# Patient Record
Sex: FEMALE | Race: WHITE | Employment: UNEMPLOYED | URBAN - METROPOLITAN AREA
[De-identification: names, ages, dates, MRNs, and addresses within clinical notes are randomized per-mention and may not be internally consistent; named-entity substitution may affect disease eponyms.]

---

## 2017-05-03 PROBLEM — C79.51 SECONDARY CANCER OF BONE (HCC): Status: ACTIVE | Noted: 2017-05-03

## 2017-05-03 PROBLEM — F32.0 MILD SINGLE CURRENT EPISODE OF MAJOR DEPRESSIVE DISORDER (HCC): Status: ACTIVE | Noted: 2017-05-03

## 2017-05-03 PROBLEM — C50.919 METASTATIC BREAST CANCER (HCC): Status: ACTIVE | Noted: 2017-05-03

## 2018-02-12 ENCOUNTER — APPOINTMENT (OUTPATIENT)
Dept: GENERAL RADIOLOGY | Age: 63
DRG: 872 | End: 2018-02-12
Attending: EMERGENCY MEDICINE
Payer: MEDICARE

## 2018-02-12 ENCOUNTER — HOSPITAL ENCOUNTER (INPATIENT)
Age: 63
LOS: 7 days | Discharge: NURSING, REHAB OR LONG-TERM CARE | DRG: 872 | End: 2018-02-19
Attending: EMERGENCY MEDICINE | Admitting: FAMILY MEDICINE
Payer: MEDICARE

## 2018-02-12 DIAGNOSIS — A41.9 SEPSIS, DUE TO UNSPECIFIED ORGANISM: Primary | ICD-10-CM

## 2018-02-12 LAB
ALBUMIN SERPL-MCNC: 3.7 G/DL (ref 3.5–5)
ALBUMIN/GLOB SERPL: 1 {RATIO} (ref 1.1–2.2)
ALP SERPL-CCNC: 79 U/L (ref 45–117)
ALT SERPL-CCNC: 25 U/L (ref 12–78)
ANION GAP SERPL CALC-SCNC: 14 MMOL/L (ref 5–15)
APPEARANCE UR: ABNORMAL
AST SERPL-CCNC: 15 U/L (ref 15–37)
ATRIAL RATE: 113 BPM
BACTERIA URNS QL MICRO: ABNORMAL /HPF
BASOPHILS # BLD: 0 K/UL (ref 0–0.1)
BASOPHILS NFR BLD: 0 % (ref 0–1)
BILIRUB SERPL-MCNC: 0.6 MG/DL (ref 0.2–1)
BILIRUB UR QL: NEGATIVE
BUN SERPL-MCNC: 24 MG/DL (ref 6–20)
BUN/CREAT SERPL: 21 (ref 12–20)
CALCIUM SERPL-MCNC: 8.7 MG/DL (ref 8.5–10.1)
CALCULATED P AXIS, ECG09: 7 DEGREES
CALCULATED R AXIS, ECG10: 47 DEGREES
CALCULATED T AXIS, ECG11: 100 DEGREES
CHLORIDE SERPL-SCNC: 104 MMOL/L (ref 97–108)
CO2 SERPL-SCNC: 18 MMOL/L (ref 21–32)
COLOR UR: ABNORMAL
CREAT SERPL-MCNC: 1.15 MG/DL (ref 0.55–1.02)
DIAGNOSIS, 93000: NORMAL
DIFFERENTIAL METHOD BLD: ABNORMAL
EOSINOPHIL # BLD: 0 K/UL (ref 0–0.4)
EOSINOPHIL NFR BLD: 0 % (ref 0–7)
EPITH CASTS URNS QL MICRO: ABNORMAL /LPF
ERYTHROCYTE [DISTWIDTH] IN BLOOD BY AUTOMATED COUNT: 12.9 % (ref 11.5–14.5)
FLUAV AG NPH QL IA: NEGATIVE
FLUBV AG NOSE QL IA: NEGATIVE
GLOBULIN SER CALC-MCNC: 3.8 G/DL (ref 2–4)
GLUCOSE SERPL-MCNC: 132 MG/DL (ref 65–100)
GLUCOSE UR STRIP.AUTO-MCNC: NEGATIVE MG/DL
HCT VFR BLD AUTO: 45.8 % (ref 35–47)
HGB BLD-MCNC: 15.7 G/DL (ref 11.5–16)
HGB UR QL STRIP: ABNORMAL
IMM GRANULOCYTES # BLD: 0.2 K/UL (ref 0–0.04)
IMM GRANULOCYTES NFR BLD AUTO: 1 % (ref 0–0.5)
KETONES UR QL STRIP.AUTO: NEGATIVE MG/DL
LACTATE SERPL-SCNC: 1.8 MMOL/L (ref 0.4–2)
LACTATE SERPL-SCNC: 3.6 MMOL/L (ref 0.4–2)
LEUKOCYTE ESTERASE UR QL STRIP.AUTO: ABNORMAL
LYMPHOCYTES # BLD: 0.2 K/UL (ref 0.8–3.5)
LYMPHOCYTES NFR BLD: 1 % (ref 12–49)
MCH RBC QN AUTO: 31.1 PG (ref 26–34)
MCHC RBC AUTO-ENTMCNC: 34.3 G/DL (ref 30–36.5)
MCV RBC AUTO: 90.7 FL (ref 80–99)
MONOCYTES # BLD: 0.5 K/UL (ref 0–1)
MONOCYTES NFR BLD: 3 % (ref 5–13)
NEUTS SEG # BLD: 16.5 K/UL (ref 1.8–8)
NEUTS SEG NFR BLD: 95 % (ref 32–75)
NITRITE UR QL STRIP.AUTO: NEGATIVE
NRBC # BLD: 0 K/UL (ref 0–0.01)
NRBC BLD-RTO: 0 PER 100 WBC
P-R INTERVAL, ECG05: 138 MS
PH UR STRIP: 6 [PH] (ref 5–8)
PLATELET # BLD AUTO: 259 K/UL (ref 150–400)
PMV BLD AUTO: 10.8 FL (ref 8.9–12.9)
POTASSIUM SERPL-SCNC: 4.7 MMOL/L (ref 3.5–5.1)
PROT SERPL-MCNC: 7.5 G/DL (ref 6.4–8.2)
PROT UR STRIP-MCNC: 30 MG/DL
Q-T INTERVAL, ECG07: 318 MS
QRS DURATION, ECG06: 68 MS
QTC CALCULATION (BEZET), ECG08: 436 MS
RBC # BLD AUTO: 5.05 M/UL (ref 3.8–5.2)
RBC #/AREA URNS HPF: ABNORMAL /HPF (ref 0–5)
RBC MORPH BLD: ABNORMAL
SODIUM SERPL-SCNC: 136 MMOL/L (ref 136–145)
SP GR UR REFRACTOMETRY: 1.02 (ref 1–1.03)
UROBILINOGEN UR QL STRIP.AUTO: 0.2 EU/DL (ref 0.2–1)
VENTRICULAR RATE, ECG03: 113 BPM
WBC # BLD AUTO: 17.4 K/UL (ref 3.6–11)
WBC URNS QL MICRO: ABNORMAL /HPF (ref 0–4)

## 2018-02-12 PROCEDURE — 96365 THER/PROPH/DIAG IV INF INIT: CPT

## 2018-02-12 PROCEDURE — 87040 BLOOD CULTURE FOR BACTERIA: CPT | Performed by: EMERGENCY MEDICINE

## 2018-02-12 PROCEDURE — 81001 URINALYSIS AUTO W/SCOPE: CPT | Performed by: EMERGENCY MEDICINE

## 2018-02-12 PROCEDURE — 74011250637 HC RX REV CODE- 250/637: Performed by: INTERNAL MEDICINE

## 2018-02-12 PROCEDURE — 74011000258 HC RX REV CODE- 258: Performed by: EMERGENCY MEDICINE

## 2018-02-12 PROCEDURE — 96361 HYDRATE IV INFUSION ADD-ON: CPT

## 2018-02-12 PROCEDURE — 87804 INFLUENZA ASSAY W/OPTIC: CPT | Performed by: EMERGENCY MEDICINE

## 2018-02-12 PROCEDURE — 74011250636 HC RX REV CODE- 250/636: Performed by: EMERGENCY MEDICINE

## 2018-02-12 PROCEDURE — 85025 COMPLETE CBC W/AUTO DIFF WBC: CPT | Performed by: EMERGENCY MEDICINE

## 2018-02-12 PROCEDURE — 36415 COLL VENOUS BLD VENIPUNCTURE: CPT | Performed by: EMERGENCY MEDICINE

## 2018-02-12 PROCEDURE — 71045 X-RAY EXAM CHEST 1 VIEW: CPT

## 2018-02-12 PROCEDURE — 93005 ELECTROCARDIOGRAM TRACING: CPT

## 2018-02-12 PROCEDURE — 80053 COMPREHEN METABOLIC PANEL: CPT | Performed by: EMERGENCY MEDICINE

## 2018-02-12 PROCEDURE — 96376 TX/PRO/DX INJ SAME DRUG ADON: CPT

## 2018-02-12 PROCEDURE — 96375 TX/PRO/DX INJ NEW DRUG ADDON: CPT

## 2018-02-12 PROCEDURE — 83605 ASSAY OF LACTIC ACID: CPT | Performed by: EMERGENCY MEDICINE

## 2018-02-12 PROCEDURE — 65270000032 HC RM SEMIPRIVATE

## 2018-02-12 PROCEDURE — 65660000000 HC RM CCU STEPDOWN

## 2018-02-12 PROCEDURE — 74011250637 HC RX REV CODE- 250/637: Performed by: FAMILY MEDICINE

## 2018-02-12 PROCEDURE — 99285 EMERGENCY DEPT VISIT HI MDM: CPT

## 2018-02-12 RX ORDER — FENTANYL CITRATE 50 UG/ML
100 INJECTION, SOLUTION INTRAMUSCULAR; INTRAVENOUS
Status: COMPLETED | OUTPATIENT
Start: 2018-02-12 | End: 2018-02-12

## 2018-02-12 RX ORDER — ENOXAPARIN SODIUM 100 MG/ML
40 INJECTION SUBCUTANEOUS EVERY 24 HOURS
Status: DISCONTINUED | OUTPATIENT
Start: 2018-02-12 | End: 2018-02-19 | Stop reason: HOSPADM

## 2018-02-12 RX ORDER — LORAZEPAM 2 MG/ML
2 INJECTION INTRAMUSCULAR
Status: COMPLETED | OUTPATIENT
Start: 2018-02-12 | End: 2018-02-12

## 2018-02-12 RX ORDER — CARVEDILOL 12.5 MG/1
12.5 TABLET ORAL 2 TIMES DAILY WITH MEALS
Status: DISCONTINUED | OUTPATIENT
Start: 2018-02-12 | End: 2018-02-19 | Stop reason: HOSPADM

## 2018-02-12 RX ORDER — ONDANSETRON 2 MG/ML
4 INJECTION INTRAMUSCULAR; INTRAVENOUS
Status: COMPLETED | OUTPATIENT
Start: 2018-02-12 | End: 2018-02-12

## 2018-02-12 RX ORDER — POLYETHYLENE GLYCOL 3350 17 G/17G
17 POWDER, FOR SOLUTION ORAL DAILY
COMMUNITY
End: 2018-02-19

## 2018-02-12 RX ORDER — BISMUTH SUBSALICYLATE 262 MG
1 TABLET,CHEWABLE ORAL DAILY
COMMUNITY
End: 2018-04-11 | Stop reason: SDUPTHER

## 2018-02-12 RX ORDER — MIRTAZAPINE 15 MG/1
15 TABLET, FILM COATED ORAL
Status: ON HOLD | COMMUNITY
End: 2018-04-05

## 2018-02-12 RX ORDER — SODIUM CHLORIDE 0.9 % (FLUSH) 0.9 %
5-10 SYRINGE (ML) INJECTION AS NEEDED
Status: DISCONTINUED | OUTPATIENT
Start: 2018-02-12 | End: 2018-02-19 | Stop reason: HOSPADM

## 2018-02-12 RX ORDER — LOSARTAN POTASSIUM 50 MG/1
50 TABLET ORAL DAILY
Status: DISCONTINUED | OUTPATIENT
Start: 2018-02-13 | End: 2018-02-19 | Stop reason: HOSPADM

## 2018-02-12 RX ORDER — IBUPROFEN 600 MG/1
600 TABLET ORAL
Status: DISCONTINUED | OUTPATIENT
Start: 2018-02-12 | End: 2018-02-19 | Stop reason: HOSPADM

## 2018-02-12 RX ORDER — CHOLECALCIFEROL (VITAMIN D3) 125 MCG
5 CAPSULE ORAL
Status: ON HOLD | COMMUNITY
End: 2018-04-05

## 2018-02-12 RX ORDER — AMOXICILLIN 250 MG
2 CAPSULE ORAL 2 TIMES DAILY
Status: ON HOLD | COMMUNITY
End: 2018-04-05

## 2018-02-12 RX ORDER — CALCIUM CARBONATE 200(500)MG
1 TABLET,CHEWABLE ORAL AS NEEDED
Status: ON HOLD | COMMUNITY
End: 2018-04-06

## 2018-02-12 RX ORDER — LOPERAMIDE HYDROCHLORIDE 2 MG/1
2 CAPSULE ORAL AS NEEDED
Status: DISCONTINUED | OUTPATIENT
Start: 2018-02-12 | End: 2018-02-19 | Stop reason: HOSPADM

## 2018-02-12 RX ORDER — DOCUSATE SODIUM 100 MG/1
100 CAPSULE, LIQUID FILLED ORAL
Status: DISCONTINUED | OUTPATIENT
Start: 2018-02-12 | End: 2018-02-19 | Stop reason: HOSPADM

## 2018-02-12 RX ORDER — CODEINE PHOSPHATE AND GUAIFENESIN 10; 100 MG/5ML; MG/5ML
5-10 SOLUTION ORAL
COMMUNITY
End: 2018-02-19

## 2018-02-12 RX ORDER — AMOXICILLIN 250 MG
1 CAPSULE ORAL DAILY
Status: DISCONTINUED | OUTPATIENT
Start: 2018-02-13 | End: 2018-02-19 | Stop reason: HOSPADM

## 2018-02-12 RX ORDER — SODIUM CHLORIDE 0.9 % (FLUSH) 0.9 %
5-10 SYRINGE (ML) INJECTION EVERY 8 HOURS
Status: DISCONTINUED | OUTPATIENT
Start: 2018-02-12 | End: 2018-02-19 | Stop reason: HOSPADM

## 2018-02-12 RX ORDER — DOCUSATE SODIUM 100 MG/1
100 CAPSULE, LIQUID FILLED ORAL EVERY 12 HOURS
COMMUNITY
End: 2018-02-19

## 2018-02-12 RX ORDER — ZOLPIDEM TARTRATE 12.5 MG/1
12.5 TABLET, FILM COATED, EXTENDED RELEASE ORAL
Status: DISCONTINUED | OUTPATIENT
Start: 2018-02-12 | End: 2018-02-12

## 2018-02-12 RX ORDER — LEVOFLOXACIN 5 MG/ML
750 INJECTION, SOLUTION INTRAVENOUS
Status: COMPLETED | OUTPATIENT
Start: 2018-02-12 | End: 2018-02-12

## 2018-02-12 RX ORDER — SODIUM CHLORIDE 9 MG/ML
1000 INJECTION, SOLUTION INTRAVENOUS CONTINUOUS
Status: DISCONTINUED | OUTPATIENT
Start: 2018-02-12 | End: 2018-02-16

## 2018-02-12 RX ORDER — ALPRAZOLAM 1 MG/1
1 TABLET ORAL 4 TIMES DAILY
Status: DISCONTINUED | OUTPATIENT
Start: 2018-02-12 | End: 2018-02-19 | Stop reason: HOSPADM

## 2018-02-12 RX ORDER — LEVOFLOXACIN 5 MG/ML
750 INJECTION, SOLUTION INTRAVENOUS EVERY 24 HOURS
Status: DISCONTINUED | OUTPATIENT
Start: 2018-02-13 | End: 2018-02-14 | Stop reason: ALTCHOICE

## 2018-02-12 RX ORDER — ASPIRIN 81 MG/1
81 TABLET ORAL DAILY
Status: DISCONTINUED | OUTPATIENT
Start: 2018-02-13 | End: 2018-02-19 | Stop reason: HOSPADM

## 2018-02-12 RX ADMIN — LOPERAMIDE HYDROCHLORIDE 2 MG: 2 CAPSULE ORAL at 18:33

## 2018-02-12 RX ADMIN — IBUPROFEN 600 MG: 600 TABLET, FILM COATED ORAL at 17:36

## 2018-02-12 RX ADMIN — PIPERACILLIN AND TAZOBACTAM 10.12 G: 3; .375 INJECTION, POWDER, FOR SOLUTION INTRAVENOUS at 11:25

## 2018-02-12 RX ADMIN — SODIUM CHLORIDE 1559 ML: 900 INJECTION, SOLUTION INTRAVENOUS at 11:21

## 2018-02-12 RX ADMIN — SODIUM CHLORIDE 1000 ML: 900 INJECTION, SOLUTION INTRAVENOUS at 10:01

## 2018-02-12 RX ADMIN — ALPRAZOLAM 1 MG: 1 TABLET ORAL at 17:36

## 2018-02-12 RX ADMIN — LOPERAMIDE HYDROCHLORIDE 2 MG: 2 CAPSULE ORAL at 21:27

## 2018-02-12 RX ADMIN — ONDANSETRON 4 MG: 2 INJECTION INTRAMUSCULAR; INTRAVENOUS at 10:34

## 2018-02-12 RX ADMIN — FENTANYL CITRATE 100 MCG: 50 INJECTION, SOLUTION INTRAMUSCULAR; INTRAVENOUS at 11:00

## 2018-02-12 RX ADMIN — VENLAFAXINE 150 MG: 100 TABLET ORAL at 17:36

## 2018-02-12 RX ADMIN — PIPERACILLIN SODIUM,TAZOBACTAM SODIUM 3.38 G: 3; .375 INJECTION, POWDER, FOR SOLUTION INTRAVENOUS at 11:24

## 2018-02-12 RX ADMIN — ALPRAZOLAM 1 MG: 1 TABLET ORAL at 21:27

## 2018-02-12 RX ADMIN — LEVOFLOXACIN 750 MG: 5 INJECTION, SOLUTION INTRAVENOUS at 13:08

## 2018-02-12 RX ADMIN — CARVEDILOL 12.5 MG: 12.5 TABLET, FILM COATED ORAL at 17:35

## 2018-02-12 RX ADMIN — LORAZEPAM 2 MG: 2 INJECTION INTRAMUSCULAR; INTRAVENOUS at 11:19

## 2018-02-12 RX ADMIN — Medication 10 ML: at 21:28

## 2018-02-12 RX ADMIN — ONDANSETRON 4 MG: 2 INJECTION INTRAMUSCULAR; INTRAVENOUS at 10:03

## 2018-02-12 NOTE — PROGRESS NOTES
Admission Medication Reconciliation:    Information obtained from: external medical records ([address])    Significant PMH/Disease States:   Past Medical History:   Diagnosis Date    Anxiety     Cancer [facility])     breast    Depression     GERD (gastroesophageal reflux disease)     HTN (hypertension)     Hypercholesterolemia     Hypotension 9/12/2012    Metastatic breast cancer ([facility] [address] 5/3/2017    Secondary cancer of bone ([facility] 5/3/2017       Chief Complaint for this Admission:    Chief Complaint   Patient presents with    Nausea    Vomiting       Allergies:  Sulfa (sulfonamide antibiotics) and Wellbutrin [bupropion hcl]    Prior to Admission Medications:   Prior to Admission Medications   Prescriptions Last Dose Informant Patient Reported? Taking? alprazolam (XANAX) 1 mg tablet   Yes Yes   Sig: Take 1 mg by mouth four (4) times daily. calcium carbonate (SABINO-GEST ANTACID) 200 mg calcium (500 mg) chew   Yes Yes   Sig: Take 1 Tab by mouth as needed. Indications: Heartburn   carvedilol (COREG) 12.5 mg tablet   Yes Yes   Sig: Take  by mouth two (2) times daily (with meals). docusate sodium (COLACE) 100 mg capsule   Yes Yes   Sig: Take 100 mg by mouth every twelve (12) hours. guaiFENesin-codeine (ROBITUSSIN AC) 100-10 mg/5 mL solution   Yes Yes   Sig: Take 5-10 mL by mouth four (4) times daily as needed for Cough. losartan (COZAAR) 50 mg tablet   Yes Yes   Sig: Take 50 mg by mouth daily. melatonin tab tablet   Yes Yes   Sig: Take 5 mg by mouth nightly. mirtazapine (REMERON) 15 mg tablet   Yes Yes   Sig: Take 15 mg by mouth nightly. multivitamin (ONE A DAY) tablet   Yes Yes   Sig: Take 1 Tab by mouth daily. polyethylene glycol (MIRALAX) 17 gram/dose powder   Yes Yes   Sig: Take 17 g by mouth daily. (Mix with liquid of choice)   senna-docusate (SENEXON-S) 8.6-50 mg per tablet   Yes Yes   Sig: Take 2 Tabs by mouth two (2) times a day.    venlafaxine (EFFEXOR) 75 mg tablet   Yes Yes Sig: Take 150 mg by mouth two (2) times a day. Facility-Administered Medications: None         Comments/Recommendations: All medications/allergies have been reviewed and updated. Medication list obtained via fax from [facility] list.     Changes made to Prior to Admission (PTA) Medication List:     Medications Added:  - guaifenesin/codeine PRN  - mirtazapine   - melatonin  - Phil-gest   - Miralax     Medications Changed:  - docusate changed from 1 cap nightly to 1 cap q12hr  - clarified losartan dose as 50 mg daily     Medications Removed:  - duplicate alprazolam order  - aspirin 81 mg  - Esgic plus PRN  - calcium + vitamin D   - chlorhexidine solution  - Tussionex PRN  - Adderall  - Lidex ointment  - Zofran PRN  - Ambien CR      Thank you for allowing me to participate in the care of this patient. Please contact the medication reconciliation pharmacist () with any questions.      Salud Moran, PharmD Candidate Class of 2381

## 2018-02-12 NOTE — IP AVS SNAPSHOT
[address] 
[phone] Patient: Dave Mcdonough MRN: [id number] About your hospitalization You were admitted on:  February 12, 2018 You last received care in the:  [address] [place] [address] You were discharged on:  February 19, 2018 Why you were hospitalized Your primary diagnosis was:  Sepsis (Hcc) Your diagnoses also included:  Urinary Tract Infection Without Hematuria, Htn (Hypertension), Gastroenteritis Follow-up Information Follow up With Details Comments Contact Info Oma Krishnamurthy MD   [address] [address] 
[phone] Discharge Orders None A check ramo indicates which time of day the medication should be taken. My Medications START taking these medications Instructions Each Dose to Equal  
 Morning Noon Evening Bedtime  
 amoxicillin-clavulanate 875-125 mg per tablet Commonly known as:  AUGMENTIN Your last dose was: Your next dose is: Take 1 Tab by mouth every twelve (12) hours. 1 Tab  
    
   
   
   
  
 fentaNYL 50 mcg/hr PATCH Commonly known as:  Jason Montemayor Your last dose was: Your next dose is:    
   
   
 1 Patch by TransDERmal route every seventy-two (72) hours. Max Daily Amount: 1 Patch. 1 Patch  
    
   
   
   
  
 hydroCHLOROthiazide 25 mg tablet Commonly known as:  HYDRODIURIL Your last dose was: Your next dose is: Take 1 Tab by mouth daily. 25 mg CHANGE how you take these medications Instructions Each Dose to Equal  
 Morning Noon Evening Bedtime ALPRAZolam 1 mg tablet Commonly known as:  Liseth Felipe What changed:  Another medication with the same name was removed. Continue taking this medication, and follow the directions you see here. Your last dose was: Your next dose is: Take 1 mg by mouth four (4) times daily. 1 mg  
    
   
   
   
  
 aspirin delayed-release 81 mg tablet What changed:  how much to take Your last dose was: Your next dose is: Take 1 Tab by mouth daily. 81 mg  
    
   
   
   
  
 docusate sodium 100 mg capsule Commonly known as:  Yu Hung What changed:   
- when to take this - Another medication with the same name was removed. Continue taking this medication, and follow the directions you see here. Your last dose was: Your next dose is: Take 1 Cap by mouth nightly for 90 days. 100 mg  
    
   
   
   
  
 multivitamin tablet Commonly known as:  ONE A DAY What changed:  Another medication with the same name was removed. Continue taking this medication, and follow the directions you see here. Your last dose was: Your next dose is: Take 1 Tab by mouth daily. 1 Tab SENEXON-S 8.6-50 mg per tablet Generic drug:  senna-docusate What changed:  Another medication with the same name was removed. Continue taking this medication, and follow the directions you see here. Your last dose was: Your next dose is: Take 2 Tabs by mouth two (2) times a day. 2 Tab CONTINUE taking these medications Instructions Each Dose to Equal  
 Morning Noon Evening Bedtime SBAINO-GEST ANTACID 200 mg calcium (500 mg) Chew Generic drug:  calcium carbonate Your last dose was: Your next dose is: Take 1 Tab by mouth as needed. Indications: Heartburn 1 Tab COREG 12.5 mg tablet Generic drug:  carvedilol Your last dose was: Your next dose is: Take  by mouth two (2) times daily (with meals). losartan 50 mg tablet Commonly known as:  COZAAR Your last dose was: Your next dose is: Take 50 mg by mouth daily. 50 mg  
    
   
   
   
  
 melatonin Tab tablet Your last dose was: Your next dose is: Take 5 mg by mouth nightly. 5 mg  
    
   
   
   
  
 mirtazapine 15 mg tablet Commonly known as:  Tanya Braden Your last dose was: Your next dose is: Take 15 mg by mouth nightly. 15 mg  
    
   
   
   
  
 venlafaxine 75 mg tablet Commonly known as:  [facility] Your last dose was: Your next dose is: Take 150 mg by mouth two (2) times a day. 150 mg  
    
   
   
   
  
  
STOP taking these medications   
 guaiFENesin-codeine 100-10 mg/5 mL solution Commonly known as:  ROBITUSSIN AC  
   
  
 MIRALAX 17 gram/dose powder Generic drug:  polyethylene glycol Where to Get Your Medications Information on where to get these meds will be given to you by the nurse or doctor. ! Ask your nurse or doctor about these medications  
  amoxicillin-clavulanate 875-125 mg per tablet  
 aspirin delayed-release 81 mg tablet  
 docusate sodium 100 mg capsule  
 fentaNYL 50 mcg/hr PATCH  
 hydroCHLOROthiazide 25 mg tablet Discharge Instructions Patient Discharge Instructions Dustin Valle / [id number] : 1955 Admitted 2018 Discharged: 2018 Take Home Medications · It is important that you take the medication exactly as they are prescribed. · Keep your medication in the bottles provided by the pharmacist and keep a list of the medication names, dosages, and times to be taken in your wallet. · Do not take other medications without consulting your doctor. What to do at [facility] Recommended diet: Regular Diet, Recommended activity: Activity as tolerated, If you experience any of the following symptoms abd pain, please follow up with Dr Shea Ghotra. Follow-up Appointments Procedures  FOLLOW UP VISIT Appointment in: One Week Dr Akin Abdi Standing Status:   Standing Number of Occurrences:   1 Order Specific Question:   Appointment in Answer: One Week Information obtained by : 
I understand that if any problems occur once I am at home I am to contact my physician. I understand and acknowledge receipt of the instructions indicated above. Physician's or R.N.'s Signature                                                                  Date/Time Patient or Representative Signature                                                          Date/Time Introducing \[facility] [place] [facility]\"" & HEALTH SERVICES! [facility] introduces [employer] patient portal. Now you can access parts of your medical record, email your doctor's office, and request medication refills online. 1. In your internet browser, go to [email or web address] 2. Click on the First Time User? Click Here link in the Sign In box. You will see the New Member Sign Up page. 3. Enter your [employer] Access Code exactly as it appears below. You will not need to use this code after youve completed the sign-up process. If you do not sign up before the expiration date, you must request a new code. · [employer] Access Code: [email or web address] Expires: 5/20/2018  8:09 AM 
 
4. Enter the last four digits of your Social Security Number (xxxx) and Date of Birth (mm/dd/yyyy) as indicated and click Submit. You will be taken to the next sign-up page. 5. Create a [employer] ID. This will be your [employer] login ID and cannot be changed, so think of one that is secure and easy to remember. 6. Create a [employer] password. You can change your password at any time. 7. Enter your Password Reset Question and Answer. This can be used at a later time if you forget your password. 8. Enter your e-mail address. You will receive e-mail notification when new information is available in [address] 9. Click Sign Up. You can now view and download portions of your medical record. 10. Click the Download Summary menu link to download a portable copy of your medical information. If you have questions, please visit the Frequently Asked Questions section of the [employer] website. Remember, [employer] is NOT to be used for urgent needs. For medical emergencies, dial 911. Now available from your iPhone and Android! Unresulted Labs-Please follow up with your PCP about these lab tests Order Current Status NOROVIRUS BY PCR In process Providers Seen During Your Hospitalization Provider Specialty Primary office phone Hussein Chacon MD Emergency Medicine [phone] Zachary Rinaldi MD Family Practice [phone] Your Primary Care Physician (PCP) Primary Care Physician Office Phone Office Fax Liv Alessandra [phone] You are allergic to the following Allergen Reactions Sulfa (Sulfonamide Antibiotics) Unknown (comments) Wellbutrin (Bupropion Hcl) Unknown (comments) Recent Documentation Height Weight BMI OB Status Smoking Status 1.651 m 85.3 kg 31.28 kg/m2 Postmenopausal Former Smoker Emergency Contacts Name Discharge Info Relation Home Work Mobile Barrera Hare CAREGIVER [3] Friend [5] [phone] Patient Belongings The following personal items are in your possession at time of discharge: 
  Dental Appliances: None  Visual Aid: Glasses      Home Medications: None   Jewelry: Watch  Clothing: Robe, Pajamas, Slippers    Other Valuables: Cell Phone Please provide this summary of care documentation to your next provider. Signatures-by signing, you are acknowledging that this After Visit Summary has been reviewed with you and you have received a copy. Patient Signature:  ____________________________________________________________ Date:  ____________________________________________________________  
  
Devota Dandy Provider Signature:  ____________________________________________________________ Date:  ____________________________________________________________

## 2018-02-12 NOTE — ED TRIAGE NOTES
Pt arrives via EMS from [facility] c/o N/V x 30 times since 1015 PM last night, fevers, rib tenderness. Hx of Breast CA with mets to the bone.

## 2018-02-12 NOTE — ED NOTES
Pt anxious in bed, asking questions repetitively, restless. MD at bedside and made aware. Pt given anxiety medication per order. Will continue to monitor. Reports taking 1 mg PO Xanax qid and has missed all doses today. 12:25 PM  Pt reports improvement in anxiety after medication administration.

## 2018-02-12 NOTE — ROUTINE PROCESS
TRANSFER - OUT REPORT:    Verbal report given to Shayan Gregorio RN(name) on Kourtney Canada  being transferred to (unit) for routine progression of care       Report consisted of patients Situation, Background, Assessment and   Recommendations(SBAR). Information from the following report(s) SBAR, ED Summary, Intake/Output, MAR, Recent Results and Cardiac Rhythm ST was reviewed with the receiving nurse. Lines:   Venous Access Device Power Port Upper chest (subclavicular area, right (Active)   Central Line Being Utilized Yes 2/12/2018  9:49 AM   Site Assessment Clean, dry, & intact 2/12/2018  9:49 AM   Date of Last Dressing Change 02/12/18 2/12/2018  9:49 AM   Dressing Status Clean, dry, & intact 2/12/2018  9:49 AM   Dressing Type Transparent 2/12/2018  9:49 AM   Date Accessed (Medial Site) 02/12/18 2/12/2018  9:49 AM   Access Time (Medial Site) 0950 2/12/2018  9:49 AM   Access Needle Size (Site #1) 20 G 2/12/2018  9:49 AM   Access Needle Length (Medial Site) 1 inch 2/12/2018  9:49 AM   Positive Blood Return (Medial Site) Yes 2/12/2018  9:49 AM   Action Taken (Medial Site) Flushed;Blood drawn 2/12/2018  9:49 AM       Peripheral IV 02/12/18 Right;Posterior Forearm (Active)   Site Assessment Clean, dry, & intact 2/12/2018  1:06 PM   Phlebitis Assessment 0 2/12/2018  1:06 PM   Infiltration Assessment 0 2/12/2018  1:06 PM   Dressing Status Clean, dry, & intact 2/12/2018  1:06 PM   Dressing Type Transparent 2/12/2018  1:06 PM   Hub Color/Line Status Patent; Flushed 2/12/2018  1:06 PM   Action Taken Blood drawn 2/12/2018  1:06 PM        Opportunity for questions and clarification was provided.       Patient transported with:   Monitor

## 2018-02-12 NOTE — PROGRESS NOTES
Primary Nurse Fawad Paniagua and Braden Alvares RN performed a dual skin assessment on this patient No impairment noted  Adama score is 20

## 2018-02-12 NOTE — PROGRESS NOTES
Problem: Falls - Risk of  Goal: *Absence of Falls  Document Mary Ellen Fall Risk and appropriate interventions in the flowsheet.    Outcome: Progressing Towards Goal  Fall Risk Interventions:  Mobility Interventions: Patient to call before getting OOB    Mentation Interventions: Evaluate medications/consider consulting pharmacy    Medication Interventions: Patient to call before getting OOB    Elimination Interventions: Call light in reach    History of Falls Interventions: Door open when patient unattended

## 2018-02-12 NOTE — ED PROVIDER NOTES
HPI Comments: 63 y.o. female with past medical history significant for HTN, GERD, depression, metastatic breast CA, and bone CA who presents from [facility] via EMS with chief complaint of vomiting. Pt reports 25-30 episodes of vomiting and diarrhea since 2115 last night with accompanying nausea and chills. Pt states she wasn't feeling well prior to symptoms starting, noting she only had some rice for dinner. Pt reports she had a low grade fever of 99.4 at home. Pt reports she has Stage 4 metastatic breast CA, stopped chemotherapy due to a cardiac complications and now is getting \"injections\" and has a port. Pt reports the CA has metastasized to her ribs and sternum, which the vomiting has aggravated and the pt reports CP. Pt reports finished a course of zithromax 3 or 4 days ago for an ear infection. Pt states her L shoulder and arm has hurt for the last 3 days. Pt denies any blood in emesis or stool, cough, or SOB. There are no other acute medical concerns at this time. Social hx: Former smoker (Quit 2012); No EtOH use  PCP: Andreia Antunez MD    Note written by Luz Marina Reyna, as dictated by Efra Rogers MD 10:12 AM          The history is provided by the patient. No  was used.         Past Medical History:   Diagnosis Date    Anxiety     Cancer [facility])     breast    Depression     GERD (gastroesophageal reflux disease)     HTN (hypertension)     Hypercholesterolemia     Hypotension 9/12/2012    Metastatic breast cancer ([facility] [address] 5/3/2017    Secondary cancer of bone ([facility] 5/3/2017       Past Surgical History:   Procedure Laterality Date    BREAST SURGERY PROCEDURE UNLISTED  march 13    carina masectomy         Family History:   Problem Relation Age of Onset    Hypertension Mother     Heart Disease Mother     Hypertension Father        Social History     Social History    Marital status: SINGLE     Spouse name: N/A    Number of children: N/A    Years of education: N/A Occupational History    Not on file. Social History Main Topics    Smoking status: Former Smoker     Packs/day: 0.50     Years: 20.00     Quit date: 1/18/2012    Smokeless tobacco: Never Used    Alcohol use No    Drug use: No    Sexual activity: Not on file     Other Topics Concern    Not on file     Social History Narrative         ALLERGIES: Sulfa (sulfonamide antibiotics) and Wellbutrin [bupropion hcl]    Review of Systems   Constitutional: Positive for chills and fever. Negative for diaphoresis. HENT: Negative for congestion, postnasal drip, rhinorrhea and sore throat. Eyes: Negative for photophobia, discharge, redness and visual disturbance. Respiratory: Negative for cough, chest tightness, shortness of breath and wheezing. Cardiovascular: Positive for chest pain. Negative for palpitations and leg swelling. Gastrointestinal: Positive for diarrhea, nausea and vomiting. Negative for abdominal distention, abdominal pain, blood in stool and constipation. Genitourinary: Negative for difficulty urinating, dysuria, frequency, hematuria and urgency. Musculoskeletal: Positive for arthralgias. Negative for back pain, joint swelling and myalgias. Skin: Negative for color change and rash. Neurological: Negative for dizziness, speech difficulty, weakness, light-headedness, numbness and headaches. Psychiatric/Behavioral: Negative for confusion. The patient is not nervous/anxious. All other systems reviewed and are negative. There were no vitals filed for this visit. Physical Exam   Constitutional: She is oriented to person, place, and time. She appears well-developed and well-nourished. No distress. HENT:   Head: Normocephalic and atraumatic. Right Ear: External ear normal.   Left Ear: External ear normal.   Nose: Nose normal.   Mouth/Throat: Oropharynx is clear and moist.   Eyes: Conjunctivae and EOM are normal. Pupils are equal, round, and reactive to light.  No scleral icterus. Neck: Normal range of motion. Neck supple. No JVD present. No tracheal deviation present. No thyromegaly present. Cardiovascular: Normal rate, regular rhythm and normal heart sounds. Exam reveals no gallop and no friction rub. No murmur heard. Regular rate and rhythm. No abnormal heart sounds. Pulmonary/Chest: Effort normal and breath sounds normal. No respiratory distress. She has no wheezes. She has no rales. She exhibits no tenderness. Clear lungs. Bilateral mastectomy. Abdominal: Soft. Bowel sounds are normal. She exhibits no distension and no mass. There is no tenderness. There is no rebound and no guarding. Musculoskeletal: Normal range of motion. She exhibits edema. She exhibits no tenderness. Trace pretibal edema. Lymphadenopathy:     She has no cervical adenopathy. Neurological: She is alert and oriented to person, place, and time. She has normal strength. She displays no atrophy and no tremor. No cranial nerve deficit. She exhibits normal muscle tone. Coordination and gait normal.   Skin: Skin is warm and dry. No rash noted. She is not diaphoretic. No erythema. Psychiatric: She has a normal mood and affect. Her behavior is normal. Judgment and thought content normal.   Nursing note and vitals reviewed. Note written by Luz Marina Scherer, as dictated by Leonidas Rob MD 10:12 AM    MDM  Number of Diagnoses or Management Options  Sepsis, due to unspecified organism [facility]):   Diagnosis management comments: MICHAEL  Impression: 29-year-old female presented to emergency department with acute onset of multiple episodes of nausea vomiting associated with diarrhea. No fever chills, no hematemesis or blood noted in the stool. She did finish antibiotics Zithromax for bronchitis recently, states usually gets diarrhea when she's taken antibiotics. No offending foods that cause this.  The patient is getting injections for her chemotherapy 3 times a week and is followed at [address] [place] [address] for this. The patient lives in an assisted living facility. Differential includes dehydration, electrolyte abnormalities, consider this to be a viral etiology for her nausea vomiting and diarrhea. Consider urinary tract infection. Consider bronchitis or pneumonia. Plan of care we baseline labs, urinalysis, chest x-ray, IV fluids and will treat symptomatically while in the emergency department. Critical Care  Total time providing critical care: (Total critical care time spend exclusive of procedures: 45 minutes  )        ED Course       Procedures      ED EKG interpretation: 8:59  Rhythm: sinus tachycardia; and regular . Rate (approx.): 113 bpm; Axis: normal; ST/T wave: non-specific changes. Note written by Luz Marina Butler, as dictated by Ki Angeles MD 9:09 AM       CONSULT NOTE:  12:34 PM Ki Angeles MD spoke with Dr. Erick Ryan, Consult for PCP. Discussed available diagnostic tests and clinical findings. He is in agreement with care plans as outlined. Dr. Erick Ryan will admit the pt.

## 2018-02-12 NOTE — ED NOTES
Pt reports that she did not have lymph nodes removed on R side with mastectomy. Reports that she believes sx was ~10 years ago. PIV started on RUE for additional access for abx administration. Denies ability to urinate at this time, IVF infusing, will continue to monitor I/O.     1340  Stool x 1, brief changed and pericare provided, repositioned in bed.     3:28 PM  Pt now notes that she thinks mastectomy was performed in 2011, but unsure of exact date. Also unsure whether lymph nodes were removed. IV abx completed and RUE PIV locked at this time.

## 2018-02-13 PROBLEM — N39.0 URINARY TRACT INFECTION WITHOUT HEMATURIA: Status: ACTIVE | Noted: 2018-02-13

## 2018-02-13 LAB
BASOPHILS # BLD: 0 K/UL (ref 0–0.1)
BASOPHILS NFR BLD: 0 % (ref 0–1)
C DIFF TOX GENS STL QL NAA+PROBE: NEGATIVE
DIFFERENTIAL METHOD BLD: ABNORMAL
EOSINOPHIL # BLD: 0 K/UL (ref 0–0.4)
EOSINOPHIL NFR BLD: 0 % (ref 0–7)
ERYTHROCYTE [DISTWIDTH] IN BLOOD BY AUTOMATED COUNT: 13 % (ref 11.5–14.5)
HCT VFR BLD AUTO: 30.9 % (ref 35–47)
HGB BLD-MCNC: 10.6 G/DL (ref 11.5–16)
IMM GRANULOCYTES # BLD: 0.1 K/UL (ref 0–0.04)
IMM GRANULOCYTES NFR BLD AUTO: 1 % (ref 0–0.5)
LYMPHOCYTES # BLD: 0.5 K/UL (ref 0.8–3.5)
LYMPHOCYTES NFR BLD: 9 % (ref 12–49)
MCH RBC QN AUTO: 31.8 PG (ref 26–34)
MCHC RBC AUTO-ENTMCNC: 34.3 G/DL (ref 30–36.5)
MCV RBC AUTO: 92.8 FL (ref 80–99)
MONOCYTES # BLD: 0.3 K/UL (ref 0–1)
MONOCYTES NFR BLD: 6 % (ref 5–13)
NEUTS SEG # BLD: 4.4 K/UL (ref 1.8–8)
NEUTS SEG NFR BLD: 83 % (ref 32–75)
NRBC # BLD: 0 K/UL (ref 0–0.01)
NRBC BLD-RTO: 0 PER 100 WBC
PLATELET # BLD AUTO: 146 K/UL (ref 150–400)
PMV BLD AUTO: 10.9 FL (ref 8.9–12.9)
RBC # BLD AUTO: 3.33 M/UL (ref 3.8–5.2)
WBC # BLD AUTO: 5.3 K/UL (ref 3.6–11)

## 2018-02-13 PROCEDURE — 85025 COMPLETE CBC W/AUTO DIFF WBC: CPT | Performed by: FAMILY MEDICINE

## 2018-02-13 PROCEDURE — 65660000000 HC RM CCU STEPDOWN

## 2018-02-13 PROCEDURE — 74011250637 HC RX REV CODE- 250/637: Performed by: FAMILY MEDICINE

## 2018-02-13 PROCEDURE — 74011250637 HC RX REV CODE- 250/637: Performed by: INTERNAL MEDICINE

## 2018-02-13 PROCEDURE — 97116 GAIT TRAINING THERAPY: CPT

## 2018-02-13 PROCEDURE — 97162 PT EVAL MOD COMPLEX 30 MIN: CPT

## 2018-02-13 PROCEDURE — 74011250636 HC RX REV CODE- 250/636: Performed by: EMERGENCY MEDICINE

## 2018-02-13 PROCEDURE — 36415 COLL VENOUS BLD VENIPUNCTURE: CPT | Performed by: FAMILY MEDICINE

## 2018-02-13 PROCEDURE — 87493 C DIFF AMPLIFIED PROBE: CPT | Performed by: FAMILY MEDICINE

## 2018-02-13 PROCEDURE — 87045 FECES CULTURE AEROBIC BACT: CPT | Performed by: FAMILY MEDICINE

## 2018-02-13 PROCEDURE — G8979 MOBILITY GOAL STATUS: HCPCS

## 2018-02-13 PROCEDURE — G8978 MOBILITY CURRENT STATUS: HCPCS

## 2018-02-13 PROCEDURE — 74011250636 HC RX REV CODE- 250/636: Performed by: FAMILY MEDICINE

## 2018-02-13 PROCEDURE — 74011250636 HC RX REV CODE- 250/636: Performed by: INTERNAL MEDICINE

## 2018-02-13 RX ORDER — FENTANYL 50 UG/1
1 PATCH TRANSDERMAL
Status: DISCONTINUED | OUTPATIENT
Start: 2018-02-13 | End: 2018-02-19 | Stop reason: HOSPADM

## 2018-02-13 RX ORDER — FENTANYL CITRATE 50 UG/ML
100 INJECTION, SOLUTION INTRAMUSCULAR; INTRAVENOUS
Status: DISCONTINUED | OUTPATIENT
Start: 2018-02-13 | End: 2018-02-15

## 2018-02-13 RX ADMIN — LOPERAMIDE HYDROCHLORIDE 2 MG: 2 CAPSULE ORAL at 10:20

## 2018-02-13 RX ADMIN — ASPIRIN 81 MG: 81 TABLET, COATED ORAL at 09:27

## 2018-02-13 RX ADMIN — LOSARTAN POTASSIUM 50 MG: 50 TABLET ORAL at 09:27

## 2018-02-13 RX ADMIN — VENLAFAXINE 150 MG: 100 TABLET ORAL at 09:41

## 2018-02-13 RX ADMIN — LEVOFLOXACIN 750 MG: 5 INJECTION, SOLUTION INTRAVENOUS at 13:47

## 2018-02-13 RX ADMIN — CARVEDILOL 12.5 MG: 12.5 TABLET, FILM COATED ORAL at 09:27

## 2018-02-13 RX ADMIN — ALPRAZOLAM 1 MG: 1 TABLET ORAL at 17:24

## 2018-02-13 RX ADMIN — ALPRAZOLAM 1 MG: 1 TABLET ORAL at 09:27

## 2018-02-13 RX ADMIN — PIPERACILLIN AND TAZOBACTAM 10.12 G: 3; .375 INJECTION, POWDER, FOR SOLUTION INTRAVENOUS at 11:58

## 2018-02-13 RX ADMIN — SODIUM CHLORIDE 1000 ML: 900 INJECTION, SOLUTION INTRAVENOUS at 11:58

## 2018-02-13 RX ADMIN — Medication 10 ML: at 22:01

## 2018-02-13 RX ADMIN — LOPERAMIDE HYDROCHLORIDE 2 MG: 2 CAPSULE ORAL at 17:32

## 2018-02-13 RX ADMIN — ALPRAZOLAM 1 MG: 1 TABLET ORAL at 22:01

## 2018-02-13 RX ADMIN — VENLAFAXINE 150 MG: 100 TABLET ORAL at 17:24

## 2018-02-13 RX ADMIN — FENTANYL CITRATE 100 MCG: 50 INJECTION, SOLUTION INTRAMUSCULAR; INTRAVENOUS at 05:23

## 2018-02-13 RX ADMIN — ENOXAPARIN SODIUM 40 MG: 40 INJECTION SUBCUTANEOUS at 15:55

## 2018-02-13 RX ADMIN — ALPRAZOLAM 1 MG: 1 TABLET ORAL at 13:47

## 2018-02-13 RX ADMIN — Medication 10 ML: at 13:47

## 2018-02-13 RX ADMIN — CARVEDILOL 12.5 MG: 12.5 TABLET, FILM COATED ORAL at 17:24

## 2018-02-13 RX ADMIN — LOPERAMIDE HYDROCHLORIDE 2 MG: 2 CAPSULE ORAL at 06:22

## 2018-02-13 NOTE — PROGRESS NOTES
Paged on call for Dr. Perla Whitmore. Dr. Claudetta Bays returned my page, informed of severe pain in ribcage/sternum that the patient was wanting something more than motrin for. Was given 100mcg fent. In ED. Orders received for QID PRN fent. 100mcg.

## 2018-02-13 NOTE — PROGRESS NOTES
physical Therapy EVALUATION  Patient: India Delacruz (63 y.o. female)  Date: 2/13/2018  Primary Diagnosis: Sepsis [facility])        Precautions:   Fall, Bilateral Mastectomies - no BP in UE    ASSESSMENT :  Based on the objective data described below, the patient presents with Stage IV Breast Ca with mets to ribs/sternum with onset of nausea, vomiting, and diarrhea. She was negative for the flu and was finally able to eat bland food this AM. She is normally independent with ADL's and ambulation despite living in an [address] [place] [address]. She is planning on moving to an apartment alone in [place] in the future. She is agreeable to participate in PT despite feeling fatigued. She was minimal assist for bed mobility and transfers. She was able to ambulate 100 feet pushing the IV pole for balance and confidence. No loss of balance but +fatigue with activity. She scored a 20/28 on the Tinetti which classifies her as a moderate fall risk due to not feeling well and being mostly bed bound due to illness for the last few days. Anticipate quick return to prior functional activity. Will continue to follow for PT services to progress toward independent and functional activity prior to discharge. Do not anticipate any Home Health needs. Patient will benefit from skilled intervention to address the above impairments.   Patients rehabilitation potential is considered to be Fair  Factors which may influence rehabilitation potential include:   []         None noted  []         Mental ability/status  [x]         Medical condition  [x]         Home/family situation and support systems  []         Safety awareness  []         Pain tolerance/management  []         Other:      PLAN :  Recommendations and Planned Interventions:  [x]           Bed Mobility Training             []    Neuromuscular Re-Education  [x]           Transfer Training                   []    Orthotic/Prosthetic Training  [x]           Gait Training []    Modalities  [x]           Therapeutic Exercises           []    Edema Management/Control  [x]           Therapeutic Activities            [x]    Patient and Family Training/Education  []           Other (comment):    Frequency/Duration: Patient will be followed by physical therapy  5 times a week to address goals. Discharge Recommendations: None  Further Equipment Recommendations for Discharge: None? SUBJECTIVE:   Patient stated I would like to try to walk. I have been in bed for a few days.     OBJECTIVE DATA SUMMARY:   HISTORY:    Past Medical History:   Diagnosis Date    Anxiety     Cancer ([facility]     breast    Depression     GERD (gastroesophageal reflux disease)     HTN (hypertension)     Hypercholesterolemia     Hypotension 9/12/2012    [facility] 5/[facility]/2017    Secondary cancer of bone ([facility] 5/3/2017     Past Surgical History:   Procedure Laterality Date    BREAST SURGERY PROCEDURE UNLISTED  march 13    carina masectomy     Prior Level of Function/Home Situation: Lives in an [address] but does not require assistance for ADL's or ambulation. No assistive device and no recent falls. She plans to move to an apartment in [place] to be near her friend. She is a retired nurse. Drives.   Personal factors and/or comorbidities impacting plan of care:     Home Situation  [address] Environment[address] Name: [facility]  # Steps to Enter: 0  One/Two Story Residence: One story  Lift Chair Available: No (elevator)  Living Alone: No  Support Systems: Assisted living ([address])  Patient Expects to be Discharged to[de-identified] Assisted living  Current DME Used/Available at Home: None    EXAMINATION/PRESENTATION/DECISION MAKING:   Critical Behavior:  Neurologic State: Alert  Orientation Level: Oriented X4  Cognition: Appropriate decision making, Appropriate for age attention/concentration, Appropriate safety awareness  Safety/Judgement: Home safety, Fall prevention, Insight into deficits, Good awareness of safety precautions  Hearing: Auditory  Auditory Impairment: None  Skin:  Appears grossly intact. Edema: none apparent  Range Of Motion:  AROM: Generally decreased, functional  PROM: Generally decreased, functional  Strength:    Strength: Generally decreased, functional     Tone & Sensation:   Tone: Normal  Sensation: Intact    Coordination:  Coordination: Generally decreased, functional  Functional Mobility:  Bed Mobility:  Rolling: Supervision  Supine to Sit: Minimum assistance (increased time)  Sit to Supine:  (Up in the chair. )  Scooting: Supervision  Transfers:  Sit to Stand: Minimum assistance; Additional time  Stand to Sit: Contact guard assistance  Balance:   Sitting: Intact  Standing: Intact; With support  Ambulation/Gait Training:  Distance (ft): 100 Feet (ft)  Assistive Device: Gait belt (pushing IV pole)  Ambulation - Level of Assistance: Contact guard assistance  Gait Abnormalities: Decreased step clearance  Speed/Antonietta: Slow  Step Length: Right shortened;Left shortened   No loss of balance +fatigue. Therapeutic Exercises:    Ankle pumps, LAQ's    Functional Measure:  Tinetti test:    Sitting Balance: 1  Arises: 0  Attempts to Rise: 2  Immediate Standing Balance: 2  Standing Balance: 1  Nudged: 1  Eyes Closed: 1  Turn 360 Degrees - Continuous/Discontinuous: 1  Turn 360 Degrees - Steady/Unsteady: 1  Sitting Down: 1  Balance Score: 11  Indication of Gait: 1  R Step Length/Height: 1  L Step Length/Height: 1  R Foot Clearance: 1  L Foot Clearance: 1  Step Symmetry: 1  Step Continuity: 1  Path: 1  Trunk: 0  Walking Time: 1  Gait Score: 9  Total Score: 20       Tinetti Test and G-code impairment scale:  Percentage of Impairment CH    0%   CI    1-19% CJ    20-39% CK    40-59% CL    60-79% CM    80-99% CN     100%   Tinetti  Score 0-28 28 23-27 17-22 12-16 6-11 1-5 0       Tinetti Tool Score Risk of Falls  <19 = High Fall Risk  19-24 = Moderate Fall Risk  25-28 = Low Fall Risk  Tinpanda ME. Performance-Oriented Assessment of Mobility Problems in Elderly Patients. [facility]; [id number]. (Scoring Description: PT Bulletin Feb. 10, 1993)    Older adults: Booker Colon et al, 2009; n = 1000 [facility] elderly evaluated with ABC, LINO, ADL, and IADL)  · Mean LINO score for males aged 69-68 years = 26.21(3.40)  · Mean LINO score for females age 69-68 years = 25.16(4.30)  · Mean LINO score for males over 80 years = 23.29(6.02)  · Mean LINO score for females over 80 years = 17.20(8.32)       G codes: In compliance with CMSs Claims Based Outcome Reporting, the following G-code set was chosen for this patient based on their primary functional limitation being treated: The outcome measure chosen to determine the severity of the functional limitation was the Tinetti with a score of 20/28 which was correlated with the impairment scale. ? Mobility - Walking and Moving Around:     - CURRENT STATUS: CJ - 20%-39% impaired, limited or restricted    - GOAL STATUS: CI - 1%-19% impaired, limited or restricted    - D/C STATUS:  ---------------To be determined---------------        Based on the above components, the patient evaluation is determined to be of the following complexity level: MEDIUM    Pain:  Pain Scale 1: Numeric (0 - 10)  Pain Intensity 1: 8  Pain Location 1: Rib cage  Pain Orientation 1: Mid  Pain Description 1: Aching     Activity Tolerance:   Please refer to the flowsheet for vital signs taken during this treatment. After treatment:   [x]         Patient left in no apparent distress sitting up in chair  []         Patient left in no apparent distress in bed  [x]         Call bell left within reach  [x]         Nursing notified  [x]         Caregiver present  []         Bed alarm activated    COMMUNICATION/EDUCATION:   The patients plan of care was discussed with: Registered Nurse.   [x]         Fall prevention education was provided and the patient/caregiver indicated understanding. [x]         Patient/family have participated as able in goal setting and plan of care. [x]         Patient/family agree to work toward stated goals and plan of care. []         Patient understands intent and goals of therapy, but is neutral about his/her participation. []         Patient is unable to participate in goal setting and plan of care.     Thank you for this referral.  Ricky Santillan, PT   Time Calculation: 23 mins

## 2018-02-13 NOTE — CDMP QUERY
Paulino Seaman,     Please clarify if this patient is (was) being treated/managed for:     => Urinary tract infection in the setting of abnormal UA requiring IV abxs  => Other explanation of clinical findings  => Unable to determine (no explanation for clinical findings)    The medical record reflects the following clinical findings, treatment, and risk factors. Risk Factors:  64 y/o pt  Clinical Indicators:  UA: large leuk est, moderate epithelial cells, bacteria 3+  Treatment: IV abxs    Please clarify and document your clinical opinion in the progress notes and discharge summary including the definitive and/or presumptive diagnosis, (suspected or probable), related to the above clinical findings. Please include clinical findings supporting your diagnosis.     Thank you,   Freida Murguia, [address]

## 2018-02-13 NOTE — PROGRESS NOTES
Dirk Hodge, Fabián Castrejon, and Melissa Burris Date: 2/12/2018      Subjective:     Patient has complaints of Had 1 episode diarrhea overnight. Feeling a little better. Lots of rib pains where her metastatic cancer is - worse with cough. .. Current Facility-Administered Medications   Medication Dose Route Frequency    fentaNYL citrate (PF) injection 100 mcg  100 mcg IntraVENous QID PRN    fentaNYL (DURAGESIC) 50 mcg/hr patch 1 Patch  1 Patch TransDERmal Q72H    sodium chloride (NS) flush 5-10 mL  5-10 mL IntraVENous PRN    piperacillin-tazobactam (ZOSYN) 10.125 g in  mL continuous 24 hr infusion  10.125 g IntraVENous Q24H    ALPRAZolam (XANAX) tablet 1 mg  1 mg Oral QID    aspirin delayed-release tablet 81 mg  81 mg Oral DAILY    carvedilol (COREG) tablet 12.5 mg  12.5 mg Oral BID WITH MEALS    docusate sodium (COLACE) capsule 100 mg  100 mg Oral QHS    losartan (COZAAR) tablet 50 mg  50 mg Oral DAILY    senna-docusate (PERICOLACE) 8.6-50 mg per tablet 1 Tab  1 Tab Oral DAILY    venlafaxine (EFFEXOR) tablet 150 mg  150 mg Oral BID    0.9% sodium chloride infusion 1,000 mL  1,000 mL IntraVENous CONTINUOUS    sodium chloride (NS) flush 5-10 mL  5-10 mL IntraVENous Q8H    sodium chloride (NS) flush 5-10 mL  5-10 mL IntraVENous PRN    enoxaparin (LOVENOX) injection 40 mg  40 mg SubCUTAneous Q24H    levoFLOXacin (LEVAQUIN) 750 mg in D5W IVPB  750 mg IntraVENous Q24H    ibuprofen (MOTRIN) tablet 600 mg  600 mg Oral Q6H PRN    loperamide (IMODIUM) capsule 2 mg  2 mg Oral PRN          Objective:     Patient Vitals for the past 8 hrs:   BP Temp Pulse Resp SpO2   02/13/18 0449 134/73 98.8 °F (37.1 °C) (!) 101 18 96 %             Physical Exam: Lungs: clear to auscultation bilaterally  Heart: regular rate and rhythm, S1, S2 normal, no murmur, click, rub or gallop  Abdomen: soft, non-tender.  Bowel sounds normal. No masses,  no organomegaly        Data Review   Recent Results (from the past 24 hour(s))   EKG, 12 LEAD, INITIAL    Collection Time: 02/12/18  8:59 AM   Result Value Ref Range    Ventricular Rate 113 BPM    Atrial Rate 113 BPM    P-R Interval 138 ms    QRS Duration 68 ms    Q-T Interval 318 ms    QTC Calculation (Bezet) 436 ms    Calculated P Axis 7 degrees    Calculated R Axis 47 degrees    Calculated T Axis 100 degrees    Diagnosis       Sinus tachycardia  Nonspecific T wave abnormality  When compared with ECG of 03-FEB-2016 01:36,  ST no longer depressed in Anterior leads  T wave inversion no longer evident in Inferior leads  T wave inversion no longer evident in Anterior leads  Confirmed by Montana Squires MD, Uri ([id number]) on 2/12/2018 11:08:27 AM     CBC WITH AUTOMATED DIFF    Collection Time: 02/12/18  9:53 AM   Result Value Ref Range    WBC 17.4 (H) 3.6 - 11.0 K/uL    RBC 5.05 3.80 - 5.20 M/uL    HGB 15.7 11.5 - 16.0 g/dL    HCT 45.8 35.0 - 47.0 %    MCV 90.7 80.0 - 99.0 FL    MCH 31.1 26.0 - 34.0 PG    MCHC 34.3 30.0 - 36.5 g/dL    RDW 12.9 11.5 - 14.5 %    PLATELET 287 581 - 594 K/uL    MPV 10.8 8.9 - 12.9 FL    NRBC 0.0 0  WBC    ABSOLUTE NRBC 0.00 0.00 - 0.01 K/uL    NEUTROPHILS 95 (H) 32 - 75 %    LYMPHOCYTES 1 (L) 12 - 49 %    MONOCYTES 3 (L) 5 - 13 %    EOSINOPHILS 0 0 - 7 %    BASOPHILS 0 0 - 1 %    IMMATURE GRANULOCYTES 1 (H) 0.0 - 0.5 %    ABS. NEUTROPHILS 16.5 (H) 1.8 - 8.0 K/UL    ABS. LYMPHOCYTES 0.2 (L) 0.8 - 3.5 K/UL    ABS. MONOCYTES 0.5 0.0 - 1.0 K/UL    ABS. EOSINOPHILS 0.0 0.0 - 0.4 K/UL    ABS. BASOPHILS 0.0 0.0 - 0.1 K/UL    ABS. IMM.  GRANS. 0.2 (H) 0.00 - 0.04 K/UL    DF SMEAR SCANNED      RBC COMMENTS NORMOCYTIC, NORMOCHROMIC     METABOLIC PANEL, COMPREHENSIVE    Collection Time: 02/12/18  9:53 AM   Result Value Ref Range    Sodium 136 136 - 145 mmol/L    Potassium 4.7 3.5 - 5.1 mmol/L    Chloride 104 97 - 108 mmol/L    CO2 18 (L) 21 - 32 mmol/L    Anion gap 14 5 - 15 mmol/L    Glucose 132 (H) 65 - 100 mg/dL    BUN 24 (H) 6 - 20 MG/DL    Creatinine 1.15 (H) 0.55 - 1.02 MG/DL    BUN/Creatinine ratio 21 (H) 12 - 20      GFR est AA 58 (L) >60 ml/min/1.73m2    GFR est non-AA 48 (L) >60 ml/min/1.73m2    Calcium 8.7 8.5 - 10.1 MG/DL    Bilirubin, total 0.6 0.2 - 1.0 MG/DL    ALT (SGPT) 25 12 - 78 U/L    AST (SGOT) 15 15 - 37 U/L    Alk.  phosphatase 79 45 - 117 U/L    Protein, total 7.5 6.4 - 8.2 g/dL    Albumin 3.7 3.5 - 5.0 g/dL    Globulin 3.8 2.0 - 4.0 g/dL    A-G Ratio 1.0 (L) 1.1 - 2.2     LACTIC ACID    Collection Time: 02/12/18  9:53 AM   Result Value Ref Range    Lactic acid 3.6 (HH) 0.4 - 2.0 MMOL/L   CULTURE, BLOOD    Collection Time: 02/12/18  9:53 AM   Result Value Ref Range    Special Requests: NO SPECIAL REQUESTS      Culture result: NO GROWTH AFTER 17 HOURS     CULTURE, BLOOD    Collection Time: 02/12/18 11:04 AM   Result Value Ref Range    Special Requests: NO SPECIAL REQUESTS      Culture result: NO GROWTH AFTER 17 HOURS     INFLUENZA A & B AG (RAPID TEST)    Collection Time: 02/12/18 11:07 AM   Result Value Ref Range    Influenza A Antigen NEGATIVE  NEG      Influenza B Antigen NEGATIVE  NEG     URINALYSIS W/ RFLX MICROSCOPIC    Collection Time: 02/12/18  1:41 PM   Result Value Ref Range    Color YELLOW/STRAW      Appearance CLOUDY (A) CLEAR      Specific gravity 1.025 1.003 - 1.030      pH (UA) 6.0 5.0 - 8.0      Protein 30 (A) NEG mg/dL    Glucose NEGATIVE  NEG mg/dL    Ketone NEGATIVE  NEG mg/dL    Bilirubin NEGATIVE  NEG      Blood LARGE (A) NEG      Urobilinogen 0.2 0.2 - 1.0 EU/dL    Nitrites NEGATIVE  NEG      Leukocyte Esterase LARGE (A) NEG     URINE MICROSCOPIC ONLY    Collection Time: 02/12/18  1:41 PM   Result Value Ref Range    WBC 20-50 0 - 4 /hpf    RBC 5-10 0 - 5 /hpf    Epithelial cells MODERATE (A) FEW /lpf    Bacteria 3+ (A) NEG /hpf   LACTIC ACID    Collection Time: 02/12/18  1:48 PM   Result Value Ref Range    Lactic acid 1.8 0.4 - 2.0 MMOL/L   CBC WITH AUTOMATED DIFF    Collection Time: 02/13/18  5:30 AM   Result Value Ref Range    WBC 5.3 3.6 - 11.0 K/uL    RBC 3.33 (L) 3.80 - 5.20 M/uL    HGB 10.6 (L) 11.5 - 16.0 g/dL    HCT 30.9 (L) 35.0 - 47.0 %    MCV 92.8 80.0 - 99.0 FL    MCH 31.8 26.0 - 34.0 PG    MCHC 34.3 30.0 - 36.5 g/dL    RDW 13.0 11.5 - 14.5 %    PLATELET 974 (L) 659 - 400 K/uL    MPV 10.9 8.9 - 12.9 FL    NRBC 0.0 0  WBC    ABSOLUTE NRBC 0.00 0.00 - 0.01 K/uL    NEUTROPHILS 83 (H) 32 - 75 %    LYMPHOCYTES 9 (L) 12 - 49 %    MONOCYTES 6 5 - 13 %    EOSINOPHILS 0 0 - 7 %    BASOPHILS 0 0 - 1 %    IMMATURE GRANULOCYTES 1 (H) 0.0 - 0.5 %    ABS. NEUTROPHILS 4.4 1.8 - 8.0 K/UL    ABS. LYMPHOCYTES 0.5 (L) 0.8 - 3.5 K/UL    ABS. MONOCYTES 0.3 0.0 - 1.0 K/UL    ABS. EOSINOPHILS 0.0 0.0 - 0.4 K/UL    ABS. BASOPHILS 0.0 0.0 - 0.1 K/UL    ABS. IMM.  GRANS. 0.1 (H) 0.00 - 0.04 K/UL    DF AUTOMATED             Assessment:     Active Problems:    Sepsis ([facility] (2/12/2018)        Plan:     1) Cont IV Abx  2) IVF  3) Fentalyl patch

## 2018-02-13 NOTE — H&P
History and Physical    Subjective:   THERESA Gerber is a 63 y.o.  female who presents with vomiting , diarrhea, fever for the last 24 hours. Patient has metastatic breast cancer and undergoing chemo. Susie Leger Past Medical History:   Diagnosis Date    Anxiety     Cancer [facility])     breast    Depression     GERD (gastroesophageal reflux disease)     HTN (hypertension)     Hypercholesterolemia     Hypotension 9/12/2012    Metastatic [facility] ([facility] 5/3/2017    Secondary cancer of bone ([facility] 5/3/2017      Past Surgical History:   Procedure Laterality Date    BREAST SURGERY PROCEDURE UNLISTED  march 13    carina masectomy     Family History   Problem Relation Age of Onset    Hypertension Mother     Heart Disease Mother     Hypertension Father       Social History   Substance Use Topics    Smoking status: Former Smoker     Packs/day: 0.50     Years: 20.00     Quit date: 1/18/2012    Smokeless tobacco: Never Used    Alcohol use No       Prior to Admission medications    Medication Sig Start Date End Date Taking? Authorizing Provider   docusate sodium (COLACE) 100 mg capsule Take 100 mg by mouth every twelve (12) hours. Yes Historical Provider   multivitamin (ONE A DAY) tablet Take 1 Tab by mouth daily. Yes Historical Provider   senna-docusate (SENEXON-S) 8.6-50 mg per tablet Take 2 Tabs by mouth two (2) times a day. Yes Historical Provider   guaiFENesin-codeine (ROBITUSSIN AC) 100-10 mg/5 mL solution Take 5-10 mL by mouth four (4) times daily as needed for Cough. Yes Historical Provider   mirtazapine (REMERON) 15 mg tablet Take 15 mg by mouth nightly. Yes Historical Provider   melatonin tab tablet Take 5 mg by mouth nightly. Yes Historical Provider   calcium carbonate (SABINO-GEST ANTACID) 200 mg calcium (500 mg) chew Take 1 Tab by mouth as needed. Indications: Heartburn   Yes Historical Provider   polyethylene glycol (MIRALAX) 17 gram/dose powder Take 17 g by mouth daily.  (Mix with liquid of choice)   Yes Historical Provider   carvedilol (COREG) 12.5 mg tablet Take  by mouth two (2) times daily (with meals). Yes Historical Provider   losartan (COZAAR) 50 mg tablet Take 50 mg by mouth daily. Yes Historical Provider   venlafaxine (EFFEXOR) 75 mg tablet Take 150 mg by mouth two (2) times a day. 5/31/11  Yes Historical Provider   alprazolam Venia Retort) 1 mg tablet Take 1 mg by mouth four (4) times daily. 5/31/11  Yes Historical Provider     Allergies   Allergen Reactions    Sulfa (Sulfonamide Antibiotics) Unknown (comments)    Wellbutrin [Bupropion Hcl] Unknown (comments)      Health Maintenance   Topic Date Due    DTaP/Tdap/Td series (1 - Tdap) 12/31/1978    PAP AKA CERVICAL CYTOLOGY  12/31/1978    Pneumococcal 19-64 Highest Risk (2 of 3 - PCV13) 02/06/2017    COLONOSCOPY  05/31/2019    BREAST CANCER SCRN MAMMOGRAM  11/09/2019    Hepatitis C Screening  Completed    ZOSTER VACCINE AGE 60>  Completed    Influenza Age 5 to Adult  Completed       Review of Systems:  A comprehensive review of systems was negative except for that written in the History of Present Illness. Objective: Intake and Output:            Physical Exam:   Visit Vitals    /87 (BP 1 Location: Right leg, BP Patient Position: Supine)    Pulse (!) 106    Temp 98 °F (36.7 °C)    Resp 18    Ht 5' 5\" (1.651 m)    Wt 188 lb (85.3 kg)    SpO2 99%    BMI 31.28 kg/m2     Neck: supple, symmetrical, trachea midline, no adenopathy, thyroid: not enlarged, symmetric, no tenderness/mass/nodules, no carotid bruit and no JVD  Lungs: clear to auscultation bilaterally  Heart: regular rate and rhythm, S1, S2 normal, no murmur, click, rub or gallop  Abdomen: soft, non-tender.  Bowel sounds normal. No masses,  no organomegaly  Extremities: extremities normal, atraumatic, no cyanosis or edema  Neurologic: Grossly normal       Data Review:   Recent Results (from the past 24 hour(s))   EKG, 12 LEAD, INITIAL    Collection Time: 02/12/18 8:59 AM   Result Value Ref Range    Ventricular Rate 113 BPM    Atrial Rate 113 BPM    P-R Interval 138 ms    QRS Duration 68 ms    Q-T Interval 318 ms    QTC Calculation (Bezet) 436 ms    Calculated P Axis 7 degrees    Calculated R Axis 47 degrees    Calculated T Axis 100 degrees    Diagnosis       Sinus tachycardia  Nonspecific T wave abnormality  When compared with ECG of 03-FEB-2016 01:36,  ST no longer depressed in Anterior leads  T wave inversion no longer evident in Inferior leads  T wave inversion no longer evident in Anterior leads  Confirmed by Evie Schofield MD, Uri ([id number]) on 2/12/2018 11:08:27 AM     CBC WITH AUTOMATED DIFF    Collection Time: 02/12/18  9:53 AM   Result Value Ref Range    WBC 17.4 (H) 3.6 - 11.0 K/uL    RBC 5.05 3.80 - 5.20 M/uL    HGB 15.7 11.5 - 16.0 g/dL    HCT 45.8 35.0 - 47.0 %    MCV 90.7 80.0 - 99.0 FL    MCH 31.1 26.0 - 34.0 PG    MCHC 34.3 30.0 - 36.5 g/dL    RDW 12.9 11.5 - 14.5 %    PLATELET 716 685 - 190 K/uL    MPV 10.8 8.9 - 12.9 FL    NRBC 0.0 0  WBC    ABSOLUTE NRBC 0.00 0.00 - 0.01 K/uL    NEUTROPHILS 95 (H) 32 - 75 %    LYMPHOCYTES 1 (L) 12 - 49 %    MONOCYTES 3 (L) 5 - 13 %    EOSINOPHILS 0 0 - 7 %    BASOPHILS 0 0 - 1 %    IMMATURE GRANULOCYTES 1 (H) 0.0 - 0.5 %    ABS. NEUTROPHILS 16.5 (H) 1.8 - 8.0 K/UL    ABS. LYMPHOCYTES 0.2 (L) 0.8 - 3.5 K/UL    ABS. MONOCYTES 0.5 0.0 - 1.0 K/UL    ABS. EOSINOPHILS 0.0 0.0 - 0.4 K/UL    ABS. BASOPHILS 0.0 0.0 - 0.1 K/UL    ABS. IMM.  GRANS. 0.2 (H) 0.00 - 0.04 K/UL    DF SMEAR SCANNED      RBC COMMENTS NORMOCYTIC, NORMOCHROMIC     METABOLIC PANEL, COMPREHENSIVE    Collection Time: 02/12/18  9:53 AM   Result Value Ref Range    Sodium 136 136 - 145 mmol/L    Potassium 4.7 3.5 - 5.1 mmol/L    Chloride 104 97 - 108 mmol/L    CO2 18 (L) 21 - 32 mmol/L    Anion gap 14 5 - 15 mmol/L    Glucose 132 (H) 65 - 100 mg/dL    BUN 24 (H) 6 - 20 MG/DL    Creatinine 1.15 (H) 0.55 - 1.02 MG/DL    BUN/Creatinine ratio 21 (H) 12 - 20      GFR est AA 58 (L) >60 ml/min/1.73m2    GFR est non-AA 48 (L) >60 ml/min/1.73m2    Calcium 8.7 8.5 - 10.1 MG/DL    Bilirubin, total 0.6 0.2 - 1.0 MG/DL    ALT (SGPT) 25 12 - 78 U/L    AST (SGOT) 15 15 - 37 U/L    Alk. phosphatase 79 45 - 117 U/L    Protein, total 7.5 6.4 - 8.2 g/dL    Albumin 3.7 3.5 - 5.0 g/dL    Globulin 3.8 2.0 - 4.0 g/dL    A-G Ratio 1.0 (L) 1.1 - 2.2     LACTIC ACID    Collection Time: 02/12/18  9:53 AM   Result Value Ref Range    Lactic acid 3.6 (HH) 0.4 - 2.0 MMOL/L   INFLUENZA A & B AG (RAPID TEST)    Collection Time: 02/12/18 11:07 AM   Result Value Ref Range    Influenza A Antigen NEGATIVE  NEG      Influenza B Antigen NEGATIVE  NEG     URINALYSIS W/ RFLX MICROSCOPIC    Collection Time: 02/12/18  1:41 PM   Result Value Ref Range    Color YELLOW/STRAW      Appearance CLOUDY (A) CLEAR      Specific gravity 1.025 1.003 - 1.030      pH (UA) 6.0 5.0 - 8.0      Protein 30 (A) NEG mg/dL    Glucose NEGATIVE  NEG mg/dL    Ketone NEGATIVE  NEG mg/dL    Bilirubin NEGATIVE  NEG      Blood LARGE (A) NEG      Urobilinogen 0.2 0.2 - 1.0 EU/dL    Nitrites NEGATIVE  NEG      Leukocyte Esterase LARGE (A) NEG     URINE MICROSCOPIC ONLY    Collection Time: 02/12/18  1:41 PM   Result Value Ref Range    WBC 20-50 0 - 4 /hpf    RBC 5-10 0 - 5 /hpf    Epithelial cells MODERATE (A) FEW /lpf    Bacteria 3+ (A) NEG /hpf   LACTIC ACID    Collection Time: 02/12/18  1:48 PM   Result Value Ref Range    Lactic acid 1.8 0.4 - 2.0 MMOL/L       Chest x-ray was negative for infiltrate, effusion, pneumothorax, or wide mediastinum.     Assessment:     Active Problems:    Sepsis ([address] (2/12/2018)        Plan:     1) IV Zosyn and Levaquin  2) IVF  3) Zofran prn  4) DVT prophylaxis    Signed By: Isreal Mcghee MD     February 12, 2018

## 2018-02-14 PROCEDURE — 74011250636 HC RX REV CODE- 250/636: Performed by: EMERGENCY MEDICINE

## 2018-02-14 PROCEDURE — 74011250636 HC RX REV CODE- 250/636: Performed by: FAMILY MEDICINE

## 2018-02-14 PROCEDURE — 65660000000 HC RM CCU STEPDOWN

## 2018-02-14 PROCEDURE — 74011250637 HC RX REV CODE- 250/637: Performed by: INTERNAL MEDICINE

## 2018-02-14 PROCEDURE — 74011250637 HC RX REV CODE- 250/637: Performed by: FAMILY MEDICINE

## 2018-02-14 RX ORDER — LEVOFLOXACIN 750 MG/1
750 TABLET ORAL EVERY 24 HOURS
Status: DISCONTINUED | OUTPATIENT
Start: 2018-02-15 | End: 2018-02-15

## 2018-02-14 RX ORDER — ONDANSETRON 2 MG/ML
4 INJECTION INTRAMUSCULAR; INTRAVENOUS
Status: DISCONTINUED | OUTPATIENT
Start: 2018-02-14 | End: 2018-02-19 | Stop reason: HOSPADM

## 2018-02-14 RX ADMIN — ALPRAZOLAM 1 MG: 1 TABLET ORAL at 08:40

## 2018-02-14 RX ADMIN — ENOXAPARIN SODIUM 40 MG: 40 INJECTION SUBCUTANEOUS at 14:39

## 2018-02-14 RX ADMIN — ALPRAZOLAM 1 MG: 1 TABLET ORAL at 21:17

## 2018-02-14 RX ADMIN — LOPERAMIDE HYDROCHLORIDE 2 MG: 2 CAPSULE ORAL at 11:51

## 2018-02-14 RX ADMIN — CARVEDILOL 12.5 MG: 12.5 TABLET, FILM COATED ORAL at 08:39

## 2018-02-14 RX ADMIN — LEVOFLOXACIN 750 MG: 5 INJECTION, SOLUTION INTRAVENOUS at 12:19

## 2018-02-14 RX ADMIN — LOPERAMIDE HYDROCHLORIDE 2 MG: 2 CAPSULE ORAL at 20:08

## 2018-02-14 RX ADMIN — PIPERACILLIN AND TAZOBACTAM 10.12 G: 3; .375 INJECTION, POWDER, FOR SOLUTION INTRAVENOUS at 12:17

## 2018-02-14 RX ADMIN — VENLAFAXINE 150 MG: 100 TABLET ORAL at 08:40

## 2018-02-14 RX ADMIN — ONDANSETRON 4 MG: 2 INJECTION INTRAMUSCULAR; INTRAVENOUS at 11:51

## 2018-02-14 RX ADMIN — SODIUM CHLORIDE 1000 ML: 900 INJECTION, SOLUTION INTRAVENOUS at 17:47

## 2018-02-14 RX ADMIN — ASPIRIN 81 MG: 81 TABLET, COATED ORAL at 08:39

## 2018-02-14 RX ADMIN — IBUPROFEN 600 MG: 600 TABLET, FILM COATED ORAL at 04:53

## 2018-02-14 RX ADMIN — ONDANSETRON 4 MG: 2 INJECTION INTRAMUSCULAR; INTRAVENOUS at 18:57

## 2018-02-14 RX ADMIN — IBUPROFEN 600 MG: 600 TABLET, FILM COATED ORAL at 14:39

## 2018-02-14 RX ADMIN — LOPERAMIDE HYDROCHLORIDE 2 MG: 2 CAPSULE ORAL at 21:17

## 2018-02-14 RX ADMIN — ALPRAZOLAM 1 MG: 1 TABLET ORAL at 12:17

## 2018-02-14 RX ADMIN — LOSARTAN POTASSIUM 50 MG: 50 TABLET ORAL at 08:40

## 2018-02-14 RX ADMIN — Medication 10 ML: at 21:17

## 2018-02-14 RX ADMIN — VENLAFAXINE 150 MG: 100 TABLET ORAL at 17:12

## 2018-02-14 RX ADMIN — ALPRAZOLAM 1 MG: 1 TABLET ORAL at 17:12

## 2018-02-14 RX ADMIN — Medication 10 ML: at 07:13

## 2018-02-14 RX ADMIN — LOPERAMIDE HYDROCHLORIDE 2 MG: 2 CAPSULE ORAL at 18:57

## 2018-02-14 RX ADMIN — CARVEDILOL 12.5 MG: 12.5 TABLET, FILM COATED ORAL at 17:12

## 2018-02-14 NOTE — PROGRESS NOTES
Problem: Falls - Risk of  Goal: *Absence of Falls  Document Mary Ellen Fall Risk and appropriate interventions in the flowsheet.    Outcome: Progressing Towards Goal  Fall Risk Interventions:  Mobility Interventions: Communicate number of staff needed for ambulation/transfer, Patient to call before getting OOB, PT Consult for mobility concerns, PT Consult for assist device competence    Mentation Interventions: Evaluate medications/consider consulting pharmacy, Room close to nurse's station    Medication Interventions: Evaluate medications/consider consulting pharmacy, Patient to call before getting OOB, Teach patient to arise slowly    Elimination Interventions: Call light in reach, Patient to call for help with toileting needs, Toileting schedule/hourly rounds    History of Falls Interventions: Room close to nurse's station, Investigate reason for fall

## 2018-02-14 NOTE — PROGRESS NOTES
Clinical Pharmacy Note: Re: IV to PO Automatic Conversion - Antibiotic    Please note: Edith Saxena medication(s) Levaquin has/have been changed from IV to PO based on the following criteria:    The patient:  1. Has received IV therapy for at least 48 hours (at time next dose is due)  2. Has a functioning GI tract  - Taking scheduled oral medications  - Tolerating tube feeds at goal rate or a full liquid, soft, or regular diet         3. Is clinically stable        - Temperature < 100.4F for at least 24 hours        - WBC is trending down    This IV to PO conversion is based on the [employer] approved automatic conversion policy for eligible patients. Please call with questions.

## 2018-02-14 NOTE — ROUTINE PROCESS
Bedside shift change report given to Jayy Nolen (oncoming nurse) by Rox Grayson (offgoing nurse). Report included the following information SBAR.

## 2018-02-14 NOTE — PROGRESS NOTES
Problem: Falls - Risk of  Goal: *Absence of Falls  Document Mary Ellen Fall Risk and appropriate interventions in the flowsheet.    Outcome: Progressing Towards Goal  Fall Risk Interventions:  Mobility Interventions: Patient to call before getting OOB    Mentation Interventions: Evaluate medications/consider consulting pharmacy, Room close to nurse's station    Medication Interventions: Patient to call before getting OOB    Elimination Interventions: Call light in reach, Patient to call for help with toileting needs    History of Falls Interventions: Door open when patient unattended

## 2018-02-14 NOTE — PROGRESS NOTES
Dirk Banerjee, Tyrell Roque, and Yue Burger Date: 2/12/2018      Subjective:     Patient feeling better. N nausea but still with diarrhea. C Diff neg. Rib pain improved with Duragesic patch. .       Current Facility-Administered Medications   Medication Dose Route Frequency    fentaNYL citrate (PF) injection 100 mcg  100 mcg IntraVENous QID PRN    fentaNYL (DURAGESIC) 50 mcg/hr patch 1 Patch  1 Patch TransDERmal Q72H    sodium chloride (NS) flush 5-10 mL  5-10 mL IntraVENous PRN    piperacillin-tazobactam (ZOSYN) 10.125 g in  mL continuous 24 hr infusion  10.125 g IntraVENous Q24H    ALPRAZolam (XANAX) tablet 1 mg  1 mg Oral QID    aspirin delayed-release tablet 81 mg  81 mg Oral DAILY    carvedilol (COREG) tablet 12.5 mg  12.5 mg Oral BID WITH MEALS    docusate sodium (COLACE) capsule 100 mg  100 mg Oral QHS    losartan (COZAAR) tablet 50 mg  50 mg Oral DAILY    senna-docusate (PERICOLACE) 8.6-50 mg per tablet 1 Tab  1 Tab Oral DAILY    venlafaxine (EFFEXOR) tablet 150 mg  150 mg Oral BID    0.9% sodium chloride infusion 1,000 mL  1,000 mL IntraVENous CONTINUOUS    sodium chloride (NS) flush 5-10 mL  5-10 mL IntraVENous Q8H    sodium chloride (NS) flush 5-10 mL  5-10 mL IntraVENous PRN    enoxaparin (LOVENOX) injection 40 mg  40 mg SubCUTAneous Q24H    levoFLOXacin (LEVAQUIN) 750 mg in D5W IVPB  750 mg IntraVENous Q24H    ibuprofen (MOTRIN) tablet 600 mg  600 mg Oral Q6H PRN    loperamide (IMODIUM) capsule 2 mg  2 mg Oral PRN          Objective:     Patient Vitals for the past 8 hrs:   BP Temp Pulse Resp SpO2   02/14/18 0509 164/76 98.1 °F (36.7 °C) 89 18 97 %             Physical Exam: Lungs: clear to auscultation bilaterally  Heart: regular rate and rhythm, S1, S2 normal, no murmur, click, rub or gallop  Abdomen: soft, non-tender. Bowel sounds normal. No masses,  no organomegaly        Data Review   Recent Results (from the past 24 hour(s))   C.  DIFFICILE (DNA) Collection Time: 02/13/18  3:50 PM   Result Value Ref Range    C. difficile (DNA) NEGATIVE  NEG             Assessment:     Active Problems:    Sepsis ([address] (2/12/2018)      Urinary tract infection without hematuria (2/13/2018)        Plan:     1) Cont IVF  2) IV Abx  3) OOB as janki

## 2018-02-15 LAB
ANION GAP SERPL CALC-SCNC: 8 MMOL/L (ref 5–15)
BACTERIA SPEC CULT: ABNORMAL
BASOPHILS # BLD: 0 K/UL (ref 0–0.1)
BASOPHILS NFR BLD: 0 % (ref 0–1)
BUN SERPL-MCNC: 3 MG/DL (ref 6–20)
BUN/CREAT SERPL: 5 (ref 12–20)
C JEJUNI+C COLI AG STL QL: NEGATIVE
CALCIUM SERPL-MCNC: 6.5 MG/DL (ref 8.5–10.1)
CHLORIDE SERPL-SCNC: 112 MMOL/L (ref 97–108)
CO2 SERPL-SCNC: 23 MMOL/L (ref 21–32)
CREAT SERPL-MCNC: 0.59 MG/DL (ref 0.55–1.02)
DIFFERENTIAL METHOD BLD: ABNORMAL
E COLI SXT1+2 STL IA: NO GROWTH
EOSINOPHIL # BLD: 0.2 K/UL (ref 0–0.4)
EOSINOPHIL NFR BLD: 5 % (ref 0–7)
ERYTHROCYTE [DISTWIDTH] IN BLOOD BY AUTOMATED COUNT: 13 % (ref 11.5–14.5)
GLUCOSE SERPL-MCNC: 117 MG/DL (ref 65–100)
HCT VFR BLD AUTO: 37 % (ref 35–47)
HGB BLD-MCNC: 12.2 G/DL (ref 11.5–16)
IMM GRANULOCYTES # BLD: 0 K/UL
IMM GRANULOCYTES NFR BLD AUTO: 0 %
LYMPHOCYTES # BLD: 1.5 K/UL (ref 0.8–3.5)
LYMPHOCYTES NFR BLD: 46 % (ref 12–49)
MCH RBC QN AUTO: 31.3 PG (ref 26–34)
MCHC RBC AUTO-ENTMCNC: 33 G/DL (ref 30–36.5)
MCV RBC AUTO: 94.9 FL (ref 80–99)
MONOCYTES # BLD: 0.3 K/UL (ref 0–1)
MONOCYTES NFR BLD: 8 % (ref 5–13)
NEUTS SEG # BLD: 1.4 K/UL (ref 1.8–8)
NEUTS SEG NFR BLD: 41 % (ref 32–75)
NRBC # BLD: 0 K/UL (ref 0–0.01)
NRBC BLD-RTO: 0 PER 100 WBC
PLATELET # BLD AUTO: 174 K/UL (ref 150–400)
PMV BLD AUTO: 11 FL (ref 8.9–12.9)
POTASSIUM SERPL-SCNC: 3.3 MMOL/L (ref 3.5–5.1)
RBC # BLD AUTO: 3.9 M/UL (ref 3.8–5.2)
RBC MORPH BLD: ABNORMAL
SERVICE CMNT-IMP: ABNORMAL
SODIUM SERPL-SCNC: 143 MMOL/L (ref 136–145)
WBC # BLD AUTO: 3.4 K/UL (ref 3.6–11)

## 2018-02-15 PROCEDURE — 97116 GAIT TRAINING THERAPY: CPT

## 2018-02-15 PROCEDURE — 85025 COMPLETE CBC W/AUTO DIFF WBC: CPT | Performed by: FAMILY MEDICINE

## 2018-02-15 PROCEDURE — 65660000000 HC RM CCU STEPDOWN

## 2018-02-15 PROCEDURE — 80048 BASIC METABOLIC PNL TOTAL CA: CPT | Performed by: FAMILY MEDICINE

## 2018-02-15 PROCEDURE — 87798 DETECT AGENT NOS DNA AMP: CPT | Performed by: FAMILY MEDICINE

## 2018-02-15 PROCEDURE — 36415 COLL VENOUS BLD VENIPUNCTURE: CPT | Performed by: FAMILY MEDICINE

## 2018-02-15 PROCEDURE — 74011250636 HC RX REV CODE- 250/636: Performed by: FAMILY MEDICINE

## 2018-02-15 PROCEDURE — 74011250637 HC RX REV CODE- 250/637: Performed by: FAMILY MEDICINE

## 2018-02-15 PROCEDURE — 74011250636 HC RX REV CODE- 250/636: Performed by: EMERGENCY MEDICINE

## 2018-02-15 PROCEDURE — 97110 THERAPEUTIC EXERCISES: CPT

## 2018-02-15 PROCEDURE — 74011250637 HC RX REV CODE- 250/637: Performed by: INTERNAL MEDICINE

## 2018-02-15 RX ORDER — POTASSIUM CHLORIDE 750 MG/1
20 TABLET, FILM COATED, EXTENDED RELEASE ORAL EVERY 6 HOURS
Status: COMPLETED | OUTPATIENT
Start: 2018-02-15 | End: 2018-02-15

## 2018-02-15 RX ADMIN — ALPRAZOLAM 1 MG: 1 TABLET ORAL at 08:54

## 2018-02-15 RX ADMIN — LOPERAMIDE HYDROCHLORIDE 2 MG: 2 CAPSULE ORAL at 07:03

## 2018-02-15 RX ADMIN — ONDANSETRON 4 MG: 2 INJECTION INTRAMUSCULAR; INTRAVENOUS at 09:01

## 2018-02-15 RX ADMIN — LOSARTAN POTASSIUM 50 MG: 50 TABLET ORAL at 08:53

## 2018-02-15 RX ADMIN — ASPIRIN 81 MG: 81 TABLET, COATED ORAL at 08:53

## 2018-02-15 RX ADMIN — ALPRAZOLAM 1 MG: 1 TABLET ORAL at 21:09

## 2018-02-15 RX ADMIN — VENLAFAXINE 150 MG: 100 TABLET ORAL at 08:53

## 2018-02-15 RX ADMIN — IBUPROFEN 600 MG: 600 TABLET, FILM COATED ORAL at 03:26

## 2018-02-15 RX ADMIN — ENOXAPARIN SODIUM 40 MG: 40 INJECTION SUBCUTANEOUS at 15:29

## 2018-02-15 RX ADMIN — CARVEDILOL 12.5 MG: 12.5 TABLET, FILM COATED ORAL at 08:54

## 2018-02-15 RX ADMIN — POTASSIUM CHLORIDE 20 MEQ: 750 TABLET, EXTENDED RELEASE ORAL at 19:44

## 2018-02-15 RX ADMIN — LOPERAMIDE HYDROCHLORIDE 2 MG: 2 CAPSULE ORAL at 13:47

## 2018-02-15 RX ADMIN — IBUPROFEN 600 MG: 600 TABLET, FILM COATED ORAL at 09:05

## 2018-02-15 RX ADMIN — IBUPROFEN 600 MG: 600 TABLET, FILM COATED ORAL at 17:27

## 2018-02-15 RX ADMIN — PIPERACILLIN AND TAZOBACTAM 10.12 G: 3; .375 INJECTION, POWDER, FOR SOLUTION INTRAVENOUS at 12:43

## 2018-02-15 RX ADMIN — VENLAFAXINE 150 MG: 100 TABLET ORAL at 17:24

## 2018-02-15 RX ADMIN — CARVEDILOL 12.5 MG: 12.5 TABLET, FILM COATED ORAL at 17:24

## 2018-02-15 RX ADMIN — ALPRAZOLAM 1 MG: 1 TABLET ORAL at 17:24

## 2018-02-15 RX ADMIN — POTASSIUM CHLORIDE 20 MEQ: 750 TABLET, EXTENDED RELEASE ORAL at 13:44

## 2018-02-15 RX ADMIN — ALPRAZOLAM 1 MG: 1 TABLET ORAL at 12:43

## 2018-02-15 NOTE — PROGRESS NOTES
Problem: Mobility Impaired (Adult and Pediatric)  Goal: *Acute Goals and Plan of Care (Insert Text)  Physical Therapy Goals  Initiated 2/13/2018  1. Patient will move from supine to sit and sit to supine  in bed with modified independence within 7 day(s). 2.  Patient will transfer from bed to chair and chair to bed with modified independence using the least restrictive device within 7 day(s). 3.  Patient will perform sit to stand with modified independence within 7 day(s). 4.  Patient will ambulate with independence for 350feet with the least restrictive device within 7 day(s). 5.  Patient will ascend/descend 3 stairs with 1 handrail(s) with modified independence within 7 day(s). physical Therapy TREATMENT  Patient: Kaylee Whitfield (63 y.o. female)  Date: 2/15/2018  Diagnosis: Sepsis ([address] <principal problem not specified>       Precautions: Fall  Chart, physical therapy assessment, plan of care and goals were reviewed. ASSESSMENT:  Pt received sitting up in bed and agreeable to working with therapy. Pt moving very well and demonstrating gait static and dynamic balance. Pt ambulated within room without external support from IV pole. Completed 6 laps with tight turns and no LOB. Minimal fatigue reported after gait trial. Pt given theraband for continued exercises acutely and at home. Demonstrated different exercises and given information on how to find additional exercises. Pt reports she sits most of the day but is constantly moving to avoid stiffness. Limited to within room treatment due to being on enteric precautions. Pt is near her baseline, therefore decreasing frequency to 3x/week to avoid any deconditioning. Pt would benefit from OP PT vs gym  upon discharge to address strength and endurance goals for continued healthy living given her diagnosis.    Progression toward goals:  [x]    Improving appropriately and progressing toward goals  []    Improving slowly and progressing toward goals  [] Not making progress toward goals and plan of care will be adjusted     PLAN:  Patient continues to benefit from skilled intervention to address the above impairments. Continue treatment per established plan of care. Discharge Recommendations:  None  Further Equipment Recommendations for Discharge:  None     SUBJECTIVE:   Patient stated I don;t feel bad given the Stage 4 cancer, it's just the fatigue.     OBJECTIVE DATA SUMMARY:   Critical Behavior:  Neurologic State: Alert  Orientation Level: Oriented X4  Cognition: Follows commands  Safety/Judgement: Home safety, Fall prevention, Insight into deficits, Good awareness of safety precautions  Functional Mobility Training:  Bed Mobility:  Rolling: Supervision  Supine to Sit: Supervision  Sit to Supine: Supervision           Transfers:  Sit to Stand: Modified independent  Stand to Sit: Modified independent                             Balance:  Sitting: Intact  Standing: Intact; Without support  Ambulation/Gait Training:  Distance (ft): 100 Feet (ft)  Assistive Device: Gait belt  Ambulation - Level of Assistance: Stand-by asssistance        Gait Abnormalities: Decreased step clearance              Speed/Antonietta: Pace decreased (<100 feet/min)  Step Length: Right shortened;Left shortened           Activity Tolerance:   Good  Please refer to the flowsheet for vital signs taken during this treatment.   After treatment:   []    Patient left in no apparent distress sitting up in chair  [x]    Patient left in no apparent distress in bed  [x]    Call bell left within reach  [x]    Nursing notified  []    Caregiver present  []    Bed alarm activated    COMMUNICATION/COLLABORATION:   The patients plan of care was discussed with: Registered Nurse    Ricky Hawthorne, PT   Time Calculation: 33 mins

## 2018-02-15 NOTE — PROGRESS NOTES
[facility]                                      Answering service # [phone]. Progress Note            Daily Progress Note: 2/15/2018    Patient seen as courtesy visit as she is [facility] [employer] member. Overall feeling better  Noted 3 overnight and 1 this morning watery BM even with imodium  Denies any abdominal pain. Might be related with viral gastroenteritis. Managed by Zachary Rinaldi MD  Let us know if we can help during hospitalization or for any post discharge care. Thanks  Sofi Vásquez MD   [facility] hospitalist  Cell # [phone].

## 2018-02-15 NOTE — PROGRESS NOTES
[employer] Volunteer visited patient in [address] [place] [address] on 2.15.18. Documented by:  Rev. Anna Jain M.Div, [facility]

## 2018-02-15 NOTE — PROGRESS NOTES
Potassium 3.3; paged Dr. Martin Alvarez. Received order for 20mEq of potassium now and 6 hours from now.  Repeat BMP in the AM. Also received order to d/c remote telemetry

## 2018-02-15 NOTE — PROGRESS NOTES
Dirk Jacobs, Stacey Martini, Fabián, and Lizz Palacios Date: 2/12/2018      Subjective:     Patient had 4 episodes diarrhea last PM. Nausea occasionally. .       Current Facility-Administered Medications   Medication Dose Route Frequency    ondansetron (ZOFRAN) injection 4 mg  4 mg IntraVENous Q6H PRN    levoFLOXacin (LEVAQUIN) tablet 750 mg  750 mg Oral Q24H    fentaNYL citrate (PF) injection 100 mcg  100 mcg IntraVENous QID PRN    fentaNYL (DURAGESIC) 50 mcg/hr patch 1 Patch  1 Patch TransDERmal Q72H    sodium chloride (NS) flush 5-10 mL  5-10 mL IntraVENous PRN    piperacillin-tazobactam (ZOSYN) 10.125 g in  mL continuous 24 hr infusion  10.125 g IntraVENous Q24H    ALPRAZolam (XANAX) tablet 1 mg  1 mg Oral QID    aspirin delayed-release tablet 81 mg  81 mg Oral DAILY    carvedilol (COREG) tablet 12.5 mg  12.5 mg Oral BID WITH MEALS    docusate sodium (COLACE) capsule 100 mg  100 mg Oral QHS    losartan (COZAAR) tablet 50 mg  50 mg Oral DAILY    senna-docusate (PERICOLACE) 8.6-50 mg per tablet 1 Tab  1 Tab Oral DAILY    venlafaxine (EFFEXOR) tablet 150 mg  150 mg Oral BID    0.9% sodium chloride infusion 1,000 mL  1,000 mL IntraVENous CONTINUOUS    sodium chloride (NS) flush 5-10 mL  5-10 mL IntraVENous Q8H    sodium chloride (NS) flush 5-10 mL  5-10 mL IntraVENous PRN    enoxaparin (LOVENOX) injection 40 mg  40 mg SubCUTAneous Q24H    ibuprofen (MOTRIN) tablet 600 mg  600 mg Oral Q6H PRN    loperamide (IMODIUM) capsule 2 mg  2 mg Oral PRN          Objective:     Patient Vitals for the past 8 hrs:   BP Temp Pulse Resp SpO2   02/15/18 0330 (!) 144/97 97.8 °F (36.6 °C) 94 20 97 %             Physical Exam: Lungs: clear to auscultation bilaterally  Heart: regular rate and rhythm, S1, S2 normal, no murmur, click, rub or gallop  Abdomen: soft, non-tender.  Bowel sounds normal. No masses,  no organomegaly        Data Review No results found for this or any previous visit (from the past 24 hour(s)).         Assessment:     Active Problems:    Sepsis ([facility] (2/12/2018)      Urinary tract infection without hematuria (2/13/2018)        Plan:     1) Continue IVF, Abx  2) check for Norovirus  3) OOB

## 2018-02-15 NOTE — PROGRESS NOTES
Problem: Falls - Risk of  Goal: *Absence of Falls  Document Mary Ellen Fall Risk and appropriate interventions in the flowsheet. Outcome: Progressing Towards Goal  Fall Risk Interventions:  Mobility Interventions: Patient to call before getting OOB, PT Consult for mobility concerns    Mentation Interventions: Door open when patient unattended, Familiar objects from home, More frequent rounding, Reorient patient    Medication Interventions: Patient to call before getting OOB, Teach patient to arise slowly    Elimination Interventions: Call light in reach, Patient to call for help with toileting needs, Toilet paper/wipes in reach    History of Falls Interventions:  Investigate reason for fall, Room close to nurse's station

## 2018-02-16 LAB
ANION GAP SERPL CALC-SCNC: 5 MMOL/L (ref 5–15)
BUN SERPL-MCNC: 4 MG/DL (ref 6–20)
BUN/CREAT SERPL: 7 (ref 12–20)
CALCIUM SERPL-MCNC: 6.5 MG/DL (ref 8.5–10.1)
CHLORIDE SERPL-SCNC: 115 MMOL/L (ref 97–108)
CO2 SERPL-SCNC: 24 MMOL/L (ref 21–32)
CREAT SERPL-MCNC: 0.56 MG/DL (ref 0.55–1.02)
GLUCOSE SERPL-MCNC: 89 MG/DL (ref 65–100)
POTASSIUM SERPL-SCNC: 3.8 MMOL/L (ref 3.5–5.1)
SODIUM SERPL-SCNC: 144 MMOL/L (ref 136–145)

## 2018-02-16 PROCEDURE — 74011250636 HC RX REV CODE- 250/636: Performed by: FAMILY MEDICINE

## 2018-02-16 PROCEDURE — 74011250637 HC RX REV CODE- 250/637: Performed by: FAMILY MEDICINE

## 2018-02-16 PROCEDURE — 74011250637 HC RX REV CODE- 250/637: Performed by: INTERNAL MEDICINE

## 2018-02-16 PROCEDURE — 65270000029 HC RM PRIVATE

## 2018-02-16 PROCEDURE — 80048 BASIC METABOLIC PNL TOTAL CA: CPT | Performed by: FAMILY MEDICINE

## 2018-02-16 PROCEDURE — 36415 COLL VENOUS BLD VENIPUNCTURE: CPT | Performed by: FAMILY MEDICINE

## 2018-02-16 RX ORDER — AMOXICILLIN AND CLAVULANATE POTASSIUM 875; 125 MG/1; MG/1
1 TABLET, FILM COATED ORAL EVERY 12 HOURS
Status: DISCONTINUED | OUTPATIENT
Start: 2018-02-16 | End: 2018-02-19 | Stop reason: HOSPADM

## 2018-02-16 RX ORDER — CHOLESTYRAMINE 4 G/4.8G
4 POWDER, FOR SUSPENSION ORAL 2 TIMES DAILY WITH MEALS
Status: DISCONTINUED | OUTPATIENT
Start: 2018-02-16 | End: 2018-02-18

## 2018-02-16 RX ADMIN — CARVEDILOL 12.5 MG: 12.5 TABLET, FILM COATED ORAL at 09:02

## 2018-02-16 RX ADMIN — AMOXICILLIN AND CLAVULANATE POTASSIUM 1 TABLET: 875; 125 TABLET, FILM COATED ORAL at 21:26

## 2018-02-16 RX ADMIN — CHOLESTYRAMINE 4 G: 4 POWDER, FOR SUSPENSION ORAL at 12:29

## 2018-02-16 RX ADMIN — VENLAFAXINE 150 MG: 100 TABLET ORAL at 18:45

## 2018-02-16 RX ADMIN — VENLAFAXINE 150 MG: 100 TABLET ORAL at 09:02

## 2018-02-16 RX ADMIN — CARVEDILOL 12.5 MG: 12.5 TABLET, FILM COATED ORAL at 16:19

## 2018-02-16 RX ADMIN — ALPRAZOLAM 1 MG: 1 TABLET ORAL at 18:46

## 2018-02-16 RX ADMIN — Medication 10 ML: at 21:27

## 2018-02-16 RX ADMIN — LOSARTAN POTASSIUM 50 MG: 50 TABLET ORAL at 09:02

## 2018-02-16 RX ADMIN — Medication 10 ML: at 14:33

## 2018-02-16 RX ADMIN — ALPRAZOLAM 1 MG: 1 TABLET ORAL at 09:02

## 2018-02-16 RX ADMIN — ASPIRIN 81 MG: 81 TABLET, COATED ORAL at 09:02

## 2018-02-16 RX ADMIN — CHOLESTYRAMINE 4 G: 4 POWDER, FOR SUSPENSION ORAL at 16:19

## 2018-02-16 RX ADMIN — IBUPROFEN 600 MG: 600 TABLET, FILM COATED ORAL at 14:31

## 2018-02-16 RX ADMIN — IBUPROFEN 600 MG: 600 TABLET, FILM COATED ORAL at 03:15

## 2018-02-16 RX ADMIN — IBUPROFEN 600 MG: 600 TABLET, FILM COATED ORAL at 09:02

## 2018-02-16 RX ADMIN — ALPRAZOLAM 1 MG: 1 TABLET ORAL at 21:26

## 2018-02-16 RX ADMIN — IBUPROFEN 600 MG: 600 TABLET, FILM COATED ORAL at 21:29

## 2018-02-16 RX ADMIN — AMOXICILLIN AND CLAVULANATE POTASSIUM 1 TABLET: 875; 125 TABLET, FILM COATED ORAL at 09:02

## 2018-02-16 RX ADMIN — ALPRAZOLAM 1 MG: 1 TABLET ORAL at 12:29

## 2018-02-16 RX ADMIN — ENOXAPARIN SODIUM 40 MG: 40 INJECTION SUBCUTANEOUS at 14:31

## 2018-02-16 NOTE — PROGRESS NOTES
Dirk Ortez, Fabián Evans, and Ovidio Lou Date: 2/12/2018      Subjective:     Patient with less diarrhea over the last 24 hrs, none over the last 12. Minimal nausea. ..       Current Facility-Administered Medications   Medication Dose Route Frequency    amoxicillin-clavulanate (AUGMENTIN) 875-125 mg per tablet 1 Tab  1 Tab Oral Q12H    ondansetron (ZOFRAN) injection 4 mg  4 mg IntraVENous Q6H PRN    fentaNYL (DURAGESIC) 50 mcg/hr patch 1 Patch  1 Patch TransDERmal Q72H    sodium chloride (NS) flush 5-10 mL  5-10 mL IntraVENous PRN    ALPRAZolam (XANAX) tablet 1 mg  1 mg Oral QID    aspirin delayed-release tablet 81 mg  81 mg Oral DAILY    carvedilol (COREG) tablet 12.5 mg  12.5 mg Oral BID WITH MEALS    docusate sodium (COLACE) capsule 100 mg  100 mg Oral QHS    losartan (COZAAR) tablet 50 mg  50 mg Oral DAILY    senna-docusate (PERICOLACE) 8.6-50 mg per tablet 1 Tab  1 Tab Oral DAILY    venlafaxine (EFFEXOR) tablet 150 mg  150 mg Oral BID    sodium chloride (NS) flush 5-10 mL  5-10 mL IntraVENous Q8H    sodium chloride (NS) flush 5-10 mL  5-10 mL IntraVENous PRN    enoxaparin (LOVENOX) injection 40 mg  40 mg SubCUTAneous Q24H    ibuprofen (MOTRIN) tablet 600 mg  600 mg Oral Q6H PRN    loperamide (IMODIUM) capsule 2 mg  2 mg Oral PRN          Objective:     Patient Vitals for the past 8 hrs:   BP Temp Pulse Resp SpO2   02/16/18 0549 138/80 97.8 °F (36.6 °C) 81 18 97 %             Physical Exam: Lungs: clear to auscultation bilaterally  Heart: regular rate and rhythm, S1, S2 normal, no murmur, click, rub or gallop  Abdomen: soft, non-tender.  Bowel sounds normal. No masses,  no organomegaly        Data Review   Recent Results (from the past 24 hour(s))   METABOLIC PANEL, BASIC    Collection Time: 02/15/18  8:56 AM   Result Value Ref Range    Sodium 143 136 - 145 mmol/L    Potassium 3.3 (L) 3.5 - 5.1 mmol/L    Chloride 112 (H) 97 - 108 mmol/L    CO2 23 21 - 32 mmol/L    Anion gap 8 5 - 15 mmol/L    Glucose 117 (H) 65 - 100 mg/dL    BUN 3 (L) 6 - 20 MG/DL    Creatinine 0.59 0.55 - 1.02 MG/DL    BUN/Creatinine ratio 5 (L) 12 - 20      GFR est AA >60 >60 ml/min/1.73m2    GFR est non-AA >60 >60 ml/min/1.73m2    Calcium 6.5 (L) 8.5 - 10.1 MG/DL   CBC WITH AUTOMATED DIFF    Collection Time: 02/15/18  8:56 AM   Result Value Ref Range    WBC 3.4 (L) 3.6 - 11.0 K/uL    RBC 3.90 3.80 - 5.20 M/uL    HGB 12.2 11.5 - 16.0 g/dL    HCT 37.0 35.0 - 47.0 %    MCV 94.9 80.0 - 99.0 FL    MCH 31.3 26.0 - 34.0 PG    MCHC 33.0 30.0 - 36.5 g/dL    RDW 13.0 11.5 - 14.5 %    PLATELET 522 688 - 238 K/uL    MPV 11.0 8.9 - 12.9 FL    NRBC 0.0 0  WBC    ABSOLUTE NRBC 0.00 0.00 - 0.01 K/uL    EOSINOPHILS 5 0 - 7 %    BASOPHILS 0 0 - 1 %    ABS. EOSINOPHILS 0.2 0.0 - 0.4 K/UL    ABS. BASOPHILS 0.0 0.0 - 0.1 K/UL    DF AUTOMATED      RBC COMMENTS NORMOCYTIC, NORMOCHROMIC      NEUTROPHILS 41 32 - 75 %    LYMPHOCYTES 46 12 - 49 %    MONOCYTES 8 5 - 13 %    IMMATURE GRANULOCYTES 0 %    ABS. NEUTROPHILS 1.4 (L) 1.8 - 8.0 K/UL    ABS. LYMPHOCYTES 1.5 0.8 - 3.5 K/UL    ABS. MONOCYTES 0.3 0.0 - 1.0 K/UL    ABS. IMM.  GRANS. 0.0 K/UL   METABOLIC PANEL, BASIC    Collection Time: 02/16/18  5:46 AM   Result Value Ref Range    Sodium 144 136 - 145 mmol/L    Potassium 3.8 3.5 - 5.1 mmol/L    Chloride 115 (H) 97 - 108 mmol/L    CO2 24 21 - 32 mmol/L    Anion gap 5 5 - 15 mmol/L    Glucose 89 65 - 100 mg/dL    BUN 4 (L) 6 - 20 MG/DL    Creatinine 0.56 0.55 - 1.02 MG/DL    BUN/Creatinine ratio 7 (L) 12 - 20      GFR est AA >60 >60 ml/min/1.73m2    GFR est non-AA >60 >60 ml/min/1.73m2    Calcium 6.5 (L) 8.5 - 10.1 MG/DL           Assessment:     Active Problems:    Sepsis ([facility] (2/12/2018)      Urinary tract infection without hematuria (2/13/2018)        Plan:     1) D/C IVF  2) Abx to PO  3) plan d/c in AM  4) D/C with Duragesic patch

## 2018-02-16 NOTE — PROGRESS NOTES
Spoke with Dr. Yi Hooks as he was rounding on pt this morning as she is a [employer] member. Dr. Vikas Lenz is attending here and expects to discharge pt tomorrow to return to [facility]. Pt has been cleared by PT, moving well. No therapy recommendations for discharge.       PB Judd

## 2018-02-16 NOTE — PROGRESS NOTES
Problem: Falls - Risk of  Goal: *Absence of Falls  Document Mary Ellen Fall Risk and appropriate interventions in the flowsheet. Outcome: Progressing Towards Goal  Fall Risk Interventions:  Mobility Interventions: Communicate number of staff needed for ambulation/transfer, Patient to call before getting OOB, PT Consult for mobility concerns, Utilize walker, cane, or other assitive device    Mentation Interventions: Door open when patient unattended, Increase mobility, Reorient patient, Room close to nurse's station    Medication Interventions: Patient to call before getting OOB, Teach patient to arise slowly    Elimination Interventions: Call light in reach, Patient to call for help with toileting needs, Toileting schedule/hourly rounds    History of Falls Interventions:  Investigate reason for fall, Room close to nurse's station

## 2018-02-16 NOTE — PROGRESS NOTES
[facility]ist                        Answering service # [phone]. Progress Note             Daily Progress Note: 2/15/2018     Patient seen as courtesy visit as she is [employer] member.     Overall feeling better but have 2 explosive diarrhea episode today and had accident  Nausea is still better, but continue to have diarrhea  Denies any abdominal pain. Might be related with viral gastroenteritis.     Managed by Trena Cheng MD  Discuss with patient in detail, I think adding [place] Mcwilliams would help. Discussed with dr. mccurdy who is covering dr. Vivek Medrano. Plan for discharge home in am    I let patient know  Let us know if we can help during hospitalization or for any post discharge care.     Thanks  Jocelyn Juarez MD   [facility] hospitalist  Cell # [phone].

## 2018-02-17 LAB
BACTERIA SPEC CULT: NORMAL
BACTERIA SPEC CULT: NORMAL
SERVICE CMNT-IMP: NORMAL
SERVICE CMNT-IMP: NORMAL

## 2018-02-17 PROCEDURE — 74011250637 HC RX REV CODE- 250/637: Performed by: INTERNAL MEDICINE

## 2018-02-17 PROCEDURE — 65270000029 HC RM PRIVATE

## 2018-02-17 PROCEDURE — 74011250636 HC RX REV CODE- 250/636: Performed by: FAMILY MEDICINE

## 2018-02-17 PROCEDURE — 74011250637 HC RX REV CODE- 250/637: Performed by: FAMILY MEDICINE

## 2018-02-17 RX ORDER — CALCIUM CARBONATE 200(500)MG
200 TABLET,CHEWABLE ORAL
Status: DISCONTINUED | OUTPATIENT
Start: 2018-02-17 | End: 2018-02-19 | Stop reason: HOSPADM

## 2018-02-17 RX ORDER — CLONIDINE HYDROCHLORIDE 0.1 MG/1
0.1 TABLET ORAL
Status: DISCONTINUED | OUTPATIENT
Start: 2018-02-17 | End: 2018-02-19 | Stop reason: HOSPADM

## 2018-02-17 RX ORDER — AMLODIPINE BESYLATE 5 MG/1
5 TABLET ORAL
Status: COMPLETED | OUTPATIENT
Start: 2018-02-17 | End: 2018-02-17

## 2018-02-17 RX ORDER — PANTOPRAZOLE SODIUM 40 MG/1
40 TABLET, DELAYED RELEASE ORAL
Status: DISCONTINUED | OUTPATIENT
Start: 2018-02-17 | End: 2018-02-19 | Stop reason: HOSPADM

## 2018-02-17 RX ORDER — OXYCODONE AND ACETAMINOPHEN 5; 325 MG/1; MG/1
1 TABLET ORAL
Status: DISCONTINUED | OUTPATIENT
Start: 2018-02-17 | End: 2018-02-19 | Stop reason: HOSPADM

## 2018-02-17 RX ADMIN — CLONIDINE HYDROCHLORIDE 0.1 MG: 0.1 TABLET ORAL at 23:01

## 2018-02-17 RX ADMIN — ALPRAZOLAM 1 MG: 1 TABLET ORAL at 09:10

## 2018-02-17 RX ADMIN — CALCIUM CARBONATE (ANTACID) CHEW TAB 500 MG 200 MG: 500 CHEW TAB at 20:41

## 2018-02-17 RX ADMIN — AMOXICILLIN AND CLAVULANATE POTASSIUM 1 TABLET: 875; 125 TABLET, FILM COATED ORAL at 20:41

## 2018-02-17 RX ADMIN — Medication 10 ML: at 23:02

## 2018-02-17 RX ADMIN — CARVEDILOL 12.5 MG: 12.5 TABLET, FILM COATED ORAL at 17:58

## 2018-02-17 RX ADMIN — CHOLESTYRAMINE 4 G: 4 POWDER, FOR SUSPENSION ORAL at 09:10

## 2018-02-17 RX ADMIN — Medication 10 ML: at 15:36

## 2018-02-17 RX ADMIN — AMLODIPINE BESYLATE 5 MG: 5 TABLET ORAL at 20:41

## 2018-02-17 RX ADMIN — ASPIRIN 81 MG: 81 TABLET, COATED ORAL at 09:10

## 2018-02-17 RX ADMIN — ALPRAZOLAM 1 MG: 1 TABLET ORAL at 17:58

## 2018-02-17 RX ADMIN — IBUPROFEN 600 MG: 600 TABLET, FILM COATED ORAL at 12:38

## 2018-02-17 RX ADMIN — Medication 10 ML: at 05:54

## 2018-02-17 RX ADMIN — VENLAFAXINE 150 MG: 100 TABLET ORAL at 17:59

## 2018-02-17 RX ADMIN — IBUPROFEN 600 MG: 600 TABLET, FILM COATED ORAL at 05:53

## 2018-02-17 RX ADMIN — ALPRAZOLAM 1 MG: 1 TABLET ORAL at 23:01

## 2018-02-17 RX ADMIN — AMOXICILLIN AND CLAVULANATE POTASSIUM 1 TABLET: 875; 125 TABLET, FILM COATED ORAL at 09:10

## 2018-02-17 RX ADMIN — ALPRAZOLAM 1 MG: 1 TABLET ORAL at 12:38

## 2018-02-17 RX ADMIN — PANTOPRAZOLE SODIUM 40 MG: 40 TABLET, DELAYED RELEASE ORAL at 20:41

## 2018-02-17 RX ADMIN — VENLAFAXINE 150 MG: 100 TABLET ORAL at 09:12

## 2018-02-17 RX ADMIN — LOSARTAN POTASSIUM 50 MG: 50 TABLET ORAL at 09:10

## 2018-02-17 RX ADMIN — OXYCODONE AND ACETAMINOPHEN 1 TABLET: 5; 325 TABLET ORAL at 23:01

## 2018-02-17 RX ADMIN — ENOXAPARIN SODIUM 40 MG: 40 INJECTION SUBCUTANEOUS at 15:36

## 2018-02-17 RX ADMIN — CHOLESTYRAMINE 4 G: 4 POWDER, FOR SUSPENSION ORAL at 17:58

## 2018-02-17 RX ADMIN — CARVEDILOL 12.5 MG: 12.5 TABLET, FILM COATED ORAL at 09:10

## 2018-02-17 NOTE — PROGRESS NOTES
Medical Progress Note      NAME: Esa Duckworth   :  1955  MRM:  [id number]    Date/Time: 2018           Problem List:     Active Problems:    Sepsis ([facility] [address] [address] (2018)      Urinary tract infection without hematuria (2018)             Subjective:     Now on Questran. Diarrhea improved. Denies nausea. Past Medical History:   Diagnosis Date    Anxiety     Cancer [facility])     breast    Depression     GERD (gastroesophageal reflux disease)     HTN (hypertension)     Hypercholesterolemia     Hypotension 2012    Metastatic breast cancer ([facility] 5/3/2017    Secondary cancer of bone ([facility] 5/3/2017            Objective:         Vitals:      Last 24hrs VS reviewed since prior progress note. Most recent are:    Visit Vitals    /67    Pulse 80    Temp 98.2 °F (36.8 °C)    Resp 18    Ht 5' 5\" (1.651 m)    Wt 188 lb (85.3 kg)    SpO2 99%    BMI 31.28 kg/m2     SpO2 Readings from Last 6 Encounters:   18 99%   16 94%   12 98%   12 100%        No intake or output data in the 24 hours ending 18 0802               Exam:      General:  Alert, cooperative, no distress, appears stated age. Lungs:   Clear to auscultation bilaterally. Heart:  Regular rate and rhythm, S1, S2 normal, no murmur, click, rub or gallop. Abdomen:   Soft, non-tender. Bowel sounds normal. No masses,  No organomegaly. Extremities: No pedal edema. Lab Data Reviewed: (see below)  No results found for this or any previous visit (from the past 24 hour(s)).     Medications Reviewed: (see below)    ______________________________________________________________________    Medications:     Current Facility-Administered Medications   Medication Dose Route Frequency    amoxicillin-clavulanate (AUGMENTIN) 875-125 mg per tablet 1 Tab  1 Tab Oral Q12H    cholestyramine-aspartame (QUESTRAN LIGHT) packet 4 g  4 g Oral BID WITH MEALS    ondansetron (ZOFRAN) injection 4 mg  4 mg IntraVENous Q6H PRN    fentaNYL (DURAGESIC) 50 mcg/hr patch 1 Patch  1 Patch TransDERmal Q72H    sodium chloride (NS) flush 5-10 mL  5-10 mL IntraVENous PRN    ALPRAZolam (XANAX) tablet 1 mg  1 mg Oral QID    aspirin delayed-release tablet 81 mg  81 mg Oral DAILY    carvedilol (COREG) tablet 12.5 mg  12.5 mg Oral BID WITH MEALS    docusate sodium (COLACE) capsule 100 mg  100 mg Oral QHS    losartan (COZAAR) tablet 50 mg  50 mg Oral DAILY    senna-docusate (PERICOLACE) 8.6-50 mg per tablet 1 Tab  1 Tab Oral DAILY    venlafaxine (EFFEXOR) tablet 150 mg  150 mg Oral BID    sodium chloride (NS) flush 5-10 mL  5-10 mL IntraVENous Q8H    sodium chloride (NS) flush 5-10 mL  5-10 mL IntraVENous PRN    enoxaparin (LOVENOX) injection 40 mg  40 mg SubCUTAneous Q24H    ibuprofen (MOTRIN) tablet 600 mg  600 mg Oral Q6H PRN    loperamide (IMODIUM) capsule 2 mg  2 mg Oral PRN                   Assessment:   Infectious Gastroenteritis, improving. Continue current rx.    Hopefully she can be d/c'd soon back to [facility]  Patient Active Problem List   Diagnosis Code    HTN (hypertension) I10    GERD (gastroesophageal reflux disease) K21.9    Hypercholesterolemia E78.00    Breast cancer ([facility] C50.919    Bimalleolar fracture of left ankle S82.842A    Anxiety F41.9    Closed left ankle fracture S82.892A    Mild single current episode of major depressive disorder ([facility] F32.0    Metastatic breast cancer ([facility] C50.919    Secondary cancer of bone ([facility] C79.51    Sepsis ([facility] A41.9    Urinary tract infection without hematuria N39.0          Plan:     As above.                                                        ___________________________________________________      Attending Physician: Jose Cruz Betancourt MD

## 2018-02-17 NOTE — PROGRESS NOTES
Chart reviewed for completion of discharge plan. No discharge order placed for today, MD notes reviewed. At discharge, nurse please call report to receiving nurse at [facility], phone, [phone]. PB Miner      3:15pm  Spoke with nurse who confirmed pt is to stay overnight tonight due to symptoms. CM called [facility], 3rd floor, phone [phone] to advise of possible return tomorrow. Pt was cleared by PT so she can likely travel by car. Was able to speak with Lashawn Lion, staff at [facility] who is aware pt may return tomorrow.     PB Miner

## 2018-02-17 NOTE — PROGRESS NOTES
Bedside and Verbal shift change report given to Constantine Dye (oncoming nurse) by Silvia Dhillon (offgoing nurse). Report included the following information SBAR, Kardex, Procedure Summary, Intake/Output, MAR and Recent Results.

## 2018-02-18 PROCEDURE — 74011250637 HC RX REV CODE- 250/637: Performed by: FAMILY MEDICINE

## 2018-02-18 PROCEDURE — 65270000029 HC RM PRIVATE

## 2018-02-18 PROCEDURE — 74011250637 HC RX REV CODE- 250/637: Performed by: INTERNAL MEDICINE

## 2018-02-18 PROCEDURE — 74011250636 HC RX REV CODE- 250/636: Performed by: FAMILY MEDICINE

## 2018-02-18 RX ORDER — HYDROCHLOROTHIAZIDE 25 MG/1
25 TABLET ORAL DAILY
Status: DISCONTINUED | OUTPATIENT
Start: 2018-02-18 | End: 2018-02-19 | Stop reason: HOSPADM

## 2018-02-18 RX ADMIN — Medication 10 ML: at 06:46

## 2018-02-18 RX ADMIN — ALPRAZOLAM 1 MG: 1 TABLET ORAL at 20:04

## 2018-02-18 RX ADMIN — ASPIRIN 81 MG: 81 TABLET, COATED ORAL at 08:09

## 2018-02-18 RX ADMIN — HYDROCHLOROTHIAZIDE 25 MG: 25 TABLET ORAL at 09:40

## 2018-02-18 RX ADMIN — VENLAFAXINE 150 MG: 100 TABLET ORAL at 17:25

## 2018-02-18 RX ADMIN — ALPRAZOLAM 1 MG: 1 TABLET ORAL at 17:25

## 2018-02-18 RX ADMIN — OXYCODONE AND ACETAMINOPHEN 1 TABLET: 5; 325 TABLET ORAL at 03:16

## 2018-02-18 RX ADMIN — OXYCODONE AND ACETAMINOPHEN 1 TABLET: 5; 325 TABLET ORAL at 20:05

## 2018-02-18 RX ADMIN — ALPRAZOLAM 1 MG: 1 TABLET ORAL at 08:08

## 2018-02-18 RX ADMIN — ENOXAPARIN SODIUM 40 MG: 40 INJECTION SUBCUTANEOUS at 14:17

## 2018-02-18 RX ADMIN — VENLAFAXINE 150 MG: 100 TABLET ORAL at 08:13

## 2018-02-18 RX ADMIN — Medication 10 ML: at 14:17

## 2018-02-18 RX ADMIN — CLONIDINE HYDROCHLORIDE 0.1 MG: 0.1 TABLET ORAL at 16:07

## 2018-02-18 RX ADMIN — CARVEDILOL 12.5 MG: 12.5 TABLET, FILM COATED ORAL at 08:08

## 2018-02-18 RX ADMIN — CARVEDILOL 12.5 MG: 12.5 TABLET, FILM COATED ORAL at 17:24

## 2018-02-18 RX ADMIN — PANTOPRAZOLE SODIUM 40 MG: 40 TABLET, DELAYED RELEASE ORAL at 06:46

## 2018-02-18 RX ADMIN — LOSARTAN POTASSIUM 50 MG: 50 TABLET ORAL at 08:09

## 2018-02-18 RX ADMIN — OXYCODONE AND ACETAMINOPHEN 1 TABLET: 5; 325 TABLET ORAL at 08:09

## 2018-02-18 RX ADMIN — Medication 10 ML: at 20:05

## 2018-02-18 RX ADMIN — ALPRAZOLAM 1 MG: 1 TABLET ORAL at 12:22

## 2018-02-18 RX ADMIN — AMOXICILLIN AND CLAVULANATE POTASSIUM 1 TABLET: 875; 125 TABLET, FILM COATED ORAL at 20:05

## 2018-02-18 RX ADMIN — OXYCODONE AND ACETAMINOPHEN 1 TABLET: 5; 325 TABLET ORAL at 16:07

## 2018-02-18 RX ADMIN — AMOXICILLIN AND CLAVULANATE POTASSIUM 1 TABLET: 875; 125 TABLET, FILM COATED ORAL at 08:08

## 2018-02-18 NOTE — PROGRESS NOTES
Dr mccurdy notified of pt status. meds ordered for bp and gastric reflux. bp to be rechecked 2hrs after bp med given.

## 2018-02-18 NOTE — PROGRESS NOTES
Medical Progress Note      NAME: Dustin Valle   :  1955  MRM:  [id number]    Date/Time: 2018           Problem List:     Active Problems:    HTN (hypertension) ()      Sepsis ([facility] (2018)      Urinary tract infection without hematuria (2018)             Subjective:     bp has been elevated, briefly responding to PRN Clonidine. She took HCTZ in the past     Headache last night; denies headache today     Had indigestion last night responding to prn TUMS and Protonix     Last bm was formed and occurred yesterday . Denies urinary difficulty     Past Medical History:   Diagnosis Date    Anxiety     Cancer ([facility]     breast    Depression     GERD (gastroesophageal reflux disease)     HTN (hypertension)     Hypercholesterolemia     Hypotension 2012    Metastatic breast cancer ([facility] 5/3/2017    Secondary cancer of bone ([facility] 5/3/2017            Objective:         Vitals:      Last 24hrs VS reviewed since prior progress note. Most recent are:    Visit Vitals    BP (!) 170/98 (BP 1 Location: Right leg, BP Patient Position: At rest)    Pulse 85    Temp 97.8 °F (36.6 °C)    Resp 18    Ht 5' 5\" (1.651 m)    Wt 188 lb (85.3 kg)    SpO2 95%    BMI 31.28 kg/m2     SpO2 Readings from Last 6 Encounters:   18 95%   16 94%   12 98%   12 100%        No intake or output data in the 24 hours ending 18 [id number]               Exam:      General:  Alert, cooperative, no distress, appears stated age. ENT Neck supple      Lungs:   Clear to auscultation bilaterally. Heart:  Regular rate and rhythm, S1, S2 normal, no murmur, click, rub or gallop. Abdomen:   Soft, non-tender. Bowel sounds normal. No masses,  No organomegaly. Extremities: No pedal  edema. Lab Data Reviewed: (see below)  No results found for this or any previous visit (from the past 24 hour(s)).     Medications Reviewed: (see below)    ______________________________________________________________________    Medications:     Current Facility-Administered Medications   Medication Dose Route Frequency    hydroCHLOROthiazide (HYDRODIURIL) tablet 25 mg  25 mg Oral DAILY    calcium carbonate (TUMS) chewable tablet 200 mg [elemental]  200 mg Oral Q4H PRN    pantoprazole (PROTONIX) tablet 40 mg  40 mg Oral ACB    cloNIDine HCl (CATAPRES) tablet 0.1 mg  0.1 mg Oral Q4H PRN    oxyCODONE-acetaminophen (PERCOCET) 5-325 mg per tablet 1 Tab  1 Tab Oral Q4H PRN    amoxicillin-clavulanate (AUGMENTIN) 875-125 mg per tablet 1 Tab  1 Tab Oral Q12H    ondansetron (ZOFRAN) injection 4 mg  4 mg IntraVENous Q6H PRN    fentaNYL (DURAGESIC) 50 mcg/hr patch 1 Patch  1 Patch TransDERmal Q72H    sodium chloride (NS) flush 5-10 mL  5-10 mL IntraVENous PRN    ALPRAZolam (XANAX) tablet 1 mg  1 mg Oral QID    aspirin delayed-release tablet 81 mg  81 mg Oral DAILY    carvedilol (COREG) tablet 12.5 mg  12.5 mg Oral BID WITH MEALS    docusate sodium (COLACE) capsule 100 mg  100 mg Oral QHS    losartan (COZAAR) tablet 50 mg  50 mg Oral DAILY    senna-docusate (PERICOLACE) 8.6-50 mg per tablet 1 Tab  1 Tab Oral DAILY    venlafaxine (EFFEXOR) tablet 150 mg  150 mg Oral BID    sodium chloride (NS) flush 5-10 mL  5-10 mL IntraVENous Q8H    sodium chloride (NS) flush 5-10 mL  5-10 mL IntraVENous PRN    enoxaparin (LOVENOX) injection 40 mg  40 mg SubCUTAneous Q24H    ibuprofen (MOTRIN) tablet 600 mg  600 mg Oral Q6H PRN    loperamide (IMODIUM) capsule 2 mg  2 mg Oral PRN                   Assessment:     Diarrhea resolved. P: D/c Enteric isolation. D/c Questran     HTN. Restart HCTZ    GERD . Continue PPI and PRN TUMS.      Augmentin continues     Patient Active Problem List   Diagnosis Code    HTN (hypertension) I10    GERD (gastroesophageal reflux disease) K21.9    Hypercholesterolemia E78.00    Breast cancer ([facility] C50.919    Bimalleolar fracture of left ankle S82.842A    Anxiety F41.9    Closed left ankle fracture S82.892A    Mild single current episode of major depressive disorder ([facility] F32.0    Metastatic breast cancer ([facility] C50.919    Secondary cancer of bone ([facility] C79.51    Sepsis ([facility] A41.9    Urinary tract infection without hematuria N39.0          Plan:     Metatstatic Breast ca to ribs and sternum.  Generally comfortable now on Duragesic patch and PRN Percocet.                                                        ___________________________________________________      Attending Physician: Sven Bradley MD

## 2018-02-18 NOTE — ROUTINE PROCESS
Bedside shift change report given to Sho Ybarra (oncoming nurse) by Miranda Mauro (offgoing nurse). Report included the following information SBAR, Kardex, ED Summary, Procedure Summary, Intake/Output and MAR.

## 2018-02-18 NOTE — PROGRESS NOTES
Talked to Dr. Dash Lerma at bedside regarding pt elevated BP. Dr ordered daily hydrochlorothiazide. If elevated again to give prn clonidine already ordered. 1600: Pt complaining of headache. BP elevated. PRN clondine given. Will recheck BP.

## 2018-02-19 ENCOUNTER — DOCUMENTATION ONLY (OUTPATIENT)
Dept: CASE MANAGEMENT | Age: 63
End: 2018-02-19

## 2018-02-19 VITALS
HEIGHT: 65 IN | SYSTOLIC BLOOD PRESSURE: 179 MMHG | OXYGEN SATURATION: 95 % | BODY MASS INDEX: 31.32 KG/M2 | HEART RATE: 93 BPM | RESPIRATION RATE: 18 BRPM | TEMPERATURE: 98.3 F | WEIGHT: 188 LBS | DIASTOLIC BLOOD PRESSURE: 82 MMHG

## 2018-02-19 PROBLEM — K52.9 GASTROENTERITIS: Status: ACTIVE | Noted: 2018-02-19

## 2018-02-19 PROCEDURE — 74011250637 HC RX REV CODE- 250/637: Performed by: INTERNAL MEDICINE

## 2018-02-19 PROCEDURE — 74011250636 HC RX REV CODE- 250/636: Performed by: FAMILY MEDICINE

## 2018-02-19 PROCEDURE — 74011250637 HC RX REV CODE- 250/637: Performed by: FAMILY MEDICINE

## 2018-02-19 RX ORDER — ASPIRIN 81 MG/1
81 TABLET ORAL DAILY
Qty: 100 TAB | Refills: 0 | Status: SHIPPED | OUTPATIENT
Start: 2018-02-19 | End: 2018-04-10

## 2018-02-19 RX ORDER — AMOXICILLIN AND CLAVULANATE POTASSIUM 875; 125 MG/1; MG/1
1 TABLET, FILM COATED ORAL EVERY 12 HOURS
Qty: 6 TAB | Refills: 0 | Status: SHIPPED | OUTPATIENT
Start: 2018-02-19 | End: 2018-04-05

## 2018-02-19 RX ORDER — DOCUSATE SODIUM 100 MG/1
100 CAPSULE, LIQUID FILLED ORAL
Qty: 30 CAP | Refills: 2 | Status: ON HOLD | OUTPATIENT
Start: 2018-02-19 | End: 2018-04-06

## 2018-02-19 RX ORDER — HEPARIN 100 UNIT/ML
300 SYRINGE INTRAVENOUS AS NEEDED
Status: DISCONTINUED | OUTPATIENT
Start: 2018-02-19 | End: 2018-02-19 | Stop reason: HOSPADM

## 2018-02-19 RX ORDER — FENTANYL 50 UG/1
1 PATCH TRANSDERMAL
Qty: 10 PATCH | Refills: 0 | Status: SHIPPED | OUTPATIENT
Start: 2018-02-19 | End: 2018-03-02 | Stop reason: SINTOL

## 2018-02-19 RX ORDER — HYDROCHLOROTHIAZIDE 25 MG/1
25 TABLET ORAL DAILY
Qty: 30 TAB | Refills: 3 | Status: SHIPPED | OUTPATIENT
Start: 2018-02-19 | End: 2018-04-05

## 2018-02-19 RX ADMIN — OXYCODONE AND ACETAMINOPHEN 1 TABLET: 5; 325 TABLET ORAL at 06:30

## 2018-02-19 RX ADMIN — VENLAFAXINE 150 MG: 100 TABLET ORAL at 08:45

## 2018-02-19 RX ADMIN — AMOXICILLIN AND CLAVULANATE POTASSIUM 1 TABLET: 875; 125 TABLET, FILM COATED ORAL at 08:46

## 2018-02-19 RX ADMIN — ALPRAZOLAM 1 MG: 1 TABLET ORAL at 08:46

## 2018-02-19 RX ADMIN — LOSARTAN POTASSIUM 50 MG: 50 TABLET ORAL at 08:45

## 2018-02-19 RX ADMIN — CARVEDILOL 12.5 MG: 12.5 TABLET, FILM COATED ORAL at 08:46

## 2018-02-19 RX ADMIN — OXYCODONE AND ACETAMINOPHEN 1 TABLET: 5; 325 TABLET ORAL at 01:13

## 2018-02-19 RX ADMIN — SODIUM CHLORIDE, PRESERVATIVE FREE 300 UNITS: 5 INJECTION INTRAVENOUS at 09:48

## 2018-02-19 RX ADMIN — HYDROCHLOROTHIAZIDE 25 MG: 25 TABLET ORAL at 08:46

## 2018-02-19 RX ADMIN — PANTOPRAZOLE SODIUM 40 MG: 40 TABLET, DELAYED RELEASE ORAL at 06:31

## 2018-02-19 RX ADMIN — IBUPROFEN 600 MG: 600 TABLET, FILM COATED ORAL at 08:46

## 2018-02-19 RX ADMIN — ASPIRIN 81 MG: 81 TABLET, COATED ORAL at 08:45

## 2018-02-19 NOTE — PROGRESS NOTES
Cm informed of discharge for today. Patient will return to [facility]. Cm discussed this with the patient, she confirmed that her friend will provide transport. [facility] ([phone]) to advise of discharge and faxed discharge instructions along with prescriptions.   Advance Gianna Us

## 2018-02-19 NOTE — ROUTINE PROCESS
Bedside shift change report given to Chalmer Libman (oncoming nurse) by Surendra (offgoing nurse). Report included the following information SBAR, Kardex, ED Summary, Intake/Output and MAR.

## 2018-02-19 NOTE — DISCHARGE INSTRUCTIONS
Patient Discharge Instructions    Maggy Avery / [id number] : 1955    Admitted 2018 Discharged: 2018     Take Home Medications            · It is important that you take the medication exactly as they are prescribed. · Keep your medication in the bottles provided by the pharmacist and keep a list of the medication names, dosages, and times to be taken in your wallet. · Do not take other medications without consulting your doctor. What to do at Home    Recommended diet: Regular Diet,     Recommended activity: Activity as tolerated,     If you experience any of the following symptoms abd pain, please follow up with Dr Aracelis Miller. Follow-up Appointments   Procedures    FOLLOW UP VISIT Appointment in: One Week Dr Aracelis Miller     Standing Status:   Standing     Number of Occurrences:   1     Order Specific Question:   Appointment in     Answer: One Week            Information obtained by :  I understand that if any problems occur once I am at home I am to contact my physician. I understand and acknowledge receipt of the instructions indicated above.                                                                                                                                            Physician's or R.N.'s Signature                                                                  Date/Time                                                                                                                                              Patient or Representative Signature                                                          Date/Time

## 2018-02-20 ENCOUNTER — PATIENT OUTREACH (OUTPATIENT)
Dept: CASE MANAGEMENT | Age: 63
End: 2018-02-20

## 2018-02-20 LAB
NOROVIRUS GI RNA STL QL NAA+PROBE: NEGATIVE
NOROVIRUS GII RNA STL QL NAA+PROBE: POSITIVE

## 2018-02-21 PROBLEM — A08.11 GASTROENTERITIS DUE TO NOROVIRUS: Status: ACTIVE | Noted: 2018-02-21

## 2018-02-22 ENCOUNTER — PATIENT OUTREACH (OUTPATIENT)
Dept: INTERNAL MEDICINE CLINIC | Age: 63
End: 2018-02-22

## 2018-02-22 ENCOUNTER — PATIENT OUTREACH (OUTPATIENT)
Dept: CASE MANAGEMENT | Age: 63
End: 2018-02-22

## 2018-02-22 NOTE — PROGRESS NOTES
Attempted call for [facility] patient to complete med rec, review discharge paperwork, and get update on patient's transition back into the facility. Was transferred to the 3rd floor that patient resides on, but the line continued to ring that writer was in queue, however there was no staff  within a 10 minute time frame. Left message this time with request to return call requesting the above information. Laura Osborn is a 63 y.o. female   This patient was received as a referral from High Risk Report   Inpatient RRAT Score: 25    Follow up appointments:  Dr. Lorne Goel changed from 2/23/18 to 3/2/18 @ 11:30    Goals      Facility/patient understands red flags post discharge. Fever, tachycardia, tachypnea, respiratory distress, N/V, fatigue, decreased urinary output, abdominal/pelvic pain    Note the importance of completion of antibiotics as ordered and to reach out to the doctor if infection does not seem to be responding or resolved with the antibiotic ordered.  Prevent complications post hospitalization. Pt to be managed OP without complications warranting readmission for a minimum of 30 days. Pt at [facility] from 2/12/18-2/19/18 for UTI/Sepsis, HTN, GERD. Pt under care of Dr. Lorne Goel and has "[employer]", so was seen by [facility] as well while in hospital. Was discharged with Augmentin antibiotic, restarted on HCTZ for HTN, and Duragesic patch for pain r/t stage 4 cancer diagnosis.

## 2018-02-22 NOTE — PROGRESS NOTES
Attempted call for [facility] patient to complete med rec, review discharge paperwork, and get update on patient's transition back into the facility. Was transferred to the 3rd floor that patient resides on, but the line continued to ring that writer was in queue, however there was no staff  within a 10 minute time frame. Will re-attempt at a later date. Lakeshia Singh is a 63 y.o. female   This patient was received as a referral from High Risk Report   Inpatient RRAT Score: 25  Patient's challenges to self management identified:  depression, ineffective coping, lack of knowledge about disease, utilization of services    Medication Management:  Facility administering medications, good adherence, good understanding    Summary of patients top three problems:     Problem 1: Lives in senior living. At will of staff to recognize and reach out appropriately on the patient's behalf. Problem 2: At risk for C-diff due to recent completion of Z-pack prior to admission for UTI and starting IV Abx and then PO Abx regimen      Problem 3: Pt also at risk for some overlapping symptoms due to chemotherapy injections through port for her stage 4 metastatic cancer. Patients motivational level on a scale of 0-10: PENNY  Advance Care Planning:   Patient was offered the opportunity to discuss advance care planning:  Unable to speak with patient     Does patient have an Advance Directive:  Not on file   If no, did you provide information on Advance Care Planning? Unable to speak with patient     [employer], Referrals, and Durable Medical Equipment: none    Follow up appointments:  Dr. Rut Dos Santos on 2/23/18  Goals      Facility/patient understands red flags post discharge.             Fever, tachycardia, tachypnea, respiratory distress, N/V, fatigue, decreased urinary output, abdominal/pelvic pain    Note the importance of completion of antibiotics as ordered and to reach out to the doctor if infection does not seem to be responding or resolved with the antibiotic ordered.  Prevent complications post hospitalization. Pt to be managed OP without complications warranting readmission for a minimum of 30 days. Pt at [facility] from 2/12/18-2/19/18 for UTI/Sepsis, HTN, GERD. Pt under care of Dr. Katelyn Waktins and has [employer], so was seen by [facility] as well while in hospital. Was discharged with Augmentin antibiotic, restarted on HCTZ for HTN, and Duragesic patch for pain r/t stage 4 cancer diagnosis.

## 2018-03-09 ENCOUNTER — HOSPITAL ENCOUNTER (OUTPATIENT)
Dept: GENERAL RADIOLOGY | Age: 63
Discharge: HOME | End: 2018-03-09
Attending: FAMILY MEDICINE
Payer: MEDICARE

## 2018-03-09 DIAGNOSIS — M25.512 LEFT SHOULDER PAIN, UNSPECIFIED CHRONICITY: ICD-10-CM

## 2018-03-09 PROCEDURE — 73030 X-RAY EXAM OF SHOULDER: CPT

## 2018-03-13 NOTE — DISCHARGE SUMMARY
Physician Discharge Summary     Patient ID:  Daniel Gerber  [id number]  56 y.o.  1955    Admit date: 2/12/2018    Discharge date and time: 3/12/2018    Admission Diagnoses: Sepsis [facility])    Discharge Diagnoses:  Principal Diagnosis Sepsis ([facility]                                            Principal Problem:    Sepsis ([facility] (2/12/2018)    Active Problems:    HTN (hypertension) ()      Urinary tract infection without hematuria (2/13/2018)      Gastroenteritis due to norovirus (2/21/2018)           Hospital Course: Admitted with urosepsis and gastroenteritis. She also has metastatic breast cancer to bone. Sheimproved with IV Abx and IVF. Her nausea improved after 2 days and her appetite improved slowly. She had continued rib pain from rib mets. Tried on Fentanyl patch which helped her sx without too much cognitive effects. PCP: ROBE Foster    Consults: None        Discharge Exam:  Visit Vitals    /82 (BP 1 Location: Left leg, BP Patient Position: At rest)    Pulse 93    Temp 98.3 °F (36.8 °C)    Resp 18    Ht 5' 5\" (1.651 m)    Wt 188 lb (85.3 kg)    SpO2 95%    BMI 31.28 kg/m2     Neck: supple, symmetrical, trachea midline, no adenopathy, thyroid: not enlarged, symmetric, no tenderness/mass/nodules, no carotid bruit and no JVD  Lungs: clear to auscultation bilaterally  Heart: regular rate and rhythm, S1, S2 normal, no murmur, click, rub or gallop  Abdomen: soft, non-tender. Bowel sounds normal. No masses,  no organomegaly  Extremities: extremities normal, atraumatic, no cyanosis or edema    Disposition: home    Patient Instructions:   Cannot display discharge medications since this patient is not currently admitted.     Activity: Activity as tolerated  Diet: Regular Diet  Wound Care: None needed    Follow-up Appointments   Procedures    FOLLOW UP VISIT Appointment in: One Week Dr Barbara Berry     Standing Status:   Standing     Number of Occurrences:   1     Order Specific Question: Appointment in     Answer:    One Week          Signed:  Shandra Viera MD  3/12/2018  10:19 PM

## 2018-04-02 ENCOUNTER — APPOINTMENT (OUTPATIENT)
Dept: GENERAL RADIOLOGY | Age: 63
DRG: 918 | End: 2018-04-02
Attending: EMERGENCY MEDICINE
Payer: MEDICARE

## 2018-04-02 ENCOUNTER — HOSPITAL ENCOUNTER (INPATIENT)
Age: 63
LOS: 3 days | Discharge: PSYCHIATRIC OR SUBSTANCE USE FACILITY | DRG: 918 | End: 2018-04-05
Attending: EMERGENCY MEDICINE | Admitting: INTERNAL MEDICINE
Payer: MEDICARE

## 2018-04-02 DIAGNOSIS — R53.0 NEOPLASTIC MALIGNANT RELATED FATIGUE: ICD-10-CM

## 2018-04-02 DIAGNOSIS — Z79.899 POLYPHARMACY: ICD-10-CM

## 2018-04-02 DIAGNOSIS — C50.919 METASTATIC BREAST CANCER (HCC): ICD-10-CM

## 2018-04-02 DIAGNOSIS — T50.902A INTENTIONAL DRUG OVERDOSE, INITIAL ENCOUNTER (HCC): Primary | ICD-10-CM

## 2018-04-02 DIAGNOSIS — Z71.89 ADVANCE CARE PLANNING: ICD-10-CM

## 2018-04-02 DIAGNOSIS — T50.902D INTENTIONAL DRUG OVERDOSE, SUBSEQUENT ENCOUNTER: ICD-10-CM

## 2018-04-02 DIAGNOSIS — G89.3 PAIN DUE TO NEOPLASM: ICD-10-CM

## 2018-04-02 DIAGNOSIS — F33.2 SEVERE EPISODE OF RECURRENT MAJOR DEPRESSIVE DISORDER, WITHOUT PSYCHOTIC FEATURES (HCC): ICD-10-CM

## 2018-04-02 DIAGNOSIS — K59.03 DRUG-INDUCED CONSTIPATION: ICD-10-CM

## 2018-04-02 DIAGNOSIS — T39.1X2A INTENTIONAL ACETAMINOPHEN OVERDOSE, INITIAL ENCOUNTER (HCC): ICD-10-CM

## 2018-04-02 PROBLEM — T50.901A OVERDOSE: Status: ACTIVE | Noted: 2018-04-02

## 2018-04-02 PROBLEM — F32.A DEPRESSION: Status: ACTIVE | Noted: 2018-04-02

## 2018-04-02 PROBLEM — I50.9 CHF (CONGESTIVE HEART FAILURE) (HCC): Status: ACTIVE | Noted: 2018-04-02

## 2018-04-02 PROBLEM — F03.90 DEMENTIA (HCC): Status: ACTIVE | Noted: 2018-04-02

## 2018-04-02 LAB
ALBUMIN SERPL-MCNC: 3.9 G/DL (ref 3.5–5)
ALBUMIN/GLOB SERPL: 1.1 {RATIO} (ref 1.1–2.2)
ALP SERPL-CCNC: 55 U/L (ref 45–117)
ALT SERPL-CCNC: 24 U/L (ref 12–78)
AMPHET UR QL SCN: NEGATIVE
ANION GAP SERPL CALC-SCNC: 7 MMOL/L (ref 5–15)
APAP SERPL-MCNC: 132 UG/ML (ref 10–30)
APPEARANCE UR: CLEAR
AST SERPL-CCNC: 14 U/L (ref 15–37)
BACTERIA URNS QL MICRO: NEGATIVE /HPF
BARBITURATES UR QL SCN: NEGATIVE
BASOPHILS # BLD: 0 K/UL (ref 0–0.1)
BASOPHILS NFR BLD: 0 % (ref 0–1)
BENZODIAZ UR QL: POSITIVE
BILIRUB SERPL-MCNC: 0.4 MG/DL (ref 0.2–1)
BILIRUB UR QL: NEGATIVE
BUN SERPL-MCNC: 17 MG/DL (ref 6–20)
BUN/CREAT SERPL: 13 (ref 12–20)
CALCIUM SERPL-MCNC: 9.5 MG/DL (ref 8.5–10.1)
CANNABINOIDS UR QL SCN: NEGATIVE
CHLORIDE SERPL-SCNC: 97 MMOL/L (ref 97–108)
CO2 SERPL-SCNC: 30 MMOL/L (ref 21–32)
COCAINE UR QL SCN: NEGATIVE
COLOR UR: ABNORMAL
CREAT SERPL-MCNC: 1.32 MG/DL (ref 0.55–1.02)
DIFFERENTIAL METHOD BLD: NORMAL
DRUG SCRN COMMENT,DRGCM: ABNORMAL
EOSINOPHIL # BLD: 0 K/UL (ref 0–0.4)
EOSINOPHIL NFR BLD: 1 % (ref 0–7)
EPITH CASTS URNS QL MICRO: ABNORMAL /LPF
ERYTHROCYTE [DISTWIDTH] IN BLOOD BY AUTOMATED COUNT: 12.6 % (ref 11.5–14.5)
ETHANOL SERPL-MCNC: <10 MG/DL
GLOBULIN SER CALC-MCNC: 3.5 G/DL (ref 2–4)
GLUCOSE SERPL-MCNC: 115 MG/DL (ref 65–100)
GLUCOSE UR STRIP.AUTO-MCNC: NEGATIVE MG/DL
HCT VFR BLD AUTO: 40.8 % (ref 35–47)
HGB BLD-MCNC: 13.6 G/DL (ref 11.5–16)
HGB UR QL STRIP: NEGATIVE
IMM GRANULOCYTES # BLD: 0 K/UL (ref 0–0.04)
IMM GRANULOCYTES NFR BLD AUTO: 0 % (ref 0–0.5)
KETONES UR QL STRIP.AUTO: NEGATIVE MG/DL
LEUKOCYTE ESTERASE UR QL STRIP.AUTO: NEGATIVE
LIPASE SERPL-CCNC: 132 U/L (ref 73–393)
LYMPHOCYTES # BLD: 1.6 K/UL (ref 0.8–3.5)
LYMPHOCYTES NFR BLD: 29 % (ref 12–49)
MAGNESIUM SERPL-MCNC: 1.8 MG/DL (ref 1.6–2.4)
MCH RBC QN AUTO: 30.5 PG (ref 26–34)
MCHC RBC AUTO-ENTMCNC: 33.3 G/DL (ref 30–36.5)
MCV RBC AUTO: 91.5 FL (ref 80–99)
METHADONE UR QL: NEGATIVE
MONOCYTES # BLD: 0.4 K/UL (ref 0–1)
MONOCYTES NFR BLD: 7 % (ref 5–13)
MUCOUS THREADS URNS QL MICRO: ABNORMAL /LPF
NEUTS SEG # BLD: 3.4 K/UL (ref 1.8–8)
NEUTS SEG NFR BLD: 63 % (ref 32–75)
NITRITE UR QL STRIP.AUTO: NEGATIVE
NRBC # BLD: 0 K/UL (ref 0–0.01)
NRBC BLD-RTO: 0 PER 100 WBC
OPIATES UR QL: POSITIVE
PCP UR QL: NEGATIVE
PH UR STRIP: 6.5 [PH] (ref 5–8)
PLATELET # BLD AUTO: 288 K/UL (ref 150–400)
PMV BLD AUTO: 10.8 FL (ref 8.9–12.9)
POTASSIUM SERPL-SCNC: 3.9 MMOL/L (ref 3.5–5.1)
PROT SERPL-MCNC: 7.4 G/DL (ref 6.4–8.2)
PROT UR STRIP-MCNC: NEGATIVE MG/DL
RBC # BLD AUTO: 4.46 M/UL (ref 3.8–5.2)
RBC #/AREA URNS HPF: ABNORMAL /HPF (ref 0–5)
SALICYLATES SERPL-MCNC: 2.1 MG/DL (ref 2.8–20)
SODIUM SERPL-SCNC: 134 MMOL/L (ref 136–145)
SP GR UR REFRACTOMETRY: 1.02 (ref 1–1.03)
TROPONIN I SERPL-MCNC: <0.04 NG/ML
UA: UC IF INDICATED,UAUC: ABNORMAL
UROBILINOGEN UR QL STRIP.AUTO: 0.2 EU/DL (ref 0.2–1)
WBC # BLD AUTO: 5.4 K/UL (ref 3.6–11)
WBC URNS QL MICRO: ABNORMAL /HPF (ref 0–4)

## 2018-04-02 PROCEDURE — 83690 ASSAY OF LIPASE: CPT | Performed by: EMERGENCY MEDICINE

## 2018-04-02 PROCEDURE — 83735 ASSAY OF MAGNESIUM: CPT | Performed by: EMERGENCY MEDICINE

## 2018-04-02 PROCEDURE — 99285 EMERGENCY DEPT VISIT HI MDM: CPT

## 2018-04-02 PROCEDURE — 96365 THER/PROPH/DIAG IV INF INIT: CPT

## 2018-04-02 PROCEDURE — 96360 HYDRATION IV INFUSION INIT: CPT

## 2018-04-02 PROCEDURE — 80053 COMPREHEN METABOLIC PANEL: CPT | Performed by: EMERGENCY MEDICINE

## 2018-04-02 PROCEDURE — 77030003560 HC NDL HUBR BARD -A

## 2018-04-02 PROCEDURE — 96361 HYDRATE IV INFUSION ADD-ON: CPT

## 2018-04-02 PROCEDURE — 74011250637 HC RX REV CODE- 250/637: Performed by: INTERNAL MEDICINE

## 2018-04-02 PROCEDURE — 74011250636 HC RX REV CODE- 250/636: Performed by: EMERGENCY MEDICINE

## 2018-04-02 PROCEDURE — 71045 X-RAY EXAM CHEST 1 VIEW: CPT

## 2018-04-02 PROCEDURE — 87086 URINE CULTURE/COLONY COUNT: CPT | Performed by: EMERGENCY MEDICINE

## 2018-04-02 PROCEDURE — 80307 DRUG TEST PRSMV CHEM ANLYZR: CPT | Performed by: EMERGENCY MEDICINE

## 2018-04-02 PROCEDURE — 65660000000 HC RM CCU STEPDOWN

## 2018-04-02 PROCEDURE — 85025 COMPLETE CBC W/AUTO DIFF WBC: CPT | Performed by: EMERGENCY MEDICINE

## 2018-04-02 PROCEDURE — 93005 ELECTROCARDIOGRAM TRACING: CPT

## 2018-04-02 PROCEDURE — 84484 ASSAY OF TROPONIN QUANT: CPT | Performed by: EMERGENCY MEDICINE

## 2018-04-02 PROCEDURE — 81001 URINALYSIS AUTO W/SCOPE: CPT | Performed by: EMERGENCY MEDICINE

## 2018-04-02 PROCEDURE — 74011000258 HC RX REV CODE- 258: Performed by: EMERGENCY MEDICINE

## 2018-04-02 PROCEDURE — 77030038269 HC DRN EXT URIN PURWCK BARD -A

## 2018-04-02 PROCEDURE — 74011250636 HC RX REV CODE- 250/636: Performed by: INTERNAL MEDICINE

## 2018-04-02 PROCEDURE — 36415 COLL VENOUS BLD VENIPUNCTURE: CPT | Performed by: EMERGENCY MEDICINE

## 2018-04-02 RX ORDER — CARVEDILOL 12.5 MG/1
12.5 TABLET ORAL 2 TIMES DAILY WITH MEALS
Status: DISCONTINUED | OUTPATIENT
Start: 2018-04-03 | End: 2018-04-05 | Stop reason: HOSPADM

## 2018-04-02 RX ORDER — HEPARIN SODIUM 5000 [USP'U]/ML
5000 INJECTION, SOLUTION INTRAVENOUS; SUBCUTANEOUS EVERY 8 HOURS
Status: DISCONTINUED | OUTPATIENT
Start: 2018-04-02 | End: 2018-04-05 | Stop reason: HOSPADM

## 2018-04-02 RX ORDER — ASPIRIN 81 MG/1
81 TABLET ORAL DAILY
Status: DISCONTINUED | OUTPATIENT
Start: 2018-04-03 | End: 2018-04-05 | Stop reason: HOSPADM

## 2018-04-02 RX ORDER — MIRTAZAPINE 15 MG/1
15 TABLET, FILM COATED ORAL
Status: DISCONTINUED | OUTPATIENT
Start: 2018-04-02 | End: 2018-04-05 | Stop reason: HOSPADM

## 2018-04-02 RX ORDER — HYDROCHLOROTHIAZIDE 25 MG/1
25 TABLET ORAL DAILY
Status: DISCONTINUED | OUTPATIENT
Start: 2018-04-03 | End: 2018-04-03

## 2018-04-02 RX ORDER — SODIUM CHLORIDE 0.9 % (FLUSH) 0.9 %
5-10 SYRINGE (ML) INJECTION AS NEEDED
Status: DISCONTINUED | OUTPATIENT
Start: 2018-04-02 | End: 2018-04-05 | Stop reason: HOSPADM

## 2018-04-02 RX ORDER — SODIUM CHLORIDE 0.9 % (FLUSH) 0.9 %
5-10 SYRINGE (ML) INJECTION EVERY 8 HOURS
Status: DISCONTINUED | OUTPATIENT
Start: 2018-04-02 | End: 2018-04-05 | Stop reason: HOSPADM

## 2018-04-02 RX ORDER — VENLAFAXINE 37.5 MG/1
150 TABLET ORAL 2 TIMES DAILY
Status: DISCONTINUED | OUTPATIENT
Start: 2018-04-02 | End: 2018-04-05 | Stop reason: HOSPADM

## 2018-04-02 RX ORDER — LOSARTAN POTASSIUM 50 MG/1
50 TABLET ORAL DAILY
Status: DISCONTINUED | OUTPATIENT
Start: 2018-04-03 | End: 2018-04-03

## 2018-04-02 RX ADMIN — HEPARIN SODIUM 5000 UNITS: 5000 INJECTION, SOLUTION INTRAVENOUS; SUBCUTANEOUS at 21:24

## 2018-04-02 RX ADMIN — VENLAFAXINE HYDROCHLORIDE 150 MG: 37.5 TABLET ORAL at 21:20

## 2018-04-02 RX ADMIN — SODIUM CHLORIDE 1000 ML: 900 INJECTION, SOLUTION INTRAVENOUS at 16:21

## 2018-04-02 RX ADMIN — MIRTAZAPINE 15 MG: 15 TABLET, FILM COATED ORAL at 21:20

## 2018-04-02 RX ADMIN — ACETYLCYSTEINE 4520 MG: 200 INJECTION, SOLUTION INTRAVENOUS at 20:12

## 2018-04-02 RX ADMIN — ACETYLCYSTEINE 13540 MG: 200 INJECTION, SOLUTION INTRAVENOUS at 19:03

## 2018-04-02 RX ADMIN — SODIUM CHLORIDE 1000 ML: 900 INJECTION, SOLUTION INTRAVENOUS at 18:34

## 2018-04-02 NOTE — ED PROVIDER NOTES
EMERGENCY DEPARTMENT HISTORY AND PHYSICAL EXAM      Date: 4/2/2018  Patient Name: Rory Chappell    History of Presenting Illness     Chief Complaint   Patient presents with    Suicidal     History Provided By: Patient and EMS    HPI: Rory Chappell, 63 y.o. female with PMHx significant for breast cancer, HTN, depression, anxiety, and hyperlipidemia, presents via EMS to the ED with a mental health concern with SI and attempt to overdose. Per EMS, pt's friend found the pt sitting on the bed with notable lethargy and concern for an overdose on medications. EMS informs that they asked the pt whether she had taken her medications and the pt informed she had taken all of her medications in an attempt to overdose. Pt reports she took her medications at near 1400 today. Pt informs she took Norco 5-325 mg and Alprazolam 1 mg; pt does not know how many of the medications she took but notes that the bottles were nearly full. EMS report that the pt was presenting with lethargy, somnolence, and a slow speech pattern. EMS informs that the pt was moving all extremities and following all commands albeit slowly. Pt and EMS inform that the pt has a history of stage four breast cancer with metastasis. EMS lastly informs that the pt had written a suicide note which is in PD's possession. Pt denies any abdominal pain. PMHx: GERD  PSHx: bilateral mastectomy   Social Hx: - EtOH; Former Smoker; - Illicit Drugs    PCP: Smita De Leon MD    History of present illness is limited secondary to the pt's condition.     Current Facility-Administered Medications   Medication Dose Route Frequency Provider Last Rate Last Dose    acetylcysteine (ACETADOTE) 13,540 mg in dextrose 5% 200 mL infusion  150 mg/kg IntraVENous ONCE Kari Gatica .7 mL/hr at 04/02/18 1903 13,540 mg at 04/02/18 1903    Followed by   [facility] acetylcysteine (ACETADOTE) 4,520 mg in dextrose 5% 500 mL infusion  50 mg/kg IntraVENous Shirlene Hanley MD Followed by   Tai Prior acetylcysteine (ACETADOTE) 9,020 mg in dextrose 5% 1,000 mL infusion  100 mg/kg IntraVENous ONCE Jered Zuniga MD         Current Outpatient Prescriptions   Medication Sig Dispense Refill    HYDROcodone-acetaminophen (NORCO) 5-325 mg per tablet Take 1 Tab by mouth two (2) times daily as needed for Pain. Max Daily Amount: 2 Tabs. 60 Tab 0    naloxone 2 mg/actuation spry Use 1 spray intranasally into 1 nostril. Use a new Narcan nasal spray for subsequent doses and administer into alternating nostrils. May repeat every 2 to 3 minutes as needed. 4 Each 0    aspirin delayed-release 81 mg tablet Take 1 Tab by mouth daily. 100 Tab 0    docusate sodium (COLACE) 100 mg capsule Take 1 Cap by mouth nightly for 90 days. 30 Cap 2    amoxicillin-clavulanate (AUGMENTIN) 875-125 mg per tablet Take 1 Tab by mouth every twelve (12) hours. 6 Tab 0    hydroCHLOROthiazide (HYDRODIURIL) 25 mg tablet Take 1 Tab by mouth daily. 30 Tab 3    multivitamin (ONE A DAY) tablet Take 1 Tab by mouth daily.  senna-docusate (SENEXON-S) 8.6-50 mg per tablet Take 2 Tabs by mouth two (2) times a day.  mirtazapine (REMERON) 15 mg tablet Take 15 mg by mouth nightly.  melatonin tab tablet Take 5 mg by mouth nightly.  calcium carbonate (SABINO-GEST ANTACID) 200 mg calcium (500 mg) chew Take 1 Tab by mouth as needed. Indications: Heartburn      carvedilol (COREG) 12.5 mg tablet Take  by mouth two (2) times daily (with meals).  losartan (COZAAR) 50 mg tablet Take 50 mg by mouth daily.  venlafaxine (EFFEXOR) 75 mg tablet Take 150 mg by mouth two (2) times a day.  alprazolam (XANAX) 1 mg tablet Take 1 mg by mouth four (4) times daily.        Past History     Past Medical History:  Past Medical History:   Diagnosis Date    Anxiety     Cancer ([facility] [address]     breast    Depression     GERD (gastroesophageal reflux disease)     HTN (hypertension)     Hypercholesterolemia     Hypotension 9/12/2012    Metastatic breast cancer ([facility] [address] 5/3/2017    Secondary cancer of bone ([facility] 5/3/2017     Past Surgical History:  Past Surgical History:   Procedure Laterality Date    BREAST SURGERY PROCEDURE UNLISTED  march 13    carina masectomy     Family History:  Family History   Problem Relation Age of Onset    Hypertension Mother     Heart Disease Mother     Hypertension Father      Social History:  Social History   Substance Use Topics    Smoking status: Former Smoker     Packs/day: 0.50     Years: 20.00     Quit date: 1/18/2012    Smokeless tobacco: Never Used    Alcohol use No     Allergies: Allergies   Allergen Reactions    Sulfa (Sulfonamide Antibiotics) Unknown (comments)    Wellbutrin [Bupropion Hcl] Unknown (comments)     Review of Systems   Review of Systems   Unable to perform ROS: Other   Eyes: Positive for pain. Physical Exam   Physical Exam   Constitutional: She appears well-developed and well-nourished. She appears lethargic. She appears distressed. HENT:   Head: Normocephalic and atraumatic. Head is without raccoon's eyes and without Lin's sign. Mouth/Throat: No oropharyngeal exudate. Eyes: Conjunctivae are normal. Pupils are equal, round, and reactive to light. Right eye exhibits no discharge. Left eye exhibits no discharge. No scleral icterus. Neck: Trachea normal and normal range of motion. Neck supple. No JVD present. Speech slurred   Cardiovascular: Normal rate, regular rhythm, normal heart sounds and intact distal pulses. Exam reveals no gallop and no friction rub. No murmur heard. Pulmonary/Chest: Effort normal and breath sounds normal. No stridor. No respiratory distress. She has no wheezes. She has no rales. She exhibits no tenderness. Abdominal: Soft. Bowel sounds are normal. She exhibits no distension, no abdominal bruit, no pulsatile midline mass and no mass. There is no tenderness. There is no rebound, no guarding and no CVA tenderness.    Neurological: She appears lethargic. She is disoriented. She displays normal reflexes. No cranial nerve deficit or sensory deficit. She exhibits normal muscle tone. She displays no seizure activity. Coordination normal. GCS eye subscore is 4. GCS verbal subscore is 5. GCS motor subscore is 6. Patient is maintaining airway, sitting up in bed, no stridor    Slurred speech   Skin: Skin is warm. No rash noted. She is not diaphoretic. No pallor. Psychiatric: Her speech is delayed and slurred. She exhibits a depressed mood. She expresses suicidal ideation. Vitals reviewed. Diagnostic Study Results     Labs -     Recent Results (from the past 12 hour(s))   EKG, 12 LEAD, INITIAL    Collection Time: 04/02/18  4:40 PM   Result Value Ref Range    Ventricular Rate 70 BPM    Atrial Rate 70 BPM    P-R Interval 180 ms    QRS Duration 78 ms    Q-T Interval 382 ms    QTC Calculation (Bezet) 412 ms    Calculated P Axis -6 degrees    Calculated R Axis 22 degrees    Calculated T Axis 46 degrees    Diagnosis       Normal sinus rhythm  Normal ECG  When compared with ECG of 12-FEB-2018 08:59,  Vent. rate has decreased BY  43 BPM  Nonspecific T wave abnormality, improved in Lateral leads     CBC WITH AUTOMATED DIFF    Collection Time: 04/02/18  5:22 PM   Result Value Ref Range    WBC 5.4 3.6 - 11.0 K/uL    RBC 4.46 3.80 - 5.20 M/uL    HGB 13.6 11.5 - 16.0 g/dL    HCT 40.8 35.0 - 47.0 %    MCV 91.5 80.0 - 99.0 FL    MCH 30.5 26.0 - 34.0 PG    MCHC 33.3 30.0 - 36.5 g/dL    RDW 12.6 11.5 - 14.5 %    PLATELET 486 224 - 396 K/uL    MPV 10.8 8.9 - 12.9 FL    NRBC 0.0 0  WBC    ABSOLUTE NRBC 0.00 0.00 - 0.01 K/uL    NEUTROPHILS 63 32 - 75 %    LYMPHOCYTES 29 12 - 49 %    MONOCYTES 7 5 - 13 %    EOSINOPHILS 1 0 - 7 %    BASOPHILS 0 0 - 1 %    IMMATURE GRANULOCYTES 0 0.0 - 0.5 %    ABS. NEUTROPHILS 3.4 1.8 - 8.0 K/UL    ABS. LYMPHOCYTES 1.6 0.8 - 3.5 K/UL    ABS. MONOCYTES 0.4 0.0 - 1.0 K/UL    ABS. EOSINOPHILS 0.0 0.0 - 0.4 K/UL    ABS.  BASOPHILS 0.0 0.0 - 0.1 K/UL    ABS. IMM. GRANS. 0.0 0.00 - 0.04 K/UL    DF AUTOMATED     METABOLIC PANEL, COMPREHENSIVE    Collection Time: 04/02/18  5:22 PM   Result Value Ref Range    Sodium 134 (L) 136 - 145 mmol/L    Potassium 3.9 3.5 - 5.1 mmol/L    Chloride 97 97 - 108 mmol/L    CO2 30 21 - 32 mmol/L    Anion gap 7 5 - 15 mmol/L    Glucose 115 (H) 65 - 100 mg/dL    BUN 17 6 - 20 MG/DL    Creatinine 1.32 (H) 0.55 - 1.02 MG/DL    BUN/Creatinine ratio 13 12 - 20      GFR est AA 50 (L) >60 ml/min/1.73m2    GFR est non-AA 41 (L) >60 ml/min/1.73m2    Calcium 9.5 8.5 - 10.1 MG/DL    Bilirubin, total 0.4 0.2 - 1.0 MG/DL    ALT (SGPT) 24 12 - 78 U/L    AST (SGOT) 14 (L) 15 - 37 U/L    Alk.  phosphatase 55 45 - 117 U/L    Protein, total 7.4 6.4 - 8.2 g/dL    Albumin 3.9 3.5 - 5.0 g/dL    Globulin 3.5 2.0 - 4.0 g/dL    A-G Ratio 1.1 1.1 - 2.2     LIPASE    Collection Time: 04/02/18  5:22 PM   Result Value Ref Range    Lipase 132 73 - 393 U/L   ETHYL ALCOHOL    Collection Time: 04/02/18  5:22 PM   Result Value Ref Range    ALCOHOL(ETHYL),SERUM <10 <10 MG/DL   TROPONIN I    Collection Time: 04/02/18  5:22 PM   Result Value Ref Range    Troponin-I, Qt. <0.04 <0.05 ng/mL   MAGNESIUM    Collection Time: 04/02/18  5:22 PM   Result Value Ref Range    Magnesium 1.8 1.6 - 2.4 mg/dL   DRUG SCREEN, URINE    Collection Time: 04/02/18  5:56 PM   Result Value Ref Range    AMPHETAMINES NEGATIVE  NEG      BARBITURATES NEGATIVE  NEG      BENZODIAZEPINES POSITIVE (A) NEG      COCAINE NEGATIVE  NEG      METHADONE NEGATIVE  NEG      OPIATES POSITIVE (A) NEG      PCP(PHENCYCLIDINE) NEGATIVE  NEG      THC (TH-CANNABINOL) NEGATIVE  NEG      Drug screen comment (NOTE)    URINALYSIS W/ REFLEX CULTURE    Collection Time: 04/02/18  5:56 PM   Result Value Ref Range    Color YELLOW/STRAW      Appearance CLEAR CLEAR      Specific gravity 1.023 1.003 - 1.030      pH (UA) 6.5 5.0 - 8.0      Protein NEGATIVE  NEG mg/dL    Glucose NEGATIVE  NEG mg/dL    Ketone NEGATIVE  NEG mg/dL    Bilirubin NEGATIVE  NEG      Blood NEGATIVE  NEG      Urobilinogen 0.2 0.2 - 1.0 EU/dL    Nitrites NEGATIVE  NEG      Leukocyte Esterase NEGATIVE  NEG      WBC 0-4 0 - 4 /hpf    RBC 0-5 0 - 5 /hpf    Epithelial cells FEW FEW /lpf    Bacteria NEGATIVE  NEG /hpf    UA:UC IF INDICATED CULTURE NOT INDICATED BY UA RESULT CNI      Mucus TRACE (A) NEG /lpf     Radiologic Studies -   XR CHEST PORT   Final Result        CXR Results  (Last 48 hours)               04/02/18 1750  XR CHEST PORT Final result    Impression:   impression: Shallow inspiration, no acute intrathoracic findings. Narrative:  Clinical indication: OD. Portable AP supine view of the chest is obtained, comparison February 12. Infusion catheter is in place, inspiration is shallow. Surgical clips left   axilla. No acute infiltrate. Medical Decision Making   I am the first provider for this patient. I reviewed the vital signs, available nursing notes, past medical history, past surgical history, family history and social history. Vital Signs-Reviewed the patient's vital signs. Patient Vitals for the past 12 hrs:   Temp Pulse Resp BP SpO2   04/02/18 1856 - 60 14 99/78 99 %   04/02/18 1845 - 62 12 (!) 77/46 97 %   04/02/18 1801 - 78 22 - 98 %   04/02/18 1727 - 65 16 - 100 %   04/02/18 1632 97.7 °F (36.5 °C) 73 18 98/49 97 %     Pulse Oximetry Analysis - 97% on RA    Cardiac Monitor:   Rate: 73 bpm  Rhythm: Normal Sinus Rhythm      EKG interpretation: (1640)  Rhythm: normal sinus rhythm; and regular . Rate (approx.): 70;  Axis: normal; NH interval: normal; QRS interval: normal ; ST/T wave: normal;  Written by CONSTANZA Girard, as dictated by Ton Kaplan MD.    Records Reviewed: Nursing Notes and Old Medical Records    Provider Notes (Medical Decision Making):   DDx: SI, opiate/benzodiazapine overdose, respiratory failure, Tylenol toxicity, aspiration PNA, electrolyte abnormality, arrhythmia     Pt with stage 4 breast cancer with history of depression to the ED with intentional suicidal ingestion of medications at 1400 today. concern for excessive Tylenol ingestion, will treat with antidote. Airway has been maintained. Labile BP, being treated with fluids, may need pressors support at some point. Will admit to hospitalist.     ED Course:   Initial assessment performed. The patients presenting problems have been discussed, and they are in agreement with the care plan formulated and outlined with them. I have encouraged them to ask questions as they arise throughout their visit. Progress Note:   5:19 PM  Tai Martins MD on the phone with Poison Control. Recommends to initiate Acetadote and to follow levels alongside liver function. No charcoal based on duration from ingestion. Written by CONSTANZA Parke, as dictated by Tai Martins MD.     CONSULT NOTE:   6:31 PM  Tai Martins MD spoke with Dr. Scotty Thornton,  Specialty: Hospitalist   Discussed pt's hx, disposition, and available diagnostic and imaging results. Reviewed care plans. Consultant agrees with plans as outlined. Accepts admission  Written by CONSTANZA Parkibmika, as dictated by Tai Martins MD.    Progress Note:   6:31 PM  RN informs pt's SBP is in the 76s. Will administer fluids. Written by CONSTANZA Parkibmika, as dictated by Tai Martins MD.     Progress Note:   7:13 PM  Pt's SBP slowly trending downwards, continuing fluids. If no improvement, may need to place on short term pressors. Friends at bedside, pt maintaining airway.   Written by CONSTANZA Parkibe, as dictated by Tai Martins MD.     Critical Care:    IMPENDING DETERIORATION -Airway, Respiratory, Cardiovascular, CNS, Metabolic, Renal and Hepatic  ASSOCIATED RISK FACTORS - Hypotension, Shock, Hypoxia, Dysrhythmia, Metabolic changes, Vascular Compromise and CNS Decompensation  MANAGEMENT- Bedside Assessment and Supervision of Care  INTERPRETATION -  ECG and Blood Pressure  INTERVENTIONS - hemodynamic mngmt, Metobolic interventions and antidote for acetaminophen  CASE REVIEW - Hospitalist, Nursing, Family and Poison Control  TREATMENT RESPONSE -Stable  PERFORMED BY - Self    Notes:  I have spent 45 minutes of critical care time involved in lab review, consultations with specialist, family decision- making, bedside attention and documentation. During this entire length of time I was immediately available to the patient. Laverne Parker MD    Disposition:  ADMIT NOTE:    6:33 PM  Patient is being admitted to the hospital by Dr. Kenneth Araujo. The results of their tests and reasons for their admission have been discussed with the patient and/or available family. They convey agreement and understanding for the need to be admitted and for the admission diagnosis. PLAN:  1. Admit to Hospitalist, Dr. Kenneth Araujo    Diagnosis     Clinical Impression:   1. [facility]    2. Polypharmacy    3. Intentional acetaminophen overdose, initial encounter [facility])      Attestations: This note is prepared by Abdiaziz Hughes, acting as Scribe for Laverne Parker MD.    Laverne Parker MD: The scribe's documentation has been prepared under my direction and personally reviewed by me in its entirety. I confirm that the note above accurately reflects all work, treatment, procedures, and medical decision making performed by me.

## 2018-04-02 NOTE — ED TRIAGE NOTES
Triage note: EMS reports pt coming from a friends house where she has been staying. Pt's friend found pt sitting on her bed in her bedroom lethargic, appearing to have taken too many of her pain and anxiety medication. Pt has a dx of breast cancer with mets. Is being seen by Dr. Shubham Hewitt. EMS found pt lethargic, placed IO in right humerus after unsuccessful IV attempt. EMS states pt's bp was 05'Z systolic. Pt a&o x3, sleepy but easily aroused. Pt asked if she took the medication and states \"yes, I took all of them\". Pt follows commands and moves all extremeties without difficulty. Pt on 2L via NC, rr even and unlabored. No medication given by EMS. EMS also states pt wrote a suicide note, and police were involved and had the note. Pt states she took the pills around 1400 today.

## 2018-04-02 NOTE — ED NOTES
Pt unable to verify medications. States she does not have her purse with her and her medication list is in there.

## 2018-04-02 NOTE — ED NOTES
Pt's bp 96/78. One liter of NS hung per MD order. Sitter remains at bedside. Will continue to monitor.

## 2018-04-02 NOTE — ED NOTES
Assumed care of patient from Erica Vidal RN. Patient is alert, lethargic, maintaining airway. Reports no pain at this time. States \"I think I took some pills\", when asked if she remembered anything today. Patient sitting up, high fowlers, with friends and sitter at bedside. Patients blood pressure borderline hypotensive, MAP remains above 65 at this time. MD aware. Anecdote infusing without issue.  Updated on the plan of care

## 2018-04-02 NOTE — IP AVS SNAPSHOT
[address] [facility] [address]. 
[phone] Patient: Eleanor Ibrahim MRN: [id number] About your hospitalization You were admitted on:  April 2, 2018 You last received care in the:  [facility] 2 GENERAL SURGERY You were discharged on:  April 5, 2018 Why you were hospitalized Your primary diagnosis was:  Not on File Your diagnoses also included:  Breast Cancer (Hcc), Chf (Congestive Heart Failure) (Hcc), Overdose, Depression, Dementia, Pain Due To Neoplasm, Neoplastic Malignant Related Fatigue, Drug-Induced Constipation, Advance Care Planning Follow-up Information Follow up With Details Comments Contact Info Anna Gutierrez [place]   [address] [address] 
[phone] Discharge Orders None A check ramo indicates which time of day the medication should be taken. My Medications START taking these medications Instructions Each Dose to Equal  
 Morning Noon Evening Bedtime  
 fentaNYL 25 mcg/hr PATCH Commonly known as:  Arville Hobbs Start taking on:  4/7/2018 Your last dose was:  1 Patch on 4/4/2018  1:28 PM  
   
 1 Patch by TransDERmal route every seventy-two (72) hours. Max Daily Amount: 1 Patch. 1 Patch  
    
   
   
   
  
 morphine IR 15 mg tablet Commonly known as:  MS IR Take 0.5 Tabs by mouth every four (4) hours as needed. Max Daily Amount: 45 mg.  
 7.5 mg  
    
   
   
   
  
  
CONTINUE taking these medications Instructions Each Dose to Equal  
 Morning Noon Evening Bedtime  
 aspirin delayed-release 81 mg tablet Your last dose was:  81 mg on 4/5/2018  9:01 AM  
   
 Take 1 Tab by mouth daily. 81 mg  
    
   
   
   
  
 SABINO-GEST ANTACID 200 mg calcium (500 mg) Chew Generic drug:  calcium carbonate Take 1 Tab by mouth as needed. Indications: Heartburn 1 Tab COREG 12.5 mg tablet Generic drug:  carvedilol Your last dose was:  12.5 mg on 4/4/2018  9:45 AM  
   
 Take  by mouth two (2) times daily (with meals). docusate sodium 100 mg capsule Commonly known as:  Hope Kirti Take 1 Cap by mouth nightly for 90 days. 100 mg  
    
   
   
   
  
 melatonin Tab tablet Take 5 mg by mouth nightly. 5 mg  
    
   
   
   
  
 mirtazapine 15 mg tablet Commonly known as:  Marzemulu Fallen Your last dose was:  15 mg on 4/2/2018  9:20 PM  
   
 Take 15 mg by mouth nightly. 15 mg  
    
   
   
   
  
 multivitamin tablet Commonly known as:  ONE A DAY Take 1 Tab by mouth daily. 1 Tab  
    
   
   
   
  
 naloxone 2 mg/actuation Spry Use 1 spray intranasally into 1 nostril. Use a new Narcan nasal spray for subsequent doses and administer into alternating nostrils. May repeat every 2 to 3 minutes as needed. SENEXON-S 8.6-50 mg per tablet Generic drug:  senna-docusate Take 2 Tabs by mouth two (2) times a day. 2 Tab  
    
   
   
   
  
 venlafaxine 75 mg tablet Commonly known as:  [facility] Your last dose was:  150 mg on 4/5/2018  9:01 AM  
   
 Take 150 mg by mouth two (2) times a day. 150 mg  
    
   
   
   
  
  
STOP taking these medications ALPRAZolam 1 mg tablet Commonly known as:  XANAX  
   
  
 amoxicillin-clavulanate 875-125 mg per tablet Commonly known as:  AUGMENTIN  
   
  
 hydroCHLOROthiazide 25 mg tablet Commonly known as:  HYDRODIURIL HYDROcodone-acetaminophen 5-325 mg per tablet Commonly known as:  NORCO  
   
  
 losartan 50 mg tablet Commonly known as:  COZAAR Where to Get Your Medications Information on where to get these meds will be given to you by the nurse or doctor. ! Ask your nurse or doctor about these medications  
  fentaNYL 25 mcg/hr PATCH  
 morphine IR 15 mg tablet Opioid Education Prescription Opioids: What You Need to Know: 
 
 
NAME: Tiffanie Mehta YOB: 1955 MRN:  [id number] Date/Time:  2018 10:45 AM 
 
ADMIT DATE: 2018 DISCHARGE DATE: 2018 Attending Physician: Tejal Easton MD 
 
DISCHARGE DIAGNOSIS: 
1) Benzodiazpeine overdose 2) Tylenol overdose   
3) Depression with suicidal intention 4) HTN 
5) Hypotension, resolved 6) Metastatic Breast CA to bone 7) Hypokalemia 8) CHF 8) CORNELIA, resolved Medications: Per above medication reconciliation. Pain Management: per above medications Recommended diet: Cardiac Diet Recommended activity: Activity as tolerated Wound care: None Indwelling devices:  None Supplemental Oxygen: None Required Lab work: none Glucose management:  None Code status: Full Outside physician follow up: Follow-up Information Follow up With Details Comments Contact [place]   [address] [address] 
[phone] Information obtained by : 
I understand that if any problems occur once I am at home I am to contact my physician. I understand and acknowledge receipt of the instructions indicated above. Physician's or R.N.'s Signature                                                                  Date/Time Patient or Repres Introducing [facility]! [employer] introduces Vaporet patient portal. Now you can access parts of your medical record, email your doctor's office, and request medication refills online. 1. In your internet browser, go to [email or web address] ([email or web address])t 2. Click on the First Time User? Click Here link in the Sign In box. You will see the New Member Sign Up page. 3. Enter your Aloqa Access Code exactly as it appears below. You will not need to use this code after youve completed the sign-up process. If you do not sign up before the expiration date, you must request a new code. · Aloqa Access Code: [email or web address] Expires: 5/20/2018  9:09 AM 
 
4. Enter the last four digits of your Social Security Number (xxxx) and Date of Birth (mm/dd/yyyy) as indicated and click Submit. You will be taken to the next sign-up page. 5. Create a Aloqa ID. This will be your Aloqa login ID and cannot be changed, so think of one that is secure and easy to remember. 6. Create a Aloqa password. You can change your password at any time. 7. Enter your Password Reset Question and Answer. This can be used at a later time if you forget your password. 8. Enter your e-mail address. You will receive e-mail notification when new information is available in [address] 9. Click Sign Up. You can now view and download portions of your medical record. 10. Click the Download Summary menu link to download a portable copy of your medical information. If you have questions, please visit the Frequently Asked Questions section of the Aloqa website. Remember, Aloqa is NOT to be used for urgent needs. For medical emergencies, dial 911. Now available from your iPhone and Android! Introducing Anshu Alegria As a [facility] patient, I wanted to make you aware of our electronic visit tool called Anshu Alegria. Raadver[employer] Dove 24/7 allows you to connect within minutes with a medical provider 24 hours a day, seven days a week via a mobile device or tablet or logging into a secure website from your computer. You can access [employer] from anywhere in the [place]. A virtual visit might be right for you when you have a simple condition and feel like you just dont want to get out of bed, or cant get away from work for an appointment, when your regular [facility] provider is not available (evenings, weekends or holidays), or when youre out of town and need minor care. Electronic visits cost only $49 and if the YbarraLoco2 24/NetDragon provider determines a prescription is needed to treat your condition, one can be electronically transmitted to a nearby pharmacy*. Please take a moment to enroll today if you have not already done so. The enrollment process is free and takes just a few minutes. To enroll, please download the GreenPocket zaida to your tablet or phone, or visit [email or web address] to enroll on your computer. And, as an [address] [place] [address] patient with a [employer] account, the results of your visits will be scanned into your electronic medical record and your primary care provider will be able to view the scanned results. We urge you to continue to see your regular [facility] provider for your ongoing medical care. And while your primary care provider may not be the one available when you seek a Anshu Alegria virtual visit, the peace of mind you get from getting a real diagnosis real time can be priceless. For more information on Anshu Valocor Therapeuticsjuan miguelfin, view our Frequently Asked Questions (FAQs) at [email or web address]. Sincerely, 
 
Marzena You MD 
Chief Medical Officer Rimma Mccollum *:  certain medications cannot be prescribed via [employer] Providers Seen During Your Hospitalization Provider Specialty Primary office phone Laverne Parker MD Emergency Medicine [phone] Amanda Dumont MD Hospitalist [phone] Your Primary Care Physician (PCP) Primary Care Physician Office Phone Office Fax Mal Norwood [phone] You are allergic to the following Allergen Reactions Sulfa (Sulfonamide Antibiotics) Unknown (comments) Wellbutrin (Bupropion Hcl) Unknown (comments) Recent Documentation Height Weight BMI OB Status Smoking Status 1.651 m 88.2 kg 32.35 kg/m2 Postmenopausal Former Smoker Emergency Contacts Name Discharge Info Relation Home Work Mobile [place]irt CAREGIVER [3] Friend [5] [phone] Patient Belongings The following personal items are in your possession at time of discharge: 
  Dental Appliances: None  Visual Aid: At bedside      Home Medications: None   Jewelry: None  Clothing: At bedside    Other Valuables: At bedside Please provide this summary of care documentation to your next provider. Signatures-by signing, you are acknowledging that this After Visit Summary has been reviewed with you and you have received a copy. Patient Signature:  ____________________________________________________________ Date:  ____________________________________________________________  
  
Ilda Hurtado Provider Signature:  ____________________________________________________________ Date:  ____________________________________________________________

## 2018-04-02 NOTE — ED NOTES
Purposeful rounding performed. Pt resting comfortably on stretcher. Nothing needed at this time. Sitter at bedside. Will continue to monitor.

## 2018-04-02 NOTE — ED NOTES
Pt's friends at bedside. Pt sitting up in bed. Appears to be more alert, but still has periods where she falls asleep during conversation, but easily arouses to voice.

## 2018-04-03 LAB
ALBUMIN SERPL-MCNC: 3 G/DL (ref 3.5–5)
ALBUMIN SERPL-MCNC: 3.1 G/DL (ref 3.5–5)
ALBUMIN/GLOB SERPL: 0.9 {RATIO} (ref 1.1–2.2)
ALBUMIN/GLOB SERPL: 1 {RATIO} (ref 1.1–2.2)
ALP SERPL-CCNC: 42 U/L (ref 45–117)
ALP SERPL-CCNC: 43 U/L (ref 45–117)
ALT SERPL-CCNC: 19 U/L (ref 12–78)
ALT SERPL-CCNC: 22 U/L (ref 12–78)
ANION GAP SERPL CALC-SCNC: 10 MMOL/L (ref 5–15)
ANION GAP SERPL CALC-SCNC: 12 MMOL/L (ref 5–15)
APAP SERPL-MCNC: 8 UG/ML (ref 10–30)
AST SERPL-CCNC: 12 U/L (ref 15–37)
AST SERPL-CCNC: 14 U/L (ref 15–37)
ATRIAL RATE: 70 BPM
BACTERIA SPEC CULT: NORMAL
BILIRUB SERPL-MCNC: 0.2 MG/DL (ref 0.2–1)
BILIRUB SERPL-MCNC: 0.3 MG/DL (ref 0.2–1)
BUN SERPL-MCNC: 16 MG/DL (ref 6–20)
BUN SERPL-MCNC: 16 MG/DL (ref 6–20)
BUN/CREAT SERPL: 12 (ref 12–20)
BUN/CREAT SERPL: 14 (ref 12–20)
CALCIUM SERPL-MCNC: 7.7 MG/DL (ref 8.5–10.1)
CALCIUM SERPL-MCNC: 8.1 MG/DL (ref 8.5–10.1)
CALCULATED P AXIS, ECG09: -6 DEGREES
CALCULATED R AXIS, ECG10: 22 DEGREES
CALCULATED T AXIS, ECG11: 46 DEGREES
CC UR VC: NORMAL
CHLORIDE SERPL-SCNC: 100 MMOL/L (ref 97–108)
CHLORIDE SERPL-SCNC: 101 MMOL/L (ref 97–108)
CO2 SERPL-SCNC: 25 MMOL/L (ref 21–32)
CO2 SERPL-SCNC: 28 MMOL/L (ref 21–32)
CREAT SERPL-MCNC: 1.13 MG/DL (ref 0.55–1.02)
CREAT SERPL-MCNC: 1.34 MG/DL (ref 0.55–1.02)
DIAGNOSIS, 93000: NORMAL
ERYTHROCYTE [DISTWIDTH] IN BLOOD BY AUTOMATED COUNT: 12.8 % (ref 11.5–14.5)
GLOBULIN SER CALC-MCNC: 3 G/DL (ref 2–4)
GLOBULIN SER CALC-MCNC: 3.3 G/DL (ref 2–4)
GLUCOSE SERPL-MCNC: 142 MG/DL (ref 65–100)
GLUCOSE SERPL-MCNC: 87 MG/DL (ref 65–100)
HCT VFR BLD AUTO: 40.7 % (ref 35–47)
HGB BLD-MCNC: 13.3 G/DL (ref 11.5–16)
MCH RBC QN AUTO: 30.3 PG (ref 26–34)
MCHC RBC AUTO-ENTMCNC: 32.7 G/DL (ref 30–36.5)
MCV RBC AUTO: 92.7 FL (ref 80–99)
NRBC # BLD: 0 K/UL (ref 0–0.01)
NRBC BLD-RTO: 0 PER 100 WBC
P-R INTERVAL, ECG05: 180 MS
PLATELET # BLD AUTO: 265 K/UL (ref 150–400)
PMV BLD AUTO: 10.5 FL (ref 8.9–12.9)
POTASSIUM SERPL-SCNC: 2.7 MMOL/L (ref 3.5–5.1)
POTASSIUM SERPL-SCNC: 3.1 MMOL/L (ref 3.5–5.1)
PROT SERPL-MCNC: 6 G/DL (ref 6.4–8.2)
PROT SERPL-MCNC: 6.4 G/DL (ref 6.4–8.2)
Q-T INTERVAL, ECG07: 382 MS
QRS DURATION, ECG06: 78 MS
QTC CALCULATION (BEZET), ECG08: 412 MS
RBC # BLD AUTO: 4.39 M/UL (ref 3.8–5.2)
SERVICE CMNT-IMP: NORMAL
SODIUM SERPL-SCNC: 137 MMOL/L (ref 136–145)
SODIUM SERPL-SCNC: 139 MMOL/L (ref 136–145)
VENTRICULAR RATE, ECG03: 70 BPM
WBC # BLD AUTO: 7 K/UL (ref 3.6–11)

## 2018-04-03 PROCEDURE — 85027 COMPLETE CBC AUTOMATED: CPT | Performed by: INTERNAL MEDICINE

## 2018-04-03 PROCEDURE — 74011250636 HC RX REV CODE- 250/636: Performed by: INTERNAL MEDICINE

## 2018-04-03 PROCEDURE — 74011250637 HC RX REV CODE- 250/637: Performed by: INTERNAL MEDICINE

## 2018-04-03 PROCEDURE — 36415 COLL VENOUS BLD VENIPUNCTURE: CPT | Performed by: INTERNAL MEDICINE

## 2018-04-03 PROCEDURE — 65270000029 HC RM PRIVATE

## 2018-04-03 PROCEDURE — 80053 COMPREHEN METABOLIC PANEL: CPT | Performed by: INTERNAL MEDICINE

## 2018-04-03 PROCEDURE — 74011250636 HC RX REV CODE- 250/636: Performed by: EMERGENCY MEDICINE

## 2018-04-03 PROCEDURE — 80307 DRUG TEST PRSMV CHEM ANLYZR: CPT | Performed by: INTERNAL MEDICINE

## 2018-04-03 RX ORDER — POTASSIUM CHLORIDE 750 MG/1
40 TABLET, FILM COATED, EXTENDED RELEASE ORAL
Status: COMPLETED | OUTPATIENT
Start: 2018-04-03 | End: 2018-04-03

## 2018-04-03 RX ORDER — SODIUM CHLORIDE 9 MG/ML
100 INJECTION, SOLUTION INTRAVENOUS CONTINUOUS
Status: DISCONTINUED | OUTPATIENT
Start: 2018-04-03 | End: 2018-04-04

## 2018-04-03 RX ORDER — POTASSIUM CHLORIDE 7.45 MG/ML
10 INJECTION INTRAVENOUS
Status: COMPLETED | OUTPATIENT
Start: 2018-04-03 | End: 2018-04-03

## 2018-04-03 RX ADMIN — POTASSIUM CHLORIDE 10 MEQ: 10 INJECTION, SOLUTION INTRAVENOUS at 19:50

## 2018-04-03 RX ADMIN — LOSARTAN POTASSIUM 50 MG: 50 TABLET ORAL at 09:23

## 2018-04-03 RX ADMIN — HYDROCHLOROTHIAZIDE 25 MG: 25 TABLET ORAL at 09:23

## 2018-04-03 RX ADMIN — VENLAFAXINE HYDROCHLORIDE 150 MG: 37.5 TABLET ORAL at 09:23

## 2018-04-03 RX ADMIN — POTASSIUM CHLORIDE 40 MEQ: 750 TABLET, EXTENDED RELEASE ORAL at 17:53

## 2018-04-03 RX ADMIN — POTASSIUM CHLORIDE 10 MEQ: 10 INJECTION, SOLUTION INTRAVENOUS at 18:47

## 2018-04-03 RX ADMIN — CARVEDILOL 12.5 MG: 12.5 TABLET, FILM COATED ORAL at 17:53

## 2018-04-03 RX ADMIN — POTASSIUM CHLORIDE 10 MEQ: 10 INJECTION, SOLUTION INTRAVENOUS at 17:41

## 2018-04-03 RX ADMIN — VENLAFAXINE HYDROCHLORIDE 150 MG: 37.5 TABLET ORAL at 18:50

## 2018-04-03 RX ADMIN — ASPIRIN 81 MG: 81 TABLET, COATED ORAL at 09:23

## 2018-04-03 RX ADMIN — POTASSIUM CHLORIDE 10 MEQ: 10 INJECTION, SOLUTION INTRAVENOUS at 20:51

## 2018-04-03 RX ADMIN — HEPARIN SODIUM 5000 UNITS: 5000 INJECTION, SOLUTION INTRAVENOUS; SUBCUTANEOUS at 13:16

## 2018-04-03 RX ADMIN — ACETYLCYSTEINE 9020 MG: 200 INJECTION, SOLUTION INTRAVENOUS at 00:43

## 2018-04-03 RX ADMIN — Medication 5 ML: at 14:00

## 2018-04-03 RX ADMIN — SODIUM CHLORIDE 1000 ML: 900 INJECTION, SOLUTION INTRAVENOUS at 15:23

## 2018-04-03 RX ADMIN — HEPARIN SODIUM 5000 UNITS: 5000 INJECTION, SOLUTION INTRAVENOUS; SUBCUTANEOUS at 20:50

## 2018-04-03 RX ADMIN — CARVEDILOL 12.5 MG: 12.5 TABLET, FILM COATED ORAL at 09:24

## 2018-04-03 RX ADMIN — SODIUM CHLORIDE 100 ML/HR: 900 INJECTION, SOLUTION INTRAVENOUS at 17:40

## 2018-04-03 NOTE — ED NOTES
Bedside and Verbal shift change report given to Pennie Frank (oncoming nurse) by Mary Manning (offgoing nurse). Report included the following information SBAR, Kardex, ED Summary, Intake/Output, MAR and Recent Results.

## 2018-04-03 NOTE — ED NOTES
Pts sheets and brief changed, purewick repositioned. Cardiac monitor x3. SRx2. Pt resting on stretcher in position of comfort. Lights dimmed. Sitter at bedside. 1:1 documentation continued.

## 2018-04-03 NOTE — ED NOTES
Pt states she is feeling sad and all \"donato has been taken from me\". She states that breast cancer has \"destroyed\" her. Pt seems pensive and lost in thought. She is calm, cooperative and pleasant. Pt has been hypotensive. Dr Benton Durbin phoned by Marivel Monk RN and obtained verbal order for 1L normal saline bolus. Pt has fluids infusing at this time. Sitter remains at bedside 1:1. Needs met at this time. Will continue to monitor.

## 2018-04-03 NOTE — ED NOTES
Bedside and verbal report given to Kee Chong RN on [facility] at shift change for routine progression of care. Report consisted of patients Situation, Background, Assessment and Recommendations(SBAR). Information from the following report(s) SBAR, Kardex, ED Summary, [facility] and Recent Results was reviewed with the receiving nurse. Opportunity for questions and clarification was provided.

## 2018-04-03 NOTE — ED NOTES
Phoned and spoke with Dr Carmen Alvares. Reported ast/alt lab results. Reported potassium levels and pt remains hypotensive. Verbal orders with verbal readback placed and inputted by RN.

## 2018-04-03 NOTE — ED NOTES
Assumed care of pt from Lauren Villaseñor RN at bedside with verbal report consisting of Situation, Background, Assessment, and Recommendations (SBAR). Pt is sleeping. Call bell within reach. Side rails x 2. Cardiac monitor x 3. Stretcher locked in the lowest position. Pt in no acute distress at this the time. Will continue to monitor. Sitter at bedside.

## 2018-04-03 NOTE — ED NOTES
Bedside and Verbal shift change report given to [place], RN and Vikas Boo RN (oncoming nurse) by this RN (offgoing nurse). Report included the following information SBAR, Kardex, Accordion and Recent Results.

## 2018-04-03 NOTE — ED NOTES
Pt hypotensive, but alert and interacting appropriately. Spoke with Dr. Joseph Levi orders received for NS IVF bolus. Receiving RN is aware.

## 2018-04-03 NOTE — CDMP QUERY
Account Number: [de-identified]  MRN: [id number]  Patient: Carmita Whiteside  Created: [id number]  Clinician Name: Froylan Krabbe, RN, CCDS   EMERITA Sebastian :  Please clarify if this patient is (was) being treated/managed for:     => Chronic diastolic (congestive) heart failure  => Other explanation of clinical findings  => Clinically Undetermined (no explanation for clinical findings)    The medical record reflects the following clinical findings, treatment, and risk factors. Risk Factors:  65yo F w/ hx of CHF   Clinical Indicators:  last echo 8/2012 LVEF est 50-55% range. Treatment: Continue coreg, follow I&O,     Please clarify and document your clinical opinion in the progress notes and discharge summary including the definitive and/or presumptive diagnosis, (suspected or probable), related to the above clinical findings. Please include clinical findings supporting your diagnosis.   Thank Janine Winn RN, CCDS

## 2018-04-03 NOTE — H&P
Hospitalist Admission Note    NAME: Jeff Gray   :  1955   MRN:  [id number]     Date/Time:  2018 8:30 PM    Patient PCP: Jonathan Ko MD  ________________________________________________________________________    My assessment of this patient's clinical condition and my plan of care is as follows. Assessment / Plan:    1) Benzo overdose: Not sure if intentional or accidental. Patient received flumazenil in the ED. patient consciousness on and off, follow commands, liver function no acute abnormal findings. AM labs, continue hydration    2) Metastatic Breast CA to bone    3) CHF: Continue coreg, troponin NEG    4) HTN: Continue home meds    5) Depression; Continue home meds        Code Status: full  Surrogate Decision Maker:    DVT Prophylaxis: yes  GI Prophylaxis: not indicated    Baseline: lives alone at home        Subjective:   CHIEF COMPLAINT: overdose    HISTORY OF PRESENT ILLNESS:     Tanna Torrez is a 63 y.o. Poor historian so info collected from ER note and friends in the room :PMH CHF, depression HTN, Metastatic breast CA  Who presents via EMS to the ED with probable intentional vs accidental drug overdose. Apparently friends came to check on her and founded her in bed with altered mental status, and some of her medicine bottles open.  EMS was called        Past Medical History:   Diagnosis Date    Anxiety     Cancer ([address]     breast    Depression     GERD (gastroesophageal reflux disease)     HTN (hypertension)     Hypercholesterolemia     Hypotension 2012    Metastatic breast cancer ([address] 5/3/2017    Secondary cancer of [facility] [address] [address] 5/3/2017        Past Surgical History:   Procedure Laterality Date    BREAST SURGERY PROCEDURE UNLISTED      carina masectomy       Social History   Substance Use Topics    Smoking status: Former Smoker     Packs/day: 0.50     Years: 20.00     Quit date: 2012    Smokeless tobacco: Never Used    Alcohol use No Family History   Problem Relation Age of Onset    Hypertension Mother     Heart Disease Mother     Hypertension Father      Allergies   Allergen Reactions    Sulfa (Sulfonamide Antibiotics) Unknown (comments)    Wellbutrin [Bupropion Hcl] Unknown (comments)        Prior to Admission medications    Medication Sig Start Date End Date Taking? Authorizing Provider   HYDROcodone-acetaminophen (NORCO) 5-325 mg per tablet Take 1 Tab by mouth two (2) times daily as needed for Pain. Max Daily Amount: 2 Tabs. 3/2/18   Kellie Herman MD   naloxone 2 mg/actuation spry Use 1 spray intranasally into 1 nostril. Use a new Narcan nasal spray for subsequent doses and administer into alternating nostrils. May repeat every 2 to 3 minutes as needed. 3/2/18   Kellie Herman MD   aspirin delayed-release 81 mg tablet Take 1 Tab by mouth daily. 2/19/18   Kellie Herman MD   docusate sodium (COLACE) 100 mg capsule Take 1 Cap by mouth nightly for 90 days. 2/19/18 5/20/18  Kellie Herman MD   amoxicillin-clavulanate (AUGMENTIN) 875-125 mg per tablet Take 1 Tab by mouth every twelve (12) hours. 2/19/18   Kellie Herman MD   hydroCHLOROthiazide (HYDRODIURIL) 25 mg tablet Take 1 Tab by mouth daily. 2/19/18   Kellie Herman MD   multivitamin (ONE A DAY) tablet Take 1 Tab by mouth daily. Historical Provider   senna-docusate (SENEXON-S) 8.6-50 mg per tablet Take 2 Tabs by mouth two (2) times a day. Historical Provider   mirtazapine (REMERON) 15 mg tablet Take 15 mg by mouth nightly. Historical Provider   melatonin tab tablet Take 5 mg by mouth nightly. Historical Provider   calcium carbonate (SABINO-GEST ANTACID) 200 mg calcium (500 mg) chew Take 1 Tab by mouth as needed. Indications: Heartburn    Historical Provider   carvedilol (COREG) 12.5 mg tablet Take  by mouth two (2) times daily (with meals). Historical Provider   losartan (COZAAR) 50 mg tablet Take 50 mg by mouth daily.     Historical Provider   venlafaxine [facility]) 75 mg tablet Take 150 mg by mouth two (2) times a day. 5/31/11   Historical Provider   alprazokev El) 1 mg tablet Take 1 mg by mouth four (4) times daily. 5/31/11   Historical Provider       REVIEW OF SYSTEMS:     I am not able to complete the review of systems because:    The patient is intubated and sedated   x The patient has altered mental status due to his acute medical problems    The patient has baseline aphasia from prior stroke(s)    The patient has baseline dementia and is not reliable historian    The patient is in acute medical distress and unable to provide information           Total of 12 systems reviewed as follows:       POSITIVE= underlined text  Negative = text not underlined  General:  fever, chills, sweats, generalized weakness, weight loss/gain,      loss of appetite   Eyes:    blurred vision, eye pain, loss of vision, double vision  ENT:    rhinorrhea, pharyngitis   Respiratory:   cough, sputum production, SOB, LOBO, wheezing, pleuritic pain   Cardiology:   chest pain, palpitations, orthopnea, PND, edema, syncope   Gastrointestinal:  abdominal pain , N/V, diarrhea, dysphagia, constipation, bleeding   Genitourinary:  frequency, urgency, dysuria, hematuria, incontinence   Muskuloskeletal :  arthralgia, myalgia, back pain  Hematology:  easy bruising, nose or gum bleeding, lymphadenopathy   Dermatological: rash, ulceration, pruritis, color change / jaundice  Endocrine:   hot flashes or polydipsia   Neurological:  headache, dizziness, confusion, focal weakness, paresthesia,     Speech difficulties, memory loss, gait difficulty  Psychological: Feelings of anxiety, depression, agitation    Objective:   VITALS:    Visit Vitals    BP 92/68    Pulse 68    Temp 97.7 °F (36.5 °C)    Resp 15    Ht 5' 5\" (1.651 m)    Wt 90.2 kg (198 lb 13.7 oz)    SpO2 100%    BMI 33.09 kg/m2       PHYSICAL EXAM:    General:    Somnolent, but follow commands, answer questions , no distress, appears stated age. HEENT: Atraumatic, anicteric sclerae, pink conjunctivae     No oral ulcers, mucosa moist, throat clear, dentition fair  Neck:  Supple, symmetrical,  thyroid: non tender  Lungs:   Clear to auscultation bilaterally. No Wheezing or Rhonchi. No rales. Chest wall:  Chemo port in place, No tenderness  No Accessory muscle use. Heart:   Regular  rhythm,  No  murmur   No edema  Abdomen:   Soft, non-tender. Not distended. Bowel sounds normal  Extremities: No cyanosis. No clubbing,      Skin turgor normal, Capillary refill normal, Radial dial pulse 2+  Skin:     Not pale. Not Jaundiced  No rashes   Psych:  Good insight. Not depressed. Not anxious or agitated. Neurologic: EOMs intact. No facial asymmetry. No aphasia or slurred speech. Symmetrical strength,somnolent  _______________________________________________________________________  Care Plan discussed with:    Comments   Patient x    Family      RN     Care Manager                    Consultant:      _______________________________________________________________________  Expected  Disposition:   Home with Family    HH/PT/OT/RN    SNF/LTC x   JATIN    ________________________________________________________________________  TOTAL TIME:  48 Minutes    Critical Care Provided     Minutes non procedure based      Comments    x Reviewed previous records   >50% of visit spent in counseling and coordination of care  Discussion with patient and/or family and questions answered       ________________________________________________________________________  Signed: Jennifer Charles MD    Procedures: see electronic medical records for all procedures/Xrays and details which were not copied into this note but were reviewed prior to creation of Plan.     LAB DATA REVIEWED:    Recent Results (from the past 24 hour(s))   EKG, 12 LEAD, INITIAL    Collection Time: 04/02/18  4:40 PM   Result Value Ref Range    Ventricular Rate 70 BPM    Atrial Rate 70 BPM P-R Interval 180 ms    QRS Duration 78 ms    Q-T Interval 382 ms    QTC Calculation (Bezet) 412 ms    Calculated P Axis -6 degrees    Calculated R Axis 22 degrees    Calculated T Axis 46 degrees    Diagnosis       Normal sinus rhythm  Normal ECG  When compared with ECG of 12-FEB-2018 08:59,  Vent. rate has decreased BY  43 BPM  Nonspecific T wave abnormality, improved in Lateral leads     CBC WITH AUTOMATED DIFF    Collection Time: 04/02/18  5:22 PM   Result Value Ref Range    WBC 5.4 3.6 - 11.0 K/uL    RBC 4.46 3.80 - 5.20 M/uL    HGB 13.6 11.5 - 16.0 g/dL    HCT 40.8 35.0 - 47.0 %    MCV 91.5 80.0 - 99.0 FL    MCH 30.5 26.0 - 34.0 PG    MCHC 33.3 30.0 - 36.5 g/dL    RDW 12.6 11.5 - 14.5 %    PLATELET 943 687 - 457 K/uL    MPV 10.8 8.9 - 12.9 FL    NRBC 0.0 0  WBC    ABSOLUTE NRBC 0.00 0.00 - 0.01 K/uL    NEUTROPHILS 63 32 - 75 %    LYMPHOCYTES 29 12 - 49 %    MONOCYTES 7 5 - 13 %    EOSINOPHILS 1 0 - 7 %    BASOPHILS 0 0 - 1 %    IMMATURE GRANULOCYTES 0 0.0 - 0.5 %    ABS. NEUTROPHILS 3.4 1.8 - 8.0 K/UL    ABS. LYMPHOCYTES 1.6 0.8 - 3.5 K/UL    ABS. MONOCYTES 0.4 0.0 - 1.0 K/UL    ABS. EOSINOPHILS 0.0 0.0 - 0.4 K/UL    ABS. BASOPHILS 0.0 0.0 - 0.1 K/UL    ABS. IMM. GRANS. 0.0 0.00 - 0.04 K/UL    DF AUTOMATED     METABOLIC PANEL, COMPREHENSIVE    Collection Time: 04/02/18  5:22 PM   Result Value Ref Range    Sodium 134 (L) 136 - 145 mmol/L    Potassium 3.9 3.5 - 5.1 mmol/L    Chloride 97 97 - 108 mmol/L    CO2 30 21 - 32 mmol/L    Anion gap 7 5 - 15 mmol/L    Glucose 115 (H) 65 - 100 mg/dL    BUN 17 6 - 20 MG/DL    Creatinine 1.32 (H) 0.55 - 1.02 MG/DL    BUN/Creatinine ratio 13 12 - 20      GFR est AA 50 (L) >60 ml/min/1.73m2    GFR est non-AA 41 (L) >60 ml/min/1.73m2    Calcium 9.5 8.5 - 10.1 MG/DL    Bilirubin, total 0.4 0.2 - 1.0 MG/DL    ALT (SGPT) 24 12 - 78 U/L    AST (SGOT) 14 (L) 15 - 37 U/L    Alk.  phosphatase 55 45 - 117 U/L    Protein, total 7.4 6.4 - 8.2 g/dL    Albumin 3.9 3.5 - 5.0 g/dL Globulin 3.5 2.0 - 4.0 g/dL    A-G Ratio 1.1 1.1 - 2.2     LIPASE    Collection Time: 04/02/18  5:22 PM   Result Value Ref Range    Lipase 132 73 - 393 U/L   ETHYL ALCOHOL    Collection Time: 04/02/18  5:22 PM   Result Value Ref Range    ALCOHOL(ETHYL),SERUM <10 <10 MG/DL   TROPONIN I    Collection Time: 04/02/18  5:22 PM   Result Value Ref Range    Troponin-I, Qt. <0.04 <0.05 ng/mL   MAGNESIUM    Collection Time: 04/02/18  5:22 PM   Result Value Ref Range    Magnesium 1.8 1.6 - 2.4 mg/dL   DRUG SCREEN, URINE    Collection Time: 04/02/18  5:56 PM   Result Value Ref Range    AMPHETAMINES NEGATIVE  NEG      BARBITURATES NEGATIVE  NEG      BENZODIAZEPINES POSITIVE (A) NEG      COCAINE NEGATIVE  NEG      METHADONE NEGATIVE  NEG      OPIATES POSITIVE (A) NEG      PCP(PHENCYCLIDINE) NEGATIVE  NEG      THC (TH-CANNABINOL) NEGATIVE  NEG      Drug screen comment (NOTE)    URINALYSIS W/ REFLEX CULTURE    Collection Time: 04/02/18  5:56 PM   Result Value Ref Range    Color YELLOW/STRAW      Appearance CLEAR CLEAR      Specific gravity 1.023 1.003 - 1.030      pH (UA) 6.5 5.0 - 8.0      Protein NEGATIVE  NEG mg/dL    Glucose NEGATIVE  NEG mg/dL    Ketone NEGATIVE  NEG mg/dL    Bilirubin NEGATIVE  NEG      Blood NEGATIVE  NEG      Urobilinogen 0.2 0.2 - 1.0 EU/dL    Nitrites NEGATIVE  NEG      Leukocyte Esterase NEGATIVE  NEG      WBC 0-4 0 - 4 /hpf    RBC 0-5 0 - 5 /hpf    Epithelial cells FEW FEW /lpf    Bacteria NEGATIVE  NEG /hpf    UA:UC IF INDICATED CULTURE NOT INDICATED BY UA RESULT CNI      Mucus TRACE (A) NEG /lpf   SALICYLATE    Collection Time: 04/02/18  7:00 PM   Result Value Ref Range    Salicylate level 2.1 (L) 2.8 - 20.0 MG/DL   ACETAMINOPHEN    Collection Time: 04/02/18  7:00 PM   Result Value Ref Range    Acetaminophen level 132 (H) 10 - 30 ug/mL

## 2018-04-03 NOTE — ED NOTES
Assumed care of pt at this time, received bedside report from [place] with pt included in care. Pt is resting in room, with call bell in reach. All questions answered.

## 2018-04-03 NOTE — ED NOTES
[facility], will repeat LFT and APAP level at 14:00 tomorrow (4/3/18)

## 2018-04-03 NOTE — ED NOTES
Bedside shift change report given to Jazmín Valdes RN (oncoming nurse) by Daniel Benitez RN (offgoing nurse). Report included the following information SBAR, Kardex, ED Summary, MAR, Recent Results and Med Rec Status.

## 2018-04-03 NOTE — ED NOTES
Pt. Resting comfortably in bed, denies needs at this time. Purewick in place. VS stable, BP slightly increased over previous hypotensive readings. Bed locked and low, call mcginnis in reach. Sitter remains at bedside.

## 2018-04-03 NOTE — ED NOTES
Hourly rounds completed. All concerns and needs addressed by RN as requested by the pt. In no apparent distress at this time. Resting in position of comfort. ED tech at bedside for 1:1 observation. Will continue to monitor.

## 2018-04-03 NOTE — ED NOTES
Spoke with Janine Zazueta at [facility] regarding latest lab results. No further labs requested. Will continue to monitor the case.

## 2018-04-04 PROBLEM — G89.3 PAIN DUE TO NEOPLASM: Status: ACTIVE | Noted: 2018-04-04

## 2018-04-04 PROBLEM — R53.0 NEOPLASTIC MALIGNANT RELATED FATIGUE: Status: ACTIVE | Noted: 2018-04-04

## 2018-04-04 LAB
ANION GAP SERPL CALC-SCNC: 6 MMOL/L (ref 5–15)
BUN SERPL-MCNC: 12 MG/DL (ref 6–20)
BUN/CREAT SERPL: 18 (ref 12–20)
CALCIUM SERPL-MCNC: 7.5 MG/DL (ref 8.5–10.1)
CHLORIDE SERPL-SCNC: 108 MMOL/L (ref 97–108)
CO2 SERPL-SCNC: 26 MMOL/L (ref 21–32)
CREAT SERPL-MCNC: 0.67 MG/DL (ref 0.55–1.02)
GLUCOSE SERPL-MCNC: 88 MG/DL (ref 65–100)
POTASSIUM SERPL-SCNC: 3.1 MMOL/L (ref 3.5–5.1)
SODIUM SERPL-SCNC: 140 MMOL/L (ref 136–145)

## 2018-04-04 PROCEDURE — 74011250637 HC RX REV CODE- 250/637: Performed by: INTERNAL MEDICINE

## 2018-04-04 PROCEDURE — 77010033678 HC OXYGEN DAILY

## 2018-04-04 PROCEDURE — 76450000000

## 2018-04-04 PROCEDURE — 80048 BASIC METABOLIC PNL TOTAL CA: CPT | Performed by: INTERNAL MEDICINE

## 2018-04-04 PROCEDURE — 74011250636 HC RX REV CODE- 250/636: Performed by: INTERNAL MEDICINE

## 2018-04-04 PROCEDURE — 65270000029 HC RM PRIVATE

## 2018-04-04 PROCEDURE — 74011250637 HC RX REV CODE- 250/637: Performed by: NURSE PRACTITIONER

## 2018-04-04 PROCEDURE — 36415 COLL VENOUS BLD VENIPUNCTURE: CPT | Performed by: INTERNAL MEDICINE

## 2018-04-04 RX ORDER — POTASSIUM CHLORIDE 750 MG/1
40 TABLET, FILM COATED, EXTENDED RELEASE ORAL ONCE
Status: COMPLETED | OUTPATIENT
Start: 2018-04-04 | End: 2018-04-04

## 2018-04-04 RX ORDER — MORPHINE SULFATE 15 MG/1
7.5 TABLET ORAL
Status: DISCONTINUED | OUTPATIENT
Start: 2018-04-04 | End: 2018-04-05 | Stop reason: HOSPADM

## 2018-04-04 RX ORDER — MORPHINE SULFATE 15 MG/1
7.5 TABLET ORAL
Status: DISCONTINUED | OUTPATIENT
Start: 2018-04-04 | End: 2018-04-04

## 2018-04-04 RX ORDER — FENTANYL 25 UG/1
1 PATCH TRANSDERMAL
Status: DISCONTINUED | OUTPATIENT
Start: 2018-04-04 | End: 2018-04-05 | Stop reason: HOSPADM

## 2018-04-04 RX ADMIN — SODIUM CHLORIDE 100 ML/HR: 900 INJECTION, SOLUTION INTRAVENOUS at 13:28

## 2018-04-04 RX ADMIN — VENLAFAXINE HYDROCHLORIDE 150 MG: 37.5 TABLET ORAL at 11:45

## 2018-04-04 RX ADMIN — POTASSIUM CHLORIDE 40 MEQ: 750 TABLET, EXTENDED RELEASE ORAL at 15:39

## 2018-04-04 RX ADMIN — Medication 10 ML: at 22:07

## 2018-04-04 RX ADMIN — SODIUM CHLORIDE 100 ML/HR: 900 INJECTION, SOLUTION INTRAVENOUS at 03:28

## 2018-04-04 RX ADMIN — HEPARIN SODIUM 5000 UNITS: 5000 INJECTION, SOLUTION INTRAVENOUS; SUBCUTANEOUS at 13:15

## 2018-04-04 RX ADMIN — Medication 10 ML: at 13:29

## 2018-04-04 RX ADMIN — ASPIRIN 81 MG: 81 TABLET, COATED ORAL at 09:45

## 2018-04-04 RX ADMIN — VENLAFAXINE HYDROCHLORIDE 150 MG: 37.5 TABLET ORAL at 17:55

## 2018-04-04 RX ADMIN — CARVEDILOL 12.5 MG: 12.5 TABLET, FILM COATED ORAL at 09:45

## 2018-04-04 RX ADMIN — HEPARIN SODIUM 5000 UNITS: 5000 INJECTION, SOLUTION INTRAVENOUS; SUBCUTANEOUS at 04:30

## 2018-04-04 RX ADMIN — Medication 10 ML: at 06:00

## 2018-04-04 RX ADMIN — HEPARIN SODIUM 5000 UNITS: 5000 INJECTION, SOLUTION INTRAVENOUS; SUBCUTANEOUS at 20:49

## 2018-04-04 NOTE — PROGRESS NOTES
Hospitalist Progress Note    NAME: Russell Medley   :  1955   MRN:  [id number]       Assessment / Plan:    1) Benzodiazpeine overdose:   Patient received flumazenil in the ED. Continues to improve, still sleepy but more alert and answering all questions.    2) Tylenol overdose  Resolved. tylenol level at 132 on admission. She received IV acetylcysteine and Iv fluids. LFT wnl.      3) hypotension  Resolved. Stop iv fluids. Secondary to benzodiazepine overdose. 4) HTN:   bp is controlled. Holding home dose of HCTZ, losartan. close monitoring. 5) Depression  Patient with signs of severe depression and anxiety related to stage 4 breast cancer. Palliative and psychiatry services consulted. Continue home velafaxine. 6)  Metastatic Breast CA to bone    7). Hypokalemia  Replete    8) CORNELIA. Resolved. Pre-renal due to dehydration  9) History of CHF  - Patient reports developing CHF after chemotherapy  -  Unknown type of CHF. Compensated. Body mass index is 32.35 kg/(m^2). Code status: Full  Prophylaxis: Hep SQ  Recommended Disposition: Home w/Family     Subjective:     Chief Complaint / Reason for Physician Visit  \"patient is still very tearful, she took more pain pills because she was just tired of her cancer\". Discussed with RN events overnight. Review of Systems:  Symptom Y/N Comments  Symptom Y/N Comments   Fever/Chills n   Chest Pain n    Poor Appetite n   Edema n    Cough n   Abdominal Pain n    Sputum n   Joint Pain n    SOB/LOBO n   Pruritis/Rash n    Nausea/vomit n   Tolerating PT/OT     Diarrhea n   Tolerating Diet y    Constipation    Other       Could NOT obtain due to:      Objective:     VITALS:   Last 24hrs VS reviewed since prior progress note.  Most recent are:  Patient Vitals for the past 24 hrs:   Temp Pulse Resp BP SpO2   18 1249 97.5 °F (36.4 °C) 87 20 103/63 95 %   18 0900 - 78 24 - 100 %   18 0845 - 78 23 - 100 %   18 0830 - 77 22 - 100 % 04/04/18 0815 - 78 24 - 100 %   04/04/18 0800 - 80 24 - 100 %   04/04/18 0745 - 79 20 - 100 %   04/04/18 0730 - 80 19 - 98 %   04/04/18 0715 - 86 23 - 99 %   04/04/18 0700 - 81 25 125/77 100 %   04/04/18 0600 - 80 23 118/81 99 %   04/04/18 0530 - 83 15 131/79 99 %   04/04/18 0515 - 79 22 128/78 100 %   04/04/18 0502 - - - - 100 %   04/04/18 0500 - 77 14 125/76 -   04/04/18 0445 - 87 19 114/73 99 %   04/04/18 0430 - 78 18 120/77 100 %   04/04/18 0415 - 78 20 113/72 100 %   04/04/18 0400 - 77 18 110/66 100 %   04/04/18 0345 - 78 18 116/68 100 %   04/04/18 0330 - 80 15 108/72 100 %   04/04/18 0315 - 80 18 107/65 100 %   04/04/18 0300 - 80 19 104/66 100 %   04/04/18 0245 - 81 19 103/64 99 %   04/04/18 0230 - 80 21 99/61 99 %   04/04/18 0215 - 79 17 106/68 100 %   04/04/18 0200 - 78 14 98/65 100 %   04/04/18 0145 - 86 17 102/70 97 %   04/04/18 0130 - 82 20 102/65 97 %   04/04/18 0115 - 80 19 93/65 96 %   04/04/18 0045 - 80 21 94/66 96 %   04/04/18 0030 - 80 14 92/69 97 %   04/04/18 0015 - 80 18 90/59 97 %   04/04/18 0001 - 81 23 (!) 80/55 97 %   04/03/18 2345 - 89 24 102/64 98 %   04/03/18 2331 - 84 20 108/66 97 %   04/03/18 2315 - 83 18 95/64 99 %   04/03/18 2300 - 89 20 96/65 99 %   04/03/18 2245 - 84 20 (!) 89/56 99 %   04/03/18 2230 - 85 17 98/60 99 %   04/03/18 2215 - 86 17 (!) 89/56 98 %   04/03/18 2200 - 90 25 (!) 89/61 -   04/03/18 2145 - 92 19 (!) 88/53 98 %   04/03/18 2130 - 91 20 (!) 85/60 96 %   04/03/18 2126 - 90 18 (!) 88/52 98 %   04/03/18 2100 - 93 20 (!) 85/64 95 %   04/03/18 2045 - 89 20 (!) 83/61 -   04/03/18 2036 - 93 22 97/70 -   04/03/18 2017 - 89 (!) 35 (!) 87/54 (!) 76 %   04/03/18 2000 - 86 24 (!) 78/47 98 %   04/03/18 1930 - 88 17 (!) 88/59 97 %   04/03/18 1900 - 99 20 (!) 83/59 95 %   04/03/18 1830 - 90 15 96/69 94 %   04/03/18 1800 - 92 18 101/65 97 %   04/03/18 1730 - 86 17 (!) 86/51 97 %   04/03/18 1702 - 87 13 92/52 98 %   04/03/18 1630 98 °F (36.7 °C) 83 19 (!) 87/60 96 %   04/03/18 1607 - - - - 96 %   04/03/18 1530 - 83 23 95/56 96 %   04/03/18 1524 - 82 14 (!) 83/51 98 %   04/03/18 1522 - 83 12 (!) 70/50 99 %       Intake/Output Summary (Last 24 hours) at 04/04/18 1330  Last data filed at 04/03/18 2330   Gross per 24 hour   Intake                0 ml   Output             1000 ml   Net            -1000 ml        PHYSICAL EXAM:  General: Alert, cooperative, tearful. Not in acute pain or distress. EENT:  Anicteric sclerae. Resp:  CTA bilaterally, no wheezing or rales. Bilateral mastectomy  CV:  Regular rate,  No LE edema  GI:  Soft, Non distended, Non tender.  +Bowel sounds  Neurologic:  Alert and oriented X 3, normal speech,   Psych:   Sad and tearful. Skin:  No rashes. No jaundice    Reviewed most current lab test results and cultures  YES  Reviewed most current radiology test results   YES  Review and summation of old records today    NO  Reviewed patient's current orders and MAR    YES  PMH/SH reviewed - no change compared to H&P  ________________________________________________________________________  ________________________________________________________________________  Lashay Louis MD     Procedures: see electronic medical records for all procedures/Xrays and details which were not copied into this note but were reviewed prior to creation of Plan. LABS:  I reviewed today's most current labs and imaging studies.   Pertinent labs include:  Recent Labs      04/03/18   0405  04/02/18   1722   WBC  7.0  5.4   HGB  13.3  13.6   HCT  40.7  40.8   PLT  265  288     Recent Labs      04/04/18   0440  04/03/18   1600  04/03/18   0405  04/02/18   1722   NA  140  139  137  134*   K  3.1*  2.7*  3.1*  3.9   CL  108  101  100  97   CO2  26  28  25  30   GLU  88  87  142*  115*   BUN  12  16  16  17   CREA  0.67  1.34*  1.13*  1.32*   CA  7.5*  7.7*  8.1*  9.5   MG   --    --    --   1.8   ALB   --   3.0*  3.1*  3.9   TBILI   --   0.2  0.3  0.4   SGOT   --   14*  12*  14*   ALT --   22 19 24       Signed: Ashley Ball MD

## 2018-04-04 NOTE — CONSULTS
PSYCHIATRIC CONSULTATION                         IDENTIFICATION:    Patient Name  Ajith New   Date of Birth 1955   [facility] [id number]   Medical Record Number  [id number]      Age  63 y.o. PCP Lord Robert MD   Admit date:  4/2/2018    Room Number  2178/01  @ [facility]   Date of Service  4/4/2018            HISTORY         REASON FOR HOSPITALIZATION/CONSULTATION:  A psychiatric consultation was requested by Mark Castillo to evaluate or provide advice/opinion related to evaluating for overdose   CC: \"depressed \"    HISTORY OF PRESENT ILLNESS:    The patient, Ajith New, is a 63 y.o. WHITE [place]  female with a past psychiatric history significant for depression  who was admitted to the medical floor for the treatment of overdose on narco , she is stage four breast cancer and she is feeling sad and depressed and she has no energy , she stated she is feeling depressed for long time and she lives by herself now and she could not take it anymore and she overdosed on Xanax and Narco . She stated depression run in hr family and mother passed away and she was depressed and tried to commit suicide several times , she denied paranoid feeling and denied psychotic features     Pt seen today for evaluation. ALLERGIES:  Allergies   Allergen Reactions    Sulfa (Sulfonamide Antibiotics) Unknown (comments)    Wellbutrin [Bupropion Hcl] Unknown (comments)      MEDICATIONS PRIOR TO ADMISSION:  Prescriptions Prior to Admission   Medication Sig    HYDROcodone-acetaminophen (NORCO) 5-325 mg per tablet Take 1 Tab by mouth two (2) times daily as needed for Pain. Max Daily Amount: 2 Tabs.  aspirin delayed-release 81 mg tablet Take 1 Tab by mouth daily.  docusate sodium (COLACE) 100 mg capsule Take 1 Cap by mouth nightly for 90 days.  hydroCHLOROthiazide (HYDRODIURIL) 25 mg tablet Take 1 Tab by mouth daily.  multivitamin (ONE A DAY) tablet Take 1 Tab by mouth daily.     senna-docusate (SENEXON-S) 8.6-50 mg per tablet Take 2 Tabs by mouth two (2) times a day.  melatonin tab tablet Take 5 mg by mouth nightly.  calcium carbonate (SABINO-GEST ANTACID) 200 mg calcium (500 mg) chew Take 1 Tab by mouth as needed. Indications: Heartburn    carvedilol (COREG) 12.5 mg tablet Take  by mouth two (2) times daily (with meals).  losartan (COZAAR) 50 mg tablet Take 50 mg by mouth daily.  venlafaxine (EFFEXOR) 75 mg tablet Take 150 mg by mouth two (2) times a day.  alprazolam (XANAX) 1 mg tablet Take 1 mg by mouth four (4) times daily.  naloxone 2 mg/actuation spry Use 1 spray intranasally into 1 nostril. Use a new Narcan nasal spray for subsequent doses and administer into alternating nostrils. May repeat every 2 to 3 minutes as needed.  amoxicillin-clavulanate (AUGMENTIN) 875-125 mg per tablet Take 1 Tab by mouth every twelve (12) hours.  mirtazapine (REMERON) 15 mg tablet Take 15 mg by mouth nightly. PAST MEDICAL HISTORY:  Past Medical History:   Diagnosis Date    Anxiety     Cancer [facility])     breast    Depression     GERD (gastroesophageal reflux disease)     HTN (hypertension)     Hypercholesterolemia     Hypotension 9/12/2012    Metastatic breast cancer ([facility] [address] 5/3/2017    Secondary cancer of bone ([facility] [address] 5/3/2017     Past Surgical History:   Procedure Laterality Date    BREAST SURGERY PROCEDURE UNLISTED  march 13    carina masectomy      SOCIAL HISTORY: single   Social History     Social History    Marital status: SINGLE     Spouse name: N/A    Number of children: N/A    Years of education: N/A     Occupational History    Not on file.      Social History Main Topics    Smoking status: Former Smoker     Packs/day: 0.50     Years: 20.00     Quit date: 1/18/2012    Smokeless tobacco: Never Used    Alcohol use No    Drug use: No    Sexual activity: Not on file     Other Topics Concern    Not on file     Social History Narrative      FAMILY HISTORY: History reviewed. No pertinent family history. Family History   Problem Relation Age of Onset    Hypertension Mother     Heart Disease Mother     Hypertension Father        REVIEW of SYSTEMS:   History obtained from the patient  Pertinent items are noted in the History of Present Illness. All other Systems reviewed and are considered negative. MENTAL STATUS EXAM & VITALS       MENTAL STATUS EXAM (MSE):    MSE FINDINGS ARE WITHIN NORMAL LIMITS (WNL) UNLESS OTHERWISE STATED BELOW. Orientation oriented to time, place and person   Vital Signs (BP,Pulse, Temp) See below (reviewed 4/4/2018); vital signs are WNL if not listed below.    Gait and Station Stable, no ataxia   Abnormal Muscular Movements/Tone/Behavior No EPS, no Tardive Dyskinesia, no abnormal muscular movements; wnl tone   Relations cooperative   General Appearance:  age appropriate   Language No aphasia or dysarthria   Speech:  normal pitch and normal volume   Thought Processes logical, wnl rate of thoughts, good abstract reasoning and computation   Thought Associations logical   Thought Content free of delusions   Suicidal Ideations no plan    Homicidal Ideations no plan    Mood:  depressed   Affect:  depressed   Memory recent  adequate   Memory remote:  adequate   Concentration/Attention:  adequate   Fund of Knowledge average   Insight:  fair   Reliability fair   Judgment:  limited            VITALS:     Patient Vitals for the past 24 hrs:   Temp Pulse Resp BP SpO2   04/04/18 1446 97.5 °F (36.4 °C) 78 18 93/56 98 %   04/04/18 1249 97.5 °F (36.4 °C) 87 20 103/63 95 %   04/04/18 0900 - 78 24 - 100 %   04/04/18 0845 - 78 23 - 100 %   04/04/18 0830 - 77 22 - 100 %   04/04/18 0815 - 78 24 - 100 %   04/04/18 0800 - 80 24 - 100 %   04/04/18 0745 - 79 20 - 100 %   04/04/18 0730 - 80 19 - 98 %   04/04/18 0715 - 86 23 - 99 %   04/04/18 0700 - 81 25 125/77 100 %   04/04/18 0600 - 80 23 118/81 99 %   04/04/18 0530 - 83 15 131/79 99 %   04/04/18 0515 - 79 22 128/78 100 %   04/04/18 0502 - - - - 100 %   04/04/18 0500 - 77 14 125/76 -   04/04/18 0445 - 87 19 114/73 99 %   04/04/18 0430 - 78 18 120/77 100 %   04/04/18 0415 - 78 20 113/72 100 %   04/04/18 0400 - 77 18 110/66 100 %   04/04/18 0345 - 78 18 116/68 100 %   04/04/18 0330 - 80 15 108/72 100 %   04/04/18 0315 - 80 18 107/65 100 %   04/04/18 0300 - 80 19 104/66 100 %   04/04/18 0245 - 81 19 103/64 99 %   04/04/18 0230 - 80 21 99/61 99 %   04/04/18 0215 - 79 17 106/68 100 %   04/04/18 0200 - 78 14 98/65 100 %   04/04/18 0145 - 86 17 102/70 97 %   04/04/18 0130 - 82 20 102/65 97 %   04/04/18 0115 - 80 19 93/65 96 %   04/04/18 0045 - 80 21 94/66 96 %   04/04/18 0030 - 80 14 92/69 97 %   04/04/18 0015 - 80 18 90/59 97 %   04/04/18 0001 - 81 23 (!) 80/55 97 %   04/03/18 2345 - 89 24 102/64 98 %   04/03/18 2331 - 84 20 108/66 97 %   04/03/18 2315 - 83 18 95/64 99 %   04/03/18 2300 - 89 20 96/65 99 %   04/03/18 2245 - 84 20 (!) 89/56 99 %   04/03/18 2230 - 85 17 98/60 99 %   04/03/18 2215 - 86 17 (!) 89/56 98 %   04/03/18 2200 - 90 25 (!) 89/61 -   04/03/18 2145 - 92 19 (!) 88/53 98 %   04/03/18 2130 - 91 20 (!) 85/60 96 %   04/03/18 2126 - 90 18 (!) 88/52 98 %   04/03/18 2100 - 93 20 (!) 85/64 95 %   04/03/18 2045 - 89 20 (!) 83/61 -   04/03/18 2036 - 93 22 97/70 -   04/03/18 2017 - 89 (!) 35 (!) 87/54 (!) 76 %   04/03/18 2000 - 86 24 (!) 78/47 98 %   04/03/18 1930 - 88 17 (!) 88/59 97 %   04/03/18 1900 - 99 20 (!) 83/59 95 %   04/03/18 1830 - 90 15 96/69 94 %   04/03/18 1800 - 92 18 101/65 97 %   04/03/18 1730 - 86 17 (!) 86/51 97 %   04/03/18 1702 - 87 13 92/52 98 %   04/03/18 1630 98 °F (36.7 °C) 83 19 (!) 87/60 96 %   04/03/18 1607 - - - - 96 %   04/03/18 1530 - 83 23 95/56 96 %   04/03/18 1524 - 82 14 (!) 83/51 98 %   04/03/18 1522 - 83 12 (!) 70/50 99 %              DATA     LABORATORY DATA:  Labs Reviewed   METABOLIC PANEL, COMPREHENSIVE - Abnormal; Notable for the following:        Result Value    Sodium 134 (*) Glucose 115 (*)     Creatinine 1.32 (*)     GFR est AA 50 (*)     GFR est non-AA 41 (*)     AST (SGOT) 14 (*)     All other components within normal limits   DRUG SCREEN, URINE - Abnormal; Notable for the following:     BENZODIAZEPINES POSITIVE (*)     OPIATES POSITIVE (*)     All other components within normal limits   URINALYSIS W/ REFLEX CULTURE - Abnormal; Notable for the following:     Mucus TRACE (*)     All other components within normal limits   SALICYLATE - Abnormal; Notable for the following:     Salicylate level 2.1 (*)     All other components within normal limits   ACETAMINOPHEN - Abnormal; Notable for the following:     Acetaminophen level 132 (*)     All other components within normal limits   METABOLIC PANEL, COMPREHENSIVE - Abnormal; Notable for the following:     Potassium 3.1 (*)     Glucose 142 (*)     Creatinine 1.13 (*)     GFR est AA 60 (*)     GFR est non-AA 49 (*)     Calcium 8.1 (*)     AST (SGOT) 12 (*)     Alk. phosphatase 42 (*)     Albumin 3.1 (*)     A-G Ratio 0.9 (*)     All other components within normal limits   ACETAMINOPHEN - Abnormal; Notable for the following:     Acetaminophen level 8 (*)     All other components within normal limits   METABOLIC PANEL, COMPREHENSIVE - Abnormal; Notable for the following:     Potassium 2.7 (*)     Creatinine 1.34 (*)     GFR est AA 49 (*)     GFR est non-AA 40 (*)     Calcium 7.7 (*)     AST (SGOT) 14 (*)     Alk.  phosphatase 43 (*)     Protein, total 6.0 (*)     Albumin 3.0 (*)     A-G Ratio 1.0 (*)     All other components within normal limits   METABOLIC PANEL, BASIC - Abnormal; Notable for the following:     Potassium 3.1 (*)     Calcium 7.5 (*)     All other components within normal limits   CULTURE, URINE   CBC WITH AUTOMATED DIFF   LIPASE   ETHYL ALCOHOL   TROPONIN I   MAGNESIUM   CBC W/O DIFF     Admission on 04/02/2018   Component Date Value Ref Range Status    Ventricular Rate 04/02/2018 70  BPM Final    Atrial Rate 04/02/2018 70 BPM Final    P-R Interval 04/02/2018 180  ms Final    QRS Duration 04/02/2018 78  ms Final    Q-T Interval 04/02/2018 382  ms Final    QTC Calculation (Bezet) 04/02/2018 412  ms Final    Calculated P Axis 04/02/2018 -6  degrees Final    Calculated R Axis 04/02/2018 22  degrees Final    Calculated T Axis 04/02/2018 46  degrees Final    Diagnosis 04/02/2018    Final                    Value:Normal sinus rhythm  When compared with ECG of 12-FEB-2018 08:59,  Vent. rate has decreased BY  43 BPM  Nonspecific T wave abnormality, improved in Lateral leads  Confirmed by Mark Ford ([id number]) on 4/3/2018 7:41:40 PM      WBC 04/02/2018 5.4  3.6 - 11.0 K/uL Final    RBC 04/02/2018 4.46  3.80 - 5.20 M/uL Final    HGB 04/02/2018 13.6  11.5 - 16.0 g/dL Final    HCT 04/02/2018 40.8  35.0 - 47.0 % Final    MCV 04/02/2018 91.5  80.0 - 99.0 FL Final    MCH 04/02/2018 30.5  26.0 - 34.0 PG Final    MCHC 04/02/2018 33.3  30.0 - 36.5 g/dL Final    RDW 04/02/2018 12.6  11.5 - 14.5 % Final    PLATELET 56/57/6203 311  150 - 400 K/uL Final    MPV 04/02/2018 10.8  8.9 - 12.9 FL Final    NRBC 04/02/2018 0.0  0  WBC Final    ABSOLUTE NRBC 04/02/2018 0.00  0.00 - 0.01 K/uL Final    NEUTROPHILS 04/02/2018 63  32 - 75 % Final    LYMPHOCYTES 04/02/2018 29  12 - 49 % Final    MONOCYTES 04/02/2018 7  5 - 13 % Final    EOSINOPHILS 04/02/2018 1  0 - 7 % Final    BASOPHILS 04/02/2018 0  0 - 1 % Final    IMMATURE GRANULOCYTES 04/02/2018 0  0.0 - 0.5 % Final    ABS. NEUTROPHILS 04/02/2018 3.4  1.8 - 8.0 K/UL Final    ABS. LYMPHOCYTES 04/02/2018 1.6  0.8 - 3.5 K/UL Final    ABS. MONOCYTES 04/02/2018 0.4  0.0 - 1.0 K/UL Final    ABS. EOSINOPHILS 04/02/2018 0.0  0.0 - 0.4 K/UL Final    ABS. BASOPHILS 04/02/2018 0.0  0.0 - 0.1 K/UL Final    ABS. IMM.  GRANS. 04/02/2018 0.0  0.00 - 0.04 K/UL Final    DF 04/02/2018 AUTOMATED    Final    Sodium 04/02/2018 134* 136 - 145 mmol/L Final    Potassium 04/02/2018 3.9  3.5 - 5.1 mmol/L Final    Chloride 04/02/2018 97  97 - 108 mmol/L Final    CO2 04/02/2018 30  21 - 32 mmol/L Final    Anion gap 04/02/2018 7  5 - 15 mmol/L Final    Glucose 04/02/2018 115* 65 - 100 mg/dL Final    BUN 04/02/2018 17  6 - 20 MG/DL Final    Creatinine 04/02/2018 1.32* 0.55 - 1.02 MG/DL Final    BUN/Creatinine ratio 04/02/2018 13  12 - 20   Final    GFR est AA 04/02/2018 50* >60 ml/min/1.73m2 Final    GFR est non-AA 04/02/2018 41* >60 ml/min/1.73m2 Final    Calcium 04/02/2018 9.5  8.5 - 10.1 MG/DL Final    Bilirubin, total 04/02/2018 0.4  0.2 - 1.0 MG/DL Final    ALT (SGPT) 04/02/2018 24  12 - 78 U/L Final    AST (SGOT) 04/02/2018 14* 15 - 37 U/L Final    Alk.  phosphatase 04/02/2018 55  45 - 117 U/L Final    Protein, total 04/02/2018 7.4  6.4 - 8.2 g/dL Final    Albumin 04/02/2018 3.9  3.5 - 5.0 g/dL Final    Globulin 04/02/2018 3.5  2.0 - 4.0 g/dL Final    A-G Ratio 04/02/2018 1.1  1.1 - 2.2   Final    Lipase 04/02/2018 132  73 - 393 U/L Final    AMPHETAMINES 04/02/2018 NEGATIVE   NEG   Final    BARBITURATES 04/02/2018 NEGATIVE   NEG   Final    BENZODIAZEPINES 04/02/2018 POSITIVE* NEG   Final    COCAINE 04/02/2018 NEGATIVE   NEG   Final    METHADONE 04/02/2018 NEGATIVE   NEG   Final    OPIATES 04/02/2018 POSITIVE* NEG   Final    PCP(PHENCYCLIDINE) 04/02/2018 NEGATIVE   NEG   Final    THC (TH-CANNABINOL) 04/02/2018 NEGATIVE   NEG   Final    Drug screen comment 04/02/2018 (NOTE)   Final    ALCOHOL(ETHYL),SERUM 04/02/2018 <10  <10 MG/DL Final    Color 04/02/2018 YELLOW/STRAW    Final    Appearance 04/02/2018 CLEAR  CLEAR   Final    Specific gravity 04/02/2018 1.023  1.003 - 1.030   Final    pH (UA) 04/02/2018 6.5  5.0 - 8.0   Final    Protein 04/02/2018 NEGATIVE   NEG mg/dL Final    Glucose 04/02/2018 NEGATIVE   NEG mg/dL Final    Ketone 04/02/2018 NEGATIVE   NEG mg/dL Final    Bilirubin 04/02/2018 NEGATIVE   NEG   Final    Blood 04/02/2018 NEGATIVE   NEG   Final    Urobilinogen 04/02/2018 0.2  0.2 - 1.0 EU/dL Final    Nitrites 04/02/2018 NEGATIVE   NEG   Final    Leukocyte Esterase 04/02/2018 NEGATIVE   NEG   Final    WBC 04/02/2018 0-4  0 - 4 /hpf Final    RBC 04/02/2018 0-5  0 - 5 /hpf Final    Epithelial cells 04/02/2018 FEW  FEW /lpf Final    Bacteria 04/02/2018 NEGATIVE   NEG /hpf Final    UA:UC IF INDICATED 04/02/2018 CULTURE NOT INDICATED BY UA RESULT  CNI   Final    Mucus 04/02/2018 TRACE* NEG /lpf Final    Troponin-I, Qt. 04/02/2018 <0.04  <0.05 ng/mL Final    Magnesium 04/02/2018 1.8  1.6 - 2.4 mg/dL Final    Salicylate level 01/12/2296 2.1* 2.8 - 20.0 MG/DL Final    Acetaminophen level 04/02/2018 132* 10 - 30 ug/mL Final    Special Requests: 04/02/2018 NO SPECIAL REQUESTS    Final    [place] Count 04/02/2018 <1,000 CFU/ML    Final    Culture result: 04/02/2018 NO GROWTH 1 DAY    Final    Sodium 04/03/2018 137  136 - 145 mmol/L Final    Potassium 04/03/2018 3.1* 3.5 - 5.1 mmol/L Final    Chloride 04/03/2018 100  97 - 108 mmol/L Final    CO2 04/03/2018 25  21 - 32 mmol/L Final    Anion gap 04/03/2018 12  5 - 15 mmol/L Final    Glucose 04/03/2018 142* 65 - 100 mg/dL Final    BUN 04/03/2018 16  6 - 20 MG/DL Final    Creatinine 04/03/2018 1.13* 0.55 - 1.02 MG/DL Final    BUN/Creatinine ratio 04/03/2018 14  12 - 20   Final    GFR est AA 04/03/2018 60* >60 ml/min/1.73m2 Final    GFR est non-AA 04/03/2018 49* >60 ml/min/1.73m2 Final    Calcium 04/03/2018 8.1* 8.5 - 10.1 MG/DL Final    Bilirubin, total 04/03/2018 0.3  0.2 - 1.0 MG/DL Final    ALT (SGPT) 04/03/2018 19  12 - 78 U/L Final    AST (SGOT) 04/03/2018 12* 15 - 37 U/L Final    Alk.  phosphatase 04/03/2018 42* 45 - 117 U/L Final    Protein, total 04/03/2018 6.4  6.4 - 8.2 g/dL Final    Albumin 04/03/2018 3.1* 3.5 - 5.0 g/dL Final    Globulin 04/03/2018 3.3  2.0 - 4.0 g/dL Final    A-G Ratio 04/03/2018 0.9* 1.1 - 2.2   Final    WBC 04/03/2018 7.0  3.6 - 11.0 K/uL Final    RBC 04/03/2018 4.39  3.80 - 5.20 M/uL Final    HGB 04/03/2018 13.3  11.5 - 16.0 g/dL Final    HCT 04/03/2018 40.7  35.0 - 47.0 % Final    MCV 04/03/2018 92.7  80.0 - 99.0 FL Final    MCH 04/03/2018 30.3  26.0 - 34.0 PG Final    MCHC 04/03/2018 32.7  30.0 - 36.5 g/dL Final    RDW 04/03/2018 12.8  11.5 - 14.5 % Final    PLATELET 82/60/6455 747  150 - 400 K/uL Final    MPV 04/03/2018 10.5  8.9 - 12.9 FL Final    NRBC 04/03/2018 0.0  0  WBC Final    ABSOLUTE NRBC 04/03/2018 0.00  0.00 - 0.01 K/uL Final    Acetaminophen level 04/03/2018 8* 10 - 30 ug/mL Final    Sodium 04/03/2018 139  136 - 145 mmol/L Final    Potassium 04/03/2018 2.7* 3.5 - 5.1 mmol/L Final    Chloride 04/03/2018 101  97 - 108 mmol/L Final    CO2 04/03/2018 28  21 - 32 mmol/L Final    Anion gap 04/03/2018 10  5 - 15 mmol/L Final    Glucose 04/03/2018 87  65 - 100 mg/dL Final    BUN 04/03/2018 16  6 - 20 MG/DL Final    Creatinine 04/03/2018 1.34* 0.55 - 1.02 MG/DL Final    BUN/Creatinine ratio 04/03/2018 12  12 - 20   Final    GFR est AA 04/03/2018 49* >60 ml/min/1.73m2 Final    GFR est non-AA 04/03/2018 40* >60 ml/min/1.73m2 Final    Calcium 04/03/2018 7.7* 8.5 - 10.1 MG/DL Final    Bilirubin, total 04/03/2018 0.2  0.2 - 1.0 MG/DL Final    ALT (SGPT) 04/03/2018 22  12 - 78 U/L Final    AST (SGOT) 04/03/2018 14* 15 - 37 U/L Final    Alk.  phosphatase 04/03/2018 43* 45 - 117 U/L Final    Protein, total 04/03/2018 6.0* 6.4 - 8.2 g/dL Final    Albumin 04/03/2018 3.0* 3.5 - 5.0 g/dL Final    Globulin 04/03/2018 3.0  2.0 - 4.0 g/dL Final    A-G Ratio 04/03/2018 1.0* 1.1 - 2.2   Final    Sodium 04/04/2018 140  136 - 145 mmol/L Final    Potassium 04/04/2018 3.1* 3.5 - 5.1 mmol/L Final    Chloride 04/04/2018 108  97 - 108 mmol/L Final    CO2 04/04/2018 26  21 - 32 mmol/L Final    Anion gap 04/04/2018 6  5 - 15 mmol/L Final    Glucose 04/04/2018 88  65 - 100 mg/dL Final    BUN 04/04/2018 12  6 - 20 MG/DL Final    Creatinine 04/04/2018 0.67  0.55 - 1.02 MG/DL Final    BUN/Creatinine ratio 04/04/2018 18  12 - 20   Final    GFR est AA 04/04/2018 >60  >60 ml/min/1.73m2 Final    GFR est non-AA 04/04/2018 >60  >60 ml/min/1.73m2 Final    Calcium 04/04/2018 7.5* 8.5 - 10.1 MG/DL Final        RADIOLOGY REPORTS:    Results from Hospital Encounter encounter on 04/02/18   XR CHEST PORT   Narrative Clinical indication: OD. Portable AP supine view of the chest is obtained, comparison February 12. Infusion catheter is in place, inspiration is shallow. Surgical clips left  axilla. No acute infiltrate. Impression impression: Shallow inspiration, no acute intrathoracic findings. Xr Shoulder Lt Ap/lat Min 2 V    Result Date: 3/9/2018  EXAM:  XR SHOULDER LT AP/LAT MIN 2 V INDICATION:   L shoulder pain. COMPARISON: None. FINDINGS: Three views of the left shoulder demonstrate no fracture, dislocation or other acute abnormality. There is no rotator cuff calcification. No significant arthritis is apparent. IMPRESSION:  No acute abnormality. Xr Chest Port    Result Date: 4/2/2018  Clinical indication: OD. Portable AP supine view of the chest is obtained, comparison February 12. Infusion catheter is in place, inspiration is shallow. Surgical clips left axilla. No acute infiltrate. impression: Shallow inspiration, no acute intrathoracic findings. Xr Chest Port    Result Date: 2/12/2018  EXAM:  XR CHEST PORT INDICATION:  Chest pain, rib tenderness, fever, nausea, and vomiting. Symptoms present since 10:15 PM last night. History of breast cancer metastatic to bone including ribs and sternum COMPARISON:  2/3/2016 FINDINGS: A portable AP radiograph of the chest was obtained at 0 900 hours. The patient is on a cardiac monitor. The Port-A-Cath tip terminates at the caval atrial junction. Left axillary clips are present. There is no infiltrate.  At least one 4 mm nodule is noted at the right lung base, stable as compared to 2016 and dense. The cardiac and mediastinal contours and pulmonary vascularity are normal.  The bones and soft tissues are grossly within normal limits. IMPRESSION: No acute cardiopulmonary process seen.  Possible right lower lobe granuloma              MEDICATIONS       ALL MEDICATIONS  Current Facility-Administered Medications   Medication Dose Route Frequency    fentaNYL (DURAGESIC) 25 mcg/hr patch 1 Patch  1 Patch TransDERmal Q72H    morphine IR (MS IR) tablet 7.5 mg  7.5 mg Oral Q4H PRN    potassium chloride SR (KLOR-CON 10) tablet 40 mEq  40 mEq Oral ONCE    aspirin delayed-release tablet 81 mg  81 mg Oral DAILY    carvedilol (COREG) tablet 12.5 mg  12.5 mg Oral BID WITH MEALS    mirtazapine (REMERON) tablet 15 mg  15 mg Oral QHS    venlafaxine (EFFEXOR) tablet 150 mg  150 mg Oral BID    sodium chloride (NS) flush 5-10 mL  5-10 mL IntraVENous Q8H    sodium chloride (NS) flush 5-10 mL  5-10 mL IntraVENous PRN    heparin (porcine) injection 5,000 Units  5,000 Units SubCUTAneous Q8H      SCHEDULED MEDICATIONS  Current Facility-Administered Medications   Medication Dose Route Frequency    fentaNYL (DURAGESIC) 25 mcg/hr patch 1 Patch  1 Patch TransDERmal Q72H    potassium chloride SR (KLOR-CON 10) tablet 40 mEq  40 mEq Oral ONCE    aspirin delayed-release tablet 81 mg  81 mg Oral DAILY    carvedilol (COREG) tablet 12.5 mg  12.5 mg Oral BID WITH MEALS    mirtazapine (REMERON) tablet 15 mg  15 mg Oral QHS    venlafaxine (EFFEXOR) tablet 150 mg  150 mg Oral BID    sodium chloride (NS) flush 5-10 mL  5-10 mL IntraVENous Q8H    heparin (porcine) injection 5,000 Units  5,000 Units SubCUTAneous Q8H                ASSESSMENT & PLAN        The patient Larry Chavez is a 63 y.o.  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 I will continue to follow up with patient as deemed appropriate. I have reviewed admission (and previous/old) labs and medical tests in the EHR and or transferring hospital documents. I will continue to order blood tests/labs and diagnostic tests as deemed appropriate and review results as they become available (see orders for details). I have reviewed old psychiatric and medical records available in the EHR. I Will order additional psychiatric records from other institutions to further elucidate the nature of patient's psychopathology and review once available. I will gather additional collateral information from friends, family and o/p treatment team to further elucidate the nature of patient's psychopathology and baselline level of psychiatric functioning.                      SIGNED:    Amy Brown MD  4/4/2018

## 2018-04-04 NOTE — ED NOTES
Patient resting in bed, requested and provided with warm blanket. Patient back to sleep. VS stable with /64. Fluids continue to infuse.

## 2018-04-04 NOTE — ED NOTES
Pt resting in bed. Denies discomfort. Pt eating apple. Sitter bedside 1:1. Call bell within [facility].

## 2018-04-04 NOTE — CONSULTS
Palliative Medicine Consult  Antonio: [phone] ([phone])    Patient Name: Russell Medley  YOB: 1955    Date of Initial Consult: 4/4/18  Reason for Consult: end stage disease  Requesting Provider: Zoë Andrade   Primary Care Physician: Magdalena Linn MD     SUMMARY:   Russell Medley is a 63 y.o. with a past history of breast cancer, HTN, depression, anxiety, and hyperlipidemia, who was admitted on 4/2/2018 from home with a diagnosis of suicide attempt. Current medical issues leading to Palliative Medicine involvement include:     : 3/31/18 Xanax 1mg tabs #120/30 day supply and 3/2/18 Norco 5/325mg #60 tab/30 day supply. No aberrant behaviors noted. Psychosocial: lives alone, never , no children. Only living relative is 79y/o father who is retired missionary. 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  (progress note dated 2/19/18 shows pt resides at [address] [place] [address] - [address])   [address] [place] [address] Dr:   1. Pain due to neoplasm  2. Lethargy  3. Distress  4. Disorientation   5. Depression   6. Severe fatigue     PLAN:   1. DEPRESSION:  Met with pt, obtained history. Pt reports first diagnosed with breast cancer in 1990, then again in her sternum and ribs in 1997. She received chemo and XRT both times, then had to stop chemo 2/2 heart issues related to the chemo. She goes to [facility] every 3 weeks for shots. She has been suffering from severe fatigue, which she is not sure if it is related to the shots or the cancer, however, she is so tired of living with this severe fatigue that she just wanted to make it stop. She has not told her oncologist about this, thus the fatigue has not been addressed. We talked about options to deal with fatigue; trial of Ritalin, or, stop the treatments if they are indeed causing the fatigue, and just focus on comfort with help from Hospice.   I recommended that she talk to her oncologist about this, since he is the only one who would know if the fatigue is disease or treatment related. She verbalized remorse over the suicide attempt, primarily because it would hurt her father, who has already been devastated by the murder of her brother. 1. Psychiatry evaluation pending. 2. Pt states she is not interested in group counseling with other breast cancer patients. 2. PAIN: pain in sternum and ribs due to neoplasm. Fentanyl Patch 25mcg/hr tried, however, she kept sweating the patches off, so she was prescribed Norco.  She does not like how the Lorenzo Joshua makes her feel, so she has only taken about 3 tabs prior to her taking the whole bottle. So, she just tries to deal with the pain without medication. Motrin used to work for her, but over time has become less effective. Pt's PCP  prescribes pt's pain medication, pt has given me permission to speak to him about a plan moving forward; she has neoplastic pain that needs to be treated. 1. Spoke with , who reports Fentanyl patch worked well for pain control, but oversedating. Agreed to try a smaller dose patch. 2. Fentanyl 25mcg patch. 3. Morphine 7.5mg q. 4 hours prn breakthrough pain. 4. Patient will need to be seen by  this week if discharged. 3. Extensive chart review,  reviewed. 4. Initial consult note routed to primary continuity provider  5.  Communicated plan of care with: Palliative IDT, RN,      GOALS OF CARE / TREATMENT PREFERENCES:     GOALS OF CARE:  Patient/Health Care Proxy Stated Goals: Prolong life      TREATMENT PREFERENCES:   Code Status: Full Code    Advance Care Planning:  Advance Care Planning 4/3/2018   Patient's Healthcare Decision Maker is: Patient declined (Legal Next of Kin remains as decision maker)   Primary Decision Maker Name -   Primary Decision Maker Phone Number -   Primary Decision Maker Relationship to Patient -   Secondary Decision [address] Name -   Secondary Decision [address] Phone Number -   Secondary Decision Maker Relationship to Patient - Confirm Advance Directive None   Patient Would Like to Complete Advance Directive -       Medical Interventions: Full interventions   Other Instructions: Other:    As far as possible, the palliative care team has discussed with patient / health care proxy about goals of care / treatment preferences for patient. HISTORY:     History obtained from: chart, patient    CHIEF COMPLAINT: lethargy    HPI/SUBJECTIVE:    The patient is:   [x] Verbal and participatory  [] Non-participatory due to:     BIBA; pt was staying at a friend's house, friend found pt sitting on her bed, lethargic, appeared to have taken too many of her pain and anxiety medications. Pt was diagnosed with breast cancer with mets, seen by Dr. Skyler Pickett. EMS found pt lethargic; pt admitted that she took \"all of them\" when asked if she took the medications. Pt had written a suicide note and the police are involved and have the note. patient took approx 57 tabs of Norco 5/325mg and 1/4 bottle of Alprazolam 1mg.     Clinical Pain Assessment (nonverbal scale for severity on nonverbal patients):   Clinical Pain Assessment  Severity: 8  Location: sternum and ribs  Character: achy  Duration: years  Frequency: constant          Duration: for how long has pt been experiencing pain (e.g., 2 days, 1 month, years)  Frequency: how often pain is an issue (e.g., several times per day, once every few days, constant)     FUNCTIONAL ASSESSMENT:     Palliative Performance Scale (PPS):  PPS: 70       PSYCHOSOCIAL/SPIRITUAL SCREENING:     Palliative IDT has assessed this patient for cultural preferences / practices and a referral made as appropriate to needs (Cultural Services, Patient Advocacy, Ethics, etc.)    Advance Care Planning:  Advance Care Planning 4/3/2018   Patient's Healthcare Decision Maker is: Patient declined (Legal Next of Kin remains as decision maker)   Primary Decision Maker Name -   Primary Decision Maker Phone Number -   Primary Decision Maker Relationship to Patient -   Secondary Decision Maker Name -   Secondary Decision Maker Phone Number -   Secondary Decision Maker Relationship to Patient -   Confirm Advance Directive None   Patient Would Like to Complete Advance Directive -       Any spiritual / Faith concerns:  [] Yes /  [x] No    Caregiver Burnout:  [] Yes /  [x] No /  [] No Caregiver Present      Anticipatory grief assessment:   [x] Normal  / [] Maladaptive       ESAS Anxiety: Anxiety: 0    ESAS Depression: Depression: 5        REVIEW OF SYSTEMS:     Positive and pertinent negative findings in ROS are noted above in HPI. The following systems were [x] reviewed / [] unable to be reviewed as noted in HPI  Other findings are noted below. Systems: constitutional, ears/nose/mouth/throat, respiratory, gastrointestinal, genitourinary, musculoskeletal, integumentary, neurologic, psychiatric, endocrine. Positive findings noted below. Modified ESAS Completed by: provider   Fatigue: 8 Drowsiness: 0   Depression: 5 Pain: 8   Anxiety: 0 Nausea: 0   Anorexia: 0 Dyspnea: 0     Constipation: No              PHYSICAL EXAM:     From RN flowsheet:  Wt Readings from Last 3 Encounters:   04/02/18 198 lb 13.7 oz (90.2 kg)   03/09/18 195 lb (88.5 kg)   03/02/18 196 lb (88.9 kg)     Blood pressure 125/77, pulse 81, temperature 98 °F (36.7 °C), resp. rate 25, height 5' 5\" (1.651 m), weight 198 lb 13.7 oz (90.2 kg), SpO2 100 %.     Pain Scale 1: Numeric (0 - 10)  Pain Intensity 1: 0                 Last bowel movement, if known:     Constitutional: WD, WN, NAD, AAOx3, fatigued and soft spoken  Eyes: pupils equal, anicteric  ENMT: no nasal discharge, moist mucous membranes  Cardiovascular: regular rhythm, distal pulses intact  Respiratory: breathing not labored, symmetric  Gastrointestinal: soft non-tender, +bowel sounds  Musculoskeletal: no deformity, no tenderness to palpation  Skin: warm, dry  Neurologic: following commands, moving all extremities  Psychiatric: full affect       HISTORY:     Active Problems:    Breast cancer ([facility] (1/13/2013)      CHF (congestive heart failure) ([facility] (4/2/2018)      Overdose (4/2/2018)      Depression (4/2/2018)      Dementia (4/2/2018)      Past Medical History:   Diagnosis Date    Anxiety     Cancer ([facility]     breast    Depression     GERD (gastroesophageal reflux disease)     HTN (hypertension)     Hypercholesterolemia     Hypotension 9/12/2012    Metastatic breast cancer ([facility] 5/3/2017    Secondary cancer of bone ([facility] 5/3/2017      Past Surgical History:   Procedure Laterality Date    BREAST SURGERY PROCEDURE UNLISTED  march 13    carina masectomy      Family History   Problem Relation Age of Onset    Hypertension Mother     Heart Disease Mother     Hypertension Father       History reviewed, no pertinent family history. Social History   Substance Use Topics    Smoking status: Former Smoker     Packs/day: 0.50     Years: 20.00     Quit date: 1/18/2012    Smokeless tobacco: Never Used    Alcohol use No     Allergies   Allergen Reactions    Sulfa (Sulfonamide Antibiotics) Unknown (comments)    Wellbutrin [Bupropion Hcl] Unknown (comments)      Current Facility-Administered Medications   Medication Dose Route Frequency    0.9% sodium chloride infusion  100 mL/hr IntraVENous CONTINUOUS    aspirin delayed-release tablet 81 mg  81 mg Oral DAILY    carvedilol (COREG) tablet 12.5 mg  12.5 mg Oral BID WITH MEALS    mirtazapine (REMERON) tablet 15 mg  15 mg Oral QHS    venlafaxine (EFFEXOR) tablet 150 mg  150 mg Oral BID    sodium chloride (NS) flush 5-10 mL  5-10 mL IntraVENous Q8H    sodium chloride (NS) flush 5-10 mL  5-10 mL IntraVENous PRN    heparin (porcine) injection 5,000 Units  5,000 Units SubCUTAneous Q8H     Current Outpatient Prescriptions   Medication Sig    HYDROcodone-acetaminophen (NORCO) 5-325 mg per tablet Take 1 Tab by mouth two (2) times daily as needed for Pain.  Max Daily Amount: 2 Tabs.  aspirin delayed-release 81 mg tablet Take 1 Tab by mouth daily.  docusate sodium (COLACE) 100 mg capsule Take 1 Cap by mouth nightly for 90 days.  hydroCHLOROthiazide (HYDRODIURIL) 25 mg tablet Take 1 Tab by mouth daily.  multivitamin (ONE A DAY) tablet Take 1 Tab by mouth daily.  senna-docusate (SENEXON-S) 8.6-50 mg per tablet Take 2 Tabs by mouth two (2) times a day.  melatonin tab tablet Take 5 mg by mouth nightly.  calcium carbonate (SABINO-GEST ANTACID) 200 mg calcium (500 mg) chew Take 1 Tab by mouth as needed. Indications: Heartburn    carvedilol (COREG) 12.5 mg tablet Take  by mouth two (2) times daily (with meals).  losartan (COZAAR) 50 mg tablet Take 50 mg by mouth daily.  venlafaxine (EFFEXOR) 75 mg tablet Take 150 mg by mouth two (2) times a day.  alprazolam (XANAX) 1 mg tablet Take 1 mg by mouth four (4) times daily.  naloxone 2 mg/actuation spry Use 1 spray intranasally into 1 nostril. Use a new Narcan nasal spray for subsequent doses and administer into alternating nostrils. May repeat every 2 to 3 minutes as needed.  amoxicillin-clavulanate (AUGMENTIN) 875-125 mg per tablet Take 1 Tab by mouth every twelve (12) hours.  mirtazapine (REMERON) 15 mg tablet Take 15 mg by mouth nightly. LAB AND IMAGING FINDINGS:     Lab Results   Component Value Date/Time    WBC 7.0 04/03/2018 04:05 AM    HGB 13.3 04/03/2018 04:05 AM    PLATELET 515 14/64/4861 04:05 AM     Lab Results   Component Value Date/Time    Sodium 140 04/04/2018 04:40 AM    Potassium 3.1 (L) 04/04/2018 04:40 AM    Chloride 108 04/04/2018 04:40 AM    CO2 26 04/04/2018 04:40 AM    BUN 12 04/04/2018 04:40 AM    Creatinine 0.67 04/04/2018 04:40 AM    Calcium 7.5 (L) 04/04/2018 04:40 AM    Magnesium 1.8 04/02/2018 05:22 PM      Lab Results   Component Value Date/Time    AST (SGOT) 14 (L) 04/03/2018 04:00 PM    Alk.  phosphatase 43 (L) 04/03/2018 04:00 PM    Protein, total 6.0 (L) 04/03/2018 04:00 PM    Albumin 3.0 (L) 04/03/2018 04:00 PM    Globulin 3.0 04/03/2018 04:00 PM     No results found for: INR, PTMR, PTP, PT1, PT2, APTT   No results found for: IRON, FE, TIBC, IBCT, PSAT, FERR   No results found for: PH, PCO2, PO2  No components found for: [place]   Lab Results   Component Value Date/Time    CK 44 08/31/2012 04:00 AM    CK - MB 1.0 08/31/2012 04:00 AM                Total time:   Counseling / coordination time, spent as noted above:   > 50% counseling / coordination?: y    Prolonged service was provided for  []30 min   []75 min in face to face time in the presence of the patient, spent as noted above. Time Start:   Time End:   Note: this can only be billed with 38644 (initial) or 19624 (follow up). If multiple start / stop times, list each separately.

## 2018-04-04 NOTE — ED NOTES
Kerri phoned from [facility]. Last set of vital signs reported.  Kerri will call to check on pt around 630am.

## 2018-04-04 NOTE — ED NOTES
Bedside and Verbal shift change report given to Bakari Cr RN (oncoming nurse) by Lynsey Hernandez RN (offgoing nurse). Report included the following information SBAR, Kardex, ED Summary, Intake/Output, MAR, Recent Results and Med Rec Status.

## 2018-04-04 NOTE — PROGRESS NOTES
Nutrition Assessment:    RECOMMENDATIONS:   Continue Cardiac diet     DIETITIANS INTERVENTIONS/PLAN:   Continue diet as tolerated  Monitor appetite/PO intake    ASSESSMENT:   Pt admitted with OD. PMH: HTN, breast CA + mets, CHF, GERD. Chart reviewed. MST triggered for unsure of wt loss. Pt's wt has been stable per EMR. Pt's appetite is very good, she consumed 100% of her lunch. No skin breakdown noted. Pt is on an appetite stimulant. SUBJECTIVE/OBJECTIVE:     Diet Order: Cardiac  % Eaten:  Patient Vitals for the past 72 hrs:   % Diet Eaten   04/04/18 0715 100 %     Pertinent Medications:KCl, remeron. Chemistries:  Lab Results   Component Value Date/Time    Sodium 140 04/04/2018 04:40 AM    Potassium 3.1 (L) 04/04/2018 04:40 AM    Chloride 108 04/04/2018 04:40 AM    CO2 26 04/04/2018 04:40 AM    Anion gap 6 04/04/2018 04:40 AM    Glucose 88 04/04/2018 04:40 AM    BUN 12 04/04/2018 04:40 AM    Creatinine 0.67 04/04/2018 04:40 AM    BUN/Creatinine ratio 18 04/04/2018 04:40 AM    GFR est AA >60 04/04/2018 04:40 AM    GFR est non-AA >60 04/04/2018 04:40 AM    Calcium 7.5 (L) 04/04/2018 04:40 AM    AST (SGOT) 14 (L) 04/03/2018 04:00 PM    Alk. phosphatase 43 (L) 04/03/2018 04:00 PM    Protein, total 6.0 (L) 04/03/2018 04:00 PM    Albumin 3.0 (L) 04/03/2018 04:00 PM    Globulin 3.0 04/03/2018 04:00 PM    A-G Ratio 1.0 (L) 04/03/2018 04:00 PM    ALT (SGPT) 22 04/03/2018 04:00 PM      Anthropometrics: Height: 5' 5\" (165.1 cm) Weight: 88.2 kg (194 lb 6.4 oz)  []bed scale    []stated   []unknown    IBW (%IBW):   ( ) UBW (%UBW):   (  %)    BMI: Body mass index is 32.35 kg/(m^2). This BMI is indicative of:  []Underweight   []Normal   []Overweight   [x] Obesity   [] Extreme Obesity (BMI>40)  Estimated Nutrition Needs (Based on): 1744 Kcals/day (MSJ 1453 x 1.2) , 71 g (0.8gPro/kg) Protein  Carbohydrate:  At Least 130 g/day  Fluids: 1800 mL/day    Last BM: 4/2   []Active     []Hyperactive  []Hypoactive       [] Absent   BS  Skin:    [x] Intact   [] Incision  [] Breakdown   [] DTI   [] Tears/Excoriation/Abrasion  []Edema [] Other: Wt Readings from Last 30 Encounters:   04/04/18 88.2 kg (194 lb 6.4 oz)   03/09/18 88.5 kg (195 lb)   03/02/18 88.9 kg (196 lb)   02/12/18 85.3 kg (188 lb)   02/02/18 93 kg (205 lb)   05/03/17 87.1 kg (192 lb)   02/03/16 71.2 kg (157 lb)   09/09/14 98 kg (216 lb)   02/21/13 78.9 kg (174 lb)   09/12/12 78 kg (172 lb)   08/31/12 79.6 kg (175 lb 7.8 oz)   02/15/12 81.6 kg (180 lb)   06/14/11 76.2 kg (168 lb)      NUTRITION DIAGNOSES:   Problem:  No nutritional diagnosis at this time        NUTRITION INTERVENTIONS:  Meals/Snacks: General/healthful diet                  GOAL:   Pt will consume >70% of meals in 4-6 days.      Cultural, Mu-ism, or Ethnic Dietary Needs: None     LEARNING NEEDS (Diet, Food/Nutrient-Drug Interaction):    [x] None Identified   [] Identified and Education Provided/Documented   [] Identified and Pt declined/was not appropriate      [x] Interdisciplinary Care Plan Reviewed/Documented    [x] Participated in Discharge Planning: Cardiac diet    [] Interdisciplinary Rounds     NUTRITION RISK:    [] High              [] Moderate           [x]  Low  [x]  Minimal/Uncompromised    PT SEEN FOR:    []  MD Consult: []Calorie Count      []Diabetic Diet Education        []Diet Education     []Electrolyte Management     []General Nutrition Management and Supplements     []Management of Tube Feeding     []TPN Recommendations    [x]  RN Referral:  [x]MST score >=2     []Enteral/Parenteral Nutrition PTA     []Pregnant: Gestational DM or Multigestation   []  Low BMI  []  Re-Screen   []  LOS   []  NPO/clears x 5 days   []  New TF/TPN    Zofia Nugent RD, [address]    Pager [phone]  Weekend Pager [phone]

## 2018-04-04 NOTE — PROGRESS NOTES
CM spoke to pt about inpatient psychiatry and pt is agreeable. Patient would like to go to [facility].       Zavala Clamp  Ext [phone]

## 2018-04-04 NOTE — ED NOTES
Assumed care of pt. Discussed case with MD regarding psych consult. Consult orders placed. Paged palliative care.  Paged on psych, who will round on pt about 1400

## 2018-04-05 ENCOUNTER — HOSPITAL ENCOUNTER (INPATIENT)
Age: 63
LOS: 5 days | Discharge: HOME | DRG: 881 | End: 2018-04-10
Attending: PSYCHIATRY & NEUROLOGY | Admitting: PSYCHIATRY & NEUROLOGY
Payer: MEDICARE

## 2018-04-05 VITALS
TEMPERATURE: 98.1 F | HEART RATE: 93 BPM | BODY MASS INDEX: 32.39 KG/M2 | WEIGHT: 194.4 LBS | SYSTOLIC BLOOD PRESSURE: 115 MMHG | OXYGEN SATURATION: 97 % | RESPIRATION RATE: 18 BRPM | DIASTOLIC BLOOD PRESSURE: 56 MMHG | HEIGHT: 65 IN

## 2018-04-05 PROBLEM — K59.03 DRUG-INDUCED CONSTIPATION: Status: ACTIVE | Noted: 2018-04-05

## 2018-04-05 PROBLEM — F32.9 MAJOR DEPRESSION: Status: ACTIVE | Noted: 2018-04-05

## 2018-04-05 PROBLEM — Z71.89 ADVANCE CARE PLANNING: Status: ACTIVE | Noted: 2018-04-05

## 2018-04-05 PROBLEM — T50.901A OVERDOSE: Status: RESOLVED | Noted: 2018-04-02 | Resolved: 2018-04-05

## 2018-04-05 LAB
ANION GAP SERPL CALC-SCNC: 5 MMOL/L (ref 5–15)
BUN SERPL-MCNC: 9 MG/DL (ref 6–20)
BUN/CREAT SERPL: 18 (ref 12–20)
CALCIUM SERPL-MCNC: 7.5 MG/DL (ref 8.5–10.1)
CHLORIDE SERPL-SCNC: 111 MMOL/L (ref 97–108)
CO2 SERPL-SCNC: 26 MMOL/L (ref 21–32)
CREAT SERPL-MCNC: 0.5 MG/DL (ref 0.55–1.02)
GLUCOSE SERPL-MCNC: 88 MG/DL (ref 65–100)
MAGNESIUM SERPL-MCNC: 1.6 MG/DL (ref 1.6–2.4)
POTASSIUM SERPL-SCNC: 3.7 MMOL/L (ref 3.5–5.1)
SODIUM SERPL-SCNC: 142 MMOL/L (ref 136–145)

## 2018-04-05 PROCEDURE — HZ2ZZZZ DETOXIFICATION SERVICES FOR SUBSTANCE ABUSE TREATMENT: ICD-10-PCS | Performed by: PSYCHIATRY & NEUROLOGY

## 2018-04-05 PROCEDURE — GZHZZZZ GROUP PSYCHOTHERAPY: ICD-10-PCS | Performed by: PSYCHIATRY & NEUROLOGY

## 2018-04-05 PROCEDURE — 74011250637 HC RX REV CODE- 250/637: Performed by: INTERNAL MEDICINE

## 2018-04-05 PROCEDURE — 74011250636 HC RX REV CODE- 250/636: Performed by: INTERNAL MEDICINE

## 2018-04-05 PROCEDURE — 65220000003 HC RM SEMIPRIVATE PSYCH

## 2018-04-05 PROCEDURE — 36415 COLL VENOUS BLD VENIPUNCTURE: CPT | Performed by: INTERNAL MEDICINE

## 2018-04-05 PROCEDURE — 80048 BASIC METABOLIC PNL TOTAL CA: CPT | Performed by: INTERNAL MEDICINE

## 2018-04-05 PROCEDURE — 83735 ASSAY OF MAGNESIUM: CPT | Performed by: INTERNAL MEDICINE

## 2018-04-05 RX ORDER — BENZTROPINE MESYLATE 1 MG/ML
2 INJECTION INTRAMUSCULAR; INTRAVENOUS
Status: DISCONTINUED | OUTPATIENT
Start: 2018-04-05 | End: 2018-04-10 | Stop reason: HOSPADM

## 2018-04-05 RX ORDER — ADHESIVE BANDAGE
30 BANDAGE TOPICAL DAILY PRN
Status: DISCONTINUED | OUTPATIENT
Start: 2018-04-05 | End: 2018-04-10 | Stop reason: HOSPADM

## 2018-04-05 RX ORDER — AMOXICILLIN 250 MG
1 CAPSULE ORAL 2 TIMES DAILY
Status: DISCONTINUED | OUTPATIENT
Start: 2018-04-05 | End: 2018-04-05 | Stop reason: HOSPADM

## 2018-04-05 RX ORDER — ZOLPIDEM TARTRATE 10 MG/1
10 TABLET ORAL
Status: DISCONTINUED | OUTPATIENT
Start: 2018-04-05 | End: 2018-04-10 | Stop reason: HOSPADM

## 2018-04-05 RX ORDER — POLYETHYLENE GLYCOL 3350 17 G/17G
17 POWDER, FOR SOLUTION ORAL DAILY
Status: DISCONTINUED | OUTPATIENT
Start: 2018-04-05 | End: 2018-04-05 | Stop reason: HOSPADM

## 2018-04-05 RX ORDER — FENTANYL 25 UG/1
1 PATCH TRANSDERMAL
Qty: 5 PATCH | Refills: 0 | Status: SHIPPED
Start: 2018-04-07 | End: 2018-04-10

## 2018-04-05 RX ORDER — BENZTROPINE MESYLATE 2 MG/1
2 TABLET ORAL
Status: DISCONTINUED | OUTPATIENT
Start: 2018-04-05 | End: 2018-04-10 | Stop reason: HOSPADM

## 2018-04-05 RX ORDER — OLANZAPINE 5 MG/1
5 TABLET ORAL
Status: DISCONTINUED | OUTPATIENT
Start: 2018-04-05 | End: 2018-04-10 | Stop reason: HOSPADM

## 2018-04-05 RX ORDER — IBUPROFEN 200 MG
1 TABLET ORAL
Status: DISCONTINUED | OUTPATIENT
Start: 2018-04-05 | End: 2018-04-10 | Stop reason: HOSPADM

## 2018-04-05 RX ORDER — ACETAMINOPHEN 325 MG/1
650 TABLET ORAL
Status: DISCONTINUED | OUTPATIENT
Start: 2018-04-05 | End: 2018-04-10 | Stop reason: HOSPADM

## 2018-04-05 RX ORDER — IBUPROFEN 400 MG/1
400 TABLET ORAL
Status: DISCONTINUED | OUTPATIENT
Start: 2018-04-05 | End: 2018-04-06

## 2018-04-05 RX ORDER — MORPHINE SULFATE 15 MG/1
7.5 TABLET ORAL
Qty: 10 TAB | Refills: 0 | Status: SHIPPED
Start: 2018-04-05 | End: 2018-04-10

## 2018-04-05 RX ADMIN — VENLAFAXINE HYDROCHLORIDE 150 MG: 37.5 TABLET ORAL at 09:01

## 2018-04-05 RX ADMIN — Medication 10 ML: at 06:00

## 2018-04-05 RX ADMIN — HEPARIN SODIUM 5000 UNITS: 5000 INJECTION, SOLUTION INTRAVENOUS; SUBCUTANEOUS at 05:43

## 2018-04-05 RX ADMIN — ASPIRIN 81 MG: 81 TABLET, COATED ORAL at 09:01

## 2018-04-05 NOTE — IP AVS SNAPSHOT
[address] [facility] 
[phone] Patient: Ebony Reed MRN: [id number] [facility]:[birth year] A check ramo indicates which time of day the medication should be taken. My Medications START taking these medications Instructions Each Dose to Equal  
 Morning Noon Evening Bedtime  
 amitriptyline 50 mg tablet Commonly known as:  ELAVIL Your next dose is:  bedtime Take 1 Tab by mouth nightly. Indications: depression 50 mg FLUoxetine 20 mg capsule Commonly known as:  PROzac Your next dose is:  Tomorrow 9am  
   
 Take 1 Cap by mouth daily. Indications: Generalized Anxiety Disorder 20 mg  
    
  
   
   
   
  
 lidocaine 5 % Commonly known as:  Sagar Obando Notes to Patient:  You refused patch this am.   
   
 Apply patch to the affected area for 12 hours a day and remove for 12 hours a day. Indications: POSTHERPETIC NEURALGIA  
     
   
   
   
  
 meloxicam 15 mg tablet Commonly known as:  MOBIC Your next dose is:  Tomorrow 9am  
   
 Take 1 Tab by mouth daily. Indications: Rheumatoid Arthritis 15 mg CHANGE how you take these medications Instructions Each Dose to Equal  
 Morning Noon Evening Bedtime  
 carvedilol 12.5 mg tablet Commonly known as:  Omar Latham What changed:  how much to take Your next dose is: Today at 5pm  
   
 Take 1 Tab by mouth two (2) times daily (with meals). Indications: hypertension 12.5 mg  
    
  
   
   
  
   
  
  
CONTINUE taking these medications Instructions Each Dose to Equal  
 Morning Noon Evening Bedtime  
 aspirin delayed-release 81 mg tablet Your next dose is:  Tomorrow 9am  
   
 Take 1 Tab by mouth daily. Indications: prevention of transient ischemic attack 81 mg  
    
  
   
   
   
  
 hydroCHLOROthiazide 25 mg tablet Commonly known as:  HYDRODIURIL  
 Your next dose is:  Tomorrow 9am  
   
 Take 1 Tab by mouth daily. Indications: hypertension 25 mg  
    
  
   
   
   
  
 losartan 50 mg tablet Commonly known as:  COZAAR Your next dose is:  Tomorrow 9am  
   
 Take 1 Tab by mouth daily. Indications: hypertension 50 mg  
    
  
   
   
   
  
 multivitamin tablet Commonly known as:  ONE A DAY Your next dose is:  Tomorrow 9am  
   
 Take 1 Tab by mouth daily. 1 Tab STOP taking these medications ALPRAZolam 1 mg tablet Commonly known as:  XANAX  
   
  
 docusate sodium 50 mg capsule Commonly known as:  COLACE  
   
  
 fentaNYL 25 mcg/hr PATCH Commonly known as:  DURAGESIC  
   
  
 morphine IR 15 mg tablet Commonly known as:  MS IR  
   
  
 venlafaxine 75 mg tablet Commonly known as:  [facility] Where to Get Your Medications Information on where to get these meds will be given to you by the nurse or doctor. ! Ask your nurse or doctor about these medications  
  amitriptyline 50 mg tablet  
 aspirin delayed-release 81 mg tablet  
 carvedilol 12.5 mg tablet FLUoxetine 20 mg capsule  
 hydroCHLOROthiazide 25 mg tablet  
 lidocaine 5 %  
 losartan 50 mg tablet  
 meloxicam 15 mg tablet

## 2018-04-05 NOTE — PROGRESS NOTES
TRANSFER - OUT REPORT:    Verbal report given to Jacek Hearn RN (name) on Gladys Yanes  being transferred to [facility] inpatient psych (unit) for routine progression of care       Report consisted of patients Situation, Background, Assessment and   Recommendations(SBAR). Information from the following report(s) SBAR was reviewed with the receiving nurse. Lines:   Venous Access Device Power port 04/02/18 Upper chest (subclavicular area, right (Active)   Central Line Being Utilized No 4/5/2018  8:55 AM   Criteria for Appropriate Use Limited/no vessel suitable for conventional peripheral access 4/5/2018  8:55 AM   Site Assessment Clean, dry, & intact 4/5/2018  8:55 AM   Date of Last Dressing Change 04/02/18 4/5/2018  8:55 AM   Dressing Status Clean, dry, & intact 4/5/2018  8:55 AM   Dressing Type Disk with Chlorhexadine gluconate (CHG) 4/5/2018  8:55 AM       Peripheral IV 04/02/18 Right Antecubital (Active)   Site Assessment Clean, dry, & intact 4/5/2018  8:55 AM   Phlebitis Assessment 0 4/5/2018  8:55 AM   Infiltration Assessment 0 4/5/2018  8:55 AM   Dressing Status Clean, dry, & intact 4/5/2018  8:55 AM   Dressing Type Transparent;Tape 4/5/2018  8:55 AM   Hub Color/Line Status Pink;Capped 4/5/2018  8:55 AM   Action Taken Open ports on tubing capped 4/5/2018 12:00 AM   Alcohol Cap Used Yes 4/5/2018 12:00 AM        Opportunity for questions and clarification was provided.       Patient transported with:  Patient belongings

## 2018-04-05 NOTE — PROGRESS NOTES
Spiritual Care Assessment/Progress Note  [facility]      NAME: Rory Chappell      MRN: [id number]  AGE: 63 y.o.  SEX: female  Oriental orthodox Affiliation: Baptism   Language: English     4/5/2018     Total Time (in minutes): 29     Spiritual Assessment begun in [facility] GENERAL SURGERY through conversation with:         [x]Patient        [] Family    [] Friend(s)        Reason for Consult: Palliative Care, Initial/Spiritual Assessment     Spiritual beliefs: (Please include comment if needed)     [x] Involved in a kimberley tradition/spiritual practice:     [] Supported by a kimberley community:      [] Claims no spiritual orientation:      [] Seeking spiritual identity:           [] Adheres to an individual form of spirituality:      [] Not able to assess:                     Identified resources for coping:      [x] Prayer                  [] Devotional reading               [] Music                  [] Guided Imagery     [x] Family/friends                 [] Pet visits     [] Other:        Interventions offered during this visit: (See comments for more details)    Patient Interventions: Affirmation of emotions/emotional suffering, Coping skills reviewed/reinforced, Prayer (actual), Iconic (affirming the presence of God/Higher Power), Initial/Spiritual assessment, patient floor, Reframing, Issues around forgiveness/closure, Oriental orthodox beliefs/image of God discussed           Plan of Care:     [] Discuss Spiritual/Cultural needs    [] Support AMD and/or advance care planning process      [] Support grieving process   [] Coordinate Rites/Rituals    [] Coordination with community clergy   [] No spiritual needs identified at this time   [x] Detailed Plan of Care below (See Comments)  [] Make referral to Music Therapy  [] Make referral to Pet Therapy     [] Make referral to Addiction services  [] Make referral to [facility]  [] Make referral to Spiritual Care Partner  [] No future visits requested Initial Spiritual Assessment in 2178 with Palliative pt. Pt appeared to be resting on side, but awoke when approached by . Led pt in processing while providing active listening. Pt identified as a former RN and went on to say that she was dealing with guilt and shame over decision she had made that led to hospitalization. Pt identified as a believer in God adding that her father had served as a missionary. Pt felt that she had been selfish towards family. Drawing from pt's kimberley background attempted to redirect pt's focus and feelings on guilt and shame towards recovery and renewal. Challenged pt to take experience as something she can learn and grow from and trust that God still accepts and forgives her. Used biblical stories to illustrate point. Pt became tearful and expressed appreciation. Closed visit with prayer. Pt to be transferred to [facility] which she is familiar with and happy to be going to. Advised of chaplains available at [facility] if desired as well. MICHAEL Bradford. Bea

## 2018-04-05 NOTE — PROGRESS NOTES
Palliative Medicine Consult  [place]: [phone]    Patient Name: Ebony Reed  YOB: 1955    Date of Initial Consult: 4/4/18  Reason for Consult: end stage disease  Requesting Provider: Replaced by [facility]   Primary Care Physician: Hamida Contreras MD     SUMMARY:   Ebony Reed is a 63 y.o. with a past history of breast cancer, HTN, depression, anxiety, and hyperlipidemia, who was admitted on 4/2/2018 from home with a diagnosis of suicide attempt. Current medical issues leading to Palliative Medicine involvement include:     : 3/31/18 Xanax 1mg tabs #120/30 day supply and 3/2/18 Norco 5/325mg #60 tab/30 day supply. No aberrant behaviors noted. Psychosocial: lives alone, never , no children. Only living relative is 79y/o father who is retired missionary. 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  (progress note dated 2/19/18 shows pt resides at [address] [place] [address] - [address])   [address] [place] [address] Dr:   1. Pain due to neoplasm  2. Lethargy  3. Distress  4. Disorientation   5. Depression   6. Severe fatigue  7. Care decisions  8. Opioid induced constipation  9. Memory impairment     PLAN:   1. DEPRESSION:    1. Psychiatry evaluation complete and plan to admit to inpatient psychiatry unit at [address] [place] [address]. 2. Pt requests that I contact her psychiatrist  at [facility] to update him on events resulting in this hospitalization. Message left on his office vm.  3. Pt states she is not interested in group counseling with other breast cancer patients. 2. PAIN: pt rates pain level today 6/10, down from 8/10 yesterday. 1. Discussed with pt decision to retry Fentanyl patch; that previously the fentanyl patch made her sleepy, however, I have lowered the dose from 50mcg to 25mcg. 2. Counseled pt that she can ask for Morphine 7.5mg q. 4 hours prn breakthrough pain. No PRNs administered overnight. 3. Patient will need to be seen by  1-2 days after discharge.   3. OPIOID INDUCED CONSTIPATION:  1. Miralax 17 gms daily. 2. pericolace 1 tab bid. 4. CARE DECISIONS:  Pt states she has no family or friends that she can name as mPOA. 1. Completed a POST. See ACP note. 5. MEMORY IMPAIRMENT: Pt shared that she has memory issues; remembered speaking with psychiatrist yesterday but did not remember plan to transfer to [facility]  psychiatric unit until I reminded her of the plan. She is worried that she might have Alzheimer's Dz, as her mother did. 6. ANXIETY:  1. Consider restarting Xanax, as pt was taking Xanax 1mg qid, or at least a lower dose to prevent w/d.  7. Extensive chart review,  reviewed. 8. Initial consult note routed to primary continuity provider  9. Communicated plan of care with: Palliative IDT, RN,      GOALS OF CARE / TREATMENT PREFERENCES:     GOALS OF CARE:  Patient/Health Care Proxy Stated Goals: Prolong life      TREATMENT PREFERENCES:   Code Status: DNR    Advance Care Planning:  Advance Care Planning 4/5/2018   Patient's Healthcare Decision Maker is: -   Primary Decision Maker Name -   Primary Decision Maker Phone Number -   Primary Decision Maker Relationship to Patient -   Secondary Decision Maker Name -   Secondary Decision [facility] Phone Number -   Secondary Decision Maker Relationship to Patient -   Confirm Advance Directive None   Patient Would Like to Complete Advance Directive No   Does the patient have other document types MOST/MOLST/POST/POLST       Medical Interventions: Limited additional interventions   Other Instructions:    Artificially Administered Nutrition: No feeding tube     Other:    As far as possible, the palliative care team has discussed with patient / health care proxy about goals of care / treatment preferences for patient.            HISTORY:     History obtained from: chart, patient    CHIEF COMPLAINT: lethargy    HPI/SUBJECTIVE:    The patient is:   [x] Verbal and participatory  [] Non-participatory due to:     BIBA; pt was staying at a friend's house, friend found pt sitting on her bed, lethargic, appeared to have taken too many of her pain and anxiety medications. Pt was diagnosed with breast cancer with mets, seen by Dr. Shanna Osman. EMS found pt lethargic; pt admitted that she took \"all of them\" when asked if she took the medications. Pt had written a suicide note and the police are involved and have the note. patient took approx 57 tabs of Norco 5/325mg and 1/4 bottle of Alprazolam 1mg.     Clinical Pain Assessment (nonverbal scale for severity on nonverbal patients):   Clinical Pain Assessment  Severity: 6  Location: sternum and ribs  Character: achy  Duration: years  Frequency: constant          Duration: for how long has pt been experiencing pain (e.g., 2 days, 1 month, years)  Frequency: how often pain is an issue (e.g., several times per day, once every few days, constant)     FUNCTIONAL ASSESSMENT:     Palliative Performance Scale (PPS):  PPS: 70       PSYCHOSOCIAL/SPIRITUAL SCREENING:     Palliative IDT has assessed this patient for cultural preferences / practices and a referral made as appropriate to needs (Cultural Services, Patient Advocacy, Ethics, etc.)    Advance Care Planning:  Advance Care Planning 4/5/2018   Patient's Healthcare Decision Maker is: -   Primary Decision Maker Name -   Primary Decision Maker Phone Number -   Primary Decision Maker Relationship to Patient -   Secondary Decision Maker Name -   Secondary Decision [address] Phone Number -   Secondary Decision Maker Relationship to Patient -   Confirm Advance Directive None   Patient Would Like to Complete Advance Directive No   Does the patient have other document types MOST/MOLST/POST/POLST       Any spiritual / Sikhism concerns:  [] Yes /  [x] No    Caregiver Burnout:  [] Yes /  [x] No /  [] No Caregiver Present      Anticipatory grief assessment:   [x] Normal  / [] Maladaptive       ESAS Anxiety: Anxiety: 3    ESAS Depression: Depression: 5        REVIEW OF SYSTEMS:     Positive and pertinent negative findings in ROS are noted above in HPI. The following systems were [x] reviewed / [] unable to be reviewed as noted in HPI  Other findings are noted below. Systems: constitutional, ears/nose/mouth/throat, respiratory, gastrointestinal, genitourinary, musculoskeletal, integumentary, neurologic, psychiatric, endocrine. Positive findings noted below. Modified ESAS Completed by: provider   Fatigue: 4 Drowsiness: 0   Depression: 5 Pain: 6   Anxiety: 3 Nausea: 0   Anorexia: 0 Dyspnea: 0     Constipation: Yes              PHYSICAL EXAM:     From RN flowsheet:  Wt Readings from Last 3 Encounters:   04/04/18 194 lb 6.4 oz (88.2 kg)   03/09/18 195 lb (88.5 kg)   03/02/18 196 lb (88.9 kg)     Blood pressure 105/71, pulse 93, temperature 98.2 °F (36.8 °C), resp. rate 18, height 5' 5\" (1.651 m), weight 194 lb 6.4 oz (88.2 kg), SpO2 100 %.     Pain Scale 1: Numeric (0 - 10)  Pain Intensity 1: 0                 Last bowel movement, if known:     Constitutional: WD, WN, NAD, AAOx3, fatigued and soft spoken  Eyes: pupils equal, anicteric  ENMT: no nasal discharge, moist mucous membranes  Cardiovascular: regular rhythm, distal pulses intact  Respiratory: breathing not labored, symmetric  Gastrointestinal: soft non-tender, +bowel sounds  Musculoskeletal: no deformity, no tenderness to palpation  Skin: warm, dry  Neurologic: following commands, moving all extremities  Psychiatric: full affect       HISTORY:     Active Problems:    Breast cancer ([facility] (1/13/2013)      CHF (congestive heart failure) ([facility] (4/2/2018)      Depression (4/2/2018)      Dementia (4/2/2018)      Pain due to neoplasm (4/4/2018)      Neoplastic malignant related fatigue (4/4/2018)      Past Medical History:   Diagnosis Date    Anxiety     Cancer [facility])     breast    Depression     GERD (gastroesophageal reflux disease)     HTN (hypertension)     Hypercholesterolemia     Hypotension 9/12/2012    Metastatic breast cancer ([facility] 5/3/2017    Secondary cancer of bone ([facility] 5/3/2017      Past Surgical History:   Procedure Laterality Date    BREAST SURGERY PROCEDURE UNLISTED  march 13    carina masectomy      Family History   Problem Relation Age of Onset    Hypertension Mother     Heart Disease Mother     Hypertension Father       History reviewed, no pertinent family history.   Social History   Substance Use Topics    Smoking status: Former Smoker     Packs/day: 0.50     Years: 20.00     Quit date: 1/18/2012    Smokeless tobacco: Never Used    Alcohol use No     Allergies   Allergen Reactions    Sulfa (Sulfonamide Antibiotics) Unknown (comments)    Wellbutrin [Bupropion Hcl] Unknown (comments)      Current Facility-Administered Medications   Medication Dose Route Frequency    polyethylene glycol (MIRALAX) packet 17 g  17 g Oral DAILY    senna-docusate (PERICOLACE) 8.6-50 mg per tablet 1 Tab  1 Tab Oral BID    fentaNYL (DURAGESIC) 25 mcg/hr patch 1 Patch  1 Patch TransDERmal Q72H    morphine IR (MS IR) tablet 7.5 mg  7.5 mg Oral Q4H PRN    aspirin delayed-release tablet 81 mg  81 mg Oral DAILY    carvedilol (COREG) tablet 12.5 mg  12.5 mg Oral BID WITH MEALS    mirtazapine (REMERON) tablet 15 mg  15 mg Oral QHS    venlafaxine (EFFEXOR) tablet 150 mg  150 mg Oral BID    sodium chloride (NS) flush 5-10 mL  5-10 mL IntraVENous Q8H    sodium chloride (NS) flush 5-10 mL  5-10 mL IntraVENous PRN    heparin (porcine) injection 5,000 Units  5,000 Units SubCUTAneous Q8H          LAB AND IMAGING FINDINGS:     Lab Results   Component Value Date/Time    WBC 7.0 04/03/2018 04:05 AM    HGB 13.3 04/03/2018 04:05 AM    PLATELET 203 82/40/3389 04:05 AM     Lab Results   Component Value Date/Time    Sodium 142 04/05/2018 04:40 AM    Potassium 3.7 04/05/2018 04:40 AM    Chloride 111 (H) 04/05/2018 04:40 AM    CO2 26 04/05/2018 04:40 AM    BUN 9 04/05/2018 04:40 AM    Creatinine 0.50 (L) 04/05/2018 04:40 AM    Calcium 7.5 (L) 04/05/2018 04:40 AM    Magnesium 1.6 04/05/2018 04:40 AM      Lab Results   Component Value Date/Time    AST (SGOT) 14 (L) 04/03/2018 04:00 PM    Alk. phosphatase 43 (L) 04/03/2018 04:00 PM    Protein, total 6.0 (L) 04/03/2018 04:00 PM    Albumin 3.0 (L) 04/03/2018 04:00 PM    Globulin 3.0 04/03/2018 04:00 PM     No results found for: INR, PTMR, PTP, PT1, PT2, APTT   No results found for: IRON, FE, TIBC, IBCT, PSAT, FERR   No results found for: PH, PCO2, PO2  No components found for: [place]   Lab Results   Component Value Date/Time    CK 44 08/31/2012 04:00 AM    CK - MB 1.0 08/31/2012 04:00 AM                Total time: 75 min  Counseling / coordination time, spent as noted above: 65 min  > 50% counseling / coordination?: y    Prolonged service was provided for  []30 min   []75 min in face to face time in the presence of the patient, spent as noted above. Time Start: 9:45am  Time End:  11:00am  Note: this can only be billed with  (initial) or 21 [phone] (follow up). If multiple start / stop times, list each separately.

## 2018-04-05 NOTE — ACP (ADVANCE CARE PLANNING)
Primary Decision Maker Info:   none             Secondary Decision Maker Info:   none             Advance Directive Info:  Confirm Advance Directive: None      POLST FORM:    1. CODE STATUS:  DNR    2. LIMITED MEDICAL INTERVENTIONS    3.  NO FEEDING TUBE

## 2018-04-05 NOTE — PROGRESS NOTES
CM spoke to Dr Tiki Fischer and MD confirmed that pt is ready to discharge to inpatient psych. CM called [facility] and CM is currently waiting for a call back. 11:15am  CM informed RN and MD of [facility] and 12:30pm  time    [facility] has a bed for pt. Patient is being transferred via [facility] at 12:30pm.  CM left a vm for Rina at [phone] informing her of transportation time. All required paperwork has been printed and placed on pt's chart. CM informed RN and  MD of placement and requested d/c order. Patient is a 63 yr old woman admitted for an overdose. Patient lives alone in a 2nd fl apartment with 5 steps and/or an elevator to enter. Patient is independent with ADL/IADL and drives. Patient admits to having [facility] in the past but denies rehab and the use of DME. Patient is being transferred to the Psych. Unit at [facility] at 12;33 via [facility]    PCP Dr Danni Kwon in [address] [place] [address] Road Management Interventions  PCP Verified by CM:  Yes (Dr Lizbeth Trinh)  Mode of Transport at Discharge: BLS  Transition of Care Consult (CM Consult): Discharge Planning  Discharge Durable Medical Equipment: No (no DME use)  Physical Therapy Consult: No  Occupational Therapy Consult: No  Speech Therapy Consult: No  Current Support Network: Lives Alone (lives in a 2nd fl apartment with 5 steps to enter and/or an elevator)  Confirm Follow Up Transport: Self  Plan discussed with Pt/Family/Caregiver: Yes  Discharge Location  Discharge Placement: Psychiatric Unit YASMIN NAGEL [facility])             Cecilia Garrison  Ext [phone]

## 2018-04-05 NOTE — ROUTINE PROCESS
Bedside and Verbal shift change report given to ZHANG Monroy (oncoming nurse) by Arnol Curry (offgoing nurse). Report included the following information SBAR, Kardex, Intake/Output, MAR and Recent Results.

## 2018-04-05 NOTE — DISCHARGE INSTRUCTIONS
HOSPITALIST DISCHARGE INSTRUCTIONS    NAME: Alease Hamman   :  1955   MRN:  [id number]     Date/Time:  2018 10:45 AM    ADMIT DATE: 2018   DISCHARGE DATE: 2018     Attending Physician: Judie Shane MD    DISCHARGE DIAGNOSIS:  1) Benzodiazpeine overdose  2) Tylenol overdose    3) Depression with suicidal intention   4) HTN  5) Hypotension, resolved  6) Metastatic Breast CA to bone  7) Hypokalemia  8) CHF  8) CORNELIA, resolved      Medications: Per above medication reconciliation. Pain Management: per above medications    Recommended diet: Cardiac Diet    Recommended activity: Activity as tolerated    Wound care: None    Indwelling devices:  None    Supplemental Oxygen: None    Required Lab work: none    Glucose management:  None    Code status: Full    Outside physician follow up: Follow-up Information     Follow up With Details Comments [address], [address] [place] [id number]  [phone]                  Information obtained by :  I understand that if any problems occur once I am at home I am to contact my physician. I understand and acknowledge receipt of the instructions indicated above.                                                                                                                                            Physician's or R.N.'s Signature                                                                  Date/Time                                                                                                                                              Patient or Repres

## 2018-04-05 NOTE — IP AVS SNAPSHOT
1111 [facility] [address] [place] [address] 
[phone] Patient: Sam Dias MRN: [id number] About your hospitalization You were admitted on:  April 5, 2018 You last received care in the:  [address] [facility] [address] You were discharged on:  April 10, 2018 Why you were hospitalized Your primary diagnosis was: Major Depression Your diagnoses also included:  Htn (Hypertension), Metastatic Breast Cancer (Hcc) Follow-up Information Follow up With Details Comments Contact Info [facility] Schedule an appointment as soon as possible for a visit  [address] [place], [address] [facility] 
[phone] Sofiya Euceda MD In 1 week hospital discharge follow up [address] [facility] [address] [place] [address] 
[phone] Jenelle Mata On 4/17/2018 You have a 10:00am appointment with the nurse practitioner for medication management. 235 [facility] 2810 [facility], [address] [place] [address] Pkw[address] 
[phone] [facility] On 4/25/2018 You have a 1:00pm appointment with the licensed clinical  (LCSW) for individual therapy. 235 [facility] [place] 1154 [facility], [address] [place] [address] 
[phone] Discharge Orders None A check ramo indicates which time of day the medication should be taken. My Medications START taking these medications Instructions Each Dose to Equal  
 Morning Noon Evening Bedtime  
 amitriptyline 50 mg tablet Commonly known as:  ELAVIL Your next dose is:  bedtime Take 1 Tab by mouth nightly. Indications: depression 50 mg FLUoxetine 20 mg capsule Commonly known as:  PROzac Your next dose is:  Tomorrow 9am  
   
 Take 1 Cap by mouth daily. Indications: Generalized Anxiety Disorder 20 mg  
    
  
   
   
   
  
 lidocaine 5 % Commonly known as:  Francisco Pulley  
 Notes to Patient:  You refused patch this am.   
   
 Apply patch to the affected area for 12 hours a day and remove for 12 hours a day. Indications: POSTHERPETIC NEURALGIA  
     
   
   
   
  
 meloxicam 15 mg tablet Commonly known as:  MOBIC Your next dose is:  Tomorrow 9am  
   
 Take 1 Tab by mouth daily. Indications: Rheumatoid Arthritis 15 mg CHANGE how you take these medications Instructions Each Dose to Equal  
 Morning Noon Evening Bedtime  
 carvedilol 12.5 mg tablet Commonly known as:  Keenan Mckee What changed:  how much to take Your next dose is: Today at 5pm  
   
 Take 1 Tab by mouth two (2) times daily (with meals). Indications: hypertension 12.5 mg  
    
  
   
   
  
   
  
  
CONTINUE taking these medications Instructions Each Dose to Equal  
 Morning Noon Evening Bedtime  
 aspirin delayed-release 81 mg tablet Your next dose is:  Tomorrow 9am  
   
 Take 1 Tab by mouth daily. Indications: prevention of transient ischemic attack 81 mg  
    
  
   
   
   
  
 hydroCHLOROthiazide 25 mg tablet Commonly known as:  HYDRODIURIL Your next dose is:  Tomorrow 9am  
   
 Take 1 Tab by mouth daily. Indications: hypertension 25 mg  
    
  
   
   
   
  
 losartan 50 mg tablet Commonly known as:  COZAAR Your next dose is:  Tomorrow 9am  
   
 Take 1 Tab by mouth daily. Indications: hypertension 50 mg  
    
  
   
   
   
  
 multivitamin tablet Commonly known as:  ONE A DAY Your next dose is:  Tomorrow 9am  
   
 Take 1 Tab by mouth daily. 1 Tab STOP taking these medications ALPRAZolam 1 mg tablet Commonly known as:  XANAX  
   
  
 docusate sodium 50 mg capsule Commonly known as:  COLACE  
   
  
 fentaNYL 25 mcg/hr PATCH Commonly known as:  DURAGESIC  
   
  
 morphine IR 15 mg tablet Commonly known as:  MS IR  
   
  
 venlafaxine 75 mg tablet Commonly known as:  [facility]  
   
  
 Where to Get Your Medications Information on where to get these meds will be given to you by the nurse or doctor. ! Ask your nurse or doctor about these medications  
  amitriptyline 50 mg tablet  
 aspirin delayed-release 81 mg tablet  
 carvedilol 12.5 mg tablet FLUoxetine 20 mg capsule  
 hydroCHLOROthiazide 25 mg tablet  
 lidocaine 5 %  
 losartan 50 mg tablet  
 meloxicam 15 mg tablet Discharge Instructions DISCHARGE SUMMARY 
 
NAME:Edith Hook : 1955 MRN: [id number] The patient Jeff Gray exhibits the ability to control behavior in a less restrictive environment. Patient's level of functioning is improving. No assaultive/destructive behavior has been observed for the past 24 hours. No suicidal/homicidal threat or behavior has been observed for the past 24 hours. There is no evidence of serious medication side effects. Patient has not been in physical or protective restraints for at least the past 24 hours. If weapons involved, how are they secured? No weapons involved. Is patient aware of and in agreement with discharge plan? Yes Arrangements for medication:  Prescriptions given to patient. Referral for substance abuse treatment? Patient is not using substances/Not applicable. Referral for smoking cessation needed? Yes, refused. Copy of discharge instructions to provider?:  Bulmaro Nguyen; Dr. Sandoval Favors Arrangements for transportation home:  Friend to . Keep all follow up appointments as scheduled, continue to take prescribed medications per physician instructions. Mental health crisis number:  538 or your local mental health crisis line number at [phone]. DISCHARGE SUMMARY from Nurse PATIENT INSTRUCTIONS: 
 
What to do at Home: 
Recommended activity: Activity as tolerated, If you experience any of the following symptoms thoughts of harming self, feeling overwhelmed with anxiety, hopelessness and loneliness, please follow up with your assigned providers. *  Please give a list of your current medications to your Primary Care Provider. *  Please update this list whenever your medications are discontinued, doses are 
    changed, or new medications (including over-the-counter products) are added. *  Please carry medication information at all times in case of emergency situations. These are general instructions for a healthy lifestyle: No smoking/ No tobacco products/ Avoid exposure to second hand smoke Surgeon General's Warning:  Quitting smoking now greatly reduces serious risk to your health. Obesity, smoking, and sedentary lifestyle greatly increases your risk for illness A healthy diet, regular physical exercise & weight monitoring are important for maintaining a healthy lifestyle You may be retaining fluid if you have a history of heart failure or if you experience any of the following symptoms:  Weight gain of 3 pounds or more overnight or 5 pounds in a week, increased swelling in our hands or feet or shortness of breath while lying flat in bed. Please call your doctor as soon as you notice any of these symptoms; do not wait until your next office visit. Recognize signs and symptoms of STROKE: 
 
F-face looks uneven A-arms unable to move or move unevenly S-speech slurred or non-existent T-time-call 911 as soon as signs and symptoms begin-DO NOT go Back to bed or wait to see if you get better-TIME IS BRAIN. Warning Signs of HEART ATTACK Call 911 if you have these symptoms: 
? Chest discomfort. Most heart attacks involve discomfort in the center of the chest that lasts more than a few minutes, or that goes away and comes back. It can feel like uncomfortable pressure, squeezing, fullness, or pain. ? Discomfort in other areas of the upper body.  Symptoms can include pain or discomfort in one or both arms, the back, neck, jaw, or stomach. ? Shortness of breath with or without chest discomfort. ? Other signs may include breaking out in a cold sweat, nausea, or lightheadedness. Don't wait more than five minutes to call [address]! Fast action can save your life. Calling 911 is almost always the fastest way to get lifesaving treatment. Emergency Medical Services staff can begin treatment when they arrive  up to an hour sooner than if someone gets to the hospital by car. The discharge information has been reviewed with the patient. The patient verbalized understanding. Discharge medications reviewed with the patient and appropriate educational materials and side effects teaching were provided. ___________________________________________________________________________________________________________________________________ [employer] Announcement We are excited to announce that we are making your provider's discharge notes available to you in Contorion. You will see these notes when they are completed and signed by the physician that discharged you from your recent hospital stay. If you have any questions or concerns about any information you see in Space Apartt, please call the Health Information Department where you were seen or reach out to your Primary Care Provider for more information about your plan of care. Introducing [facility]! [employer] introduces Contorion patient portal. Now you can access parts of your medical record, email your doctor's office, and request medication refills online. 1. In your internet browser, go to [email or web address]. Airphrame[employer]Swan Inchart 2. Click on the First Time User? Click Here link in the Sign In box. You will see the New Member Sign Up page. 3. Enter your Contorion Access Code exactly as it appears below. You will not need to use this code after youve completed the sign-up process.  If you do not sign up before the expiration date, you must request a new code. · Flybits Access Code: [email or web address] Expires: 5/20/2018  9:09 AM 
 
4. Enter the last four digits of your Social Security Number (xxxx) and Date of Birth (mm/dd/yyyy) as indicated and click Submit. You will be taken to the next sign-up page. 5. Create a Flybits ID. This will be your Flybits login ID and cannot be changed, so think of one that is secure and easy to remember. 6. Create a Flybits password. You can change your password at any time. 7. Enter your Password Reset Question and Answer. This can be used at a later time if you forget your password. 8. Enter your e-mail address. You will receive e-mail notification when new information is available in [address] 9. Click Sign Up. You can now view and download portions of your medical record. 10. Click the Download Summary menu link to download a portable copy of your medical information. If you have questions, please visit the Frequently Asked Questions section of the Flybits website. Remember, Flybits is NOT to be used for urgent needs. For medical emergencies, dial 911. Now available from your iPhone and Android! Introducing Anshu Alegria As a [employer] patient, I wanted to make you aware of our electronic visit tool called Anshu Alegria. [employer] 24/7 allows you to connect within minutes with a medical provider 24 hours a day, seven days a week via a mobile device or tablet or logging into a secure website from your computer. You can access Anshu Alegria from anywhere in the [place].  
 
A virtual visit might be right for you when you have a simple condition and feel like you just dont want to get out of bed, or cant get away from work for an appointment, when your regular [employer] provider is not available (evenings, weekends or holidays), or when youre out of town and need minor care. Electronic visits cost only $49 and if the [address] [place] [address] 24/7 provider determines a prescription is needed to treat your condition, one can be electronically transmitted to a nearby pharmacy*. Please take a moment to enroll today if you have not already done so. The enrollment process is free and takes just a few minutes. To enroll, please download the [address] [place] [address] 24/7 zaida to your tablet or phone, or visit [email or web address] to enroll on your computer. And, as an [address] [place] [address] patient with a [employer] account, the results of your visits will be scanned into your electronic medical record and your primary care provider will be able to view the scanned results. We urge you to continue to see your regular [address] [place] [address] provider for your ongoing medical care. And while your primary care provider may not be the one available when you seek a Zenitum virtual visit, the peace of mind you get from getting a real diagnosis real time can be priceless. For more information on Zenitum, view our Frequently Asked Questions (FAQs) at [email or web address]. Sincerely, 
 
Tatiana Casper MD 
Chief Medical Officer Rimma Mccollum *:  certain medications cannot be prescribed via Zenitum Providers Seen During Your Hospitalization Provider Specialty Primary office phone Cl Huggins MD Psychiatry [phone] Your Primary Care Physician (PCP) Primary Care Physician Office Phone Office Fax Miguel Rhoades [phone] You are allergic to the following Allergen Reactions Sulfa (Sulfonamide Antibiotics) Unknown (comments) Wellbutrin (Bupropion Hcl) Unknown (comments) Recent Documentation Height Weight Breastfeeding? BMI OB Status Smoking Status 1.651 m 86.2 kg No 31.62 kg/m2 Postmenopausal Former Smoker Emergency Contacts Name Discharge Info Relation Home Work Mobile Malone Ash CAREGIVER [3] Friend [5] [phone] Patient Belongings The following personal items are in your possession at time of discharge: 
  Dental Appliances: None  Visual Aid: None      Home Medications: None   Jewelry: None  Clothing: None    Other Valuables: Other (comment) (William)  Personal Items Sent to Safe: None Please provide this summary of care documentation to your next provider. Signatures-by signing, you are acknowledging that this After Visit Summary has been reviewed with you and you have received a copy. Patient Signature:  ____________________________________________________________ Date:  ____________________________________________________________  
  
[facility] Provider Signature:  ____________________________________________________________ Date:  ____________________________________________________________

## 2018-04-05 NOTE — BH NOTES
Dr. Mikel wolff regarding H&P. Notified of consult. 2:42 pm-Dr. [facility] office notified of consult to see pt.

## 2018-04-05 NOTE — PROGRESS NOTES
Bedside and Verbal shift change report given to [facility] (oncoming nurse) by Narcisa Brantley (offgoing nurse). Report included the following information SBAR, Kardex, MAR and Recent Results.

## 2018-04-05 NOTE — BH NOTES
Primary Nurse Jenna Jordan RN and Caridad Liu RN performed a dual skin assessment on this patient No impairment noted  Adama score is 23

## 2018-04-05 NOTE — PROGRESS NOTES
TRANSFER - IN REPORT:    Verbal report received from [facility] RN(name) on Elmarie Alpers  being received from [facility](unit) for routine progression of care      Report consisted of patients Situation, Background, Assessment and   Recommendations(SBAR). Information from the following report(s) SBAR, Kardex, Intake/Output, Recent Results and Med Rec Status was reviewed with the receiving nurse. Opportunity for questions and clarification was provided. Assessment completed upon patients arrival to unit and care assumed.

## 2018-04-05 NOTE — BH NOTES
Pt admitted voluntarily to the service of Dr. Heena Ly after an overdose. Skin assessment completed. Pt has a power port on the right upper chest. Stated she is being treated for cancer. Denies any drug or alcohol usage at this time. Denies SI/HI currently. Placed on Q 15 minute safety checks.

## 2018-04-05 NOTE — DISCHARGE SUMMARY
Hospitalist Progress Note    NAME: Elmarie Alpers   :  1955   MRN:  [id number]                       Hospitalist Discharge Summary     Patient ID:  Elmarie Alpers  [id number]  2 y.o.  1955    PCP on record: Tori Du MD    Admit date: 2018  Discharge date and time: 2018      DISCHARGE DIAGNOSIS:  1) Benzodiazpeine overdose  2) Tylenol overdose    3) Depression with suicidal intention   4) HTN  5) Hypotension  6) Metastatic Breast CA to bone  7) Hypokalemia  8) CHF, unknown type. No records in our system. 8) CORNELIA      CONSULTATIONS:  None    ______________________________________________________________________  DISCHARGE SUMMARY/HOSPITAL COURSE:  for full details see H&P, daily progress notes, labs, consult notes. 63year old female with past medical history of depression, HTN, metastatic breast CA and CHF who was brought to ED after found somnolent by her friends. Admitted with diagnosis of overdose tylenol and BZD, hypotension and CORNELIA secondary to overdose and severe depression with suicidal ideation. Patient received flumazenil, IV acetylcysteine and IV fluid hydration with clinical improvement. Tylenol level 132 on admission to 8 after supportive therapy. Psychiatry assessed the patient and recommended inpatient psychiatry hospitalization. Palliative team adjusted home medications with better control of pain related to cancer. PLAN:   - inpatient psychiatry hospitalization. _______________________________________________________________________  Patient seen and examined by me on discharge day. Pertinent Findings:  Gen:    Not in acute pain or distress  Chest: Clear to auscultation. No wheezes. CVS:   Regular rate. No edema  Abd:  Soft, not distended, not tender  Neuro:  Alert, oriented x3.  Moving all extremities  _______________________________________________________________________  DISCHARGE MEDICATIONS:   Discharge Medication List as of 2018 12:45 PM START taking these medications    Details   fentaNYL (DURAGESIC) 25 mcg/hr PATCH 1 Patch by TransDERmal route every seventy-two (72) hours. Max Daily Amount: 1 Patch., No Print, Disp-5 Patch, R-0      morphine IR (MS IR) 15 mg tablet Take 0.5 Tabs by mouth every four (4) hours as needed. Max Daily Amount: 45 mg., No Print, Disp-10 Tab, R-0         CONTINUE these medications which have NOT CHANGED    Details   aspirin delayed-release 81 mg tablet Take 1 Tab by mouth daily. , Print, Disp-100 Tab, R-0      docusate sodium (COLACE) 100 mg capsule Take 1 Cap by mouth nightly for 90 days. , Print, Disp-30 Cap, R-2      multivitamin (ONE A DAY) tablet Take 1 Tab by mouth daily. , Historical Med      calcium carbonate (SABINO-GEST ANTACID) 200 mg calcium (500 mg) chew Take 1 Tab by mouth as needed. Indications: Heartburn, Historical Med      senna-docusate (SENEXON-S) 8.6-50 mg per tablet Take 2 Tabs by mouth two (2) times a day., Historical Med      melatonin tab tablet Take 5 mg by mouth nightly., Historical Med      carvedilol (COREG) 12.5 mg tablet Take  by mouth two (2) times daily (with meals). , Historical Med      venlafaxine (EFFEXOR) 75 mg tablet Take 150 mg by mouth two (2) times a day. Historical Med, 150 mg      naloxone 2 mg/actuation spry Use 1 spray intranasally into 1 nostril. Use a new Narcan nasal spray for subsequent doses and administer into alternating nostrils. May repeat every 2 to 3 minutes as needed. , Print, Disp-4 Each, R-0      mirtazapine (REMERON) 15 mg tablet Take 15 mg by mouth nightly., Historical Med         STOP taking these medications       HYDROcodone-acetaminophen (NORCO) 5-325 mg per tablet Comments:   Reason for Stopping:         amoxicillin-clavulanate (AUGMENTIN) 875-125 mg per tablet Comments:   Reason for Stopping:         hydroCHLOROthiazide (HYDRODIURIL) 25 mg tablet Comments:   Reason for Stopping:         losartan (COZAAR) 50 mg tablet Comments:   Reason for Stopping: alprazolam (XANAX) 1 mg tablet Comments:   Reason for Stopping:               My Recommended Diet, Activity, Wound Care, and follow-up labs are listed in the patient's Discharge Insturctions which I have personally completed and reviewed.     ______________________________________________________________________    Risk of deterioration: Low    Condition at Discharge:  Stable  ______________________________________________________________________    Disposition  psychiatry hospital  ______________________________________________________________________    Care Plan discussed with:   Patient    ______________________________________________________________________    Code Status: Full Code  ______________________________________________________________________      Follow up with:   PCP : Smita De Leon MD  Follow-up Information     Follow up With Details Comments [address], [place]   [address] [place] [address] Place [id number] [phone]                Total time in minutes spent coordinating this discharge (includes going over instructions, follow-up, prescriptions, and preparing report for sign off to her PCP) :  30 minutes    Signed:  Nevin Steve MD

## 2018-04-06 PROCEDURE — 65220000003 HC RM SEMIPRIVATE PSYCH

## 2018-04-06 PROCEDURE — 74011250637 HC RX REV CODE- 250/637: Performed by: PSYCHIATRY & NEUROLOGY

## 2018-04-06 RX ORDER — VENLAFAXINE HYDROCHLORIDE 37.5 MG/1
37.5 CAPSULE, EXTENDED RELEASE ORAL
Status: DISCONTINUED | OUTPATIENT
Start: 2018-04-10 | End: 2018-04-10 | Stop reason: HOSPADM

## 2018-04-06 RX ORDER — LIDOCAINE 50 MG/G
2 PATCH TOPICAL EVERY 24 HOURS
Status: DISCONTINUED | OUTPATIENT
Start: 2018-04-07 | End: 2018-04-06

## 2018-04-06 RX ORDER — LOSARTAN POTASSIUM 50 MG/1
50 TABLET ORAL DAILY
COMMUNITY
End: 2018-04-10

## 2018-04-06 RX ORDER — CARVEDILOL 12.5 MG/1
12.5 TABLET ORAL 2 TIMES DAILY WITH MEALS
Status: DISCONTINUED | OUTPATIENT
Start: 2018-04-06 | End: 2018-04-10 | Stop reason: HOSPADM

## 2018-04-06 RX ORDER — DOCUSATE SODIUM 100 MG/1
100 CAPSULE, LIQUID FILLED ORAL
Status: DISCONTINUED | OUTPATIENT
Start: 2018-04-06 | End: 2018-04-06

## 2018-04-06 RX ORDER — LORAZEPAM 2 MG/1
2 TABLET ORAL
Status: DISCONTINUED | OUTPATIENT
Start: 2018-04-06 | End: 2018-04-10 | Stop reason: HOSPADM

## 2018-04-06 RX ORDER — ALPRAZOLAM 1 MG/1
1 TABLET ORAL 4 TIMES DAILY
COMMUNITY
End: 2018-04-10

## 2018-04-06 RX ORDER — FOLIC ACID 1 MG/1
1 TABLET ORAL DAILY
Status: DISCONTINUED | OUTPATIENT
Start: 2018-04-07 | End: 2018-04-06 | Stop reason: CLARIF

## 2018-04-06 RX ORDER — LOSARTAN POTASSIUM 50 MG/1
50 TABLET ORAL DAILY
Status: DISCONTINUED | OUTPATIENT
Start: 2018-04-07 | End: 2018-04-10 | Stop reason: HOSPADM

## 2018-04-06 RX ORDER — MELOXICAM 7.5 MG/1
15 TABLET ORAL DAILY
Status: DISCONTINUED | OUTPATIENT
Start: 2018-04-07 | End: 2018-04-10 | Stop reason: HOSPADM

## 2018-04-06 RX ORDER — VENLAFAXINE HYDROCHLORIDE 150 MG/1
150 CAPSULE, EXTENDED RELEASE ORAL
Status: COMPLETED | OUTPATIENT
Start: 2018-04-07 | End: 2018-04-08

## 2018-04-06 RX ORDER — PROCHLORPERAZINE MALEATE 10 MG
10 TABLET ORAL
Status: DISCONTINUED | OUTPATIENT
Start: 2018-04-06 | End: 2018-04-10 | Stop reason: HOSPADM

## 2018-04-06 RX ORDER — ASPIRIN 81 MG/1
81 TABLET ORAL DAILY
Status: DISCONTINUED | OUTPATIENT
Start: 2018-04-07 | End: 2018-04-10 | Stop reason: HOSPADM

## 2018-04-06 RX ORDER — FLUOXETINE HYDROCHLORIDE 20 MG/1
20 CAPSULE ORAL DAILY
Status: DISCONTINUED | OUTPATIENT
Start: 2018-04-10 | End: 2018-04-10 | Stop reason: HOSPADM

## 2018-04-06 RX ORDER — DOCUSATE SODIUM 100 MG/1
100 CAPSULE, LIQUID FILLED ORAL 2 TIMES DAILY
Status: DISCONTINUED | OUTPATIENT
Start: 2018-04-06 | End: 2018-04-10 | Stop reason: HOSPADM

## 2018-04-06 RX ORDER — HYDROCHLOROTHIAZIDE 25 MG/1
25 TABLET ORAL DAILY
Status: DISCONTINUED | OUTPATIENT
Start: 2018-04-07 | End: 2018-04-10 | Stop reason: HOSPADM

## 2018-04-06 RX ORDER — HYDROCHLOROTHIAZIDE 25 MG/1
25 TABLET ORAL DAILY
COMMUNITY
End: 2018-04-10

## 2018-04-06 RX ORDER — LORAZEPAM 2 MG/1
4 TABLET ORAL
Status: DISCONTINUED | OUTPATIENT
Start: 2018-04-06 | End: 2018-04-10 | Stop reason: HOSPADM

## 2018-04-06 RX ORDER — THERA TABS 400 MCG
1 TAB ORAL DAILY
Status: DISCONTINUED | OUTPATIENT
Start: 2018-04-07 | End: 2018-04-10 | Stop reason: HOSPADM

## 2018-04-06 RX ORDER — DIAZEPAM 10 MG/1
20 TABLET ORAL
Status: COMPLETED | OUTPATIENT
Start: 2018-04-06 | End: 2018-04-06

## 2018-04-06 RX ORDER — TRAMADOL HYDROCHLORIDE 50 MG/1
50 TABLET ORAL
Status: DISCONTINUED | OUTPATIENT
Start: 2018-04-06 | End: 2018-04-10 | Stop reason: HOSPADM

## 2018-04-06 RX ORDER — FLUOXETINE 10 MG/1
10 CAPSULE ORAL DAILY
Status: COMPLETED | OUTPATIENT
Start: 2018-04-07 | End: 2018-04-09

## 2018-04-06 RX ORDER — LANOLIN ALCOHOL/MO/W.PET/CERES
100 CREAM (GRAM) TOPICAL DAILY
Status: DISCONTINUED | OUTPATIENT
Start: 2018-04-07 | End: 2018-04-06 | Stop reason: CLARIF

## 2018-04-06 RX ORDER — VENLAFAXINE HYDROCHLORIDE 37.5 MG/1
75 CAPSULE, EXTENDED RELEASE ORAL
Status: COMPLETED | OUTPATIENT
Start: 2018-04-09 | End: 2018-04-09

## 2018-04-06 RX ORDER — AMITRIPTYLINE HYDROCHLORIDE 25 MG/1
25 TABLET, FILM COATED ORAL
Status: DISCONTINUED | OUTPATIENT
Start: 2018-04-06 | End: 2018-04-08

## 2018-04-06 RX ADMIN — DIAZEPAM 20 MG: 10 TABLET ORAL at 16:02

## 2018-04-06 RX ADMIN — AMITRIPTYLINE HYDROCHLORIDE 25 MG: 25 TABLET, FILM COATED ORAL at 21:43

## 2018-04-06 RX ADMIN — DOCUSATE SODIUM 100 MG: 100 CAPSULE, LIQUID FILLED ORAL at 17:06

## 2018-04-06 RX ADMIN — IBUPROFEN 400 MG: 400 TABLET, FILM COATED ORAL at 11:53

## 2018-04-06 RX ADMIN — DIAZEPAM 20 MG: 10 TABLET ORAL at 17:06

## 2018-04-06 RX ADMIN — DIAZEPAM 20 MG: 10 TABLET ORAL at 15:03

## 2018-04-06 RX ADMIN — VENLAFAXINE 225 MG: 100 TABLET ORAL at 18:03

## 2018-04-06 RX ADMIN — CARVEDILOL 12.5 MG: 12.5 TABLET, FILM COATED ORAL at 17:45

## 2018-04-06 NOTE — BH NOTES
Note pt wearing fentanyl patch  .b/p 75/45 at present.  aware and patch removed and disregarded by Amanda Masterson. Witness by emilee adler rn. . Will continue to observe b/p the patient encourage to drink water. Pt encourage to call for assistance with ambulation if needed.

## 2018-04-06 NOTE — PROGRESS NOTES
Problem: Depressed Mood (Adult/Pediatric)  Goal: *STG: Attends activities and groups  Outcome: Progressing Towards Goal  Pt currently attending NA/AA meeting

## 2018-04-06 NOTE — BH NOTES
PSYCHOSOCIAL ASSESSMENT  :Patient identifying info: Gerry Guevara is a 63 y.o., female admitted 2018  1:44 PM     Presenting problem and precipitating factors: Patient was transferred to [place] [address] after being medically stabilized at [address] following an intentional overdose attempt. Pt's friend had found her unresponsive after Pt had taken hydrocodone and xanax in an attempt to take her life. Pt has several previous suicide attempts and a family history. Pt is stage 4 cancer patient with chronic pain. Mental status assessment: Depressed, oriented    Current psychiatric providers and contact info: Dr. Dinesh Abbasi (psychiatrist)    Previous psychiatric services/providers and response to treatment: long-term outpatient psychiatric treatment; previous suicide attempt    Family history of mental illness: Mother suffered from bipolar disorder and attempted suicide several times    Substance abuse history:  None  Social History   Substance Use Topics    Smoking status: Former Smoker     Packs/day: 0.50     Years: 20.00    Smokeless tobacco: Never Used    Alcohol use No       Family constellation: None    Is significant other involved?  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

## 2018-04-06 NOTE — INTERDISCIPLINARY ROUNDS
Behavioral Health Interdisciplinary Rounds     Patient Name: Laura Stanley  Age: 63 y.o. Room/Bed:  731/02  Primary Diagnosis: <principal problem not specified>   Admission Status: Voluntary     Readmission within 30 days:   Power of  in place:   Patient requires a blocked bed: no          Reason for blocked bed: na    VTE Prophylaxis: No--was on ASA 81 mg Delayed Release before here  Flu vaccine given : yes --PTA   Mobility needs/Fall risk: no    Nutritional Plan: no  Consults:          Labs/Testing due today?: yes    Sleep hours:  7+      Participation in Care/Groups:  no  Medication Compliant?: Yes  PRNS (last 24 hours): None    Restraints (last 24 hours):  no     CIWA (range last 24 hours):     COWS (range last 24 hours):      Alcohol screening (AUDIT) completed -   AUDIT Score: 0     If applicable, date SBIRT discussed in treatment team AND documented: N/A    Tobacco - patient is a smoker: Have You Used Tobacco in the Past 30 Days: Yes  Illegal Drugs use: Have You Used Any Illegal Substances Over the Past 12 Months: No    24 hour chart check complete: yes     Patient goal(s) for today:   Treatment team focus/goals: psychosocial  Progress note: Pt transferred after overdose     LOS:  1  Expected LOS: TBD    Financial concerns/prescription coverage: [employer]; Medicaid  Date of last family contact: None       Family requesting physician contact today: No  Discharge plan: Return home (senior care) when stable for discharge  Guns in the home: No       Outpatient provider(s):     Participating treatment team members:  PB Rai; Dr. Samm Rasheed MD; Aracelis Morales RN; Dewayne Vieira, ShaniaD

## 2018-04-06 NOTE — H&P
INITIAL PSYCHIATRIC EVALUATION            IDENTIFICATION:    Patient Name  Melo Felipe   Date of Birth 1955   [facility] [id number]   Medical Record Number  [id number]      Age  63 y.o. PCP Kaitlynn Ramirez MD   Admit date:  4/5/2018    Room Number  731/02  @ [facility]   Date of Service  4/6/2018            HISTORY         REASON FOR HOSPITALIZATION:  CC: \"SI/SA \". Pt admitted under a voluntary basis for severe depression with suicidal ideations proving to be an imminent danger to self and an inability to care for self. HISTORY OF PRESENT ILLNESS:    The patient, Melo Felipe, is a 63 y.o. WHITE [place]  female with a past psychiatric history significant for depression , who presents at this time with complaints of (and/or evidence of) the following emotional symptoms: depression and suicidal thoughts/threats. Additional symptomatology include anxiety. The above symptoms have been present for years . These symptoms are of severe severity. These symptoms are intermittent/ fleeting in nature. The patient's condition has been precipitated by cancer and psychosocial stressors (cancer treatment  ). Patient's condition made worse by no psychotherapy . BAL=0. ALLERGIES:   Allergies   Allergen Reactions    Sulfa (Sulfonamide Antibiotics) Unknown (comments)    Wellbutrin [Bupropion Hcl] Unknown (comments)      MEDICATIONS PRIOR TO ADMISSION:   Prescriptions Prior to Admission   Medication Sig    aspirin delayed-release 81 mg tablet Take 1 Tab by mouth daily.  docusate sodium (COLACE) 100 mg capsule Take 1 Cap by mouth nightly for 90 days.  multivitamin (ONE A DAY) tablet Take 1 Tab by mouth daily.  carvedilol (COREG) 12.5 mg tablet Take  by mouth two (2) times daily (with meals).  venlafaxine (EFFEXOR) 75 mg tablet Take 150 mg by mouth two (2) times a day.  [START ON 4/7/2018] fentaNYL (DURAGESIC) 25 mcg/hr PATCH 1 Patch by TransDERmal route every seventy-two (72) hours. Max Daily Amount: 1 Patch.  morphine IR (MS IR) 15 mg tablet Take 0.5 Tabs by mouth every four (4) hours as needed. Max Daily Amount: 45 mg. PAST MEDICAL HISTORY:   Past Medical History:   Diagnosis Date    Anxiety     Cancer [facility])     breast    Depression     GERD (gastroesophageal reflux disease)     HTN (hypertension)     Hypercholesterolemia     Hypotension 9/12/2012    Metastatic breast cancer ([facility] [address] 5/3/2017    Secondary cancer of bone ([facility] [address] 5/3/2017     Past Surgical History:   Procedure Laterality Date    BREAST SURGERY PROCEDURE UNLISTED  march 13    carina masectomy      SOCIAL HISTORY:    Social History     Social History    Marital status: SINGLE     Spouse name: N/A    Number of children: N/A    Years of education: N/A     Occupational History    Not on file. Social History Main Topics    Smoking status: Former Smoker     Packs/day: 0.50     Years: 20.00    Smokeless tobacco: Never Used    Alcohol use No    Drug use: No    Sexual activity: No     Other Topics Concern    Not on file     Social History Narrative    63year old  female admitted for depression after suicide attempt on Xanax and opiods. Py is follwed by DR. Stiven Banuelos on the outside. She has breast and bone cancer. She is living by herself with no family supports. FAMILY HISTORY:    Family History   Problem Relation Age of Onset    Hypertension Mother     Heart Disease Mother     Depression Mother     Hypertension Father        REVIEW OF SYSTEMS:   Psychological ROS: positive for - behavioral disorder  Respiratory ROS: no cough, shortness of breath, or wheezing  Cardiovascular ROS: no chest pain or dyspnea on exertion  Pertinent items are noted in the History of Present Illness. All other Systems reviewed and are considered negative.            MENTAL STATUS EXAM & VITALS     MENTAL STATUS EXAM (MSE):    MSE FINDINGS ARE WITHIN NORMAL LIMITS (WNL) UNLESS OTHERWISE STATED BELOW. ( ALL OF THE BELOW CATEGORIES OF THE MSE HAVE BEEN REVIEWED (reviewed 4/6/2018) AND UPDATED AS DEEMED APPROPRIATE )  General Presentation age appropriate, cooperative   Orientation oriented to time, place and person   Vital Signs  See below (reviewed 4/6/2018); Vital Signs (BP, Pulse, & Temp) are within normal limits if not listed below.    Gait and Station Stable/steady, no ataxia   Musculoskeletal System No extrapyramidal symptoms (EPS); no abnormal muscular movements or Tardive Dyskinesia (TD); muscle strength and tone are within normal limits   Language No aphasia or dysarthria   Speech:  normal pitch and normal volume   Thought Processes logical; normal rate of thoughts; fair abstract reasoning/computation   Thought Associations goal directed   Thought Content free of delusions   Suicidal Ideations none   Homicidal Ideations none   Mood:  anxious  and depressed   Affect:  mood-congruent   Memory recent  good   Memory remote:  good   Concentration/Attention:  hypervigilance   Fund of Knowledge above average   Insight:  limited   Reliability fair   Judgment:  limited          VITALS:     Patient Vitals for the past 24 hrs:   Temp Pulse Resp BP SpO2   04/06/18 1151 97.3 °F (36.3 °C) (!) 102 18 136/90 97 %   04/06/18 0900 97.5 °F (36.4 °C) (!) 103 20 106/83 95 %   04/05/18 2053 97.5 °F (36.4 °C) 80 20 118/68 -   04/05/18 1747 97.4 °F (36.3 °C) 99 16 132/84 99 %     Wt Readings from Last 3 Encounters:   04/05/18 89.8 kg (198 lb)   04/04/18 88.2 kg (194 lb 6.4 oz)   03/09/18 88.5 kg (195 lb)     Temp Readings from Last 3 Encounters:   04/06/18 97.3 °F (36.3 °C)   04/05/18 98.1 °F (36.7 °C)   02/19/18 98.3 °F (36.8 °C)     BP Readings from Last 3 Encounters:   04/06/18 136/90   04/05/18 115/56   03/02/18 134/84     Pulse Readings from Last 3 Encounters:   04/06/18 (!) 102   04/05/18 93   02/19/18 93            DATA     LABORATORY DATA:  Labs Reviewed   TSH 3RD GENERATION   LIPID PANEL   GLUCOSE, FASTING     Admission on 04/02/2018, Discharged on 04/05/2018   Component Date Value Ref Range Status    Ventricular Rate 04/02/2018 70  BPM Final    Atrial Rate 04/02/2018 70  BPM Final    P-R Interval 04/02/2018 180  ms Final    QRS Duration 04/02/2018 78  ms Final    Q-T Interval 04/02/2018 382  ms Final    QTC Calculation (Bezet) 04/02/2018 412  ms Final    Calculated P Axis 04/02/2018 -6  degrees Final    Calculated R Axis 04/02/2018 22  degrees Final    Calculated T Axis 04/02/2018 46  degrees Final    Diagnosis 04/02/2018    Final                    Value:Normal sinus rhythm  When compared with ECG of 12-FEB-2018 08:59,  Vent. rate has decreased BY  43 BPM  Nonspecific T wave abnormality, improved in Lateral leads  Confirmed by Mark Ford ([id number]) on 4/3/2018 7:41:40 PM      WBC 04/02/2018 5.4  3.6 - 11.0 K/uL Final    RBC 04/02/2018 4.46  3.80 - 5.20 M/uL Final    HGB 04/02/2018 13.6  11.5 - 16.0 g/dL Final    HCT 04/02/2018 40.8  35.0 - 47.0 % Final    MCV 04/02/2018 91.5  80.0 - 99.0 FL Final    MCH 04/02/2018 30.5  26.0 - 34.0 PG Final    MCHC 04/02/2018 33.3  30.0 - 36.5 g/dL Final    RDW 04/02/2018 12.6  11.5 - 14.5 % Final    PLATELET 03/14/5905 791  150 - 400 K/uL Final    MPV 04/02/2018 10.8  8.9 - 12.9 FL Final    NRBC 04/02/2018 0.0  0  WBC Final    ABSOLUTE NRBC 04/02/2018 0.00  0.00 - 0.01 K/uL Final    NEUTROPHILS 04/02/2018 63  32 - 75 % Final    LYMPHOCYTES 04/02/2018 29  12 - 49 % Final    MONOCYTES 04/02/2018 7  5 - 13 % Final    EOSINOPHILS 04/02/2018 1  0 - 7 % Final    BASOPHILS 04/02/2018 0  0 - 1 % Final    IMMATURE GRANULOCYTES 04/02/2018 0  0.0 - 0.5 % Final    ABS. NEUTROPHILS 04/02/2018 3.4  1.8 - 8.0 K/UL Final    ABS. LYMPHOCYTES 04/02/2018 1.6  0.8 - 3.5 K/UL Final    ABS. MONOCYTES 04/02/2018 0.4  0.0 - 1.0 K/UL Final    ABS. EOSINOPHILS 04/02/2018 0.0  0.0 - 0.4 K/UL Final    ABS. BASOPHILS 04/02/2018 0.0  0.0 - 0.1 K/UL Final    ABS. IMM.  GRANS. 04/02/2018 0.0  0.00 - 0.04 K/UL Final    DF 04/02/2018 AUTOMATED    Final    Sodium 04/02/2018 134* 136 - 145 mmol/L Final    Potassium 04/02/2018 3.9  3.5 - 5.1 mmol/L Final    Chloride 04/02/2018 97  97 - 108 mmol/L Final    CO2 04/02/2018 30  21 - 32 mmol/L Final    Anion gap 04/02/2018 7  5 - 15 mmol/L Final    Glucose 04/02/2018 115* 65 - 100 mg/dL Final    BUN 04/02/2018 17  6 - 20 MG/DL Final    Creatinine 04/02/2018 1.32* 0.55 - 1.02 MG/DL Final    BUN/Creatinine ratio 04/02/2018 13  12 - 20   Final    GFR est AA 04/02/2018 50* >60 ml/min/1.73m2 Final    GFR est non-AA 04/02/2018 41* >60 ml/min/1.73m2 Final    Calcium 04/02/2018 9.5  8.5 - 10.1 MG/DL Final    Bilirubin, total 04/02/2018 0.4  0.2 - 1.0 MG/DL Final    ALT (SGPT) 04/02/2018 24  12 - 78 U/L Final    AST (SGOT) 04/02/2018 14* 15 - 37 U/L Final    Alk.  phosphatase 04/02/2018 55  45 - 117 U/L Final    Protein, total 04/02/2018 7.4  6.4 - 8.2 g/dL Final    Albumin 04/02/2018 3.9  3.5 - 5.0 g/dL Final    Globulin 04/02/2018 3.5  2.0 - 4.0 g/dL Final    A-G Ratio 04/02/2018 1.1  1.1 - 2.2   Final    Lipase 04/02/2018 132  73 - 393 U/L Final    AMPHETAMINES 04/02/2018 NEGATIVE   NEG   Final    BARBITURATES 04/02/2018 NEGATIVE   NEG   Final    BENZODIAZEPINES 04/02/2018 POSITIVE* NEG   Final    COCAINE 04/02/2018 NEGATIVE   NEG   Final    METHADONE 04/02/2018 NEGATIVE   NEG   Final    OPIATES 04/02/2018 POSITIVE* NEG   Final    PCP(PHENCYCLIDINE) 04/02/2018 NEGATIVE   NEG   Final    THC (TH-CANNABINOL) 04/02/2018 NEGATIVE   NEG   Final    Drug screen comment 04/02/2018 (NOTE)   Final    ALCOHOL(ETHYL),SERUM 04/02/2018 <10  <10 MG/DL Final    Color 04/02/2018 YELLOW/STRAW    Final    Appearance 04/02/2018 CLEAR  CLEAR   Final    Specific gravity 04/02/2018 1.023  1.003 - 1.030   Final    pH (UA) 04/02/2018 6.5  5.0 - 8.0   Final    Protein 04/02/2018 NEGATIVE   NEG mg/dL Final    Glucose 04/02/2018 NEGATIVE   NEG mg/dL Final    Ketone 04/02/2018 NEGATIVE   NEG mg/dL Final    Bilirubin 04/02/2018 NEGATIVE   NEG   Final    Blood 04/02/2018 NEGATIVE   NEG   Final    Urobilinogen 04/02/2018 0.2  0.2 - 1.0 EU/dL Final    Nitrites 04/02/2018 NEGATIVE   NEG   Final    Leukocyte Esterase 04/02/2018 NEGATIVE   NEG   Final    WBC 04/02/2018 0-4  0 - 4 /hpf Final    RBC 04/02/2018 0-5  0 - 5 /hpf Final    Epithelial cells 04/02/2018 FEW  FEW /lpf Final    Bacteria 04/02/2018 NEGATIVE   NEG /hpf Final    UA:UC IF INDICATED 04/02/2018 CULTURE NOT INDICATED BY UA RESULT  CNI   Final    Mucus 04/02/2018 TRACE* NEG /lpf Final    Troponin-I, Qt. 04/02/2018 <0.04  <0.05 ng/mL Final    Magnesium 04/02/2018 1.8  1.6 - 2.4 mg/dL Final    Salicylate level 31/84/7832 2.1* 2.8 - 20.0 MG/DL Final    Acetaminophen level 04/02/2018 132* 10 - 30 ug/mL Final    Special Requests: 04/02/2018 NO SPECIAL REQUESTS    Final    [place] Count 04/02/2018 <1,000 CFU/ML    Final    Culture result: 04/02/2018 NO GROWTH 1 DAY    Final    Sodium 04/03/2018 137  136 - 145 mmol/L Final    Potassium 04/03/2018 3.1* 3.5 - 5.1 mmol/L Final    Chloride 04/03/2018 100  97 - 108 mmol/L Final    CO2 04/03/2018 25  21 - 32 mmol/L Final    Anion gap 04/03/2018 12  5 - 15 mmol/L Final    Glucose 04/03/2018 142* 65 - 100 mg/dL Final    BUN 04/03/2018 16  6 - 20 MG/DL Final    Creatinine 04/03/2018 1.13* 0.55 - 1.02 MG/DL Final    BUN/Creatinine ratio 04/03/2018 14  12 - 20   Final    GFR est AA 04/03/2018 60* >60 ml/min/1.73m2 Final    GFR est non-AA 04/03/2018 49* >60 ml/min/1.73m2 Final    Calcium 04/03/2018 8.1* 8.5 - 10.1 MG/DL Final    Bilirubin, total 04/03/2018 0.3  0.2 - 1.0 MG/DL Final    ALT (SGPT) 04/03/2018 19  12 - 78 U/L Final    AST (SGOT) 04/03/2018 12* 15 - 37 U/L Final    Alk.  phosphatase 04/03/2018 42* 45 - 117 U/L Final    Protein, total 04/03/2018 6.4  6.4 - 8.2 g/dL Final    Albumin 04/03/2018 3.1* 3.5 - 5.0 g/dL Final    Globulin 04/03/2018 3.3  2.0 - 4.0 g/dL Final    A-G Ratio 04/03/2018 0.9* 1.1 - 2.2   Final    WBC 04/03/2018 7.0  3.6 - 11.0 K/uL Final    RBC 04/03/2018 4.39  3.80 - 5.20 M/uL Final    HGB 04/03/2018 13.3  11.5 - 16.0 g/dL Final    HCT 04/03/2018 40.7  35.0 - 47.0 % Final    MCV 04/03/2018 92.7  80.0 - 99.0 FL Final    MCH 04/03/2018 30.3  26.0 - 34.0 PG Final    MCHC 04/03/2018 32.7  30.0 - 36.5 g/dL Final    RDW 04/03/2018 12.8  11.5 - 14.5 % Final    PLATELET 54/57/3732 076  150 - 400 K/uL Final    MPV 04/03/2018 10.5  8.9 - 12.9 FL Final    NRBC 04/03/2018 0.0  0  WBC Final    ABSOLUTE NRBC 04/03/2018 0.00  0.00 - 0.01 K/uL Final    Acetaminophen level 04/03/2018 8* 10 - 30 ug/mL Final    Sodium 04/03/2018 139  136 - 145 mmol/L Final    Potassium 04/03/2018 2.7* 3.5 - 5.1 mmol/L Final    Chloride 04/03/2018 101  97 - 108 mmol/L Final    CO2 04/03/2018 28  21 - 32 mmol/L Final    Anion gap 04/03/2018 10  5 - 15 mmol/L Final    Glucose 04/03/2018 87  65 - 100 mg/dL Final    BUN 04/03/2018 16  6 - 20 MG/DL Final    Creatinine 04/03/2018 1.34* 0.55 - 1.02 MG/DL Final    BUN/Creatinine ratio 04/03/2018 12  12 - 20   Final    GFR est AA 04/03/2018 49* >60 ml/min/1.73m2 Final    GFR est non-AA 04/03/2018 40* >60 ml/min/1.73m2 Final    Calcium 04/03/2018 7.7* 8.5 - 10.1 MG/DL Final    Bilirubin, total 04/03/2018 0.2  0.2 - 1.0 MG/DL Final    ALT (SGPT) 04/03/2018 22  12 - 78 U/L Final    AST (SGOT) 04/03/2018 14* 15 - 37 U/L Final    Alk.  phosphatase 04/03/2018 43* 45 - 117 U/L Final    Protein, total 04/03/2018 6.0* 6.4 - 8.2 g/dL Final    Albumin 04/03/2018 3.0* 3.5 - 5.0 g/dL Final    Globulin 04/03/2018 3.0  2.0 - 4.0 g/dL Final    A-G Ratio 04/03/2018 1.0* 1.1 - 2.2   Final    Sodium 04/04/2018 140  136 - 145 mmol/L Final    Potassium 04/04/2018 3.1* 3.5 - 5.1 mmol/L Final    Chloride 04/04/2018 108  97 - 108 mmol/L Final    CO2 04/04/2018 26  21 - 32 mmol/L Final    Anion gap 04/04/2018 6  5 - 15 mmol/L Final    Glucose 04/04/2018 88  65 - 100 mg/dL Final    BUN 04/04/2018 12  6 - 20 MG/DL Final    Creatinine 04/04/2018 0.67  0.55 - 1.02 MG/DL Final    BUN/Creatinine ratio 04/04/2018 18  12 - 20   Final    GFR est AA 04/04/2018 >60  >60 ml/min/1.73m2 Final    GFR est non-AA 04/04/2018 >60  >60 ml/min/1.73m2 Final    Calcium 04/04/2018 7.5* 8.5 - 10.1 MG/DL Final    Sodium 04/05/2018 142  136 - 145 mmol/L Final    Potassium 04/05/2018 3.7  3.5 - 5.1 mmol/L Final    Chloride 04/05/2018 111* 97 - 108 mmol/L Final    CO2 04/05/2018 26  21 - 32 mmol/L Final    Anion gap 04/05/2018 5  5 - 15 mmol/L Final    Glucose 04/05/2018 88  65 - 100 mg/dL Final    BUN 04/05/2018 9  6 - 20 MG/DL Final    Creatinine 04/05/2018 0.50* 0.55 - 1.02 MG/DL Final    BUN/Creatinine ratio 04/05/2018 18  12 - 20   Final    GFR est AA 04/05/2018 >60  >60 ml/min/1.73m2 Final    GFR est non-AA 04/05/2018 >60  >60 ml/min/1.73m2 Final    Calcium 04/05/2018 7.5* 8.5 - 10.1 MG/DL Final    Magnesium 04/05/2018 1.6  1.6 - 2.4 mg/dL Final        RADIOLOGY REPORTS:    Results from Hospital Encounter encounter on 04/02/18   XR CHEST PORT   Narrative Clinical indication: OD. Portable AP supine view of the chest is obtained, comparison February 12. Infusion catheter is in place, inspiration is shallow. Surgical clips left  axilla. No acute infiltrate. Impression impression: Shallow inspiration, no acute intrathoracic findings. Xr Shoulder Lt Ap/lat Min 2 V    Result Date: 3/9/2018  EXAM:  XR SHOULDER LT AP/LAT MIN 2 V INDICATION:   L shoulder pain. COMPARISON: None. FINDINGS: Three views of the left shoulder demonstrate no fracture, dislocation or other acute abnormality. There is no rotator cuff calcification. No significant arthritis is apparent. IMPRESSION:  No acute abnormality. Xr Chest Port    Result Date: 4/2/2018  Clinical indication: OD.  Portable AP supine view of the chest is obtained, comparison February 12. Infusion catheter is in place, inspiration is shallow. Surgical clips left axilla. No acute infiltrate. impression: Shallow inspiration, no acute intrathoracic findings. Xr Chest Port    Result Date: 2/12/2018  EXAM:  XR CHEST PORT INDICATION:  Chest pain, rib tenderness, fever, nausea, and vomiting. Symptoms present since 10:15 PM last night. History of breast cancer metastatic to bone including ribs and sternum COMPARISON:  2/3/2016 FINDINGS: A portable AP radiograph of the chest was obtained at 0 900 hours. The patient is on a cardiac monitor. The Port-A-Cath tip terminates at the caval atrial junction. Left axillary clips are present. There is no infiltrate. At least one 4 mm nodule is noted at the right lung base, stable as compared to 2016 and dense. The cardiac and mediastinal contours and pulmonary vascularity are normal.  The bones and soft tissues are grossly within normal limits. IMPRESSION: No acute cardiopulmonary process seen. Possible right lower lobe granuloma              MEDICATIONS       ALL MEDICATIONS  Current Facility-Administered Medications   Medication Dose Route Frequency    venlafaxine (EFFEXOR) tablet 150 mg  150 mg Oral BID    carvedilol (COREG) tablet 12.5 mg  12.5 mg Oral BID WITH MEALS    [START ON 4/7/2018] aspirin delayed-release tablet 81 mg  81 mg Oral DAILY    docusate sodium (COLACE) capsule 100 mg  100 mg Oral QHS    . PHARMACY TO SUBSTITUTE PER PROTOCOL    Per Protocol    [START ON 4/9/2412] folic acid (FOLVITE) tablet 1 mg  1 mg Oral DAILY    [START ON 4/7/2018] thiamine (B-1) tablet 100 mg  100 mg Oral DAILY    diazePAM (VALIUM) tablet 20 mg  20 mg Oral Q1H    LORazepam (ATIVAN) tablet 2 mg  2 mg Oral Q1H PRN    LORazepam (ATIVAN) tablet 4 mg  4 mg Oral Q1H PRN    prochlorperazine (COMPAZINE) tablet 10 mg  10 mg Oral Q6H PRN    [START ON 4/7/2018] meloxicam (MOBIC) tablet 15 mg  15 mg Oral DAILY    traMADol (ULTRAM) tablet 50 mg  50 mg Oral BID PRN    lidocaine (LIDODERM) 5 % patch 2 Patch  2 Patch TransDERmal Q24H    ziprasidone (GEODON) 20 mg in sterile water (preservative free) 1 mL injection  20 mg IntraMUSCular BID PRN    OLANZapine (ZyPREXA) tablet 5 mg  5 mg Oral Q6H PRN    benztropine (COGENTIN) tablet 2 mg  2 mg Oral BID PRN    benztropine (COGENTIN) injection 2 mg  2 mg IntraMUSCular BID PRN    zolpidem (AMBIEN) tablet 10 mg  10 mg Oral QHS PRN    acetaminophen (TYLENOL) tablet 650 mg  650 mg Oral Q4H PRN    ibuprofen (MOTRIN) tablet 400 mg  400 mg Oral Q8H PRN    magnesium hydroxide (MILK OF MAGNESIA) 400 mg/5 mL oral suspension 30 mL  30 mL Oral DAILY PRN    nicotine (NICODERM CQ) 21 mg/24 hr patch 1 Patch  1 Patch TransDERmal DAILY PRN      SCHEDULED MEDICATIONS  Current Facility-Administered Medications   Medication Dose Route Frequency    venlafaxine (EFFEXOR) tablet 150 mg  150 mg Oral BID    carvedilol (COREG) tablet 12.5 mg  12.5 mg Oral BID WITH MEALS    [START ON 4/7/2018] aspirin delayed-release tablet 81 mg  81 mg Oral DAILY    docusate sodium (COLACE) capsule 100 mg  100 mg Oral QHS    [START ON 5/4/7192] folic acid (FOLVITE) tablet 1 mg  1 mg Oral DAILY    [START ON 4/7/2018] thiamine (B-1) tablet 100 mg  100 mg Oral DAILY    diazePAM (VALIUM) tablet 20 mg  20 mg Oral Q1H    [START ON 4/7/2018] meloxicam (MOBIC) tablet 15 mg  15 mg Oral DAILY    lidocaine (LIDODERM) 5 % patch 2 Patch  2 Patch TransDERmal Q24H                ASSESSMENT & PLAN        The patient, Paulina Souza, is a 63 y.o.  female who presents at this time for treatment of the following diagnoses:  Patient Active Hospital Problem List:   Major depression (4/5/2018)    Assessment: sadness, hopelessness, helplesness, poor energy and insomnia and fatigue. Plan: Taper off of Effexor. Start prozac upward titration. Valium loading, and CIWA with Ativan PRN, SZ precaution. NON opiate pain management.                A coordinated, multidisplinary treatment team (includes the nurse, unit pharmcist,  and writer) round was conducted for this initial evaluation with the patient present. The following regarding medications was addressed during rounds with patient: valium   the risks and benefits of the proposed medication. The patient was given the opportunity to ask questions. Informed consent given to the use of the above medications. I will continue to adjust psychiatric and non-psychiatric medications (see above \"medication\" section and orders section for details) as deemed appropriate & based upon diagnoses and response to treatment. I have reviewed admission (and previous/old) labs and medical tests in the EHR and or transferring hospital documents. I will continue to order blood tests/labs and diagnostic tests as deemed appropriate and review results as they become available (see orders for details). I have reviewed old psychiatric and medical records available in the EHR. I Will order additional psychiatric records from other institutions to further elucidate the nature of patient's psychopathology and review once available. I will gather additional collateral information from friends, family and o/p treatment team to further elucidate the nature of patient's psychopathology and baselline level of psychiatric functioning.       ESTIMATED LENGTH OF STAY:    5-7 days        STRENGTHS:  Access to housing/residential stability, Knowledge of medications and Awareness of Substance abuse issues                                        SIGNED:    Iván Barrera MD  4/6/2018

## 2018-04-06 NOTE — CONSULTS
Medical Consult    Subjective:   HPI  Ada Covarrubias is a 63 y.o.  female who is admitted in psychiatric unit for attempted suicide. She says it was a willfull act and tried to overdose. She has complicated medical history of metastatic breast cancer to bone. She says she just got frustrated that she had nowhere to live as her place is taking forever to get fixed. She has few other complaints today. She had been put on pain meds by me for her sternal metastases pain. She had a low dose Duragesic patch pulled off her last night. She is off her BP meds for now as her BP is OK. Admitted and medically cleared at [facility] before coming to [facility] [place]. .   Past Medical History:   Diagnosis Date    Anxiety     Cancer ([address]     breast    Depression     GERD (gastroesophageal reflux disease)     HTN (hypertension)     Hypercholesterolemia     Hypotension 9/12/2012    Metastatic breast cancer ([facility] 5/3/2017    Secondary cancer of bone ([facility] 5/3/2017      Past Surgical History:   Procedure Laterality Date    BREAST SURGERY PROCEDURE UNLISTED  march 13    carina masectomy     Family History   Problem Relation Age of Onset    Hypertension Mother     Heart Disease Mother     Depression Mother     Hypertension Father       Social History   Substance Use Topics    Smoking status: Former Smoker     Packs/day: 0.50     Years: 20.00    Smokeless tobacco: Never Used    Alcohol use No       Prior to Admission medications    Medication Sig Start Date End Date Taking? Authorizing Provider   aspirin delayed-release 81 mg tablet Take 1 Tab by mouth daily. 2/19/18  Yes Jose Dash MD   docusate sodium (COLACE) 100 mg capsule Take 1 Cap by mouth nightly for 90 days. 2/19/18 5/20/18 Yes Jose Dash MD   multivitamin (ONE A DAY) tablet Take 1 Tab by mouth daily. Yes Historical Provider   carvedilol (COREG) 12.5 mg tablet Take  by mouth two (2) times daily (with meals).    Yes Historical Provider   venlafaxine (EFFEXOR) 75 mg tablet Take 150 mg by mouth two (2) times a day. 5/31/11  Yes Historical Provider   fentaNYL (DURAGESIC) 25 mcg/hr PATCH 1 Patch by TransDERmal route every seventy-two (72) hours. Max Daily Amount: 1 Patch. 4/7/18   Nevin Steve MD   morphine IR (MS IR) 15 mg tablet Take 0.5 Tabs by mouth every four (4) hours as needed. Max Daily Amount: 45 mg. 4/5/18   Nevin Steve MD     Allergies   Allergen Reactions    Sulfa (Sulfonamide Antibiotics) Unknown (comments)    Wellbutrin [Bupropion Hcl] Unknown (comments)      Health Maintenance   Topic Date Due    DTaP/Tdap/Td series (1 - Tdap) 12/31/1978    PAP AKA CERVICAL CYTOLOGY  12/31/1978    Pneumococcal 19-64 Highest Risk (2 of 3 - PCV13) 02/06/2017    MEDICARE YEARLY EXAM  03/14/2018    COLONOSCOPY  05/31/2019    BREAST CANCER SCRN MAMMOGRAM  11/09/2019    Hepatitis C Screening  Completed    ZOSTER VACCINE AGE 60>  Completed    Influenza Age 5 to Adult  Completed       Review of Systems:  A comprehensive review of systems was negative except for that written in the History of Present Illness. Objective: Intake and Output:            Physical Exam:   Visit Vitals    /83 (BP Patient Position: At rest)    Pulse (!) 103    Temp 97.5 °F (36.4 °C)    Resp 20    Ht 5' 5\" (1.651 m)    Wt 198 lb (89.8 kg)    SpO2 95%    Breastfeeding No    BMI 32.95 kg/m2     Neck: supple, symmetrical, trachea midline, no adenopathy, thyroid: not enlarged, symmetric, no tenderness/mass/nodules, no carotid bruit and no JVD  Lungs: clear to auscultation bilaterally  Heart: regular rate and rhythm, S1, S2 normal, no murmur, click, rub or gallop  Abdomen: soft, non-tender. Bowel sounds normal. No masses,  no organomegaly  Extremities: extremities normal, atraumatic, no cyanosis or edema  Neurologic: Grossly normal         Data Review:   No results found for this or any previous visit (from the past 24 hour(s)).         Assessment:     Active Problems:    Major depression (4/5/2018)        Plan:     Hold BP meds for now  Hold pain meds    Signed By: Kaitlynn Ramirez MD     April 6, 2018

## 2018-04-06 NOTE — PROGRESS NOTES
1954  BP 75/45 P 80. Pt denies symptoms  Pt noted to have a phentanyl patch on her left shoulder. There is no order for patch. \"Read only\" order in [facility] unable to be scanned as it has been discontinued. Patch removed and dispose of by writer and [email or web address], LPN. Pt provided with a water jug and encouraged fluid intake. 2027 /62 P 92.

## 2018-04-06 NOTE — PROGRESS NOTES
[address] [facility] 60  Master Treatment Plan for Adonay Sheikh    Date Treatment Plan Initiated: 4/8/18    Treatment Plan Modalities:  Type of Modality Amount  (x minutes) Frequency (x/week) Duration (x days) Name of Responsible Staff   [address] [facility] meetings to encourage peer interactions 15 [address] psychotherapy to assist in building coping skills and internal controls 60 7 1 Alireza Hood   Therapeutic activity groups to build coping skills 60 7 1 Alireza Hood   Psychoeducation in group setting to address:   Medication education   15 [phone] [facility] skills         Relaxation techniques         Symptom management         Discharge planning   60 2 Magdalene Finnlonnie 115   60 1 1 volunteer   Recovery/AA/NA      volunteer   Physician medication management   13 7 1 Dr. Nahed Arita meeting/discharge planning   15 2 [address] [facility] and Shai Case                                          Problem: Depressed Mood (Adult/Pediatric) these goals will be met by 4/11/18  Goal: *STG: Participates in treatment plan  Outcome: Progressing Towards Goal  Out on unit engaged and pleasant. Mood and affect full range. Daily goal is to talk with tx team about length of stay. Goal: *STG: Verbalizes anger, guilt, and other feelings in a constructive manor  Outcome: Progressing Towards Goal  Grief regarding cancer, anxiety about future when discussing cancer  Goal: *STG: Attends activities and groups  Outcome: Progressing Towards Goal  engaged  Goal: *STG: Demonstrates reduction in symptoms and increase in insight into coping skills/future focused  Outcome: Progressing Towards Goal  Denies SI, no self harming behaviors. Symptoms such as hopelessness resolved. Insight into benefits of psychiatry follow up plans to continue to see psychiatrist declined to engaged in groups or support groups as outpatient.  States able to share with staff recently has helped   Goal: Interventions  Outcome: Progressing Towards Goal  Staff focus is on offering support and reassurnace

## 2018-04-07 PROCEDURE — 65220000003 HC RM SEMIPRIVATE PSYCH

## 2018-04-07 PROCEDURE — 74011250637 HC RX REV CODE- 250/637: Performed by: PSYCHIATRY & NEUROLOGY

## 2018-04-07 RX ORDER — LIDOCAINE 50 MG/G
3 PATCH TOPICAL EVERY 24 HOURS
Status: DISCONTINUED | OUTPATIENT
Start: 2018-04-07 | End: 2018-04-10 | Stop reason: HOSPADM

## 2018-04-07 RX ORDER — SENNOSIDES 8.6 MG/1
2 TABLET ORAL DAILY PRN
Status: DISCONTINUED | OUTPATIENT
Start: 2018-04-07 | End: 2018-04-10 | Stop reason: HOSPADM

## 2018-04-07 RX ADMIN — ASPIRIN 81 MG: 81 TABLET, COATED ORAL at 08:44

## 2018-04-07 RX ADMIN — HYDROCHLOROTHIAZIDE 25 MG: 25 TABLET ORAL at 08:42

## 2018-04-07 RX ADMIN — ACETAMINOPHEN 650 MG: 325 TABLET, FILM COATED ORAL at 21:50

## 2018-04-07 RX ADMIN — CARVEDILOL 12.5 MG: 12.5 TABLET, FILM COATED ORAL at 17:21

## 2018-04-07 RX ADMIN — CARVEDILOL 12.5 MG: 12.5 TABLET, FILM COATED ORAL at 10:15

## 2018-04-07 RX ADMIN — DOCUSATE SODIUM 100 MG: 100 CAPSULE, LIQUID FILLED ORAL at 08:44

## 2018-04-07 RX ADMIN — FLUOXETINE 10 MG: 10 CAPSULE ORAL at 08:42

## 2018-04-07 RX ADMIN — AMITRIPTYLINE HYDROCHLORIDE 25 MG: 25 TABLET, FILM COATED ORAL at 20:27

## 2018-04-07 RX ADMIN — MELOXICAM 15 MG: 7.5 TABLET ORAL at 08:44

## 2018-04-07 RX ADMIN — SENNOSIDES 17.2 MG: 8.6 TABLET, FILM COATED ORAL at 13:48

## 2018-04-07 RX ADMIN — LOSARTAN POTASSIUM 50 MG: 50 TABLET ORAL at 10:15

## 2018-04-07 RX ADMIN — DOCUSATE SODIUM 100 MG: 100 CAPSULE, LIQUID FILLED ORAL at 17:21

## 2018-04-07 RX ADMIN — THERA TABS 1 TABLET: TAB at 08:44

## 2018-04-07 RX ADMIN — VENLAFAXINE HYDROCHLORIDE 150 MG: 150 CAPSULE, EXTENDED RELEASE ORAL at 08:42

## 2018-04-07 NOTE — BH NOTES
PSYCHIATRIC PROGRESS NOTE         Patient Name  Deepti Quiros   Date of Birth 1955   [facility] [id number]   Medical Record Number  [id number]      Age  63 y.o. PCP Wilfredo Marvin MD   Admit date:  4/5/2018    Room Number  26/1  @ [facility]   Date of Service  4/7/2018          PSYCHOTHERAPY SESSION NOTE:  Length of psychotherapy session: 45 minutes    Main condition/diagnosis/issues treated during session today, 4/7/2018 : coping skills, anxiety mangement, sobriety    I employed Cognitive Behavioral therapy techniques, Reality-Oriented psychotherapy, as well as supportive psychotherapy in regards to various ongoing psychosocial stressors, including the following: pre-admission and current problems; housing issues; occupational issues; medical issues; and stress of hospitalization. Interpersonal relationship issues and psychodynamic conflicts explored. Attempts made to alleviate maladaptive patterns. We, also, worked on issues of denial & effects of substance dependency/use     Overall, patient is not progressing    Treatment Plan Update (reviewed an updated 4/7/2018) : I will modify psychotherapy tx plan by implementing more stress management strategies, building upon cognitive behavioral techniques, increasing coping skills, as well as shoring up psychological defenses). An extended energy and skill set was needed to engage pt in psychotherapy due to some of the following: resistiveness, complexity, negativity, confrontational nature, hostile behaviors, and/or severe abnormalities in thought processes/psychosis resulting in the loss of expressive/receptive language communication skills. E & M PROGRESS NOTE:         HISTORY       CC:  \"SI [id number] \"  HISTORY OF PRESENT ILLNESS/INTERVAL HISTORY:  (reviewed/updated 4/7/2018).   per initial evaluation:     Deepti Quiros presents/reports/evidences the following emotional symptoms today, 4/7/2018:depression and suicidal thoughts/threats. The above symptoms have been present for 4 months . These symptoms are of severe severity. The symptoms are ntermittent/ fleeting in nature. Additional symptomatology and features include anxiety. SIDE EFFECTS: (reviewed/updated 4/7/2018)  None reported or admitted to. No noted toxicity with use of Depakote/Tegretol/lithium/Clozaril/TCAs   ALLERGIES:(reviewed/updated 4/7/2018)  Allergies   Allergen Reactions    Sulfa (Sulfonamide Antibiotics) Unknown (comments)    Wellbutrin [Bupropion Hcl] Unknown (comments)      MEDICATIONS PRIOR TO ADMISSION:(reviewed/updated 4/7/2018)  Prescriptions Prior to Admission   Medication Sig    ALPRAZolam (XANAX) 1 mg tablet Take 1 mg by mouth four (4) times daily. Indications: ANXIETY WITH DEPRESSION    hydroCHLOROthiazide (HYDRODIURIL) 25 mg tablet Take 25 mg by mouth daily. Indications: hypertension    losartan (COZAAR) 50 mg tablet Take 50 mg by mouth daily. Indications: hypertension    docusate sodium (COLACE) 50 mg capsule Take 50 mg by mouth two (2) times a day. Indications: constipation    aspirin delayed-release 81 mg tablet Take 1 Tab by mouth daily.  multivitamin (ONE A DAY) tablet Take 1 Tab by mouth daily.  carvedilol (COREG) 12.5 mg tablet Take  by mouth two (2) times daily (with meals).  venlafaxine (EFFEXOR) 75 mg tablet Take 150 mg by mouth two (2) times a day.  fentaNYL (DURAGESIC) 25 mcg/hr PATCH 1 Patch by TransDERmal route every seventy-two (72) hours. Max Daily Amount: 1 Patch.  morphine IR (MS IR) 15 mg tablet Take 0.5 Tabs by mouth every four (4) hours as needed. Max Daily Amount: 45 mg. PAST MEDICAL HISTORY: Past medical history from the initial psychiatric evaluation has been reviewed (reviewed/updated 4/7/2018) with no additional updates (I asked patient and no additional past medical history provided).  Past Medical History:   Diagnosis Date    Anxiety     Cancer [facility])     breast    Depression  GERD (gastroesophageal reflux disease)     HTN (hypertension)     Hypercholesterolemia     Hypotension 9/12/2012    Metastatic breast cancer ([facility] 5/3/2017    Secondary cancer of bone ([facility] 5/3/2017     Past Surgical History:   Procedure Laterality Date    BREAST SURGERY PROCEDURE UNLISTED  march 13    carina masectomy      SOCIAL HISTORY: Social history from the initial psychiatric evaluation has been reviewed (reviewed/updated 4/7/2018) with no additional updates (I asked patient and no additional social history provided). Social History     Social History    Marital status: SINGLE     Spouse name: N/A    Number of children: N/A    Years of education: N/A     Occupational History    Not on file. Social History Main Topics    Smoking status: Former Smoker     Packs/day: 0.50     Years: 20.00    Smokeless tobacco: Never Used    Alcohol use No    Drug use: No    Sexual activity: No     Other Topics Concern    Not on file     Social History Narrative    63year old  female admitted for depression after suicide attempt on Xanax and opiods. Py is follwed by DR. Jose Mehta on the outside. She has breast and bone cancer. She is living by herself with no family supports. FAMILY HISTORY: Family history from the initial psychiatric evaluation has been reviewed (reviewed/updated 4/7/2018) with no additional updates (I asked patient and no additional family history provided).  Family History   Problem Relation Age of Onset    Hypertension Mother     Heart Disease Mother     Depression Mother     Hypertension Father        REVIEW OF SYSTEMS: (reviewed/updated 4/7/2018)  Appetite:no change from normal   Sleep: no change   All other Review of Systems: Psychological ROS: positive for - behavioral disorder  Respiratory ROS: no cough, shortness of breath, or wheezing  Cardiovascular ROS: no chest pain or dyspnea on exertion         [address]    [facility] FINDINGS ARE WITHIN NORMAL LIMITS (WNL) UNLESS OTHERWISE STATED BELOW. ( ALL OF THE BELOW CATEGORIES OF THE MSE HAVE BEEN REVIEWED (reviewed 4/7/2018) AND UPDATED AS DEEMED APPROPRIATE )  General Presentation age appropriate, cooperative   Orientation oriented to time, place and person   Vital Signs  See below (reviewed 4/7/2018); Vital Signs (BP, Pulse, & Temp) are within normal limits if not listed below.    Gait and Station Stable/steady, no ataxia   Musculoskeletal System No extrapyramidal symptoms (EPS); no abnormal muscular movements or Tardive Dyskinesia (TD); muscle strength and tone are within normal limits   Language No aphasia or dysarthria   Speech:  hyperverbal   Thought Processes logical; normal rate of thoughts; poor abstract reasoning/computation   Thought Associations goal directed   Thought Content free of delusions   Suicidal Ideations contracts for safety   Homicidal Ideations none   Mood:  anxious    Affect:  mood-congruent   Memory recent  fair   Memory remote:  fair   Concentration/Attention:  distractable   Fund of Knowledge average   Insight:  poor   Reliability poor   Judgment:  poor          VITALS:     Patient Vitals for the past 24 hrs:   Temp Pulse Resp BP SpO2   04/07/18 1425 98.3 °F (36.8 °C) 93 18 (!) 141/92 98 %   04/07/18 1013 - (!) 101 - 120/87 -   04/07/18 0841 - 97 - 109/89 -   04/07/18 0738 98.1 °F (36.7 °C) 100 18 110/82 100 %   04/06/18 2030 97.3 °F (36.3 °C) 97 16 117/79 98 %     Wt Readings from Last 3 Encounters:   04/05/18 89.8 kg (198 lb)   04/04/18 88.2 kg (194 lb 6.4 oz)   03/09/18 88.5 kg (195 lb)     Temp Readings from Last 3 Encounters:   04/07/18 98.3 °F (36.8 °C)   04/05/18 98.1 °F (36.7 °C)   02/19/18 98.3 °F (36.8 °C)     BP Readings from Last 3 Encounters:   04/07/18 (!) 141/92   04/05/18 115/56   03/02/18 134/84     Pulse Readings from Last 3 Encounters:   04/07/18 93   04/05/18 93   02/19/18 93            DATA     LABORATORY DATA:(reviewed/updated 4/7/2018)  No results found for this or any previous visit (from the past 24 hour(s)). No results found for: VALF2, VALAC, VALP, VALPR, DS6, CRBAM, CRBAMP, CARB2, XCRBAM  No results found for: LITHM   RADIOLOGY REPORTS:(reviewed/updated 4/7/2018)  Xr Shoulder Lt Ap/lat Min 2 V    Result Date: 3/9/2018  EXAM:  XR SHOULDER LT AP/LAT MIN 2 V INDICATION:   L shoulder pain. COMPARISON: None. FINDINGS: Three views of the left shoulder demonstrate no fracture, dislocation or other acute abnormality. There is no rotator cuff calcification. No significant arthritis is apparent. IMPRESSION:  No acute abnormality. Xr Chest Port    Result Date: 4/2/2018  Clinical indication: OD. Portable AP supine view of the chest is obtained, comparison February 12. Infusion catheter is in place, inspiration is shallow. Surgical clips left axilla. No acute infiltrate. impression: Shallow inspiration, no acute intrathoracic findings. Xr Chest Port    Result Date: 2/12/2018  EXAM:  XR CHEST PORT INDICATION:  Chest pain, rib tenderness, fever, nausea, and vomiting. Symptoms present since 10:15 PM last night. History of breast cancer metastatic to bone including ribs and sternum COMPARISON:  2/3/2016 FINDINGS: A portable AP radiograph of the chest was obtained at 0 900 hours. The patient is on a cardiac monitor. The Port-A-Cath tip terminates at the caval atrial junction. Left axillary clips are present. There is no infiltrate. At least one 4 mm nodule is noted at the right lung base, stable as compared to 2016 and dense. The cardiac and mediastinal contours and pulmonary vascularity are normal.  The bones and soft tissues are grossly within normal limits. IMPRESSION: No acute cardiopulmonary process seen.  Possible right lower lobe granuloma          MEDICATIONS     ALL MEDICATIONS:   Current Facility-Administered Medications   Medication Dose Route Frequency    lidocaine (LIDODERM) 5 % patch 3 Patch  3 Patch TransDERmal Q24H    senna (SENOKOT) tablet 17.2 mg  2 Tab Oral DAILY PRN    carvedilol (COREG) tablet 12.5 mg  12.5 mg Oral BID WITH MEALS    aspirin delayed-release tablet 81 mg  81 mg Oral DAILY    LORazepam (ATIVAN) tablet 2 mg  2 mg Oral Q1H PRN    LORazepam (ATIVAN) tablet 4 mg  4 mg Oral Q1H PRN    prochlorperazine (COMPAZINE) tablet 10 mg  10 mg Oral Q6H PRN    meloxicam (MOBIC) tablet 15 mg  15 mg Oral DAILY    traMADol (ULTRAM) tablet 50 mg  50 mg Oral BID PRN    therapeutic multivitamin (THERAGRAN) tablet 1 Tab  1 Tab Oral DAILY    docusate sodium (COLACE) capsule 100 mg  100 mg Oral BID    hydroCHLOROthiazide (HYDRODIURIL) tablet 25 mg  25 mg Oral DAILY    losartan (COZAAR) tablet 50 mg  50 mg Oral DAILY    venlafaxine-SR (EFFEXOR-XR) capsule 150 mg  150 mg Oral DAILY WITH BREAKFAST    [START ON 4/9/2018] venlafaxine-SR (EFFEXOR-XR) capsule 75 mg  75 mg Oral DAILY WITH BREAKFAST    [START ON 4/10/2018] venlafaxine-SR (EFFEXOR-XR) capsule 37.5 mg  37.5 mg Oral DAILY WITH BREAKFAST    FLUoxetine (PROzac) capsule 10 mg  10 mg Oral DAILY    [START ON 4/10/2018] FLUoxetine (PROzac) capsule 20 mg  20 mg Oral DAILY    amitriptyline (ELAVIL) tablet 25 mg  25 mg Oral QHS    ziprasidone (GEODON) 20 mg in sterile water (preservative free) 1 mL injection  20 mg IntraMUSCular BID PRN    OLANZapine (ZyPREXA) tablet 5 mg  5 mg Oral Q6H PRN    benztropine (COGENTIN) tablet 2 mg  2 mg Oral BID PRN    benztropine (COGENTIN) injection 2 mg  2 mg IntraMUSCular BID PRN    zolpidem (AMBIEN) tablet 10 mg  10 mg Oral QHS PRN    acetaminophen (TYLENOL) tablet 650 mg  650 mg Oral Q4H PRN    magnesium hydroxide (MILK OF MAGNESIA) 400 mg/5 mL oral suspension 30 mL  30 mL Oral DAILY PRN    nicotine (NICODERM CQ) 21 mg/24 hr patch 1 Patch  1 Patch TransDERmal DAILY PRN      SCHEDULED MEDICATIONS:   Current Facility-Administered Medications   Medication Dose Route Frequency    lidocaine (LIDODERM) 5 % patch 3 Patch  3 Patch TransDERmal Q24H    carvedilol (COREG) tablet 12.5 mg  12.5 mg Oral BID WITH MEALS    aspirin delayed-release tablet 81 mg  81 mg Oral DAILY    meloxicam (MOBIC) tablet 15 mg  15 mg Oral DAILY    therapeutic multivitamin (THERAGRAN) tablet 1 Tab  1 Tab Oral DAILY    docusate sodium (COLACE) capsule 100 mg  100 mg Oral BID    hydroCHLOROthiazide (HYDRODIURIL) tablet 25 mg  25 mg Oral DAILY    losartan (COZAAR) tablet 50 mg  50 mg Oral DAILY    venlafaxine-SR (EFFEXOR-XR) capsule 150 mg  150 mg Oral DAILY WITH BREAKFAST    [START ON 4/9/2018] venlafaxine-SR (EFFEXOR-XR) capsule 75 mg  75 mg Oral DAILY WITH BREAKFAST    [START ON 4/10/2018] venlafaxine-SR (EFFEXOR-XR) capsule 37.5 mg  37.5 mg Oral DAILY WITH BREAKFAST    FLUoxetine (PROzac) capsule 10 mg  10 mg Oral DAILY    [START ON 4/10/2018] FLUoxetine (PROzac) capsule 20 mg  20 mg Oral DAILY    amitriptyline (ELAVIL) tablet 25 mg  25 mg Oral QHS          ASSESSMENT & PLAN     DIAGNOSES REQUIRING ACTIVE TREATMENT AND MONITORING: (reviewed/updated 4/7/2018)  Patient Active Hospital Problem List:   Major depression (4/5/2018)    Assessment: sadness, helplessness, hopelessness, poor energy and insomnia     Plan: antideepressant- consider Prazosin for PTSD like symptoms              In summary, Ajith New is a 63 y.o.  female who presents with a severe exacerbation of the principal diagnosis of Major depression  Patient's condition is worsening/not improving/not stable . Patient requires continued inpatient hospitalization for further stabilization, safety monitoring and medication management. I will continue to coordinate the provision of individual, milieu, occupational, group, and substance abuse therapies to address target symptoms/diagnoses as deemed appropriate for the individual patient.   A coordinated, multidisplinary treatment team round was conducted with the patient (this team consists of the nurse, psychiatric unit pharmcist,  and writer). Complete current electronic health record for patient has been reviewed today including consultant notes, ancillary staff notes, nurses and psychiatric tech notes. Suicide risk assessment completed and patient deemed to be of low risk for suicide at this time. The following regarding medications was addressed during rounds with patient:   the risks and benefits of the proposed medication. The patient was given the opportunity to ask questions. Informed consent given to the use of the above medications. Will continue to adjust psychiatric and non-psychiatric medications (see above \"medication\" section and orders section for details) as deemed appropriate & based upon diagnoses and response to treatment. I will continue to order blood tests/labs and diagnostic tests as deemed appropriate and review results as they become available (see orders for details and above listed lab/test results). I will order psychiatric records from previous [facility] hospitals to further elucidate the nature of patient's psychopathology and review once available. I will gather additional collateral information from friends, family and o/p treatment team to further elucidate the nature of patient's psychopathology and baselline level of psychiatric functioning. I certify that this patient's inpatient psychiatric hospital services furnished since the previous certification were, and continue to be, required for treatment that could reasonably be expected to improve the patient's condition, or for diagnostic study, and that the patient continues to need, on a daily basis, active treatment furnished directly by or requiring the supervision of inpatient psychiatric facility personnel. In addition, the hospital records show that services furnished were intensive treatment services, admission or related services, or equivalent services.     EXPECTED DISCHARGE DATE/DAY: TBD     DISPOSITION: Home Signed By:   Marva Greene MD  4/7/2018

## 2018-04-07 NOTE — BH NOTES
Pt currently resting comfortably in bed. Respirations even and unlabored. NAD. Continue to monitor via 15 minute observation. 24 hour chart review completed. 0515-attempted to draw pts TSH, unable to obtain draw. Pt encouraged to hydrate and attempt can be made later.

## 2018-04-07 NOTE — INTERDISCIPLINARY ROUNDS
Behavioral Health Interdisciplinary Rounds     Patient Name: Laura Stanley  Age: 63 y.o. Room/Bed:  731/02  Primary Diagnosis: Major depression   Admission Status: Voluntary     Readmission within 30 days: no  Power of  in place: no  Patient requires a blocked bed: no          Reason for blocked bed: n/a    VTE Prophylaxis: Yes-ASA  Flu vaccine given : yes-PTA   Mobility needs/Fall risk: no    Nutritional Plan: no  Consults: no         Labs/Testing due today?: yes-TSH    Sleep hours: 6:15       Participation in Care/Groups:  no  Medication Compliant?: Yes  PRNS (last 24 hours): Pain    Restraints (last 24 hours):  no     CIWA (range last 24 hours): CIWA-Ar Total: 0   COWS (range last 24 hours): COWS Total: 0    Alcohol screening (AUDIT) completed -   AUDIT Score: 0     If applicable, date SBIRT discussed in treatment team AND documented:     Tobacco - patient is a smoker: Have You Used Tobacco in the Past 30 Days: Yes  Illegal Drugs use: Have You Used Any Illegal Substances Over the Past 12 Months: No    24 hour chart check complete: yes     Patient goal(s) for today: Contact supportive friends  Treatment team focus/goals: Continue medication regime  Progress note Patient is alert and oriented. She is attending groups. LOS:  2  Expected LOS: TBD    Financial concerns/prescription coverage:  Has insurance  Date of last family contact:       Family requesting physician contact today:  no  Discharge plan: Return to home when stabilized  Guns in the home:  No       Outpatient provider(s): No provider    Participating treatment team members:  Lauar Stanley, Dr[facility]; Dong Cisse

## 2018-04-08 LAB — TSH SERPL DL<=0.05 MIU/L-ACNC: 5.89 UIU/ML (ref 0.36–3.74)

## 2018-04-08 PROCEDURE — 65220000003 HC RM SEMIPRIVATE PSYCH

## 2018-04-08 PROCEDURE — 74011250637 HC RX REV CODE- 250/637: Performed by: PSYCHIATRY & NEUROLOGY

## 2018-04-08 PROCEDURE — 84443 ASSAY THYROID STIM HORMONE: CPT | Performed by: PSYCHIATRY & NEUROLOGY

## 2018-04-08 PROCEDURE — 36415 COLL VENOUS BLD VENIPUNCTURE: CPT | Performed by: PSYCHIATRY & NEUROLOGY

## 2018-04-08 RX ORDER — DIAZEPAM 10 MG/1
20 TABLET ORAL
Status: DISCONTINUED | OUTPATIENT
Start: 2018-04-08 | End: 2018-04-08

## 2018-04-08 RX ORDER — DIAZEPAM 10 MG/1
20 TABLET ORAL
Status: COMPLETED | OUTPATIENT
Start: 2018-04-08 | End: 2018-04-08

## 2018-04-08 RX ORDER — AMITRIPTYLINE HYDROCHLORIDE 50 MG/1
50 TABLET, FILM COATED ORAL
Status: DISCONTINUED | OUTPATIENT
Start: 2018-04-08 | End: 2018-04-10 | Stop reason: HOSPADM

## 2018-04-08 RX ADMIN — ASPIRIN 81 MG: 81 TABLET, COATED ORAL at 08:26

## 2018-04-08 RX ADMIN — CARVEDILOL 12.5 MG: 12.5 TABLET, FILM COATED ORAL at 08:27

## 2018-04-08 RX ADMIN — DIAZEPAM 20 MG: 10 TABLET ORAL at 14:27

## 2018-04-08 RX ADMIN — DOCUSATE SODIUM 100 MG: 100 CAPSULE, LIQUID FILLED ORAL at 17:08

## 2018-04-08 RX ADMIN — LORAZEPAM 2 MG: 2 TABLET ORAL at 05:12

## 2018-04-08 RX ADMIN — VENLAFAXINE HYDROCHLORIDE 150 MG: 150 CAPSULE, EXTENDED RELEASE ORAL at 08:26

## 2018-04-08 RX ADMIN — MAGNESIUM HYDROXIDE 30 ML: 400 SUSPENSION ORAL at 17:09

## 2018-04-08 RX ADMIN — ACETAMINOPHEN 650 MG: 325 TABLET, FILM COATED ORAL at 06:34

## 2018-04-08 RX ADMIN — FLUOXETINE 10 MG: 10 CAPSULE ORAL at 08:27

## 2018-04-08 RX ADMIN — DOCUSATE SODIUM 100 MG: 100 CAPSULE, LIQUID FILLED ORAL at 08:27

## 2018-04-08 RX ADMIN — AMITRIPTYLINE HYDROCHLORIDE 50 MG: 50 TABLET, FILM COATED ORAL at 20:52

## 2018-04-08 RX ADMIN — DIAZEPAM 20 MG: 10 TABLET ORAL at 13:04

## 2018-04-08 RX ADMIN — LORAZEPAM 2 MG: 2 TABLET ORAL at 19:37

## 2018-04-08 RX ADMIN — MELOXICAM 15 MG: 7.5 TABLET ORAL at 08:27

## 2018-04-08 RX ADMIN — CARVEDILOL 12.5 MG: 12.5 TABLET, FILM COATED ORAL at 17:08

## 2018-04-08 RX ADMIN — DIAZEPAM 20 MG: 10 TABLET ORAL at 12:15

## 2018-04-08 RX ADMIN — HYDROCHLOROTHIAZIDE 25 MG: 25 TABLET ORAL at 08:26

## 2018-04-08 RX ADMIN — LOSARTAN POTASSIUM 50 MG: 50 TABLET ORAL at 08:26

## 2018-04-08 RX ADMIN — THERA TABS 1 TABLET: TAB at 08:27

## 2018-04-08 NOTE — INTERDISCIPLINARY ROUNDS
Behavioral Health Interdisciplinary Rounds     Patient Name: Haley Kelly  Age: 63 y.o. Room/Bed:  731/02  Primary Diagnosis: Major depression   Admission Status: Voluntary     Readmission within 30 days: no  Power of  in place: no  Patient requires a blocked bed: no          Reason for blocked bed: n/a    VTE Prophylaxis: Yes, ASA  Flu vaccine given : yes -, PTA  Mobility needs/Fall risk: no    Nutritional Plan: no  Consults:          Labs/Testing due today?: yes   (TSH)   Drawn and sent    Sleep hours:    7 hrs. Participation in Care/Groups:  no  Medication Compliant?: Yes  PRNS (last 24 hours): Pain and Senokot    Restraints (last 24 hours):  no     CIWA (range last 24 hours): CIWA-Ar Total: 1   COWS (range last 24 hours): COWS Total: 0    Alcohol screening (AUDIT) completed -   AUDIT Score: 0     If applicable, date SBIRT discussed in treatment team AND documented:     Tobacco - patient is a smoker: Have You Used Tobacco in the Past 30 Days: Yes  Illegal Drugs use: Have You Used Any Illegal Substances Over the Past 12 Months: No    24 hour chart check complete: yes     Patient goal(s) for today:   Treatment team focus/goals:   Progress note     LOS:  3  Expected LOS:     Financial concerns/prescription coverage:    Date of last family contact:       Family requesting physician contact today:    Discharge plan:   Guns in the home:         Outpatient provider(s):     Participating treatment team members:  Natydarius Kelly, * (assigned SW),

## 2018-04-08 NOTE — BH NOTES
Pt is appropriate and cooperative with staff and peers   Watching TV in the DR socializing with her peers   Med/Meal compliant   No signs of distress noted

## 2018-04-08 NOTE — PROGRESS NOTES
Problem: Depressed Mood (Adult/Pediatric)  Goal: *STG: REMAINS SAFE IN HOSPITAL  Outcome: Progressing Towards Goal  Pt continues on q15 min checks and Standard Falls Precautions and Seizure Precautions for safety. CIWA scoring. 0130  CIWA = 1.      0458  CIWA = 8  VS obtained. MEWS =  1    PRN Medication Documentation    Specific patient behavior that led to need for PRN medication: CIWA 8  Staff interventions attempted prior to PRN being given: pt had been resting in bed;  CIWA scoring monitoring  PRN medication given:   0512     Ativan 2 mg po administered for CIWA = 8  Patient response/effectiveness of PRN medication:   Will reassess 1 hr    1757  Pt reassessed. CIWA = 6  HR = 88.       PRN Medication Documentation    Specific patient behavior that led to need for PRN medication: pt c/o headache, #6 of 10 (10 highest)  Staff interventions attempted prior to PRN being given: pt resting in bed, lights dim  PRN medication given:  0634  Tylenol 650 mg po administered for c/o headache  Patient response/effectiveness of PRN medication: will reassess

## 2018-04-08 NOTE — PROGRESS NOTES
1429- Pt completes loading dose Valium  20 mg po x 3. Pt alert oriented. Pt encouraged to rest at this time.

## 2018-04-08 NOTE — PROGRESS NOTES
Problem: Falls - Risk of  Goal: *Absence of Falls  Document Mary Ellen Fall Risk and appropriate interventions in the flowsheet.    Outcome: Progressing Towards Goal  Fall Risk Interventions:            Medication Interventions: Teach patient to arise slowly

## 2018-04-08 NOTE — BH NOTES
Clarified with Dr. Christine Abarca that patient is fact supposed to receive 3 loading doses of valium beginning at noon, each dose one hour apart

## 2018-04-08 NOTE — PROGRESS NOTES
Problem: Depressed Mood (Adult/Pediatric)  Goal: *STG: Complies with medication therapy  Outcome: Progressing Towards Goal  Patient is lying quietly in bed. Appears to be asleep. No respiratory distress noted. Will continue to monitor.

## 2018-04-08 NOTE — PROGRESS NOTES
Problem: Depressed Mood (Adult/Pediatric)  Goal: *STG: Verbalizes anger, guilt, and other feelings in a constructive manor  Outcome: Progressing Towards Goal  Pt verbalizing feelings in a constructive manor. Goal: *STG: Attends activities and groups  Outcome: Progressing Towards Goal  Encouraged pt to attend groups and express feelings and concerns. Goal: *STG: Complies with medication therapy  Outcome: Progressing Towards Goal  Pt took all scheduled medications this shift.

## 2018-04-08 NOTE — BH NOTES
0130  Writer to pt room to remove 3 Lidoderm patches. Pt has 1 patch on top of bedside table. Can't remember what she did with the other 2 patches \"threw away\" because ends were \"curling\" and bothering her. Pt, bed, trash bags in room and floor searched for patches. Pt verbalized understanding that when patches placed again in 12 hours, she is to come to nursing if she wants them removed. Pt verbalized understanding.     CIWA = 1

## 2018-04-08 NOTE — PROGRESS NOTES
Problem: Depressed Mood (Adult/Pediatric)  Goal: *STG: Complies with medication therapy  Outcome: Progressing Towards Goal  Patient is sitting in the dinning room; interacting with peers. No distress noted. Will continue to monitor.

## 2018-04-08 NOTE — BH NOTES
PRN Medication Documentation    Specific patient behavior that led to need for PRN medication: Anxious; CIWA 10  Staff interventions attempted prior to PRN being given: redirection  PRN medication given: Ativan 2mg  Patient response/effectiveness of PRN medication: tolerated

## 2018-04-08 NOTE — BH NOTES
PSYCHIATRIC PROGRESS NOTE         Patient Name  Eleanor Ibrahim   Date of Birth 1955   [facility] [id number]   Medical Record Number  [id number]      Age  63 y.o. PCP Anna Gutierrez MD   Admit date:  4/5/2018    Room Number  26/1  @ [facility]   Date of Service  4/8/2018          PSYCHOTHERAPY SESSION NOTE:  Length of psychotherapy session: 45 minutes    Main condition/diagnosis/issues treated during session today, 4/8/2018 : coping skills, anxiety mangement, sobriety    I employed Cognitive Behavioral therapy techniques, Reality-Oriented psychotherapy, as well as supportive psychotherapy in regards to various ongoing psychosocial stressors, including the following: pre-admission and current problems; housing issues; occupational issues; medical issues; and stress of hospitalization. Interpersonal relationship issues and psychodynamic conflicts explored. Attempts made to alleviate maladaptive patterns. We, also, worked on issues of denial & effects of substance dependency/use     Overall, patient is not progressing    Treatment Plan Update (reviewed an updated 4/8/2018) : I will modify psychotherapy tx plan by implementing more stress management strategies, building upon cognitive behavioral techniques, increasing coping skills, as well as shoring up psychological defenses). An extended energy and skill set was needed to engage pt in psychotherapy due to some of the following: resistiveness, complexity, negativity, confrontational nature, hostile behaviors, and/or severe abnormalities in thought processes/psychosis resulting in the loss of expressive/receptive language communication skills. E & M PROGRESS NOTE:         HISTORY       CC:  \"[id number] \"  HISTORY OF PRESENT ILLNESS/INTERVAL HISTORY:  (reviewed/updated 4/8/2018).   per initial evaluation:     Eleanor Ibrahim presents/reports/evidences the following emotional symptoms today, 4/8/2018:depression and suicidal thoughts/threats. The above symptoms have been present for 4 months . These symptoms are of severe severity. The symptoms are ntermittent/ fleeting in nature. Additional symptomatology and features include anxiety. 4/8/18- Pt is anxious and has med seeking behaviors. Completely denied any hx of substance abuse. SIDE EFFECTS: (reviewed/updated 4/8/2018)  None reported or admitted to. No noted toxicity with use of Depakote/Tegretol/lithium/Clozaril/TCAs   ALLERGIES:(reviewed/updated 4/8/2018)  Allergies   Allergen Reactions    Sulfa (Sulfonamide Antibiotics) Unknown (comments)    Wellbutrin [Bupropion Hcl] Unknown (comments)      MEDICATIONS PRIOR TO ADMISSION:(reviewed/updated 4/8/2018)  Prescriptions Prior to Admission   Medication Sig    ALPRAZolam (XANAX) 1 mg tablet Take 1 mg by mouth four (4) times daily. Indications: ANXIETY WITH DEPRESSION    hydroCHLOROthiazide (HYDRODIURIL) 25 mg tablet Take 25 mg by mouth daily. Indications: hypertension    losartan (COZAAR) 50 mg tablet Take 50 mg by mouth daily. Indications: hypertension    docusate sodium (COLACE) 50 mg capsule Take 50 mg by mouth two (2) times a day. Indications: constipation    aspirin delayed-release 81 mg tablet Take 1 Tab by mouth daily.  multivitamin (ONE A DAY) tablet Take 1 Tab by mouth daily.  carvedilol (COREG) 12.5 mg tablet Take  by mouth two (2) times daily (with meals).  venlafaxine (EFFEXOR) 75 mg tablet Take 150 mg by mouth two (2) times a day.  fentaNYL (DURAGESIC) 25 mcg/hr PATCH 1 Patch by TransDERmal route every seventy-two (72) hours. Max Daily Amount: 1 Patch.  morphine IR (MS IR) 15 mg tablet Take 0.5 Tabs by mouth every four (4) hours as needed. Max Daily Amount: 45 mg.       PAST MEDICAL HISTORY: Past medical history from the initial psychiatric evaluation has been reviewed (reviewed/updated 4/8/2018) with no additional updates (I asked patient and no additional past medical history provided). Past Medical History:   Diagnosis Date    Anxiety     Cancer [facility])     breast    Depression     GERD (gastroesophageal reflux disease)     HTN (hypertension)     Hypercholesterolemia     Hypotension 9/12/2012    Metastatic breast cancer ([facility] [address] 5/3/2017    Secondary cancer of bone ([facility] [address] 5/3/2017     Past Surgical History:   Procedure Laterality Date    BREAST SURGERY PROCEDURE UNLISTED  march 13    carina masectomy      SOCIAL HISTORY: Social history from the initial psychiatric evaluation has been reviewed (reviewed/updated 4/8/2018) with no additional updates (I asked patient and no additional social history provided). Social History     Social History    Marital status: SINGLE     Spouse name: N/A    Number of children: N/A    Years of education: N/A     Occupational History    Not on file. Social History Main Topics    Smoking status: Former Smoker     Packs/day: 0.50     Years: 20.00    Smokeless tobacco: Never Used    Alcohol use No    Drug use: No    Sexual activity: No     Other Topics Concern    Not on file     Social History Narrative    63year old  female admitted for depression after suicide attempt on Xanax and opiods. Py is follwed by DR. Amrais Barajas on the outside. She has breast and bone cancer. She is living by herself with no family supports. FAMILY HISTORY: Family history from the initial psychiatric evaluation has been reviewed (reviewed/updated 4/8/2018) with no additional updates (I asked patient and no additional family history provided).    Family History   Problem Relation Age of Onset    Hypertension Mother     Heart Disease Mother     Depression Mother     Hypertension Father        REVIEW OF SYSTEMS: (reviewed/updated 4/8/2018)  Appetite:no change from normal   Sleep: no change   All other Review of Systems: Psychological ROS: positive for - behavioral disorder  Respiratory ROS: no cough, shortness of breath, or wheezing  Cardiovascular ROS: no chest pain or dyspnea on exertion         [facility] ([facility] [place]    MSE FINDINGS ARE WITHIN NORMAL LIMITS (WNL) UNLESS OTHERWISE STATED BELOW. ( ALL OF THE BELOW CATEGORIES OF THE MSE HAVE BEEN REVIEWED (reviewed 4/8/2018) AND UPDATED AS DEEMED APPROPRIATE )  General Presentation age appropriate, cooperative   Orientation oriented to time, place and person   Vital Signs  See below (reviewed 4/8/2018); Vital Signs (BP, Pulse, & Temp) are within normal limits if not listed below.    Gait and Station Stable/steady, no ataxia   Musculoskeletal System No extrapyramidal symptoms (EPS); no abnormal muscular movements or Tardive Dyskinesia (TD); muscle strength and tone are within normal limits   Language No aphasia or dysarthria   Speech:  hyperverbal   Thought Processes logical; normal rate of thoughts; poor abstract reasoning/computation   Thought Associations goal directed   Thought Content free of delusions   Suicidal Ideations contracts for safety   Homicidal Ideations none   Mood:  anxious    Affect:  mood-congruent   Memory recent  fair   Memory remote:  fair   Concentration/Attention:  distractable   Fund of Knowledge average   Insight:  poor   Reliability poor   Judgment:  poor          VITALS:     Patient Vitals for the past 24 hrs:   Temp Pulse Resp BP SpO2   04/08/18 1136 97.1 °F (36.2 °C) 90 16 (!) 136/100 98 %   04/08/18 0811 97.8 °F (36.6 °C) 100 16 143/87 98 %   04/08/18 0635 - 88 16 - -   04/08/18 0505 97.2 °F (36.2 °C) 96 18 145/84 98 %   04/07/18 1925 97.8 °F (36.6 °C) 94 18 135/89 98 %   04/07/18 1721 - 87 - 120/86 -   04/07/18 1550 97.4 °F (36.3 °C) 86 18 120/60 99 %   04/07/18 1425 98.3 °F (36.8 °C) 93 18 (!) 141/92 98 %     Wt Readings from Last 3 Encounters:   04/08/18 86.2 kg (190 lb)   04/04/18 88.2 kg (194 lb 6.4 oz)   03/09/18 88.5 kg (195 lb)     Temp Readings from Last 3 Encounters:   04/08/18 97.1 °F (36.2 °C)   04/05/18 98.1 °F (36.7 °C)   02/19/18 98.3 °F (36.8 °C)     BP Readings from Last 3 Encounters:   04/08/18 (!) 136/100   04/05/18 115/56   03/02/18 134/84     Pulse Readings from Last 3 Encounters:   04/08/18 90   04/05/18 93   02/19/18 93            DATA     LABORATORY DATA:(reviewed/updated 4/8/2018)  Recent Results (from the past 24 hour(s))   TSH 3RD GENERATION    Collection Time: 04/08/18  5:30 AM   Result Value Ref Range    TSH 5.89 (H) 0.36 - 3.74 uIU/mL     No results found for: VALF2, VALAC, VALP, VALPR, DS6, CRBAM, CRBAMP, CARB2, XCRBAM  No results found for: LITHM   RADIOLOGY REPORTS:(reviewed/updated 4/8/2018)  Xr Shoulder Lt Ap/lat Min 2 V    Result Date: 3/9/2018  EXAM:  XR SHOULDER LT AP/LAT MIN 2 V INDICATION:   L shoulder pain. COMPARISON: None. FINDINGS: Three views of the left shoulder demonstrate no fracture, dislocation or other acute abnormality. There is no rotator cuff calcification. No significant arthritis is apparent. IMPRESSION:  No acute abnormality. Xr Chest Port    Result Date: 4/2/2018  Clinical indication: OD. Portable AP supine view of the chest is obtained, comparison February 12. Infusion catheter is in place, inspiration is shallow. Surgical clips left axilla. No acute infiltrate. impression: Shallow inspiration, no acute intrathoracic findings. Xr Chest Port    Result Date: 2/12/2018  EXAM:  XR CHEST PORT INDICATION:  Chest pain, rib tenderness, fever, nausea, and vomiting. Symptoms present since 10:15 PM last night. History of breast cancer metastatic to bone including ribs and sternum COMPARISON:  2/3/2016 FINDINGS: A portable AP radiograph of the chest was obtained at 0 900 hours. The patient is on a cardiac monitor. The Port-A-Cath tip terminates at the caval atrial junction. Left axillary clips are present. There is no infiltrate. At least one 4 mm nodule is noted at the right lung base, stable as compared to 2016 and dense.  The cardiac and mediastinal contours and pulmonary vascularity are normal. The bones and soft tissues are grossly within normal limits. IMPRESSION: No acute cardiopulmonary process seen.  Possible right lower lobe granuloma          MEDICATIONS     ALL MEDICATIONS:   Current Facility-Administered Medications   Medication Dose Route Frequency    amitriptyline (ELAVIL) tablet 50 mg  50 mg Oral QHS    diazePAM (VALIUM) tablet 20 mg  20 mg Oral Q1H    lidocaine (LIDODERM) 5 % patch 3 Patch  3 Patch TransDERmal Q24H    senna (SENOKOT) tablet 17.2 mg  2 Tab Oral DAILY PRN    carvedilol (COREG) tablet 12.5 mg  12.5 mg Oral BID WITH MEALS    aspirin delayed-release tablet 81 mg  81 mg Oral DAILY    LORazepam (ATIVAN) tablet 2 mg  2 mg Oral Q1H PRN    LORazepam (ATIVAN) tablet 4 mg  4 mg Oral Q1H PRN    prochlorperazine (COMPAZINE) tablet 10 mg  10 mg Oral Q6H PRN    meloxicam (MOBIC) tablet 15 mg  15 mg Oral DAILY    traMADol (ULTRAM) tablet 50 mg  50 mg Oral BID PRN    therapeutic multivitamin (THERAGRAN) tablet 1 Tab  1 Tab Oral DAILY    docusate sodium (COLACE) capsule 100 mg  100 mg Oral BID    hydroCHLOROthiazide (HYDRODIURIL) tablet 25 mg  25 mg Oral DAILY    losartan (COZAAR) tablet 50 mg  50 mg Oral DAILY    [START ON 4/9/2018] venlafaxine-SR (EFFEXOR-XR) capsule 75 mg  75 mg Oral DAILY WITH BREAKFAST    [START ON 4/10/2018] venlafaxine-SR (EFFEXOR-XR) capsule 37.5 mg  37.5 mg Oral DAILY WITH BREAKFAST    FLUoxetine (PROzac) capsule 10 mg  10 mg Oral DAILY    [START ON 4/10/2018] FLUoxetine (PROzac) capsule 20 mg  20 mg Oral DAILY    ziprasidone (GEODON) 20 mg in sterile water (preservative free) 1 mL injection  20 mg IntraMUSCular BID PRN    OLANZapine (ZyPREXA) tablet 5 mg  5 mg Oral Q6H PRN    benztropine (COGENTIN) tablet 2 mg  2 mg Oral BID PRN    benztropine (COGENTIN) injection 2 mg  2 mg IntraMUSCular BID PRN    zolpidem (AMBIEN) tablet 10 mg  10 mg Oral QHS PRN    acetaminophen (TYLENOL) tablet 650 mg  650 mg Oral Q4H PRN    magnesium hydroxide (MILK OF MAGNESIA) 400 mg/5 mL oral suspension 30 mL  30 mL Oral DAILY PRN    nicotine (NICODERM CQ) 21 mg/24 hr patch 1 Patch  1 Patch TransDERmal DAILY PRN      SCHEDULED MEDICATIONS:   Current Facility-Administered Medications   Medication Dose Route Frequency    amitriptyline (ELAVIL) tablet 50 mg  50 mg Oral QHS    diazePAM (VALIUM) tablet 20 mg  20 mg Oral Q1H    lidocaine (LIDODERM) 5 % patch 3 Patch  3 Patch TransDERmal Q24H    carvedilol (COREG) tablet 12.5 mg  12.5 mg Oral BID WITH MEALS    aspirin delayed-release tablet 81 mg  81 mg Oral DAILY    meloxicam (MOBIC) tablet 15 mg  15 mg Oral DAILY    therapeutic multivitamin (THERAGRAN) tablet 1 Tab  1 Tab Oral DAILY    docusate sodium (COLACE) capsule 100 mg  100 mg Oral BID    hydroCHLOROthiazide (HYDRODIURIL) tablet 25 mg  25 mg Oral DAILY    losartan (COZAAR) tablet 50 mg  50 mg Oral DAILY    [START ON 4/9/2018] venlafaxine-SR (EFFEXOR-XR) capsule 75 mg  75 mg Oral DAILY WITH BREAKFAST    [START ON 4/10/2018] venlafaxine-SR (EFFEXOR-XR) capsule 37.5 mg  37.5 mg Oral DAILY WITH BREAKFAST    FLUoxetine (PROzac) capsule 10 mg  10 mg Oral DAILY    [START ON 4/10/2018] FLUoxetine (PROzac) capsule 20 mg  20 mg Oral DAILY          ASSESSMENT & PLAN     DIAGNOSES REQUIRING ACTIVE TREATMENT AND MONITORING: (reviewed/updated 4/8/2018)  Patient Active Hospital Problem List:   Major depression (4/5/2018)    Assessment: sadness, helplessness, hopelessness, poor energy and insomnia     Plan: antideepressant- consider Prazosin for PTSD like symptoms              In summary, Roland Santos, is a 63 y.o.  female who presents with a severe exacerbation of the principal diagnosis of Major depression  Patient's condition is worsening/not improving/not stable . Patient requires continued inpatient hospitalization for further stabilization, safety monitoring and medication management.   I will continue to coordinate the provision of individual, milieu, occupational, group, and substance abuse therapies to address target symptoms/diagnoses as deemed appropriate for the individual patient. A coordinated, multidisplinary treatment team round was conducted with the patient (this team consists of the nurse, psychiatric unit pharmcist,  and writer). Complete current electronic health record for patient has been reviewed today including consultant notes, ancillary staff notes, nurses and psychiatric tech notes. Suicide risk assessment completed and patient deemed to be of low risk for suicide at this time. The following regarding medications was addressed during rounds with patient:   the risks and benefits of the proposed medication. The patient was given the opportunity to ask questions. Informed consent given to the use of the above medications. Will continue to adjust psychiatric and non-psychiatric medications (see above \"medication\" section and orders section for details) as deemed appropriate & based upon diagnoses and response to treatment. I will continue to order blood tests/labs and diagnostic tests as deemed appropriate and review results as they become available (see orders for details and above listed lab/test results). I will order psychiatric records from previous [facility] hospitals to further elucidate the nature of patient's psychopathology and review once available. I will gather additional collateral information from friends, family and o/p treatment team to further elucidate the nature of patient's psychopathology and baselline level of psychiatric functioning.          I certify that this patient's inpatient psychiatric hospital services furnished since the previous certification were, and continue to be, required for treatment that could reasonably be expected to improve the patient's condition, or for diagnostic study, and that the patient continues to need, on a daily basis, active treatment furnished directly by or requiring the supervision of inpatient psychiatric facility personnel. In addition, the hospital records show that services furnished were intensive treatment services, admission or related services, or equivalent services.     EXPECTED DISCHARGE DATE/DAY: TBD     DISPOSITION: Home       Signed By:   Silvia Quick MD  4/8/2018

## 2018-04-08 NOTE — BH NOTES
GROUP THERAPY PROGRESS NOTE    Rory Misti is participating in reflection group. Group time: 25 minutes    Personal goal for participation:  Daily progress    Goal orientation: personal    Group therapy participation: active    Therapeutic interventions reviewed and discussed: Unit rules and regulations. 15 ways to be happy.      Impression of participation: active

## 2018-04-09 PROCEDURE — 65220000003 HC RM SEMIPRIVATE PSYCH

## 2018-04-09 PROCEDURE — 74011250637 HC RX REV CODE- 250/637: Performed by: PSYCHIATRY & NEUROLOGY

## 2018-04-09 RX ORDER — LOSARTAN POTASSIUM 50 MG/1
50 TABLET ORAL DAILY
Qty: 30 TAB | Refills: 0 | Status: SHIPPED | OUTPATIENT
Start: 2018-04-10 | End: 2018-04-11 | Stop reason: SDUPTHER

## 2018-04-09 RX ORDER — FLUOXETINE HYDROCHLORIDE 20 MG/1
20 CAPSULE ORAL DAILY
Qty: 30 CAP | Refills: 0 | Status: SHIPPED | OUTPATIENT
Start: 2018-04-10 | End: 2018-04-11 | Stop reason: SDUPTHER

## 2018-04-09 RX ORDER — LIDOCAINE 50 MG/G
PATCH TOPICAL
Qty: 90 EACH | Refills: 0 | Status: SHIPPED | OUTPATIENT
Start: 2018-04-09 | End: 2018-04-11 | Stop reason: SDUPTHER

## 2018-04-09 RX ORDER — ASPIRIN 81 MG/1
81 TABLET ORAL DAILY
Qty: 30 TAB | Refills: 0 | Status: SHIPPED | OUTPATIENT
Start: 2018-04-10 | End: 2018-04-11 | Stop reason: SDUPTHER

## 2018-04-09 RX ORDER — CARVEDILOL 12.5 MG/1
12.5 TABLET ORAL 2 TIMES DAILY WITH MEALS
Qty: 60 TAB | Refills: 0 | Status: SHIPPED | OUTPATIENT
Start: 2018-04-09 | End: 2018-04-11 | Stop reason: SDUPTHER

## 2018-04-09 RX ORDER — HYDROCHLOROTHIAZIDE 25 MG/1
25 TABLET ORAL DAILY
Qty: 30 TAB | Refills: 0 | Status: SHIPPED | OUTPATIENT
Start: 2018-04-10 | End: 2018-06-11 | Stop reason: SDUPTHER

## 2018-04-09 RX ORDER — AMITRIPTYLINE HYDROCHLORIDE 50 MG/1
50 TABLET, FILM COATED ORAL
Qty: 30 TAB | Refills: 0 | Status: SHIPPED | OUTPATIENT
Start: 2018-04-09 | End: 2018-04-11 | Stop reason: SDUPTHER

## 2018-04-09 RX ORDER — MELOXICAM 15 MG/1
15 TABLET ORAL DAILY
Qty: 30 TAB | Refills: 0 | Status: SHIPPED | OUTPATIENT
Start: 2018-04-10 | End: 2018-04-11 | Stop reason: SDUPTHER

## 2018-04-09 RX ADMIN — THERA TABS 1 TABLET: TAB at 08:17

## 2018-04-09 RX ADMIN — VENLAFAXINE HYDROCHLORIDE 75 MG: 37.5 CAPSULE, EXTENDED RELEASE ORAL at 08:19

## 2018-04-09 RX ADMIN — DOCUSATE SODIUM 100 MG: 100 CAPSULE, LIQUID FILLED ORAL at 08:19

## 2018-04-09 RX ADMIN — FLUOXETINE 10 MG: 10 CAPSULE ORAL at 08:19

## 2018-04-09 RX ADMIN — CARVEDILOL 12.5 MG: 12.5 TABLET, FILM COATED ORAL at 08:19

## 2018-04-09 RX ADMIN — DOCUSATE SODIUM 100 MG: 100 CAPSULE, LIQUID FILLED ORAL at 17:17

## 2018-04-09 RX ADMIN — MELOXICAM 15 MG: 7.5 TABLET ORAL at 08:19

## 2018-04-09 RX ADMIN — HYDROCHLOROTHIAZIDE 25 MG: 25 TABLET ORAL at 08:18

## 2018-04-09 RX ADMIN — ASPIRIN 81 MG: 81 TABLET, COATED ORAL at 08:18

## 2018-04-09 RX ADMIN — CARVEDILOL 12.5 MG: 12.5 TABLET, FILM COATED ORAL at 17:17

## 2018-04-09 RX ADMIN — AMITRIPTYLINE HYDROCHLORIDE 50 MG: 50 TABLET, FILM COATED ORAL at 21:48

## 2018-04-09 RX ADMIN — LOSARTAN POTASSIUM 50 MG: 50 TABLET ORAL at 08:19

## 2018-04-09 NOTE — INTERDISCIPLINARY ROUNDS
Behavioral Health Interdisciplinary Rounds     Patient Name: Jessica Lawrence  Age: 63 y.o. Room/Bed:  731/02  Primary Diagnosis: Major depression   Admission Status: Voluntary     Readmission within 30 days: no  Power of  in place: no  Patient requires a blocked bed: no          Reason for blocked bed:     VTE Prophylaxis: Yes ASA  Flu vaccine given : yes PTA  Mobility needs/Fall risk: no    Nutritional Plan: no  Consults:          Labs/Testing due today?: no    Sleep hours:  6.45      Participation in Care/Groups:  no  Medication Compliant?: Yes  PRNS (last 24 hours): Antianxiety    Restraints (last 24 hours):  no     CIWA (range last 24 hours): CIWA-Ar Total: 3   COWS (range last 24 hours): COWS Total: 2    Alcohol screening (AUDIT) completed -   AUDIT Score: 0     If applicable, date SBIRT discussed in treatment team AND documented:     Tobacco - patient is a smoker: Have You Used Tobacco in the Past 30 Days: Yes  Illegal Drugs use: Have You Used Any Illegal Substances Over the Past 12 Months: No    24 hour chart check complete: yes     Patient goal(s) for today: Prepare for discharge tomorrow  Treatment team focus/goals: Schedule follow-up  Progress note     LOS:  4  Expected LOS: 5    Financial concerns/prescription coverage:  [employer]; [place] Medicaid  Date of last family contact:       Family requesting physician contact today:  No  Discharge plan: Return home  Guns in the home:  No       Outpatient provider(s): Dr. Len Alba    Participating treatment team members:  PB Townsend; Dr. Nabeel Barnes MD; Kaylee Brice RN; Joanie Hernandez, ShaniaD

## 2018-04-09 NOTE — PROGRESS NOTES
Problem: Depressed Mood (Adult/Pediatric)  Goal: *STG: Participates in treatment plan  Outcome: Progressing Towards Goal  Out on unit social and engaged w peers/staff. Mood and affect bright hopeful and reports decrease in anxiety. Denies SI, no self harming behaviors. Daily goal is d/c home.  Staff focus is on d/c planning and coping skills educaiton

## 2018-04-09 NOTE — PROGRESS NOTES
Problem: Depressed Mood (Adult/Pediatric)  Goal: *STG: Complies with medication therapy  Outcome: Progressing Towards Goal  Patient is sitting in the dinning room; interacting with peers. Alert, calm and cooperative. No distress noted. Will continue to monitor.

## 2018-04-09 NOTE — BH NOTES
PSYCHIATRIC PROGRESS NOTE         Patient Name  Gladys Yanes   Date of Birth 1955   [facility] [id number]   Medical Record Number  [id number]      Age  63 y.o. PCP Silvia Bennett MD   Admit date:  4/5/2018    Room Number  731/02  @ [facility]   Date of Service  4/9/2018          PSYCHOTHERAPY SESSION NOTE:  Length of psychotherapy session: 45 minutes    Main condition/diagnosis/issues treated during session today, 4/9/2018 : coping skills, anxiety mangement, sobriety    I employed Cognitive Behavioral therapy techniques, Reality-Oriented psychotherapy, as well as supportive psychotherapy in regards to various ongoing psychosocial stressors, including the following: pre-admission and current problems; housing issues; occupational issues; medical issues; and stress of hospitalization. Interpersonal relationship issues and psychodynamic conflicts explored. Attempts made to alleviate maladaptive patterns. We, also, worked on issues of denial & effects of substance dependency/use     Overall, patient is not progressing    Treatment Plan Update (reviewed an updated 4/9/2018) : I will modify psychotherapy tx plan by implementing more stress management strategies, building upon cognitive behavioral techniques, increasing coping skills, as well as shoring up psychological defenses). An extended energy and skill set was needed to engage pt in psychotherapy due to some of the following: resistiveness, complexity, negativity, confrontational nature, hostile behaviors, and/or severe abnormalities in thought processes/psychosis resulting in the loss of expressive/receptive language communication skills. E & M PROGRESS NOTE:         HISTORY       CC:  \"[id number] \"  HISTORY OF PRESENT ILLNESS/INTERVAL HISTORY:  (reviewed/updated 4/9/2018).   per initial evaluation:     Gladys Yanes presents/reports/evidences the following emotional symptoms today, 4/9/2018:depression and suicidal thoughts/threats. The above symptoms have been present for 4 months . These symptoms are of severe severity. The symptoms are ntermittent/ fleeting in nature. Additional symptomatology and features include anxiety. 4/8/18- Pt is anxious and has med seeking behaviors. Completely denied any hx of substance abuse. 4/9/18- Focused on benzodiazepine seeking, even though admits anxiety is better with current antidepressants. SIDE EFFECTS: (reviewed/updated 4/9/2018)  None reported or admitted to. No noted toxicity with use of Depakote/Tegretol/lithium/Clozaril/TCAs   ALLERGIES:(reviewed/updated 4/9/2018)  Allergies   Allergen Reactions    Sulfa (Sulfonamide Antibiotics) Unknown (comments)    Wellbutrin [Bupropion Hcl] Unknown (comments)      MEDICATIONS PRIOR TO ADMISSION:(reviewed/updated 4/9/2018)  Prescriptions Prior to Admission   Medication Sig    ALPRAZolam (XANAX) 1 mg tablet Take 1 mg by mouth four (4) times daily. Indications: ANXIETY WITH DEPRESSION    hydroCHLOROthiazide (HYDRODIURIL) 25 mg tablet Take 25 mg by mouth daily. Indications: hypertension    losartan (COZAAR) 50 mg tablet Take 50 mg by mouth daily. Indications: hypertension    docusate sodium (COLACE) 50 mg capsule Take 50 mg by mouth two (2) times a day. Indications: constipation    aspirin delayed-release 81 mg tablet Take 1 Tab by mouth daily.  multivitamin (ONE A DAY) tablet Take 1 Tab by mouth daily.  carvedilol (COREG) 12.5 mg tablet Take  by mouth two (2) times daily (with meals).  venlafaxine (EFFEXOR) 75 mg tablet Take 150 mg by mouth two (2) times a day.  fentaNYL (DURAGESIC) 25 mcg/hr PATCH 1 Patch by TransDERmal route every seventy-two (72) hours. Max Daily Amount: 1 Patch.  morphine IR (MS IR) 15 mg tablet Take 0.5 Tabs by mouth every four (4) hours as needed. Max Daily Amount: 45 mg.       PAST MEDICAL HISTORY: Past medical history from the initial psychiatric evaluation has been reviewed (reviewed/updated 4/9/2018) with no additional updates (I asked patient and no additional past medical history provided). Past Medical History:   Diagnosis Date    Anxiety     Cancer [facility])     breast    Depression     GERD (gastroesophageal reflux disease)     HTN (hypertension)     Hypercholesterolemia     Hypotension 9/12/2012    Metastatic breast cancer ([facility] [address] 5/3/2017    Secondary cancer of bone ([facility] [address] 5/3/2017     Past Surgical History:   Procedure Laterality Date    BREAST SURGERY PROCEDURE UNLISTED  march 13    carina masectomy      SOCIAL HISTORY: Social history from the initial psychiatric evaluation has been reviewed (reviewed/updated 4/9/2018) with no additional updates (I asked patient and no additional social history provided). Social History     Social History    Marital status: SINGLE     Spouse name: N/A    Number of children: N/A    Years of education: N/A     Occupational History    Not on file. Social History Main Topics    Smoking status: Former Smoker     Packs/day: 0.50     Years: 20.00    Smokeless tobacco: Never Used    Alcohol use No    Drug use: No    Sexual activity: No     Other Topics Concern    Not on file     Social History Narrative    63year old  female admitted for depression after suicide attempt on Xanax and opiods. Py is follwed by DR. Claudette Dodge on the outside. She has breast and bone cancer. She is living by herself with no family supports. FAMILY HISTORY: Family history from the initial psychiatric evaluation has been reviewed (reviewed/updated 4/9/2018) with no additional updates (I asked patient and no additional family history provided).    Family History   Problem Relation Age of Onset    Hypertension Mother     Heart Disease Mother     Depression Mother     Hypertension Father        REVIEW OF SYSTEMS: (reviewed/updated 4/9/2018)  Appetite:no change from normal   Sleep: no change   All other Review of Systems: Psychological ROS: positive for - behavioral disorder  Respiratory ROS: no cough, shortness of breath, or wheezing  Cardiovascular ROS: no chest pain or dyspnea on exertion         [facility] (MSE):    MSE FINDINGS ARE WITHIN NORMAL LIMITS (WNL) UNLESS OTHERWISE STATED BELOW. ( ALL OF THE BELOW CATEGORIES OF THE MSE HAVE BEEN REVIEWED (reviewed 4/9/2018) AND UPDATED AS DEEMED APPROPRIATE )  General Presentation age appropriate, cooperative   Orientation oriented to time, place and person   Vital Signs  See below (reviewed 4/9/2018); Vital Signs (BP, Pulse, & Temp) are within normal limits if not listed below.    Gait and Station Stable/steady, no ataxia   Musculoskeletal System No extrapyramidal symptoms (EPS); no abnormal muscular movements or Tardive Dyskinesia (TD); muscle strength and tone are within normal limits   Language No aphasia or dysarthria   Speech:  hyperverbal   Thought Processes logical; normal rate of thoughts; poor abstract reasoning/computation   Thought Associations goal directed   Thought Content free of delusions   Suicidal Ideations contracts for safety   Homicidal Ideations none   Mood:  anxious    Affect:  mood-congruent   Memory recent  fair   Memory remote:  fair   Concentration/Attention:  distractable   Fund of Knowledge average   Insight:  poor   Reliability poor   Judgment:  poor          VITALS:     Patient Vitals for the past 24 hrs:   Temp Pulse Resp BP SpO2   04/09/18 0817 98.3 °F (36.8 °C) 91 16 (!) 133/101 96 %   04/08/18 1948 97.2 °F (36.2 °C) 89 18 112/76 98 %   04/08/18 1541 97.5 °F (36.4 °C) 88 18 (!) 140/98 98 %     Wt Readings from Last 3 Encounters:   04/08/18 86.2 kg (190 lb)   04/04/18 88.2 kg (194 lb 6.4 oz)   03/09/18 88.5 kg (195 lb)     Temp Readings from Last 3 Encounters:   04/09/18 98.3 °F (36.8 °C)   04/05/18 98.1 °F (36.7 °C)   02/19/18 98.3 °F (36.8 °C)     BP Readings from Last 3 Encounters:   04/09/18 (!) 133/101   04/05/18 115/56 03/02/18 134/84     Pulse Readings from Last 3 Encounters:   04/09/18 91   04/05/18 93   02/19/18 93            DATA     LABORATORY DATA:(reviewed/updated 4/9/2018)  No results found for this or any previous visit (from the past 24 hour(s)). No results found for: VALF2, VALAC, VALP, VALPR, DS6, CRBAM, CRBAMP, CARB2, XCRBAM  No results found for: LITHM   RADIOLOGY REPORTS:(reviewed/updated 4/9/2018)  Xr Shoulder Lt Ap/lat Min 2 V    Result Date: 3/9/2018  EXAM:  XR SHOULDER LT AP/LAT MIN 2 V INDICATION:   L shoulder pain. COMPARISON: None. FINDINGS: Three views of the left shoulder demonstrate no fracture, dislocation or other acute abnormality. There is no rotator cuff calcification. No significant arthritis is apparent. IMPRESSION:  No acute abnormality. Xr Chest Port    Result Date: 4/2/2018  Clinical indication: OD. Portable AP supine view of the chest is obtained, comparison February 12. Infusion catheter is in place, inspiration is shallow. Surgical clips left axilla. No acute infiltrate. impression: Shallow inspiration, no acute intrathoracic findings. Xr Chest Port    Result Date: 2/12/2018  EXAM:  XR CHEST PORT INDICATION:  Chest pain, rib tenderness, fever, nausea, and vomiting. Symptoms present since 10:15 PM last night. History of breast cancer metastatic to bone including ribs and sternum COMPARISON:  2/3/2016 FINDINGS: A portable AP radiograph of the chest was obtained at 0 900 hours. The patient is on a cardiac monitor. The Port-A-Cath tip terminates at the caval atrial junction. Left axillary clips are present. There is no infiltrate. At least one 4 mm nodule is noted at the right lung base, stable as compared to 2016 and dense. The cardiac and mediastinal contours and pulmonary vascularity are normal.  The bones and soft tissues are grossly within normal limits. IMPRESSION: No acute cardiopulmonary process seen.  Possible right lower lobe granuloma          MEDICATIONS     ALL MEDICATIONS:   Current Facility-Administered Medications   Medication Dose Route Frequency    amitriptyline (ELAVIL) tablet 50 mg  50 mg Oral QHS    lidocaine (LIDODERM) 5 % patch 3 Patch  3 Patch TransDERmal Q24H    senna (SENOKOT) tablet 17.2 mg  2 Tab Oral DAILY PRN    carvedilol (COREG) tablet 12.5 mg  12.5 mg Oral BID WITH MEALS    aspirin delayed-release tablet 81 mg  81 mg Oral DAILY    LORazepam (ATIVAN) tablet 2 mg  2 mg Oral Q1H PRN    LORazepam (ATIVAN) tablet 4 mg  4 mg Oral Q1H PRN    prochlorperazine (COMPAZINE) tablet 10 mg  10 mg Oral Q6H PRN    meloxicam (MOBIC) tablet 15 mg  15 mg Oral DAILY    traMADol (ULTRAM) tablet 50 mg  50 mg Oral BID PRN    therapeutic multivitamin (THERAGRAN) tablet 1 Tab  1 Tab Oral DAILY    docusate sodium (COLACE) capsule 100 mg  100 mg Oral BID    hydroCHLOROthiazide (HYDRODIURIL) tablet 25 mg  25 mg Oral DAILY    losartan (COZAAR) tablet 50 mg  50 mg Oral DAILY    [START ON 4/10/2018] venlafaxine-SR (EFFEXOR-XR) capsule 37.5 mg  37.5 mg Oral DAILY WITH BREAKFAST    [START ON 4/10/2018] FLUoxetine (PROzac) capsule 20 mg  20 mg Oral DAILY    ziprasidone (GEODON) 20 mg in sterile water (preservative free) 1 mL injection  20 mg IntraMUSCular BID PRN    OLANZapine (ZyPREXA) tablet 5 mg  5 mg Oral Q6H PRN    benztropine (COGENTIN) tablet 2 mg  2 mg Oral BID PRN    benztropine (COGENTIN) injection 2 mg  2 mg IntraMUSCular BID PRN    zolpidem (AMBIEN) tablet 10 mg  10 mg Oral QHS PRN    acetaminophen (TYLENOL) tablet 650 mg  650 mg Oral Q4H PRN    magnesium hydroxide (MILK OF MAGNESIA) 400 mg/5 mL oral suspension 30 mL  30 mL Oral DAILY PRN    nicotine (NICODERM CQ) 21 mg/24 hr patch 1 Patch  1 Patch TransDERmal DAILY PRN      SCHEDULED MEDICATIONS:   Current Facility-Administered Medications   Medication Dose Route Frequency    amitriptyline (ELAVIL) tablet 50 mg  50 mg Oral QHS    lidocaine (LIDODERM) 5 % patch 3 Patch  3 Patch TransDERmal Q24H    carvedilol (COREG) tablet 12.5 mg  12.5 mg Oral BID WITH MEALS    aspirin delayed-release tablet 81 mg  81 mg Oral DAILY    meloxicam (MOBIC) tablet 15 mg  15 mg Oral DAILY    therapeutic multivitamin (THERAGRAN) tablet 1 Tab  1 Tab Oral DAILY    docusate sodium (COLACE) capsule 100 mg  100 mg Oral BID    hydroCHLOROthiazide (HYDRODIURIL) tablet 25 mg  25 mg Oral DAILY    losartan (COZAAR) tablet 50 mg  50 mg Oral DAILY    [START ON 4/10/2018] venlafaxine-SR (EFFEXOR-XR) capsule 37.5 mg  37.5 mg Oral DAILY WITH BREAKFAST    [START ON 4/10/2018] FLUoxetine (PROzac) capsule 20 mg  20 mg Oral DAILY          ASSESSMENT & PLAN     DIAGNOSES REQUIRING ACTIVE TREATMENT AND MONITORING: (reviewed/updated 4/9/2018)  Patient Active Hospital Problem List:   Major depression (4/5/2018)    Assessment: sadness, helplessness, hopelessness, poor energy and insomnia     Plan: antideepressant- consider Prazosin for PTSD like symptoms              In summary, Milena Shah, is a 63 y.o.  female who presents with a severe exacerbation of the principal diagnosis of Major depression  Patient's condition is worsening/not improving/not stable . Patient requires continued inpatient hospitalization for further stabilization, safety monitoring and medication management. I will continue to coordinate the provision of individual, milieu, occupational, group, and substance abuse therapies to address target symptoms/diagnoses as deemed appropriate for the individual patient. A coordinated, multidisplinary treatment team round was conducted with the patient (this team consists of the nurse, psychiatric unit pharmcist,  and writer). Complete current electronic health record for patient has been reviewed today including consultant notes, ancillary staff notes, nurses and psychiatric tech notes. Suicide risk assessment completed and patient deemed to be of low risk for suicide at this time.      The following regarding medications was addressed during rounds with patient:   the risks and benefits of the proposed medication. The patient was given the opportunity to ask questions. Informed consent given to the use of the above medications. Will continue to adjust psychiatric and non-psychiatric medications (see above \"medication\" section and orders section for details) as deemed appropriate & based upon diagnoses and response to treatment. I will continue to order blood tests/labs and diagnostic tests as deemed appropriate and review results as they become available (see orders for details and above listed lab/test results). I will order psychiatric records from previous [facility] hospitals to further elucidate the nature of patient's psychopathology and review once available. I will gather additional collateral information from friends, family and o/p treatment team to further elucidate the nature of patient's psychopathology and baselline level of psychiatric functioning. I certify that this patient's inpatient psychiatric hospital services furnished since the previous certification were, and continue to be, required for treatment that could reasonably be expected to improve the patient's condition, or for diagnostic study, and that the patient continues to need, on a daily basis, active treatment furnished directly by or requiring the supervision of inpatient psychiatric facility personnel. In addition, the hospital records show that services furnished were intensive treatment services, admission or related services, or equivalent services.     EXPECTED DISCHARGE DATE/DAY: TBD     DISPOSITION: Home       Signed By:   Tania Pittman MD  4/9/2018

## 2018-04-09 NOTE — BH NOTES
GROUP THERAPY PROGRESS NOTE    Milena Shah is participating in Process Group. Group time: 45 minutes    Personal goal for participation: To Gain Awareness    Goal orientation: personal    Group therapy participation: active    Therapeutic interventions reviewed and discussed: How do you Handle Anger Quiz and General Principles Regarding Anger    Impression of participation: Patient participated fully. She questioned her own anger style after taking the quiz and starting asking other patients when she acted anger. Decided she did not recognize when she is angry and made a decision to be more tuned into to her own feelings and emotions. She participated fully and gave/received feedback from peers.

## 2018-04-09 NOTE — PROGRESS NOTES
Problem: Falls - Risk of  Goal: *Absence of Falls  Document Mary Ellen Fall Risk and appropriate interventions in the flowsheet. Outcome: Progressing Towards Goal  Fall Risk Interventions:            Medication Interventions: Teach patient to arise slowly       Resting in bed with eyes closed, no complaints, no distress noted. Safety measures in place, will continue to monitor.

## 2018-04-09 NOTE — PROGRESS NOTES
GROUP THERAPY PROGRESS NOTE    Faby Navas is participating in Goals Group and Community. Group time: 45 minutes    Personal goal for participation: Play more board games to promote relaxation    Goal orientation: personal    Group therapy participation: active    Therapeutic interventions reviewed and discussed: Discussed unit rules and schedule, spoke to patients about roles of staff members and importance of learning staff names and openly communicating during stay in hospital. Highlighted importance of learning other patient names in order to create support system with each other. Discussed daily goal setting and importance of setting definable, achievable and measurable goals in order to increase motivation and organization in life. Discussed setting goals that take care of a chore, something that is fun and betters well-being, and something that involves contacting someone in support system in order to keep contact.     Impression of participation: Actively listened, positively contributed to conversation with peers, participated in worksheet provided by staff

## 2018-04-10 VITALS
OXYGEN SATURATION: 100 % | SYSTOLIC BLOOD PRESSURE: 120 MMHG | BODY MASS INDEX: 31.65 KG/M2 | RESPIRATION RATE: 20 BRPM | HEART RATE: 100 BPM | HEIGHT: 65 IN | TEMPERATURE: 97.5 F | DIASTOLIC BLOOD PRESSURE: 90 MMHG | WEIGHT: 190 LBS

## 2018-04-10 PROCEDURE — 74011250636 HC RX REV CODE- 250/636: Performed by: PSYCHIATRY & NEUROLOGY

## 2018-04-10 PROCEDURE — 74011250637 HC RX REV CODE- 250/637: Performed by: PSYCHIATRY & NEUROLOGY

## 2018-04-10 RX ORDER — HEPARIN 100 UNIT/ML
300 SYRINGE INTRAVENOUS AS NEEDED
Status: DISCONTINUED | OUTPATIENT
Start: 2018-04-10 | End: 2018-04-10 | Stop reason: HOSPADM

## 2018-04-10 RX ORDER — SODIUM CHLORIDE 0.9 % (FLUSH) 0.9 %
5 SYRINGE (ML) INJECTION ONCE
Status: COMPLETED | OUTPATIENT
Start: 2018-04-10 | End: 2018-04-10

## 2018-04-10 RX ADMIN — CARVEDILOL 12.5 MG: 12.5 TABLET, FILM COATED ORAL at 16:00

## 2018-04-10 RX ADMIN — ACETAMINOPHEN 650 MG: 325 TABLET, FILM COATED ORAL at 06:52

## 2018-04-10 RX ADMIN — ASPIRIN 81 MG: 81 TABLET, COATED ORAL at 08:43

## 2018-04-10 RX ADMIN — LOSARTAN POTASSIUM 50 MG: 50 TABLET ORAL at 08:49

## 2018-04-10 RX ADMIN — DOCUSATE SODIUM 100 MG: 100 CAPSULE, LIQUID FILLED ORAL at 15:58

## 2018-04-10 RX ADMIN — CARVEDILOL 12.5 MG: 12.5 TABLET, FILM COATED ORAL at 08:49

## 2018-04-10 RX ADMIN — MELOXICAM 15 MG: 7.5 TABLET ORAL at 08:49

## 2018-04-10 RX ADMIN — FLUOXETINE 20 MG: 20 CAPSULE ORAL at 08:43

## 2018-04-10 RX ADMIN — HYDROCHLOROTHIAZIDE 25 MG: 25 TABLET ORAL at 08:43

## 2018-04-10 RX ADMIN — THERA TABS 1 TABLET: TAB at 08:49

## 2018-04-10 RX ADMIN — VENLAFAXINE HYDROCHLORIDE 37.5 MG: 37.5 CAPSULE, EXTENDED RELEASE ORAL at 08:49

## 2018-04-10 RX ADMIN — DOCUSATE SODIUM 100 MG: 100 CAPSULE, LIQUID FILLED ORAL at 08:49

## 2018-04-10 RX ADMIN — Medication 10 ML: at 12:23

## 2018-04-10 RX ADMIN — HEPARIN 300 UNITS: 100 SYRINGE at 12:23

## 2018-04-10 NOTE — PROGRESS NOTES
Pharmacist Discharge Medication Reconciliation    Discharging Provider: Dr. Kerrie Ziegler PMH:   Past Medical History:   Diagnosis Date    Anxiety     Cancer [facility])     breast    Depression     GERD (gastroesophageal reflux disease)     HTN (hypertension)     Hypercholesterolemia     Hypotension 9/12/2012    Metastatic breast cancer ([facility] [address] 5/3/2017    Secondary cancer of bone ([facility] 5/3/2017     Chief Complaint for this Admission: No chief complaint on file. Allergies: Sulfa (sulfonamide antibiotics) and Wellbutrin [bupropion hcl]    Discharge Medications:   Current Discharge Medication List        START taking these medications    Details   meloxicam (MOBIC) 15 mg tablet Take 1 Tab by mouth daily. Indications: Rheumatoid Arthritis  Qty: 30 Tab, Refills: 0      lidocaine (LIDODERM) 5 % Apply patch to the affected area for 12 hours a day and remove for 12 hours a day. Indications: POSTHERPETIC NEURALGIA  Qty: 90 Each, Refills: 0      FLUoxetine (PROZAC) 20 mg capsule Take 1 Cap by mouth daily. Indications: Generalized Anxiety Disorder  Qty: 30 Cap, Refills: 0      amitriptyline (ELAVIL) 50 mg tablet Take 1 Tab by mouth nightly. Indications: depression  Qty: 30 Tab, Refills: 0           CONTINUE these medications which have CHANGED    Details   losartan (COZAAR) 50 mg tablet Take 1 Tab by mouth daily. Indications: hypertension  Qty: 30 Tab, Refills: 0      hydroCHLOROthiazide (HYDRODIURIL) 25 mg tablet Take 1 Tab by mouth daily. Indications: hypertension  Qty: 30 Tab, Refills: 0      carvedilol (COREG) 12.5 mg tablet Take 1 Tab by mouth two (2) times daily (with meals). Indications: hypertension  Qty: 60 Tab, Refills: 0      aspirin delayed-release 81 mg tablet Take 1 Tab by mouth daily. Indications: prevention of transient ischemic attack  Qty: 30 Tab, Refills: 0           CONTINUE these medications which have NOT CHANGED    Details   multivitamin (ONE A DAY) tablet Take 1 Tab by mouth daily. STOP taking these medications       ALPRAZolam (XANAX) 1 mg tablet Comments:   Reason for Stopping:         docusate sodium (COLACE) 50 mg capsule Comments:   Reason for Stopping:         venlafaxine (EFFEXOR) 75 mg tablet Comments:   Reason for Stopping:         fentaNYL (DURAGESIC) 25 mcg/hr PATCH Comments:   Reason for Stopping:         morphine IR (MS IR) 15 mg tablet Comments:   Reason for Stopping:             The patient's chart, MAR and AVS were reviewed by Otoniel Ulloa, PHARMD.

## 2018-04-10 NOTE — BH NOTES
Behavioral Health Transition Record to Provider    Patient Name: Laura Stanley  YOB: 1955  Medical Record Number: [id number]  Date of Admission: 4/5/2018  Date of Discharge: 4/10/2018    Attending Provider: Rui Holland MD  Discharging Provider: Rui Holland MD  To contact this individual call [phone] and ask the  to page. If unavailable, ask to be transferred to [facility] Provider on call. [facility] Provider will be available on call 24/7 and during holidays. Primary Care Provider: Linnea Pang MD    Allergies   Allergen Reactions    Sulfa (Sulfonamide Antibiotics) Unknown (comments)    Wellbutrin [Bupropion Hcl] Unknown (comments)       Reason for Admission: Pt admitted under a voluntary basis for severe depression with suicidal ideations proving to be an imminent danger to self and an inability to care for self. Admission Diagnosis: major depression  Major depression    * No surgery found *    Results for orders placed or performed during the hospital encounter of 04/05/18   TSH 3RD GENERATION   Result Value Ref Range    TSH 5.89 (H) 0.36 - 3.74 uIU/mL       Immunizations administered during this encounter:   Immunization History   Administered Date(s) Administered    Influenza Vaccine (Quad) PF 02/06/2016    Influenza Vaccine PF 11/09/2017    Pneumococcal Polysaccharide (PPSV-23) 02/06/2016    TB Skin Test (PPD) Intradermal 09/16/2014    Varicella Virus Vaccine 08/01/2014       Screening for Metabolic Disorders for Patients on Antipsychotic Medications  (Data obtained from the EMR)    Estimated Body Mass Index  Estimated body mass index is 31.62 kg/(m^2) as calculated from the following:    Height as of this encounter: 5' 5\" (1.651 m). Weight as of this encounter: 86.2 kg (190 lb).      Vital Signs/Blood Pressure  Visit Vitals    /88    Pulse 100    Temp 97.5 °F (36.4 °C)    Resp 18    Ht 5' 5\" (1.651 m)    Wt 86.2 kg (190 lb)  SpO2 97%    Breastfeeding No    BMI 31.62 kg/m2       Blood Glucose/Hemoglobin A1c  Lab Results   Component Value Date/Time    Glucose 88 2018 04:40 AM    Glucose POC 83 2011 10:33 AM       No results found for: HBA1C, HGBE8, BOX2QZUD     Lipid Panel  No results found for: CHOL, CHOLX, CHLST, CHOLV, 620764, HDL, LDL, LDLC, DLDLP, TGLX, TRIGL, TRIGP, CHHD, CHHDX     Discharge Diagnosis: Major depression (ICD-10-CM: F32.9)    Discharge Plan: Patient discharged home into the care of a friend. DISCHARGE SUMMARY    NAME:Edith Andino  : 1955  MRN: [id number]    The patient Ron Slater exhibits the ability to control behavior in a less restrictive environment. Patient's level of functioning is improving. No assaultive/destructive behavior has been observed for the past 24 hours. No suicidal/homicidal threat or behavior has been observed for the past 24 hours. There is no evidence of serious medication side effects. Patient has not been in physical or protective restraints for at least the past 24 hours. If weapons involved, how are they secured? No weapons involved. Is patient aware of and in agreement with discharge plan? Yes    Arrangements for medication:  Prescriptions given to patient. Referral for substance abuse treatment? Patient is not using substances/Not applicable. Referral for smoking cessation needed? Yes, refused. Copy of discharge instructions to provider?:  Jw Myles; Dr. Hilario Single for transportation home:  Friend to . Keep all follow up appointments as scheduled, continue to take prescribed medications per physician instructions. Mental health crisis number:  [id number] or your local mental health crisis line number at [phone]. Discharge Medication List and Instructions:   Current Discharge Medication List      START taking these medications    Details   meloxicam (MOBIC) 15 mg tablet Take 1 Tab by mouth daily. Indications: Rheumatoid Arthritis  Qty: 30 Tab, Refills: 0      lidocaine (LIDODERM) 5 % Apply patch to the affected area for 12 hours a day and remove for 12 hours a day. Indications: POSTHERPETIC NEURALGIA  Qty: 90 Each, Refills: 0      FLUoxetine (PROZAC) 20 mg capsule Take 1 Cap by mouth daily. Indications: Generalized Anxiety Disorder  Qty: 30 Cap, Refills: 0      amitriptyline (ELAVIL) 50 mg tablet Take 1 Tab by mouth nightly. Indications: depression  Qty: 30 Tab, Refills: 0         CONTINUE these medications which have CHANGED    Details   losartan (COZAAR) 50 mg tablet Take 1 Tab by mouth daily. Indications: hypertension  Qty: 30 Tab, Refills: 0      hydroCHLOROthiazide (HYDRODIURIL) 25 mg tablet Take 1 Tab by mouth daily. Indications: hypertension  Qty: 30 Tab, Refills: 0      carvedilol (COREG) 12.5 mg tablet Take 1 Tab by mouth two (2) times daily (with meals). Indications: hypertension  Qty: 60 Tab, Refills: 0      aspirin delayed-release 81 mg tablet Take 1 Tab by mouth daily. Indications: prevention of transient ischemic attack  Qty: 30 Tab, Refills: 0         CONTINUE these medications which have NOT CHANGED    Details   multivitamin (ONE A DAY) tablet Take 1 Tab by mouth daily.          STOP taking these medications       ALPRAZolam (XANAX) 1 mg tablet Comments:   Reason for Stopping:         docusate sodium (COLACE) 50 mg capsule Comments:   Reason for Stopping:         venlafaxine (EFFEXOR) 75 mg tablet Comments:   Reason for Stopping:         fentaNYL (DURAGESIC) 25 mcg/hr PATCH Comments:   Reason for Stopping:         morphine IR (MS IR) 15 mg tablet Comments:   Reason for Stopping:               Unresulted Labs     None        To obtain results of studies pending at discharge, please contact [phone]    Follow-up Information     Follow up With Details Comments Lennie Callahan Schedule an appointment as soon as possible for a visit  [address] [place] [id number]  [phone]    Lauren Pearson MD In 1 week hospital discharge follow up [address] [place] [address]      Nahed Sevilla NP On 4/17/2018 You have a 10:00am appointment with the nurse practitioner for medication management. [address] [place] [address], [address]  [phone]        Lukas Rand On 4/25/2018 You have a 1:00pm appointment with the licensed clinical  (LCSW) for individual therapy. [facility], [address] [place] [address]  [phone]          Advanced Directive:   Does the patient have an appointed surrogate decision maker? Yes  Does the patient have a Medical Advance Directive? Yes  Does the patient have a Psychiatric Advance Directive? No  If the patient does not have a surrogate or Medical Advance Directive AND Psychiatric Advance Directive, the patient was offered information on these advance directives Yes    Patient Instructions: Please continue all medications until otherwise directed by physician. Tobacco Cessation Discharge Plan:   Is the patient a smoker and needs referral for smoking cessation? Yes  Patient referred to the following for smoking cessation with an appointment? Refused     Patient was offered medication to assist with smoking cessation at discharge? Refused  Was education for smoking cessation added to the discharge instructions? Yes    Alcohol/Substance Abuse Discharge Plan:   Does the patient have a history of substance/alcohol abuse and requires a referral for treatment? No  Patient referred to the following for substance/alcohol abuse treatment with an appointment? Refused  Patient was offered medication to assist with alcohol cessation at discharge? Refused  Was education for substance/alcohol abuse added to discharge instructions?  Refused    Patient discharged to Home; discussed with patient/caregiver and provided to the patient/caregiver either in hard copy or electronically.

## 2018-04-10 NOTE — DISCHARGE SUMMARY
PSYCHIATRIC DISCHARGE SUMMARY         IDENTIFICATION:    Patient Name  Isael Ibarra   Date of Birth 1955   [facility] [id number]   Medical Record Number  [id number]      Age  63 y.o.    PCP Merlyn Corcoran MD   Admit date:  4/5/2018    Discharge date: 4/10/2018   Room Number  26/1  @ [facility]   Date of Service  4/10/2018               TYPE OF DISCHARGE: REGULAR               CONDITION AT DISCHARGE: good       PROVISIONAL & DISCHARGE DIAGNOSES:    Problem List  Date Reviewed: 3/10/2018          Codes Class    Drug-induced constipation ICD-10-CM: K59.03  ICD-9-CM: 564.09, E980.5         Advance care planning ICD-10-CM: Z71.89  ICD-9-CM: V65.49         * (Principal)Major depression ICD-10-CM: F32.9  ICD-9-CM: 296.20         Pain due to neoplasm ICD-10-CM: G89.3  ICD-9-CM: 338.3         Neoplastic malignant related fatigue ICD-10-CM: R53.0  ICD-9-CM: 780.79         CHF (congestive heart failure) (HCC) ICD-10-CM: I50.9  ICD-9-CM: 428.0         Depression ICD-10-CM: F32.9  ICD-9-CM: 311         Dementia ICD-10-CM: F03.90  ICD-9-CM: 294.20         Gastroenteritis due to norovirus ICD-10-CM: A08.11  ICD-9-CM: 008.63         Urinary tract infection without hematuria ICD-10-CM: N39.0  ICD-9-CM: 599.0         Sepsis ([facility] ICD-10-CM: A41.9  ICD-9-CM: 038.9, 995.91         Mild single current episode of major depressive disorder ([facility] ICD-10-CM: F32.0  ICD-9-CM: 296.21         Metastatic breast cancer ([facility] ICD-10-CM: C50.919  ICD-9-CM: 174.9         Secondary cancer of bone ([facility] ICD-10-CM: C79.51  ICD-9-CM: 198.5         Closed left ankle fracture ICD-10-CM: O66.439I  ICD-9-CM: 824.8         Bimalleolar fracture of left ankle ICD-10-CM: A58.277E  ICD-9-CM: 824.4     Overview Signed 2/3/2016 12:49 AM by JUANITA Harley     Comminuted and displaced             Anxiety ICD-10-CM: F41.9  ICD-9-CM: 300.00         Breast cancer ([facility] ICD-10-CM: C50.919  ICD-9-CM: 174.9         HTN (hypertension) ICD-10-CM: I10  ICD-9-CM: 401.9         GERD (gastroesophageal reflux disease) ICD-10-CM: K21.9  ICD-9-CM: 530.81         Hypercholesterolemia ICD-10-CM: E78.00  ICD-9-CM: 272.0               Active Hospital Problems    *Major depression      Metastatic breast cancer (HCC)      HTN (hypertension)        DISCHARGE DIAGNOSIS:   Axis I:  SEE ABOVE  Axis II: SEE ABOVE  Axis III: SEE ABOVE  Axis IV:  lack of structure  Axis V:  60 on admission, 70 on discharge 70(baseline)       CC & HISTORY OF PRESENT ILLNESS:  63year old  female admitted for SI/DA by OD on Xanax and opioates. PT was detoxed for benzo dependence. Pain was managed with non opiod meds. Pt has had bilateral mastectomies and suffers for chronic sternal pain, from CA progression. Pt was started on antidepressants with resolution of symptoms. At discharge she denied SI h/HI intent and plan and did not meet TDO criteria. SOCIAL HISTORY:    Social History     Social History    Marital status: SINGLE     Spouse name: N/A    Number of children: N/A    Years of education: N/A     Occupational History    Not on file. Social History Main Topics    Smoking status: Former Smoker     Packs/day: 0.50     Years: 20.00    Smokeless tobacco: Never Used    Alcohol use No    Drug use: No    Sexual activity: No     Other Topics Concern    Not on file     Social History Narrative    63year old  female admitted for depression after suicide attempt on Xanax and opiods. Py is follwed by DR. Len Alba on the outside. She has breast and bone cancer. She is living by herself with no family supports.       FAMILY HISTORY:   Family History   Problem Relation Age of Onset    Hypertension Mother     Heart Disease Mother     Depression Mother     Hypertension Father              HOSPITALIZATION COURSE:    Jessica Lawrence was admitted to the inpatient psychiatric unit [address] for acute psychiatric stabilization in regards to symptomatology as described in the HPI above. The differential diagnosis at time of admission included: depression   While on the unit Isael Ibarra was involved in individual, group, occupational and milieu therapy. Psychiatric medications were adjusted during this hospitalization including valium . Isael Ibarra demonstrated a slow, but progressive improvement in overall condition. Much of patient's depression appeared to be related to situational stressors and psychological factors. Please see individual progress notes for more specific details regarding patient's hospitalization course. At time of dc, Isael Ibarra was without significant problems with depression psychosis  mauri. Overall presentation at time of discharge is most consistent with the diagnosis of adjustment disorder with depressed mood. Patient with request for discharge today. There are no grounds to seek a TDO. Patient has maximized benefit to be derived from acute inpatient psychiatric treatment.   All members of the treatment team concur with each other in regards to plans for discharge today per patient's request.          LABS AND IMAGAING:    Labs Reviewed   TSH 3RD GENERATION - Abnormal; Notable for the following:        Result Value    TSH 5.89 (*)     All other components within normal limits     Admission on 04/05/2018   Component Date Value Ref Range Status    TSH 04/08/2018 5.89* 0.36 - 3.74 uIU/mL Final   Admission on 04/02/2018, Discharged on 04/05/2018   Component Date Value Ref Range Status    Ventricular Rate 04/02/2018 70  BPM Final    Atrial Rate 04/02/2018 70  BPM Final    P-R Interval 04/02/2018 180  ms Final    QRS Duration 04/02/2018 78  ms Final    Q-T Interval 04/02/2018 382  ms Final    QTC Calculation (Bezet) 04/02/2018 412  ms Final    Calculated P Axis 04/02/2018 -6  degrees Final    Calculated R Axis 04/02/2018 22  degrees Final    Calculated T Axis 04/02/2018 46  degrees Final    Diagnosis 04/02/2018    Final Value:Normal sinus rhythm  When compared with ECG of 12-FEB-2018 08:59,  Vent. rate has decreased BY  43 BPM  Nonspecific T wave abnormality, improved in Lateral leads  Confirmed by Mark Ford ([id number]) on 4/3/2018 7:41:40 PM      WBC 04/02/2018 5.4  3.6 - 11.0 K/uL Final    RBC 04/02/2018 4.46  3.80 - 5.20 M/uL Final    HGB 04/02/2018 13.6  11.5 - 16.0 g/dL Final    HCT 04/02/2018 40.8  35.0 - 47.0 % Final    MCV 04/02/2018 91.5  80.0 - 99.0 FL Final    MCH 04/02/2018 30.5  26.0 - 34.0 PG Final    MCHC 04/02/2018 33.3  30.0 - 36.5 g/dL Final    RDW 04/02/2018 12.6  11.5 - 14.5 % Final    PLATELET 01/81/1299 341  150 - 400 K/uL Final    MPV 04/02/2018 10.8  8.9 - 12.9 FL Final    NRBC 04/02/2018 0.0  0  WBC Final    ABSOLUTE NRBC 04/02/2018 0.00  0.00 - 0.01 K/uL Final    NEUTROPHILS 04/02/2018 63  32 - 75 % Final    LYMPHOCYTES 04/02/2018 29  12 - 49 % Final    MONOCYTES 04/02/2018 7  5 - 13 % Final    EOSINOPHILS 04/02/2018 1  0 - 7 % Final    BASOPHILS 04/02/2018 0  0 - 1 % Final    IMMATURE GRANULOCYTES 04/02/2018 0  0.0 - 0.5 % Final    ABS. NEUTROPHILS 04/02/2018 3.4  1.8 - 8.0 K/UL Final    ABS. LYMPHOCYTES 04/02/2018 1.6  0.8 - 3.5 K/UL Final    ABS. MONOCYTES 04/02/2018 0.4  0.0 - 1.0 K/UL Final    ABS. EOSINOPHILS 04/02/2018 0.0  0.0 - 0.4 K/UL Final    ABS. BASOPHILS 04/02/2018 0.0  0.0 - 0.1 K/UL Final    ABS. IMM.  GRANS. 04/02/2018 0.0  0.00 - 0.04 K/UL Final    DF 04/02/2018 AUTOMATED    Final    Sodium 04/02/2018 134* 136 - 145 mmol/L Final    Potassium 04/02/2018 3.9  3.5 - 5.1 mmol/L Final    Chloride 04/02/2018 97  97 - 108 mmol/L Final    CO2 04/02/2018 30  21 - 32 mmol/L Final    Anion gap 04/02/2018 7  5 - 15 mmol/L Final    Glucose 04/02/2018 115* 65 - 100 mg/dL Final    BUN 04/02/2018 17  6 - 20 MG/DL Final    Creatinine 04/02/2018 1.32* 0.55 - 1.02 MG/DL Final    BUN/Creatinine ratio 04/02/2018 13  12 - 20   Final    GFR est AA 04/02/2018 50* >60 ml/min/1.73m2 Final    GFR est non-AA 04/02/2018 41* >60 ml/min/1.73m2 Final    Calcium 04/02/2018 9.5  8.5 - 10.1 MG/DL Final    Bilirubin, total 04/02/2018 0.4  0.2 - 1.0 MG/DL Final    ALT (SGPT) 04/02/2018 24  12 - 78 U/L Final    AST (SGOT) 04/02/2018 14* 15 - 37 U/L Final    Alk.  phosphatase 04/02/2018 55  45 - 117 U/L Final    Protein, total 04/02/2018 7.4  6.4 - 8.2 g/dL Final    Albumin 04/02/2018 3.9  3.5 - 5.0 g/dL Final    Globulin 04/02/2018 3.5  2.0 - 4.0 g/dL Final    A-G Ratio 04/02/2018 1.1  1.1 - 2.2   Final    Lipase 04/02/2018 132  73 - 393 U/L Final    AMPHETAMINES 04/02/2018 NEGATIVE   NEG   Final    BARBITURATES 04/02/2018 NEGATIVE   NEG   Final    BENZODIAZEPINES 04/02/2018 POSITIVE* NEG   Final    COCAINE 04/02/2018 NEGATIVE   NEG   Final    METHADONE 04/02/2018 NEGATIVE   NEG   Final    OPIATES 04/02/2018 POSITIVE* NEG   Final    PCP(PHENCYCLIDINE) 04/02/2018 NEGATIVE   NEG   Final    THC (TH-CANNABINOL) 04/02/2018 NEGATIVE   NEG   Final    Drug screen comment 04/02/2018 (NOTE)   Final    ALCOHOL(ETHYL),SERUM 04/02/2018 <10  <10 MG/DL Final    Color 04/02/2018 YELLOW/STRAW    Final    Appearance 04/02/2018 CLEAR  CLEAR   Final    Specific gravity 04/02/2018 1.023  1.003 - 1.030   Final    pH (UA) 04/02/2018 6.5  5.0 - 8.0   Final    Protein 04/02/2018 NEGATIVE   NEG mg/dL Final    Glucose 04/02/2018 NEGATIVE   NEG mg/dL Final    Ketone 04/02/2018 NEGATIVE   NEG mg/dL Final    Bilirubin 04/02/2018 NEGATIVE   NEG   Final    Blood 04/02/2018 NEGATIVE   NEG   Final    Urobilinogen 04/02/2018 0.2  0.2 - 1.0 EU/dL Final    Nitrites 04/02/2018 NEGATIVE   NEG   Final    Leukocyte Esterase 04/02/2018 NEGATIVE   NEG   Final    WBC 04/02/2018 0-4  0 - 4 /hpf Final    RBC 04/02/2018 0-5  0 - 5 /hpf Final    Epithelial cells 04/02/2018 FEW  FEW /lpf Final    Bacteria 04/02/2018 NEGATIVE   NEG /hpf Final    UA:UC IF INDICATED 04/02/2018 CULTURE NOT INDICATED BY UA RESULT  CNI   Final    Mucus 04/02/2018 TRACE* NEG /lpf Final    Troponin-I, Qt. 04/02/2018 <0.04  <0.05 ng/mL Final    Magnesium 04/02/2018 1.8  1.6 - 2.4 mg/dL Final    Salicylate level 03/97/7029 2.1* 2.8 - 20.0 MG/DL Final    Acetaminophen level 04/02/2018 132* 10 - 30 ug/mL Final    Special Requests: 04/02/2018 NO SPECIAL REQUESTS    Final    [place] Count 04/02/2018 <1,000 CFU/ML    Final    Culture result: 04/02/2018 NO GROWTH 1 DAY    Final    Sodium 04/03/2018 137  136 - 145 mmol/L Final    Potassium 04/03/2018 3.1* 3.5 - 5.1 mmol/L Final    Chloride 04/03/2018 100  97 - 108 mmol/L Final    CO2 04/03/2018 25  21 - 32 mmol/L Final    Anion gap 04/03/2018 12  5 - 15 mmol/L Final    Glucose 04/03/2018 142* 65 - 100 mg/dL Final    BUN 04/03/2018 16  6 - 20 MG/DL Final    Creatinine 04/03/2018 1.13* 0.55 - 1.02 MG/DL Final    BUN/Creatinine ratio 04/03/2018 14  12 - 20   Final    GFR est AA 04/03/2018 60* >60 ml/min/1.73m2 Final    GFR est non-AA 04/03/2018 49* >60 ml/min/1.73m2 Final    Calcium 04/03/2018 8.1* 8.5 - 10.1 MG/DL Final    Bilirubin, total 04/03/2018 0.3  0.2 - 1.0 MG/DL Final    ALT (SGPT) 04/03/2018 19  12 - 78 U/L Final    AST (SGOT) 04/03/2018 12* 15 - 37 U/L Final    Alk.  phosphatase 04/03/2018 42* 45 - 117 U/L Final    Protein, total 04/03/2018 6.4  6.4 - 8.2 g/dL Final    Albumin 04/03/2018 3.1* 3.5 - 5.0 g/dL Final    Globulin 04/03/2018 3.3  2.0 - 4.0 g/dL Final    A-G Ratio 04/03/2018 0.9* 1.1 - 2.2   Final    WBC 04/03/2018 7.0  3.6 - 11.0 K/uL Final    RBC 04/03/2018 4.39  3.80 - 5.20 M/uL Final    HGB 04/03/2018 13.3  11.5 - 16.0 g/dL Final    HCT 04/03/2018 40.7  35.0 - 47.0 % Final    MCV 04/03/2018 92.7  80.0 - 99.0 FL Final    MCH 04/03/2018 30.3  26.0 - 34.0 PG Final    MCHC 04/03/2018 32.7  30.0 - 36.5 g/dL Final    RDW 04/03/2018 12.8  11.5 - 14.5 % Final    PLATELET 36/98/4316 665  150 - 400 K/uL Final    MPV 04/03/2018 10.5 8.9 - 12.9 FL Final    NRBC 04/03/2018 0.0  0  WBC Final    ABSOLUTE NRBC 04/03/2018 0.00  0.00 - 0.01 K/uL Final    Acetaminophen level 04/03/2018 8* 10 - 30 ug/mL Final    Sodium 04/03/2018 139  136 - 145 mmol/L Final    Potassium 04/03/2018 2.7* 3.5 - 5.1 mmol/L Final    Chloride 04/03/2018 101  97 - 108 mmol/L Final    CO2 04/03/2018 28  21 - 32 mmol/L Final    Anion gap 04/03/2018 10  5 - 15 mmol/L Final    Glucose 04/03/2018 87  65 - 100 mg/dL Final    BUN 04/03/2018 16  6 - 20 MG/DL Final    Creatinine 04/03/2018 1.34* 0.55 - 1.02 MG/DL Final    BUN/Creatinine ratio 04/03/2018 12  12 - 20   Final    GFR est AA 04/03/2018 49* >60 ml/min/1.73m2 Final    GFR est non-AA 04/03/2018 40* >60 ml/min/1.73m2 Final    Calcium 04/03/2018 7.7* 8.5 - 10.1 MG/DL Final    Bilirubin, total 04/03/2018 0.2  0.2 - 1.0 MG/DL Final    ALT (SGPT) 04/03/2018 22  12 - 78 U/L Final    AST (SGOT) 04/03/2018 14* 15 - 37 U/L Final    Alk.  phosphatase 04/03/2018 43* 45 - 117 U/L Final    Protein, total 04/03/2018 6.0* 6.4 - 8.2 g/dL Final    Albumin 04/03/2018 3.0* 3.5 - 5.0 g/dL Final    Globulin 04/03/2018 3.0  2.0 - 4.0 g/dL Final    A-G Ratio 04/03/2018 1.0* 1.1 - 2.2   Final    Sodium 04/04/2018 140  136 - 145 mmol/L Final    Potassium 04/04/2018 3.1* 3.5 - 5.1 mmol/L Final    Chloride 04/04/2018 108  97 - 108 mmol/L Final    CO2 04/04/2018 26  21 - 32 mmol/L Final    Anion gap 04/04/2018 6  5 - 15 mmol/L Final    Glucose 04/04/2018 88  65 - 100 mg/dL Final    BUN 04/04/2018 12  6 - 20 MG/DL Final    Creatinine 04/04/2018 0.67  0.55 - 1.02 MG/DL Final    BUN/Creatinine ratio 04/04/2018 18  12 - 20   Final    GFR est AA 04/04/2018 >60  >60 ml/min/1.73m2 Final    GFR est non-AA 04/04/2018 >60  >60 ml/min/1.73m2 Final    Calcium 04/04/2018 7.5* 8.5 - 10.1 MG/DL Final    Sodium 04/05/2018 142  136 - 145 mmol/L Final    Potassium 04/05/2018 3.7  3.5 - 5.1 mmol/L Final    Chloride 04/05/2018 111* 97 - 108 mmol/L Final    CO2 04/05/2018 26  21 - 32 mmol/L Final    Anion gap 04/05/2018 5  5 - 15 mmol/L Final    Glucose 04/05/2018 88  65 - 100 mg/dL Final    BUN 04/05/2018 9  6 - 20 MG/DL Final    Creatinine 04/05/2018 0.50* 0.55 - 1.02 MG/DL Final    BUN/Creatinine ratio 04/05/2018 18  12 - 20   Final    GFR est AA 04/05/2018 >60  >60 ml/min/1.73m2 Final    GFR est non-AA 04/05/2018 >60  >60 ml/min/1.73m2 Final    Calcium 04/05/2018 7.5* 8.5 - 10.1 MG/DL Final    Magnesium 04/05/2018 1.6  1.6 - 2.4 mg/dL Final     Xr Chest Port    Result Date: 4/2/2018  Clinical indication: OD. Portable AP supine view of the chest is obtained, comparison February 12. Infusion catheter is in place, inspiration is shallow. Surgical clips left axilla. No acute infiltrate. impression: Shallow inspiration, no acute intrathoracic findings. DISPOSITION:    Home. Patient to f/u with o/p rug/etoh rehabilitation, psychiatric, and psychotherapy appointments. Patient is to f/u with internist as directed. Patient should have a depakote level and associated labs checked within the next 1-2 weeks by patient's o/p psychiatrist/internist.               FOLLOW-UP CARE:    REGULAR DIET AND ACTIVITY AS TOLERATED    Wound Care: none needed. Follow-up Information     Follow up With Details Comments [address] [place] [id number]  [phone]       Schedule an appointment as soon as possible for a visit  Full [address] [place] [address]  [id number]  [phone]                 PROGNOSIS:  Greatly dependent upon patient's ability to remain sober and to f/u with o/p drug/etoh rehabilitation and psychiatric/psychotherapy appointments as well as to comply with psychiatric medications as prescribed. Patient denies suicidal or homicidal ideations. Laura Sotog fully contracts for safety.  Patient reports many positive predictive factors in terms of not attempting suicide or homicide. Patient appears to be at low risk of suicide or homicide. Patient and family are aware and in agreement with discharge and discharge plan. DISCHARGE MEDICATIONS: (no changes made). Informed consent given for the use of following psychotropic medications:  Current Discharge Medication List      START taking these medications    Details   meloxicam (MOBIC) 15 mg tablet Take 1 Tab by mouth daily. Indications: Rheumatoid Arthritis  Qty: 30 Tab, Refills: 0      lidocaine (LIDODERM) 5 % Apply patch to the affected area for 12 hours a day and remove for 12 hours a day. Indications: POSTHERPETIC NEURALGIA  Qty: 90 Each, Refills: 0      FLUoxetine (PROZAC) 20 mg capsule Take 1 Cap by mouth daily. Indications: Generalized Anxiety Disorder  Qty: 30 Cap, Refills: 0      amitriptyline (ELAVIL) 50 mg tablet Take 1 Tab by mouth nightly. Indications: depression  Qty: 30 Tab, Refills: 0         CONTINUE these medications which have CHANGED    Details   losartan (COZAAR) 50 mg tablet Take 1 Tab by mouth daily. Indications: hypertension  Qty: 30 Tab, Refills: 0      hydroCHLOROthiazide (HYDRODIURIL) 25 mg tablet Take 1 Tab by mouth daily. Indications: hypertension  Qty: 30 Tab, Refills: 0      carvedilol (COREG) 12.5 mg tablet Take 1 Tab by mouth two (2) times daily (with meals). Indications: hypertension  Qty: 60 Tab, Refills: 0      aspirin delayed-release 81 mg tablet Take 1 Tab by mouth daily. Indications: prevention of transient ischemic attack  Qty: 30 Tab, Refills: 0         CONTINUE these medications which have NOT CHANGED    Details   multivitamin (ONE A DAY) tablet Take 1 Tab by mouth daily.          STOP taking these medications       ALPRAZolam (XANAX) 1 mg tablet Comments:   Reason for Stopping:         docusate sodium (COLACE) 50 mg capsule Comments:   Reason for Stopping:         venlafaxine (EFFEXOR) 75 mg tablet Comments:   Reason for Stopping:         fentaNYL ([address]) 25 mcg/hr PATCH Comments:   Reason for Stopping:         morphine IR (MS IR) 15 mg tablet Comments:   Reason for Stopping:                      A coordinated, multidisplinary treatment team round was conducted with Vira Horowitz is done daily here at [facility] . This team consists of the nurse, psychiatric unit pharmcist,  and writer. I have spent greater than 35 minutes on discharge work.     Signed:  Orlando Restrepo MD  4/10/2018

## 2018-04-10 NOTE — BH NOTES
Pt verbalized understanding of all discharge instructions  Pt discharged at [id number] , as father picked her up  Pt reports they are going to get prescriptions filled and going out to eat

## 2018-04-10 NOTE — BH NOTES
PRN Medication Documentation    Specific patient behavior that led to need for PRN medication: 1040  C/o headache=8. Requesting Tylenol PO. Staff interventions attempted prior to PRN being given: Distraction; emotional support. PRN medication given: 0652  Tylenol 650 mg PO  Patient response/effectiveness of PRN medication: Monitoring continues.

## 2018-04-10 NOTE — PROGRESS NOTES
Problem: Depressed Mood (Adult/Pediatric)  Goal: *STG: Participates in treatment plan  Outcome: Progressing Towards Goal  Engaged, social and positive attitude. Denies SI, no self harming behaviors. Daily goal is to d/c home.  Staff focus is on d/c planning

## 2018-04-10 NOTE — INTERDISCIPLINARY ROUNDS
Behavioral Health Interdisciplinary Rounds     Patient Name: Darling Walker  Age: 63 y.o. Room/Bed:  731/02  Primary Diagnosis: Major depression   Admission Status: Voluntary     Readmission within 30 days: no  Power of  in place: no  Patient requires a blocked bed: no          Reason for blocked bed: na    VTE Prophylaxis: Yes--ASA  Flu vaccine given : yes --PTA  Mobility needs/Fall risk: no    Nutritional Plan: no  Consults:          Labs/Testing due today?: no    Sleep hours:  6.5+      Participation in Care/Groups:  yes  Medication Compliant?: Yes  PRNS (last 24 hours): None    Restraints (last 24 hours):  no     CIWA (range last 24 hours): CIWA-Ar Total: 0   COWS (range last 24 hours): COWS Total: 2    Alcohol screening (AUDIT) completed -   AUDIT Score: 0     If applicable, date SBIRT discussed in treatment team AND documented:     Tobacco - patient is a smoker: Have You Used Tobacco in the Past 30 Days: Yes  Illegal Drugs use: Have You Used Any Illegal Substances Over the Past 12 Months: No    24 hour chart check complete: yes     Patient goal(s) for today: Discharge  Treatment team focus/goals: Discharge  Progress note: Pt is ready for discharge    LOS:  5  Expected LOS: 5    Financial concerns/prescription coverage:  [employer]; [place] Medicaid  Date of last family contact:       Family requesting physician contact today:    Discharge plan: Return home  Guns in the home: no        Outpatient provider(s):     Participating treatment team members:  PB Foreman; Dr. Melissa Munoz MD; Surekha Rueda RN; Minda Altamirano, PharmD

## 2018-04-10 NOTE — DISCHARGE INSTRUCTIONS
DISCHARGE SUMMARY    NAME:Edith Desir  : 1955  MRN: [id number]    The patient Gerry Guevara exhibits the ability to control behavior in a less restrictive environment. Patient's level of functioning is improving. No assaultive/destructive behavior has been observed for the past 24 hours. No suicidal/homicidal threat or behavior has been observed for the past 24 hours. There is no evidence of serious medication side effects. Patient has not been in physical or protective restraints for at least the past 24 hours. If weapons involved, how are they secured? No weapons involved. Is patient aware of and in agreement with discharge plan? Yes    Arrangements for medication:  Prescriptions given to patient. Referral for substance abuse treatment? Patient is not using substances/Not applicable. Referral for smoking cessation needed? Yes, refused. Copy of discharge instructions to provider?:  Jacqueline Medina; Dr. Leonel Trevino for transportation home:  Friend to . Keep all follow up appointments as scheduled, continue to take prescribed medications per physician instructions. Mental health crisis number:  471 or your local mental health crisis line number at [phone]. DISCHARGE SUMMARY from Nurse    PATIENT INSTRUCTIONS:    What to do at Home:  Recommended activity: Activity as tolerated,     If you experience any of the following symptoms thoughts of harming self, feeling overwhelmed with anxiety, hopelessness and loneliness, please follow up with your assigned providers. *  Please give a list of your current medications to your Primary Care Provider. *  Please update this list whenever your medications are discontinued, doses are      changed, or new medications (including over-the-counter products) are added. *  Please carry medication information at all times in case of emergency situations.     These are general instructions for a healthy lifestyle:    No smoking/ No tobacco products/ Avoid exposure to second hand smoke  Surgeon General's Warning:  Quitting smoking now greatly reduces serious risk to your health. Obesity, smoking, and sedentary lifestyle greatly increases your risk for illness    A healthy diet, regular physical exercise & weight monitoring are important for maintaining a healthy lifestyle    You may be retaining fluid if you have a history of heart failure or if you experience any of the following symptoms:  Weight gain of 3 pounds or more overnight or 5 pounds in a week, increased swelling in our hands or feet or shortness of breath while lying flat in bed. Please call your doctor as soon as you notice any of these symptoms; do not wait until your next office visit. Recognize signs and symptoms of STROKE:    F-face looks uneven    A-arms unable to move or move unevenly    S-speech slurred or non-existent    T-time-call 911 as soon as signs and symptoms begin-DO NOT go       Back to bed or wait to see if you get better-TIME IS BRAIN. Warning Signs of HEART ATTACK     Call 911 if you have these symptoms:   Chest discomfort. Most heart attacks involve discomfort in the center of the chest that lasts more than a few minutes, or that goes away and comes back. It can feel like uncomfortable pressure, squeezing, fullness, or pain.  Discomfort in other areas of the upper body. Symptoms can include pain or discomfort in one or both arms, the back, neck, jaw, or stomach.  Shortness of breath with or without chest discomfort.  Other signs may include breaking out in a cold sweat, nausea, or lightheadedness. Don't wait more than five minutes to call 911 - MINUTES MATTER! Fast action can save your life. Calling 911 is almost always the fastest way to get lifesaving treatment. Emergency Medical Services staff can begin treatment when they arrive -- up to an hour sooner than if someone gets to the hospital by car.      The discharge information has been reviewed with the patient. The patient verbalized understanding. Discharge medications reviewed with the patient and appropriate educational materials and side effects teaching were provided.   ___________________________________________________________________________________________________________________________________

## 2018-04-10 NOTE — BH NOTES
GROUP THERAPY PROGRESS NOTE    Saba Crook participated in the General Unit's Process Group, with a focus on setting a direction or goal, identifying feelings and planning for the day. Group time: 65 minutes. Personal goal for participation: To increase the capacity to improve ones mood and structure. Goal orientation: The patient will be able to identify a direction or goal for themselves, identify their feelings, develop a plan for structuring their day, and continue working on a discharge planning. Group therapy participation: With prompting, this patient participated in the group. Therapeutic interventions reviewed and discussed: The group members were asked to introduce themselves to each other and to see if they could identify an emotion they are having and/or let the group know what they want to focus on for the day as they continue to make discharge plans. They were also asked to read a list of twenty-five suggestions for living and to pick one for themselves today. Impression of participation: The patient said she intended to [place] today. \" She shared how she felt like giving up before coming into the hospital due to her struggle with stage 4 cancer. \"I was tired and depressed,\" she added. She talked about her brother being murdered in 1985 and the impact his death had on the family, particularly her parents. She described her 80year old father as \"doing well. ..going out with women younger than me. \" She expressed no current SI/HI and displayed no overt psychotic symptoms. She spoke briefly about the overdose that led her to the hospital and her plan to move from [facility] to [place]. She also spoke fondly about her grandfather, who was a rural physician, that she followed around when she was young. 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 She was encouraged to consider what her grandfather would say to her today.  She replied, \"He'd be proud of me,\" she added with a smile. Her affect was mildly euphoric about leaving the hospital and her mood reflected her affect. This was the first process group for this patient with the undersigned.

## 2019-06-18 ENCOUNTER — APPOINTMENT (OUTPATIENT)
Dept: GENERAL RADIOLOGY | Age: 64
DRG: 641 | End: 2019-06-18
Attending: EMERGENCY MEDICINE
Payer: MEDICARE

## 2019-06-18 ENCOUNTER — HOSPITAL ENCOUNTER (INPATIENT)
Age: 64
LOS: 2 days | Discharge: HOME | DRG: 641 | End: 2019-06-20
Attending: EMERGENCY MEDICINE | Admitting: FAMILY MEDICINE
Payer: MEDICARE

## 2019-06-18 DIAGNOSIS — R07.9 CHEST PAIN, UNSPECIFIED TYPE: ICD-10-CM

## 2019-06-18 DIAGNOSIS — E87.1 HYPONATREMIA: Primary | ICD-10-CM

## 2019-06-18 LAB
ALBUMIN SERPL-MCNC: 3.8 G/DL (ref 3.5–5)
ALBUMIN/GLOB SERPL: 1.4 {RATIO} (ref 1.1–2.2)
ALP SERPL-CCNC: 106 U/L (ref 45–117)
ALT SERPL-CCNC: 25 U/L (ref 12–78)
ANION GAP SERPL CALC-SCNC: 7 MMOL/L (ref 5–15)
ANION GAP SERPL CALC-SCNC: 8 MMOL/L (ref 5–15)
APPEARANCE UR: CLEAR
AST SERPL-CCNC: 23 U/L (ref 15–37)
ATRIAL RATE: 71 BPM
BACTERIA URNS QL MICRO: NEGATIVE /HPF
BASOPHILS # BLD: 0 K/UL (ref 0–0.1)
BASOPHILS NFR BLD: 0 % (ref 0–1)
BILIRUB SERPL-MCNC: 0.5 MG/DL (ref 0.2–1)
BILIRUB UR QL: NEGATIVE
BNP SERPL-MCNC: 351 PG/ML
BUN SERPL-MCNC: 7 MG/DL (ref 6–20)
BUN SERPL-MCNC: 7 MG/DL (ref 6–20)
BUN/CREAT SERPL: 10 (ref 12–20)
BUN/CREAT SERPL: 11 (ref 12–20)
CALCIUM SERPL-MCNC: 8.6 MG/DL (ref 8.5–10.1)
CALCIUM SERPL-MCNC: 9.1 MG/DL (ref 8.5–10.1)
CALCULATED P AXIS, ECG09: 48 DEGREES
CALCULATED R AXIS, ECG10: 44 DEGREES
CALCULATED T AXIS, ECG11: 44 DEGREES
CHLORIDE SERPL-SCNC: 84 MMOL/L (ref 97–108)
CHLORIDE SERPL-SCNC: 93 MMOL/L (ref 97–108)
CO2 SERPL-SCNC: 27 MMOL/L (ref 21–32)
CO2 SERPL-SCNC: 28 MMOL/L (ref 21–32)
COLOR UR: NORMAL
CREAT SERPL-MCNC: 0.63 MG/DL (ref 0.55–1.02)
CREAT SERPL-MCNC: 0.68 MG/DL (ref 0.55–1.02)
D DIMER PPP FEU-MCNC: 0.34 MG/L FEU (ref 0–0.65)
DIAGNOSIS, 93000: NORMAL
DIFFERENTIAL METHOD BLD: NORMAL
EOSINOPHIL # BLD: 0.1 K/UL (ref 0–0.4)
EOSINOPHIL NFR BLD: 1 % (ref 0–7)
EPITH CASTS URNS QL MICRO: NORMAL /LPF
ERYTHROCYTE [DISTWIDTH] IN BLOOD BY AUTOMATED COUNT: 11.5 % (ref 11.5–14.5)
GLOBULIN SER CALC-MCNC: 2.7 G/DL (ref 2–4)
GLUCOSE SERPL-MCNC: 83 MG/DL (ref 65–100)
GLUCOSE SERPL-MCNC: 95 MG/DL (ref 65–100)
GLUCOSE UR STRIP.AUTO-MCNC: NEGATIVE MG/DL
HCT VFR BLD AUTO: 35.9 % (ref 35–47)
HGB BLD-MCNC: 12.9 G/DL (ref 11.5–16)
HGB UR QL STRIP: NEGATIVE
IMM GRANULOCYTES # BLD AUTO: 0 K/UL
IMM GRANULOCYTES NFR BLD AUTO: 0 %
INR PPP: 1 (ref 0.9–1.1)
KETONES UR QL STRIP.AUTO: NEGATIVE MG/DL
LEUKOCYTE ESTERASE UR QL STRIP.AUTO: NEGATIVE
LYMPHOCYTES # BLD: 1.9 K/UL (ref 0.8–3.5)
LYMPHOCYTES NFR BLD: 35 % (ref 12–49)
MAGNESIUM SERPL-MCNC: 1.7 MG/DL (ref 1.6–2.4)
MCH RBC QN AUTO: 31.2 PG (ref 26–34)
MCHC RBC AUTO-ENTMCNC: 35.9 G/DL (ref 30–36.5)
MCV RBC AUTO: 86.7 FL (ref 80–99)
MONOCYTES # BLD: 0.4 K/UL (ref 0–1)
MONOCYTES NFR BLD: 8 % (ref 5–13)
NEUTS BAND NFR BLD MANUAL: 1 % (ref 0–6)
NEUTS SEG # BLD: 2.9 K/UL (ref 1.8–8)
NEUTS SEG NFR BLD: 55 % (ref 32–75)
NITRITE UR QL STRIP.AUTO: NEGATIVE
NRBC # BLD: 0 K/UL (ref 0–0.01)
NRBC BLD-RTO: 0 PER 100 WBC
OSMOLALITY UR: 79 MOSM/KG H2O
P-R INTERVAL, ECG05: 188 MS
PH UR STRIP: 7.5 [PH] (ref 5–8)
PLATELET # BLD AUTO: 227 K/UL (ref 150–400)
PMV BLD AUTO: 11 FL (ref 8.9–12.9)
POTASSIUM SERPL-SCNC: 3.1 MMOL/L (ref 3.5–5.1)
POTASSIUM SERPL-SCNC: 3.4 MMOL/L (ref 3.5–5.1)
PROT SERPL-MCNC: 6.5 G/DL (ref 6.4–8.2)
PROT UR STRIP-MCNC: NEGATIVE MG/DL
PROTHROMBIN TIME: 10.4 SEC (ref 9–11.1)
Q-T INTERVAL, ECG07: 374 MS
QRS DURATION, ECG06: 64 MS
QTC CALCULATION (BEZET), ECG08: 406 MS
RBC # BLD AUTO: 4.14 M/UL (ref 3.8–5.2)
RBC #/AREA URNS HPF: NORMAL /HPF (ref 0–5)
RBC MORPH BLD: NORMAL
SODIUM SERPL-SCNC: 120 MMOL/L (ref 136–145)
SODIUM SERPL-SCNC: 127 MMOL/L (ref 136–145)
SODIUM UR-SCNC: 21 MMOL/L
SP GR UR REFRACTOMETRY: <1.005 (ref 1–1.03)
TROPONIN I SERPL-MCNC: <0.05 NG/ML
TROPONIN I SERPL-MCNC: <0.05 NG/ML
TSH SERPL DL<=0.05 MIU/L-ACNC: 2.64 UIU/ML (ref 0.36–3.74)
UA: UC IF INDICATED,UAUC: NORMAL
UROBILINOGEN UR QL STRIP.AUTO: 0.2 EU/DL (ref 0.2–1)
VENTRICULAR RATE, ECG03: 71 BPM
WBC # BLD AUTO: 5.3 K/UL (ref 3.6–11)
WBC MORPH BLD: NORMAL
WBC URNS QL MICRO: NORMAL /HPF (ref 0–4)

## 2019-06-18 PROCEDURE — 84484 ASSAY OF TROPONIN QUANT: CPT

## 2019-06-18 PROCEDURE — 74011250636 HC RX REV CODE- 250/636: Performed by: EMERGENCY MEDICINE

## 2019-06-18 PROCEDURE — 83735 ASSAY OF MAGNESIUM: CPT

## 2019-06-18 PROCEDURE — 71046 X-RAY EXAM CHEST 2 VIEWS: CPT

## 2019-06-18 PROCEDURE — 85025 COMPLETE CBC W/AUTO DIFF WBC: CPT

## 2019-06-18 PROCEDURE — 93005 ELECTROCARDIOGRAM TRACING: CPT

## 2019-06-18 PROCEDURE — 65660000001 HC RM ICU INTERMED STEPDOWN

## 2019-06-18 PROCEDURE — 84300 ASSAY OF URINE SODIUM: CPT

## 2019-06-18 PROCEDURE — 74011250637 HC RX REV CODE- 250/637: Performed by: FAMILY MEDICINE

## 2019-06-18 PROCEDURE — 51798 US URINE CAPACITY MEASURE: CPT

## 2019-06-18 PROCEDURE — 85379 FIBRIN DEGRADATION QUANT: CPT

## 2019-06-18 PROCEDURE — 74011250636 HC RX REV CODE- 250/636: Performed by: FAMILY MEDICINE

## 2019-06-18 PROCEDURE — 85610 PROTHROMBIN TIME: CPT

## 2019-06-18 PROCEDURE — 81001 URINALYSIS AUTO W/SCOPE: CPT

## 2019-06-18 PROCEDURE — 36415 COLL VENOUS BLD VENIPUNCTURE: CPT

## 2019-06-18 PROCEDURE — 94762 N-INVAS EAR/PLS OXIMTRY CONT: CPT

## 2019-06-18 PROCEDURE — 99284 EMERGENCY DEPT VISIT MOD MDM: CPT

## 2019-06-18 PROCEDURE — 83880 ASSAY OF NATRIURETIC PEPTIDE: CPT

## 2019-06-18 PROCEDURE — 80053 COMPREHEN METABOLIC PANEL: CPT

## 2019-06-18 PROCEDURE — 83935 ASSAY OF URINE OSMOLALITY: CPT

## 2019-06-18 PROCEDURE — 84443 ASSAY THYROID STIM HORMONE: CPT

## 2019-06-18 RX ORDER — POTASSIUM CHLORIDE 750 MG/1
40 TABLET, FILM COATED, EXTENDED RELEASE ORAL
Status: COMPLETED | OUTPATIENT
Start: 2019-06-18 | End: 2019-06-18

## 2019-06-18 RX ORDER — SODIUM CHLORIDE 0.9 % (FLUSH) 0.9 %
5-40 SYRINGE (ML) INJECTION AS NEEDED
Status: DISCONTINUED | OUTPATIENT
Start: 2019-06-18 | End: 2019-06-20 | Stop reason: HOSPADM

## 2019-06-18 RX ORDER — IBUPROFEN 400 MG/1
400 TABLET ORAL
Status: COMPLETED | OUTPATIENT
Start: 2019-06-18 | End: 2019-06-19

## 2019-06-18 RX ORDER — ALPRAZOLAM 0.5 MG/1
1 TABLET ORAL
Status: DISCONTINUED | OUTPATIENT
Start: 2019-06-18 | End: 2019-06-19

## 2019-06-18 RX ORDER — CARVEDILOL 12.5 MG/1
12.5 TABLET ORAL 2 TIMES DAILY WITH MEALS
Status: DISCONTINUED | OUTPATIENT
Start: 2019-06-19 | End: 2019-06-20 | Stop reason: HOSPADM

## 2019-06-18 RX ORDER — LOSARTAN POTASSIUM 50 MG/1
50 TABLET ORAL DAILY
Status: DISCONTINUED | OUTPATIENT
Start: 2019-06-19 | End: 2019-06-20 | Stop reason: HOSPADM

## 2019-06-18 RX ORDER — SODIUM CHLORIDE 0.9 % (FLUSH) 0.9 %
5-40 SYRINGE (ML) INJECTION EVERY 8 HOURS
Status: DISCONTINUED | OUTPATIENT
Start: 2019-06-18 | End: 2019-06-20 | Stop reason: HOSPADM

## 2019-06-18 RX ORDER — GABAPENTIN 100 MG/1
100 CAPSULE ORAL 3 TIMES DAILY
Status: DISCONTINUED | OUTPATIENT
Start: 2019-06-18 | End: 2019-06-20 | Stop reason: HOSPADM

## 2019-06-18 RX ORDER — ONDANSETRON 2 MG/ML
4 INJECTION INTRAMUSCULAR; INTRAVENOUS
Status: ACTIVE | OUTPATIENT
Start: 2019-06-18 | End: 2019-06-19

## 2019-06-18 RX ORDER — ENOXAPARIN SODIUM 100 MG/ML
40 INJECTION SUBCUTANEOUS EVERY 24 HOURS
Status: DISCONTINUED | OUTPATIENT
Start: 2019-06-18 | End: 2019-06-20 | Stop reason: HOSPADM

## 2019-06-18 RX ORDER — ASPIRIN 81 MG/1
81 TABLET ORAL DAILY
Status: DISCONTINUED | OUTPATIENT
Start: 2019-06-19 | End: 2019-06-20 | Stop reason: HOSPADM

## 2019-06-18 RX ORDER — IBUPROFEN 200 MG
1 TABLET ORAL EVERY 24 HOURS
Status: DISCONTINUED | OUTPATIENT
Start: 2019-06-18 | End: 2019-06-20 | Stop reason: HOSPADM

## 2019-06-18 RX ADMIN — Medication 10 ML: at 23:11

## 2019-06-18 RX ADMIN — ENOXAPARIN SODIUM 40 MG: 40 INJECTION SUBCUTANEOUS at 23:10

## 2019-06-18 RX ADMIN — ALPRAZOLAM 1 MG: 0.5 TABLET ORAL at 23:55

## 2019-06-18 RX ADMIN — POTASSIUM CHLORIDE 40 MEQ: 750 TABLET, FILM COATED, EXTENDED RELEASE ORAL at 23:10

## 2019-06-18 RX ADMIN — IBUPROFEN 400 MG: 400 TABLET, FILM COATED ORAL at 23:55

## 2019-06-18 RX ADMIN — GABAPENTIN 100 MG: 100 CAPSULE ORAL at 23:10

## 2019-06-18 RX ADMIN — SODIUM CHLORIDE 1000 ML: 900 INJECTION, SOLUTION INTRAVENOUS at 20:51

## 2019-06-18 NOTE — ED PROVIDER NOTES
66 y.o. female with past medical history significant for Hypertension, GERD, Hypercholesterolemia, Depression, Metastatic breast cancer, and Anxiety who presents via EMS from home with chief complaint of chest pain. Patient reports onset at 0200 of chest pain with accompanied difficulty breathing, intermittent diaphoresis, and diarrhea and dizziness. Patient presents to [facility] ED today with persisting chest pain and symptoms. Patient endorses history of stage 4 breast cancer with mets to her sternum and rib, but states chest pain experienced today is different. Patient endorses taking Ibuprofen for symptoms with no relief, as well as a Xanax and two doses of her blood pressure medications also with no improvement in symptoms. Patient reports having a \"heart history\" but cannot specify this further and denies having a cardiologist. Patient notes she is not currently undergoing treatment for breast cancer because it \"messed with her heart and EF\" and she has decided to [place] it do what it is going to do. \" Patient specifically denies abdominal pain, nausea, or vomiting. There are no other acute medical concerns at this time. PCP: Zelalem Velez MD    Note written by Luz Marina Faith, as dictated by Jericho Marcelino MD 2:30 PM      The history is provided by the patient.         Past Medical History:   Diagnosis Date    Anxiety     Cancer [facility])     breast    Depression     GERD (gastroesophageal reflux disease)     HTN (hypertension)     Hypercholesterolemia     Hypotension 9/12/2012    Metastatic breast cancer ([facility] [address] 5/3/2017    Secondary cancer of bone ([facility] [address] 5/3/2017       Past Surgical History:   Procedure Laterality Date    BREAST SURGERY PROCEDURE UNLISTED  march 13    carian masectomy         Family History:   Problem Relation Age of Onset    Hypertension Mother     Heart Disease Mother     Depression Mother     Hypertension Father        Social History     Socioeconomic History    Marital status: SINGLE     Spouse name: Not on file    Number of children: Not on file    Years of education: Not on file    Highest education level: Not on file   Occupational History    Not on file   Social Needs    Financial resource strain: Not on file    Food insecurity:     Worry: Not on file     Inability: Not on file    Transportation needs:     Medical: Not on file     Non-medical: Not on file   Tobacco Use    Smoking status: Former Smoker     Packs/day: 0.50     Years: 20.00     Pack years: 10.00    Smokeless tobacco: Never Used   Substance and Sexual Activity    Alcohol use: No    Drug use: No    Sexual activity: Never   Lifestyle    Physical activity:     Days per week: Not on file     Minutes per session: Not on file    Stress: Not on file   Relationships    Social connections:     Talks on phone: Not on file     Gets together: Not on file     Attends Zoroastrian service: Not on file     Active member of club or organization: Not on file     Attends meetings of clubs or organizations: Not on file     Relationship status: Not on file    Intimate partner violence:     Fear of current or ex partner: Not on file     Emotionally abused: Not on file     Physically abused: Not on file     Forced sexual activity: Not on file   Other Topics Concern    Not on file   Social History Narrative    63year old  female admitted for depression after suicide attempt on Xanax and opiods. Py is follwed by DR. Ofe Marte on the outside. She has breast and bone cancer. She is living by herself with no family supports. ALLERGIES: Sulfa (sulfonamide antibiotics) and Wellbutrin [bupropion hcl]    Review of Systems   Constitutional: Positive for diaphoresis. Negative for chills and fever. Respiratory: Positive for shortness of breath. Negative for cough. Cardiovascular: Positive for chest pain. Gastrointestinal: Positive for diarrhea. Negative for abdominal pain, nausea and vomiting.    Skin: Negative for rash. All other systems reviewed and are negative. Vitals:    06/18/19 1411   BP: 119/82   Pulse: 82   Resp: 16   Temp: 98 °F (36.7 °C)   SpO2: 95%   Weight: 83.9 kg (185 lb)   Height: 5' 5\" (1.651 m)            Physical Exam   Constitutional: She is oriented to person, place, and time. She appears well-developed and well-nourished. No distress. HENT:   Head: Normocephalic. Mouth/Throat: Oropharynx is clear and moist.   Eyes: Pupils are equal, round, and reactive to light. Conjunctivae and EOM are normal.   Neck: Normal range of motion. Neck supple. No JVD present. Cardiovascular: Normal rate, regular rhythm, normal heart sounds and intact distal pulses. Pulmonary/Chest: Effort normal and breath sounds normal.   Abdominal: Soft. Bowel sounds are normal. She exhibits no distension. There is no tenderness. Musculoskeletal: Normal range of motion. She exhibits no edema, tenderness or deformity. Lymphadenopathy:     She has no cervical adenopathy. Neurological: She is alert and oriented to person, place, and time. No cranial nerve deficit or sensory deficit. Skin: Skin is warm and dry. Capillary refill takes less than 2 seconds. No rash noted. She is not diaphoretic. No erythema. Psychiatric: Her mood appears anxious. Nursing note and vitals reviewed. Note written by Luz Marina Carrillo, as dictated by Roxi Sears MD 2:30 PM      MDM       Procedures  ED EKG interpretation:  Rhythm: normal sinus rhythm; and regular . Rate (approx.): 71 bpm; Axis: normal; Intervals: normal; ST/T wave: Nonspecific ST and T wave abnormalities in inferior leads, appears to be artifact. No STEMI. Note written by Luz Marina Carrillo, as dictated by Roxi Sears MD 2:48 PM     4:32 PM  Pt denies chest pain currently. CXR without acute process.   Labs ordered and pending.    5:06 PM consult to hospitalist (Dr. Clarence Ndiaye) via [facility] Text for admission for hyponatremia also can continue ACS r/o since last echo/stress in 2017 and 1st trop normal. She does have nonspecific ST and T wave abnormality on EKG.    5:46 PM  Change of shift. Care of patient signed over to Dr. Lake Molina. Bedside handoff complete awaiting admission and call back from PCP to admit for hyponatremia and chest pain. .  Dr. Dionna Mccord says the PCP group will admit until 7 pm    Jacqui Saldivar MD

## 2019-06-18 NOTE — PROGRESS NOTES
Called to see patient. But patient does not see Dr. Dee James any longer.   Will defer to hospitalist

## 2019-06-18 NOTE — ED NOTES
Pt was seen by this RN placing pill/pills in mouth. RN went to check on pt and asked what she was putting her mouth, pt stated \"Im taking Xanax because I am having an anxiety attack\". RN explained home meds must be verified, pt stated \"I wasn't trying to kill myself\". Provider notified.

## 2019-06-18 NOTE — ED TRIAGE NOTES
Pt arrives with CP since approx 0200 today. Hx of breast CA with mets to bone. Took double dose of BP meds this morning due to concern for elevated BP, also took PO xanax. + cough. No fever.

## 2019-06-19 LAB
ANION GAP SERPL CALC-SCNC: 8 MMOL/L (ref 5–15)
ANION GAP SERPL CALC-SCNC: 9 MMOL/L (ref 5–15)
BASOPHILS # BLD: 0 K/UL (ref 0–0.1)
BASOPHILS NFR BLD: 0 % (ref 0–1)
BUN SERPL-MCNC: 7 MG/DL (ref 6–20)
BUN SERPL-MCNC: 7 MG/DL (ref 6–20)
BUN/CREAT SERPL: 11 (ref 12–20)
BUN/CREAT SERPL: 8 (ref 12–20)
CALCIUM SERPL-MCNC: 9 MG/DL (ref 8.5–10.1)
CALCIUM SERPL-MCNC: 9.8 MG/DL (ref 8.5–10.1)
CHLORIDE SERPL-SCNC: 96 MMOL/L (ref 97–108)
CHLORIDE SERPL-SCNC: 96 MMOL/L (ref 97–108)
CO2 SERPL-SCNC: 25 MMOL/L (ref 21–32)
CO2 SERPL-SCNC: 27 MMOL/L (ref 21–32)
CREAT SERPL-MCNC: 0.63 MG/DL (ref 0.55–1.02)
CREAT SERPL-MCNC: 0.89 MG/DL (ref 0.55–1.02)
DIFFERENTIAL METHOD BLD: ABNORMAL
EOSINOPHIL # BLD: 0.1 K/UL (ref 0–0.4)
EOSINOPHIL NFR BLD: 3 % (ref 0–7)
ERYTHROCYTE [DISTWIDTH] IN BLOOD BY AUTOMATED COUNT: 11.8 % (ref 11.5–14.5)
GLUCOSE SERPL-MCNC: 114 MG/DL (ref 65–100)
GLUCOSE SERPL-MCNC: 184 MG/DL (ref 65–100)
HCT VFR BLD AUTO: 36.9 % (ref 35–47)
HGB BLD-MCNC: 12.9 G/DL (ref 11.5–16)
IMM GRANULOCYTES # BLD AUTO: 0 K/UL
IMM GRANULOCYTES NFR BLD AUTO: 0 %
LYMPHOCYTES # BLD: 2 K/UL (ref 0.8–3.5)
LYMPHOCYTES NFR BLD: 45 % (ref 12–49)
MCH RBC QN AUTO: 30.8 PG (ref 26–34)
MCHC RBC AUTO-ENTMCNC: 35 G/DL (ref 30–36.5)
MCV RBC AUTO: 88.1 FL (ref 80–99)
MONOCYTES # BLD: 0.2 K/UL (ref 0–1)
MONOCYTES NFR BLD: 4 % (ref 5–13)
NEUTS SEG # BLD: 2.2 K/UL (ref 1.8–8)
NEUTS SEG NFR BLD: 48 % (ref 32–75)
NRBC # BLD: 0 K/UL (ref 0–0.01)
NRBC BLD-RTO: 0 PER 100 WBC
PLATELET # BLD AUTO: 219 K/UL (ref 150–400)
PMV BLD AUTO: 10.5 FL (ref 8.9–12.9)
POTASSIUM SERPL-SCNC: 3.5 MMOL/L (ref 3.5–5.1)
POTASSIUM SERPL-SCNC: 4.2 MMOL/L (ref 3.5–5.1)
RBC # BLD AUTO: 4.19 M/UL (ref 3.8–5.2)
RBC MORPH BLD: ABNORMAL
SODIUM SERPL-SCNC: 130 MMOL/L (ref 136–145)
SODIUM SERPL-SCNC: 131 MMOL/L (ref 136–145)
WBC # BLD AUTO: 4.5 K/UL (ref 3.6–11)
WBC MORPH BLD: ABNORMAL

## 2019-06-19 PROCEDURE — 51798 US URINE CAPACITY MEASURE: CPT

## 2019-06-19 PROCEDURE — 74011250637 HC RX REV CODE- 250/637: Performed by: FAMILY MEDICINE

## 2019-06-19 PROCEDURE — 85025 COMPLETE CBC W/AUTO DIFF WBC: CPT

## 2019-06-19 PROCEDURE — 36415 COLL VENOUS BLD VENIPUNCTURE: CPT

## 2019-06-19 PROCEDURE — 74011250637 HC RX REV CODE- 250/637: Performed by: INTERNAL MEDICINE

## 2019-06-19 PROCEDURE — 74011250636 HC RX REV CODE- 250/636: Performed by: FAMILY MEDICINE

## 2019-06-19 PROCEDURE — 80048 BASIC METABOLIC PNL TOTAL CA: CPT

## 2019-06-19 PROCEDURE — 65660000000 HC RM CCU STEPDOWN

## 2019-06-19 RX ORDER — ALPRAZOLAM 0.5 MG/1
1 TABLET ORAL 4 TIMES DAILY
Status: DISCONTINUED | OUTPATIENT
Start: 2019-06-19 | End: 2019-06-20 | Stop reason: HOSPADM

## 2019-06-19 RX ADMIN — ASPIRIN 81 MG: 81 TABLET ORAL at 08:58

## 2019-06-19 RX ADMIN — ENOXAPARIN SODIUM 40 MG: 40 INJECTION SUBCUTANEOUS at 22:01

## 2019-06-19 RX ADMIN — IBUPROFEN 400 MG: 400 TABLET, FILM COATED ORAL at 22:20

## 2019-06-19 RX ADMIN — ALPRAZOLAM 1 MG: 0.5 TABLET ORAL at 17:54

## 2019-06-19 RX ADMIN — Medication 10 ML: at 06:06

## 2019-06-19 RX ADMIN — ALPRAZOLAM 1 MG: 0.5 TABLET ORAL at 22:01

## 2019-06-19 RX ADMIN — GABAPENTIN 100 MG: 100 CAPSULE ORAL at 22:01

## 2019-06-19 RX ADMIN — ALPRAZOLAM 1 MG: 0.5 TABLET ORAL at 14:11

## 2019-06-19 RX ADMIN — Medication 20 ML: at 14:00

## 2019-06-19 RX ADMIN — CARVEDILOL 12.5 MG: 12.5 TABLET, FILM COATED ORAL at 16:36

## 2019-06-19 RX ADMIN — LOSARTAN POTASSIUM 50 MG: 50 TABLET ORAL at 08:58

## 2019-06-19 RX ADMIN — ALPRAZOLAM 1 MG: 0.5 TABLET ORAL at 08:58

## 2019-06-19 RX ADMIN — GABAPENTIN 100 MG: 100 CAPSULE ORAL at 16:36

## 2019-06-19 RX ADMIN — Medication 10 ML: at 22:02

## 2019-06-19 RX ADMIN — GABAPENTIN 100 MG: 100 CAPSULE ORAL at 08:59

## 2019-06-19 RX ADMIN — CARVEDILOL 12.5 MG: 12.5 TABLET, FILM COATED ORAL at 08:58

## 2019-06-19 NOTE — ROUTINE PROCESS
TRANSFER - OUT REPORT: 
 
Verbal report given to ZHANG Dean(name) on Judy Keep  being transferred to [facility]) for routine progression of care Report consisted of patients Situation, Background, Assessment and  
Recommendations(SBAR). Information from the following report(s) SBAR, ED Summary, Intake/Output, MAR and Recent Results was reviewed with the receiving nurse. Lines:  
Venous Access Device Power port 04/02/18 Upper chest (subclavicular area, right (Active) Opportunity for questions and clarification was provided. Patient transported with: 
 Monitor

## 2019-06-19 NOTE — PROGRESS NOTES
NUTRITION COMPLETE ASSESSMENT    RECOMMENDATIONS:   1. Continue diet as ordered with adjustment in sodium restrictions and/or fluid restriction as appropriate per renal  2. Continue daily weights     Interventions/Plan:   Food/Nutrient Delivery: Diet as ordered    Assessment:   Reason for Assessment:   [x]BPA/MST Referral     Diet: Cardiac    Nutritionally Significant Medications: [x] Reviewed  Meal Intake:   Patient Vitals for the past 100 hrs:   % Diet Eaten   06/19/19 1000 100 %     Pre-Hospitalization:  Usual Appetite: Good  Diet at Home: Regular  Vitamins/Supplements: No    Current Hospitalization:   Fluid Restriction:  NA  Appetite: Good  PO Ability: Independent      Subjective:  Pt states she has a good appetite, but she's not surprised she's lost weight. She reports weighing 210# in May at her oncologist appointment (\"I think\"). This would indicate 20# weight loss, which would be severe. The pt states she typically eats convenient foods because she feels fatigued and doesn't like to cook. \"My best friend went to a Palliative Care doctor who told her the healthy things to do, so now I do cafe steamers instead of regular microwave dinners. \"    Objective:  Chart reviewed, discussed with RN and team during interdisciplinary rounds. Pt admitted with acute hyponatremia. PMHx: metastatic breast cancer, bilateral mastectomy, depression, CHF, others noted. Pt is not a detailed historian, but feels she's lost weight. Will continue to monitor trends as her stated weight loss from prior to admission, if accurate, would be severe (10% x 2 month). Per EHR, current weight is within 5# of weight ~1 year ago. Pt appears well nourished and has eaten well so fat this admission. Noted questionable SIADH. Renal consulted. Will follow and monitor their recommendations for dietary restrictions.      Pt also states she was having diarrhea for 12 hours prior to admission, which could explain some of her electrolytes shifts. Estimated Nutrition Needs:   Kcals/day: 0544 Kcals/day(6632-2254 kcal/day (MSJ x 1-1.2))  Protein: 69 g(0.8 g/kg)  Fluid: (1 mL/kcal)  Based On: June Moser  Weight Used: Actual wt(85.9 kg)    Pt expected to meet estimated nutrient needs:  []   Yes     []  No [x] Unable to predict at this time  Nutrition Diagnosis:   1. Inadequate protein-energy intake related to decreased appetite, decreased portions/cooking as evidenced by reports 20# weight loss x 2 months    Goals:     Pt to consume at least 75% of meals over the next 5-7 days     Monitoring & Evaluation:    - Total energy intake   - Weight/weight change   -      Previous Nutrition Goals Met:   N/A  Previous Recommendations:    N/A    Education & Discharge Needs:   [x] None Identified   [] Identified and addressed    [x] Participated in care plan, discharge planning, and/or interdisciplinary rounds        Cultural, Rastafarian and ethnic food preferences identified: None    Skin Integrity: [x]Intact  []Other  Edema: [x]None []Other  Last BM: 6/18/19  Food Allergies: [x]None []Other  Diet Restrictions: Cultural/Roman Catholic Preference(s): None     Anthropometrics:    Weight Loss Metrics 6/19/2019 4/4/2018 3/9/2018 3/2/2018 2/12/2018 2/2/2018 5/3/2017   Today's Wt 189 lb 6 oz 194 lb 6.4 oz 195 lb 196 lb 188 lb 205 lb 192 lb   BMI 31.51 kg/m2 32.35 kg/m2 32.45 kg/m2 32.62 kg/m2 31.28 kg/m2 34.11 kg/m2 31.95 kg/m2   Some encounter information is confidential and restricted. Go to Review Flowsheets activity to see all data. Weight Source: Bed  Height: 5' 5\" (165.1 cm),    Body mass index is 31.51 kg/m².      IBW : 56.7 kg (125 lb),    Usual Body Weight: 95.3 kg (210 lb)(May 2019 per pt at [facility]),      Labs:    Lab Results   Component Value Date/Time    Sodium 131 (L) 06/19/2019 11:37 AM    Potassium 4.2 06/19/2019 11:37 AM    Chloride 96 (L) 06/19/2019 11:37 AM    CO2 27 06/19/2019 11:37 AM    Glucose 184 (H) 06/19/2019 11:37 AM    BUN 7 06/19/2019 11:37 AM    Creatinine 0.89 06/19/2019 11:37 AM    Calcium 9.8 06/19/2019 11:37 AM    Magnesium 1.7 06/18/2019 03:46 PM    Albumin 3.8 06/18/2019 03:46 PM     Lab Results   Component Value Date/Time    Hemoglobin A1c (POC) 6.0 06/14/2011 10:33 AM     Eduardo Query, [address] [facility]

## 2019-06-19 NOTE — PROGRESS NOTES
TRANSFER - OUT REPORT:    Verbal report given to Alonso Darby RN (name) on Dayami Sidhu  being transferred to [facility] (unit) for routine progression of care       Report consisted of patients Situation, Background, Assessment and   Recommendations(SBAR). Information from the following report(s) SBAR, Kardex, MAR, Recent Results and Cardiac Rhythm NSR was reviewed with the receiving nurse. Lines:   Venous Access Device Power port 04/02/18 Upper chest (subclavicular area, right (Active)       Peripheral IV 06/18/19 Anterior;Left;Proximal (Active)   Site Assessment Clean, dry, & intact 6/19/2019  4:00 PM   Phlebitis Assessment 0 6/19/2019  4:00 PM   Infiltration Assessment 0 6/19/2019  4:00 PM   Dressing Status Clean, dry, & intact 6/19/2019  4:00 PM   Dressing Type Tape;Transparent 6/19/2019  4:00 PM   Hub Color/Line Status Pink;Capped 6/19/2019  4:00 PM   Action Taken Open ports on tubing capped 6/19/2019  4:00 PM   Alcohol Cap Used Yes 6/19/2019  4:00 PM        Opportunity for questions and clarification was provided.       Patient transported with:   Monitor   Tech

## 2019-06-19 NOTE — PROGRESS NOTES
Hospitalist Progress Note  Audrey Stallings MD  Answering service: [phone] from in house phone      Date of Service:  2019  NAME:  Roxie Mcgarry  :  1955  MRN:  [id number]      Admission Summary:   A 19-year-old white female with past medical history of hypertension, GERD, hypercholesterolemia, metastatic breast cancer, anxiety, depression, prior sepsis, UTI, CHF, drug-induced constipation, status post bilateral mastectomies, tobacco abuse, obesity, prior suicidal ideation, prior benzodiazepine overdose, presented to the emergency department from home via EMS with chief complaints of chest pain, shortness of breath, and diarrhea. She was noted to be hyponatremic. Interval history / Subjective:   Patient seen and examined this morning. She is feeling better. She denied any more diarrhea. Per patient, was having very frequent diarrhea for about 12 hours yesterday and only drinking water. Assessment & Plan:     # Acute Hypovolemic hyponatremia   - likely from volume depletion from diarrhea with only water replacement  - appropriately corrected with Na 130 this morning   - urine and serum osmolarity checked   - continue IVF   - recheck BMP in am     # Acute chest pain: resolved   - Serial troponin negative and EKG w/o ST-T segment change  - telemetry no arrhythmia detected      # Shortness of breath, currently resolved. # Diarrhea, currently resolved. No acute gastrointestinal symptoms on exam.  # Anxiety. Continue to monitor closely. Advised the patient not to take her home medications while in the hospital.  Monitor closely as the patient has a prior history of benzodiazepine overdose. She notes she is currently taking her medications as directed. # Depression. Plan as noted above and adjust medications. We will monitor closely. # Hypertension. Continue on losartan and Coreg.   Cautioned with diuretics, as mentioned is currently treating for hyponatremia. 8.  Tobacco abuse. Encouraged smoking cessation. We will order a nicotine patch. 9.  Obesity. Consider for eventual weight loss, heart healthy diet, and lifestyle modification. .    DVT prop: Enoxaparin   GI prop: None     Code: Full   Dispo: home tomorrow      Hospital Problems  Date Reviewed: 3/10/2018          Codes Class Noted POA    Acute hyponatremia ICD-10-CM: E87.1  ICD-9-CM: 276.1  6/18/2019 Unknown                Review of Systems:   Pertinent positive mentioned in interval hx. Other systems reviewed and negative     Vital Signs:    Last 24hrs VS reviewed since prior progress note. Most recent are:  Visit Vitals  /81 (BP 1 Location: Right leg, BP Patient Position: At rest)   Pulse 85   Temp 97.6 °F (36.4 °C)   Resp 19   Ht 5' 5\" (1.651 m)   Wt 85.9 kg (189 lb 6 oz)   SpO2 100%   BMI 31.51 kg/m²         Intake/Output Summary (Last 24 hours) at 6/19/2019 1107  Last data filed at 6/19/2019 0835  Gross per 24 hour   Intake 10 ml   Output 2400 ml   Net -2390 ml        Physical Examination:             Constitutional:  No acute distress, cooperative, pleasant    ENT:  Oral mucous moist, oropharynx benign. Neck supple,    Resp:  CTA bilaterally. No wheezing/rhonchi/rales. No accessory muscle use   CV:  Regular rhythm, normal rate, no murmurs, gallops, rubs    GI:  Soft, non distended, non tender. normoactive bowel sounds, no hepatosplenomegaly     Musculoskeletal:  No edema, warm, 2+ pulses throughout    Neurologic:  Moves all extremities.   AAOx3, CN II-XII reviewed            Data Review:   I personally reviewed labs and imaging       Labs:     Recent Labs     06/19/19  0216 06/18/19  1546   WBC 4.5 5.3   HGB 12.9 12.9   HCT 36.9 35.9    227     Recent Labs     06/19/19  0216 06/18/19 2004 06/18/19  1546   * 127* 120*   K 3.5 3.1* 3.4*   CL 96* 93* 84*   CO2 25 27 28   BUN 7 7 7   CREA 0.63 0.63 0.68   * 95 83   CA 9.0 8.6 9.1   MG --   --  1.7     Recent Labs     06/18/19  1546   SGOT 23   ALT 25      TBILI 0.5   TP 6.5   ALB 3.8   GLOB 2.7     Recent Labs     06/18/19  1546   INR 1.0   PTP 10.4      No results for input(s): FE, TIBC, PSAT, FERR in the last 72 hours. No results found for: FOL, RBCF   No results for input(s): PH, PCO2, PO2 in the last 72 hours.   Recent Labs     06/18/19 2004 06/18/19  1546   TROIQ <0.05 <0.05     No results found for: CHOL, CHOLX, CHLST, CHOLV, HDL, LDL, LDLC, DLDLP, TGLX, TRIGL, TRIGP, CHHD, CHHDX  Lab Results   Component Value Date/Time    Glucose POC 83 06/14/2011 10:33 AM     Lab Results   Component Value Date/Time    Color YELLOW/STRAW 06/18/2019 10:43 PM    Appearance CLEAR 06/18/2019 10:43 PM    Specific gravity <1.005 06/18/2019 10:43 PM    Specific gravity 1.025 02/12/2018 01:41 PM    pH (UA) 7.5 06/18/2019 10:43 PM    Protein NEGATIVE  06/18/2019 10:43 PM    Glucose NEGATIVE  06/18/2019 10:43 PM    Ketone NEGATIVE  06/18/2019 10:43 PM    Bilirubin NEGATIVE  06/18/2019 10:43 PM    Urobilinogen 0.2 06/18/2019 10:43 PM    Nitrites NEGATIVE  06/18/2019 10:43 PM    Leukocyte Esterase NEGATIVE  06/18/2019 10:43 PM    Epithelial cells FEW 06/18/2019 10:43 PM    Bacteria NEGATIVE  06/18/2019 10:43 PM    WBC 0-4 06/18/2019 10:43 PM    RBC 0-5 06/18/2019 10:43 PM         Medications Reviewed:     Current Facility-Administered Medications   Medication Dose Route Frequency    aspirin delayed-release tablet 81 mg  81 mg Oral DAILY    carvedilol (COREG) tablet 12.5 mg  12.5 mg Oral BID WITH MEALS    gabapentin (NEURONTIN) capsule 100 mg  100 mg Oral TID    losartan (COZAAR) tablet 50 mg  50 mg Oral DAILY    sodium chloride (NS) flush 5-40 mL  5-40 mL IntraVENous Q8H    sodium chloride (NS) flush 5-40 mL  5-40 mL IntraVENous PRN    nicotine (NICODERM CQ) 21 mg/24 hr patch 1 Patch  1 Patch TransDERmal Q24H    enoxaparin (LOVENOX) injection 40 mg  40 mg SubCUTAneous Q24H    ibuprofen (MOTRIN) tablet 400 mg  400 mg Oral Q6H PRN    ALPRAZolam (XANAX) tablet 1 mg  1 mg Oral QID PRN     ______________________________________________________________________  EXPECTED LENGTH OF STAY: - - -  ACTUAL LENGTH OF STAY:          1                 Renea Piper MD   Patient has given Verbal permission to discuss medical care with   persons present in the room and and also with contact as listed on face sheet.

## 2019-06-19 NOTE — PROGRESS NOTES
Problem: Falls - Risk of  Goal: *Absence of Falls  Description  Document Rhiannon Colin Fall Risk and appropriate interventions in the flowsheet. Outcome: Progressing Towards Goal  Note:   Fall Risk Interventions:       Mentation Interventions: Door open when patient unattended, Eyeglasses and hearing aids, Evaluate medications/consider consulting pharmacy, Reorient patient    Medication Interventions: Evaluate medications/consider consulting pharmacy, Patient to call before getting OOB, Teach patient to arise slowly       Pt remains free of falls during admission. Call bell and frequently used items within reach. Bedside table within reach. Patient provided non skid socks and instructed to call out for nurse when in need of assistance. Problem: Patient Education: Go to Patient Education Activity  Goal: Patient/Family Education  Outcome: Progressing Towards Goal  Note:   Pt educated on fall prevention. Pt demonstrates appropriate understanding. Pt is oriented and calls out appropriately for assistance ambulating. Purposeful hourly rounds initiated by staff. 625- called Dr. Skyler Davis to notify of bladder scan results- 959 mL. Verbal orders received to place tompkins for I&O monitoring.      Last 3 Recorded Weights in this Encounter    06/18/19 1411 06/18/19 2120 06/19/19 0518   Weight: 83.9 kg (185 lb) 81.6 kg (180 lb) 85.9 kg (189 lb 6 oz)

## 2019-06-19 NOTE — PROGRESS NOTES
Problem: Heart Failure: Day 1  Goal: *Anxiety reduced or absent  Outcome: Progressing Towards Goal  Note:   Patient anxiety controlled with medication.

## 2019-06-19 NOTE — PROGRESS NOTES
Reason for Admission:   Chest pain, shortness of breath, diarrhea               RRAT Score:     22-High-Red             Resources/supports as identified by patient/family:  Patient reports independence at home with self-care and ADLs. Patient has a father but he has dim entia. She also has a friend who is listed as emergency contact who is supportive. Top Challenges facing patient (as identified by patient/family and CM): None reported. Finances/Medication cost?      Patient is unemployed and receives disability income. Preferred pharmacy is [facility] [place]. Transportation? Friend or Round trip [employer] or lack thereof? Friend. Patient is a [employer] member, CM will follow up with [employer] CM tomorrow. Living arrangements? Lives alone in independent living at [facility]. Self-care/ADLs/Cognition? Patient reports independence. Current Advanced Directive/Advance Care Plan:  AMD on file/FULL code                          Plan for utilizing home health:    TBD but no anticipated needs. Transition of Care Plan:       Home when medically stable.     Anny Carrera MS

## 2019-06-19 NOTE — PROGRESS NOTES
Problem: Pain  Goal: *Control of Pain  Outcome: Progressing Towards Goal     Problem: Falls - Risk of  Goal: *Absence of Falls  Description  Document Wilmar Pearson Fall Risk and appropriate interventions in the flowsheet. Outcome: Progressing Towards Goal  Note:   Fall Risk Interventions:  Mobility Interventions: Communicate number of staff needed for ambulation/transfer, OT consult for ADLs, Patient to call before getting OOB, PT Consult for mobility concerns    Mentation Interventions: Adequate sleep, hydration, pain control, Evaluate medications/consider consulting pharmacy, Door open when patient unattended    Medication Interventions: Assess postural VS orthostatic hypotension, Evaluate medications/consider consulting pharmacy, Patient to call before getting OOB    Elimination Interventions: Call light in reach, Stay With Me (per policy), Toilet paper/wipes in reach    History of Falls Interventions: Consult care management for discharge planning, Door open when patient unattended, Investigate reason for fall, Room close to nurse's station, Utilize gait belt for transfer/ambulation    Problem: Patient Education: Go to Patient Education Activity  Goal: Patient/Family Education  Outcome: Progressing Towards Goal     Problem: Pressure Injury - Risk of  Goal: *Prevention of pressure injury  Description  Document Adama Scale and appropriate interventions in the flowsheet. Outcome: Progressing Towards Goal   Bedside and Verbal shift change report given to Vivian Underwood RN (oncoming nurse) by Marylen Baas, RN (offgoing nurse). Report included the following information SBAR, Kardex, MAR, Recent Results and Cardiac Rhythm NSR.      Patient Vitals for the past 4 hrs:   Temp Pulse Resp BP SpO2   06/19/19 1510 98.5 °F (36.9 °C) 97 17 (!) 129/96 100 %

## 2019-06-19 NOTE — PROGRESS NOTES
Problem: Falls - Risk of  Goal: *Absence of Falls  Description  Document Joleen Getting Fall Risk and appropriate interventions in the flowsheet. Outcome: Progressing Towards Goal   Pt educated to let staff know before getting out of bed or chair. Pt verbalized understanding. Problem: Heart Failure: Day 2  Goal: Medications  Outcome: Progressing Towards Goal   Pt educated on heart failure medications including carvedilol. Bedside shift change report given to ZHANG king (oncoming nurse) by Mardy Halsted, RN (offgoing nurse). Report included the following information SBAR, Kardex, ED Summary, Procedure Summary, Intake/Output, MAR, Accordion, Recent Results, Med Rec Status, Cardiac Rhythm NSR and Alarm Parameters .

## 2019-06-19 NOTE — ED NOTES
Assumed care of patient at this time. Patient resting comfortably in stretcher with friend at bedside. VSS. Pt A&Ox4. Pt currently denying chest pain and shortness of breath. Dr. London  at bedside to evaluate patient. Patient reminded to not take any of her home medication, RN will provide pt medication. Pt verbalized understanding.

## 2019-06-19 NOTE — H&P
[address]  HISTORY AND PHYSICAL    Name:  Debi Barthel  MR#:  [id number]  :  1955  ACCOUNT #:  [de-identified]  ADMIT DATE:  2019      CHIEF COMPLAINT:  Chest pain, shortness of breath, and diarrhea. HISTORY OF PRESENT ILLNESS:  A 28-year-old white female with past medical history of hypertension, GERD, hypercholesterolemia, metastatic breast cancer, anxiety, depression, prior sepsis, UTI, CHF, drug-induced constipation, status post bilateral mastectomies, tobacco abuse, obesity, prior suicidal ideation, prior benzodiazepine overdose, presented to the emergency department from home via EMS with chief complaints of chest pain, shortness of breath, and diarrhea. The patient notes the onset of symptoms starting midnight last night, which initially was rated as 8-9/10, severe, dull aching, located in substernal region, radiating across both sides of her chest without specific exacerbating or alleviating factors. She also reportedly initially complained of shortness of breath and \"hyperventilating\" which she later notes \"I think I was having a panic attack. \"  She has known history of anxiety and depression and notably is on Effexor for the same. She had other symptoms of diarrhea, reportedly having approximately 10-15 episodes of loose watery nonbloody stool starting yesterday, which stopped this morning. She reportedly took 2 Imodium A-D tablets yesterday for the same. She does not report any abdominal pain, nausea, vomiting. No reports of dizziness, lightheadedness, new-onset focal weakness, numbness (compared to baseline neuropathy), visual disturbance, headache, neck pain, back pain, palpitations, abdominal pain, melena, hematochezia, dysuria, hematuria, calf pain, increased swelling, edema, fever, chills, or rash.   The patient had last been hospitalized per chart records on 2018 and 2018 with benzodiazepine overdose, Tylenol overdose, depression, suicidal ideation/intention, hypotension, hypokalemia, known CHF, and acute kidney injury and had been hospitalized for psychiatric hospitalization from 04/05/2018 to 04/10/2018 for major depression. The patient denies any suicidal ideation at this time. On arrival to the emergency department, initial recorded vital signs, blood pressure 119/82, heart rate 82, respiratory rate 16, O2 saturation 95% on room air. Workup included labs showing troponin less than 0.05. Abnormal labs included sodium of 120, potassium 3.4. The patient reportedly initially had been given for administration of 0.9% normal saline with 1000 mL IV fluid bolus and Zofran 4 mg IV. The patient does not report any current shortness of breath and notes her chest pain has resolved. The patient is now seen for admission to the hospitalist service for continued evaluation and treatment. PAST MEDICAL HISTORY:  1. Hypertension. 2.  GERD. 3.  Hypercholesterolemia. 4.  Metastatic breast cancer. Reportedly off chemo and other treatments as the patient reports heart problems due to treatments. 5.  Bimalleolar fracture of the left ankle. 6.  Anxiety. 7. \"Left ankle fracture. \"  8. Major depressive disorder. 9.  Sepsis with UTI. 10.  Gastroenteritis. 11. CHF. Last echocardiogram 08/31/2012 showed a left ventricular ejection fraction of 50%-55% with mild mitral regurgitation. 12.  Pain due to neoplasm. 13.  Drug-induced constipation, suicidal ideation, benzodiazepine overdose. 14.  Obesity. 15.  Tobacco abuse. PAST SURGICAL HISTORY:  Status post bilateral mastectomies. CURRENT MEDICATIONS:  Medication list reviewed and noted on chart records including alprazolam 1 mg p.o. q.i.d., carvedilol 12.5 mg p.o. b.i.d., aspirin 81 mg p.o. daily, hydrochlorothiazide 25 mg p.o. daily, losartan 50 mg p.o. daily, multivitamin 1 tablet p.o. daily, Effexor 75 mg 2 tablets p.o. b.i.d. ALLERGIES:  SULFA DRUGS, WELLBUTRIN.     SOCIAL HISTORY:  Smokes cigarettes 1 pack approximately every 6 days. Denies alcohol. He reportedly quit marijuana as a child. Lives alone. Ambulates unassisted. FAMILY HISTORY:  Hypertension, mother, father. Depression, mother. Heart disease, mother. REVIEW OF SYSTEMS:  Pertinent positives as noted in HPI, but otherwise negative. PHYSICAL EXAMINATION:  VITAL SIGNS:  Temperature 98.0 degrees Fahrenheit, blood pressure 150/84, heart rate 82, respiratory rate 22, O2 saturation 100% on room air. Recorded weight of 185 pounds (83.9 kg), recorded height 5 feet 5 inches tall. BMI 30.8. GENERAL:  Obese female in no acute respiratory distress. PSYCH:  The patient is awake, alert, and oriented x3, anxious. NEUROLOGIC:  GCS of 15. Moves extremities x4. Sensation is grossly decreased in the plantar surface of both feet without slurred speech or facial droop. HEENT:  Normocephalic, atraumatic. PERRLA. EOMs intact. Sclerae anicteric. Conjunctivae clear. Nares are patent. Oropharynx is clear. Tongue is midline, not edematous. NECK:  Supple without lymphadenopathy, JVD, carotid bruits, or thyromegaly. Nontender. No acute palpable soft tissue or bony deformity. LYMPHATIC:  Negative for cervical or supraclavicular adenopathy. RESPIRATORY/LUNGS:  Clear to auscultation bilaterally. CVS/HEART:  Regular rate and rhythm. Normal S1 and S2 without murmurs, rubs, or gallops. GI:  Abdomen soft, nontender, and nondistended. Normoactive bowel sounds. No rebound, guarding, or rigidity. No auscultated abdominal bruits. No palpable abdominal mass. BACK:  No CVA tenderness. No step-off deformity. MUSCULOSKELETAL:  No acute palpable bony deformity. Negative for calf tenderness. VASCULAR:  2+ radial and 1+ dorsalis pedis pulses without cyanosis, clubbing, or edema. SKIN:  Warm and dry.     LABORATORY DATA:  Labs are reviewed as follows:  Sodium is 120, potassium 3.4, chloride 84, CO2 of 20, BUN is 7, creatinine 0.68, glucose 83, anion gap of 8, calcium 9.1, magnesium 1.7, GFR greater than 60. Total bilirubin 0.5, total protein 6.5, albumin is 3.8, ALT 25, AST of 23, alkaline phosphatase 106, troponin I of less than 0.05, proBNP of 351. WBC of 5.3, hemoglobin 12.9, hematocrit 35.9, platelets of 666, neutrophils 55%. INR 1.0, PT of 10.4. D-dimer 0.34. Chest x-ray, AP and lateral, no acute process. A 12-lead EKG normal sinus rhythm, nonspecific ST-T wave changes at 71 beats per minute. IMPRESSION AND PLAN:  1. Acute hyponatremia. Admit the patient to telemetry unit. Order stat repeat BMP. Recheck the sodium levels and proceed towards slow correction of sodium. Check urine sodium, urine osmolality. We will consult with nephrologist on further recommendations and treatment. Placed on neurovascular checks, fall precautions. Cautioned with diuretics, selective serotonin reuptake inhibitors at this time. 2.  Acute chest pain. Repeat serial troponin levels. Chest pain is currently resolved. Place him on telemetry monitoring. 3.  Shortness of breath, currently resolved. 4.  Diarrhea, currently resolved. No acute gastrointestinal symptoms on exam.  5.  Anxiety. Continue to monitor closely. Advised the patient not to take her home medications while in the hospital.  Monitor closely as the patient has a prior history of benzodiazepine overdose. She notes she is currently taking her medications as directed. 6.  Depression. Plan as noted above and adjust medications. We will monitor closely. 7.  Hypertension. Continue on losartan and Coreg. Cautioned with diuretics, as mentioned is currently treating for hyponatremia. 8.  Tobacco abuse. Encouraged smoking cessation. We will order a nicotine patch. 9.  Obesity. Consider for eventual weight loss, heart healthy diet, and lifestyle modification. 10.  Venous thromboembolism prophylaxis. Order Lovenox 40 mg subcutaneous q.24 hours.     CODE STATUS:  Full code.    FUNCTIONAL STATUS:  Ambulates unassisted.       Rita Osman MD [email or web address]  D:  06/18/2019 20:36  T:  06/19/2019 0:04  JOB #:  [id number]

## 2019-06-20 VITALS
RESPIRATION RATE: 18 BRPM | WEIGHT: 207.89 LBS | TEMPERATURE: 98.2 F | BODY MASS INDEX: 34.64 KG/M2 | HEIGHT: 65 IN | OXYGEN SATURATION: 100 % | DIASTOLIC BLOOD PRESSURE: 97 MMHG | HEART RATE: 93 BPM | SYSTOLIC BLOOD PRESSURE: 162 MMHG

## 2019-06-20 LAB
ANION GAP SERPL CALC-SCNC: 6 MMOL/L (ref 5–15)
BUN SERPL-MCNC: 9 MG/DL (ref 6–20)
BUN/CREAT SERPL: 11 (ref 12–20)
CALCIUM SERPL-MCNC: 9 MG/DL (ref 8.5–10.1)
CHLORIDE SERPL-SCNC: 100 MMOL/L (ref 97–108)
CO2 SERPL-SCNC: 27 MMOL/L (ref 21–32)
CREAT SERPL-MCNC: 0.8 MG/DL (ref 0.55–1.02)
GLUCOSE SERPL-MCNC: 104 MG/DL (ref 65–100)
POTASSIUM SERPL-SCNC: 3.5 MMOL/L (ref 3.5–5.1)
SODIUM SERPL-SCNC: 133 MMOL/L (ref 136–145)

## 2019-06-20 PROCEDURE — 36415 COLL VENOUS BLD VENIPUNCTURE: CPT

## 2019-06-20 PROCEDURE — 74011250637 HC RX REV CODE- 250/637: Performed by: FAMILY MEDICINE

## 2019-06-20 PROCEDURE — 97530 THERAPEUTIC ACTIVITIES: CPT

## 2019-06-20 PROCEDURE — 97161 PT EVAL LOW COMPLEX 20 MIN: CPT

## 2019-06-20 PROCEDURE — 74011250637 HC RX REV CODE- 250/637: Performed by: INTERNAL MEDICINE

## 2019-06-20 PROCEDURE — 80048 BASIC METABOLIC PNL TOTAL CA: CPT

## 2019-06-20 PROCEDURE — 74011250637 HC RX REV CODE- 250/637: Performed by: NURSE PRACTITIONER

## 2019-06-20 RX ORDER — HYDROXYZINE 25 MG/1
25 TABLET, FILM COATED ORAL ONCE
Status: COMPLETED | OUTPATIENT
Start: 2019-06-20 | End: 2019-06-20

## 2019-06-20 RX ADMIN — ALPRAZOLAM 1 MG: 0.5 TABLET ORAL at 09:18

## 2019-06-20 RX ADMIN — LOSARTAN POTASSIUM 50 MG: 50 TABLET ORAL at 09:18

## 2019-06-20 RX ADMIN — HYDROXYZINE HYDROCHLORIDE 25 MG: 25 TABLET, FILM COATED ORAL at 02:43

## 2019-06-20 RX ADMIN — Medication 10 ML: at 05:56

## 2019-06-20 RX ADMIN — CARVEDILOL 12.5 MG: 12.5 TABLET, FILM COATED ORAL at 09:24

## 2019-06-20 RX ADMIN — ASPIRIN 81 MG: 81 TABLET ORAL at 09:18

## 2019-06-20 RX ADMIN — GABAPENTIN 100 MG: 100 CAPSULE ORAL at 09:18

## 2019-06-20 NOTE — PROGRESS NOTES
Spiritual Care Assessment/Progress Note  [facility]      NAME: Anabella Torres      MRN: [id number]  AGE: 66 y.o.  SEX: female  Restorationist Affiliation: Druze   Language: English     6/20/2019     Total Time (in minutes): 15     Spiritual Assessment begun in [facility] through conversation with:         [x]Patient        [] Family    [] Friend(s)        Reason for Consult: Initial/Spiritual assessment, patient floor     Spiritual beliefs: (Please include comment if needed)     [x] Identifies with a kimberley tradition:         [x] Supported by a kimberley community:            [] Claims no spiritual orientation:           [] Seeking spiritual identity:                [] Adheres to an individual form of spirituality:           [] Not able to assess:                           Identified resources for coping:      [x] Prayer                               [] Music                  [] Guided Imagery     [x] Family/friends                 [] Pet visits     [] Devotional reading                         [] Unknown     [] Other:                                               Interventions offered during this visit: (See comments for more details)    Patient Interventions: Affirmation of kimberley, Coping skills reviewed/reinforced, Iconic (affirming the presence of God/Higher Power), Initial/Spiritual assessment, patient floor, Normalization of emotional/spiritual concerns, Prayer (actual), Prayer (assurance of), Restorationist beliefs/image of God discussed           Plan of Care:     [x] Support spiritual and/or cultural needs    [] Support AMD and/or advance care planning process      [] Support grieving process   [] Coordinate Rites and/or Rituals    [] Coordination with community clergy   [] No spiritual needs identified at this time   [] Detailed Plan of Care below (See Comments)  [] Make referral to Music Therapy  [] Make referral to Pet Therapy     [] Make referral to Addiction services  [] Make referral to [facility] Passages  [] Make referral to Spiritual Care Partner  [] No future visits requested        [x] Follow up visits as needed     Comments:  visited pt in room 202. Pt was very grateful for the visit. Pt stated that she may be discharged tomorrow. She is hopeful. Pt asked  to pray. She wanted a prayer for peace and strength.  prayed and offered continued prayer and spiritual support.     Janes Stewartin Intern, MDiv  [phone])

## 2019-06-20 NOTE — PROGRESS NOTES
Current Discharge Medication List      CONTINUE these medications which have NOT CHANGED    Details   aspirin delayed-release 81 mg tablet Take 1 Tab by mouth daily. Indications: prevention of transient ischemic attack  Qty: 30 Tab, Refills: 0      losartan (COZAAR) 50 mg tablet TAKE 1 TABLET BY MOUTH ONCE DAILY  Qty: 30 Tab, Refills: 5      gabapentin (NEURONTIN) 100 mg capsule take 3 capsules by mouth three times a day  Qty: 270 Cap, Refills: 1      carvedilol (COREG) 12.5 mg tablet take 1 tablet by mouth twice a day  Qty: 60 Tab, Refills: 5      lidocaine (LIDODERM) 5 % Apply patch to the affected area for 12 hours a day and remove for 12 hours a day. Indications: POSTHERPETIC NEURALGIA  Qty: 90 Each, Refills: 0      meloxicam (MOBIC) 15 mg tablet Take 1 Tab by mouth daily. Indications: Rheumatoid Arthritis  Qty: 30 Tab, Refills: 0         STOP taking these medications       hydroCHLOROthiazide (HYDRODIURIL) 25 mg tablet Comments:   Reason for Stopping:             I have reviewed discharge instructions with the patient. The patient verbalized understanding.

## 2019-06-20 NOTE — DISCHARGE SUMMARY
Discharge Summary       PATIENT ID: Carlos Haddad  MRN: [id number]   YOB: 1955    DATE OF ADMISSION: 6/18/2019  2:07 PM    DATE OF DISCHARGE: 06/20/19  PRIMARY CARE PROVIDER: Ricardo Bueno MD       DISCHARGING PROVIDER: Kyle Grimm MD    To contact this individual call [phone] and ask the  to page. If unavailable ask to be transferred the Adult Hospitalist Department. CONSULTATIONS: IP CONSULT TO HOSPITALIST      PROCEDURES/SURGERIES: * No surgery found *    IMAGING  Xr Chest Pa Lat    Result Date: 6/18/2019  Impression: No acute process. [address] COURSE:   # Acute Hypovolemic hyponatremia: improved   - likely from volume depletion from diarrhea with only water replacement  - appropriately corrected with Na 133 this morning   - urine and serum osmolarity checked   - received hydration with NS IVF      # Acute chest pain: resolved   - Serial troponin negative and EKG w/o ST-T segment change  - telemetry no arrhythmia detected       # Shortness of breath, currently resolved. # Diarrhea, currently resolved.  No acute gastrointestinal symptoms on exam.  # Anxiety: resumed on home medication   # Depression.  Plan as noted above and adjust medications  # Hypertension.  Continue on losartan and Coreg  # Tobacco abuse.  Encouraged smoking cessation  # Obesity with BMI 34.4.  Consider for eventual weight loss, heart healthy diet, and lifestyle modification. .           DISCHARGE DIAGNOSES / PLAN:    # Acute Hypovolemic hyponatremia   # Acute chest pain   # Shortness of breath  # Diarrhea  # Anxiety  # Depression  # Hypertension  # Tobacco abuse  # Obesity with BMI 34.4     Patient has a new primary care. She no longer follows with Dr Ilda Cervanets. She is advised to follow up with PCP in one week.      Patient Active Problem List   Diagnosis Code    HTN (hypertension) I10    GERD (gastroesophageal reflux disease) K21.9    Hypercholesterolemia E78.00    Breast cancer ([facility] C50.919    Bimalleolar fracture of left ankle S82.842A    Anxiety F41.9    Closed left ankle fracture S82.892A    Mild single current episode of major depressive disorder ([facility] F32.0    Metastatic breast cancer ([facility] C50.919    Secondary cancer of bone ([facility] C79.51    Seps[facility] A41.9    Urinary tract infection without hematuria N39.0    Gastroenteritis due to norovirus A08.11    CHF (congestive heart failure) ([facility]) I50.9    Depression F32.9    Dementia F03.90    Pain due to neoplasm G89.3    Neoplastic malignant related fatigue R53.0    Drug-induced constipation K59.03    Advance care planning Z71.89    Major depression F32.9    Acute hyponatremia E87.1               PENDING TEST RESULTS:   At the time of discharge the following test results are still pending: None     FOLLOW UP APPOINTMENTS:    Follow-up Information     Follow up With Specialties Details Why Contact Info    PCP Dr Zach Ceron an appointment as soon as possible for a visit in 1 week Post hospital discharge follow up             ADDITIONAL CARE RECOMMENDATIONS: None     DIET: Healthy diet     ACTIVITY: as tolerated     WOUND CARE: None     EQUIPMENT needed: None       DISCHARGE MEDICATIONS:  Current Discharge Medication List      CONTINUE these medications which have NOT CHANGED    Details   aspirin delayed-release 81 mg tablet Take 1 Tab by mouth daily. Indications: prevention of transient ischemic attack  Qty: 30 Tab, Refills: 0      losartan (COZAAR) 50 mg tablet TAKE 1 TABLET BY MOUTH ONCE DAILY  Qty: 30 Tab, Refills: 5      gabapentin (NEURONTIN) 100 mg capsule take 3 capsules by mouth three times a day  Qty: 270 Cap, Refills: 1      carvedilol (COREG) 12.5 mg tablet take 1 tablet by mouth twice a day  Qty: 60 Tab, Refills: 5      lidocaine (LIDODERM) 5 % Apply patch to the affected area for 12 hours a day and remove for 12 hours a day. Indications: POSTHERPETIC NEURALGIA  Qty: 90 Each, Refills: 0      meloxicam (MOBIC) 15 mg tablet Take 1 Tab by mouth daily. Indications: Rheumatoid Arthritis  Qty: 30 Tab, Refills: 0         STOP taking these medications       hydroCHLOROthiazide (HYDRODIURIL) 25 mg tablet Comments:   Reason for Stopping:               All Micro Results     None          Recent Results (from the past 24 hour(s))   METABOLIC PANEL, BASIC    Collection Time: 06/19/19 11:37 AM   Result Value Ref Range    Sodium 131 (L) 136 - 145 mmol/L    Potassium 4.2 3.5 - 5.1 mmol/L    Chloride 96 (L) 97 - 108 mmol/L    CO2 27 21 - 32 mmol/L    Anion gap 8 5 - 15 mmol/L    Glucose 184 (H) 65 - 100 mg/dL    BUN 7 6 - 20 MG/DL    Creatinine 0.89 0.55 - 1.02 MG/DL    BUN/Creatinine ratio 8 (L) 12 - 20      GFR est AA >60 >60 ml/min/1.73m2    GFR est non-AA >60 >60 ml/min/1.73m2    Calcium 9.8 8.5 - 36.3 MG/DL   METABOLIC PANEL, BASIC    Collection Time: 06/20/19 12:50 AM   Result Value Ref Range    Sodium 133 (L) 136 - 145 mmol/L    Potassium 3.5 3.5 - 5.1 mmol/L    Chloride 100 97 - 108 mmol/L    CO2 27 21 - 32 mmol/L    Anion gap 6 5 - 15 mmol/L    Glucose 104 (H) 65 - 100 mg/dL    BUN 9 6 - 20 MG/DL    Creatinine 0.80 0.55 - 1.02 MG/DL    BUN/Creatinine ratio 11 (L) 12 - 20      GFR est AA >60 >60 ml/min/1.73m2    GFR est non-AA >60 >60 ml/min/1.73m2    Calcium 9.0 8.5 - 10.1 MG/DL           NOTIFY YOUR PHYSICIAN FOR ANY OF THE FOLLOWING:   Fever over 101 degrees for 24 hours. Chest pain, shortness of breath, fever, chills, nausea, vomiting, diarrhea, change in mentation, falling, weakness, bleeding. Severe pain or pain not relieved by medications. Or, any other signs or symptoms that you may have questions about.     DISPOSITION:   x Home With:   OT  PT  HH  RN       Long term SNF/Inpatient Rehab    Independent/assisted living    Hospice    Other:       PATIENT CONDITION AT DISCHARGE:     Functional status    Poor     Deconditioned    x Independent      Cognition     Lucid     Forgetful     Dementia      Catheters/lines (plus indication)    Jolly     PICC     PEG    x None      Code status   x  Full code     DNR      PHYSICAL EXAMINATION AT DISCHARGE:  Gen:  No distress, alert  HEENT:  Normal cephalic atraumatic, extra-occular movements are intact. Neck:  Supple, No JVD  Lungs:  Clear bilaterally, no wheeze, no rales, normal effort  Heart:  Regular Rate and Rhythm, normal S1 and S2, no edema  Abdomen:  Soft, non tender, normal bowel sounds, no guarding.   Extremities:  Well perfused, no cyanosis or edema  Neurological:  Awake and alert, CN's are intact, normal strength throughout extremities  Skin:  No rashes or moles  Mental Status:  Normal thought process, does not appear anxious      CHRONIC MEDICAL DIAGNOSES:  Problem List as of 6/20/2019 Date Reviewed: 3/10/2018          Codes Class Noted - Resolved    Acute hyponatremia ICD-10-CM: E87.1  ICD-9-CM: 276.1  6/18/2019 - Present        Drug-induced constipation ICD-10-CM: K59.03  ICD-9-CM: 564.09, E980.5  4/5/2018 - Present        Advance care planning ICD-10-CM: Z71.89  ICD-9-CM: V65.49  4/5/2018 - Present        Major depression ICD-10-CM: F32.9  ICD-9-CM: 296.20  4/5/2018 - Present        Pain due to neoplasm ICD-10-CM: G89.3  ICD-9-CM: 338.3  4/4/2018 - Present        Neoplastic malignant related fatigue ICD-10-CM: R53.0  ICD-9-CM: 780.79  4/4/2018 - Present        CHF (congestive heart failure) ([facility] ICD-10-CM: I50.9  ICD-9-CM: 428.0  4/2/2018 - Present        Depression ICD-10-CM: F32.9  ICD-9-CM: 808  4/2/2018 - Present        Dementia ICD-10-CM: F03.90  ICD-9-CM: 294.20  4/2/2018 - Present        Gastroenteritis due to norovirus ICD-10-CM: A08.11  ICD-9-CM: 008.63  2/21/2018 - Present        Urinary tract infection without hematuria ICD-10-CM: N39.0  ICD-9-CM: 599.0  2/13/2018 - Present        Sepsis ([facility] ICD-10-CM: A41.9  ICD-9-CM: 038.9, 995.91  2/12/2018 - Present        Mild single current episode of major depressive disorder [facility]) ICD-10-CM: F32.0  ICD-9-CM: 296.21  5/3/2017 - Present        Metastatic breast cancer ([facility] ICD-10-CM: C50.919  ICD-9-CM: 174.9  5/3/2017 - Present        Secondary cancer of bone ([facility] ICD-10-CM: C79.51  ICD-9-CM: 198.5  5/3/2017 - Present        Closed left ankle fracture ICD-10-CM: S82.892A  ICD-9-CM: 824.8  2/4/2016 - Present        Bimalleolar fracture of left ankle ICD-10-CM: S82.842A  ICD-9-CM: 824.4  2/3/2016 - Present    Overview Signed 2/3/2016 12:49 AM by JUANITA Wayne     Comminuted and displaced             Anxiety ICD-10-CM: F41.9  ICD-9-CM: 300.00  2/3/2016 - Present        Breast cancer ([facility] ICD-10-CM: C50.919  ICD-9-CM: 174.9  1/13/2013 - Present        HTN (hypertension) ICD-10-CM: I10  ICD-9-CM: 401.9  Unknown - Present        GERD (gastroesophageal reflux disease) ICD-10-CM: K21.9  ICD-9-CM: 530.81  Unknown - Present        Hypercholesterolemia ICD-10-CM: E78.00  ICD-9-CM: 272.0  Unknown - Present        RESOLVED: Overdose ICD-10-CM: T50.901A  ICD-9-CM: 977.9, E980.5  4/2/2018 - 4/5/2018        RESOLVED: Hypotension ICD-10-CM: I95.9  ICD-9-CM: 458.9  9/12/2012 - 9/13/2012        RESOLVED: Dyspnea ICD-10-CM: R06.00  ICD-9-CM: 786.09  8/30/2012 - 8/31/2012                Discussed with patient and family. Explained the importance of following up, Compliance with medications and recommendations on discharge,Side effect profile of medications were explained. Safety precautions at home and while taking pain medications also explained. All questions answered to the satisfaction of the patient/family. Discussed with consultant(s) who are agreeable to the discharge. Verbal and written instructions on discharge given. Explained about Discharge medications and side effect profile. Advised patient/family to followup with their pcp for medication refills and preauthorization of medications, Home health orders. checkups,screenign programs as appropriate for age.        Thank you Laisha Vega MD for taking care of your patient, Please call with any questions.       Greater than 35 minutes were spent with the patient on counseling and coordination of care    Signed:   Alwin Lesches, MD  6/20/2019  8:54 AM

## 2019-06-20 NOTE — PROGRESS NOTES
Transition of care Plan: Return to  [address]    Chart reviewed. Patient to dc back to her Independent living apartment today. PT evaluated patient and she does not require walker at this time, could benefit from HHPT. Cm met with patient who signed [facility] form [facility], referral placed. Cm called [facility] and was told they are out of the office until Monday.      Florine Merlin, [facility]

## 2019-06-20 NOTE — PROGRESS NOTES
Transition of care Note: Return to [address] [place] [address] Drive no needs      RN Liasion from [facility] here and met with patient. Patient does not wish to be homebound or receive any  services at this time. Patient will dc home independently no needs. Referral has been cancelled by [facility] order cancelled by CASANDRA Quintanilla, [facility]

## 2019-06-20 NOTE — PROGRESS NOTES
Problem: Patient Education: Go to Patient Education Activity  Goal: Patient/Family Education  Outcome: Progressing Towards Goal     Problem: Pain  Goal: *Control of Pain  Outcome: Progressing Towards Goal

## 2019-06-20 NOTE — PROGRESS NOTES
Hospital follow-up PCP transitional care appointment has been scheduled with Dr. Otilio Harden for Tuesday, 7/2/19 at 11:00 a.m. Pending patient discharge.   Iker Cordero, Care Management Specialist.

## 2019-06-20 NOTE — DISCHARGE INSTRUCTIONS
Patient Education        Hyponatremia: Care Instructions  Your Care Instructions    Hyponatremia (say \"sm-[id number]-[id number]-dav-uh\") means that you don't have enough sodium in your blood. It can cause nausea, vomiting, and headaches. Or you may not feel hungry. In serious cases, it can cause seizures, a coma, or even death. Hyponatremia is not a disease. It is a problem caused by something else, such as medicines or exercising for a long time in hot weather. You can get hyponatremia if you lose a lot of fluids and then you drink a lot of water or other liquids that don't have much sodium. You can also get it if you have kidney, liver, heart, or other health problems. Treatment is focused on getting your sodium levels back to normal.  Follow-up care is a key part of your treatment and safety. Be sure to make and go to all appointments, and call your doctor if you are having problems. It's also a good idea to know your test results and keep a list of the medicines you take. How can you care for yourself at home? · If your doctor recommends it, drink fluids that have sodium. Sports drinks are a good choice. Or you can eat salty foods. · If your doctor recommends it, limit the amount of water you drink. And limit fluids that are mostly water. These include tea, coffee, and juice. · Take your medicines exactly as prescribed. Call your doctor if you have any problems with your medicine. · Get your sodium levels tested when your doctor tells you to. When should you call for help? Call 911 anytime you think you may need emergency care.  For example, call if:    · You have a seizure.     · You passed out (lost consciousness).    Call your doctor now or seek immediate medical care if:    · You are confused or it is hard to focus.     · You have little or no appetite.     · You feel sick to your stomach or you vomit.     · You have a headache.     · You have mood changes.     · You feel more tired than usual.   [employer] closely for changes in your health, and be sure to contact your doctor if:    · You do not get better as expected. Where can you learn more? Go to http://karey-kayla.info/. Enter D348 in the search box to learn more about \"Hyponatremia: Care Instructions. \"  Current as of: June 25, 2018  Content Version: 11.9  © 7308-8796 [employer]. Care instructions adapted under license by "[employer] (which disclaims liability or warranty for this information). If you have questions about a medical condition or this instruction, always ask your healthcare professional. [place] [address] any warranty or liability for your use of this information.

## 2019-12-20 ENCOUNTER — APPOINTMENT (OUTPATIENT)
Dept: GENERAL RADIOLOGY | Age: 64
DRG: 871 | End: 2019-12-20
Attending: EMERGENCY MEDICINE
Payer: MEDICARE

## 2019-12-20 ENCOUNTER — APPOINTMENT (OUTPATIENT)
Dept: CT IMAGING | Age: 64
DRG: 871 | End: 2019-12-20
Attending: EMERGENCY MEDICINE
Payer: MEDICARE

## 2019-12-20 ENCOUNTER — HOSPITAL ENCOUNTER (INPATIENT)
Age: 64
LOS: 6 days | Discharge: HOME HEALTH | DRG: 871 | End: 2019-12-26
Attending: EMERGENCY MEDICINE | Admitting: INTERNAL MEDICINE
Payer: MEDICARE

## 2019-12-20 DIAGNOSIS — A41.9 SEVERE SEPSIS (HCC): Primary | ICD-10-CM

## 2019-12-20 DIAGNOSIS — R06.02 SOB (SHORTNESS OF BREATH): ICD-10-CM

## 2019-12-20 DIAGNOSIS — N17.9 ACUTE RENAL FAILURE, UNSPECIFIED ACUTE RENAL FAILURE TYPE (HCC): ICD-10-CM

## 2019-12-20 DIAGNOSIS — R65.20 SEVERE SEPSIS (HCC): Primary | ICD-10-CM

## 2019-12-20 DIAGNOSIS — F51.04 PSYCHOPHYSIOLOGICAL INSOMNIA: ICD-10-CM

## 2019-12-20 DIAGNOSIS — N30.00 ACUTE CYSTITIS WITHOUT HEMATURIA: ICD-10-CM

## 2019-12-20 DIAGNOSIS — R00.0 SINUS TACHYCARDIA BY ELECTROCARDIOGRAM: ICD-10-CM

## 2019-12-20 LAB
ALBUMIN SERPL-MCNC: 4.7 G/DL (ref 3.5–5)
ALBUMIN/GLOB SERPL: 1.1 {RATIO} (ref 1.1–2.2)
ALP SERPL-CCNC: 107 U/L (ref 45–117)
ALT SERPL-CCNC: 77 U/L (ref 12–78)
AMMONIA PLAS-SCNC: 12 UMOL/L
ANION GAP SERPL CALC-SCNC: 14 MMOL/L (ref 5–15)
APPEARANCE UR: ABNORMAL
ARTERIAL PATENCY WRIST A: YES
AST SERPL-CCNC: 169 U/L (ref 15–37)
BACTERIA URNS QL MICRO: NEGATIVE /HPF
BASE DEFICIT BLD-SCNC: 5 MMOL/L
BASOPHILS # BLD: 0.3 K/UL (ref 0–0.1)
BASOPHILS NFR BLD: 1 % (ref 0–1)
BDY SITE: ABNORMAL
BILIRUB SERPL-MCNC: 0.8 MG/DL (ref 0.2–1)
BILIRUB UR QL CFM: NEGATIVE
BUN SERPL-MCNC: 62 MG/DL (ref 6–20)
BUN/CREAT SERPL: 18 (ref 12–20)
CALCIUM SERPL-MCNC: 10.9 MG/DL (ref 8.5–10.1)
CHLORIDE SERPL-SCNC: 104 MMOL/L (ref 97–108)
CO2 SERPL-SCNC: 23 MMOL/L (ref 21–32)
COLOR UR: ABNORMAL
CREAT SERPL-MCNC: 3.48 MG/DL (ref 0.55–1.02)
D DIMER PPP FEU-MCNC: 0.52 MG/L FEU (ref 0–0.65)
DIFFERENTIAL METHOD BLD: ABNORMAL
EOSINOPHIL # BLD: 0 K/UL (ref 0–0.4)
EOSINOPHIL NFR BLD: 0 % (ref 0–7)
EPITH CASTS URNS QL MICRO: ABNORMAL /LPF
ERYTHROCYTE [DISTWIDTH] IN BLOOD BY AUTOMATED COUNT: 12.7 % (ref 11.5–14.5)
FLUAV AG NPH QL IA: NEGATIVE
FLUBV AG NOSE QL IA: NEGATIVE
GAS FLOW.O2 O2 DELIVERY SYS: ABNORMAL L/MIN
GLOBULIN SER CALC-MCNC: 4.1 G/DL (ref 2–4)
GLUCOSE SERPL-MCNC: 152 MG/DL (ref 65–100)
GLUCOSE UR STRIP.AUTO-MCNC: 100 MG/DL
HCO3 BLD-SCNC: 19.2 MMOL/L (ref 22–26)
HCT VFR BLD AUTO: 43.4 % (ref 35–47)
HGB BLD-MCNC: 14.8 G/DL (ref 11.5–16)
HGB UR QL STRIP: ABNORMAL
IMM GRANULOCYTES # BLD AUTO: 0 K/UL (ref 0–0.04)
IMM GRANULOCYTES NFR BLD AUTO: 0 % (ref 0–0.5)
INR PPP: 1.1 (ref 0.9–1.1)
KETONES UR QL STRIP.AUTO: ABNORMAL MG/DL
LACTATE BLD-SCNC: 1.17 MMOL/L (ref 0.4–2)
LACTATE BLD-SCNC: 3.25 MMOL/L (ref 0.4–2)
LEUKOCYTE ESTERASE UR QL STRIP.AUTO: ABNORMAL
LYMPHOCYTES # BLD: 1.5 K/UL (ref 0.8–3.5)
LYMPHOCYTES NFR BLD: 6 % (ref 12–49)
MCH RBC QN AUTO: 31 PG (ref 26–34)
MCHC RBC AUTO-ENTMCNC: 34.1 G/DL (ref 30–36.5)
MCV RBC AUTO: 90.8 FL (ref 80–99)
MONOCYTES # BLD: 1 K/UL (ref 0–1)
MONOCYTES NFR BLD: 4 % (ref 5–13)
NEUTS SEG # BLD: 22.2 K/UL (ref 1.8–8)
NEUTS SEG NFR BLD: 89 % (ref 32–75)
NITRITE UR QL STRIP.AUTO: NEGATIVE
NRBC # BLD: 0 K/UL (ref 0–0.01)
NRBC BLD-RTO: 0 PER 100 WBC
O2/TOTAL GAS SETTING VFR VENT: 21 %
PCO2 BLD: 29 MMHG (ref 35–45)
PH BLD: 7.43 [PH] (ref 7.35–7.45)
PH UR STRIP: 5 [PH] (ref 5–8)
PLATELET # BLD AUTO: 381 K/UL (ref 150–400)
PMV BLD AUTO: 11.1 FL (ref 8.9–12.9)
PO2 BLD: 65 MMHG (ref 80–100)
POTASSIUM SERPL-SCNC: 3.7 MMOL/L (ref 3.5–5.1)
PROT SERPL-MCNC: 8.8 G/DL (ref 6.4–8.2)
PROT UR STRIP-MCNC: 100 MG/DL
PROTHROMBIN TIME: 11.1 SEC (ref 9–11.1)
RBC # BLD AUTO: 4.78 M/UL (ref 3.8–5.2)
RBC #/AREA URNS HPF: ABNORMAL /HPF (ref 0–5)
RBC MORPH BLD: ABNORMAL
SAO2 % BLD: 93 % (ref 92–97)
SODIUM SERPL-SCNC: 141 MMOL/L (ref 136–145)
SP GR UR REFRACTOMETRY: 1.02 (ref 1–1.03)
SPECIMEN TYPE: ABNORMAL
TOTAL RESP. RATE, ITRR: 32
TROPONIN I SERPL-MCNC: 0.05 NG/ML
UA: UC IF INDICATED,UAUC: ABNORMAL
UROBILINOGEN UR QL STRIP.AUTO: 1 EU/DL (ref 0.2–1)
WBC # BLD AUTO: 25 K/UL (ref 3.6–11)
WBC URNS QL MICRO: ABNORMAL /HPF (ref 0–4)

## 2019-12-20 PROCEDURE — 87040 BLOOD CULTURE FOR BACTERIA: CPT

## 2019-12-20 PROCEDURE — 99285 EMERGENCY DEPT VISIT HI MDM: CPT

## 2019-12-20 PROCEDURE — 85610 PROTHROMBIN TIME: CPT

## 2019-12-20 PROCEDURE — 74176 CT ABD & PELVIS W/O CONTRAST: CPT

## 2019-12-20 PROCEDURE — 87804 INFLUENZA ASSAY W/OPTIC: CPT

## 2019-12-20 PROCEDURE — 96368 THER/DIAG CONCURRENT INF: CPT

## 2019-12-20 PROCEDURE — 70450 CT HEAD/BRAIN W/O DYE: CPT

## 2019-12-20 PROCEDURE — 77030019905 HC CATH URETH INTMIT MDII -A

## 2019-12-20 PROCEDURE — 74011250636 HC RX REV CODE- 250/636: Performed by: EMERGENCY MEDICINE

## 2019-12-20 PROCEDURE — 74011000258 HC RX REV CODE- 258: Performed by: EMERGENCY MEDICINE

## 2019-12-20 PROCEDURE — 85379 FIBRIN DEGRADATION QUANT: CPT

## 2019-12-20 PROCEDURE — 96366 THER/PROPH/DIAG IV INF ADDON: CPT

## 2019-12-20 PROCEDURE — 84484 ASSAY OF TROPONIN QUANT: CPT

## 2019-12-20 PROCEDURE — 71250 CT THORAX DX C-: CPT

## 2019-12-20 PROCEDURE — 93005 ELECTROCARDIOGRAM TRACING: CPT

## 2019-12-20 PROCEDURE — 81001 URINALYSIS AUTO W/SCOPE: CPT

## 2019-12-20 PROCEDURE — 82803 BLOOD GASES ANY COMBINATION: CPT

## 2019-12-20 PROCEDURE — 71045 X-RAY EXAM CHEST 1 VIEW: CPT

## 2019-12-20 PROCEDURE — 83605 ASSAY OF LACTIC ACID: CPT

## 2019-12-20 PROCEDURE — 36415 COLL VENOUS BLD VENIPUNCTURE: CPT

## 2019-12-20 PROCEDURE — 96365 THER/PROPH/DIAG IV INF INIT: CPT

## 2019-12-20 PROCEDURE — 85025 COMPLETE CBC W/AUTO DIFF WBC: CPT

## 2019-12-20 PROCEDURE — 65660000000 HC RM CCU STEPDOWN

## 2019-12-20 PROCEDURE — 87086 URINE CULTURE/COLONY COUNT: CPT

## 2019-12-20 PROCEDURE — 36600 WITHDRAWAL OF ARTERIAL BLOOD: CPT

## 2019-12-20 PROCEDURE — 82140 ASSAY OF AMMONIA: CPT

## 2019-12-20 PROCEDURE — 80053 COMPREHEN METABOLIC PANEL: CPT

## 2019-12-20 PROCEDURE — 74011250637 HC RX REV CODE- 250/637: Performed by: EMERGENCY MEDICINE

## 2019-12-20 RX ORDER — ACETAMINOPHEN 325 MG/1
650 TABLET ORAL
Status: DISCONTINUED | OUTPATIENT
Start: 2019-12-20 | End: 2019-12-21

## 2019-12-20 RX ORDER — ACETAMINOPHEN 500 MG
1000 TABLET ORAL
Status: COMPLETED | OUTPATIENT
Start: 2019-12-20 | End: 2019-12-20

## 2019-12-20 RX ORDER — SODIUM CHLORIDE 0.9 % (FLUSH) 0.9 %
10 SYRINGE (ML) INJECTION
Status: ACTIVE | OUTPATIENT
Start: 2019-12-20 | End: 2019-12-21

## 2019-12-20 RX ADMIN — SODIUM CHLORIDE 700 ML: 900 INJECTION, SOLUTION INTRAVENOUS at 20:24

## 2019-12-20 RX ADMIN — CEFTRIAXONE SODIUM 1 G: 1 INJECTION, POWDER, FOR SOLUTION INTRAMUSCULAR; INTRAVENOUS at 19:46

## 2019-12-20 RX ADMIN — VANCOMYCIN HYDROCHLORIDE 1750 MG: 10 INJECTION, POWDER, LYOPHILIZED, FOR SOLUTION INTRAVENOUS at 23:07

## 2019-12-20 RX ADMIN — ACETAMINOPHEN 1000 MG: 500 TABLET ORAL at 20:24

## 2019-12-20 RX ADMIN — SODIUM CHLORIDE 1000 ML: 900 INJECTION, SOLUTION INTRAVENOUS at 19:44

## 2019-12-20 RX ADMIN — AZITHROMYCIN MONOHYDRATE 500 MG: 500 INJECTION, POWDER, LYOPHILIZED, FOR SOLUTION INTRAVENOUS at 20:00

## 2019-12-20 NOTE — ED TRIAGE NOTES
Pt brought in from home states she lives alone in a senior community pt states \"I take A, B, C, and D meds. \" Pt unable to state month or year or situation, Oriented to self and place.  Pt states she fell over the weekend bruise noted to right arm Pupils unequal but reactive MD made aware

## 2019-12-20 NOTE — ED PROVIDER NOTES
EMERGENCY DEPARTMENT HISTORY AND PHYSICAL EXAM      Date: 12/20/2019  Patient Name: Noman Gill  Patient Age and Sex: 66 y.o. female     History of Presenting Illness     Chief Complaint   Patient presents with    Respiratory Distress     Pt from [facility] for difficulty breathing BS for EMS was 231 no hx of diabetes. Pt c/o SOB. Pt confused on situation, time, oriented to place and self. Pt states she fell over the weekend right arm bruised, right pupil 5 briskly reactive and left Pupil 3 briskly reactive.  Fall       History Provided By: Patient, ems    HPI: Noman Gill 30-year-old female with a history of dementia, anxiety, hyponatremia, breast cancer, presenting for shortness of breath. EMS reports that patient fell a few days ago and injured her right elbow with a hematoma. Today called from [facility] where she lives independently for shortness of breath. Patient states that she has been having shortness of breath associated with some chest tightness and a cough. Patient keeps perseverating about her arm as well as repeats ABCD when discussing her medications. Unable to get a clear story from her. There are no other complaints, changes, or physical findings at this time. PCP: Aminata Gauthier MD    No current facility-administered medications on file prior to encounter. Current Outpatient Medications on File Prior to Encounter   Medication Sig Dispense Refill    carvedilol (COREG) 12.5 mg tablet TAKE 1 TABLET BY MOUTH TWICE A  Tab 3    losartan (COZAAR) 50 mg tablet TAKE 1 TABLET BY MOUTH ONCE DAILY 90 Tab 5    gabapentin (NEURONTIN) 100 mg capsule take 3 capsules by mouth three times a day 270 Cap 1    amitriptyline (ELAVIL) 50 mg tablet TAKE 1 TABLET BY MOUTH EVERY EVENING 90 Tab 0    aspirin delayed-release 81 mg tablet Take 1 Tab by mouth daily.  Indications: prevention of transient ischemic attack 30 Tab 0    lidocaine (LIDODERM) 5 % Apply patch to the affected area for 12 hours a day and remove for 12 hours a day. Indications: POSTHERPETIC NEURALGIA 90 Each 0    meloxicam (MOBIC) 15 mg tablet Take 1 Tab by mouth daily. Indications: Rheumatoid Arthritis 30 Tab 0    multivitamin (ONE A DAY) tablet Take 1 Tab by mouth daily. 30 Tab 1       Past History     Past Medical History:  Past Medical History:   Diagnosis Date    Anxiety     [facility]     breast    Depression     GERD (gastroesophageal reflux disease)     HTN (hypertension)     Hypercholesterolemia     Hypotension 9/12/2012    Metastatic breast cancer ([facility] 5/3/2017    Secondary cancer of bone ([facility] 5/3/2017       Past Surgical History:  Past Surgical History:   Procedure Laterality Date    BREAST SURGERY PROCEDURE UNLISTED  march 13    carina masectomy       Family History:  Family History   Problem Relation Age of Onset    Hypertension Mother     Heart Disease Mother     Depression Mother     Hypertension Father        Social History:  Social History     Tobacco Use    Smoking status: Former Smoker     Packs/day: 0.50     Years: 20.00     Pack years: 10.00    Smokeless tobacco: Never Used   Substance Use Topics    Alcohol use: No    Drug use: No       Allergies: Allergies   Allergen Reactions    Sulfa (Sulfonamide Antibiotics) Unknown (comments)    Wellbutrin [Bupropion Hcl] Unknown (comments)         Review of Systems   Review of Systems   Unable to perform ROS: Dementia   Respiratory: Positive for cough and shortness of breath. Skin: Positive for wound. Physical Exam   Physical Exam  Vitals signs and nursing note reviewed. Constitutional:       Appearance: She is well-developed. HENT:      Head: Normocephalic and atraumatic. Eyes:      Extraocular Movements: Extraocular movements intact. Conjunctiva/sclera: Conjunctivae normal.      Comments: Pupils are reactive but slight difference in the right pupil compared to the left.    Neck: Musculoskeletal: Normal range of motion and neck supple. Cardiovascular:      Rate and Rhythm: Regular rhythm. Tachycardia present. Pulmonary:      Effort: Respiratory distress present. Breath sounds: Normal breath sounds. Comments: Patient is tachypneic and speaking in short sentences. Poor air movement diffusely  Abdominal:      General: There is no distension. Palpations: Abdomen is soft. Tenderness: There is no tenderness. Musculoskeletal: Normal range of motion. Comments: Patient has significant hematoma over her right elbow but is able to range her elbow without any difficulties   Skin:     General: Skin is warm and dry. Neurological:      Mental Status: She is alert. Comments: Oriented to person, birthday, president, place, but not to month. Patient continues to perseverate about her elbow as well as repeats ABCD. Unable to give me a good story as to what brings her in. Diagnostic Study Results     Labs -     Recent Results (from the past 12 hour(s))   INFLUENZA A & B AG (RAPID TEST)    Collection Time: 12/20/19  6:46 PM   Result Value Ref Range    Influenza A Antigen NEGATIVE  NEG      Influenza B Antigen NEGATIVE  NEG     CBC WITH AUTOMATED DIFF    Collection Time: 12/20/19  7:12 PM   Result Value Ref Range    WBC 25.0 (H) 3.6 - 11.0 K/uL    RBC 4.78 3.80 - 5.20 M/uL    HGB 14.8 11.5 - 16.0 g/dL    HCT 43.4 35.0 - 47.0 %    MCV 90.8 80.0 - 99.0 FL    MCH 31.0 26.0 - 34.0 PG    MCHC 34.1 30.0 - 36.5 g/dL    RDW 12.7 11.5 - 14.5 %    PLATELET 855 782 - 838 K/uL    MPV 11.1 8.9 - 12.9 FL    NRBC 0.0 0  WBC    ABSOLUTE NRBC 0.00 0.00 - 0.01 K/uL    NEUTROPHILS 89 (H) 32 - 75 %    LYMPHOCYTES 6 (L) 12 - 49 %    MONOCYTES 4 (L) 5 - 13 %    EOSINOPHILS 0 0 - 7 %    BASOPHILS 1 0 - 1 %    IMMATURE GRANULOCYTES 0 0.0 - 0.5 %    ABS. NEUTROPHILS 22.2 (H) 1.8 - 8.0 K/UL    ABS. LYMPHOCYTES 1.5 0.8 - 3.5 K/UL    ABS. MONOCYTES 1.0 0.0 - 1.0 K/UL    ABS. EOSINOPHILS 0.0 0.0 - 0.4 K/UL    ABS. BASOPHILS 0.3 (H) 0.0 - 0.1 K/UL    ABS. IMM. GRANS. 0.0 0.00 - 0.04 K/UL    DF MANUAL      RBC COMMENTS NORMOCYTIC, NORMOCHROMIC     PROTHROMBIN TIME + INR    Collection Time: 12/20/19  7:12 PM   Result Value Ref Range    INR 1.1 0.9 - 1.1      Prothrombin time 11.1 9.0 - 69.7 sec   METABOLIC PANEL, COMPREHENSIVE    Collection Time: 12/20/19  7:12 PM   Result Value Ref Range    Sodium 141 136 - 145 mmol/L    Potassium 3.7 3.5 - 5.1 mmol/L    Chloride 104 97 - 108 mmol/L    CO2 23 21 - 32 mmol/L    Anion gap 14 5 - 15 mmol/L    Glucose 152 (H) 65 - 100 mg/dL    BUN 62 (H) 6 - 20 MG/DL    Creatinine 3.48 (H) 0.55 - 1.02 MG/DL    BUN/Creatinine ratio 18 12 - 20      GFR est AA 16 (L) >60 ml/min/1.73m2    GFR est non-AA 13 (L) >60 ml/min/1.73m2    Calcium 10.9 (H) 8.5 - 10.1 MG/DL    Bilirubin, total 0.8 0.2 - 1.0 MG/DL    ALT (SGPT) 77 12 - 78 U/L    AST (SGOT) 169 (H) 15 - 37 U/L    Alk.  phosphatase 107 45 - 117 U/L    Protein, total 8.8 (H) 6.4 - 8.2 g/dL    Albumin 4.7 3.5 - 5.0 g/dL    Globulin 4.1 (H) 2.0 - 4.0 g/dL    A-G Ratio 1.1 1.1 - 2.2     D DIMER    Collection Time: 12/20/19  7:12 PM   Result Value Ref Range    D-dimer 0.52 0.00 - 0.65 mg/L FEU   TROPONIN I    Collection Time: 12/20/19  7:12 PM   Result Value Ref Range    Troponin-I, Qt. 0.05 (H) <0.05 ng/mL   AMMONIA    Collection Time: 12/20/19  7:12 PM   Result Value Ref Range    Ammonia 12 <32 UMOL/L   POC LACTIC ACID    Collection Time: 12/20/19  7:18 PM   Result Value Ref Range    Lactic Acid (POC) 3.25 (HH) 0.40 - 2.00 mmol/L   URINALYSIS W/ REFLEX CULTURE    Collection Time: 12/20/19  7:58 PM   Result Value Ref Range    Color DARK YELLOW      Appearance CLOUDY (A) CLEAR      Specific gravity 1.025 1.003 - 1.030      pH (UA) 5.0 5.0 - 8.0      Protein 100 (A) NEG mg/dL    Glucose 100 (A) NEG mg/dL    Ketone TRACE (A) NEG mg/dL    Blood TRACE (A) NEG      Urobilinogen 1.0 0.2 - 1.0 EU/dL    Nitrites NEGATIVE NEG      Leukocyte Esterase SMALL (A) NEG      WBC 10-20 0 - 4 /hpf    RBC 0-5 0 - 5 /hpf    Epithelial cells MANY (A) FEW /lpf    Bacteria NEGATIVE  NEG /hpf    UA:UC IF INDICATED URINE CULTURE ORDERED (A) CNI     BILIRUBIN, CONFIRM    Collection Time: 12/20/19  7:58 PM   Result Value Ref Range    Bilirubin UA, confirm NEGATIVE  NEG     POC G3 - PUL    Collection Time: 12/20/19  8:54 PM   Result Value Ref Range    FIO2 (POC) 21 %    pH (POC) 7.430 7.35 - 7.45      pCO2 (POC) 29.0 (L) 35.0 - 45.0 MMHG    pO2 (POC) 65 (L) 80 - 100 MMHG    HCO3 (POC) 19.2 (L) 22 - 26 MMOL/L    sO2 (POC) 93 92 - 97 %    Base deficit (POC) 5 mmol/L    Site RIGHT RADIAL      Device: ROOM AIR      Allens test (POC) YES      Specimen type (POC) ARTERIAL      Total resp. rate 32     POC LACTIC ACID    Collection Time: 12/20/19 10:10 PM   Result Value Ref Range    Lactic Acid (POC) 1.17 0.40 - 2.00 mmol/L       Radiologic Studies -   CT CHEST WO CONT   Final Result   IMPRESSION:      1. No acute abnormality. 2. New sclerosis and mild expansion of the sternum suspicious for metastasis. Recommend bone scan for further evaluation. CT ABD PELV WO CONT   Final Result   IMPRESSION:      1. No acute abnormality. 2. New sclerosis and mild expansion of the sternum suspicious for metastasis. Recommend bone scan for further evaluation. XR CHEST PORT   Final Result   Impression:   No acute cardiopulmonary process. CT HEAD WO CONT   Final Result   IMPRESSION: No acute abnormality. CT Results  (Last 48 hours)               12/20/19 2015  CT CHEST WO CONT Final result    Impression:  IMPRESSION:       1. No acute abnormality. 2. New sclerosis and mild expansion of the sternum suspicious for metastasis. Recommend bone scan for further evaluation. Narrative:  EXAM: CT CHEST WO CONT, CT ABD PELV WO CONT       INDICATION: Shortness of breath; sob with leukocystosis and tachcyardia. eval   for pna. History of breast cancer       COMPARISON: Radiographs same day. Chest CT 8/30/2012       CONTRAST: None       TECHNIQUE:    Thin axial images were obtained through the chest, abdomen and pelvis. Coronal   and sagittal reconstructions were generated. Oral contrast was not administered. CT dose reduction was achieved through use of a standardized protocol tailored   for this examination and automatic exposure control for dose modulation. FINDINGS:        A right-sided Port-A-Cath is in place. THYROID: No nodule. MEDIASTINUM: No mass or lymphadenopathy. NOLVIA: No mass or lymphadenopathy. THORACIC AORTA: No  aneurysm. MAIN PULMONARY ARTERY: Normal in caliber. TRACHEA/BRONCHI: Patent. ESOPHAGUS: No wall thickening or dilatation. HEART: Normal in size. PLEURA: No effusion or pneumothorax. LUNGS: No nodule, mass, or airspace disease. A calcified granuloma is seen in   the right lower lobe. LIVER: No mass or biliary dilatation. Several calcifications likely related to   prior granulomatous disease. Hepatic steatosis. GALLBLADDER: Several calcifications likely related to prior granulomatous   disease. SPLEEN: No mass. PANCREAS: No mass or ductal dilatation. ADRENALS: Unremarkable. KIDNEYS: No mass, calculus, or hydronephrosis. STOMACH: Unremarkable. SMALL BOWEL: No dilatation or wall thickening. COLON: No dilatation or wall thickening. APPENDIX: Unremarkable. PERITONEUM: No ascites or pneumoperitoneum. RETROPERITONEUM: No lymphadenopathy or aortic aneurysm. REPRODUCTIVE ORGANS: Unremarkable   URINARY BLADDER: No mass or calculus. BONES: There is sclerosis of the mid sternum with mild expansion that is new   since 8/30/2012. This is best appreciated on the sagittal.   ADDITIONAL COMMENTS: N/A           12/20/19 2015  CT ABD PELV WO CONT Final result    Impression:  IMPRESSION:       1. No acute abnormality. 2. New sclerosis and mild expansion of the sternum suspicious for metastasis. Recommend bone scan for further evaluation. Narrative:  EXAM: CT CHEST WO CONT, CT ABD PELV WO CONT       INDICATION: Shortness of breath; sob with leukocystosis and tachcyardia. eval   for pna. History of breast cancer       COMPARISON: Radiographs same day. Chest CT 8/30/2012       CONTRAST: None       TECHNIQUE:    Thin axial images were obtained through the chest, abdomen and pelvis. Coronal   and sagittal reconstructions were generated. Oral contrast was not administered. CT dose reduction was achieved through use of a standardized protocol tailored   for this examination and automatic exposure control for dose modulation. FINDINGS:        A right-sided Port-A-Cath is in place. THYROID: No nodule. MEDIASTINUM: No mass or lymphadenopathy. NOLVIA: No mass or lymphadenopathy. THORACIC AORTA: No  aneurysm. MAIN PULMONARY ARTERY: Normal in caliber. TRACHEA/BRONCHI: Patent. ESOPHAGUS: No wall thickening or dilatation. HEART: Normal in size. PLEURA: No effusion or pneumothorax. LUNGS: No nodule, mass, or airspace disease. A calcified granuloma is seen in   the right lower lobe. LIVER: No mass or biliary dilatation. Several calcifications likely related to   prior granulomatous disease. Hepatic steatosis. GALLBLADDER: Several calcifications likely related to prior granulomatous   disease. SPLEEN: No mass. PANCREAS: No mass or ductal dilatation. ADRENALS: Unremarkable. KIDNEYS: No mass, calculus, or hydronephrosis. STOMACH: Unremarkable. SMALL BOWEL: No dilatation or wall thickening. COLON: No dilatation or wall thickening. APPENDIX: Unremarkable. PERITONEUM: No ascites or pneumoperitoneum. RETROPERITONEUM: No lymphadenopathy or aortic aneurysm. REPRODUCTIVE ORGANS: Unremarkable   URINARY BLADDER: No mass or calculus. BONES: There is sclerosis of the mid sternum with mild expansion that is new   since 8/30/2012.  This is best appreciated on the sagittal. ADDITIONAL COMMENTS: N/A           12/20/19 1929  CT HEAD WO CONT Final result    Impression:  IMPRESSION: No acute abnormality. Narrative:  EXAM: CT HEAD WO CONT       INDICATION: Confusion/delirium, altered LOC, unexplained       COMPARISON: 9/13/2012. CONTRAST: None. TECHNIQUE: Unenhanced CT of the head was performed using 5 mm images. Brain and   bone windows were generated. CT dose reduction was achieved through use of a   standardized protocol tailored for this examination and automatic exposure   control for dose modulation. FINDINGS:   The ventricles and sulci are normal in size, shape and configuration and   midline. There is no significant white matter disease. There is no intracranial   hemorrhage, extra-axial collection, mass, mass effect or midline shift. The   basilar cisterns are open. No acute infarct is identified. The bone windows   demonstrate no abnormalities. The visualized portions of the paranasal sinuses   and mastoid air cells are clear. CXR Results  (Last 48 hours)               12/20/19 1943  XR CHEST PORT Final result    Impression:  Impression:   No acute cardiopulmonary process. Narrative:  Chest portable AP       History: Short of breath. Tachycardia. Comparison: 6/18/2019       Findings: A right IJ Port-A-Cath is in place. Cardiac monitoring leads overlie   the chest The lungs are well expanded. No focal consolidation, pleural   effusion, or pneumothorax. The cardiomediastinal silhouette is unremarkable. The visualized osseous structures are unremarkable. Surgical clips are seen in   the left axilla. Medical Decision Making   I am the first provider for this patient. I reviewed the vital signs, available nursing notes, past medical history, past surgical history, family history and social history. Vital Signs-Reviewed the patient's vital signs.   Patient Vitals for the past 12 hrs:   Temp Pulse Resp BP SpO2   12/20/19 2100 99 °F (37.2 °C) (!) 121 21 (!) 132/102 98 %   12/20/19 2034 -- -- -- 114/87 --   12/20/19 2030 -- (!) 129 23 (!) 133/117 99 %   12/20/19 1945 -- (!) 134 (!) 31 (!) 138/119 97 %   12/20/19 1905 -- (!) 135 (!) 39 105/84 96 %   12/20/19 1841 -- -- -- -- 97 %   12/20/19 1830 -- (!) 135 (!) 35 119/89 94 %   12/20/19 1825 -- (!) 136 28 (!) 122/92 94 %   12/20/19 1819 98 °F (36.7 °C) (!) 139 28 -- 97 %       Records Reviewed: Nursing Notes and Old Medical Records    Provider Notes (Medical Decision Making):   Patient presenting with shortness of breath and hematoma of the right arm. Unknown whether this is her baseline mental status but in reviewing the chart, patient was admitted in June 2019 for altered mental status due to hyponatremia. Has also had psych admissions for depression and anxiety. Will wait for friend to show up. She is notably tachycardic and tachypneic here so possibly related to infection (pna, bronchitis, flu) versus PE. Will start off with labs, urinalysis, POC lactate, ddimer chest x-ray and EKG. .      ED Course:   Initial assessment performed. The patients presenting problems have been discussed, and they are in agreement with the care plan formulated and outlined with them. I have encouraged them to ask questions as they arise throughout their visit. ED Course as of Dec 20 2224   Fri Dec 20, 2019   1854 EKG interpreted by me. Shows sinus tachycardia with some PVCs at a rate of 133. No ST elevations or depressions concerning for ischemia. [JS]   1931 Lactate 3.25 so meets severe sepsis. Code sepsis called and abx and fluids ordered. Pt in CT getting CT head. WBC 25K. [JS]   1953 7:53 PM - I suspect that this patient has an active infection    7:53 PM - The patient met criteria for severe sepsis at this time.     PROVIDER SEPSIS PHYSICAL EXAM EVAL  Vital signs reviewed (see nursing documentation for further details):    Cardiac exam:Tachycardic  Pulmonary exam:Clear Lungs  Peripheral pulses:Normal  Capillary refill:Normal  Skin exam:pink    Exam performed by Kassandra Fisher MD      [JS]   2003 Patient's friend is now at bedside. She states she is the one that found patient. States that patient does live independently in a senior citizens apartment. Patient's baseline mental status is that she perseverates and has memory issues. She denies any diarrhea that she knows about. [JS]   2132 Patient's urinalysis shows possible infection. CT show metastatic bone cancer from her prior breast cancer. Patient does have a port in place that has not been accessed for 2 years but it is in place according to chest x-ray and CT so we will access. In speaking with patient's friend, patient was admitted in 2016 at [facility] for sepsis due to unknown cause. When asked about patient's mental status, she states that she does normally perseverate as well as has memory loss issues, though this is more intense than it has been in the past.    [JS]      ED Course User Index  [JS] Maik Senior MD     Critical Care Time:   CRITICAL CARE NOTE :    6:21 PM    IMPENDING DETERIORATION -Respiratory, Cardiovascular and Renal  ASSOCIATED RISK FACTORS - Dehydration  MANAGEMENT- Bedside Assessment and Supervision of Care  INTERPRETATION -  Xrays, CT Scan, Blood Gases, ECG, Blood Pressure and Cardiac Output Measures   INTERVENTIONS - hemodynamic mngmt  CASE REVIEW - Hospitalist and Nursing  TREATMENT RESPONSE -Stable  PERFORMED BY - Self    NOTES   :    I have spent 50 minutes of critical care time involved in lab review, consultations with specialist, family decision- making, bedside attention and documentation. During this entire length of time I was immediately available to the patient .       Disposition:    Admission Note:  Patient is being admitted to the hospital by Dr. Imer Peters, Service: Hospitalist.  The results of their tests and reasons for their admission have been discussed with them and available family. They convey agreement and understanding for the need to be admitted and for their admission diagnosis. Diagnosis     Clinical Impression:   1. Severe sepsis ([address]    2. Acute renal failure, unspecified acute renal failure type ([address]    3. Sinus tachycardia by electrocardiogram        Attestations:  Lisette Marti M.D. Please note that this dictation was completed with [employer], the computer voice recognition software. Quite often unanticipated grammatical, syntax, homophones, and other interpretive errors are inadvertently transcribed by the computer software. Please disregard these errors. Please excuse any errors that have escaped final proofreading. Thank you.

## 2019-12-21 ENCOUNTER — APPOINTMENT (OUTPATIENT)
Dept: GENERAL RADIOLOGY | Age: 64
DRG: 871 | End: 2019-12-21
Attending: INTERNAL MEDICINE
Payer: MEDICARE

## 2019-12-21 LAB
ALBUMIN SERPL-MCNC: 3.5 G/DL (ref 3.5–5)
ALBUMIN/GLOB SERPL: 1 {RATIO} (ref 1.1–2.2)
ALP SERPL-CCNC: 86 U/L (ref 45–117)
ALT SERPL-CCNC: 68 U/L (ref 12–78)
ANION GAP SERPL CALC-SCNC: 11 MMOL/L (ref 5–15)
AST SERPL-CCNC: 121 U/L (ref 15–37)
ATRIAL RATE: 133 BPM
BASOPHILS # BLD: 0.1 K/UL (ref 0–0.1)
BASOPHILS NFR BLD: 0 % (ref 0–1)
BILIRUB SERPL-MCNC: 1.9 MG/DL (ref 0.2–1)
BUN SERPL-MCNC: 60 MG/DL (ref 6–20)
BUN/CREAT SERPL: 26 (ref 12–20)
CALCIUM SERPL-MCNC: 8.5 MG/DL (ref 8.5–10.1)
CALCULATED P AXIS, ECG09: 0 DEGREES
CALCULATED R AXIS, ECG10: 23 DEGREES
CALCULATED T AXIS, ECG11: 176 DEGREES
CHLORIDE SERPL-SCNC: 112 MMOL/L (ref 97–108)
CO2 SERPL-SCNC: 21 MMOL/L (ref 21–32)
CREAT SERPL-MCNC: 2.28 MG/DL (ref 0.55–1.02)
DIAGNOSIS, 93000: NORMAL
DIFFERENTIAL METHOD BLD: ABNORMAL
EOSINOPHIL # BLD: 0 K/UL (ref 0–0.4)
EOSINOPHIL NFR BLD: 0 % (ref 0–7)
ERYTHROCYTE [DISTWIDTH] IN BLOOD BY AUTOMATED COUNT: 12.7 % (ref 11.5–14.5)
ETHANOL SERPL-MCNC: <10 MG/DL
GLOBULIN SER CALC-MCNC: 3.4 G/DL (ref 2–4)
GLUCOSE SERPL-MCNC: 163 MG/DL (ref 65–100)
HCT VFR BLD AUTO: 37 % (ref 35–47)
HGB BLD-MCNC: 12.2 G/DL (ref 11.5–16)
IMM GRANULOCYTES # BLD AUTO: 0.2 K/UL (ref 0–0.04)
IMM GRANULOCYTES NFR BLD AUTO: 1 % (ref 0–0.5)
LYMPHOCYTES # BLD: 1.4 K/UL (ref 0.8–3.5)
LYMPHOCYTES NFR BLD: 7 % (ref 12–49)
MCH RBC QN AUTO: 30.5 PG (ref 26–34)
MCHC RBC AUTO-ENTMCNC: 33 G/DL (ref 30–36.5)
MCV RBC AUTO: 92.5 FL (ref 80–99)
MONOCYTES # BLD: 1.4 K/UL (ref 0–1)
MONOCYTES NFR BLD: 7 % (ref 5–13)
NEUTS SEG # BLD: 15.9 K/UL (ref 1.8–8)
NEUTS SEG NFR BLD: 84 % (ref 32–75)
NRBC # BLD: 0 K/UL (ref 0–0.01)
NRBC BLD-RTO: 0 PER 100 WBC
P-R INTERVAL, ECG05: 140 MS
PLATELET # BLD AUTO: 278 K/UL (ref 150–400)
PMV BLD AUTO: 11.1 FL (ref 8.9–12.9)
POTASSIUM SERPL-SCNC: 3.2 MMOL/L (ref 3.5–5.1)
PROT SERPL-MCNC: 6.9 G/DL (ref 6.4–8.2)
Q-T INTERVAL, ECG07: 282 MS
QRS DURATION, ECG06: 70 MS
QTC CALCULATION (BEZET), ECG08: 419 MS
RBC # BLD AUTO: 4 M/UL (ref 3.8–5.2)
SODIUM SERPL-SCNC: 144 MMOL/L (ref 136–145)
VENTRICULAR RATE, ECG03: 133 BPM
WBC # BLD AUTO: 18.9 K/UL (ref 3.6–11)

## 2019-12-21 PROCEDURE — 80307 DRUG TEST PRSMV CHEM ANLYZR: CPT

## 2019-12-21 PROCEDURE — 74011000258 HC RX REV CODE- 258: Performed by: INTERNAL MEDICINE

## 2019-12-21 PROCEDURE — 73080 X-RAY EXAM OF ELBOW: CPT

## 2019-12-21 PROCEDURE — 74011250636 HC RX REV CODE- 250/636: Performed by: INTERNAL MEDICINE

## 2019-12-21 PROCEDURE — 80053 COMPREHEN METABOLIC PANEL: CPT

## 2019-12-21 PROCEDURE — 85025 COMPLETE CBC W/AUTO DIFF WBC: CPT

## 2019-12-21 PROCEDURE — 74011000250 HC RX REV CODE- 250: Performed by: INTERNAL MEDICINE

## 2019-12-21 PROCEDURE — 36415 COLL VENOUS BLD VENIPUNCTURE: CPT

## 2019-12-21 PROCEDURE — 65660000001 HC RM ICU INTERMED STEPDOWN

## 2019-12-21 PROCEDURE — 74011250637 HC RX REV CODE- 250/637: Performed by: INTERNAL MEDICINE

## 2019-12-21 RX ORDER — POTASSIUM CHLORIDE 7.45 MG/ML
10 INJECTION INTRAVENOUS
Status: COMPLETED | OUTPATIENT
Start: 2019-12-21 | End: 2019-12-21

## 2019-12-21 RX ORDER — AMOXICILLIN 500 MG/1
500 TABLET, FILM COATED ORAL EVERY 8 HOURS
COMMUNITY
End: 2019-12-26

## 2019-12-21 RX ORDER — ACETAMINOPHEN 325 MG/1
650 TABLET ORAL
Status: DISCONTINUED | OUTPATIENT
Start: 2019-12-21 | End: 2019-12-26 | Stop reason: HOSPADM

## 2019-12-21 RX ORDER — VENLAFAXINE 100 MG/1
200 TABLET ORAL 2 TIMES DAILY
COMMUNITY
End: 2020-02-05

## 2019-12-21 RX ORDER — HYDROCHLOROTHIAZIDE 25 MG/1
25 TABLET ORAL DAILY
COMMUNITY
End: 2020-10-27

## 2019-12-21 RX ORDER — BISACODYL 5 MG
5 TABLET, DELAYED RELEASE (ENTERIC COATED) ORAL DAILY PRN
Status: DISCONTINUED | OUTPATIENT
Start: 2019-12-21 | End: 2019-12-26 | Stop reason: HOSPADM

## 2019-12-21 RX ORDER — ASPIRIN 81 MG/1
81 TABLET ORAL DAILY
Status: DISCONTINUED | OUTPATIENT
Start: 2019-12-22 | End: 2019-12-26 | Stop reason: HOSPADM

## 2019-12-21 RX ORDER — FENOFIBRATE 134 MG/1
134 CAPSULE ORAL
COMMUNITY

## 2019-12-21 RX ORDER — GABAPENTIN 100 MG/1
100 CAPSULE ORAL 3 TIMES DAILY
Status: DISCONTINUED | OUTPATIENT
Start: 2019-12-21 | End: 2019-12-26 | Stop reason: HOSPADM

## 2019-12-21 RX ORDER — ONDANSETRON 2 MG/ML
4 INJECTION INTRAMUSCULAR; INTRAVENOUS
Status: DISCONTINUED | OUTPATIENT
Start: 2019-12-21 | End: 2019-12-26 | Stop reason: HOSPADM

## 2019-12-21 RX ORDER — HYDROCODONE BITARTRATE AND ACETAMINOPHEN 5; 325 MG/1; MG/1
1 TABLET ORAL
COMMUNITY
End: 2020-02-01

## 2019-12-21 RX ORDER — LABETALOL HYDROCHLORIDE 5 MG/ML
10 INJECTION, SOLUTION INTRAVENOUS
Status: DISCONTINUED | OUTPATIENT
Start: 2019-12-21 | End: 2019-12-26 | Stop reason: HOSPADM

## 2019-12-21 RX ORDER — SODIUM CHLORIDE 9 MG/ML
100 INJECTION, SOLUTION INTRAVENOUS CONTINUOUS
Status: DISCONTINUED | OUTPATIENT
Start: 2019-12-21 | End: 2019-12-22

## 2019-12-21 RX ORDER — LOSARTAN POTASSIUM 50 MG/1
50 TABLET ORAL DAILY
Status: DISCONTINUED | OUTPATIENT
Start: 2019-12-22 | End: 2019-12-21

## 2019-12-21 RX ORDER — ALPRAZOLAM 1 MG/1
1 TABLET ORAL 4 TIMES DAILY
COMMUNITY
End: 2019-12-26

## 2019-12-21 RX ORDER — CARVEDILOL 12.5 MG/1
12.5 TABLET ORAL 2 TIMES DAILY WITH MEALS
Status: DISCONTINUED | OUTPATIENT
Start: 2019-12-21 | End: 2019-12-26 | Stop reason: HOSPADM

## 2019-12-21 RX ORDER — LORAZEPAM 2 MG/ML
2 INJECTION INTRAMUSCULAR
Status: DISCONTINUED | OUTPATIENT
Start: 2019-12-21 | End: 2019-12-26 | Stop reason: HOSPADM

## 2019-12-21 RX ORDER — AMITRIPTYLINE HYDROCHLORIDE 50 MG/1
50 TABLET, FILM COATED ORAL
Status: DISCONTINUED | OUTPATIENT
Start: 2019-12-21 | End: 2019-12-26 | Stop reason: HOSPADM

## 2019-12-21 RX ORDER — SODIUM CHLORIDE 0.9 % (FLUSH) 0.9 %
5-40 SYRINGE (ML) INJECTION EVERY 8 HOURS
Status: DISCONTINUED | OUTPATIENT
Start: 2019-12-21 | End: 2019-12-26 | Stop reason: HOSPADM

## 2019-12-21 RX ORDER — SODIUM CHLORIDE 0.9 % (FLUSH) 0.9 %
5-40 SYRINGE (ML) INJECTION AS NEEDED
Status: DISCONTINUED | OUTPATIENT
Start: 2019-12-21 | End: 2019-12-26 | Stop reason: HOSPADM

## 2019-12-21 RX ORDER — LORAZEPAM 2 MG/ML
4 INJECTION INTRAMUSCULAR
Status: DISCONTINUED | OUTPATIENT
Start: 2019-12-21 | End: 2019-12-26 | Stop reason: HOSPADM

## 2019-12-21 RX ORDER — HEPARIN SODIUM 5000 [USP'U]/ML
5000 INJECTION, SOLUTION INTRAVENOUS; SUBCUTANEOUS EVERY 8 HOURS
Status: DISCONTINUED | OUTPATIENT
Start: 2019-12-21 | End: 2019-12-26 | Stop reason: HOSPADM

## 2019-12-21 RX ADMIN — PIPERACILLIN AND TAZOBACTAM 3.38 G: 3; .375 INJECTION, POWDER, LYOPHILIZED, FOR SOLUTION INTRAVENOUS at 09:05

## 2019-12-21 RX ADMIN — GABAPENTIN 100 MG: 100 CAPSULE ORAL at 15:06

## 2019-12-21 RX ADMIN — LORAZEPAM 4 MG: 2 INJECTION INTRAMUSCULAR; INTRAVENOUS at 15:05

## 2019-12-21 RX ADMIN — SODIUM CHLORIDE 100 ML/HR: 900 INJECTION, SOLUTION INTRAVENOUS at 01:00

## 2019-12-21 RX ADMIN — Medication 10 ML: at 05:43

## 2019-12-21 RX ADMIN — Medication 10 ML: at 00:36

## 2019-12-21 RX ADMIN — CARVEDILOL 12.5 MG: 12.5 TABLET, FILM COATED ORAL at 16:16

## 2019-12-21 RX ADMIN — SODIUM CHLORIDE 100 ML/HR: 900 INJECTION, SOLUTION INTRAVENOUS at 11:23

## 2019-12-21 RX ADMIN — HEPARIN SODIUM 5000 UNITS: 5000 INJECTION INTRAVENOUS; SUBCUTANEOUS at 21:23

## 2019-12-21 RX ADMIN — FOLIC ACID: 5 INJECTION, SOLUTION INTRAMUSCULAR; INTRAVENOUS; SUBCUTANEOUS at 15:36

## 2019-12-21 RX ADMIN — PIPERACILLIN AND TAZOBACTAM 3.38 G: 3; .375 INJECTION, POWDER, LYOPHILIZED, FOR SOLUTION INTRAVENOUS at 21:25

## 2019-12-21 RX ADMIN — POTASSIUM CHLORIDE 10 MEQ: 7.46 INJECTION, SOLUTION INTRAVENOUS at 07:51

## 2019-12-21 RX ADMIN — HEPARIN SODIUM 5000 UNITS: 5000 INJECTION INTRAVENOUS; SUBCUTANEOUS at 14:51

## 2019-12-21 RX ADMIN — AMITRIPTYLINE HYDROCHLORIDE 50 MG: 50 TABLET, FILM COATED ORAL at 21:24

## 2019-12-21 RX ADMIN — Medication 10 ML: at 21:25

## 2019-12-21 RX ADMIN — POTASSIUM CHLORIDE 10 MEQ: 7.46 INJECTION, SOLUTION INTRAVENOUS at 09:01

## 2019-12-21 RX ADMIN — Medication 10 ML: at 14:52

## 2019-12-21 RX ADMIN — PIPERACILLIN AND TAZOBACTAM 3.38 G: 3; .375 INJECTION, POWDER, LYOPHILIZED, FOR SOLUTION INTRAVENOUS at 01:00

## 2019-12-21 RX ADMIN — GABAPENTIN 100 MG: 100 CAPSULE ORAL at 21:24

## 2019-12-21 RX ADMIN — HEPARIN SODIUM 5000 UNITS: 5000 INJECTION INTRAVENOUS; SUBCUTANEOUS at 05:44

## 2019-12-21 RX ADMIN — THIAMINE HYDROCHLORIDE 100 MG: 100 INJECTION, SOLUTION INTRAMUSCULAR; INTRAVENOUS at 15:12

## 2019-12-21 NOTE — PROGRESS NOTES
Primary Nurse Briseida Zelaya RN and hi RN performed a dual skin assessment on this patient No impairment noted    Buttocks/sacrum/coccyx pink-red and blanching    Feet pink and blanching    Bilateral mastectomy    Large swath of deep bruising concentrated on RUE    Adama score is 19

## 2019-12-21 NOTE — ED NOTES
TRANSFER - OUT REPORT:    Verbal report given to ZHANG Katz(name) on Bon Confer  being transferred to [facility](unit) for routine progression of care       Report consisted of patients Situation, Background, Assessment and   Recommendations(SBAR). Information from the following report(s) SBAR, Kardex, ED Summary, Intake/Output, MAR, Recent Results and Cardiac Rhythm . was reviewed with the receiving nurse. Lines:   Venous Access Device Power port 04/02/18 Upper chest (subclavicular area, right (Active)       Venous Access Device Power Port 12/20/19 Upper chest (subclavicular area, right (Active)   Central Line Being Utilized Yes 12/21/2019  7:58 AM   Criteria for Appropriate Use Other (comment) 12/21/2019  7:58 AM   Site Assessment Clean, dry, & intact 12/21/2019  7:58 AM   Date of Last Dressing Change 12/21/19 12/21/2019  7:58 AM   Dressing Status Clean, dry, & intact 12/21/2019  7:58 AM   Dressing Type 4 X 4;Disk with Chlorhexadine gluconate (CHG); Transparent 12/21/2019  7:58 AM   Action Taken Open ports on tubing capped 12/21/2019  7:58 AM   Alcohol Cap Used Yes 12/21/2019  7:58 AM       Peripheral IV 12/20/19 Right Other(comment) (Active)   Site Assessment Clean, dry, & intact 12/21/2019  7:58 AM   Phlebitis Assessment 0 12/21/2019  7:58 AM   Infiltration Assessment 0 12/21/2019  7:58 AM   Dressing Status Clean, dry, & intact 12/21/2019  7:58 AM   Dressing Type Tape;Transparent 12/21/2019  7:58 AM   Hub Color/Line Status Pink 12/21/2019  7:58 AM   Action Taken Open ports on tubing capped 12/21/2019  7:58 AM   Alcohol Cap Used Yes 12/21/2019  7:58 AM        Opportunity for questions and clarification was provided.       Patient transported with:   Monitor  Registered Nurse

## 2019-12-21 NOTE — PROGRESS NOTES
TRANSFER - IN REPORT:    Verbal report received from angel(name) on Kemi Bachelor  being received from ED (unit) for routine progression of care      Report consisted of patients Situation, Background, Assessment and   Recommendations(SBAR). Information from the following report(s) SBAR, Kardex, ED Summary, Procedure Summary, Intake/Output, MAR and Recent Results was reviewed with the receiving nurse. Opportunity for questions and clarification was provided. Assessment completed upon patients arrival to unit and care assumed.

## 2019-12-21 NOTE — PROGRESS NOTES
Bedside and Verbal shift change report GIVEN TO Elan Nicholson RN. Report included the following information SBAR, Kardex, ED Summary, Procedure Summary, Intake/Output, MAR and Recent Results. Per admitting MD note: Surrogate Decision Maker:Mone Hopkins [phone]    Unable to complete admission db d/t pt severely confused.

## 2019-12-21 NOTE — PROGRESS NOTES
Pharmacy Clarification of the Prior to Admission Medication Regimen Retrospective to the Admission Medication Reconciliation-Follow Up Needed    The patient was unable to be interviewed regarding clarification of the prior to admission medication regimen due to AMS. No family/friends were present at time of interview. Per [facility] ED nurse, the patient lives at a senior living community in the independent living quarters. MHT called the patient's outpatient pharmacy on file, [facility], [phone], and spoke with ras Jonesician, who was able to verfiy the patient's refill history. MHT updated the PTA med list based on the information received from the outpatient pharmacy. Compliance and last doses administered are unknown at this time    Information Obtained From: outpatient pharmacy    Recommendations/Findings: The following amendments were made to the patient's active medication list on file at [facility]:     1) Additions:   ALPRAZolam (XANAX) 1 mg tablet  HYDROcodone-acetaminophen (NORCO) 5-325 mg per tablet  amoxicillin 500 mg tab  fenofibrate micronized (LOFIBRA) 134 mg capsule  hydroCHLOROthiazide (HYDRODIURIL) 25 mg tablet  venlafaxine (EFFEXOR) 100 mg tablet    2) Removals:   Lidocaine patch  meloxicam    3) Changes: NONE    4) Pertinent Pharmacy Findings:  Updated patients preferred outpatient pharmacy to: T did not update the outpatient pharmacy  amoxicillin 500 mg tab: This agent was prescribed on 12/12/19, for a 7 day supply. It is unknown at this time if/when the patient started therapy and/or if therapy is completed. The medication history will need to be re-evaluated at a later time during admission when patient is willing/able to participate or if more information is provided     PTA medication list was corrected to the following:     Prior to Admission Medications   Prescriptions Last Dose Informant Taking?    ALPRAZolam (XANAX) 1 mg tablet Unknown at Unknown time Other No   Sig: Take 1 mg by mouth four (4) times daily. HYDROcodone-acetaminophen (NORCO) 5-325 mg per tablet Unknown at Unknown time Other No   Sig: Take 1 Tab by mouth every six (6) hours as needed for Pain. amitriptyline (ELAVIL) 50 mg tablet Unknown at Unknown time Other No   Sig: TAKE 1 TABLET BY MOUTH EVERY EVENING   amoxicillin 500 mg tab Unknown at Unknown time Other No   Sig: Take 500 mg by mouth every eight (8) hours. aspirin delayed-release 81 mg tablet Unknown at Unknown time Other No   Sig: Take 1 Tab by mouth daily. Indications: prevention of transient ischemic attack   carvedilol (COREG) 12.5 mg tablet Unknown at Unknown time Other No   Sig: TAKE 1 TABLET BY MOUTH TWICE A DAY   fenofibrate micronized (LOFIBRA) 134 mg capsule Unknown at Unknown time Other No   Sig: Take 134 mg by mouth every morning.   gabapentin (NEURONTIN) 100 mg capsule Unknown at Unknown time Other No   Sig: take 3 capsules by mouth three times a day   hydroCHLOROthiazide (HYDRODIURIL) 25 mg tablet Unknown at Unknown time Other No   Sig: Take 25 mg by mouth daily. losartan (COZAAR) 50 mg tablet Unknown at Unknown time Other No   Sig: TAKE 1 TABLET BY MOUTH ONCE DAILY   multivitamin (ONE A DAY) tablet Unknown at Unknown time Other No   Sig: Take 1 Tab by mouth daily. venlafaxine (EFFEXOR) 100 mg tablet Unknown at Unknown time Other No   Sig: Take 200 mg by mouth two (2) times a day.       Facility-Administered Medications: None          Thank you,  Jose Cuellar [facility]  Medication History Pharmacy Technician

## 2019-12-21 NOTE — H&P
Hospitalist Admission Note    NAME: Ryan Anand   :  1955   MRN:  [id number]     Date/Time:  2019 11:58 PM    Patient PCP: Carolyne Lorenzo MD  ______________________________________________________________________  Given the patient's current clinical presentation, I have a high level of concern for decompensation if discharged from the emergency department. Complex decision making was performed, which includes reviewing the patient's available past medical records, laboratory results, and x-ray films. My assessment of this patient's clinical condition and my plan of care is as follows. Assessment / Plan:    Sepsis secondary to UTI  -Admit patient to stepdown  -start patient on IV antibiotic Zosyn  -Follow-up blood culture follow-up urine culture    Acute renal failure  -Start patient on IV fluid  -Avoid nephrotoxic medication    Hypertension  -hold antihypertensive medication blood pressure low    Change mental status most likely secondary to metabolic encephalopathy    Anxiety depression  -Hold anti-depressant medication      Code Status: Full   Surrogate Decision Maker:Mone Hopkins    DVT Prophylaxis: Heparin   GI Prophylaxis: not indicated          Subjective:   CHIEF COMPLAINT: SOB     HISTORY OF PRESENT ILLNESS:     66years old female with past medical history significant for dementia, anxiety, history of breast cancer, hypertension, GERD presented to the hospital for evaluation of shortness of breath, according to the EMS report patient fell few days ago and injured her right elbow with hematoma, blood work in ED was significant for elevated white blood cell count 25.0, creatinine was noticed to be elevated 3.48 and BUN is 62, patient confused and unable to give a clear history. We were asked to admit for work up and evaluation of the above problems.      Past Medical History:   Diagnosis Date    Anxiety     Cancer [facility])     breast    Depression  GERD (gastroesophageal reflux disease)     HTN (hypertension)     Hypercholesterolemia     Hypotension 9/12/2012    Metastatic breast cancer ([facility] [address] 5/3/2017    Secondary cancer of bone ([facility] 5/3/2017        Past Surgical History:   Procedure Laterality Date    BREAST SURGERY PROCEDURE UNLISTED  march 13    carina masectomy       Social History     Tobacco Use    Smoking status: Former Smoker     Packs/day: 0.50     Years: 20.00     Pack years: 10.00    Smokeless tobacco: Never Used   Substance Use Topics    Alcohol use: No        Family History   Problem Relation Age of Onset    Hypertension Mother     Heart Disease Mother     Depression Mother     Hypertension Father      Allergies   Allergen Reactions    Sulfa (Sulfonamide Antibiotics) Unknown (comments)    Wellbutrin [Bupropion Hcl] Unknown (comments)        Prior to Admission medications    Medication Sig Start Date End Date Taking? Authorizing Provider   carvedilol (COREG) 12.5 mg tablet TAKE 1 TABLET BY MOUTH TWICE A DAY 7/19/19   Valentine Mckeon MD   losartan (COZAAR) 50 mg tablet TAKE 1 TABLET BY MOUTH ONCE DAILY 7/19/19   Valentine Mckeon MD   gabapentin (NEURONTIN) 100 mg capsule take 3 capsules by mouth three times a day 7/19/19   Valentine Mckeon MD   amitriptyline (ELAVIL) 50 mg tablet TAKE 1 TABLET BY MOUTH EVERY EVENING 6/28/19   Valentine Mckeon MD   aspirin delayed-release 81 mg tablet Take 1 Tab by mouth daily. Indications: prevention of transient ischemic attack 4/11/18   Valentine Mckeon MD   lidocaine (LIDODERM) 5 % Apply patch to the affected area for 12 hours a day and remove for 12 hours a day. Indications: POSTHERPETIC NEURALGIA 4/11/18   Valentine Mckeon MD   meloxicam (MOBIC) 15 mg tablet Take 1 Tab by mouth daily. Indications: Rheumatoid Arthritis 4/11/18   Valentine Mckeon MD   multivitamin (ONE A DAY) tablet Take 1 Tab by mouth daily.  4/11/18   Valentine Mckeon MD       REVIEW OF SYSTEMS:     I am not able to complete the review of systems because: The patient is intubated and sedated   y The patient has altered mental status due to his acute medical problems    The patient has baseline aphasia from prior stroke(s)    The patient has baseline dementia and is not reliable historian    The patient is in acute medical distress and unable to provide information           Total of 12 systems reviewed as follows:       POSITIVE= underlined text  Negative = text not underlined  General:  fever, chills, sweats, generalized weakness, weight loss/gain,      loss of appetite   Eyes:    blurred vision, eye pain, loss of vision, double vision  ENT:    rhinorrhea, pharyngitis   Respiratory:   cough, sputum production, SOB, LOBO, wheezing, pleuritic pain   Cardiology:   chest pain, palpitations, orthopnea, PND, edema, syncope   Gastrointestinal:  abdominal pain , N/V, diarrhea, dysphagia, constipation, bleeding   Genitourinary:  frequency, urgency, dysuria, hematuria, incontinence   Muskuloskeletal :  arthralgia, myalgia, back pain  Hematology:  easy bruising, nose or gum bleeding, lymphadenopathy   Dermatological: rash, ulceration, pruritis, color change / jaundice  Endocrine:   hot flashes or polydipsia   Neurological:  headache, dizziness, confusion, focal weakness, paresthesia,     Speech difficulties, memory loss, gait difficulty  Psychological: Feelings of anxiety, depression, agitation    Objective:   VITALS:    Visit Vitals  /81   Pulse (!) 119   Temp 98.6 °F (37 °C)   Resp 27   Ht 5' 4\" (1.626 m)   Wt 81.6 kg (180 lb)   SpO2 95%   BMI 30.90 kg/m²       PHYSICAL EXAM:    General:    Confused   HEENT: Atraumatic, anicteric sclerae, pink conjunctivae     No oral ulcers, mucosa moist, throat clear, dentition fair  Neck:  Supple, symmetrical,  thyroid: non tender  Lungs:   Clear to auscultation bilaterally. No Wheezing or Rhonchi. No rales. Chest wall:  No tenderness  No Accessory muscle use.   Heart:   Regular  rhythm,  No  murmur No edema  Abdomen:   Soft, non-tender. Not distended. Bowel sounds normal  Extremities: No cyanosis. No clubbing,  Right Elbow Hematoma     Skin turgor normal, Capillary refill normal, Radial dial pulse 2+  Skin:     Not pale. Not Jaundiced  No rashes   Psych:  Good insight. Not depressed. Not anxious or agitated. Neurologic: EOMs intact. No facial asymmetry. No aphasia or slurred speech. Symmetrical strength, Sensation grossly intact. Alert and oriented X 2  _______________________________________________________________________  Care Plan discussed with:    Comments   Patient y    Family      RN y    Care Manager                    Consultant:      _______________________________________________________________________  Expected  Disposition:   Home with Family y   HH/PT/OT/RN    SNF/LTC    JATIN    ________________________________________________________________________  TOTAL TIME:  61 Minutes    Critical Care Provided     Minutes non procedure based      Comments    y Reviewed previous records   >50% of visit spent in counseling and coordination of care y Discussion with patient and/or family and questions answered       ________________________________________________________________________  Signed: Angelique Jefferson MD    Procedures: see electronic medical records for all procedures/Xrays and details which were not copied into this note but were reviewed prior to creation of Plan.     LAB DATA REVIEWED:    Recent Results (from the past 24 hour(s))   INFLUENZA A & B AG (RAPID TEST)    Collection Time: 12/20/19  6:46 PM   Result Value Ref Range    Influenza A Antigen NEGATIVE  NEG      Influenza B Antigen NEGATIVE  NEG     CBC WITH AUTOMATED DIFF    Collection Time: 12/20/19  7:12 PM   Result Value Ref Range    WBC 25.0 (H) 3.6 - 11.0 K/uL    RBC 4.78 3.80 - 5.20 M/uL    HGB 14.8 11.5 - 16.0 g/dL    HCT 43.4 35.0 - 47.0 %    MCV 90.8 80.0 - 99.0 FL    MCH 31.0 26.0 - 34.0 PG    MCHC 34.1 30.0 - 36.5 g/dL RDW 12.7 11.5 - 14.5 %    PLATELET 890 107 - 405 K/uL    MPV 11.1 8.9 - 12.9 FL    NRBC 0.0 0  WBC    ABSOLUTE NRBC 0.00 0.00 - 0.01 K/uL    NEUTROPHILS 89 (H) 32 - 75 %    LYMPHOCYTES 6 (L) 12 - 49 %    MONOCYTES 4 (L) 5 - 13 %    EOSINOPHILS 0 0 - 7 %    BASOPHILS 1 0 - 1 %    IMMATURE GRANULOCYTES 0 0.0 - 0.5 %    ABS. NEUTROPHILS 22.2 (H) 1.8 - 8.0 K/UL    ABS. LYMPHOCYTES 1.5 0.8 - 3.5 K/UL    ABS. MONOCYTES 1.0 0.0 - 1.0 K/UL    ABS. EOSINOPHILS 0.0 0.0 - 0.4 K/UL    ABS. BASOPHILS 0.3 (H) 0.0 - 0.1 K/UL    ABS. IMM. GRANS. 0.0 0.00 - 0.04 K/UL    DF MANUAL      RBC COMMENTS NORMOCYTIC, NORMOCHROMIC     PROTHROMBIN TIME + INR    Collection Time: 12/20/19  7:12 PM   Result Value Ref Range    INR 1.1 0.9 - 1.1      Prothrombin time 11.1 9.0 - 43.7 sec   METABOLIC PANEL, COMPREHENSIVE    Collection Time: 12/20/19  7:12 PM   Result Value Ref Range    Sodium 141 136 - 145 mmol/L    Potassium 3.7 3.5 - 5.1 mmol/L    Chloride 104 97 - 108 mmol/L    CO2 23 21 - 32 mmol/L    Anion gap 14 5 - 15 mmol/L    Glucose 152 (H) 65 - 100 mg/dL    BUN 62 (H) 6 - 20 MG/DL    Creatinine 3.48 (H) 0.55 - 1.02 MG/DL    BUN/Creatinine ratio 18 12 - 20      GFR est AA 16 (L) >60 ml/min/1.73m2    GFR est non-AA 13 (L) >60 ml/min/1.73m2    Calcium 10.9 (H) 8.5 - 10.1 MG/DL    Bilirubin, total 0.8 0.2 - 1.0 MG/DL    ALT (SGPT) 77 12 - 78 U/L    AST (SGOT) 169 (H) 15 - 37 U/L    Alk.  phosphatase 107 45 - 117 U/L    Protein, total 8.8 (H) 6.4 - 8.2 g/dL    Albumin 4.7 3.5 - 5.0 g/dL    Globulin 4.1 (H) 2.0 - 4.0 g/dL    A-G Ratio 1.1 1.1 - 2.2     D DIMER    Collection Time: 12/20/19  7:12 PM   Result Value Ref Range    D-dimer 0.52 0.00 - 0.65 mg/L FEU   TROPONIN I    Collection Time: 12/20/19  7:12 PM   Result Value Ref Range    Troponin-I, Qt. 0.05 (H) <0.05 ng/mL   AMMONIA    Collection Time: 12/20/19  7:12 PM   Result Value Ref Range    Ammonia 12 <32 UMOL/L   POC LACTIC ACID    Collection Time: 12/20/19  7:18 PM   Result Value Ref Range    Lactic Acid (POC) 3.25 (HH) 0.40 - 2.00 mmol/L   URINALYSIS W/ REFLEX CULTURE    Collection Time: 12/20/19  7:58 PM   Result Value Ref Range    Color DARK YELLOW      Appearance CLOUDY (A) CLEAR      Specific gravity 1.025 1.003 - 1.030      pH (UA) 5.0 5.0 - 8.0      Protein 100 (A) NEG mg/dL    Glucose 100 (A) NEG mg/dL    Ketone TRACE (A) NEG mg/dL    Blood TRACE (A) NEG      Urobilinogen 1.0 0.2 - 1.0 EU/dL    Nitrites NEGATIVE  NEG      Leukocyte Esterase SMALL (A) NEG      WBC 10-20 0 - 4 /hpf    RBC 0-5 0 - 5 /hpf    Epithelial cells MANY (A) FEW /lpf    Bacteria NEGATIVE  NEG /hpf    UA:UC IF INDICATED URINE CULTURE ORDERED (A) CNI     BILIRUBIN, CONFIRM    Collection Time: 12/20/19  7:58 PM   Result Value Ref Range    Bilirubin UA, confirm NEGATIVE  NEG     POC G3 - PUL    Collection Time: 12/20/19  8:54 PM   Result Value Ref Range    FIO2 (POC) 21 %    pH (POC) 7.430 7.35 - 7.45      pCO2 (POC) 29.0 (L) 35.0 - 45.0 MMHG    pO2 (POC) 65 (L) 80 - 100 MMHG    HCO3 (POC) 19.2 (L) 22 - 26 MMOL/L    sO2 (POC) 93 92 - 97 %    Base deficit (POC) 5 mmol/L    Site RIGHT RADIAL      Device: ROOM AIR      Allens test (POC) YES      Specimen type (POC) ARTERIAL      Total resp.  rate 32     POC LACTIC ACID    Collection Time: 12/20/19 10:10 PM   Result Value Ref Range    Lactic Acid (POC) 1.17 0.40 - 2.00 mmol/L

## 2019-12-21 NOTE — ED NOTES
Patient friend reports patient is taking xanax 4 times per day and wellbutrin. Friend name is Marleni Shae phone # [phone].

## 2019-12-21 NOTE — PROGRESS NOTES
Hospitalist Progress Note    NAME: Savita Rick   :  1955   MRN:  [id number]       Assessment / Plan:  Sepsis POA: 2ry to UTI, c/w IVF and ABX. UTI: c/w Zosyn, F/U cultures. Acute Metabolic Encephalopathy: likely 2ry to infection and Alcohol, in record there is mention of Dementia, will monitor. CT shows no structural lesion  Alcohol Abuse: patient still drinking, will place on CIWA protocol, and start supplements. H/O Breast Cancer: CT shows likely mets to sternum, will ask for Oncology evaluation  Acute renal failure: CT shows no signs of obstruction, c/w IVF and monitor  Hypertension hold antihypertensive medication blood pressure low and very tachycardic, use labetalol prn. Can resume Coreg, hold ARB  Anxiety depression Hold anti-depressant medication  Hypokalemia: replace and monitor  Obesity: BMI 30.9  Surrogate Decision Maker:Mone Hopkins [phone]  Code status: Full  Prophylaxis: Hep SQ  Recommended Disposition: TBD     Patient still need PCU monitoring due to tachycardia, Sepsis and AMS     Subjective:     Chief Complaint / Reason for Physician Visit  \"I feel OK\". Confused unable to provide a meaningful history   Discussed with RN events overnight. Review of Systems:  Symptom Y/N Comments  Symptom Y/N Comments   Fever/Chills    Chest Pain     Poor Appetite    Edema     Cough    Abdominal Pain     Sputum    Joint Pain     SOB/LOBO    Pruritis/Rash     Nausea/vomit    Tolerating PT/OT     Diarrhea    Tolerating Diet     Constipation    Other       Could NOT obtain due to: confused     Objective:     VITALS:   Last 24hrs VS reviewed since prior progress note.  Most recent are:  Patient Vitals for the past 24 hrs:   Temp Pulse Resp BP SpO2   19 1300 -- 100 25 100/71 95 %   19 1200 -- 99 26 103/73 93 %   19 1000 -- (!) 113 24 104/70 94 %   19 0900 -- (!) 106 22 124/84 97 %   19 0758 98.2 °F (36.8 °C) (!) 110 21 115/77 97 %   19 0547 98.4 °F (36.9 °C) (!) 110 22 140/90 98 %   12/21/19 0400 -- (!) 111 26 147/79 95 %   12/21/19 0200 98.6 °F (37 °C) (!) 115 20 123/81 94 %   12/21/19 0000 -- (!) 116 23 114/80 95 %   12/20/19 2300 98.6 °F (37 °C) (!) 119 27 107/81 95 %   12/20/19 2200 -- (!) 121 25 129/84 98 %   12/20/19 2145 -- (!) 121 15 104/69 97 %   12/20/19 2100 99 °F (37.2 °C) (!) 121 21 (!) 132/102 98 %   12/20/19 2034 -- -- -- 114/87 --   12/20/19 2030 -- (!) 129 23 (!) 133/117 99 %   12/20/19 1945 -- (!) 134 (!) 31 (!) 138/119 97 %   12/20/19 1905 -- (!) 135 (!) 39 105/84 96 %   12/20/19 1841 -- -- -- -- 97 %   12/20/19 1830 -- (!) 135 (!) 35 119/89 94 %   12/20/19 1825 -- (!) 136 28 (!) 122/92 94 %   12/20/19 1819 98 °F (36.7 °C) (!) 139 28 -- 97 %       Intake/Output Summary (Last 24 hours) at 12/21/2019 1410  Last data filed at 12/21/2019 1300  Gross per 24 hour   Intake 2600 ml   Output 450 ml   Net 2150 ml        PHYSICAL EXAM:  General: WD, WN. Lethargic, confused, no acute distress    EENT:  EOMI. Anicteric sclerae. MMM  Resp:  Coarse BS  CV:  Regular  rhythm,  No edema  GI:  Soft, Non distended, Non tender.  +Bowel sounds  Neurologic:  Alert and oriented X 1, slow speech,   Psych:   Poor  insight. Not anxious nor agitated  Skin:  No rashes. No jaundice    Reviewed most current lab test results and cultures  YES  Reviewed most current radiology test results   YES  Review and summation of old records today    NO  Reviewed patient's current orders and MAR    YES  PMH/SH reviewed - no change compared to H&P  ________________________________________________________________________  Care Plan discussed with:    Comments   Patient y    Family      RN y    Care Manager     Consultant                        Multidiciplinary team rounds were held today with , nursing, pharmacist and clinical coordinator. Patient's plan of care was discussed; medications were reviewed and discharge planning was addressed. ________________________________________________________________________  Total NON critical care TIME: 35   Minutes    Total CRITICAL CARE TIME Spent:   Minutes non procedure based      Comments   >50% of visit spent in counseling and coordination of care y    ________________________________________________________________________  Diana Hammond MD     Procedures: see electronic medical records for all procedures/Xrays and details which were not copied into this note but were reviewed prior to creation of Plan. LABS:  I reviewed today's most current labs and imaging studies.   Pertinent labs include:  Recent Labs     12/21/19  0412 12/20/19 1912   WBC 18.9* 25.0*   HGB 12.2 14.8   HCT 37.0 43.4    381     Recent Labs     12/21/19  0412 12/20/19 1912    141   K 3.2* 3.7   * 104   CO2 21 23   * 152*   BUN 60* 62*   CREA 2.28* 3.48*   CA 8.5 10.9*   ALB 3.5 4.7   TBILI 1.9* 0.8   SGOT 121* 169*   ALT 68 77   INR  --  1.1       Signed: Diana Hammond MD

## 2019-12-21 NOTE — ED NOTES
Pt care assumed. Pt arrived to the ED with a c/c of SOB and AMS. Pt is now resting in ED room with side rail up, bed to lowest position and call light within reach. Pt verbalizes no other complaint at this time,VS noted stable. Will continue to monitor.

## 2019-12-22 LAB
ALBUMIN SERPL-MCNC: 2.8 G/DL (ref 3.5–5)
ALBUMIN/GLOB SERPL: 0.9 {RATIO} (ref 1.1–2.2)
ALP SERPL-CCNC: 72 U/L (ref 45–117)
ALT SERPL-CCNC: 69 U/L (ref 12–78)
ANION GAP SERPL CALC-SCNC: 6 MMOL/L (ref 5–15)
AST SERPL-CCNC: 90 U/L (ref 15–37)
BACTERIA SPEC CULT: NORMAL
BASOPHILS # BLD: 0.1 K/UL (ref 0–0.1)
BASOPHILS NFR BLD: 1 % (ref 0–1)
BILIRUB SERPL-MCNC: 0.8 MG/DL (ref 0.2–1)
BUN SERPL-MCNC: 37 MG/DL (ref 6–20)
BUN/CREAT SERPL: 32 (ref 12–20)
CALCIUM SERPL-MCNC: 8.3 MG/DL (ref 8.5–10.1)
CC UR VC: NORMAL
CHLORIDE SERPL-SCNC: 116 MMOL/L (ref 97–108)
CO2 SERPL-SCNC: 24 MMOL/L (ref 21–32)
CREAT SERPL-MCNC: 1.14 MG/DL (ref 0.55–1.02)
DIFFERENTIAL METHOD BLD: ABNORMAL
EOSINOPHIL # BLD: 0.1 K/UL (ref 0–0.4)
EOSINOPHIL NFR BLD: 1 % (ref 0–7)
ERYTHROCYTE [DISTWIDTH] IN BLOOD BY AUTOMATED COUNT: 13.1 % (ref 11.5–14.5)
GLOBULIN SER CALC-MCNC: 3.1 G/DL (ref 2–4)
GLUCOSE SERPL-MCNC: 122 MG/DL (ref 65–100)
HCT VFR BLD AUTO: 33.4 % (ref 35–47)
HGB BLD-MCNC: 10.7 G/DL (ref 11.5–16)
IMM GRANULOCYTES # BLD AUTO: 0.1 K/UL (ref 0–0.04)
IMM GRANULOCYTES NFR BLD AUTO: 1 % (ref 0–0.5)
LYMPHOCYTES # BLD: 1.9 K/UL (ref 0.8–3.5)
LYMPHOCYTES NFR BLD: 20 % (ref 12–49)
MAGNESIUM SERPL-MCNC: 2.2 MG/DL (ref 1.6–2.4)
MCH RBC QN AUTO: 30.6 PG (ref 26–34)
MCHC RBC AUTO-ENTMCNC: 32 G/DL (ref 30–36.5)
MCV RBC AUTO: 95.4 FL (ref 80–99)
MONOCYTES # BLD: 0.6 K/UL (ref 0–1)
MONOCYTES NFR BLD: 6 % (ref 5–13)
NEUTS SEG # BLD: 6.7 K/UL (ref 1.8–8)
NEUTS SEG NFR BLD: 71 % (ref 32–75)
NRBC # BLD: 0 K/UL (ref 0–0.01)
NRBC BLD-RTO: 0 PER 100 WBC
PHOSPHATE SERPL-MCNC: 2.2 MG/DL (ref 2.6–4.7)
PLATELET # BLD AUTO: 205 K/UL (ref 150–400)
PMV BLD AUTO: 11.6 FL (ref 8.9–12.9)
POTASSIUM SERPL-SCNC: 3.3 MMOL/L (ref 3.5–5.1)
PROT SERPL-MCNC: 5.9 G/DL (ref 6.4–8.2)
RBC # BLD AUTO: 3.5 M/UL (ref 3.8–5.2)
SERVICE CMNT-IMP: NORMAL
SODIUM SERPL-SCNC: 146 MMOL/L (ref 136–145)
WBC # BLD AUTO: 9.3 K/UL (ref 3.6–11)

## 2019-12-22 PROCEDURE — 85025 COMPLETE CBC W/AUTO DIFF WBC: CPT

## 2019-12-22 PROCEDURE — 74011000250 HC RX REV CODE- 250: Performed by: INTERNAL MEDICINE

## 2019-12-22 PROCEDURE — 83735 ASSAY OF MAGNESIUM: CPT

## 2019-12-22 PROCEDURE — 84100 ASSAY OF PHOSPHORUS: CPT

## 2019-12-22 PROCEDURE — 80053 COMPREHEN METABOLIC PANEL: CPT

## 2019-12-22 PROCEDURE — 74011250637 HC RX REV CODE- 250/637: Performed by: INTERNAL MEDICINE

## 2019-12-22 PROCEDURE — 74011250636 HC RX REV CODE- 250/636: Performed by: INTERNAL MEDICINE

## 2019-12-22 PROCEDURE — 74011000258 HC RX REV CODE- 258: Performed by: INTERNAL MEDICINE

## 2019-12-22 PROCEDURE — 65270000029 HC RM PRIVATE

## 2019-12-22 PROCEDURE — 94760 N-INVAS EAR/PLS OXIMETRY 1: CPT

## 2019-12-22 PROCEDURE — 36415 COLL VENOUS BLD VENIPUNCTURE: CPT

## 2019-12-22 RX ORDER — IBUPROFEN 200 MG
1 TABLET ORAL DAILY
Status: DISCONTINUED | OUTPATIENT
Start: 2019-12-22 | End: 2019-12-26 | Stop reason: HOSPADM

## 2019-12-22 RX ORDER — PANTOPRAZOLE SODIUM 40 MG/1
40 TABLET, DELAYED RELEASE ORAL
Status: DISCONTINUED | OUTPATIENT
Start: 2019-12-23 | End: 2019-12-26 | Stop reason: HOSPADM

## 2019-12-22 RX ORDER — POTASSIUM CHLORIDE 7.45 MG/ML
10 INJECTION INTRAVENOUS
Status: COMPLETED | OUTPATIENT
Start: 2019-12-22 | End: 2019-12-22

## 2019-12-22 RX ORDER — POTASSIUM CHLORIDE 20 MEQ/1
20 TABLET, EXTENDED RELEASE ORAL
Status: DISCONTINUED | OUTPATIENT
Start: 2019-12-22 | End: 2019-12-22

## 2019-12-22 RX ADMIN — GABAPENTIN 100 MG: 100 CAPSULE ORAL at 08:29

## 2019-12-22 RX ADMIN — THIAMINE HYDROCHLORIDE 100 MG: 100 INJECTION, SOLUTION INTRAMUSCULAR; INTRAVENOUS at 11:53

## 2019-12-22 RX ADMIN — HEPARIN SODIUM 5000 UNITS: 5000 INJECTION INTRAVENOUS; SUBCUTANEOUS at 13:23

## 2019-12-22 RX ADMIN — GABAPENTIN 100 MG: 100 CAPSULE ORAL at 21:39

## 2019-12-22 RX ADMIN — Medication 10 ML: at 13:11

## 2019-12-22 RX ADMIN — Medication 10 ML: at 21:40

## 2019-12-22 RX ADMIN — HEPARIN SODIUM 5000 UNITS: 5000 INJECTION INTRAVENOUS; SUBCUTANEOUS at 21:39

## 2019-12-22 RX ADMIN — ASPIRIN 81 MG: 81 TABLET, COATED ORAL at 08:29

## 2019-12-22 RX ADMIN — HEPARIN SODIUM 5000 UNITS: 5000 INJECTION INTRAVENOUS; SUBCUTANEOUS at 06:52

## 2019-12-22 RX ADMIN — PIPERACILLIN AND TAZOBACTAM 3.38 G: 3; .375 INJECTION, POWDER, LYOPHILIZED, FOR SOLUTION INTRAVENOUS at 18:20

## 2019-12-22 RX ADMIN — PIPERACILLIN AND TAZOBACTAM 3.38 G: 3; .375 INJECTION, POWDER, LYOPHILIZED, FOR SOLUTION INTRAVENOUS at 10:09

## 2019-12-22 RX ADMIN — AMITRIPTYLINE HYDROCHLORIDE 50 MG: 50 TABLET, FILM COATED ORAL at 21:39

## 2019-12-22 RX ADMIN — Medication 10 ML: at 06:53

## 2019-12-22 RX ADMIN — POTASSIUM CHLORIDE 10 MEQ: 7.46 INJECTION, SOLUTION INTRAVENOUS at 07:55

## 2019-12-22 RX ADMIN — FOLIC ACID: 5 INJECTION, SOLUTION INTRAMUSCULAR; INTRAVENOUS; SUBCUTANEOUS at 13:09

## 2019-12-22 RX ADMIN — ACETAMINOPHEN 650 MG: 325 TABLET ORAL at 18:26

## 2019-12-22 RX ADMIN — POTASSIUM CHLORIDE 10 MEQ: 7.46 INJECTION, SOLUTION INTRAVENOUS at 09:00

## 2019-12-22 RX ADMIN — GABAPENTIN 100 MG: 100 CAPSULE ORAL at 16:29

## 2019-12-22 NOTE — PROGRESS NOTES
0700: Bedside shift change report given to Laureen Smith (oncoming nurse) by Juni Haro (offgoing nurse). Report included the following information SBAR, Kardex, Intake/Output, MAR, Recent Results and Cardiac Rhythm NSR.     0820: Message sent to Dr. Cassandra Márquez via [employer] regarding pt's BP of 95/53. Pt scheduled to received 12.5 mg coreg. Per MD, will hold coreg this AM.    0901: Pt now able to state her name and location. When asked why she is in the hospital, she states, \"I had some sort of heart operation. \" Will continue to monitor. 1250: Pt has bed assigned on ortho floor. This RN attempted to call report. Per ortho RN, will call back to receive report. 1313: TRANSFER - OUT REPORT:    Verbal report given to Karly(name) on Caleb Burns  being transferred to Ortho(unit) for routine progression of care       Report consisted of patients Situation, Background, Assessment and   Recommendations(SBAR). Information from the following report(s) SBAR, Kardex, Intake/Output, MAR, Recent Results and Cardiac Rhythm NSR was reviewed with the receiving nurse. Opportunity for questions and clarification was provided. Patient transported with:   Registered Nurse  Tech     Pt currently eating lunch. Will transfer when able.

## 2019-12-22 NOTE — PROGRESS NOTES
12/21/19 2330   Vitals   Temp 97.9 °F (36.6 °C)   Pulse (Heart Rate) 90   Resp Rate 18   O2 Sat (%) 96 %   Level of Consciousness (!) Confused or Agitated   /82   MAP (Monitor) 91   MAP (Calculated) 95   MEWS Score 3

## 2019-12-22 NOTE — PROGRESS NOTES
12/22/19 0727   Vital Signs   Temp 99 °F (37.2 °C)   Temp Source Axillary   Pulse (Heart Rate) 92   Heart Rate Source Monitor   Resp Rate 18   O2 Sat (%) 93 %   Level of Consciousness Responds to Voice   BP 95/53   MAP (Monitor) 66   MAP (Calculated) 67   BP 1 Method Automatic   BP 1 Location Left arm   BP Patient Position At rest   MEWS Score 3   Pain 1   Pain Scale 1 Adult Nonverbal Pain Scale   Pain Intensity 1 0   Patient Stated Pain Goal 0   Adult Nonverbal Pain Scale   Face 0   Activity (Movement) 0   Guarding 0   Physiology (Vital Signs) 0   Respiratory 0   Total Score 0   Oxygen Therapy   Pulse via Oximetry 92 beats per minute

## 2019-12-22 NOTE — PROGRESS NOTES
Problem: Falls - Risk of  Goal: *Absence of Falls  Description  Document Shannon Mcburney Fall Risk and appropriate interventions in the flowsheet.   Outcome: Progressing Towards Goal  Note: Fall Risk Interventions:  Mobility Interventions: Bed/chair exit alarm, Communicate number of staff needed for ambulation/transfer, OT consult for ADLs, Patient to call before getting OOB, PT Consult for mobility concerns, Strengthening exercises (ROM-active/passive), Utilize walker, cane, or other assistive device    Mentation Interventions: Adequate sleep, hydration, pain control, Bed/chair exit alarm, Door open when patient unattended, Increase mobility, More frequent rounding    Medication Interventions: Bed/chair exit alarm, Patient to call before getting OOB, Teach patient to arise slowly    Elimination Interventions: Bed/chair exit alarm, Call light in reach, Patient to call for help with toileting needs, Toileting schedule/hourly rounds    History of Falls Interventions: Bed/chair exit alarm, Consult care management for discharge planning, Door open when patient unattended         Problem: Patient Education: Go to Patient Education Activity  Goal: Patient/Family Education  Outcome: Progressing Towards Goal     Problem: General Infection Care Plan (Adult and Pediatric)  Goal: Improvement in signs and symptoms of infection  Outcome: Progressing Towards Goal

## 2019-12-22 NOTE — PROGRESS NOTES
Hospitalist Progress Note    NAME: Helen Barnes   :  1955   MRN:  [id number]       Assessment / Plan:  Sepsis POA: 2ry to UTI, c/w IVF and ABX. UTI: c/w Zosyn, F/U cultures. Acute Metabolic Encephalopathy: likely 2ry to infection and Alcohol, in record there is mention of Dementia, will monitor. CT shows no structural lesion, so far improved  Alcohol Abuse: patient still drinking, c/w CIWA protocol, and c/w  supplements. H/O Breast Cancer: CT shows likely mets to sternum, should F/U Oncology Dr. Leno Ramirez as outpatient  Acute renal failure: CT shows no signs of obstruction, c/w IVF and monitor, so far improved, monitor  Hypertension hold antihypertensive medication blood pressure low and very tachycardic, use labetalol prn. C/w  Coreg, hold ARB  Anxiety depression Hold anti-depressant medication  Hypokalemia: replace and monitor  Obesity: BMI 30.9  Surrogate Decision Maker:Mone Hopkins [phone]  Code status: Full  Prophylaxis: Hep SQ  Recommended Disposition: TBD     Can be transfer out of PCU     Subjective:     Chief Complaint / Reason for Physician Visit  \"I feel better\". Discussed with RN events overnight. Review of Systems:  Symptom Y/N Comments  Symptom Y/N Comments   Fever/Chills    Chest Pain     Poor Appetite    Edema     Cough    Abdominal Pain     Sputum    Joint Pain     SOB/LOBO    Pruritis/Rash     Nausea/vomit    Tolerating PT/OT     Diarrhea    Tolerating Diet y    Constipation    Other       Could NOT obtain due to:      Objective:     VITALS:   Last 24hrs VS reviewed since prior progress note.  Most recent are:  Patient Vitals for the past 24 hrs:   Temp Pulse Resp BP SpO2   19 0727 99 °F (37.2 °C) 92 18 95/53 93 %   19 2330 97.9 °F (36.6 °C) 90 18 120/82 96 %   19 1930 98 °F (36.7 °C) 91 18 96/47 96 %   19 1809 97.8 °F (36.6 °C) 81 18 (!) 89/64 --   19 1745 97.7 °F (36.5 °C) 100 18 91/61 96 %   19 1600 -- (!) 102 18 118/79 95 %   19 1500 -- (!) 107 18 (!) 112/93 94 %   12/21/19 1400 -- (!) 109 24 110/58 96 %   12/21/19 1300 -- 100 25 100/71 95 %   12/21/19 1200 -- 99 26 103/73 93 %       Intake/Output Summary (Last 24 hours) at 12/22/2019 1024  Last data filed at 12/22/2019 0852  Gross per 24 hour   Intake 820 ml   Output 400 ml   Net 420 ml        PHYSICAL EXAM:  General: WD, WN. Alert and cooperative, no acute distress    EENT:  EOMI. Anicteric sclerae. MMM  Resp:  Coarse BS  CV:  Regular  rhythm,  No edema  GI:  Soft, Non distended, Non tender.  +Bowel sounds  Neurologic:  Alert and oriented X 3, normal speech,   Psych:   Fair  insight. Not anxious nor agitated  Skin:  No rashes. No jaundice    Reviewed most current lab test results and cultures  YES  Reviewed most current radiology test results   YES  Review and summation of old records today    NO  Reviewed patient's current orders and MAR    YES  PMH/SH reviewed - no change compared to H&P  ________________________________________________________________________  Care Plan discussed with:    Comments   Patient y    Family      RN y    Care Manager     Consultant                        Multidiciplinary team rounds were held today with , nursing, pharmacist and clinical coordinator. Patient's plan of care was discussed; medications were reviewed and discharge planning was addressed. ________________________________________________________________________  Total NON critical care TIME: 35   Minutes    Total CRITICAL CARE TIME Spent:   Minutes non procedure based      Comments   >50% of visit spent in counseling and coordination of care y    ________________________________________________________________________  Omega Alvarez MD     Procedures: see electronic medical records for all procedures/Xrays and details which were not copied into this note but were reviewed prior to creation of Plan.       LABS:  I reviewed today's most current labs and imaging studies.   Pertinent labs include:  Recent Labs     12/22/19  0458 12/21/19  0412 12/20/19 1912   WBC 9.3 18.9* 25.0*   HGB 10.7* 12.2 14.8   HCT 33.4* 37.0 43.4    278 381     Recent Labs     12/22/19 0458 12/21/19 0412 12/20/19 1912   * 144 141   K 3.3* 3.2* 3.7   * 112* 104   CO2 24 21 23   * 163* 152*   BUN 37* 60* 62*   CREA 1.14* 2.28* 3.48*   CA 8.3* 8.5 10.9*   MG 2.2  --   --    PHOS 2.2*  --   --    ALB 2.8* 3.5 4.7   TBILI 0.8 1.9* 0.8   SGOT 90* 121* 169*   ALT 69 68 77   INR  --   --  1.1       Signed: Rena Horne MD

## 2019-12-22 NOTE — PROGRESS NOTES
Pharmacy Automatic Renal Dosing Protocol - Antimicrobials    Indication for Antimicrobials: UTI     Current Regimen of Each Antimicrobial:  Zosyn 3.375gm IV q12h x5 days  start ; Day #2    Previous Antimicrobial Therapy:        Significant Cultures:   : Blood (19:12) = NGSF (pending)  : Blood (22:45) = NGSF (pending)  : Urine = No Growth (FINAL)    Radiology / Imaging results: (X-ray, CT scan or MRI):   : CT Abd: New sclerosis and mild expansion of the sternum suspicious for metastasis. Recommend bone scan for further evaluation    Paralysis, amputations, malnutrition:     Labs:  Recent Labs     19  0458 19  0412 19  1912   CREA 1.14* 2.28* 3.48*   BUN 37* 60* 62*   WBC 9.3 18.9* 25.0*     Temp (24hrs), Av.1 °F (36.7 °C), Min:97.7 °F (36.5 °C), Max:99 °F (37.2 °C)    Creatinine Clearance (mL/min) or Dialysis: 45 ml/min    Impression/Plan:   WBC decreased; renal function improved; Urine negative; Blood NGSF (pending)  Change Zosyn to 3.375gm IV q8h  Suzie BMP   Antimicrobial stop date      Pharmacy will follow daily and adjust medications as appropriate for renal function and/or serum levels. Thank you,  Je Solo, [facility]    Recommended duration of therapy  http://[facility]/[place]/[facility]/Pharmacy/Clinical%20Companion/Duration%20of%20ABX%20therapy. docx    Renal Dosing  http://[facility]/[place]/[facility]/Pharmacy/Clinical%20Companion/Renal%[id number]. pdf

## 2019-12-22 NOTE — PROGRESS NOTES
12/21/19 1930   Vitals   Temp 98 °F (36.7 °C)   Pulse (Heart Rate) 91   Resp Rate 18   O2 Sat (%) 96 %   Level of Consciousness (!) Confused or Agitated   BP 96/47   MAP (Monitor) (!) 62   MAP (Calculated) (!) 63   MEWS Score 4   Oxygen Therapy   Pulse via Oximetry 89 beats per minute

## 2019-12-23 LAB
ALBUMIN SERPL-MCNC: 2.7 G/DL (ref 3.5–5)
ALBUMIN/GLOB SERPL: 0.9 {RATIO} (ref 1.1–2.2)
ALP SERPL-CCNC: 66 U/L (ref 45–117)
ALT SERPL-CCNC: 64 U/L (ref 12–78)
ANION GAP SERPL CALC-SCNC: 6 MMOL/L (ref 5–15)
AST SERPL-CCNC: 54 U/L (ref 15–37)
BASOPHILS # BLD: 0.1 K/UL (ref 0–0.1)
BASOPHILS NFR BLD: 1 % (ref 0–1)
BILIRUB SERPL-MCNC: 0.3 MG/DL (ref 0.2–1)
BUN SERPL-MCNC: 21 MG/DL (ref 6–20)
BUN/CREAT SERPL: 22 (ref 12–20)
CALCIUM SERPL-MCNC: 7.8 MG/DL (ref 8.5–10.1)
CHLORIDE SERPL-SCNC: 114 MMOL/L (ref 97–108)
CO2 SERPL-SCNC: 25 MMOL/L (ref 21–32)
CREAT SERPL-MCNC: 0.97 MG/DL (ref 0.55–1.02)
DIFFERENTIAL METHOD BLD: ABNORMAL
EOSINOPHIL # BLD: 0.2 K/UL (ref 0–0.4)
EOSINOPHIL NFR BLD: 3 % (ref 0–7)
ERYTHROCYTE [DISTWIDTH] IN BLOOD BY AUTOMATED COUNT: 12.6 % (ref 11.5–14.5)
GLOBULIN SER CALC-MCNC: 2.9 G/DL (ref 2–4)
GLUCOSE SERPL-MCNC: 123 MG/DL (ref 65–100)
HCT VFR BLD AUTO: 33 % (ref 35–47)
HGB BLD-MCNC: 10.7 G/DL (ref 11.5–16)
IMM GRANULOCYTES # BLD AUTO: 0.1 K/UL (ref 0–0.04)
IMM GRANULOCYTES NFR BLD AUTO: 1 % (ref 0–0.5)
LYMPHOCYTES # BLD: 1.7 K/UL (ref 0.8–3.5)
LYMPHOCYTES NFR BLD: 29 % (ref 12–49)
MAGNESIUM SERPL-MCNC: 1.8 MG/DL (ref 1.6–2.4)
MCH RBC QN AUTO: 30.8 PG (ref 26–34)
MCHC RBC AUTO-ENTMCNC: 32.4 G/DL (ref 30–36.5)
MCV RBC AUTO: 95.1 FL (ref 80–99)
MONOCYTES # BLD: 0.4 K/UL (ref 0–1)
MONOCYTES NFR BLD: 7 % (ref 5–13)
NEUTS SEG # BLD: 3.5 K/UL (ref 1.8–8)
NEUTS SEG NFR BLD: 59 % (ref 32–75)
NRBC # BLD: 0.02 K/UL (ref 0–0.01)
NRBC BLD-RTO: 0.3 PER 100 WBC
PLATELET # BLD AUTO: 210 K/UL (ref 150–400)
PMV BLD AUTO: 11 FL (ref 8.9–12.9)
POTASSIUM SERPL-SCNC: 3.4 MMOL/L (ref 3.5–5.1)
PROT SERPL-MCNC: 5.6 G/DL (ref 6.4–8.2)
RBC # BLD AUTO: 3.47 M/UL (ref 3.8–5.2)
SODIUM SERPL-SCNC: 145 MMOL/L (ref 136–145)
WBC # BLD AUTO: 5.9 K/UL (ref 3.6–11)

## 2019-12-23 PROCEDURE — 65270000029 HC RM PRIVATE

## 2019-12-23 PROCEDURE — 36415 COLL VENOUS BLD VENIPUNCTURE: CPT

## 2019-12-23 PROCEDURE — 97535 SELF CARE MNGMENT TRAINING: CPT | Performed by: OCCUPATIONAL THERAPIST

## 2019-12-23 PROCEDURE — 74011250637 HC RX REV CODE- 250/637: Performed by: INTERNAL MEDICINE

## 2019-12-23 PROCEDURE — 74011250636 HC RX REV CODE- 250/636: Performed by: INTERNAL MEDICINE

## 2019-12-23 PROCEDURE — 97165 OT EVAL LOW COMPLEX 30 MIN: CPT | Performed by: OCCUPATIONAL THERAPIST

## 2019-12-23 PROCEDURE — 97530 THERAPEUTIC ACTIVITIES: CPT

## 2019-12-23 PROCEDURE — 85025 COMPLETE CBC W/AUTO DIFF WBC: CPT

## 2019-12-23 PROCEDURE — 97162 PT EVAL MOD COMPLEX 30 MIN: CPT

## 2019-12-23 PROCEDURE — 97116 GAIT TRAINING THERAPY: CPT

## 2019-12-23 PROCEDURE — 83735 ASSAY OF MAGNESIUM: CPT

## 2019-12-23 PROCEDURE — 94760 N-INVAS EAR/PLS OXIMETRY 1: CPT

## 2019-12-23 PROCEDURE — 80053 COMPREHEN METABOLIC PANEL: CPT

## 2019-12-23 PROCEDURE — 74011000258 HC RX REV CODE- 258: Performed by: INTERNAL MEDICINE

## 2019-12-23 RX ORDER — LOSARTAN POTASSIUM 50 MG/1
50 TABLET ORAL DAILY
Status: DISCONTINUED | OUTPATIENT
Start: 2019-12-23 | End: 2019-12-26 | Stop reason: HOSPADM

## 2019-12-23 RX ORDER — BISACODYL 5 MG
10 TABLET, DELAYED RELEASE (ENTERIC COATED) ORAL
Status: ACTIVE | OUTPATIENT
Start: 2019-12-23 | End: 2019-12-24

## 2019-12-23 RX ORDER — CEFUROXIME AXETIL 250 MG/1
500 TABLET ORAL EVERY 12 HOURS
Status: DISCONTINUED | OUTPATIENT
Start: 2019-12-23 | End: 2019-12-26 | Stop reason: HOSPADM

## 2019-12-23 RX ORDER — POTASSIUM CHLORIDE 20 MEQ/1
20 TABLET, EXTENDED RELEASE ORAL
Status: COMPLETED | OUTPATIENT
Start: 2019-12-23 | End: 2019-12-23

## 2019-12-23 RX ORDER — ASPIRIN 325 MG/1
100 TABLET, FILM COATED ORAL DAILY
Status: DISCONTINUED | OUTPATIENT
Start: 2019-12-23 | End: 2019-12-26 | Stop reason: HOSPADM

## 2019-12-23 RX ORDER — FOLIC ACID 1 MG/1
1 TABLET ORAL DAILY
Status: DISCONTINUED | OUTPATIENT
Start: 2019-12-23 | End: 2019-12-26 | Stop reason: HOSPADM

## 2019-12-23 RX ORDER — ALPRAZOLAM 0.5 MG/1
0.25 TABLET ORAL
Status: DISCONTINUED | OUTPATIENT
Start: 2019-12-23 | End: 2019-12-26 | Stop reason: HOSPADM

## 2019-12-23 RX ORDER — POLYETHYLENE GLYCOL 3350 17 G/17G
17 POWDER, FOR SOLUTION ORAL DAILY
Status: DISCONTINUED | OUTPATIENT
Start: 2019-12-23 | End: 2019-12-26 | Stop reason: HOSPADM

## 2019-12-23 RX ORDER — TRAMADOL HYDROCHLORIDE 50 MG/1
50 TABLET ORAL
Status: DISCONTINUED | OUTPATIENT
Start: 2019-12-23 | End: 2019-12-26 | Stop reason: HOSPADM

## 2019-12-23 RX ADMIN — CEFUROXIME AXETIL 500 MG: 250 TABLET, FILM COATED ORAL at 20:02

## 2019-12-23 RX ADMIN — POTASSIUM CHLORIDE 20 MEQ: 20 TABLET, EXTENDED RELEASE ORAL at 08:14

## 2019-12-23 RX ADMIN — TRAMADOL HYDROCHLORIDE 50 MG: 50 TABLET, FILM COATED ORAL at 19:59

## 2019-12-23 RX ADMIN — Medication 10 ML: at 06:19

## 2019-12-23 RX ADMIN — Medication 10 ML: at 22:28

## 2019-12-23 RX ADMIN — LOSARTAN POTASSIUM 50 MG: 50 TABLET, FILM COATED ORAL at 08:30

## 2019-12-23 RX ADMIN — Medication 100 MG: at 08:30

## 2019-12-23 RX ADMIN — GABAPENTIN 100 MG: 100 CAPSULE ORAL at 15:43

## 2019-12-23 RX ADMIN — HEPARIN SODIUM 5000 UNITS: 5000 INJECTION INTRAVENOUS; SUBCUTANEOUS at 06:18

## 2019-12-23 RX ADMIN — Medication 10 ML: at 13:34

## 2019-12-23 RX ADMIN — GABAPENTIN 100 MG: 100 CAPSULE ORAL at 08:30

## 2019-12-23 RX ADMIN — CARVEDILOL 12.5 MG: 12.5 TABLET, FILM COATED ORAL at 17:37

## 2019-12-23 RX ADMIN — POLYETHYLENE GLYCOL 3350 17 G: 17 POWDER, FOR SOLUTION ORAL at 12:36

## 2019-12-23 RX ADMIN — PIPERACILLIN AND TAZOBACTAM 3.38 G: 3; .375 INJECTION, POWDER, LYOPHILIZED, FOR SOLUTION INTRAVENOUS at 01:45

## 2019-12-23 RX ADMIN — HEPARIN SODIUM 5000 UNITS: 5000 INJECTION INTRAVENOUS; SUBCUTANEOUS at 22:27

## 2019-12-23 RX ADMIN — GABAPENTIN 100 MG: 100 CAPSULE ORAL at 22:27

## 2019-12-23 RX ADMIN — CEFUROXIME AXETIL 500 MG: 250 TABLET, FILM COATED ORAL at 08:30

## 2019-12-23 RX ADMIN — ACETAMINOPHEN 650 MG: 325 TABLET ORAL at 00:20

## 2019-12-23 RX ADMIN — HEPARIN SODIUM 5000 UNITS: 5000 INJECTION INTRAVENOUS; SUBCUTANEOUS at 13:47

## 2019-12-23 RX ADMIN — PANTOPRAZOLE SODIUM 40 MG: 40 TABLET, DELAYED RELEASE ORAL at 06:19

## 2019-12-23 RX ADMIN — ASPIRIN 81 MG: 81 TABLET, COATED ORAL at 08:30

## 2019-12-23 RX ADMIN — TRAMADOL HYDROCHLORIDE 50 MG: 50 TABLET, FILM COATED ORAL at 09:59

## 2019-12-23 RX ADMIN — CARVEDILOL 12.5 MG: 12.5 TABLET, FILM COATED ORAL at 08:14

## 2019-12-23 RX ADMIN — AMITRIPTYLINE HYDROCHLORIDE 50 MG: 50 TABLET, FILM COATED ORAL at 22:27

## 2019-12-23 RX ADMIN — FOLIC ACID 1 MG: 1 TABLET ORAL at 08:30

## 2019-12-23 NOTE — PROGRESS NOTES
Problem: Self Care Deficits Care Plan (Adult)  Goal: *Acute Goals and Plan of Care (Insert Text)  Description      FUNCTIONAL STATUS PRIOR TO ADMISSION: Patient was independent and active without use of DME.    HOME SUPPORT: The patient lived alone with no local support. Occupational Therapy Goals  Initiated 12/23/2019  1. Patient will perform grooming standing at sink with independence within 7 day(s). 2.  Patient will perform upper body dressing with independence within 7 day(s). 3.  Patient will perform lower body dressing with independence within 7 day(s). 4.  Patient will perform toilet transfers with independence within 7 day(s). 5.  Patient will perform all aspects of toileting with independence within 7 day(s). 6.  Patient will perform ADL setup with independence within 7 day(s). Outcome: Progressing Towards Goal    OCCUPATIONAL THERAPY EVALUATION  Patient: Byron Ramos (66 y.o. female)  Date: 12/23/2019  Primary Diagnosis: Sepsis ([facility] [address] [A41.9]        Precautions: Falls       ASSESSMENT  Based on the objective data described below, the patient presents with mild GW, decreased activity tolerance and decreased balance which is impairing her functional independence. The patient is functioning below her independent baseline, now performing ADLs and functional mobility at an independent to CGA level. The patient will benefit from acute OT intervention and she may need HHOT and PT after discharge, depending her progress with acute OT and PT. Functional Outcome Measure: The patient scored 65/100 on the Barthel Index outcome measure which is indicative of a 35% impairment in function.           PLAN :  Recommendations and Planned Interventions: self care training, functional mobility training, therapeutic exercise, balance training, therapeutic activities, endurance activities, patient education, home safety training and family training/education    Frequency/Duration: Patient will be followed by occupational therapy 3 times a week to address goals. Recommendation for discharge: (in order for the patient to meet his/her long term goals)  Occupational therapy at least 2 days/week in the home AND ensure assist and/or supervision for safety with ADLs and functional mobility VS. No follow up, pending progress VS. No follow up, pending progress with acute OT and PT      Equipment recommendations for successful discharge (if) home: Per PT patient may need Rolling Walker. OBJECTIVE DATA SUMMARY:   HISTORY:   Past Medical History:   Diagnosis Date    Anxiety     Cancer ([facility] [address]     breast    Depression     GERD (gastroesophageal reflux disease)     HTN (hypertension)     Hypercholesterolemia     Hypotension 9/12/2012    Metastatic breast cancer ([facility] [address] 5/3/2017    Secondary cancer of bone ([facility] [address] 5/3/2017     Past Surgical History:   Procedure Laterality Date    BREAST SURGERY PROCEDURE UNLISTED  march 13    carina masectomy       Expanded or extensive additional review of patient history:     Home Situation  Home Environment: Apartment(over 54)  One/Two Story Residence: One story  Living Alone: Yes  Support Systems: None  Current DME Used/Available at Home: Grab bars, Raised toilet seat, Shower chair  Tub or Shower Type: Shower    Hand dominance: Right    EXAMINATION OF PERFORMANCE DEFICITS:  Cognitive/Behavioral Status:  Neurologic State: Alert  Orientation Level: Oriented X4  Cognition: Appropriate for age attention/concentration; Follows commands     Perseveration: No perseveration noted  Safety/Judgement: Decreased awareness of need for safety      Hearing: Auditory  Auditory Impairment: None    Vision/Perceptual:    Acuity: Within Defined Limits    Corrective Lenses: Glasses    Range of Motion:  AROM: Generally decreased, functional    Strength:  Strength: Generally decreased, functional    Coordination:  Coordination: Within functional limits  Fine Motor Skills-Upper: Left Intact; Right Intact Gross Motor Skills-Upper: Left Intact; Right Intact    Tone & Sensation:  Tone: Normal  Sensation: Impaired(neuropathy in B toes and digits. )       Balance:  Sitting: Intact    Functional Mobility and Transfers for ADLs:  Bed Mobility:  Rolling: Modified independent  Supine to Sit: Modified independent  Scooting: Independent    Transfers:  Sit to Stand: Stand-by assistance  Stand to Sit: Stand-by assistance  Bed to Chair: Contact guard assistance  Bathroom Mobility: Contact guard assistance(ambulating without an AD)  Toilet Transfer : Stand-by assistance    ADL Assessment:  Feeding: Independent    Oral Facial Hygiene/Grooming: Stand-by assistance    Bathing: Contact guard assistance    Upper Body Dressing: Supervision;Setup    Lower Body Dressing: Stand-by assistance    Toileting: Stand by assistance                ADL Intervention and task modifications:  Initiated bed mobility, dressing ADL, transfer, toileting, standing grooming and safety training. Functional Measure:  Barthel Index:    Bathin  Bladder: 10  Bowels: 10  Groomin  Dressin  Feeding: 10  Mobility: 10  Stairs: 5  Toilet Use: 5  Transfer (Bed to Chair and Back): 10  Total: 65/100        Percentage of impairment   0%   1-19%   20-39%   40-59%   60-79%   80-99%   100%   Barthel Score 0-100 100 99-80 79-60 59-40 20-39 1-19   0     The Barthel ADL Index: Guidelines  1. The index should be used as a record of what a patient does, not as a record of what a patient could do. 2. The main aim is to establish degree of independence from any help, physical or verbal, however minor and for whatever reason. 3. The need for supervision renders the patient not independent. 4. A patient's performance should be established using the best available evidence. Asking the patient, friends/relatives and nurses are the usual sources, but direct observation and common sense are also important. However direct testing is not needed.   5. Usually the patient's performance over the preceding 24-48 hours is important, but occasionally longer periods will be relevant. 6. Middle categories imply that the patient supplies over 50 per cent of the effort. 7. Use of aids to be independent is allowed. James Gann., Barthel, D.W. ([id number]). Functional evaluation: the Barthel Index. [address] [facility] (14)2. RAYSA Dasilva, [place] Grazyna., [place], [address] [facility] (1999). Measuring the change indisability after inpatient rehabilitation; comparison of the responsiveness of the Barthel Index and Functional [place] Measure. Journal of Neurology, Neurosurgery, and Psychiatry, 66(4), 081-731. Landry Hurtado, N.[place].A, LAVONNE Andrade, & Benjamin Christianson MKAROLYN. (2004.) Assessment of post-stroke quality of life in cost-effectiveness studies: The usefulness of the Barthel Index and the EuroQoL-5D. Quality of Life Research, 13, 446-71       Activity Tolerance:   Fair  Please refer to the flowsheet for vital signs taken during this treatment. After treatment patient left in no apparent distress:    Sitting in chair and Call bell within reach    COMMUNICATION/EDUCATION:   The patients plan of care was discussed with: Physical Therapist, Registered Nurse and . Home safety education was provided and the patient/caregiver indicated understanding., Patient/family have participated as able in goal setting and plan of care. and Patient/family agree to work toward stated goals and plan of care. This patients plan of care is appropriate for delegation to [facility].     Thank you for this referral.  Richard Dolan, OTR/L  Time Calculation: 23 mins

## 2019-12-23 NOTE — PROGRESS NOTES
Orders received, chart reviewed and patient evaluated by physical therapy. Pending progression with skilled acute physical therapy, recommend:  Physical therapy at least 2 days/week in the home     Recommend with nursing patient to complete as able in order to maintain strength, endurance and independence: OOB to chair 3x/day with assistance x 1 and ambulating with RW. Thank you for your assistance. Full evaluation to follow.      Lisette Sharma, PT

## 2019-12-23 NOTE — PROGRESS NOTES
Bedside and Verbal shift change report given to Amie (oncoming nurse) by Sonu Hall (offgoing nurse). Report included the following information SBAR, Kardex, Intake/Output, MAR, Recent Results, Med Rec Status and Cardiac Rhythm NSR/Sinus Tach.

## 2019-12-23 NOTE — PROGRESS NOTES
Problem: Mobility Impaired (Adult and Pediatric)  Goal: *Acute Goals and Plan of Care (Insert Text)  Description  FUNCTIONAL STATUS PRIOR TO ADMISSION: Patient was independent and active without use of DME.    HOME SUPPORT PRIOR TO ADMISSION: The patient lived alone with no local support. Physical Therapy Goals  Initiated 12/23/2019  1. Patient will move from supine to sit and sit to supine , scoot up and down and roll side to side in bed with independence within 7 day(s). 2.  Patient will transfer from bed to chair and chair to bed with modified independence using the least restrictive device within 7 day(s). 3.  Patient will perform sit to stand with independence within 7 day(s). 4.  Patient will ambulate with modified independence for 350 feet with the least restrictive device within 7 day(s). Outcome: Progressing Towards Goal   PHYSICAL THERAPY EVALUATION  Patient: Sherry Dickerson (66 y.o. female)  Date: 12/23/2019  Primary Diagnosis: Sepsis [facility]) [A41.9]        Precautions: Fall      ASSESSMENT  Based on the objective data described below, the patient presents with generalized weakness, decreased standing balance and decreased mobility skills following admission for sepsis with fall at home. R elbow/humerus is bruised but not significantly painful per patient. Gait was unsteady with multiple lob to her right due to increased path deviations and trunk sway. Provided an adjusted walker which greatly improved her overall stability needing cg vs min a. She was encouraged to eat all meal on edge of bed or in chair and to ambulate with nursing staff 2-3 x day using RW - she verbalized understanding. Will likely improve further with balance, strength and mobility skills as her medical status improves. Current Level of Function Impacting Discharge (mobility/balance): independent bed mobility, sba sit to stand, cg assist bed to chair, cg assistance with  feet    Functional Outcome Measure:   The patient scored 65/100 on the Barthel outcome measure which is indicative of 35% functional impairment. Other factors to consider for discharge: live alone, Independent PLOF at home and in community     Patient will benefit from skilled therapy intervention to address the above noted impairments. PLAN :  Recommendations and Planned Interventions: transfer training, gait training, therapeutic exercises, neuromuscular re-education, patient and family training/education, and therapeutic activities      Frequency/Duration: Patient will be followed by physical therapy:  3 times a week to address goals. Recommendation for discharge: (in order for the patient to meet his/her long term goals)  Physical therapy at least 2 days/week in the home     This discharge recommendation:  Has been made in collaboration with the attending provider and/or case management    IF patient discharges home will need the following DME: rolling walker         SUBJECTIVE:   Patient stated I feel much better than yesterday.     OBJECTIVE DATA SUMMARY:   HISTORY:    Past Medical History:   Diagnosis Date    Anxiety     Cancer ([facility] [address]     breast    Depression     GERD (gastroesophageal reflux disease)     HTN (hypertension)     Hypercholesterolemia     Hypotension 9/12/2012    Metastatic breast cancer ([facility] [address] 5/3/2017    Secondary cancer of bone ([facility] 5/3/2017     Past Surgical History:   Procedure Laterality Date    BREAST SURGERY PROCEDURE UNLISTED  march 13    carina masectomy       Personal factors and/or comorbidities impacting plan of care: metastatic breast cancer with bone mets    Home Situation  Home Environment: Apartment(over 54)  One/Two Story Residence: One story  Living Alone: Yes  Support Systems: None  Current DME Used/Available at Home: Grab bars, Raised toilet seat, Shower chair  Tub or Shower Type: Shower    EXAMINATION/PRESENTATION/DECISION MAKING:   Critical Behavior:  Neurologic State: Alert  Orientation Level: Oriented X4  Cognition: Appropriate for age attention/concentration, Follows commands  Safety/Judgement: Decreased awareness of need for safety  Hearing: Auditory  Auditory Impairment: None  Range Of Motion:  AROM: Generally decreased, functional                       Strength:    Strength: Generally decreased, functional                    Tone & Sensation:   Tone: Normal              Sensation: Impaired(neuropathy in B toes and digits. )               Coordination:  Coordination: Within functional limits  Vision:   Acuity: Within Defined Limits  Corrective Lenses: Glasses  Functional Mobility:  Bed Mobility:  Rolling: Modified independent  Supine to Sit: Modified independent     Scooting: Independent  Transfers:  Sit to Stand: Stand-by assistance  Stand to Sit: Stand-by assistance        Bed to Chair: Contact guard assistance              Balance:   Sitting: Intact  Ambulation/Gait Training:  Distance (ft): 150 Feet (ft)  Assistive Device: Gait belt(gait training with walker for 30 feet at end)  Ambulation - Level of Assistance: Assist x1;Minimal assistance     Gait Description (WDL): Exceptions to WDL  Gait Abnormalities: Altered arm swing;Trunk sway increased; Path deviations;Decreased step clearance  Right Side Weight Bearing: Full  Left Side Weight Bearing: Full  Base of Support: Widened     Speed/Antonietta: Pace decreased (<100 feet/min)     Swing Pattern: Right asymmetrical(advances RLE with increased ext rotation and decreased ankle)                 Functional Measure:  Barthel Index:    Bathin  Bladder: 10  Bowels: 10  Groomin  Dressin  Feeding: 10  Mobility: 10  Stairs: 5  Toilet Use: 5  Transfer (Bed to Chair and Back): 10  Total: 65/100       The Barthel ADL Index: Guidelines  1. The index should be used as a record of what a patient does, not as a record of what a patient could do.   2. The main aim is to establish degree of independence from any help, physical or verbal, however minor and for whatever reason. 3. The need for supervision renders the patient not independent. 4. A patient's performance should be established using the best available evidence. Asking the patient, friends/relatives and nurses are the usual sources, but direct observation and common sense are also important. However direct testing is not needed. 5. Usually the patient's performance over the preceding 24-48 hours is important, but occasionally longer periods will be relevant. 6. Middle categories imply that the patient supplies over 50 per cent of the effort. 7. Use of aids to be independent is allowed. Shelli Seaman., Barthel, DMatthewW. ([id number]). Functional evaluation: the Barthel Index. [address] [facility] (14)2. Lloyd Wong sammi RAYSA Gonzales, Todd Dear., Evaristo Maxwell., [place], [address] [place] [address] (1999). Measuring the change indisability after inpatient rehabilitation; comparison of the responsiveness of the Barthel Index and Functional [place] Measure. Journal of Neurology, Neurosurgery, and Psychiatry, 66(4), 401-206. AYDEN Martin, LAVONNE Andrade, & Renetta Frazeir MKAROLYN. (2004.) Assessment of post-stroke quality of life in cost-effectiveness studies: The usefulness of the Barthel Index and the EuroQoL-5D.  Quality of Life Research, 15, 367-43            Physical Therapy Evaluation Charge Determination   History Examination Presentation Decision-Making   HIGH Complexity :3+ comorbidities / personal factors will impact the outcome/ POC  HIGH Complexity : 4+ Standardized tests and measures addressing body structure, function, activity limitation and / or participation in recreation  MEDIUM Complexity : Evolving with changing characteristics  Other outcome measures Barthel  MEDIUM      Based on the above components, the patient evaluation is determined to be of the following complexity level: MEDIUM    Pain Rating:  None reported    Activity Tolerance:   Good and SpO2 stable on RA  Please refer to the flowsheet for vital signs taken during this treatment. After treatment patient left in no apparent distress:   Sitting in chair and Call bell within reach    COMMUNICATION/EDUCATION:   The patients plan of care was discussed with: Occupational Therapist, Registered Nurse, and . Fall prevention education was provided and the patient/caregiver indicated understanding., Patient/family have participated as able in goal setting and plan of care. , and Patient/family agree to work toward stated goals and plan of care.     Thank you for this referral.  Margaret Friedman, PT   Time Calculation: 31 mins

## 2019-12-23 NOTE — PROGRESS NOTES
Paged dr Aisha Gibson regarding patient's home xanax script. Patient can have . 25 of xanax PRN at bedside.

## 2019-12-23 NOTE — PROGRESS NOTES
Hospitalist Progress Note    NAME: Kristin Hutchinson   :  1955   MRN:  [id number]       Assessment / Plan:  Sepsis POA: 2ry to UTI, c/w IVF and ABX. UTI: D/c  Zosyn, F/U cultures no growth, will give ceftin for 5 days. Acute Metabolic Encephalopathy: likely 2ry to infection and Alcohol, in record there is mention of Dementia, will monitor. CT shows no structural lesion, so far improved  Alcohol Abuse: patient still drinking, c/w CIWA protocol, and c/w  Supplements. As per conversation with friend Ervin patient has been abusing medication taking Xanax and pain meds. Got arrested in [employer] and got to [facility] recently  H/O Breast Cancer: CT shows likely mets to sternum, should F/U Oncology Dr. Tracey Tan as outpatient  Acute renal failure: CT shows no signs of obstruction, c/w IVF and monitor, so far resolved, monitor  Hypertension resume  Coreg and ARB, use labetalol prn  Anxiety depression Hold anti-depressant medication  Hypokalemia: replace and monitor  Obesity: BMI 30.9  Surrogate Decision Maker:Mone Hopkins [phone], friend Ervin [phone]  Code status: Full  Prophylaxis: Hep SQ  Recommended Disposition: TBD     Dc/ plan likely for tomorrow either SNF vs Home Health depending on how well she does with PT today. Subjective:     Chief Complaint / Reason for Physician Visit  \"I feel much better\". Discussed with RN events overnight. Review of Systems:  Symptom Y/N Comments  Symptom Y/N Comments   Fever/Chills    Chest Pain     Poor Appetite    Edema     Cough    Abdominal Pain     Sputum    Joint Pain     SOB/LOBO    Pruritis/Rash     Nausea/vomit    Tolerating PT/OT     Diarrhea    Tolerating Diet y    Constipation    Other       Could NOT obtain due to:      Objective:     VITALS:   Last 24hrs VS reviewed since prior progress note.  Most recent are:  Patient Vitals for the past 24 hrs:   Temp Pulse Resp BP SpO2   19 0746 98.4 °F (36.9 °C) 88 18 122/69 99 %   19 0402 97.9 °F (36.6 °C) (!) 105 18 142/63 98 %   12/22/19 2358 -- 99 -- -- --   12/22/19 2314 98.7 °F (37.1 °C) 89 18 132/74 100 %   12/22/19 2005 97.7 °F (36.5 °C) 99 16 112/58 99 %   12/22/19 1550 -- 93 -- 96/62 --   12/22/19 1426 97.7 °F (36.5 °C) 98 18 93/53 98 %   12/22/19 1043 98.3 °F (36.8 °C) 89 18 103/72 96 %       Intake/Output Summary (Last 24 hours) at 12/23/2019 0942  Last data filed at 12/23/2019 0403  Gross per 24 hour   Intake 240 ml   Output 1600 ml   Net -1360 ml        PHYSICAL EXAM:  General: WD, WN. Alert and cooperative, no acute distress    EENT:  EOMI. Anicteric sclerae. MMM  Resp:  Coarse BS  CV:  Regular  rhythm,  No edema  GI:  Soft, Non distended, Non tender.  +Bowel sounds  Neurologic:  Alert and oriented X 3, normal speech,   Psych:   Fair  insight. Not anxious nor agitated  Skin:  No rashes. No jaundice    Reviewed most current lab test results and cultures  YES  Reviewed most current radiology test results   YES  Review and summation of old records today    NO  Reviewed patient's current orders and MAR    YES  PMH/ reviewed - no change compared to H&P  ________________________________________________________________________  Care Plan discussed with:    Comments   Patient y    Family  y Jada Jones RN y    Care Manager     Consultant                        Multidiciplinary team rounds were held today with , nursing, pharmacist and clinical coordinator. Patient's plan of care was discussed; medications were reviewed and discharge planning was addressed.      ________________________________________________________________________  Total NON critical care TIME: 35   Minutes    Total CRITICAL CARE TIME Spent:   Minutes non procedure based      Comments   >50% of visit spent in counseling and coordination of care y    ________________________________________________________________________  Radha Lerner MD     Procedures: see electronic medical records for all procedures/Xrays and details which were not copied into this note but were reviewed prior to creation of Plan. LABS:  I reviewed today's most current labs and imaging studies.   Pertinent labs include:  Recent Labs     12/23/19 0520 12/22/19 0458 12/21/19 0412   WBC 5.9 9.3 18.9*   HGB 10.7* 10.7* 12.2   HCT 33.0* 33.4* 37.0    205 278     Recent Labs     12/23/19 0520 12/22/19 0458 12/21/19 0412 12/20/19 1912    146* 144 141   K 3.4* 3.3* 3.2* 3.7   * 116* 112* 104   CO2 25 24 21 23   * 122* 163* 152*   BUN 21* 37* 60* 62*   CREA 0.97 1.14* 2.28* 3.48*   CA 7.8* 8.3* 8.5 10.9*   MG 1.8 2.2  --   --    PHOS  --  2.2*  --   --    ALB 2.7* 2.8* 3.5 4.7   TBILI 0.3 0.8 1.9* 0.8   SGOT 54* 90* 121* 169*   ALT 64 69 68 77   INR  --   --   --  1.1       Signed: Albert Taylor MD

## 2019-12-23 NOTE — PROGRESS NOTES
Attempted to see patient for OT evaluation, but she is presently being bathed by CNA and just received pain medication. Will defer OT and follow back later today.

## 2019-12-24 ENCOUNTER — HOME HEALTH ADMISSION (OUTPATIENT)
Dept: HOME HEALTH SERVICES | Facility: HOME HEALTH | Age: 64
End: 2019-12-24
Payer: MEDICARE

## 2019-12-24 LAB
ALBUMIN SERPL-MCNC: 2.9 G/DL (ref 3.5–5)
ALBUMIN/GLOB SERPL: 0.9 {RATIO} (ref 1.1–2.2)
ALP SERPL-CCNC: 75 U/L (ref 45–117)
ALT SERPL-CCNC: 62 U/L (ref 12–78)
ANION GAP SERPL CALC-SCNC: 6 MMOL/L (ref 5–15)
AST SERPL-CCNC: 33 U/L (ref 15–37)
BASOPHILS # BLD: 0.1 K/UL (ref 0–0.1)
BASOPHILS NFR BLD: 1 % (ref 0–1)
BILIRUB SERPL-MCNC: 0.3 MG/DL (ref 0.2–1)
BUN SERPL-MCNC: 16 MG/DL (ref 6–20)
BUN/CREAT SERPL: 21 (ref 12–20)
CALCIUM SERPL-MCNC: 8.7 MG/DL (ref 8.5–10.1)
CHLORIDE SERPL-SCNC: 106 MMOL/L (ref 97–108)
CO2 SERPL-SCNC: 29 MMOL/L (ref 21–32)
CREAT SERPL-MCNC: 0.78 MG/DL (ref 0.55–1.02)
DIFFERENTIAL METHOD BLD: ABNORMAL
EOSINOPHIL # BLD: 0.2 K/UL (ref 0–0.4)
EOSINOPHIL NFR BLD: 3 % (ref 0–7)
ERYTHROCYTE [DISTWIDTH] IN BLOOD BY AUTOMATED COUNT: 12.3 % (ref 11.5–14.5)
GLOBULIN SER CALC-MCNC: 3.2 G/DL (ref 2–4)
GLUCOSE SERPL-MCNC: 133 MG/DL (ref 65–100)
HCT VFR BLD AUTO: 38.1 % (ref 35–47)
HGB BLD-MCNC: 12.6 G/DL (ref 11.5–16)
IMM GRANULOCYTES # BLD AUTO: 0.1 K/UL (ref 0–0.04)
IMM GRANULOCYTES NFR BLD AUTO: 1 % (ref 0–0.5)
LYMPHOCYTES # BLD: 2 K/UL (ref 0.8–3.5)
LYMPHOCYTES NFR BLD: 27 % (ref 12–49)
MAGNESIUM SERPL-MCNC: 1.7 MG/DL (ref 1.6–2.4)
MCH RBC QN AUTO: 31.1 PG (ref 26–34)
MCHC RBC AUTO-ENTMCNC: 33.1 G/DL (ref 30–36.5)
MCV RBC AUTO: 94.1 FL (ref 80–99)
MONOCYTES # BLD: 0.4 K/UL (ref 0–1)
MONOCYTES NFR BLD: 6 % (ref 5–13)
NEUTS SEG # BLD: 4.6 K/UL (ref 1.8–8)
NEUTS SEG NFR BLD: 62 % (ref 32–75)
NRBC # BLD: 0 K/UL (ref 0–0.01)
NRBC BLD-RTO: 0 PER 100 WBC
PLATELET # BLD AUTO: 263 K/UL (ref 150–400)
PMV BLD AUTO: 11.4 FL (ref 8.9–12.9)
POTASSIUM SERPL-SCNC: 3.8 MMOL/L (ref 3.5–5.1)
PROT SERPL-MCNC: 6.1 G/DL (ref 6.4–8.2)
RBC # BLD AUTO: 4.05 M/UL (ref 3.8–5.2)
SODIUM SERPL-SCNC: 141 MMOL/L (ref 136–145)
WBC # BLD AUTO: 7.4 K/UL (ref 3.6–11)

## 2019-12-24 PROCEDURE — 83735 ASSAY OF MAGNESIUM: CPT

## 2019-12-24 PROCEDURE — 74011250637 HC RX REV CODE- 250/637: Performed by: INTERNAL MEDICINE

## 2019-12-24 PROCEDURE — 80053 COMPREHEN METABOLIC PANEL: CPT

## 2019-12-24 PROCEDURE — 36415 COLL VENOUS BLD VENIPUNCTURE: CPT

## 2019-12-24 PROCEDURE — 74011250636 HC RX REV CODE- 250/636: Performed by: INTERNAL MEDICINE

## 2019-12-24 PROCEDURE — 94760 N-INVAS EAR/PLS OXIMETRY 1: CPT

## 2019-12-24 PROCEDURE — 65270000029 HC RM PRIVATE

## 2019-12-24 PROCEDURE — 85025 COMPLETE CBC W/AUTO DIFF WBC: CPT

## 2019-12-24 RX ORDER — FOLIC ACID 1 MG/1
1 TABLET ORAL DAILY
Qty: 60 TAB | Refills: 3 | Status: SHIPPED | OUTPATIENT
Start: 2019-12-25

## 2019-12-24 RX ORDER — ASPIRIN 325 MG/1
100 TABLET, FILM COATED ORAL DAILY
Qty: 60 TAB | Refills: 3 | Status: ON HOLD | OUTPATIENT
Start: 2019-12-25 | End: 2020-02-04

## 2019-12-24 RX ORDER — IBUPROFEN 200 MG
1 TABLET ORAL DAILY
Qty: 30 PATCH | Refills: 0 | Status: SHIPPED | OUTPATIENT
Start: 2019-12-25 | End: 2020-01-24

## 2019-12-24 RX ADMIN — ASPIRIN 81 MG: 81 TABLET, COATED ORAL at 09:11

## 2019-12-24 RX ADMIN — GABAPENTIN 100 MG: 100 CAPSULE ORAL at 09:11

## 2019-12-24 RX ADMIN — HEPARIN SODIUM 5000 UNITS: 5000 INJECTION INTRAVENOUS; SUBCUTANEOUS at 23:01

## 2019-12-24 RX ADMIN — Medication 10 ML: at 06:23

## 2019-12-24 RX ADMIN — TRAMADOL HYDROCHLORIDE 50 MG: 50 TABLET, FILM COATED ORAL at 23:33

## 2019-12-24 RX ADMIN — TRAMADOL HYDROCHLORIDE 50 MG: 50 TABLET, FILM COATED ORAL at 09:10

## 2019-12-24 RX ADMIN — AMITRIPTYLINE HYDROCHLORIDE 50 MG: 50 TABLET, FILM COATED ORAL at 23:00

## 2019-12-24 RX ADMIN — CEFUROXIME AXETIL 500 MG: 250 TABLET, FILM COATED ORAL at 09:10

## 2019-12-24 RX ADMIN — TRAMADOL HYDROCHLORIDE 50 MG: 50 TABLET, FILM COATED ORAL at 16:09

## 2019-12-24 RX ADMIN — CARVEDILOL 12.5 MG: 12.5 TABLET, FILM COATED ORAL at 09:10

## 2019-12-24 RX ADMIN — FOLIC ACID 1 MG: 1 TABLET ORAL at 09:11

## 2019-12-24 RX ADMIN — Medication 10 ML: at 23:02

## 2019-12-24 RX ADMIN — GABAPENTIN 100 MG: 100 CAPSULE ORAL at 23:00

## 2019-12-24 RX ADMIN — PANTOPRAZOLE SODIUM 40 MG: 40 TABLET, DELAYED RELEASE ORAL at 09:11

## 2019-12-24 RX ADMIN — ALPRAZOLAM 0.25 MG: 0.5 TABLET ORAL at 23:00

## 2019-12-24 RX ADMIN — LOSARTAN POTASSIUM 50 MG: 50 TABLET, FILM COATED ORAL at 09:10

## 2019-12-24 RX ADMIN — Medication 10 ML: at 14:30

## 2019-12-24 RX ADMIN — POLYETHYLENE GLYCOL 3350 17 G: 17 POWDER, FOR SOLUTION ORAL at 09:15

## 2019-12-24 RX ADMIN — CEFUROXIME AXETIL 500 MG: 250 TABLET, FILM COATED ORAL at 23:01

## 2019-12-24 RX ADMIN — CARVEDILOL 12.5 MG: 12.5 TABLET, FILM COATED ORAL at 16:09

## 2019-12-24 RX ADMIN — HEPARIN SODIUM 5000 UNITS: 5000 INJECTION INTRAVENOUS; SUBCUTANEOUS at 06:23

## 2019-12-24 RX ADMIN — GABAPENTIN 100 MG: 100 CAPSULE ORAL at 16:09

## 2019-12-24 RX ADMIN — HEPARIN SODIUM 5000 UNITS: 5000 INJECTION INTRAVENOUS; SUBCUTANEOUS at 14:30

## 2019-12-24 RX ADMIN — Medication 100 MG: at 09:11

## 2019-12-24 NOTE — PROGRESS NOTES
Bedside shift change report given to ZHANG Keller (oncoming nurse) by Alize Portillo RN (offgoing nurse). Report included the following information SBAR, Kardex, Procedure Summary, Intake/Output, MAR, Accordion, Recent Results and Med Rec Status.

## 2019-12-24 NOTE — PROGRESS NOTES
Bedside and Verbal shift change report given to [address] Jeffy Mendoza (oncoming nurse) by Stanley Mix RN (offgoing nurse). Report included the following information SBAR, Kardex, Intake/Output, MAR, Recent Results, Med Rec Status and Cardiac Rhythm NSR/ Sinus Tach.

## 2019-12-24 NOTE — PROGRESS NOTES
Bedside shift change report given to ZHANG Keller (oncoming nurse) by Ganga Terry (offgoing nurse). Report included the following information SBAR, Kardex, Procedure Summary, Intake/Output, MAR, Accordion, Recent Results and Med Rec Status.

## 2019-12-25 LAB
BACTERIA SPEC CULT: NORMAL
SERVICE CMNT-IMP: NORMAL

## 2019-12-25 PROCEDURE — 65270000029 HC RM PRIVATE

## 2019-12-25 PROCEDURE — 74011250636 HC RX REV CODE- 250/636: Performed by: INTERNAL MEDICINE

## 2019-12-25 PROCEDURE — 94760 N-INVAS EAR/PLS OXIMETRY 1: CPT

## 2019-12-25 PROCEDURE — 74011250637 HC RX REV CODE- 250/637: Performed by: INTERNAL MEDICINE

## 2019-12-25 RX ADMIN — GABAPENTIN 100 MG: 100 CAPSULE ORAL at 09:02

## 2019-12-25 RX ADMIN — HEPARIN SODIUM 5000 UNITS: 5000 INJECTION INTRAVENOUS; SUBCUTANEOUS at 13:01

## 2019-12-25 RX ADMIN — CARVEDILOL 12.5 MG: 12.5 TABLET, FILM COATED ORAL at 09:02

## 2019-12-25 RX ADMIN — Medication 10 ML: at 06:29

## 2019-12-25 RX ADMIN — CEFUROXIME AXETIL 500 MG: 250 TABLET, FILM COATED ORAL at 09:01

## 2019-12-25 RX ADMIN — Medication 10 ML: at 13:01

## 2019-12-25 RX ADMIN — CARVEDILOL 12.5 MG: 12.5 TABLET, FILM COATED ORAL at 16:00

## 2019-12-25 RX ADMIN — GABAPENTIN 100 MG: 100 CAPSULE ORAL at 21:30

## 2019-12-25 RX ADMIN — Medication 100 MG: at 09:02

## 2019-12-25 RX ADMIN — Medication 10 ML: at 21:35

## 2019-12-25 RX ADMIN — AMITRIPTYLINE HYDROCHLORIDE 50 MG: 50 TABLET, FILM COATED ORAL at 21:30

## 2019-12-25 RX ADMIN — TRAMADOL HYDROCHLORIDE 50 MG: 50 TABLET, FILM COATED ORAL at 18:43

## 2019-12-25 RX ADMIN — TRAMADOL HYDROCHLORIDE 50 MG: 50 TABLET, FILM COATED ORAL at 13:01

## 2019-12-25 RX ADMIN — ALPRAZOLAM 0.25 MG: 0.5 TABLET ORAL at 21:30

## 2019-12-25 RX ADMIN — ASPIRIN 81 MG: 81 TABLET, COATED ORAL at 09:02

## 2019-12-25 RX ADMIN — GABAPENTIN 100 MG: 100 CAPSULE ORAL at 15:57

## 2019-12-25 RX ADMIN — TRAMADOL HYDROCHLORIDE 50 MG: 50 TABLET, FILM COATED ORAL at 06:47

## 2019-12-25 RX ADMIN — LOSARTAN POTASSIUM 50 MG: 50 TABLET, FILM COATED ORAL at 09:02

## 2019-12-25 RX ADMIN — HEPARIN SODIUM 5000 UNITS: 5000 INJECTION INTRAVENOUS; SUBCUTANEOUS at 06:28

## 2019-12-25 RX ADMIN — FOLIC ACID 1 MG: 1 TABLET ORAL at 09:02

## 2019-12-25 RX ADMIN — HEPARIN SODIUM 5000 UNITS: 5000 INJECTION INTRAVENOUS; SUBCUTANEOUS at 21:31

## 2019-12-25 RX ADMIN — PANTOPRAZOLE SODIUM 40 MG: 40 TABLET, DELAYED RELEASE ORAL at 09:02

## 2019-12-25 RX ADMIN — CEFUROXIME AXETIL 500 MG: 250 TABLET, FILM COATED ORAL at 21:30

## 2019-12-25 RX ADMIN — POLYETHYLENE GLYCOL 3350 17 G: 17 POWDER, FOR SOLUTION ORAL at 09:03

## 2019-12-25 NOTE — PROGRESS NOTES
Problem: Falls - Risk of  Goal: *Absence of Falls  Description  Document Jennie Lr Fall Risk and appropriate interventions in the flowsheet.   Outcome: Progressing Towards Goal  Note: Fall Risk Interventions:  Mobility Interventions: Assess mobility with egress test    Mentation Interventions: Adequate sleep, hydration, pain control    Medication Interventions: Assess postural VS orthostatic hypotension    Elimination Interventions: Call light in reach    History of Falls Interventions: Consult care management for discharge planning

## 2019-12-25 NOTE — PROGRESS NOTES
Problem: Falls - Risk of  Goal: *Absence of Falls  Description  Document Jose D Swanson Fall Risk and appropriate interventions in the flowsheet.   Outcome: Progressing Towards Goal  Note: Fall Risk Interventions:  Mobility Interventions: Assess mobility with egress test, Communicate number of staff needed for ambulation/transfer, OT consult for ADLs, Patient to call before getting OOB, PT Consult for mobility concerns, PT Consult for assist device competence, Strengthening exercises (ROM-active/passive), Utilize gait belt for transfers/ambulation, Utilize walker, cane, or other assistive device    Mentation Interventions: Adequate sleep, hydration, pain control, Door open when patient unattended, Evaluate medications/consider consulting pharmacy, Eyeglasses and hearing aids, Familiar objects from home, Gait belt with transfers/ambulation, Family/sitter at bedside, Update white board, Toileting rounds, Room close to nurse's station, Reorient patient, More frequent rounding    Medication Interventions: Assess postural VS orthostatic hypotension, Evaluate medications/consider consulting pharmacy, Patient to call before getting OOB, Teach patient to arise slowly, Utilize gait belt for transfers/ambulation    Elimination Interventions: Call light in reach, Patient to call for help with toileting needs, Stay With Me (per policy), Elevated toilet seat, Toileting schedule/hourly rounds, Toilet paper/wipes in reach    History of Falls Interventions: Consult care management for discharge planning, Door open when patient unattended, Evaluate medications/consider consulting pharmacy, Room close to nurse's station, Assess for delayed presentation/identification of injury for 48 hrs (comment for end date), Utilize gait belt for transfer/ambulation, Vital signs minimum Q4HRs X 24 hrs (comment for end date)         Problem: Falls - Risk of  Goal: *Absence of Falls  Description  Document Jose D Swanson Fall Risk and appropriate interventions in the flowsheet.   Outcome: Progressing Towards Goal  Note: Fall Risk Interventions:  Mobility Interventions: Assess mobility with egress test, Communicate number of staff needed for ambulation/transfer, OT consult for ADLs, Patient to call before getting OOB, PT Consult for mobility concerns, PT Consult for assist device competence, Strengthening exercises (ROM-active/passive), Utilize gait belt for transfers/ambulation, Utilize walker, cane, or other assistive device    Mentation Interventions: Adequate sleep, hydration, pain control, Door open when patient unattended, Evaluate medications/consider consulting pharmacy, Eyeglasses and hearing aids, Familiar objects from home, Gait belt with transfers/ambulation, Family/sitter at bedside, Update white board, Toileting rounds, Room close to nurse's station, Reorient patient, More frequent rounding    Medication Interventions: Assess postural VS orthostatic hypotension, Evaluate medications/consider consulting pharmacy, Patient to call before getting OOB, Teach patient to arise slowly, Utilize gait belt for transfers/ambulation    Elimination Interventions: Call light in reach, Patient to call for help with toileting needs, Stay With Me (per policy), Elevated toilet seat, Toileting schedule/hourly rounds, Toilet paper/wipes in reach    History of Falls Interventions: Consult care management for discharge planning, Door open when patient unattended, Evaluate medications/consider consulting pharmacy, Room close to nurse's station, Assess for delayed presentation/identification of injury for 48 hrs (comment for end date), Utilize gait belt for transfer/ambulation, Vital signs minimum Q4HRs X 24 hrs (comment for end date)         Problem: Patient Education: Go to Patient Education Activity  Goal: Patient/Family Education  Outcome: Progressing Towards Goal     Problem: Patient Education: Go to Patient Education Activity  Goal: Patient/Family Education  Outcome: Progressing Towards Goal     Problem: Sepsis: Day of Diagnosis  Goal: Off Pathway (Use only if patient is Off Pathway)  Outcome: Progressing Towards Goal  Goal: *Fluid resuscitation  Outcome: Progressing Towards Goal  Goal: *Paired blood cultures prior to first dose of antibiotic  Outcome: Progressing Towards Goal  Goal: *First dose of  appropriate antibiotic within 3 hours of arrival to ED, within 1 hour of arrival to ICU  Outcome: Progressing Towards Goal  Goal: *Lactic acid with first set of blood cultures  Outcome: Progressing Towards Goal  Goal: *Pneumococcal immunization (if eligible)  Outcome: Progressing Towards Goal  Goal: *Influenza immunization (if eligible)  Outcome: Progressing Towards Goal  Goal: Activity/Safety  Outcome: Progressing Towards Goal  Goal: Consults, if ordered  Outcome: Progressing Towards Goal  Goal: Diagnostic Test/Procedures  Outcome: Progressing Towards Goal  Goal: Nutrition/Diet  Outcome: Progressing Towards Goal  Goal: Discharge Planning  Outcome: Progressing Towards Goal  Goal: Medications  Outcome: Progressing Towards Goal  Goal: Respiratory  Outcome: Progressing Towards Goal  Goal: Treatments/Interventions/Procedures  Outcome: Progressing Towards Goal  Goal: Psychosocial  Outcome: Progressing Towards Goal     Problem: Sepsis: Day 2  Goal: Off Pathway (Use only if patient is Off Pathway)  Outcome: Progressing Towards Goal  Goal: *Central Venous Pressure maintained at 8-12 mm Hg  Outcome: Progressing Towards Goal  Goal: *Hemodynamically stable  Outcome: Progressing Towards Goal  Goal: *Tolerating diet  Outcome: Progressing Towards Goal  Goal: Activity/Safety  Outcome: Progressing Towards Goal  Goal: Consults, if ordered  Outcome: Progressing Towards Goal  Goal: Diagnostic Test/Procedures  Outcome: Progressing Towards Goal  Goal: Nutrition/Diet  Outcome: Progressing Towards Goal  Goal: Discharge Planning  Outcome: Progressing Towards Goal  Goal: Medications  Outcome: Progressing Towards Goal  Goal: Respiratory  Outcome: Progressing Towards Goal  Goal: Treatments/Interventions/Procedures  Outcome: Progressing Towards Goal  Goal: Psychosocial  Outcome: Progressing Towards Goal     Problem: Sepsis: Day 3  Goal: Off Pathway (Use only if patient is Off Pathway)  Outcome: Progressing Towards Goal  Goal: *Central Venous Pressure maintained at 8-12 mm Hg  Outcome: Progressing Towards Goal  Goal: *Oxygen saturation within defined limits  Outcome: Progressing Towards Goal  Goal: *Vital sign stability  Outcome: Progressing Towards Goal  Goal: *Tolerating diet  Outcome: Progressing Towards Goal  Goal: *Demonstrates progressive activity  Outcome: Progressing Towards Goal  Goal: Activity/Safety  Outcome: Progressing Towards Goal  Goal: Consults, if ordered  Outcome: Progressing Towards Goal  Goal: Diagnostic Test/Procedures  Outcome: Progressing Towards Goal  Goal: Nutrition/Diet  Outcome: Progressing Towards Goal  Goal: Discharge Planning  Outcome: Progressing Towards Goal  Goal: Medications  Outcome: Progressing Towards Goal  Goal: Respiratory  Outcome: Progressing Towards Goal  Goal: Treatments/Interventions/Procedures  Outcome: Progressing Towards Goal  Goal: Psychosocial  Outcome: Progressing Towards Goal     Problem: Sepsis: Day 4  Goal: Off Pathway (Use only if patient is Off Pathway)  Outcome: Progressing Towards Goal  Goal: Activity/Safety  Outcome: Progressing Towards Goal  Goal: Consults, if ordered  Outcome: Progressing Towards Goal  Goal: Diagnostic Test/Procedures  Outcome: Progressing Towards Goal  Goal: Nutrition/Diet  Outcome: Progressing Towards Goal  Goal: Discharge Planning  Outcome: Progressing Towards Goal  Goal: Medications  Outcome: Progressing Towards Goal  Goal: Respiratory  Outcome: Progressing Towards Goal  Goal: Treatments/Interventions/Procedures  Outcome: Progressing Towards Goal  Goal: Psychosocial  Outcome: Progressing Towards Goal  Goal: *Oxygen saturation within defined limits  Outcome: Progressing Towards Goal  Goal: *Hemodynamically stable  Outcome: Progressing Towards Goal  Goal: *Vital signs within defined limits  Outcome: Progressing Towards Goal  Goal: *Tolerating diet  Outcome: Progressing Towards Goal  Goal: *Demonstrates progressive activity  Outcome: Progressing Towards Goal  Goal: *Fluid volume maintenance  Outcome: Progressing Towards Goal     Problem: Sepsis: Day 5  Goal: Off Pathway (Use only if patient is Off Pathway)  Outcome: Progressing Towards Goal  Goal: *Oxygen saturation within defined limits  Outcome: Progressing Towards Goal  Goal: *Vital signs within defined limits  Outcome: Progressing Towards Goal  Goal: *Tolerating diet  Outcome: Progressing Towards Goal  Goal: *Demonstrates progressive activity  Outcome: Progressing Towards Goal  Goal: *Discharge plan identified  Outcome: Progressing Towards Goal  Goal: Activity/Safety  Outcome: Progressing Towards Goal  Goal: Consults, if ordered  Outcome: Progressing Towards Goal  Goal: Diagnostic Test/Procedures  Outcome: Progressing Towards Goal  Goal: Nutrition/Diet  Outcome: Progressing Towards Goal  Goal: Discharge Planning  Outcome: Progressing Towards Goal  Goal: Medications  Outcome: Progressing Towards Goal  Goal: Respiratory  Outcome: Progressing Towards Goal  Goal: Treatments/Interventions/Procedures  Outcome: Progressing Towards Goal  Goal: Psychosocial  Outcome: Progressing Towards Goal     Problem: Sepsis: Day 6  Goal: Off Pathway (Use only if patient is Off Pathway)  Outcome: Progressing Towards Goal  Goal: *Oxygen saturation within defined limits  Outcome: Progressing Towards Goal  Goal: *Vital signs within defined limits  Outcome: Progressing Towards Goal  Goal: *Tolerating diet  Outcome: Progressing Towards Goal  Goal: *Demonstrates progressive activity  Outcome: Progressing Towards Goal  Goal: Influenza immunization  Outcome: Progressing Towards Goal  Goal: *Pneumococcal immunization  Outcome: Progressing Towards Goal  Goal: Activity/Safety  Outcome: Progressing Towards Goal  Goal: Diagnostic Test/Procedures  Outcome: Progressing Towards Goal  Goal: Nutrition/Diet  Outcome: Progressing Towards Goal  Goal: Discharge Planning  Outcome: Progressing Towards Goal  Goal: Medications  Outcome: Progressing Towards Goal  Goal: Respiratory  Outcome: Progressing Towards Goal  Goal: Treatments/Interventions/Procedures  Outcome: Progressing Towards Goal  Goal: Psychosocial  Outcome: Progressing Towards Goal     Problem: Sepsis: Discharge Outcomes  Goal: *Vital signs within defined limits  Outcome: Progressing Towards Goal  Goal: *Tolerating diet  Outcome: Progressing Towards Goal  Goal: *Verbalizes understanding and describes prescribed diet  Outcome: Progressing Towards Goal  Goal: *Demonstrates progressive activity  Outcome: Progressing Towards Goal  Goal: *Describes follow-up/return visits to physicians  Outcome: Progressing Towards Goal  Goal: *Verbalizes name, dosage, time, side effects, and number of days to continue medications  Outcome: Progressing Towards Goal  Goal: *Influenza immunization (Oct-Mar only)  Outcome: Progressing Towards Goal  Goal: *Pneumococcal immunization  Outcome: Progressing Towards Goal  Goal: *Lungs clear or at baseline  Outcome: Progressing Towards Goal  Goal: *Oxygen saturation returns to baseline or 90% or better on room air  Outcome: Progressing Towards Goal  Goal: *Glycemic control  Outcome: Progressing Towards Goal  Goal: *Absence of deep venous thrombosis signs and symptoms(Stroke Metric)  Outcome: Progressing Towards Goal  Goal: *Describes available resources and support systems  Outcome: Progressing Towards Goal  Goal: *Optimal pain control at patient's stated goal  Outcome: Progressing Towards Goal

## 2019-12-25 NOTE — PROGRESS NOTES
Bedside shift change report given to Tanya (oncoming nurse) by Sandra Elias (offgoing nurse). Report included the following information SBAR, Kardex, ED Summary, Intake/Output, MAR, Accordion, Recent Results and Med Rec Status.

## 2019-12-25 NOTE — PROGRESS NOTES
Problem: Falls - Risk of  Goal: *Absence of Falls  Description  Document Yunior Tiwari Fall Risk and appropriate interventions in the flowsheet.   12/25/2019 0946 by Patti Esteban RN  Outcome: Progressing Towards Goal  Note: Fall Risk Interventions:  Mobility Interventions: Assess mobility with egress test, Communicate number of staff needed for ambulation/transfer, OT consult for ADLs, Patient to call before getting OOB, PT Consult for mobility concerns, PT Consult for assist device competence, Strengthening exercises (ROM-active/passive), Utilize gait belt for transfers/ambulation, Utilize walker, cane, or other assistive device    Mentation Interventions: Adequate sleep, hydration, pain control, Door open when patient unattended, Evaluate medications/consider consulting pharmacy, Eyeglasses and hearing aids, Familiar objects from home, Gait belt with transfers/ambulation, Family/sitter at bedside, Update white board, Toileting rounds, Room close to nurse's station, Reorient patient, More frequent rounding    Medication Interventions: Assess postural VS orthostatic hypotension, Evaluate medications/consider consulting pharmacy, Patient to call before getting OOB, Teach patient to arise slowly, Utilize gait belt for transfers/ambulation    Elimination Interventions: Call light in reach, Patient to call for help with toileting needs, Stay With Me (per policy), Elevated toilet seat, Toileting schedule/hourly rounds, Toilet paper/wipes in reach    History of Falls Interventions: Consult care management for discharge planning, Door open when patient unattended, Evaluate medications/consider consulting pharmacy, Room close to nurse's station, Assess for delayed presentation/identification of injury for 48 hrs (comment for end date), Utilize gait belt for transfer/ambulation, Vital signs minimum Q4HRs X 24 hrs (comment for end date)      12/25/2019 0944 by Patti Esteban, RN  Outcome: Progressing Towards Goal  Note: Fall Risk Interventions:  Mobility Interventions: Assess mobility with egress test, Communicate number of staff needed for ambulation/transfer, OT consult for ADLs, Patient to call before getting OOB, PT Consult for mobility concerns, PT Consult for assist device competence, Strengthening exercises (ROM-active/passive), Utilize gait belt for transfers/ambulation, Utilize walker, cane, or other assistive device    Mentation Interventions: Adequate sleep, hydration, pain control, Door open when patient unattended, Evaluate medications/consider consulting pharmacy, Eyeglasses and hearing aids, Familiar objects from home, Gait belt with transfers/ambulation, Family/sitter at bedside, Update white board, Toileting rounds, Room close to nurse's station, Reorient patient, More frequent rounding    Medication Interventions: Assess postural VS orthostatic hypotension, Evaluate medications/consider consulting pharmacy, Patient to call before getting OOB, Teach patient to arise slowly, Utilize gait belt for transfers/ambulation    Elimination Interventions: Call light in reach, Patient to call for help with toileting needs, Stay With Me (per policy), Elevated toilet seat, Toileting schedule/hourly rounds, Toilet paper/wipes in reach    History of Falls Interventions: Consult care management for discharge planning, Door open when patient unattended, Evaluate medications/consider consulting pharmacy, Room close to nurse's station, Assess for delayed presentation/identification of injury for 48 hrs (comment for end date), Utilize gait belt for transfer/ambulation, Vital signs minimum Q4HRs X 24 hrs (comment for end date)         Problem: Falls - Risk of  Goal: *Absence of Falls  Description  Document Shanthi Flores Fall Risk and appropriate interventions in the flowsheet.   12/25/2019 0946 by Judy Tripathi RN  Outcome: Progressing Towards Goal  Note: Fall Risk Interventions:  Mobility Interventions: Assess mobility with egress test, Communicate number of staff needed for ambulation/transfer, OT consult for ADLs, Patient to call before getting OOB, PT Consult for mobility concerns, PT Consult for assist device competence, Strengthening exercises (ROM-active/passive), Utilize gait belt for transfers/ambulation, Utilize walker, cane, or other assistive device    Mentation Interventions: Adequate sleep, hydration, pain control, Door open when patient unattended, Evaluate medications/consider consulting pharmacy, Eyeglasses and hearing aids, Familiar objects from home, Gait belt with transfers/ambulation, Family/sitter at bedside, Update white board, Toileting rounds, Room close to nurse's station, Reorient patient, More frequent rounding    Medication Interventions: Assess postural VS orthostatic hypotension, Evaluate medications/consider consulting pharmacy, Patient to call before getting OOB, Teach patient to arise slowly, Utilize gait belt for transfers/ambulation    Elimination Interventions: Call light in reach, Patient to call for help with toileting needs, Stay With Me (per policy), Elevated toilet seat, Toileting schedule/hourly rounds, Toilet paper/wipes in reach    History of Falls Interventions: Consult care management for discharge planning, Door open when patient unattended, Evaluate medications/consider consulting pharmacy, Room close to nurse's station, Assess for delayed presentation/identification of injury for 48 hrs (comment for end date), Utilize gait belt for transfer/ambulation, Vital signs minimum Q4HRs X 24 hrs (comment for end date)      12/25/2019 0944 by Kranthi Elias RN  Outcome: Progressing Towards Goal  Note: Fall Risk Interventions:  Mobility Interventions: Assess mobility with egress test, Communicate number of staff needed for ambulation/transfer, OT consult for ADLs, Patient to call before getting OOB, PT Consult for mobility concerns, PT Consult for assist device competence, Strengthening exercises (ROM-active/passive), Utilize gait belt for transfers/ambulation, Utilize walker, cane, or other assistive device    Mentation Interventions: Adequate sleep, hydration, pain control, Door open when patient unattended, Evaluate medications/consider consulting pharmacy, Eyeglasses and hearing aids, Familiar objects from home, Gait belt with transfers/ambulation, Family/sitter at bedside, Update white board, Toileting rounds, Room close to nurse's station, Reorient patient, More frequent rounding    Medication Interventions: Assess postural VS orthostatic hypotension, Evaluate medications/consider consulting pharmacy, Patient to call before getting OOB, Teach patient to arise slowly, Utilize gait belt for transfers/ambulation    Elimination Interventions: Call light in reach, Patient to call for help with toileting needs, Stay With Me (per policy), Elevated toilet seat, Toileting schedule/hourly rounds, Toilet paper/wipes in reach    History of Falls Interventions: Consult care management for discharge planning, Door open when patient unattended, Evaluate medications/consider consulting pharmacy, Room close to nurse's station, Assess for delayed presentation/identification of injury for 48 hrs (comment for end date), Utilize gait belt for transfer/ambulation, Vital signs minimum Q4HRs X 24 hrs (comment for end date)         Problem: Patient Education: Go to Patient Education Activity  Goal: Patient/Family Education  12/25/2019 0946 by Yola Martinez RN  Outcome: Progressing Towards Goal  12/25/2019 0944 by Yola Martinez RN  Outcome: Progressing Towards Goal     Problem: Patient Education: Go to Patient Education Activity  Goal: Patient/Family Education  12/25/2019 0946 by Yola Martinez RN  Outcome: Progressing Towards Goal  12/25/2019 0944 by Yola Martinez RN  Outcome: Progressing Towards Goal     Problem: Sepsis: Day 6  Goal: Off Pathway (Use only if patient is Off Pathway)  12/25/2019 0946 by Bayron Garibay RN  Outcome: Progressing Towards Goal  12/25/2019 0944 by Bayron Garibay RN  Outcome: Progressing Towards Goal  Goal: *Oxygen saturation within defined limits  12/25/2019 0946 by Bayron Garibay RN  Outcome: Progressing Towards Goal  12/25/2019 0944 by Bayron Garibay RN  Outcome: Progressing Towards Goal  Goal: *Vital signs within defined limits  12/25/2019 0946 by Bayron Garibay RN  Outcome: Progressing Towards Goal  12/25/2019 0944 by Bayron Garibay RN  Outcome: Progressing Towards Goal  Goal: *Tolerating diet  12/25/2019 0946 by Bayron Garibay RN  Outcome: Progressing Towards Goal  12/25/2019 0944 by Bayron Garibay RN  Outcome: Progressing Towards Goal  Goal: *Demonstrates progressive activity  12/25/2019 0946 by Bayron Garibay RN  Outcome: Progressing Towards Goal  12/25/2019 0944 by Bayron Garibay RN  Outcome: Progressing Towards Goal  Goal: Influenza immunization  12/25/2019 0946 by Bayron Garibay RN  Outcome: Progressing Towards Goal  12/25/2019 0944 by Bayron Garibay RN  Outcome: Progressing Towards Goal  Goal: *Pneumococcal immunization  12/25/2019 0946 by Bayron Garibay RN  Outcome: Progressing Towards Goal  12/25/2019 0944 by Bayron Garibay RN  Outcome: Progressing Towards Goal  Goal: Activity/Safety  12/25/2019 0946 by Bayron Garibay RN  Outcome: Progressing Towards Goal  12/25/2019 0944 by Bayron Garibay RN  Outcome: Progressing Towards Goal  Goal: Diagnostic Test/Procedures  12/25/2019 0946 by Bayron Garibay RN  Outcome: Progressing Towards Goal  12/25/2019 0944 by Bayron Garibay RN  Outcome: Progressing Towards Goal  Goal: Nutrition/Diet  12/25/2019 0946 by Bayron Garibay RN  Outcome: Progressing Towards Goal  12/25/2019 0944 by Bayron Garibay RN  Outcome: Progressing Towards Goal  Goal: Discharge Planning  12/25/2019 0946 by Bayron Garibay RN  Outcome: Progressing Towards Goal  12/25/2019 0944 by Lilly Barbour Raffi Johnson RN  Outcome: Progressing Towards Goal  Goal: Medications  12/25/2019 0946 by Ewelina Harrell RN  Outcome: Progressing Towards Goal  12/25/2019 0944 by Ewelina Harrell RN  Outcome: Progressing Towards Goal  Goal: Respiratory  12/25/2019 0946 by Ewelina Harrell RN  Outcome: Progressing Towards Goal  12/25/2019 0944 by Ewelina Harrell RN  Outcome: Progressing Towards Goal  Goal: Treatments/Interventions/Procedures  12/25/2019 0946 by Ewelina Harrell RN  Outcome: Progressing Towards Goal  12/25/2019 0944 by Ewelina Harrell RN  Outcome: Progressing Towards Goal  Goal: Psychosocial  12/25/2019 0946 by Ewelina Harrell RN  Outcome: Progressing Towards Goal  12/25/2019 0944 by Ewelina Harrell RN  Outcome: Progressing Towards Goal     Problem: Sepsis: Discharge Outcomes  Goal: *Vital signs within defined limits  12/25/2019 0946 by Ewelina Harrell RN  Outcome: Progressing Towards Goal  12/25/2019 0944 by Ewelina Harrell RN  Outcome: Progressing Towards Goal  Goal: *Tolerating diet  12/25/2019 0946 by Ewelina Harrell RN  Outcome: Progressing Towards Goal  12/25/2019 0944 by Ewelina Harrell RN  Outcome: Progressing Towards Goal  Goal: *Verbalizes understanding and describes prescribed diet  12/25/2019 0946 by Ewelina Harrell RN  Outcome: Progressing Towards Goal  12/25/2019 0944 by Ewelina Harrell RN  Outcome: Progressing Towards Goal  Goal: *Demonstrates progressive activity  12/25/2019 0946 by Ewelina Harrell RN  Outcome: Progressing Towards Goal  12/25/2019 0944 by Ewelina Harrell RN  Outcome: Progressing Towards Goal  Goal: *Describes follow-up/return visits to physicians  12/25/2019 0946 by Ewelina Harrell RN  Outcome: Progressing Towards Goal  12/25/2019 0944 by Ewelina Harrell RN  Outcome: Progressing Towards Goal  Goal: *Verbalizes name, dosage, time, side effects, and number of days to continue medications  12/25/2019 0946 by Ewelina Harrell RN  Outcome: Progressing Towards Goal  12/25/2019 0944 by Henry Dixon, RN  Outcome: Progressing Towards Goal  Goal: *Influenza immunization (Oct-Mar only)  12/25/2019 0946 by Henry Dixon, RN  Outcome: Progressing Towards Goal  12/25/2019 0944 by Henry Dixon, RN  Outcome: Progressing Towards Goal  Goal: *Pneumococcal immunization  12/25/2019 0946 by Henry Dixon, RN  Outcome: Progressing Towards Goal  12/25/2019 0944 by Henry Dixon, RN  Outcome: Progressing Towards Goal  Goal: *Lungs clear or at baseline  12/25/2019 0946 by Henry Dixon, RN  Outcome: Progressing Towards Goal  12/25/2019 0944 by Henry Dixon, RN  Outcome: Progressing Towards Goal  Goal: *Oxygen saturation returns to baseline or 90% or better on room air  12/25/2019 0946 by Henry Dixon, RN  Outcome: Progressing Towards Goal  12/25/2019 0944 by Henry Dixon, RN  Outcome: Progressing Towards Goal  Goal: *Glycemic control  12/25/2019 0946 by Henry Dixon, RN  Outcome: Progressing Towards Goal  12/25/2019 0944 by Henry Dixon, RN  Outcome: Progressing Towards Goal  Goal: *Absence of deep venous thrombosis signs and symptoms(Stroke Metric)  12/25/2019 0946 by Henry Dixon, RN  Outcome: Progressing Towards Goal  12/25/2019 0944 by Henry Dixon, RN  Outcome: Progressing Towards Goal  Goal: *Describes available resources and support systems  12/25/2019 0946 by Henry Dixon, RN  Outcome: Progressing Towards Goal  12/25/2019 0944 by Henry Dixon, RN  Outcome: Progressing Towards Goal  Goal: *Optimal pain control at patient's stated goal  12/25/2019 0946 by Henry Dixon, RN  Outcome: Progressing Towards Goal  12/25/2019 0944 by Henry Dixon, RN  Outcome: Progressing Towards Goal     Problem: General Infection Care Plan (Adult and Pediatric)  Goal: Improvement in signs and symptoms of infection  Outcome: Progressing Towards Goal  Goal: *Optimize nutritional status  Outcome: Progressing Towards Goal     Problem: Patient Education: Go to Patient Education Activity  Goal: Patient/Family Education  Outcome: Progressing Towards Goal     Problem: Pressure Injury - Risk of  Goal: *Prevention of pressure injury  Description  Document Adama Scale and appropriate interventions in the flowsheet.   Outcome: Progressing Towards Goal  Note: Pressure Injury Interventions:  Sensory Interventions: Assess changes in LOC, Assess need for specialty bed, Avoid rigorous massage over bony prominences, Check visual cues for pain, Float heels, Chair cushion, Discuss PT/OT consult with provider    Moisture Interventions: Absorbent underpads, Assess need for specialty bed, Check for incontinence Q2 hours and as needed, Contain wound drainage, Apply protective barrier, creams and emollients    Activity Interventions: Assess need for specialty bed, Pressure redistribution bed/mattress(bed type), PT/OT evaluation, Increase time out of bed, Chair cushion    Mobility Interventions: Chair cushion, Assess need for specialty bed, HOB 30 degrees or less, Pressure redistribution bed/mattress (bed type), Float heels, PT/OT evaluation    Nutrition Interventions: Document food/fluid/supplement intake, Discuss nutritional consult with provider, Offer support with meals,snacks and hydration    Friction and Shear Interventions: Apply protective barrier, creams and emollients, Feet elevated on foot rest, Foam dressings/transparent film/skin sealants, Lift sheet, Lift team/patient mobility team, HOB 30 degrees or less                Problem: Patient Education: Go to Patient Education Activity  Goal: Patient/Family Education  Outcome: Progressing Towards Goal     Problem: Patient Education: Go to Patient Education Activity  Goal: Patient/Family Education  Outcome: Progressing Towards Goal     Problem: Patient Education: Go to Patient Education Activity  Goal: Patient/Family Education  Outcome: Progressing Towards Goal

## 2019-12-25 NOTE — ROUTINE PROCESS
Bedside and Verbal shift change report given to [address] [place] [address] (oncoming nurse) by Mackenzie David RN (offgoing nurse). Report included the following information SBAR, Kardex, Intake/Output, MAR, Recent Results and Cardiac Rhythm nsr.

## 2019-12-25 NOTE — PROGRESS NOTES
Hospitalist Progress Note    NAME: Jenny Rivas   :  1955   MRN:  [id number]       Assessment / Plan:  Sepsis POA: 2ry to UTI, c/w IVF and ABX. UTI: D/c  Zosyn, F/U cultures no growth, will give ceftin for 5 days. Acute Metabolic Encephalopathy:   likely 2ry to infection and Alcohol, in record there is mention of Dementia, will monitor. CT shows no structural lesion, so far improved  -remains confused  -?benzo intoxication vs withdrawal  -noted to have seizure thought 2/2 benzo tox vs withdrawal at [facility] earlier this year  -will observer overnight, may require SNF vs      Alcohol Abuse:   -still drinking  -cont CIWA  -concern for benzo abuse   -cont multivitamin, thiamine, folcate    H/O Breast Cancer: CT shows likely mets to sternum, should F/U Oncology Dr. Salome Dodd as outpatient  Acute renal failure: CT shows no signs of obstruction, c/w IVF and monitor, so far resolved, monitor  Hypertension resume  Coreg and ARB, use labetalol prn  Anxiety depression Hold anti-depressant medication  Hypokalemia: replace and monitor  Obesity: BMI 30.9  Surrogate Decision Maker:Mone Hopkins [phone], friend Vonzell Sacks [phone]  Code status: Full  Prophylaxis: Hep SQ  Recommended Disposition: TBD      Subjective:     Chief Complaint / Reason for Physician Visit  Confused, gives limited history, difficulty getting to bathroom with walker just prior to encounter per nursing and lives at home alone    Discussed with RN events overnight. Review of Systems:  Symptom Y/N Comments  Symptom Y/N Comments   Fever/Chills    Chest Pain     Poor Appetite    Edema     Cough    Abdominal Pain     Sputum    Joint Pain     SOB/LOBO    Pruritis/Rash     Nausea/vomit    Tolerating PT/OT     Diarrhea    Tolerating Diet     Constipation    Other       Could NOT obtain due to: AMS     Objective:     VITALS:   Last 24hrs VS reviewed since prior progress note.  Most recent are:  Patient Vitals for the past 24 hrs: Temp Pulse Resp BP SpO2   12/24/19 2014 98.3 °F (36.8 °C) 72 18 117/65 99 %   12/24/19 1607 98.2 °F (36.8 °C) 90 18 130/86 99 %   12/24/19 1205 98.8 °F (37.1 °C) 86 18 142/84 --   12/24/19 0859 98.4 °F (36.9 °C) 92 18 117/89 99 %   12/24/19 0400 99.1 °F (37.3 °C) (!) 102 18 121/74 99 %   12/23/19 2352 98.3 °F (36.8 °C) 98 18 133/90 97 %     No intake or output data in the 24 hours ending 12/24/19 2207     PHYSICAL EXAM:  General: WD, WN. Alert, cooperative, no acute distress    EENT:  EOMI. Anicteric sclerae. MMM  Resp:  CTA bilaterally, no wheezing or rales. No accessory muscle use  CV:  Regular  rhythm,  No edema  GI:  Soft, Non distended, Non tender.  +Bowel sounds  Neurologic:  Alert and oriented X 2, normal speech,   Psych:   limited insight.mildly anxious  Skin:  No rashes. No jaundice    Reviewed most current lab test results and cultures  YES  Reviewed most current radiology test results   YES  Review and summation of old records today    NO  Reviewed patient's current orders and MAR    YES  PMH/SH reviewed - no change compared to H&P  ________________________________________________________________________  Care Plan discussed with:    Comments   Patient x    Family      RN x    Care Manager x    Consultant                        Multidiciplinary team rounds were held today with , nursing, pharmacist and clinical coordinator. Patient's plan of care was discussed; medications were reviewed and discharge planning was addressed.      ________________________________________________________________________  Total NON critical care TIME: 30   Minutes    Total CRITICAL CARE TIME Spent:   Minutes non procedure based      Comments   >50% of visit spent in counseling and coordination of care x    ________________________________________________________________________  Suad Main DO     Procedures: see electronic medical records for all procedures/Xrays and details which were not copied into this note but were reviewed prior to creation of Plan. LABS:  I reviewed today's most current labs and imaging studies.   Pertinent labs include:  Recent Labs     12/24/19  0401 12/23/19  0520 12/22/19 0458   WBC 7.4 5.9 9.3   HGB 12.6 10.7* 10.7*   HCT 38.1 33.0* 33.4*    210 205     Recent Labs     12/24/19  0401 12/23/19  0520 12/22/19 0458    145 146*   K 3.8 3.4* 3.3*    114* 116*   CO2 29 25 24   * 123* 122*   BUN 16 21* 37*   CREA 0.78 0.97 1.14*   CA 8.7 7.8* 8.3*   MG 1.7 1.8 2.2   PHOS  --   --  2.2*   ALB 2.9* 2.7* 2.8*   TBILI 0.3 0.3 0.8   SGOT 33 54* 90*   ALT 62 64 69       Signed: Anselmo Aguirre, DO

## 2019-12-25 NOTE — PROGRESS NOTES
Reason for Admission:   Sepsis               RRAT Score:   23               Resources/supports as identified by patient/family:   Pt reports to have several supportive friends. Belkis Carson listed as emergency contact. Top Challenges facing patient (as identified by patient/family and CM): Finances/Medication cost?    No issues at this time. Pt has Care more benefits. Transportation? Pt reported that she does drive, but relies on friends for transportation as well. Support system or lack thereof? Pt reports to have minimal family support. Pt reports that her father is living but is \"80 something. \"                      Living arrangements? [place] [facility] [place]              Self-care/ADLs/Cognition? Pt reports to be independent with ADLs            Current Advanced Directive/Advance Care Plan: On file: Full code                          Plan for utilizing home health:    [facility] has accepted for PT/OT and RN                  Transition of Care Plan:   Pt to discharge home with [facility]. Rolling walker order has been sent to [facility]. Pt is a a 66year old female admitted on 12/20 for sepsis. Pt appeared alert and oriented. Pt reports to live alone at [facility]. Pt has been to [facility] and then transferred to [facility] in the past. Pt has been accepted to [facility]. Pt's friend may provide transport at discharge or may need roundtrip arranged. Care Management Interventions  PCP Verified by CM: Yes(Hema Foster)  Mode of Transport at Discharge:  Other (see comment)(private vehicle; friend)  Transition of Care Consult (CM Consult): Discharge Planning, [address]: Yes  Physical Therapy Consult: Yes  Occupational Therapy Consult: Yes  Speech Therapy Consult: No  Current Support Network: Lives Alone  Confirm Follow Up Transport: Friends  The Plan for Transition of Care is Related to the Following Treatment Goals : Home health   The Patient and/or Patient Representative was Provided with a Choice of Provider and Agrees with the Discharge Plan?: Yes  Name of the Patient Representative Who was Provided with a Choice of Provider and Agrees with the Discharge Plan: patient   Freedom of Choice List was Provided with Basic Dialogue that Supports the Patient's Individualized Plan of Care/Goals, Treatment Preferences and Shares the Quality Data Associated with the Providers?: Yes  The [employer] Information Provided?: No  Discharge Location  Discharge Placement: Home with home health    [facility], [address] [place] [address]

## 2019-12-25 NOTE — PROGRESS NOTES
Bedside and Verbal shift change report given to [facility] (oncoming nurse) by Uday Villeda (offgoing nurse). Report included the following information SBAR, Kardex, Intake/Output, MAR and Recent Results.

## 2019-12-25 NOTE — PROGRESS NOTES
Hospitalist Progress Note    NAME: Amanda Encarnacion   :  1955   MRN:  [id number]       Assessment / Plan:  Sepsis POA: 2ry to UTI, c/w IVF and ABX. UTI: D/c  Zosyn, F/U cultures no growth, will give ceftin for 5 days. Acute Metabolic Encephalopathy:  -improved  -patient extremely anxious re taking care of herself at home  -possible early cognitive changes of dementia, very anxious that she won't be able to care for herself at home  -has been heavily benzodiazapine dependent with polypharmacy, taking 1mg xanax 4 times daily in addition to norco, gabapentin, emitriptyline  -MS improved though still anxious  -noted to have seizure thought 2/2 benzo tox vs withdrawal at [facility] earlier this year  -will observer overnight, may require SNF vs      Alcohol Abuse:   -still drinking  -cont CIWA  -concern for benzo abuse   -cont multivitamin, thiamine, folcate    H/O Breast Cancer: CT shows likely mets to sternum, should F/U Oncology Dr. Dasha David as outpatient  Acute renal failure: CT shows no signs of obstruction, c/w IVF and monitor, so far resolved, monitor  Hypertension resume  Coreg and ARB, use labetalol prn  Anxiety depression Hold anti-depressant medication  Hypokalemia: replace and monitor  Obesity: BMI 30.9  Surrogate Decision Maker:SandeepMone [phone], friend Ervin [phone]  Code status: Full  Prophylaxis: Hep SQ  Recommended Disposition: TBD      Subjective:     Chief Complaint / Reason for Physician Visit  Remains anxious but more alert and able to carry on a conversation today    Discussed with RN events overnight.      Review of Systems:  Symptom Y/N Comments  Symptom Y/N Comments   Fever/Chills n   Chest Pain n    Poor Appetite    Edema     Cough n   Abdominal Pain n    Sputum n   Joint Pain     SOB/LOBO n   Pruritis/Rash     Nausea/vomit n   Tolerating PT/OT     Diarrhea n   Tolerating Diet y    Constipation n   Other       Could NOT obtain due to: AMS     Objective: VITALS:   Last 24hrs VS reviewed since prior progress note. Most recent are:  Patient Vitals for the past 24 hrs:   Temp Pulse Resp BP SpO2   12/25/19 1507 98.7 °F (37.1 °C) 87 18 98/63 95 %   12/25/19 1113 98.1 °F (36.7 °C) 88 18 102/86 100 %   12/25/19 0742 97.8 °F (36.6 °C) 93 18 123/89 100 %   12/25/19 0411 98.2 °F (36.8 °C) 92 18 134/85 96 %   12/24/19 2334 98 °F (36.7 °C) 87 18 126/87 97 %   12/24/19 2014 98.3 °F (36.8 °C) 72 18 117/65 99 %   12/24/19 1607 98.2 °F (36.8 °C) 90 18 130/86 99 %     No intake or output data in the 24 hours ending 12/25/19 1532     PHYSICAL EXAM:  General: WD, WN. Alert, cooperative, no acute distress    EENT:  EOMI. Anicteric sclerae. MMM  Resp:  CTA bilaterally, no wheezing or rales. No accessory muscle use  CV:  Regular  rhythm,  No edema  GI:  Soft, Non distended, Non tender.  +Bowel sounds  Neurologic:  Alert and oriented X 2, normal speech,   Psych:   limited insight.moderate anxiety  Skin:  No rashes. No jaundice    Reviewed most current lab test results and cultures  YES  Reviewed most current radiology test results   YES  Review and summation of old records today    NO  Reviewed patient's current orders and MAR    YES  PMH/ reviewed - no change compared to H&P  ________________________________________________________________________  Care Plan discussed with:    Comments   Patient x    Family      RN x    Care Manager     Consultant                        Multidiciplinary team rounds were held today with , nursing, pharmacist and clinical coordinator. Patient's plan of care was discussed; medications were reviewed and discharge planning was addressed.      ________________________________________________________________________  Total NON critical care TIME: 25   Minutes    Total CRITICAL CARE TIME Spent:   Minutes non procedure based      Comments   >50% of visit spent in counseling and coordination of care x ________________________________________________________________________  Meng Hughes DO     Procedures: see electronic medical records for all procedures/Xrays and details which were not copied into this note but were reviewed prior to creation of Plan. LABS:  I reviewed today's most current labs and imaging studies.   Pertinent labs include:  Recent Labs     12/24/19  0401 12/23/19  0520   WBC 7.4 5.9   HGB 12.6 10.7*   HCT 38.1 33.0*    210     Recent Labs     12/24/19  0401 12/23/19  0520    145   K 3.8 3.4*    114*   CO2 29 25   * 123*   BUN 16 21*   CREA 0.78 0.97   CA 8.7 7.8*   MG 1.7 1.8   ALB 2.9* 2.7*   TBILI 0.3 0.3   SGOT 33 54*   ALT 62 64       Signed: Meng Hughes DO

## 2019-12-26 VITALS
TEMPERATURE: 98.1 F | SYSTOLIC BLOOD PRESSURE: 111 MMHG | RESPIRATION RATE: 18 BRPM | OXYGEN SATURATION: 99 % | HEIGHT: 64 IN | WEIGHT: 213.63 LBS | DIASTOLIC BLOOD PRESSURE: 74 MMHG | BODY MASS INDEX: 36.47 KG/M2 | HEART RATE: 98 BPM

## 2019-12-26 LAB
ANION GAP SERPL CALC-SCNC: 5 MMOL/L (ref 5–15)
BACTERIA SPEC CULT: NORMAL
BUN SERPL-MCNC: 17 MG/DL (ref 6–20)
BUN/CREAT SERPL: 20 (ref 12–20)
CALCIUM SERPL-MCNC: 9 MG/DL (ref 8.5–10.1)
CHLORIDE SERPL-SCNC: 104 MMOL/L (ref 97–108)
CO2 SERPL-SCNC: 28 MMOL/L (ref 21–32)
CREAT SERPL-MCNC: 0.85 MG/DL (ref 0.55–1.02)
GLUCOSE SERPL-MCNC: 120 MG/DL (ref 65–100)
POTASSIUM SERPL-SCNC: 3.9 MMOL/L (ref 3.5–5.1)
SERVICE CMNT-IMP: NORMAL
SODIUM SERPL-SCNC: 137 MMOL/L (ref 136–145)

## 2019-12-26 PROCEDURE — 74011250637 HC RX REV CODE- 250/637: Performed by: INTERNAL MEDICINE

## 2019-12-26 PROCEDURE — 90471 IMMUNIZATION ADMIN: CPT

## 2019-12-26 PROCEDURE — 74011250636 HC RX REV CODE- 250/636: Performed by: INTERNAL MEDICINE

## 2019-12-26 PROCEDURE — 36415 COLL VENOUS BLD VENIPUNCTURE: CPT

## 2019-12-26 PROCEDURE — 80048 BASIC METABOLIC PNL TOTAL CA: CPT

## 2019-12-26 PROCEDURE — 90686 IIV4 VACC NO PRSV 0.5 ML IM: CPT | Performed by: INTERNAL MEDICINE

## 2019-12-26 PROCEDURE — 94760 N-INVAS EAR/PLS OXIMETRY 1: CPT

## 2019-12-26 RX ORDER — HEPARIN 100 UNIT/ML
300 SYRINGE INTRAVENOUS ONCE
Status: COMPLETED | OUTPATIENT
Start: 2019-12-26 | End: 2019-12-26

## 2019-12-26 RX ORDER — HEPARIN 100 UNIT/ML
30 SYRINGE INTRAVENOUS AS NEEDED
Status: DISCONTINUED | OUTPATIENT
Start: 2019-12-26 | End: 2019-12-26 | Stop reason: HOSPADM

## 2019-12-26 RX ORDER — CEFUROXIME AXETIL 500 MG/1
500 TABLET ORAL EVERY 12 HOURS
Qty: 4 TAB | Refills: 0 | Status: SHIPPED | OUTPATIENT
Start: 2019-12-26 | End: 2019-12-26

## 2019-12-26 RX ORDER — CEFUROXIME AXETIL 500 MG/1
500 TABLET ORAL EVERY 12 HOURS
Qty: 4 TAB | Refills: 0 | Status: ON HOLD | OUTPATIENT
Start: 2019-12-26 | End: 2020-02-03

## 2019-12-26 RX ORDER — HEPARIN SODIUM,PORCINE 10 UNIT/ML
30 VIAL (ML) INTRAVENOUS AS NEEDED
Status: DISCONTINUED | OUTPATIENT
Start: 2019-12-26 | End: 2019-12-26

## 2019-12-26 RX ADMIN — CEFUROXIME AXETIL 500 MG: 250 TABLET, FILM COATED ORAL at 09:11

## 2019-12-26 RX ADMIN — Medication 100 MG: at 09:11

## 2019-12-26 RX ADMIN — INFLUENZA VIRUS VACCINE 0.5 ML: 15; 15; 15; 15 SUSPENSION INTRAMUSCULAR at 12:02

## 2019-12-26 RX ADMIN — PANTOPRAZOLE SODIUM 40 MG: 40 TABLET, DELAYED RELEASE ORAL at 09:11

## 2019-12-26 RX ADMIN — LOSARTAN POTASSIUM 50 MG: 50 TABLET, FILM COATED ORAL at 09:11

## 2019-12-26 RX ADMIN — CARVEDILOL 12.5 MG: 12.5 TABLET, FILM COATED ORAL at 09:11

## 2019-12-26 RX ADMIN — Medication 10 ML: at 05:58

## 2019-12-26 RX ADMIN — POLYETHYLENE GLYCOL 3350 17 G: 17 POWDER, FOR SOLUTION ORAL at 09:09

## 2019-12-26 RX ADMIN — FOLIC ACID 1 MG: 1 TABLET ORAL at 09:11

## 2019-12-26 RX ADMIN — Medication 300 UNITS: at 12:35

## 2019-12-26 RX ADMIN — ASPIRIN 81 MG: 81 TABLET, COATED ORAL at 09:11

## 2019-12-26 RX ADMIN — HEPARIN SODIUM 5000 UNITS: 5000 INJECTION INTRAVENOUS; SUBCUTANEOUS at 05:56

## 2019-12-26 RX ADMIN — GABAPENTIN 100 MG: 100 CAPSULE ORAL at 09:11

## 2019-12-26 NOTE — PROGRESS NOTES
Patient given education on discharge instructions. Opportunity to ask questions was given. Patient given prescription to be filled at pharmacy of choice and informed that some medications were available at [address]. Patient IV removed and port a cath heperinized and removed. Patient discharged with [facility] walker and home health.

## 2019-12-26 NOTE — PROGRESS NOTES
Bedside shift change report given to Charli brewer (oncoming nurse) by Lisa Mann (offgoing nurse). Report included the following information SBAR, Kardex, Procedure Summary, Intake/Output, MAR, Accordion, Recent Results and Med Rec Status.

## 2019-12-26 NOTE — DISCHARGE SUMMARY
Hospitalist Discharge Summary     Patient ID:  Sherry Dickerson  [id number]  89 y.o.  1955 12/20/2019    PCP on record: Syed Simms MD    Admit date: 12/20/2019  Discharge date and time: 12/26/2019    DISCHARGE DIAGNOSIS:    See below    CONSULTATIONS:  None    Excerpted HPI from H&P of Maria Elena Alonso MD:  66years old female with past medical history significant for dementia, anxiety, history of breast cancer, hypertension, GERD presented to the hospital for evaluation of shortness of breath, according to the EMS report patient fell few days ago and injured her right elbow with hematoma, blood work in ED was significant for elevated white blood cell count 25.0, creatinine was noticed to be elevated 3.48 and BUN is 62, patient confused and unable to give a clear history. ______________________________________________________________________  DISCHARGE SUMMARY/HOSPITAL COURSE:  for full details see H&P, daily progress notes, labs, consult notes.      Sepsis POA: 2ry to UTI, c/w IVF and ABX.     UTI: D/c  Zosyn, F/U cultures no growth, will give ceftin for 5 days.     Acute Metabolic Encephalopathy:  -improved  -patient extremely anxious re taking care of herself at home  -possible early cognitive changes of dementia, very anxious that she won't be able to care for herself at home  -has been heavily benzodiazapine dependent with polypharmacy, taking 1mg xanax 4 times daily in addition to norco, gabapentin, emitriptyline  -MS improved   -noted to have seizure thought 2/2 benzo tox vs withdrawal at [facility] earlier this year  -will observer overnight, may require SNF vs       Alcohol Abuse:   -still drinking  -CIWA, no current signs of withdrawal  -counseled on abstinence  -concern for benzo abuse   -cont multivitamin, thiamine, folcate     H/O Breast Cancer: CT shows likely mets to sternum, should F/U Oncology Dr. Patrick Syed as outpatient  Acute renal failure: CT shows no signs of obstruction, c/w IVF and monitor, so far resolved, monitor  Hypertension resume  Coreg and ARB, use labetalol prn  Anxiety depression Hold anti-depressant medication  Hypokalemia: replace and monitor  Obesity: BMI 30.9  _______________________________________________________________________  Patient seen and examined by me on discharge day. Pertinent Findings:  Gen:    Not in distress  Chest: Clear lungs  CVS:   Regular rhythm. No edema  Abd:  Soft, not distended, not tender  Neuro:  Alert, oriented x 3  _______________________________________________________________________  DISCHARGE MEDICATIONS:   Current Discharge Medication List      START taking these medications    Details   cefUROXime (CEFTIN) 500 mg tablet Take 1 Tab by mouth every twelve (12) hours. Qty: 4 Tab, Refills: 0      thiamine mononitrate (B-1) 100 mg tablet Take 1 Tab by mouth daily. Qty: 60 Tab, Refills: 3      nicotine (NICODERM CQ) 14 mg/24 hr patch 1 Patch by TransDERmal route daily for 30 days. Qty: 30 Patch, Refills: 0      folic acid (FOLVITE) 1 mg tablet Take 1 Tab by mouth daily. Qty: 60 Tab, Refills: 3         CONTINUE these medications which have NOT CHANGED    Details   venlafaxine (EFFEXOR) 100 mg tablet Take 200 mg by mouth two (2) times a day. hydroCHLOROthiazide (HYDRODIURIL) 25 mg tablet Take 25 mg by mouth daily. HYDROcodone-acetaminophen (NORCO) 5-325 mg per tablet Take 1 Tab by mouth every six (6) hours as needed for Pain. fenofibrate micronized (LOFIBRA) 134 mg capsule Take 134 mg by mouth every morning.       carvedilol (COREG) 12.5 mg tablet TAKE 1 TABLET BY MOUTH TWICE A DAY  Qty: 180 Tab, Refills: 3    Comments: **Patient requests 90 days supply**      losartan (COZAAR) 50 mg tablet TAKE 1 TABLET BY MOUTH ONCE DAILY  Qty: 90 Tab, Refills: 5    Comments: **Patient requests 90 days supply**      gabapentin (NEURONTIN) 100 mg capsule take 3 capsules by mouth three times a day  Qty: 270 Cap, Refills: 1 Associated Diagnoses: Other chronic pain      amitriptyline (ELAVIL) 50 mg tablet TAKE 1 TABLET BY MOUTH EVERY EVENING  Qty: 90 Tab, Refills: 0    Comments: **Patient requests 90 days supply**      aspirin delayed-release 81 mg tablet Take 1 Tab by mouth daily. Indications: prevention of transient ischemic attack  Qty: 30 Tab, Refills: 0      multivitamin (ONE A DAY) tablet Take 1 Tab by mouth daily. Qty: 30 Tab, Refills: 1         STOP taking these medications       amoxicillin 500 mg tab Comments:   Reason for Stopping:         ALPRAZolam (XANAX) 1 mg tablet Comments:   Reason for Stopping:                 Patient Follow Up Instructions:    Activity: PT/OT per Home Health  Diet: Resume previous diet  Wound Care: None needed    Follow-up with pcp as scheduled below  Follow-up tests/labs none pending  Follow-up Information     Follow up With Specialties Details Why [address] [place] [address], [address] [place] [address].  [phone]          ________________________________________________________________    Risk of deterioration: Moderate    Condition at Discharge:  Stable  __________________________________________________________________    Disposition  Home with family and home health services    ____________________________________________________________________    Code Status: Full Code  ___________________________________________________________________      Total time in minutes spent coordinating this discharge (includes going over instructions, follow-up, prescriptions, and preparing report for sign off to her PCP) :  35 minutes    Signed:  Meaghan Schmidt DO

## 2019-12-26 NOTE — PROGRESS NOTES
FRIDA PLAN    Plan home with [facility]. RW delivered to Pt from [facility]   CM Specialist unable to make PCP appt. Pt will have to schedule  Pt indicated that her friend Cecilia Myles [phone] would be able to transport her home in car. 10 AM  CM Specialist indicated that office will not allow us to make PCP appt. Pt will have to schedule. Information added to AVS.     RW delivered to Pt. Form uploaded to [employer]. Jose Faustin will be here around noon to transport home. No further CM needs. 9:15 AM  CM let [facility] liason know that Pt was possible DC for today. [facility] provided RW to Pt.      8:44 AM  CM noted DC order. CM completed chart review. Noted that [address] Pereyra has accepted referral for [facility] services. CM added to AVS.     [facility] for this Pt. They are still reviewing the referral at this time. CM emailed CM Specialist to make PCP apt. CM talked to Pt and she was concerned about going home alone. CM encouraged her to talk to MD about plan. She indicated that Cecilia Myles: [phone] can transport her home in car later today. She was going to call them to see if they could help transport.      Darren Garrido, [address]

## 2019-12-28 ENCOUNTER — HOME CARE VISIT (OUTPATIENT)
Dept: SCHEDULING | Facility: HOME HEALTH | Age: 64
End: 2019-12-28
Payer: MEDICARE

## 2019-12-28 VITALS
OXYGEN SATURATION: 98 % | SYSTOLIC BLOOD PRESSURE: 144 MMHG | RESPIRATION RATE: 18 BRPM | DIASTOLIC BLOOD PRESSURE: 82 MMHG | HEIGHT: 65 IN | HEART RATE: 95 BPM | BODY MASS INDEX: 29.99 KG/M2 | TEMPERATURE: 97.2 F | WEIGHT: 180 LBS

## 2019-12-28 PROCEDURE — 400013 HH SOC

## 2019-12-28 PROCEDURE — G0299 HHS/HOSPICE OF RN EA 15 MIN: HCPCS

## 2019-12-30 ENCOUNTER — HOME CARE VISIT (OUTPATIENT)
Dept: SCHEDULING | Facility: HOME HEALTH | Age: 64
End: 2019-12-30
Payer: MEDICARE

## 2019-12-30 PROCEDURE — G0151 HHCP-SERV OF PT,EA 15 MIN: HCPCS

## 2019-12-31 ENCOUNTER — HOME CARE VISIT (OUTPATIENT)
Dept: SCHEDULING | Facility: HOME HEALTH | Age: 64
End: 2019-12-31
Payer: MEDICARE

## 2019-12-31 VITALS — DIASTOLIC BLOOD PRESSURE: 80 MMHG | OXYGEN SATURATION: 98 % | SYSTOLIC BLOOD PRESSURE: 122 MMHG | HEART RATE: 58 BPM

## 2019-12-31 VITALS
DIASTOLIC BLOOD PRESSURE: 84 MMHG | HEART RATE: 105 BPM | SYSTOLIC BLOOD PRESSURE: 116 MMHG | TEMPERATURE: 97.8 F | OXYGEN SATURATION: 99 % | RESPIRATION RATE: 18 BRPM

## 2019-12-31 PROCEDURE — G0152 HHCP-SERV OF OT,EA 15 MIN: HCPCS

## 2019-12-31 PROCEDURE — G0299 HHS/HOSPICE OF RN EA 15 MIN: HCPCS

## 2020-01-02 ENCOUNTER — HOME CARE VISIT (OUTPATIENT)
Dept: SCHEDULING | Facility: HOME HEALTH | Age: 65
End: 2020-01-02
Payer: MEDICARE

## 2020-01-02 VITALS — SYSTOLIC BLOOD PRESSURE: 124 MMHG | OXYGEN SATURATION: 97 % | DIASTOLIC BLOOD PRESSURE: 80 MMHG | HEART RATE: 95 BPM

## 2020-01-02 PROCEDURE — G0152 HHCP-SERV OF OT,EA 15 MIN: HCPCS

## 2020-01-03 ENCOUNTER — HOME CARE VISIT (OUTPATIENT)
Dept: HOME HEALTH SERVICES | Facility: HOME HEALTH | Age: 65
End: 2020-01-03
Payer: MEDICARE

## 2020-01-03 ENCOUNTER — HOME CARE VISIT (OUTPATIENT)
Dept: SCHEDULING | Facility: HOME HEALTH | Age: 65
End: 2020-01-03
Payer: MEDICARE

## 2020-01-05 ENCOUNTER — HOME CARE VISIT (OUTPATIENT)
Dept: HOME HEALTH SERVICES | Facility: HOME HEALTH | Age: 65
End: 2020-01-05
Payer: MEDICARE

## 2020-01-06 ENCOUNTER — HOME CARE VISIT (OUTPATIENT)
Dept: SCHEDULING | Facility: HOME HEALTH | Age: 65
End: 2020-01-06
Payer: MEDICARE

## 2020-01-06 VITALS — OXYGEN SATURATION: 99 % | DIASTOLIC BLOOD PRESSURE: 70 MMHG | SYSTOLIC BLOOD PRESSURE: 118 MMHG | HEART RATE: 71 BPM

## 2020-01-06 PROCEDURE — G0155 HHCP-SVS OF CSW,EA 15 MIN: HCPCS

## 2020-01-06 PROCEDURE — G0300 HHS/HOSPICE OF LPN EA 15 MIN: HCPCS

## 2020-01-06 PROCEDURE — G0152 HHCP-SERV OF OT,EA 15 MIN: HCPCS

## 2020-01-07 ENCOUNTER — HOME CARE VISIT (OUTPATIENT)
Dept: SCHEDULING | Facility: HOME HEALTH | Age: 65
End: 2020-01-07
Payer: MEDICARE

## 2020-01-07 VITALS
OXYGEN SATURATION: 98 % | SYSTOLIC BLOOD PRESSURE: 130 MMHG | RESPIRATION RATE: 17 BRPM | TEMPERATURE: 98 F | DIASTOLIC BLOOD PRESSURE: 60 MMHG | HEART RATE: 76 BPM

## 2020-01-07 PROCEDURE — G0151 HHCP-SERV OF PT,EA 15 MIN: HCPCS

## 2020-01-08 ENCOUNTER — HOME CARE VISIT (OUTPATIENT)
Dept: HOME HEALTH SERVICES | Facility: HOME HEALTH | Age: 65
End: 2020-01-08

## 2020-01-08 ENCOUNTER — HOME CARE VISIT (OUTPATIENT)
Dept: HOME HEALTH SERVICES | Facility: HOME HEALTH | Age: 65
End: 2020-01-08
Payer: MEDICARE

## 2020-01-08 VITALS
RESPIRATION RATE: 18 BRPM | SYSTOLIC BLOOD PRESSURE: 118 MMHG | HEART RATE: 84 BPM | OXYGEN SATURATION: 98 % | TEMPERATURE: 98 F | DIASTOLIC BLOOD PRESSURE: 70 MMHG

## 2020-01-09 VITALS
TEMPERATURE: 98.4 F | SYSTOLIC BLOOD PRESSURE: 130 MMHG | HEART RATE: 84 BPM | DIASTOLIC BLOOD PRESSURE: 62 MMHG | RESPIRATION RATE: 18 BRPM | OXYGEN SATURATION: 96 %

## 2020-01-27 ENCOUNTER — APPOINTMENT (OUTPATIENT)
Dept: CT IMAGING | Age: 65
DRG: 897 | End: 2020-01-27
Attending: GENERAL ACUTE CARE HOSPITAL
Payer: MEDICARE

## 2020-01-27 ENCOUNTER — HOSPITAL ENCOUNTER (INPATIENT)
Age: 65
LOS: 5 days | Discharge: PSYCHIATRIC OR SUBSTANCE USE FACILITY | DRG: 897 | End: 2020-02-01
Attending: EMERGENCY MEDICINE | Admitting: INTERNAL MEDICINE
Payer: MEDICARE

## 2020-01-27 DIAGNOSIS — F13.931 BENZODIAZEPINE WITHDRAWAL WITH DELIRIUM (HCC): Primary | ICD-10-CM

## 2020-01-27 PROBLEM — R41.82 ALTERED MENTAL STATUS: Status: ACTIVE | Noted: 2020-01-27

## 2020-01-27 LAB
AMPHET UR QL SCN: NEGATIVE
ANION GAP SERPL CALC-SCNC: 11 MMOL/L (ref 5–15)
APAP SERPL-MCNC: <2 UG/ML (ref 10–30)
APPEARANCE UR: ABNORMAL
ATRIAL RATE: 100 BPM
BACTERIA URNS QL MICRO: ABNORMAL /HPF
BARBITURATES UR QL SCN: NEGATIVE
BASOPHILS # BLD: 0.1 K/UL (ref 0–0.1)
BASOPHILS NFR BLD: 1 % (ref 0–1)
BENZODIAZ UR QL: POSITIVE
BILIRUB UR QL: NEGATIVE
BUN SERPL-MCNC: 16 MG/DL (ref 6–20)
BUN/CREAT SERPL: 14 (ref 12–20)
CALCIUM SERPL-MCNC: 9.6 MG/DL (ref 8.5–10.1)
CALCULATED R AXIS, ECG10: 50 DEGREES
CALCULATED T AXIS, ECG11: 72 DEGREES
CANNABINOIDS UR QL SCN: POSITIVE
CAOX CRY URNS QL MICRO: ABNORMAL
CHLORIDE SERPL-SCNC: 101 MMOL/L (ref 97–108)
CK SERPL-CCNC: 83 U/L (ref 26–192)
CO2 SERPL-SCNC: 23 MMOL/L (ref 21–32)
COCAINE UR QL SCN: NEGATIVE
COLOR UR: ABNORMAL
CREAT SERPL-MCNC: 1.11 MG/DL (ref 0.55–1.02)
DIAGNOSIS, 93000: NORMAL
DIFFERENTIAL METHOD BLD: ABNORMAL
DRUG SCRN COMMENT,DRGCM: ABNORMAL
EOSINOPHIL # BLD: 0 K/UL (ref 0–0.4)
EOSINOPHIL NFR BLD: 0 % (ref 0–7)
EPITH CASTS URNS QL MICRO: ABNORMAL /LPF
ERYTHROCYTE [DISTWIDTH] IN BLOOD BY AUTOMATED COUNT: 12.4 % (ref 11.5–14.5)
GLUCOSE SERPL-MCNC: 137 MG/DL (ref 65–100)
GLUCOSE UR STRIP.AUTO-MCNC: NEGATIVE MG/DL
HCT VFR BLD AUTO: 40.7 % (ref 35–47)
HGB BLD-MCNC: 14.2 G/DL (ref 11.5–16)
HGB UR QL STRIP: NEGATIVE
IMM GRANULOCYTES # BLD AUTO: 0.1 K/UL (ref 0–0.04)
IMM GRANULOCYTES NFR BLD AUTO: 0 % (ref 0–0.5)
KETONES UR QL STRIP.AUTO: NEGATIVE MG/DL
LEUKOCYTE ESTERASE UR QL STRIP.AUTO: NEGATIVE
LYMPHOCYTES # BLD: 1.8 K/UL (ref 0.8–3.5)
LYMPHOCYTES NFR BLD: 16 % (ref 12–49)
MCH RBC QN AUTO: 31.2 PG (ref 26–34)
MCHC RBC AUTO-ENTMCNC: 34.9 G/DL (ref 30–36.5)
MCV RBC AUTO: 89.5 FL (ref 80–99)
METHADONE UR QL: NEGATIVE
MONOCYTES # BLD: 0.8 K/UL (ref 0–1)
MONOCYTES NFR BLD: 7 % (ref 5–13)
NEUTS SEG # BLD: 8.6 K/UL (ref 1.8–8)
NEUTS SEG NFR BLD: 76 % (ref 32–75)
NITRITE UR QL STRIP.AUTO: NEGATIVE
NRBC # BLD: 0 K/UL (ref 0–0.01)
NRBC BLD-RTO: 0 PER 100 WBC
OPIATES UR QL: NEGATIVE
P-R INTERVAL, ECG05: 150 MS
PCP UR QL: NEGATIVE
PH UR STRIP: 6.5 [PH] (ref 5–8)
PLATELET # BLD AUTO: 397 K/UL (ref 150–400)
PMV BLD AUTO: 11 FL (ref 8.9–12.9)
POTASSIUM SERPL-SCNC: 3.2 MMOL/L (ref 3.5–5.1)
PROT UR STRIP-MCNC: NEGATIVE MG/DL
Q-T INTERVAL, ECG07: 352 MS
QRS DURATION, ECG06: 68 MS
QTC CALCULATION (BEZET), ECG08: 454 MS
RBC # BLD AUTO: 4.55 M/UL (ref 3.8–5.2)
RBC #/AREA URNS HPF: ABNORMAL /HPF (ref 0–5)
SALICYLATES SERPL-MCNC: 5.3 MG/DL (ref 2.8–20)
SODIUM SERPL-SCNC: 135 MMOL/L (ref 136–145)
SP GR UR REFRACTOMETRY: 1.03 (ref 1–1.03)
UA: UC IF INDICATED,UAUC: ABNORMAL
UROBILINOGEN UR QL STRIP.AUTO: 0.2 EU/DL (ref 0.2–1)
VENTRICULAR RATE, ECG03: 100 BPM
WBC # BLD AUTO: 11.3 K/UL (ref 3.6–11)
WBC URNS QL MICRO: ABNORMAL /HPF (ref 0–4)

## 2020-01-27 PROCEDURE — 93005 ELECTROCARDIOGRAM TRACING: CPT

## 2020-01-27 PROCEDURE — 96376 TX/PRO/DX INJ SAME DRUG ADON: CPT

## 2020-01-27 PROCEDURE — 85025 COMPLETE CBC W/AUTO DIFF WBC: CPT

## 2020-01-27 PROCEDURE — 96361 HYDRATE IV INFUSION ADD-ON: CPT

## 2020-01-27 PROCEDURE — 74011250636 HC RX REV CODE- 250/636: Performed by: EMERGENCY MEDICINE

## 2020-01-27 PROCEDURE — 74011250636 HC RX REV CODE- 250/636: Performed by: GENERAL ACUTE CARE HOSPITAL

## 2020-01-27 PROCEDURE — 80307 DRUG TEST PRSMV CHEM ANLYZR: CPT

## 2020-01-27 PROCEDURE — 96374 THER/PROPH/DIAG INJ IV PUSH: CPT

## 2020-01-27 PROCEDURE — 36415 COLL VENOUS BLD VENIPUNCTURE: CPT

## 2020-01-27 PROCEDURE — 74011250637 HC RX REV CODE- 250/637: Performed by: GENERAL ACUTE CARE HOSPITAL

## 2020-01-27 PROCEDURE — 99218 HC RM OBSERVATION: CPT

## 2020-01-27 PROCEDURE — 74011000250 HC RX REV CODE- 250: Performed by: EMERGENCY MEDICINE

## 2020-01-27 PROCEDURE — 80048 BASIC METABOLIC PNL TOTAL CA: CPT

## 2020-01-27 PROCEDURE — 82550 ASSAY OF CK (CPK): CPT

## 2020-01-27 PROCEDURE — 99285 EMERGENCY DEPT VISIT HI MDM: CPT

## 2020-01-27 PROCEDURE — 65270000029 HC RM PRIVATE

## 2020-01-27 PROCEDURE — 70450 CT HEAD/BRAIN W/O DYE: CPT

## 2020-01-27 PROCEDURE — 81001 URINALYSIS AUTO W/SCOPE: CPT

## 2020-01-27 RX ORDER — ALPRAZOLAM 0.5 MG/1
1 TABLET ORAL 2 TIMES DAILY
Status: DISCONTINUED | OUTPATIENT
Start: 2020-01-27 | End: 2020-02-01 | Stop reason: HOSPADM

## 2020-01-27 RX ORDER — GABAPENTIN 100 MG/1
100 CAPSULE ORAL 3 TIMES DAILY
Status: DISCONTINUED | OUTPATIENT
Start: 2020-01-27 | End: 2020-02-01 | Stop reason: HOSPADM

## 2020-01-27 RX ORDER — HYDROCHLOROTHIAZIDE 25 MG/1
25 TABLET ORAL DAILY
Status: DISCONTINUED | OUTPATIENT
Start: 2020-01-28 | End: 2020-02-01 | Stop reason: HOSPADM

## 2020-01-27 RX ORDER — SODIUM CHLORIDE 0.9 % (FLUSH) 0.9 %
5-40 SYRINGE (ML) INJECTION AS NEEDED
Status: DISCONTINUED | OUTPATIENT
Start: 2020-01-27 | End: 2020-02-01 | Stop reason: HOSPADM

## 2020-01-27 RX ORDER — LORAZEPAM 2 MG/ML
1 INJECTION INTRAMUSCULAR
Status: COMPLETED | OUTPATIENT
Start: 2020-01-27 | End: 2020-01-27

## 2020-01-27 RX ORDER — POTASSIUM CHLORIDE 750 MG/1
40 TABLET, FILM COATED, EXTENDED RELEASE ORAL
Status: COMPLETED | OUTPATIENT
Start: 2020-01-27 | End: 2020-01-27

## 2020-01-27 RX ORDER — ASPIRIN 325 MG/1
100 TABLET, FILM COATED ORAL DAILY
Status: DISCONTINUED | OUTPATIENT
Start: 2020-01-28 | End: 2020-02-01 | Stop reason: HOSPADM

## 2020-01-27 RX ORDER — ASPIRIN 81 MG/1
81 TABLET ORAL DAILY
Status: DISCONTINUED | OUTPATIENT
Start: 2020-01-28 | End: 2020-02-01 | Stop reason: HOSPADM

## 2020-01-27 RX ORDER — LIDOCAINE 4 G/100G
1 PATCH TOPICAL EVERY 24 HOURS
Status: DISCONTINUED | OUTPATIENT
Start: 2020-01-27 | End: 2020-02-01 | Stop reason: HOSPADM

## 2020-01-27 RX ORDER — HEPARIN SODIUM 5000 [USP'U]/ML
5000 INJECTION, SOLUTION INTRAVENOUS; SUBCUTANEOUS EVERY 8 HOURS
Status: DISCONTINUED | OUTPATIENT
Start: 2020-01-27 | End: 2020-02-01 | Stop reason: HOSPADM

## 2020-01-27 RX ORDER — FOLIC ACID 1 MG/1
1 TABLET ORAL DAILY
Status: DISCONTINUED | OUTPATIENT
Start: 2020-01-28 | End: 2020-02-01 | Stop reason: HOSPADM

## 2020-01-27 RX ORDER — LOSARTAN POTASSIUM 25 MG/1
50 TABLET ORAL DAILY
Status: DISCONTINUED | OUTPATIENT
Start: 2020-01-28 | End: 2020-02-01 | Stop reason: HOSPADM

## 2020-01-27 RX ORDER — CARVEDILOL 12.5 MG/1
12.5 TABLET ORAL 2 TIMES DAILY WITH MEALS
Status: DISCONTINUED | OUTPATIENT
Start: 2020-01-27 | End: 2020-02-01 | Stop reason: HOSPADM

## 2020-01-27 RX ORDER — AMITRIPTYLINE HYDROCHLORIDE 25 MG/1
50 TABLET, FILM COATED ORAL
Status: DISCONTINUED | OUTPATIENT
Start: 2020-01-27 | End: 2020-02-01 | Stop reason: HOSPADM

## 2020-01-27 RX ORDER — SODIUM CHLORIDE 0.9 % (FLUSH) 0.9 %
5-40 SYRINGE (ML) INJECTION EVERY 8 HOURS
Status: DISCONTINUED | OUTPATIENT
Start: 2020-01-27 | End: 2020-02-01 | Stop reason: HOSPADM

## 2020-01-27 RX ORDER — FENOFIBRATE 145 MG/1
145 TABLET, COATED ORAL DAILY
Status: DISCONTINUED | OUTPATIENT
Start: 2020-01-28 | End: 2020-02-01 | Stop reason: HOSPADM

## 2020-01-27 RX ADMIN — LORAZEPAM 1 MG: 2 INJECTION INTRAMUSCULAR; INTRAVENOUS at 09:24

## 2020-01-27 RX ADMIN — POTASSIUM CHLORIDE 40 MEQ: 750 TABLET, FILM COATED, EXTENDED RELEASE ORAL at 11:42

## 2020-01-27 RX ADMIN — ALPRAZOLAM 1 MG: 0.5 TABLET ORAL at 17:20

## 2020-01-27 RX ADMIN — LORAZEPAM 1 MG: 2 INJECTION INTRAMUSCULAR; INTRAVENOUS at 08:22

## 2020-01-27 RX ADMIN — SODIUM CHLORIDE 1000 ML: 900 INJECTION, SOLUTION INTRAVENOUS at 08:22

## 2020-01-27 RX ADMIN — Medication 10 ML: at 13:07

## 2020-01-27 RX ADMIN — HEPARIN SODIUM 5000 UNITS: 5000 INJECTION INTRAVENOUS; SUBCUTANEOUS at 21:36

## 2020-01-27 RX ADMIN — HEPARIN SODIUM 5000 UNITS: 5000 INJECTION INTRAVENOUS; SUBCUTANEOUS at 13:14

## 2020-01-27 RX ADMIN — VENLAFAXINE 200 MG: 37.5 TABLET ORAL at 18:34

## 2020-01-27 RX ADMIN — AMITRIPTYLINE HYDROCHLORIDE 50 MG: 25 TABLET, FILM COATED ORAL at 21:37

## 2020-01-27 RX ADMIN — CARVEDILOL 12.5 MG: 12.5 TABLET, FILM COATED ORAL at 17:20

## 2020-01-27 RX ADMIN — GABAPENTIN 100 MG: 100 CAPSULE ORAL at 17:20

## 2020-01-27 RX ADMIN — Medication 10 ML: at 21:43

## 2020-01-27 RX ADMIN — GABAPENTIN 100 MG: 100 CAPSULE ORAL at 21:36

## 2020-01-27 NOTE — ED PROVIDER NOTES
EMERGENCY DEPARTMENT HISTORY AND PHYSICAL EXAM      Date: 1/27/2020  Patient Name: Reginaldo Medina  Patient Age and Sex: 60 y.o. female    History of Presenting Illness     Chief Complaint   Patient presents with    Anxiety       History Provided By: Patient    Ability to gather history was limited by: Clinical condition, noncommunicative    HPI: Reginaldo Medina, 60 y.o. female with history of severe anxiety, benzodiazepine abuse, metastatic breast cancer, presents with what seems to be severe anxiety attack. Patient is a very poor historian, is not communicating with staff, says very few words. She is looking wildly around the room throughout the H&P, speaks only telegraphically: \" Hospital\"  \" cancer\". She endorses some marijuana use. She denies other drugs or alcohol, denies cocaine use. Pt denies any other alleviating or exacerbating factors. There are no other complaints, changes or physical findings at this time.      Past Medical History:   Diagnosis Date    Anxiety     Cancer [facility])     breast    Depression     GERD (gastroesophageal reflux disease)     HTN (hypertension)     Hypercholesterolemia     Hypotension 9/12/2012    Metastatic breast cancer ([facility] [address] 5/3/2017    Secondary cancer of bone ([facility] [address] 5/3/2017     Past Surgical History:   Procedure Laterality Date    BREAST SURGERY PROCEDURE UNLISTED  march 13    carina masectomy       PCP: Zenaida Bower MD    Past History     Past Medical History:  Past Medical History:   Diagnosis Date    Anxiety     Cancer [facility])     breast    Depression     GERD (gastroesophageal reflux disease)     HTN (hypertension)     Hypercholesterolemia     Hypotension 9/12/2012    Metastatic breast cancer ([facility] [address] 5/3/2017    Secondary cancer of bone ([facility] [address] 5/3/2017       Past Surgical History:  Past Surgical History:   Procedure Laterality Date    BREAST SURGERY PROCEDURE UNLISTED  march 13    carina masectomy       Family History:  Family History   Problem Relation Age of Onset    Hypertension Mother     Heart Disease Mother     Depression Mother     Hypertension Father        Social History:  Social History     Tobacco Use    Smoking status: Former Smoker     Packs/day: 0.50     Years: 20.00     Pack years: 10.00    Smokeless tobacco: Never Used   Substance Use Topics    Alcohol use: No    Drug use: No       Allergies: Allergies   Allergen Reactions    Sulfa (Sulfonamide Antibiotics) Unknown (comments)    Wellbutrin [Bupropion Hcl] Unknown (comments)       Current Medications:  No current facility-administered medications on file prior to encounter. Current Outpatient Medications on File Prior to Encounter   Medication Sig Dispense Refill    naproxen (NAPROSYN) 250 mg tablet Take 250 mg by mouth as needed for Pain.  Omeprazole delayed release (PRILOSEC D/R) 20 mg tablet Take 20 mg by mouth daily.  cefUROXime (CEFTIN) 500 mg tablet Take 1 Tab by mouth every twelve (12) hours. (Patient not taking: Reported on 1/2/2020) 4 Tab 0    thiamine mononitrate (B-1) 100 mg tablet Take 1 Tab by mouth daily. 60 Tab 3    folic acid (FOLVITE) 1 mg tablet Take 1 Tab by mouth daily. 60 Tab 3    venlafaxine (EFFEXOR) 100 mg tablet Take 200 mg by mouth two (2) times a day.  hydroCHLOROthiazide (HYDRODIURIL) 25 mg tablet Take 25 mg by mouth daily.  HYDROcodone-acetaminophen (NORCO) 5-325 mg per tablet Take 1 Tab by mouth every six (6) hours as needed for Pain.  fenofibrate micronized (LOFIBRA) 134 mg capsule Take 134 mg by mouth every morning.       carvedilol (COREG) 12.5 mg tablet TAKE 1 TABLET BY MOUTH TWICE A  Tab 3    losartan (COZAAR) 50 mg tablet TAKE 1 TABLET BY MOUTH ONCE DAILY 90 Tab 5    gabapentin (NEURONTIN) 100 mg capsule take 3 capsules by mouth three times a day 270 Cap 1    amitriptyline (ELAVIL) 50 mg tablet TAKE 1 TABLET BY MOUTH EVERY EVENING 90 Tab 0    aspirin delayed-release 81 mg tablet Take 1 Tab by mouth daily. Indications: prevention of transient ischemic attack 30 Tab 0    multivitamin (ONE A DAY) tablet Take 1 Tab by mouth daily. 30 Tab 1       Review of Systems   Review of Systems   Psychiatric/Behavioral: The patient is nervous/anxious. All other systems reviewed and are negative. Physical Exam   Vital Signs  Patient Vitals for the past 24 hrs:   Temp Pulse Resp BP SpO2   01/27/20 0740 98 °F (36.7 °C) 89 20 132/77 100 %       Physical Exam  Vitals signs and nursing note reviewed. Constitutional:       General: She is in acute distress. Appearance: She is well-developed. She is ill-appearing and diaphoretic. HENT:      Head: Normocephalic and atraumatic. Mouth/Throat:      Mouth: Mucous membranes are moist.   Eyes:      General:         Right eye: No discharge. Left eye: No discharge. Conjunctiva/sclera: Conjunctivae normal.      Comments: Dilated pupils   Neck:      Musculoskeletal: Normal range of motion and neck supple. Cardiovascular:      Rate and Rhythm: Normal rate and regular rhythm. Heart sounds: Normal heart sounds. No murmur. Pulmonary:      Effort: Pulmonary effort is normal. No respiratory distress. Breath sounds: Normal breath sounds. No wheezing. Abdominal:      General: There is no distension. Palpations: Abdomen is soft. Tenderness: There is no tenderness. Musculoskeletal: Normal range of motion. General: No deformity. Skin:     General: Skin is warm. Findings: No rash. Neurological:      Mental Status: She is alert. She is disoriented. Psychiatric:         Mood and Affect: Mood is anxious. Speech: She is noncommunicative. Behavior: Behavior is hyperactive. Cognition and Memory: Cognition is impaired.          Diagnostic Study Results   Labs  Recent Results (from the past 24 hour(s))   EKG, 12 LEAD, INITIAL    Collection Time: 01/27/20  7:49 AM   Result Value Ref Range    Ventricular Rate 100 BPM    Atrial Rate 100 BPM    P-R Interval 150 ms    QRS Duration 68 ms    Q-T Interval 352 ms    QTC Calculation (Bezet) 454 ms    Calculated R Axis 50 degrees    Calculated T Axis 72 degrees    Diagnosis       Baseline artifact  Sinus rhythm with premature supraventricular complexes  Nonspecific ST and T wave abnormality  Confirmed by Judy Bañuelos ([id number]) on 1/27/2020 9:10:10 AM     CBC WITH AUTOMATED DIFF    Collection Time: 01/27/20  8:25 AM   Result Value Ref Range    WBC 11.3 (H) 3.6 - 11.0 K/uL    RBC 4.55 3.80 - 5.20 M/uL    HGB 14.2 11.5 - 16.0 g/dL    HCT 40.7 35.0 - 47.0 %    MCV 89.5 80.0 - 99.0 FL    MCH 31.2 26.0 - 34.0 PG    MCHC 34.9 30.0 - 36.5 g/dL    RDW 12.4 11.5 - 14.5 %    PLATELET 362 730 - 569 K/uL    MPV 11.0 8.9 - 12.9 FL    NRBC 0.0 0  WBC    ABSOLUTE NRBC 0.00 0.00 - 0.01 K/uL    NEUTROPHILS 76 (H) 32 - 75 %    LYMPHOCYTES 16 12 - 49 %    MONOCYTES 7 5 - 13 %    EOSINOPHILS 0 0 - 7 %    BASOPHILS 1 0 - 1 %    IMMATURE GRANULOCYTES 0 0.0 - 0.5 %    ABS. NEUTROPHILS 8.6 (H) 1.8 - 8.0 K/UL    ABS. LYMPHOCYTES 1.8 0.8 - 3.5 K/UL    ABS. MONOCYTES 0.8 0.0 - 1.0 K/UL    ABS. EOSINOPHILS 0.0 0.0 - 0.4 K/UL    ABS. BASOPHILS 0.1 0.0 - 0.1 K/UL    ABS. IMM.  GRANS. 0.1 (H) 0.00 - 0.04 K/UL    DF AUTOMATED     METABOLIC PANEL, BASIC    Collection Time: 01/27/20  8:25 AM   Result Value Ref Range    Sodium 135 (L) 136 - 145 mmol/L    Potassium 3.2 (L) 3.5 - 5.1 mmol/L    Chloride 101 97 - 108 mmol/L    CO2 23 21 - 32 mmol/L    Anion gap 11 5 - 15 mmol/L    Glucose 137 (H) 65 - 100 mg/dL    BUN 16 6 - 20 MG/DL    Creatinine 1.11 (H) 0.55 - 1.02 MG/DL    BUN/Creatinine ratio 14 12 - 20      GFR est AA >60 >60 ml/min/1.73m2    GFR est non-AA 50 (L) >60 ml/min/1.73m2    Calcium 9.6 8.5 - 10.1 MG/DL   CK    Collection Time: 01/27/20  8:25 AM   Result Value Ref Range    CK 83 26 - 488 U/L   SALICYLATE    Collection Time: 01/27/20  8:26 AM   Result Value Ref Range    Salicylate level 5.3 2.8 - 20.0 MG/DL   ACETAMINOPHEN    Collection Time: 01/27/20  8:26 AM   Result Value Ref Range    Acetaminophen level <2 (L) 10 - 30 ug/mL   URINALYSIS W/ REFLEX CULTURE    Collection Time: 01/27/20  9:59 AM   Result Value Ref Range    Color YELLOW/STRAW      Appearance CLOUDY (A) CLEAR      Specific gravity 1.026 1.003 - 1.030      pH (UA) 6.5 5.0 - 8.0      Protein NEGATIVE  NEG mg/dL    Glucose NEGATIVE  NEG mg/dL    Ketone NEGATIVE  NEG mg/dL    Bilirubin NEGATIVE  NEG      Blood NEGATIVE  NEG      Urobilinogen 0.2 0.2 - 1.0 EU/dL    Nitrites NEGATIVE  NEG      Leukocyte Esterase NEGATIVE  NEG      WBC 0-4 0 - 4 /hpf    RBC 0-5 0 - 5 /hpf    Epithelial cells MANY (A) FEW /lpf    Bacteria 1+ (A) NEG /hpf    UA:UC IF INDICATED CULTURE NOT INDICATED BY UA RESULT CNI      CA Oxalate crystals 3+ (A) NEG   DRUG SCREEN, URINE    Collection Time: 01/27/20  9:59 AM   Result Value Ref Range    AMPHETAMINES NEGATIVE  NEG      BARBITURATES NEGATIVE  NEG      BENZODIAZEPINES POSITIVE (A) NEG      COCAINE NEGATIVE  NEG      METHADONE NEGATIVE  NEG      OPIATES NEGATIVE  NEG      PCP(PHENCYCLIDINE) NEGATIVE  NEG      THC (TH-CANNABINOL) POSITIVE (A) NEG      Drug screen comment (NOTE)        Radiologic Studies  No orders to display     CT Results  (Last 48 hours)    None        CXR Results  (Last 48 hours)    None          Procedures   EKG  Date/Time: 1/27/2020 8:33 AM  Performed by: Elisa Khan MD  Authorized by: Elisa Khan MD     ECG reviewed by ED Physician in the absence of a cardiologist: yes    Interpretation:     Interpretation: non-specific    Rate:     ECG rate assessment: normal    Rhythm:     Rhythm: sinus rhythm    Ectopy:     Ectopy: none    QRS:     QRS axis:  Normal  ST segments:     ST segments:  Normal  T waves:     T waves: normal      CRITICAL CARE (ASAP ONLY)  Performed by: Elisa Khan MD  Authorized by: Elisa Khan MD     Critical care provider statement:     Critical care time (minutes):  35    Critical care was necessary to treat or prevent imminent or life-threatening deterioration of the following conditions:  CNS failure or compromise    Critical care was time spent personally by me on the following activities:  Re-evaluation of patient's condition, review of old charts, ordering and review of laboratory studies, evaluation of patient's response to treatment and examination of patient        Medical Decision Making     Provider Notes (Medical Decision Making):   61-year-old female presenting somewhat unclear circumstances. History is very limited as patient is noncommunicative. On exam she is diaphoretic, pupils are dilated, and she is looking about the room wildly, and offers very little description of her symptoms or circumstances. Through her prior charts, she has a history of benzodiazepine abuse and withdrawal symptoms, also sepsis, also metabolic encephalopathy. Her presentation today appears to be possibly metabolic in origin, possibly toxicologic. I doubt acute infection or sepsis. More likely this is benzodiazepine withdrawal, possible serotonin syndrome, possible cocaine or other stimulant intoxication. We will check urine drug screen, CK, basic screening laboratories, administer lorazepam and fluids and reevaluate. Mark Zamorano MD  8:30 AM    Apparently this patient typically takes alprazolam 1 mg 4 times daily, but about a week ago ran out of medications and was unable to refill them (unable to find her bank card)     H&P is most consistent with acute, severe benzodiazepine withdrawal with delirium and perceptual disturbance. She is now much more alert and oriented after Ativan 2 mg IV. She reports hallucinations over the past few hours. She was much more ill-appearing on arrival, is significantly improved after Ativan. Is not appropriate for discharge yet at this time. Continue benzodiazepine taper.   Admit to floor.    .dmb      Consult required? No      Medications Administered During ED Course:  Medications   lidocaine 4 % patch 1 Patch (1 Patch TransDERmal Apply Patch 1/27/20 0934)   potassium chloride SR (KLOR-CON 10) tablet 40 mEq (has no administration in time range)   sodium chloride 0.9 % bolus infusion 1,000 mL (1,000 mL IntraVENous New Bag 1/27/20 0822)   LORazepam (ATIVAN) injection 1 mg (1 mg IntraVENous Given 1/27/20 0822)   LORazepam (ATIVAN) injection 1 mg (1 mg IntraVENous Given 1/27/20 0924)          Diagnosis and Disposition     Disposition:  Admitted    Clinical Impression:   1. Benzodiazepine withdrawal with delirium [facility])        Attestation:  I personally performed the services described in this documentation on this date 1/27/2020 for patient Reginaldo Medina. Morena Cherry MD        I was the first provider for this patient on this visit. To the best of my ability I reviewed relevant prior medical records, electrocardiograms, laboratories, and radiologic studies. The patient's presenting problems were discussed, and the patient was in agreement with the care plan formulated and outlined with them. Morena Cherry MD    Please note that this dictation was completed with Dragon voice recognition software. Quite often unanticipated grammatical, syntax, homophones, and other interpretive errors are inadvertently transcribed by the computer software. Please disregard these errors and excuse any errors that have escaped final proofreading.

## 2020-01-27 NOTE — ED NOTES
TRANSFER - OUT REPORT:    Verbal report given to Delaney HOLCOMB on [facility] Mir  being transferred to obs for routine progression of care       Report consisted of patients Situation, Background, Assessment and   Recommendations(SBAR). Information from the following report(s) ED Summary was reviewed with the receiving nurse. Lines:   Venous Access Device Power Port 01/27/20 Upper chest (subclavicular area, right (Active)        Opportunity for questions and clarification was provided.       Patient transported with:   Monitor

## 2020-01-27 NOTE — ED TRIAGE NOTES
Pt presents to ED from home with complaints of anxiety, \"pain all over\" x 4 days.  Pt very diaphoretic and hyperventilating

## 2020-01-27 NOTE — H&P
Hospitalist Admission Note    NAME: Carmen Guerin   :  1955   MRN:  [id number]     Date/Time:  2020 11:07 AM    Patient PCP: Pete Anderson MD  ______________________________________________________________________  Given the patient's current clinical presentation, I have a high level of concern for decompensation if discharged from the emergency department. Complex decision making was performed, which includes reviewing the patient's available past medical records, laboratory results, and x-ray films. My assessment of this patient's clinical condition and my plan of care is as follows. Assessment / Plan: Altered mental status, improving, likely secondary to benzo withdrawal  -Admit for Observation  -On Xanax 1mg QID and then had an issue obtaining the medication - received IV Ativan in the ER which improved her mental status   -Resume Xanax, dose down to BID  -Head CT not done in the ER - will order   -Would monitor on Telemetry and if MS returns back to baseline, likely can be discharged tomorrow. -UTox positive for benzos and THC    Hypokalemia  -K 3.2, repleted    Anxiety  Depression  -Will ask for Psychiatry Consult for help on titration and management of psychiatric meds    GERD  HLD  HTN  -Resume home meds    Code Status: Full  Surrogate Decision Maker: ? DVT Prophylaxis: Heparin  GI Prophylaxis: not indicated  Baseline: Independent ADLs      Subjective:   CHIEF COMPLAINT: Altered mental status    HISTORY OF PRESENT ILLNESS:     Mary Wei is a 60 y.o.  female who presents with CC listed above. When pt initially arrived to the ER, her condition was described to me as her being altered, speaking in 1 word maximum sentences with inappropriate answers. She was also apparently tremulous and experiencing visual hallucinations. She was given Ativan 2mg IV once and her condition has markedly improved. Upon my evaluation at bedside, pt feels feel.  She states that she tried to obtain her Xanax pills but had some issue with her bank card and therefore was unable to get them. She was not expecting to have to pay but realizes that because of the new year, she has not met her deductible yet. She denies any illicit drug abuse. We were asked to admit for work up and evaluation of the above problems. Past Medical History:   Diagnosis Date    Anxiety     Cancer [facility])     breast    Depression     GERD (gastroesophageal reflux disease)     HTN (hypertension)     Hypercholesterolemia     Hypotension 9/12/2012    Metastatic breast cancer ([facility] [address] 5/3/2017    Secondary cancer of bone ([facility] [address] 5/3/2017        Past Surgical History:   Procedure Laterality Date    BREAST SURGERY PROCEDURE UNLISTED  march 13    carina masectomy       Social History     Tobacco Use    Smoking status: Former Smoker     Packs/day: 0.50     Years: 20.00     Pack years: 10.00    Smokeless tobacco: Never Used   Substance Use Topics    Alcohol use: No        Family History   Problem Relation Age of Onset    Hypertension Mother     Heart Disease Mother     Depression Mother     Hypertension Father      Allergies   Allergen Reactions    Sulfa (Sulfonamide Antibiotics) Unknown (comments)    Wellbutrin [Bupropion Hcl] Unknown (comments)        Prior to Admission medications    Medication Sig Start Date End Date Taking? Authorizing Provider   naproxen (NAPROSYN) 250 mg tablet Take 250 mg by mouth as needed for Pain. Ezzie Bamberger, MD   Omeprazole delayed release (PRILOSEC D/R) 20 mg tablet Take 20 mg by mouth daily. 1/2/20   Ezzie Bamberger, MD   cefUROXime (CEFTIN) 500 mg tablet Take 1 Tab by mouth every twelve (12) hours. Patient not taking: Reported on 1/2/2020 12/26/19   Mandie Holm, DO   thiamine mononitrate (B-1) 100 mg tablet Take 1 Tab by mouth daily. 12/25/19   Missy Aguirre, DO   folic acid (FOLVITE) 1 mg tablet Take 1 Tab by mouth daily.  12/25/19 Oz Aguirre,    venlafaxine [facility]) 100 mg tablet Take 200 mg by mouth two (2) times a day. Provider, Historical   hydroCHLOROthiazide (HYDRODIURIL) 25 mg tablet Take 25 mg by mouth daily. Provider, Historical   HYDROcodone-acetaminophen (NORCO) 5-325 mg per tablet Take 1 Tab by mouth every six (6) hours as needed for Pain. Provider, Historical   fenofibrate micronized (LOFIBRA) 134 mg capsule Take 134 mg by mouth every morning. Provider, Historical   carvedilol (COREG) 12.5 mg tablet TAKE 1 TABLET BY MOUTH TWICE A DAY 7/19/19   Frandy Pitt MD   losartan (COZAAR) 50 mg tablet TAKE 1 TABLET BY MOUTH ONCE DAILY 7/19/19   Frandy Pitt MD   gabapentin (NEURONTIN) 100 mg capsule take 3 capsules by mouth three times a day 7/19/19   Frandy Pitt MD   amitriptyline (ELAVIL) 50 mg tablet TAKE 1 TABLET BY MOUTH EVERY EVENING 6/28/19   Frandy Pitt MD   aspirin delayed-release 81 mg tablet Take 1 Tab by mouth daily. Indications: prevention of transient ischemic attack 4/11/18   Frandy Pitt MD   multivitamin (ONE A DAY) tablet Take 1 Tab by mouth daily. 4/11/18   Frandy Pitt MD       REVIEW OF SYSTEMS:     I am not able to complete the review of systems because:    The patient is intubated and sedated    The patient has altered mental status due to his acute medical problems    The patient has baseline aphasia from prior stroke(s)    The patient has baseline dementia and is not reliable historian    The patient is in acute medical distress and unable to provide information           Total of 12 systems reviewed as follows:       POSITIVE= underlined text  Negative = text not underlined  General:  fever, chills, sweats, generalized weakness, weight loss/gain,      loss of appetite   Eyes:    blurred vision, eye pain, loss of vision, double vision  ENT:    rhinorrhea, pharyngitis   Respiratory:   cough, sputum production, SOB, LOBO, wheezing, pleuritic pain   Cardiology: chest pain, palpitations, orthopnea, PND, edema, syncope   Gastrointestinal:  abdominal pain , N/V, diarrhea, dysphagia, constipation, bleeding   Genitourinary:  frequency, urgency, dysuria, hematuria, incontinence   Muskuloskeletal :  arthralgia, myalgia, back pain  Hematology:  easy bruising, nose or gum bleeding, lymphadenopathy   Dermatological: rash, ulceration, pruritis, color change / jaundice  Endocrine:   hot flashes or polydipsia   Neurological:  headache, dizziness, confusion, focal weakness, paresthesia,     Speech difficulties, memory loss, gait difficulty  Psychological: Feelings of anxiety, depression, agitation    Objective:   VITALS:    Visit Vitals  /77   Pulse 89   Temp 98 °F (36.7 °C)   Resp 20   Ht 5' 5\" (1.651 m)   Wt 90.7 kg (200 lb)   SpO2 100%   BMI 33.28 kg/m²       PHYSICAL EXAM:    General:    Alert, cooperative, no distress, appears stated age. HEENT: Atraumatic, anicteric sclerae, pink conjunctivae     No oral ulcers, mucosa moist, throat clear, dentition fair  Neck:  Supple, symmetrical,  thyroid: non tender  Lungs:   Clear to auscultation bilaterally. No Wheezing or Rhonchi. No rales. Chest wall:  No tenderness  No Accessory muscle use. Heart:   Regular  rhythm,  No  murmur   No edema  Abdomen:   Soft, non-tender. Not distended. Bowel sounds normal  Extremities: No cyanosis. No clubbing,      Skin turgor normal, Capillary refill normal, Radial dial pulse 2+  Skin:     Not pale. Not Jaundiced  No rashes   Psych:  Good insight. Not depressed. + anxious  Neurologic: EOMs intact. No facial asymmetry. No aphasia or slurred speech. Symmetrical strength, Sensation grossly intact.  Alert and oriented X 4.     _______________________________________________________________________  Care Plan discussed with:    Comments   Patient x    Family      RN x    Care Manager                    Consultant: _______________________________________________________________________  Expected  Disposition:   Home with Family x   HH/PT/OT/RN    SNF/LTC    JATIN    ________________________________________________________________________  TOTAL TIME:  54 Minutes    Critical Care Provided     Minutes non procedure based      Comments     Reviewed previous records   >50% of visit spent in counseling and coordination of care  Discussion with patient and/or family and questions answered       ________________________________________________________________________  Signed: Sonia Amado MD    Procedures: see electronic medical records for all procedures/Xrays and details which were not copied into this note but were reviewed prior to creation of Plan. LAB DATA REVIEWED:    Recent Results (from the past 24 hour(s))   EKG, 12 LEAD, INITIAL    Collection Time: 01/27/20  7:49 AM   Result Value Ref Range    Ventricular Rate 100 BPM    Atrial Rate 100 BPM    P-R Interval 150 ms    QRS Duration 68 ms    Q-T Interval 352 ms    QTC Calculation (Bezet) 454 ms    Calculated R Axis 50 degrees    Calculated T Axis 72 degrees    Diagnosis       Baseline artifact  Sinus rhythm with premature supraventricular complexes  Nonspecific ST and T wave abnormality  Confirmed by Pooja Chavez ([id number]) on 1/27/2020 9:10:10 AM     CBC WITH AUTOMATED DIFF    Collection Time: 01/27/20  8:25 AM   Result Value Ref Range    WBC 11.3 (H) 3.6 - 11.0 K/uL    RBC 4.55 3.80 - 5.20 M/uL    HGB 14.2 11.5 - 16.0 g/dL    HCT 40.7 35.0 - 47.0 %    MCV 89.5 80.0 - 99.0 FL    MCH 31.2 26.0 - 34.0 PG    MCHC 34.9 30.0 - 36.5 g/dL    RDW 12.4 11.5 - 14.5 %    PLATELET 255 203 - 992 K/uL    MPV 11.0 8.9 - 12.9 FL    NRBC 0.0 0  WBC    ABSOLUTE NRBC 0.00 0.00 - 0.01 K/uL    NEUTROPHILS 76 (H) 32 - 75 %    LYMPHOCYTES 16 12 - 49 %    MONOCYTES 7 5 - 13 %    EOSINOPHILS 0 0 - 7 %    BASOPHILS 1 0 - 1 %    IMMATURE GRANULOCYTES 0 0.0 - 0.5 %    ABS.  NEUTROPHILS 8.6 (H) 1.8 - 8.0 K/UL    ABS. LYMPHOCYTES 1.8 0.8 - 3.5 K/UL    ABS. MONOCYTES 0.8 0.0 - 1.0 K/UL    ABS. EOSINOPHILS 0.0 0.0 - 0.4 K/UL    ABS. BASOPHILS 0.1 0.0 - 0.1 K/UL    ABS. IMM.  GRANS. 0.1 (H) 0.00 - 0.04 K/UL    DF AUTOMATED     METABOLIC PANEL, BASIC    Collection Time: 01/27/20  8:25 AM   Result Value Ref Range    Sodium 135 (L) 136 - 145 mmol/L    Potassium 3.2 (L) 3.5 - 5.1 mmol/L    Chloride 101 97 - 108 mmol/L    CO2 23 21 - 32 mmol/L    Anion gap 11 5 - 15 mmol/L    Glucose 137 (H) 65 - 100 mg/dL    BUN 16 6 - 20 MG/DL    Creatinine 1.11 (H) 0.55 - 1.02 MG/DL    BUN/Creatinine ratio 14 12 - 20      GFR est AA >60 >60 ml/min/1.73m2    GFR est non-AA 50 (L) >60 ml/min/1.73m2    Calcium 9.6 8.5 - 10.1 MG/DL   CK    Collection Time: 01/27/20  8:25 AM   Result Value Ref Range    CK 83 26 - 346 U/L   SALICYLATE    Collection Time: 01/27/20  8:26 AM   Result Value Ref Range    Salicylate level 5.3 2.8 - 20.0 MG/DL   ACETAMINOPHEN    Collection Time: 01/27/20  8:26 AM   Result Value Ref Range    Acetaminophen level <2 (L) 10 - 30 ug/mL   URINALYSIS W/ REFLEX CULTURE    Collection Time: 01/27/20  9:59 AM   Result Value Ref Range    Color YELLOW/STRAW      Appearance CLOUDY (A) CLEAR      Specific gravity 1.026 1.003 - 1.030      pH (UA) 6.5 5.0 - 8.0      Protein NEGATIVE  NEG mg/dL    Glucose NEGATIVE  NEG mg/dL    Ketone NEGATIVE  NEG mg/dL    Bilirubin NEGATIVE  NEG      Blood NEGATIVE  NEG      Urobilinogen 0.2 0.2 - 1.0 EU/dL    Nitrites NEGATIVE  NEG      Leukocyte Esterase NEGATIVE  NEG      WBC 0-4 0 - 4 /hpf    RBC 0-5 0 - 5 /hpf    Epithelial cells MANY (A) FEW /lpf    Bacteria 1+ (A) NEG /hpf    UA:UC IF INDICATED CULTURE NOT INDICATED BY UA RESULT CNI      CA Oxalate crystals 3+ (A) NEG   DRUG SCREEN, URINE    Collection Time: 01/27/20  9:59 AM   Result Value Ref Range    AMPHETAMINES NEGATIVE  NEG      BARBITURATES NEGATIVE  NEG      BENZODIAZEPINES POSITIVE (A) NEG      COCAINE NEGATIVE  NEG METHADONE NEGATIVE  NEG      OPIATES NEGATIVE  NEG      PCP(PHENCYCLIDINE) NEGATIVE  NEG      THC (TH-CANNABINOL) POSITIVE (A) NEG      Drug screen comment (NOTE)

## 2020-01-27 NOTE — PROGRESS NOTES
Pharmacy Clarification of Prior to Admission Medication Regimen-Follow Up Needed    The patient was unable to participate in interview regarding clarification of the prior to admission medication regimen. Patient stated her granddaughter, Shane Carter, manages her medications. MHT called the patient's granddaughter, Shane Carter, [phone], who stated the patient has been a resident of [facility] since 1/25/20. MHT called [facility], [phone], and spoke with the [facility] nurse, who stated she cannot see the patient in their system. The medication history will need to be re-evaluated at a later time during admission when patient is willing/able to participate or if more information is provided.     Thank you,  Ludwin Nicolas CPhT  Medication History Pharmacy Technician

## 2020-01-28 PROBLEM — R53.0 NEOPLASTIC MALIGNANT RELATED FATIGUE: Status: RESOLVED | Noted: 2018-04-04 | Resolved: 2020-01-28

## 2020-01-28 PROBLEM — Z79.899 CHRONIC PRESCRIPTION BENZODIAZEPINE USE: Status: ACTIVE | Noted: 2020-01-28

## 2020-01-28 PROBLEM — G89.3 PAIN DUE TO NEOPLASM: Status: RESOLVED | Noted: 2018-04-04 | Resolved: 2020-01-28

## 2020-01-28 PROBLEM — A08.11 GASTROENTERITIS DUE TO NOROVIRUS: Status: RESOLVED | Noted: 2018-02-21 | Resolved: 2020-01-28

## 2020-01-28 PROBLEM — E87.6 HYPOKALEMIA: Status: ACTIVE | Noted: 2020-01-28

## 2020-01-28 PROBLEM — F13.939 BENZODIAZEPINE WITHDRAWAL (HCC): Status: ACTIVE | Noted: 2020-01-28

## 2020-01-28 PROBLEM — N39.0 URINARY TRACT INFECTION WITHOUT HEMATURIA: Status: RESOLVED | Noted: 2018-02-13 | Resolved: 2020-01-28

## 2020-01-28 PROBLEM — A41.9 SEPSIS (HCC): Status: RESOLVED | Noted: 2018-02-12 | Resolved: 2020-01-28

## 2020-01-28 PROBLEM — E87.1 ACUTE HYPONATREMIA: Status: RESOLVED | Noted: 2019-06-18 | Resolved: 2020-01-28

## 2020-01-28 LAB
ANION GAP SERPL CALC-SCNC: 7 MMOL/L (ref 5–15)
BUN SERPL-MCNC: 14 MG/DL (ref 6–20)
BUN/CREAT SERPL: 18 (ref 12–20)
CALCIUM SERPL-MCNC: 8.9 MG/DL (ref 8.5–10.1)
CHLORIDE SERPL-SCNC: 107 MMOL/L (ref 97–108)
CO2 SERPL-SCNC: 25 MMOL/L (ref 21–32)
CREAT SERPL-MCNC: 0.78 MG/DL (ref 0.55–1.02)
GLUCOSE SERPL-MCNC: 103 MG/DL (ref 65–100)
MAGNESIUM SERPL-MCNC: 1.8 MG/DL (ref 1.6–2.4)
PHOSPHATE SERPL-MCNC: 4 MG/DL (ref 2.6–4.7)
POTASSIUM SERPL-SCNC: 3.3 MMOL/L (ref 3.5–5.1)
SODIUM SERPL-SCNC: 139 MMOL/L (ref 136–145)

## 2020-01-28 PROCEDURE — 99218 HC RM OBSERVATION: CPT

## 2020-01-28 PROCEDURE — 83735 ASSAY OF MAGNESIUM: CPT

## 2020-01-28 PROCEDURE — 84100 ASSAY OF PHOSPHORUS: CPT

## 2020-01-28 PROCEDURE — 65270000029 HC RM PRIVATE

## 2020-01-28 PROCEDURE — 36415 COLL VENOUS BLD VENIPUNCTURE: CPT

## 2020-01-28 PROCEDURE — 74011250636 HC RX REV CODE- 250/636

## 2020-01-28 PROCEDURE — 80048 BASIC METABOLIC PNL TOTAL CA: CPT

## 2020-01-28 PROCEDURE — 74011250637 HC RX REV CODE- 250/637: Performed by: NURSE PRACTITIONER

## 2020-01-28 PROCEDURE — 74011000250 HC RX REV CODE- 250: Performed by: GENERAL ACUTE CARE HOSPITAL

## 2020-01-28 PROCEDURE — 74011250637 HC RX REV CODE- 250/637: Performed by: GENERAL ACUTE CARE HOSPITAL

## 2020-01-28 PROCEDURE — 74011250636 HC RX REV CODE- 250/636: Performed by: GENERAL ACUTE CARE HOSPITAL

## 2020-01-28 RX ORDER — HEPARIN 100 UNIT/ML
SYRINGE INTRAVENOUS
Status: COMPLETED
Start: 2020-01-28 | End: 2020-01-28

## 2020-01-28 RX ORDER — POTASSIUM CHLORIDE 750 MG/1
40 TABLET, FILM COATED, EXTENDED RELEASE ORAL
Status: COMPLETED | OUTPATIENT
Start: 2020-01-28 | End: 2020-01-28

## 2020-01-28 RX ADMIN — CARVEDILOL 12.5 MG: 12.5 TABLET, FILM COATED ORAL at 16:07

## 2020-01-28 RX ADMIN — ASPIRIN 81 MG: 81 TABLET ORAL at 09:35

## 2020-01-28 RX ADMIN — SODIUM CHLORIDE, PRESERVATIVE FREE 500 UNITS: 5 INJECTION INTRAVENOUS at 16:11

## 2020-01-28 RX ADMIN — HEPARIN SODIUM 5000 UNITS: 5000 INJECTION INTRAVENOUS; SUBCUTANEOUS at 21:30

## 2020-01-28 RX ADMIN — VENLAFAXINE 200 MG: 37.5 TABLET ORAL at 09:40

## 2020-01-28 RX ADMIN — GABAPENTIN 100 MG: 100 CAPSULE ORAL at 09:35

## 2020-01-28 RX ADMIN — VENLAFAXINE 200 MG: 37.5 TABLET ORAL at 17:51

## 2020-01-28 RX ADMIN — HEPARIN SODIUM 5000 UNITS: 5000 INJECTION INTRAVENOUS; SUBCUTANEOUS at 14:18

## 2020-01-28 RX ADMIN — ALPRAZOLAM 1 MG: 0.5 TABLET ORAL at 09:35

## 2020-01-28 RX ADMIN — HEPARIN SODIUM 5000 UNITS: 5000 INJECTION INTRAVENOUS; SUBCUTANEOUS at 04:39

## 2020-01-28 RX ADMIN — Medication 100 MG: at 09:35

## 2020-01-28 RX ADMIN — GABAPENTIN 100 MG: 100 CAPSULE ORAL at 21:31

## 2020-01-28 RX ADMIN — LOSARTAN POTASSIUM 50 MG: 25 TABLET ORAL at 09:35

## 2020-01-28 RX ADMIN — AMITRIPTYLINE HYDROCHLORIDE 50 MG: 25 TABLET, FILM COATED ORAL at 21:31

## 2020-01-28 RX ADMIN — CARVEDILOL 12.5 MG: 12.5 TABLET, FILM COATED ORAL at 09:35

## 2020-01-28 RX ADMIN — FOLIC ACID 1 MG: 1 TABLET ORAL at 09:35

## 2020-01-28 RX ADMIN — ALPRAZOLAM 1 MG: 0.5 TABLET ORAL at 17:47

## 2020-01-28 RX ADMIN — POTASSIUM CHLORIDE 40 MEQ: 750 TABLET, FILM COATED, EXTENDED RELEASE ORAL at 14:17

## 2020-01-28 RX ADMIN — GABAPENTIN 100 MG: 100 CAPSULE ORAL at 16:07

## 2020-01-28 RX ADMIN — Medication 10 ML: at 04:40

## 2020-01-28 RX ADMIN — HYDROCHLOROTHIAZIDE 25 MG: 25 TABLET ORAL at 09:35

## 2020-01-28 RX ADMIN — FENOFIBRATE 145 MG: 145 TABLET ORAL at 09:35

## 2020-01-28 RX ADMIN — Medication 10 ML: at 14:19

## 2020-01-28 NOTE — PROGRESS NOTES
Spiritual Care Partner Volunteer visited patient in Clinical Observation  on 1/28/2020. Documented by:  Rev. Yosvany Lawrence EdD MDiv     For  Assistance Page [phone]

## 2020-01-28 NOTE — PROGRESS NOTES
Initial Nutrition Assessment:    INTERVENTIONS/RECOMMENDATIONS:   · Continue current diet     ASSESSMENT:   Chart reviewed, medically noted for AMS d/t benzodiazapine withdrawal and PMH shown below. Nutrition referral triggered due to MST score. Pt is was sleeping during visit attempt. It is documented that she is a poor historian. Weight history shows no recent significant weight loss. No obvious signs of temporal muscle or orbital fat wasting. Will monitor PO intake. Past Medical History:   Diagnosis Date    Acute hyponatremia 6/18/2019    Anxiety     Bimalleolar fracture of left ankle 2/3/2016    Comminuted and displaced     Cancer [facility])     breast    Closed left ankle fracture 2/4/2016    Depression     Gastroenteritis due to norovirus 2/21/2018    GERD (gastroesophageal reflux disease)     HTN (hypertension)     Hypercholesterolemia     Hypotension 9/12/2012    Metastatic breast cancer ([facility] [address] 5/3/2017    Neoplastic malignant related fatigue 4/4/2018    Pain due to neoplasm 4/4/2018    Secondary cancer of bone ([facility] [address] 5/3/2017    Sepsis ([facility] [address] 2/12/2018    Urinary tract infection without hematuria 2/13/2018       Diet Order: Regular  % Eaten:  No data found.   Pertinent Medications: [x]Reviewed:   Pertinent Labs: [x]Reviewed:   Food Allergies: [x]NKFA  []Other   Last BM: PTA  Edema: n/a       []RUE   []LUE   []RLE   []LLE      Pressure Injury:   n/a   [] Stage I   [] Stage II   [] Stage III   [] Stage IV      Wt Readings from Last 30 Encounters:   01/27/20 90.7 kg (200 lb)   12/28/19 81.6 kg (180 lb)   12/25/19 96.9 kg (213 lb 10 oz)   06/20/19 94.3 kg (207 lb 14.3 oz)   04/04/18 88.2 kg (194 lb 6.4 oz)   03/09/18 88.5 kg (195 lb)   03/02/18 88.9 kg (196 lb)   02/12/18 85.3 kg (188 lb)   02/02/18 93 kg (205 lb)   05/03/17 87.1 kg (192 lb)   02/03/16 71.2 kg (157 lb)   09/09/14 98 kg (216 lb)   02/21/13 78.9 kg (174 lb)   09/12/12 78 kg (172 lb)   08/31/12 79.6 kg (175 lb 7.8 oz)   02/15/12 81.6 kg (180 lb)   06/14/11 76.2 kg (168 lb)       Anthropometrics:   Height: 5' 5\" (165.1 cm) Weight: 90.7 kg (200 lb)   IBW (%IBW):   ( ) UBW (%UBW):   (  %)   Last Weight Metrics:  Weight Loss Metrics 1/27/2020 12/28/2019 12/25/2019 6/20/2019 4/4/2018 3/9/2018 3/2/2018   Today's Wt 200 lb 180 lb 213 lb 10 oz 207 lb 14.3 oz 194 lb 6.4 oz 195 lb 196 lb   BMI 33.28 kg/m2 29.95 kg/m2 36.67 kg/m2 34.6 kg/m2 32.35 kg/m2 32.45 kg/m2 32.62 kg/m2   Some encounter information is confidential and restricted. Go to Review Flowsheets activity to see all data. BMI: Body mass index is 33.28 kg/m². This BMI is indicative of:   []Underweight    []Normal    []Overweight    [x] Obesity   [] Extreme Obesity (BMI>40)     Estimated Nutrition Needs (Based on):   1625 Kcals/day(BMR: 1425 x 1.1) , 70 g(0.8 g/kg) Protein  Carbohydrate: At Least 130 g/day  Fluids: 1625 mL/day (1ml/kcal) or per primary team    NUTRITION DIAGNOSES:   Problem:  No nutritional diagnosis at this time      Etiology: related to       Signs/Symptoms: as evidenced by        NUTRITION INTERVENTIONS:  Meals/Snacks: General/healthful diet                  GOAL:   consume >50% of meals in 3-5 days    LEARNING NEEDS (Diet, Food/Nutrient-Drug Interaction):    [x] None Identified   [] Identified and Education Provided/Documented   [] Identified and Pt declined/was not appropriate     Cultureal, Mandaen, OR Ethnic Dietary Needs:    [x] None Identified   [] Identified and Addressed     [x] Interdisciplinary Care Plan Reviewed/Documented    [x] Discharge Planning: General healthy diet       MONITORING /EVALUATION:      Food/Nutrient Intake Outcomes:  Total energy intake  Physical Signs/Symptoms Outcomes: Weight/weight change, Electrolyte and renal profile, GI    NUTRITION RISK:    [] High              [x] Moderate           []  Low  []  Minimal/Uncompromised    PT SEEN FOR:    []  MD Consult: []Calorie Count      []Diabetic Diet Education        []Diet Education     []Electrolyte Management     []General Nutrition Management and Supplements     []Management of Tube Feeding     []TPN Recommendations    [x]  RN Referral:  [x]MST score >=2     []Enteral/Parenteral Nutrition PTA     []Pregnant: Gestational DM or Multigestation     []Pressure Ulcer/Wound Care needs        []  Low BMI  []  LOS Referral       Jason Corral RDN  Pager [phone]  Weekend Pager [phone]

## 2020-01-28 NOTE — PROGRESS NOTES
2150: Pt MAR scheduled to have Lidocaine patch removed at this time. Writing nurse and student nurse assessed pt skin, no patch seen on skin.

## 2020-01-28 NOTE — DISCHARGE SUMMARY
Hospitalist Discharge Summary Patient ID: Dickson Torrez [id number] 
89 y.o. 
1955 1/27/2020 PCP on record: Armen Dandy, MD 
 
Admit date: 1/27/2020 Discharge date and time: 1/28/2020 DISCHARGE DIAGNOSIS: 
 
Active Hospital Problems Diagnosis Date Noted  Hypokalemia 01/28/2020  Benzodiazepine withdrawal ([address] 01/28/2020  Chronic prescription benzodiazepine use 01/28/2020  Altered mental status 01/27/2020  Depression 04/02/2018  Anxiety 02/03/2016  GERD (gastroesophageal reflux disease)  HTN (hypertension)  Hypercholesterolemia CONSULTATIONS: 
IP CONSULT TO HOSPITALIST 
IP CONSULT TO PSYCHIATRY Excerpted HPI from H&P of Sonia Amado MD: \"Edith Del Cid is a 60 y.o.  female who presents with CC listed above. When pt initially arrived to the ER, her condition was described to me as her being altered, speaking in 1 word maximum sentences with inappropriate answers. She was also apparently tremulous and experiencing visual hallucinations. She was given Ativan 2mg IV once and her condition has markedly improved. Upon my evaluation at bedside, pt feels feel. She states that she tried to obtain her Xanax pills but had some issue with her bank card and therefore was unable to get them. She was not expecting to have to pay but realizes that because of the new year, she has not met her deductible yet. She denies any illicit drug abuse. \" 
______________________________________________________________________ DISCHARGE SUMMARY/HOSPITAL COURSE:  for full details see H&P, daily progress notes, labs, consult notes. Wing Knott y.o. female was admitted to [facility] on 1/27/2020 and treated for the following medical complaints: 
 
Altered mental status, POA d/t benzodiazapine withdrawal 
Chronic benzodiazapine use · Patient had previously been taking Xanax 1 mg QID and then began to have difficulty obtaining the medication. · Mental status returned to baseline after receiving IV Ativan in ED · Decrease Xanax dose to 1 mg BID · UDS +benzos and THC · Appreciate psychiatry input; follow-up OP Hypokalemia · K 3.3; replete and monitor Anxiety Depression · Continue amitriptyline, gabapentin, and effexor GERD · Symptomatic care HLD 
HTN 
· Continue ASA, Coreg, Tricor, HCTZ, and losartan Patient's plan of care has been reviewed with them. Patient and/or family have verbally conveyed their understanding and agreement of the patient's signs, symptoms, diagnosis, treatment and prognosis and additionally agree to follow up as recommended or return to [facility] should their condition change prior to follow-up. Discharge instructions have also been provided to the patient with some educational information regarding their diagnosis as well a list of reasons why they would want to return to the office prior to their follow-up appointment should their condition change. Orville Ho to avoid frequent ED visits. [facility] can treat; pains, sprains, cuts, wounds, high fevers, upper respiratory infections and much more. There medical team is equipped with all the tools necessary to provide advanced medical care in the comfort of you home, workplace, or location of need. The medical team consists of doctors, nurse practitioners, and EMTs. [facility] is available 7 days per week 9 am to 9 pm.   Request care by calling [phone] or by going online at [phone] [address] [address] 
_______________________________________________________________________ Patient seen and examined by me on discharge day. Pertinent Findings: 
Gen:    Not in distress Chest: Clear lungs CVS:   Regular rhythm. No edema Abd:  Soft, not distended, not tender Neuro:  Alert, oriented 
_______________________________________________________________________ DISCHARGE MEDICATIONS:  
 Current Discharge Medication List  
  
CONTINUE these medications which have NOT CHANGED Details  
naproxen (NAPROSYN) 250 mg tablet Take 250 mg by mouth as needed for Pain. Omeprazole delayed release (PRILOSEC D/R) 20 mg tablet Take 20 mg by mouth daily. cefUROXime (CEFTIN) 500 mg tablet Take 1 Tab by mouth every twelve (12) hours. Qty: 4 Tab, Refills: 0  
  
thiamine mononitrate (B-1) 100 mg tablet Take 1 Tab by mouth daily. Qty: 60 Tab, Refills: 3  
  
folic acid (FOLVITE) 1 mg tablet Take 1 Tab by mouth daily. Qty: 60 Tab, Refills: 3  
  
venlafaxine (EFFEXOR) 100 mg tablet Take 200 mg by mouth two (2) times a day. hydroCHLOROthiazide (HYDRODIURIL) 25 mg tablet Take 25 mg by mouth daily. fenofibrate micronized (LOFIBRA) 134 mg capsule Take 134 mg by mouth every morning. carvedilol (COREG) 12.5 mg tablet TAKE 1 TABLET BY MOUTH TWICE A DAY Qty: 180 Tab, Refills: 3 Comments: **Patient requests 90 days supply**  
  
losartan (COZAAR) 50 mg tablet TAKE 1 TABLET BY MOUTH ONCE DAILY Qty: 90 Tab, Refills: 5 Comments: **Patient requests 90 days supply**  
  
gabapentin (NEURONTIN) 100 mg capsule take 3 capsules by mouth three times a day 
Qty: 270 Cap, Refills: 1 Associated Diagnoses: Other chronic pain  
  
amitriptyline (ELAVIL) 50 mg tablet TAKE 1 TABLET BY MOUTH EVERY EVENING Qty: 90 Tab, Refills: 0 Comments: **Patient requests 90 days supply**  
  
aspirin delayed-release 81 mg tablet Take 1 Tab by mouth daily. Indications: prevention of transient ischemic attack Qty: 30 Tab, Refills: 0  
  
multivitamin (ONE A DAY) tablet Take 1 Tab by mouth daily. Qty: 30 Tab, Refills: 1 STOP taking these medications HYDROcodone-acetaminophen (NORCO) 5-325 mg per tablet Comments:  
Reason for Stopping:   
   
  
 
 
 
Patient Follow Up Instructions: Activity: Activity as tolerated Diet: Regular Diet Wound Care: None needed Follow-up with PCP in 1 week. Follow-up Information Follow up With Specialties Details Why Contact MD Neyda Serna [address] 
[id number] [id number] [phone] Mental health provider   Schedule an appointment as soon as possible for a visit  Ana Laura Rizvi   
  
 
________________________________________________________________ Risk of deterioration: Moderate Condition at Discharge:  Stable 
__________________________________________________________________ Disposition Home with family, no needs 
 
____________________________________________________________________ Code Status: Full Code 
___________________________________________________________________ Total time in minutes spent coordinating this discharge (includes going over instructions, follow-up, prescriptions, and preparing report for sign off to her PCP) :  31 minutes Signed: 
Pauline Landers NP

## 2020-01-28 NOTE — PROGRESS NOTES
Oncology End of Shift Note      Bedside shift change report given to Monico Beckett RN (incoming nurse) by Monty Chu (outgoing nurse) on [facility]. Report included the following information SBAR, Kardex, Intake/Output and MAR. Shift Summary: Agree with care provided and documented by Remi, Student Nurse. Pt remained stable through shift. No complaints. Labs drawn. Im Gust[address] working well. Psych to see pt today. Possible D/C depending on psych recommendations. Issues for Physician to Address:       Patient on Cardiac Monitoring?     [x] Yes  [] No    Rhythm: Telemetry: normal sinus rhythm         Monty Chu

## 2020-01-28 NOTE — NURSE NAVIGATOR
ONCOLOGY NURSE NAVIGATOR    Referral given to ONN by Lucia Jameson RN CCL, to provide with information on AMD. Stage IV breast cancer. Follows w/ dr. Lizzie Martinez introduced self and role, provided w/ contact information. Provided w/ information on Advanced Medical Directive, states she does not wish her dad to have to make any decisions. Discussed POST form and advised to discuss w/ PCP. Offered referral to Palliative Care team, declined. EMOTIONAL: hx anxiety; sees psychologist @ [facility] for many years. Only sibling (brother) killed in '85. Father only living relative, he's 80, lives locally, sees every other week. FINANCIAL: unwilling to provide w/ information. Left [address] flyer w/ patient and offered resources provided. TRANSPORTATION: provided [employer], states still drives, needs occasional rides. Update provided to CCL and RN.     Ricardo Monzon RN

## 2020-01-28 NOTE — CONSULTS
PSYCHIATRIC CONSULTATION NOTE:    REASON FOR CONSULT:    A psychiatric consultation was requested by Dr Florencio Tamayo to evaluate or provide advice/opinion related to evaluating depression and anxiety and medication management. HISTORY OF PRESENTING COMPLAINT:     Ms. Dameon Jones is a 60 y.o. WHITE OR  female who is currently admitted to the medical floor at [facility] on 1/27/2020 for the treatment of <principal problem not specified>. Patient reports she has a history of depression and has been seeing a psychiatrist at [facility] for years. Patient reports being on a total of 4 Xanax daily for over 12 years. Patient reports that she stopped taking the Xanax and began to withdraw, she reports anxiety, sweating, palpitations, and hallucinations. 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 Patient also reports being diagnosed with cancer and having it metastasize in to her bone. Patient reports several suicide attempts. Patient denies SI/HI/AVH    REVIEW OF SYMPTOMS:  Patient denies appetite change, weight change, vision change, sleep change, fever, night sweats, headache, cough, shortness of breath, chest pain, palpitations, nausea,vomiting, diarrhea, dizziness, weakness, tremors. PAST MEDICAL HISTORY:  Please see History & Physical for details.    Past Medical History:   Diagnosis Date    Anxiety     Cancer [facility])     breast    Depression     GERD (gastroesophageal reflux disease)     HTN (hypertension)     Hypercholesterolemia     Hypotension 9/12/2012    Metastatic breast cancer ([facility] [address] 5/3/2017    Secondary cancer of bone ([facility] 5/3/2017         ALLERGIES:  Allergies   Allergen Reactions    Sulfa (Sulfonamide Antibiotics) Unknown (comments)    Wellbutrin [Bupropion Hcl] Unknown (comments)       MEDICATIONS PRIOR TO ADMISSION:  Medications Prior to Admission Medication Sig    naproxen (NAPROSYN) 250 mg tablet Take 250 mg by mouth as needed for Pain.  Omeprazole delayed release (PRILOSEC D/R) 20 mg tablet Take 20 mg by mouth daily.  cefUROXime (CEFTIN) 500 mg tablet Take 1 Tab by mouth every twelve (12) hours.  thiamine mononitrate (B-1) 100 mg tablet Take 1 Tab by mouth daily.  folic acid (FOLVITE) 1 mg tablet Take 1 Tab by mouth daily.  venlafaxine (EFFEXOR) 100 mg tablet Take 200 mg by mouth two (2) times a day.  hydroCHLOROthiazide (HYDRODIURIL) 25 mg tablet Take 25 mg by mouth daily.  HYDROcodone-acetaminophen (NORCO) 5-325 mg per tablet Take 1 Tab by mouth every six (6) hours as needed for Pain.  fenofibrate micronized (LOFIBRA) 134 mg capsule Take 134 mg by mouth every morning.  carvedilol (COREG) 12.5 mg tablet TAKE 1 TABLET BY MOUTH TWICE A DAY    losartan (COZAAR) 50 mg tablet TAKE 1 TABLET BY MOUTH ONCE DAILY    gabapentin (NEURONTIN) 100 mg capsule take 3 capsules by mouth three times a day    amitriptyline (ELAVIL) 50 mg tablet TAKE 1 TABLET BY MOUTH EVERY EVENING    aspirin delayed-release 81 mg tablet Take 1 Tab by mouth daily. Indications: prevention of transient ischemic attack    multivitamin (ONE A DAY) tablet Take 1 Tab by mouth daily.        CURRENT MEDICATIONS:    Current Facility-Administered Medications:     lidocaine 4 % patch 1 Patch, 1 Patch, TransDERmal, Q24H, Vibha Matthew MD, 1 Patch at 01/27/20 0934    amitriptyline (ELAVIL) tablet 50 mg, 50 mg, Oral, QHS, Vibha Matthew MD, 50 mg at 01/27/20 2137    aspirin delayed-release tablet 81 mg, 81 mg, Oral, DAILY, Slick Hunt MD    carvediloL (COREG) tablet 12.5 mg, 12.5 mg, Oral, BID WITH MEALS, Willi BLANDON MD, 12.5 mg at 01/27/20 1720    fenofibrate nanocrystallized (TRICOR) tablet 145 mg, 145 mg, Oral, DAILY, Slick Hunt MD    folic acid (FOLVITE) tablet 1 mg, 1 mg, Oral, DAILY, Slick Hunt MD    gabapentin (NEURONTIN) capsule 100 mg, 100 mg, Oral, TID, Beatriz Diaz MD, 100 mg at 01/27/20 2136    hydroCHLOROthiazide (HYDRODIURIL) tablet 25 mg, 25 mg, Oral, DAILY, Angelica Nation MD    losartan (COZAAR) tablet 50 mg, 50 mg, Oral, DAILY, Angelica Nation MD    venlafaxine [facility]) tablet 200 mg, 200 mg, Oral, BID, Beatriz Diaz MD, 200 mg at 01/27/20 1834    thiamine mononitrate (B-1) tablet 100 mg, 100 mg, Oral, DAILY, Beatriz Diaz MD    sodium chloride (NS) flush 5-40 mL, 5-40 mL, IntraVENous, Q8H, Daniela Garcia MD, 10 mL at 01/27/20 2143    sodium chloride (NS) flush 5-40 mL, 5-40 mL, IntraVENous, PRN, Beatriz Diaz MD, 10 mL at 01/28/20 0440    ALPRAZolam (XANAX) tablet 1 mg, 1 mg, Oral, BID, Beatriz Diaz MD, 1 mg at 01/27/20 1720    heparin (porcine) injection 5,000 Units, 5,000 Units, SubCUTAneous, Q8H, Beatriz Diaz MD, 5,000 Units at 01/28/20 0439     LAB RESULTS:  Lab Results   Component Value Date/Time    WBC 11.3 (H) 01/27/2020 08:25 AM    HGB (POC) 15.5 02/02/2018 12:29 PM    HGB 14.2 01/27/2020 08:25 AM    HCT (POC) 45.6 02/02/2018 12:29 PM    HCT 40.7 01/27/2020 08:25 AM    PLATELET 556 35/00/0856 08:25 AM    MCV 89.5 01/27/2020 08:25 AM      Lab Results   Component Value Date/Time    Sodium 139 01/28/2020 04:36 AM    Potassium 3.3 (L) 01/28/2020 04:36 AM    Chloride 107 01/28/2020 04:36 AM    CO2 25 01/28/2020 04:36 AM    Anion gap 7 01/28/2020 04:36 AM    Glucose 103 (H) 01/28/2020 04:36 AM    BUN 14 01/28/2020 04:36 AM    Creatinine 0.78 01/28/2020 04:36 AM    BUN/Creatinine ratio 18 01/28/2020 04:36 AM    GFR est AA >60 01/28/2020 04:36 AM    GFR est non-AA >60 01/28/2020 04:36 AM    Calcium 8.9 01/28/2020 04:36 AM    Bilirubin, total 0.3 12/24/2019 04:01 AM    AST (SGOT) 33 12/24/2019 04:01 AM    Alk.  phosphatase 75 12/24/2019 04:01 AM    Protein, total 6.1 (L) 12/24/2019 04:01 AM    Albumin 2.9 (L) 12/24/2019 04:01 AM    Globulin 3.2 12/24/2019 04:01 AM    A-G Ratio 0.9 (L) 12/24/2019 04:01 AM    ALT (SGPT) 62 2019 04:01 AM        PAST PSYCHIATRIC HISTORY:  Patient reports seeing a psychiatrist at [facility]. Patient reports history of depression, anxiety and possibly ptsd. SUBSTANCE ABUSE HISTORY:  Social History     Substance and Sexual Activity   Drug Use No      Drug Screen Most Recent Result Date     DRUG SCREEN, URINE  Collected: 2020  9:59 AM (Final result)    Complete Results                 PSYCHOSOCIAL HISTORY:  Patient reports that she lives alone. Patient is a retired nurse. MENTAL STATUS EXAM:  General appearance:  Appropriate in [facility] contact: Good  Speech: Normal  Affect: Congruent  Mood: Im just so scared\"  Orientation: Alert and Oriented x 4  Thought Process: Goal Directed, Logical   Perception: Denies AVH/Paranoia   Thought Content: Denies SI/HI  Insight: Fair  Judgement: Fair  Cognition: Intact grossly  Impulse Control: Good    ASSESSMENT AND PLAN/RECOMMENDATION:  Reginaldo Medina meets criteria for a diagnosis of  Major Depressive Disorder and Anxiety. At this time I recommend/plan the followin. Continue on Effexor 200 mg BID  2. Detox off Xanax and begin another anxiolytic, patient reports failed trials of Hydroxyzine, Buspar, and Seroquel. 3. Increase Gabapentin 300 mg TID if Xanax is discontinued    At this time the patient will likely benefit from inpatient psychiatric treatment and will should be admitted in to a psychiatric facility due to depression, anxiety, and benzo detox. Please have the case management team locate an inpatient psychiatric facility for the patient to immediately be admitted. Patient reports sh is thinking about going voluntarily, if patient refuses please contact your local [facility] for TDO (Temporary USP Order) assessment as patient is not a current danger to her self or others and has the ability to care for herself. Psychiatry will sign off at this time.  Please consult Psychiatry again for any concerns regarding the patient's mental health changes and/or management. Thank you for the opportunity to participate in the care of your patient.

## 2020-01-28 NOTE — PROGRESS NOTES
FRIDA:   1) Home with f.u appts vs. Inpatient psych     Update- 4:08 PM- CM cancelled round trip per RN Lead while we wait on psychs note. CM will continue to follow and remain available for support. Reason for Admission: Benzodiazepine withdrawal ([facility] [address]               RUR Score: 23 HIGH              Resources/supports as identified by patient/family: Pt has limited support                 Top Challenges facing patient (as identified by patient/family and CM): Finances/Medication cost?  Pt states she is able to afford her medications but was initally having problems               Transportation? Pt states she drives at baseline but will require a ride home at time of discharge               Support system or lack thereof? Pt lacks a support system. Living arrangements? Lives alone           Self-care/ADLs/Cognition? Alert, oriented and able to care for self at baseline. Current Advanced Directive/Advance Care Plan:  None on file. Pt was interested in arranging them but states she does not have support or family. CM contacted Oncology NN to assist with AMD.                           Plan for utilizing home health: Per recommendation pending pt's progression and needs during hospitalization stay. Transition of Care Plan:                Pt is a 60year old,  female, brought in with altered mental status. Pt was alert and oriented when meeting with CM, confirming address, emergency contact Maritza Mallory- [phone]) and PCP. Pt states she lives alone in a senior community. Pt states she has a walker and drives at baseline, pt uses the elevator at the apartment complex. Pt states she has had HH in the past and also has been to [facility] and [facility].  Pt states no problems affording or accessing medications and follows up every 12 weeks with her psychiatrist. Pt states she will need a ride home at time of discharge as she has no family and does not have her wallet or anything to pay for a ride. Pt states she does not use [employer] for transportation and drives herself at baseline. CM informed this morning by RN psych pt would benefit from inpatient psych but did not say more than that. CM attempted to contact psych and have been waiting on a return phone call. CM spoke with pt regarding going to [address] contacted [facility]- pt does not qualify due to the medication and frequency of using the drug. Pt states she will just follow up with her psychologist. Pt has an appt on Monday with her psychologist. [address] contacted office of Dr. Kristin Bass at [facility], they will follow up with pt and arrange for a sooner appt. CM arranging for round trip, pt is cleared to d/c from [address] perspective, RN informed. Care Management Interventions  PCP Verified by CM:  Yes  Mode of Transport at Discharge: Self  Transition of Care Consult (CM Consult): Discharge Planning  MyChart Signup: No  Discharge Durable Medical Equipment: No  Physical Therapy Consult: No  Occupational Therapy Consult: No  Speech Therapy Consult: No  Current Support Network: Lives Alone  Confirm Follow Up Transport: Self  Discharge Location  Discharge Placement: [address], MSW, [address] [facility]    [phone]

## 2020-01-28 NOTE — PROGRESS NOTES
I reviewed pertinent labs and imaging, and discussed /agreed on the plan of care with Dr. Barb Ramos. Hospitalist Progress Note    NAME: Sebastien Howard   :  1955   MRN:  [id number]     History of Present Illness: Wyatt Gan is a 60 y.o.  female. When pt initially arrived to the ER, her condition was described to me as her being altered, speaking in 1 word maximum sentences with inappropriate answers. She was also apparently tremulous and experiencing visual hallucinations. She was given Ativan 2mg IV once and her condition has markedly improved. She states that she tried to obtain her Xanax pills but had some issue with her bank card and therefore was unable to get them. She was not expecting to have to pay but realizes that because of the new year, she has not met her deductible yet. Assessment / Plan: Altered mental status, POA d/t benzodiazapine withdrawal- improving  Chronic benzodiazapine use  · Patient had previously been taking Xanax 1 mg QID and then began to have difficulty obtaining the medication. · Mental status returned to baseline after receiving IV Ativan in ED  · Decrease Xanax dose to 1 mg BID  · UDS +benzos and THC  · Appreciate psychiatry input; awaiting there recommendations      Hypokalemia   · K 3.3; replete and monitor      Anxiety  Depression  · Continue amitriptyline, gabapentin, and effexor     GERD  · Symptomatic care      HLD  HTN  · Continue ASA, Coreg, Tricor, HCTZ, and losartan    30.0 - 39.9 Obese / Body mass index is 33.28 kg/m². Code status: Full  Prophylaxis: Hep SQ  Recommended Disposition: TBD     Subjective:     Chief Complaint / Reason for Physician Visit  \"I feel ok. \"  Patient laying in bed with eyes open. Patient appears to be back a baseline mental status. Patient was seen by psychiatry this AM, awaiting recommendations. Discussed with RN events overnight.      Review of Systems:  Symptom Y/N Comments  Symptom Y/N Comments   Fever/Chills n Chest Pain n    Poor Appetite    Edema     Cough    Abdominal Pain     Sputum    Joint Pain     SOB/LOBO n   Pruritis/Rash     Nausea/vomit n   Tolerating PT/OT     Diarrhea n   Tolerating Diet y    Constipation n   Other       Could NOT obtain due to:      Objective:     VITALS:   Last 24hrs VS reviewed since prior progress note. Most recent are:  Patient Vitals for the past 24 hrs:   Temp Pulse Resp BP SpO2   01/28/20 1128 97.9 °F (36.6 °C) 89 18 105/70 99 %   01/28/20 0741 98.1 °F (36.7 °C) 88 18 (!) 113/93 98 %   01/28/20 0423 98.9 °F (37.2 °C) 88 18 123/72 100 %   01/27/20 2210 98.6 °F (37 °C) 83 18 101/67 96 %   01/27/20 1914 98.1 °F (36.7 °C) 85 20 114/73 98 %     No intake or output data in the 24 hours ending 01/28/20 1625     PHYSICAL EXAM:  General: Pleasant  female. Obese. NAD    EENT:  EOMI. Anicteric sclerae. MMM  Resp:  CTA bilaterally, no wheezing or rales. No accessory muscle use  CV:  Regular rate  rhythm,  No edema  GI:  Soft, Non distended, Non tender.  +Bowel sounds  Neurologic:  Alert and oriented X 3, normal speech,   Psych:   Fair insight. Not anxious nor agitated  Skin:  No rashes. No jaundice    Reviewed most current lab test results and cultures  YES  Reviewed most current radiology test results   YES  Review and summation of old records today    NO  Reviewed patient's current orders and MAR    YES  PMH/ reviewed - no change compared to H&P  ________________________________________________________________________  Care Plan discussed with:    Comments   Patient x    Family      RN x    Care Manager x    Consultant                        Multidiciplinary team rounds were held today with , nursing, pharmacist and clinical coordinator. Patient's plan of care was discussed; medications were reviewed and discharge planning was addressed.      ________________________________________________________________________  Total NON critical care TIME:  25   Minutes    Total CRITICAL CARE TIME Spent:   Minutes non procedure based      Comments   >50% of visit spent in counseling and coordination of care     ________________________________________________________________________  Rosetta Grayson NP     Procedures: see electronic medical records for all procedures/Xrays and details which were not copied into this note but were reviewed prior to creation of Plan. LABS:  I reviewed today's most current labs and imaging studies.   Pertinent labs include:  Recent Labs     01/27/20  0825   WBC 11.3*   HGB 14.2   HCT 40.7        Recent Labs     01/28/20  0436 01/27/20  0825    135*   K 3.3* 3.2*    101   CO2 25 23   * 137*   BUN 14 16   CREA 0.78 1.11*   CA 8.9 9.6   MG 1.8  --    PHOS 4.0  --        Signed: Rosetta Grayson NP

## 2020-01-28 NOTE — PROGRESS NOTES
Bedside shift change report given to Coy Richter (oncoming nurse) by Bret Roblero (offgoing nurse). Report included the following information SBAR, Kardex, ED Summary, Intake/Output, MAR, Accordion and Recent Results.      Pt seems anxious except when she is sleeping; ambulates to bathroom with stand by assist.

## 2020-01-29 PROCEDURE — 65270000029 HC RM PRIVATE

## 2020-01-29 PROCEDURE — 74011250636 HC RX REV CODE- 250/636: Performed by: GENERAL ACUTE CARE HOSPITAL

## 2020-01-29 PROCEDURE — 74011250637 HC RX REV CODE- 250/637: Performed by: GENERAL ACUTE CARE HOSPITAL

## 2020-01-29 PROCEDURE — 74011000250 HC RX REV CODE- 250: Performed by: GENERAL ACUTE CARE HOSPITAL

## 2020-01-29 PROCEDURE — 99218 HC RM OBSERVATION: CPT

## 2020-01-29 PROCEDURE — 74011250637 HC RX REV CODE- 250/637: Performed by: HOSPITALIST

## 2020-01-29 RX ORDER — ACETAMINOPHEN 325 MG/1
650 TABLET ORAL
Status: DISCONTINUED | OUTPATIENT
Start: 2020-01-29 | End: 2020-02-01 | Stop reason: HOSPADM

## 2020-01-29 RX ADMIN — HEPARIN SODIUM 5000 UNITS: 5000 INJECTION INTRAVENOUS; SUBCUTANEOUS at 21:19

## 2020-01-29 RX ADMIN — FOLIC ACID 1 MG: 1 TABLET ORAL at 10:25

## 2020-01-29 RX ADMIN — HYDROCHLOROTHIAZIDE 25 MG: 25 TABLET ORAL at 10:26

## 2020-01-29 RX ADMIN — FENOFIBRATE 145 MG: 145 TABLET ORAL at 10:26

## 2020-01-29 RX ADMIN — GABAPENTIN 100 MG: 100 CAPSULE ORAL at 17:36

## 2020-01-29 RX ADMIN — HEPARIN SODIUM 5000 UNITS: 5000 INJECTION INTRAVENOUS; SUBCUTANEOUS at 13:35

## 2020-01-29 RX ADMIN — CARVEDILOL 12.5 MG: 12.5 TABLET, FILM COATED ORAL at 10:26

## 2020-01-29 RX ADMIN — ALPRAZOLAM 1 MG: 0.5 TABLET ORAL at 10:24

## 2020-01-29 RX ADMIN — Medication 10 ML: at 13:42

## 2020-01-29 RX ADMIN — LOSARTAN POTASSIUM 50 MG: 25 TABLET ORAL at 10:25

## 2020-01-29 RX ADMIN — HEPARIN SODIUM 5000 UNITS: 5000 INJECTION INTRAVENOUS; SUBCUTANEOUS at 06:13

## 2020-01-29 RX ADMIN — CARVEDILOL 12.5 MG: 12.5 TABLET, FILM COATED ORAL at 17:36

## 2020-01-29 RX ADMIN — AMITRIPTYLINE HYDROCHLORIDE 50 MG: 25 TABLET, FILM COATED ORAL at 21:19

## 2020-01-29 RX ADMIN — ASPIRIN 81 MG: 81 TABLET ORAL at 10:26

## 2020-01-29 RX ADMIN — GABAPENTIN 100 MG: 100 CAPSULE ORAL at 10:26

## 2020-01-29 RX ADMIN — ACETAMINOPHEN 650 MG: 325 TABLET ORAL at 17:36

## 2020-01-29 RX ADMIN — Medication 100 MG: at 10:27

## 2020-01-29 RX ADMIN — VENLAFAXINE 200 MG: 37.5 TABLET ORAL at 17:36

## 2020-01-29 RX ADMIN — GABAPENTIN 100 MG: 100 CAPSULE ORAL at 21:19

## 2020-01-29 RX ADMIN — ALPRAZOLAM 1 MG: 0.5 TABLET ORAL at 17:36

## 2020-01-29 RX ADMIN — ACETAMINOPHEN 650 MG: 325 TABLET ORAL at 06:12

## 2020-01-29 RX ADMIN — VENLAFAXINE 200 MG: 37.5 TABLET ORAL at 11:03

## 2020-01-29 NOTE — PROGRESS NOTES
I reviewed pertinent labs and imaging, and discussed /agreed on the plan of care with Dr. Rui Newman. Hospitalist Progress Note    NAME: Suri Mcclelland   :  1955   MRN:  [id number]     History of Present Illness: Jackelyn Harrison is a 60 y.o.  female. When pt initially arrived to the ER, her condition was described to me as her being altered, speaking in 1 word maximum sentences with inappropriate answers. She was also apparently tremulous and experiencing visual hallucinations. She was given Ativan 2mg IV once and her condition has markedly improved. She states that she tried to obtain her Xanax pills but had some issue with her bank card and therefore was unable to get them. She was not expecting to have to pay but realizes that because of the new year, she has not met her deductible yet. Assessment / Plan:  Medically stable for discharge once placement has been arranged    Altered mental status, POA d/t benzodiazapine withdrawal- improving  Chronic benzodiazapine use  · Patient had previously been taking Xanax 1 mg QID and then began to have difficulty obtaining the medication. · Mental status returned to baseline after receiving IV Ativan in ED  · Decrease Xanax dose to 1 mg BID  · UDS +benzos and THC  · Appreciate psychiatry input; recommending IP detox  · CM working on placement   · Denies SI/HI     Hypokalemia   · K 3.3; replete and monitor      Anxiety  Depression  · Continue amitriptyline, gabapentin, and effexor     GERD  · Symptomatic care      HLD  HTN  · Continue ASA, Coreg, Tricor, HCTZ, and losartan    30.0 - 39.9 Obese / Body mass index is 33.28 kg/m². Code status: Full  Prophylaxis: Hep SQ  Recommended Disposition: IP psych     Subjective:     Chief Complaint / Reason for Physician Visit  Patient in room no new complaints. No family at bedside. NAD. Discussed with RN events overnight.      Review of Systems:  Symptom Y/N Comments  Symptom Y/N Comments   Fever/Chills n Chest Pain n    Poor Appetite    Edema     Cough    Abdominal Pain     Sputum    Joint Pain     SOB/LOBO n   Pruritis/Rash     Nausea/vomit n   Tolerating PT/OT     Diarrhea n   Tolerating Diet y    Constipation n   Other       Could NOT obtain due to:      Objective:     VITALS:   Last 24hrs VS reviewed since prior progress note. Most recent are:  Patient Vitals for the past 24 hrs:   Temp Pulse Resp BP SpO2   01/29/20 1025    116/66    01/29/20 0755 97.6 °F (36.4 °C) 89 16 110/75 98 %   01/29/20 0404 97.9 °F (36.6 °C) 90 18 (!) 104/91 96 %   01/28/20 2005 98.2 °F (36.8 °C) 82 18 100/58 97 %       Intake/Output Summary (Last 24 hours) at 1/29/2020 1512  Last data filed at 1/29/2020 0654  Gross per 24 hour   Intake 820 ml   Output    Net 820 ml        PHYSICAL EXAM:  General: NAD. Pleasant  female.     EENT:  EOMI. Anicteric sclerae. MMM  Resp:  Lung sounds clear, no wheezing or rales. No accessory muscle use  CV:  RRR,  No edema  GI:  Soft, Non distended, Non tender.  +Bowel sounds  Neurologic:  Alert and oriented X 3, normal speech,   Psych:   Fair insight. Not anxious nor agitated. Denies SI/HI  Skin:  No rashes. No jaundice    Reviewed most current lab test results and cultures  YES  Reviewed most current radiology test results   YES  Review and summation of old records today    NO  Reviewed patient's current orders and MAR    YES  PMH/SH reviewed - no change compared to H&P  ________________________________________________________________________  Care Plan discussed with:    Comments   Patient x    Family      RN x    Care Manager x    Consultant                        Multidiciplinary team rounds were held today with , nursing, pharmacist and clinical coordinator. Patient's plan of care was discussed; medications were reviewed and discharge planning was addressed.      ________________________________________________________________________  Total NON critical care TIME:  25 Minutes    Total CRITICAL CARE TIME Spent:   Minutes non procedure based      Comments   >50% of visit spent in counseling and coordination of care     ________________________________________________________________________  Nadege Dial NP     Procedures: see electronic medical records for all procedures/Xrays and details which were not copied into this note but were reviewed prior to creation of Plan. LABS:  I reviewed today's most current labs and imaging studies.   Pertinent labs include:  Recent Labs     01/27/20  0825   WBC 11.3*   HGB 14.2   HCT 40.7        Recent Labs     01/28/20  0436 01/27/20  0825    135*   K 3.3* 3.2*    101   CO2 25 23   * 137*   BUN 14 16   CREA 0.78 1.11*   CA 8.9 9.6   MG 1.8  --    PHOS 4.0  --        Signed: Nadege Dial NP

## 2020-01-29 NOTE — PROGRESS NOTES
Oncology End of Shift Note      Bedside shift change report given to ZHANG Marie (incoming nurse) by Shala Live (outgoing nurse[facility] Report included the following information SBAR, Kardex and MAR. Shift Summary:   Patient is up with the assistance of one. She was given PRN Tylenol see MAR for pain. She has been going to the bathroom with the use of a walker. She has eaten adequately throughout the day. She has been given all schedule meds see MAR. Routine rounding has been done. Issues for Physician to Address:       Patient on Cardiac Monitoring?     [] Yes  [x] No    Rhythm:          Shift Events        Shala Live

## 2020-01-29 NOTE — PROGRESS NOTES
Bedside shift change report given to Sonu Breaux (oncoming nurse) by Ricardo Mackey RN (offgoing nurse). Report included the following information SBAR, Kardex, ED Summary, Intake/Output, MAR and Recent Results.

## 2020-01-29 NOTE — PROGRESS NOTES
Patient c/o neck and back pain. Pain rate 8/10. Patient request for tylenol. Tele hospitalist notified.

## 2020-01-29 NOTE — PROGRESS NOTES
FRIDA:   1. Transfer to inpatient psych aA    4:43 PM- CM spoke with pt's insurance company Steff Malone with [employer]- [phone]). Pt's only option besides [facility] is [facility] in [place]. CM spoke with pt regarding this option, pt states she does not want to go to [place] and wishes for CM to look at closer facilities. While looking for placement pt would really benefit from palliative consult to discuss goals of care and arrange AMD as she has limited support and is listed as a full code. Pt does have a POST form on file but with her co morbidities we should look at code status and appointing someone in the event this happens again and pt cannot make decisions. 4:15 PM- CM spoke with Sebastien Pagan with  Caralex Lando provided CM with [facility] ([phone]), CM left HIPPA compliant VM with them. CM also contacted [address] [place] [address] [phone], they do not accept pt's insurance. CM contacting pt's insurance to assist.     11:30 AM- CM met with pt at bedside regarding d/c planning. Pt is still willing to go to rehab and states she would like for CM to contact her friend Oralee Files. CM contacted Bethany Smith- [phone] and informed her of pt's admission. Oralee Files disclosed to CM that pt is not safe to go home and does not care for herself. Oralee Files states she has been trying to get pt a legal guardian to assist with finances and POA as pt was recently told she has early onset dementia. Oralee Files states she is limited to helping pt as she has several family members who are not doing well but she will be coming up to visit pt today. CM informed Oralee Files we could not do much on pt's home situation at this point but directed her to the Department of , Oralee Files stated she would be making an anonymous report. 11:00 AM- CM spoke with the transfer center at [facility], they state they do not have any beds available at this point. CM will continue to find placement for pt. Mekhi Fitch, MSGLADIS, [address] [facility]   [phone]

## 2020-01-29 NOTE — INTERDISCIPLINARY ROUNDS
Oncology Interdisciplinary rounds were held today to discuss patient plan of care and outcomes. The following members were present: Nursing, Physician, Case Management, Pharmacy, and PT/OT Actual Length of Stay: 0 Expected Length of Stay: - - - Plan            Discharge Waiting on psy bed at [facility]. Discharge TBA

## 2020-01-30 PROCEDURE — 74011000250 HC RX REV CODE- 250: Performed by: GENERAL ACUTE CARE HOSPITAL

## 2020-01-30 PROCEDURE — 65270000015 HC RM PRIVATE ONCOLOGY

## 2020-01-30 PROCEDURE — 74011250637 HC RX REV CODE- 250/637: Performed by: GENERAL ACUTE CARE HOSPITAL

## 2020-01-30 PROCEDURE — 99218 HC RM OBSERVATION: CPT

## 2020-01-30 PROCEDURE — 74011250636 HC RX REV CODE- 250/636: Performed by: GENERAL ACUTE CARE HOSPITAL

## 2020-01-30 PROCEDURE — 74011250637 HC RX REV CODE- 250/637: Performed by: INTERNAL MEDICINE

## 2020-01-30 PROCEDURE — 74011250636 HC RX REV CODE- 250/636: Performed by: NURSE PRACTITIONER

## 2020-01-30 RX ORDER — HYDROCODONE BITARTRATE AND ACETAMINOPHEN 5; 325 MG/1; MG/1
1 TABLET ORAL ONCE
Status: COMPLETED | OUTPATIENT
Start: 2020-01-30 | End: 2020-01-30

## 2020-01-30 RX ORDER — KETOROLAC TROMETHAMINE 30 MG/ML
30 INJECTION, SOLUTION INTRAMUSCULAR; INTRAVENOUS
Status: COMPLETED | OUTPATIENT
Start: 2020-01-30 | End: 2020-01-30

## 2020-01-30 RX ADMIN — ASPIRIN 81 MG: 81 TABLET ORAL at 09:51

## 2020-01-30 RX ADMIN — HEPARIN SODIUM 5000 UNITS: 5000 INJECTION INTRAVENOUS; SUBCUTANEOUS at 05:28

## 2020-01-30 RX ADMIN — VENLAFAXINE 200 MG: 37.5 TABLET ORAL at 10:05

## 2020-01-30 RX ADMIN — ALPRAZOLAM 1 MG: 0.5 TABLET ORAL at 09:51

## 2020-01-30 RX ADMIN — AMITRIPTYLINE HYDROCHLORIDE 50 MG: 25 TABLET, FILM COATED ORAL at 21:50

## 2020-01-30 RX ADMIN — CARVEDILOL 12.5 MG: 12.5 TABLET, FILM COATED ORAL at 17:16

## 2020-01-30 RX ADMIN — FENOFIBRATE 145 MG: 145 TABLET ORAL at 09:51

## 2020-01-30 RX ADMIN — HYDROCODONE BITARTRATE AND ACETAMINOPHEN 1 TABLET: 5; 325 TABLET ORAL at 03:35

## 2020-01-30 RX ADMIN — HEPARIN SODIUM 5000 UNITS: 5000 INJECTION INTRAVENOUS; SUBCUTANEOUS at 21:48

## 2020-01-30 RX ADMIN — GABAPENTIN 100 MG: 100 CAPSULE ORAL at 09:52

## 2020-01-30 RX ADMIN — ALPRAZOLAM 1 MG: 0.5 TABLET ORAL at 17:16

## 2020-01-30 RX ADMIN — GABAPENTIN 100 MG: 100 CAPSULE ORAL at 21:51

## 2020-01-30 RX ADMIN — Medication 100 MG: at 09:51

## 2020-01-30 RX ADMIN — Medication 10 ML: at 14:00

## 2020-01-30 RX ADMIN — FOLIC ACID 1 MG: 1 TABLET ORAL at 09:51

## 2020-01-30 RX ADMIN — KETOROLAC TROMETHAMINE 30 MG: 30 INJECTION, SOLUTION INTRAMUSCULAR at 19:30

## 2020-01-30 RX ADMIN — GABAPENTIN 100 MG: 100 CAPSULE ORAL at 17:16

## 2020-01-30 RX ADMIN — HEPARIN SODIUM 5000 UNITS: 5000 INJECTION INTRAVENOUS; SUBCUTANEOUS at 13:45

## 2020-01-30 RX ADMIN — LOSARTAN POTASSIUM 50 MG: 25 TABLET ORAL at 09:52

## 2020-01-30 NOTE — PROGRESS NOTES
Bedside shift change report given to Nura [facility] Manuel Panchal (oncoming nurse) by Yue Castellano RN (offgoing nurse). Report included the following information SBAR, Kardex, ED Summary, Intake/Output, MAR and Recent Results.

## 2020-01-30 NOTE — PROGRESS NOTES
Problem: Falls - Risk of  Goal: *Absence of Falls  Description  Document Jenelle Cervantes Fall Risk and appropriate interventions in the flowsheet.   Outcome: Progressing Towards Goal  Note: Fall Risk Interventions:  Mobility Interventions: Bed/chair exit alarm, Communicate number of staff needed for ambulation/transfer    Mentation Interventions: Adequate sleep, hydration, pain control    Medication Interventions: Bed/chair exit alarm, Assess postural VS orthostatic hypotension    Elimination Interventions: Call light in reach, Bed/chair exit alarm

## 2020-01-30 NOTE — PROGRESS NOTES
TRANSFER - OUT REPORT:    Verbal report given to Umm(name) on Emmy Pickens  being transferred to Oncology(unit) for routine progression of care       Report consisted of patients Situation, Background, Assessment and   Recommendations(SBAR). Information from the following report(s) SBAR, Kardex and MAR was reviewed with the receiving nurse. Lines:       Opportunity for questions and clarification was provided.       Patient transported with:   Registered Nurse

## 2020-01-30 NOTE — PROGRESS NOTES
Patient c/o shoulder and back pain laying on hospital bed. Pain rate 9/10. Tylenol not working. Patient Request for stronger pain medication. Tele hospitalist notified. Dr. Edwards Lines order for Norco x1 dose given.

## 2020-01-30 NOTE — PROGRESS NOTES
FRIDA Plan    Transfer to Inpatient Psych     1:43 PM   THOMAS called Access Resource Line: [phone] and they do not have a [facility] today and we will need to check tomorrow. She gave another resource of [address] [place] [address] Inpatient Program in [facility]: Phone [phone]. CM talked to them and they do have beds but they would need to evaluate case. CM talked to Pt and let her know about this Place in [address] and she indicated that she did not want to go to [address]. Pt wants to wait for bed at [facility]. CM will need to call again tomorrow.      Kp Wright, [place]  Ext [phone]

## 2020-01-30 NOTE — PROGRESS NOTES
I reviewed pertinent labs and imaging, and discussed /agreed on the plan of care with Dr. Jose Lozada. Hospitalist Progress Note    NAME: Camren Guerin   :  1955   MRN:  [id number]     History of Present Illness: Mary Wei is a 60 y.o.  female. When pt initially arrived to the ER, her condition was described to me as her being altered, speaking in 1 word maximum sentences with inappropriate answers. She was also apparently tremulous and experiencing visual hallucinations. She was given Ativan 2mg IV once and her condition has markedly improved. She states that she tried to obtain her Xanax pills but had some issue with her bank card and therefore was unable to get them. She was not expecting to have to pay but realizes that because of the new year, she has not met her deductible yet. Assessment / Plan:  Medically stable for discharge once placement has been arranged    Altered mental status, POA d/t benzodiazapine withdrawal- resolved   Chronic benzodiazapine use  · Patient had previously been taking Xanax 1 mg QID and then began to have difficulty obtaining the medication. · Mental status returned to baseline after receiving IV Ativan in ED  · Decrease Xanax dose to 1 mg BID  · UDS +benzos and THC  · Appreciate psychiatry input; recommending IP detox  · CM working on placement   · Denies SI/HI     Hypokalemia   · K 3.3; replete and monitor      Anxiety  Depression  · Continue amitriptyline, gabapentin, and effexor     GERD  · Symptomatic care      HLD  HTN  · Continue ASA, Coreg, Tricor, HCTZ, and losartan    Hx of breast cancer  · CT in 2019 was suspicious of mets to sternum. Bone scan was recommended. · Needs to follow-up with oncology OP    30.0 - 39.9 Obese / Body mass index is 33.28 kg/m².     Code status: Full  Prophylaxis: Hep SQ  Recommended Disposition: IP psych     Subjective:     Chief Complaint / Reason for Physician Visit  Patient resting quietly in room with lights off. No new complaints at this time. NAD. Discussed with RN events overnight. Review of Systems:  Symptom Y/N Comments  Symptom Y/N Comments   Fever/Chills n   Chest Pain n    Poor Appetite    Edema     Cough    Abdominal Pain     Sputum    Joint Pain     SOB/LOBO n   Pruritis/Rash     Nausea/vomit n   Tolerating PT/OT     Diarrhea n   Tolerating Diet y    Constipation n   Other       Could NOT obtain due to:      Objective:     VITALS:   Last 24hrs VS reviewed since prior progress note. Most recent are:  Patient Vitals for the past 24 hrs:   Temp Pulse Resp BP SpO2   01/30/20 0720 97.6 °F (36.4 °C) (!) 101 16 (!) 120/93 97 %   01/30/20 0310 97.7 °F (36.5 °C) 88 18 109/78 98 %   01/29/20 2011  83  97/65    01/29/20 1958 98.2 °F (36.8 °C) 90 19 106/49 97 %   01/29/20 1733  93  137/84    01/29/20 1516 97.9 °F (36.6 °C) 84 16 106/61 98 %   01/29/20 1025    116/66        Intake/Output Summary (Last 24 hours) at 1/30/2020 0736  Last data filed at 1/30/2020 0532  Gross per 24 hour   Intake 550 ml   Output 450 ml   Net 100 ml        PHYSICAL EXAM:  General: Pleasant  female. NAD     EENT:  EOMI. Anicteric sclerae. MMM  Resp:  CTA, no wheezing or rales. No accessory muscle use  CV:  Regular rate rhythm,  No edema  GI:  Soft, Non distended, Non tender.  +Bowel sounds  Neurologic:  Alert and oriented X 3, normal speech,   Psych:   Fair insight. Not anxious nor agitated. Denies SI/HI  Skin:  No rashes.   No jaundice    Reviewed most current lab test results and cultures  YES  Reviewed most current radiology test results   YES  Review and summation of old records today    NO  Reviewed patient's current orders and MAR    YES  PMH/SH reviewed - no change compared to H&P  ________________________________________________________________________  Care Plan discussed with:    Comments   Patient x    Family      RN x    Care Manager x    Consultant                        Multidiciplinary team rounds were held today with , nursing, pharmacist and clinical coordinator. Patient's plan of care was discussed; medications were reviewed and discharge planning was addressed. ________________________________________________________________________  Total NON critical care TIME:  25   Minutes    Total CRITICAL CARE TIME Spent:   Minutes non procedure based      Comments   >50% of visit spent in counseling and coordination of care     ________________________________________________________________________  Katelin Souza NP     Procedures: see electronic medical records for all procedures/Xrays and details which were not copied into this note but were reviewed prior to creation of Plan. LABS:  I reviewed today's most current labs and imaging studies. Pertinent labs include:  No results for input(s): WBC, HGB, HCT, PLT, HGBEXT, HCTEXT, PLTEXT, HGBEXT, HCTEXT, PLTEXT in the last 72 hours.   Recent Labs     01/28/20  0436      K 3.3*      CO2 25   *   BUN 14   CREA 0.78   CA 8.9   MG 1.8   PHOS 4.0       Signed: Katelin Souza NP

## 2020-01-30 NOTE — PROGRESS NOTES
Oncology End of Shift Note      Bedside shift change report given to Malini Louie RN (incoming nurse) by Loreta Hanson (outgoing nurse) on Samra Rivera. Report included the following information SBAR, Kardex, Intake/Output and MAR. Shift Summary:  Pt admitted to oncology unit today from observation. Pt remained stable throughout shift. Scheduled meds, PRN meds given for pain. Hourly meds given, pt turns self, pt up at melita to the bathroom, no IV access. Issues for Physician to Address:       Patient on Cardiac Monitoring?     [] Yes  [x] No    Rhythm:        Loreta Hanson

## 2020-01-31 PROCEDURE — 94760 N-INVAS EAR/PLS OXIMETRY 1: CPT

## 2020-01-31 PROCEDURE — 74011250636 HC RX REV CODE- 250/636: Performed by: GENERAL ACUTE CARE HOSPITAL

## 2020-01-31 PROCEDURE — 74011250637 HC RX REV CODE- 250/637: Performed by: INTERNAL MEDICINE

## 2020-01-31 PROCEDURE — 74011250637 HC RX REV CODE- 250/637: Performed by: HOSPITALIST

## 2020-01-31 PROCEDURE — 74011000250 HC RX REV CODE- 250: Performed by: GENERAL ACUTE CARE HOSPITAL

## 2020-01-31 PROCEDURE — 74011250637 HC RX REV CODE- 250/637: Performed by: NURSE PRACTITIONER

## 2020-01-31 PROCEDURE — 65270000015 HC RM PRIVATE ONCOLOGY

## 2020-01-31 PROCEDURE — 74011250637 HC RX REV CODE- 250/637: Performed by: GENERAL ACUTE CARE HOSPITAL

## 2020-01-31 RX ORDER — KETOROLAC TROMETHAMINE 10 MG/1
10 TABLET, FILM COATED ORAL ONCE
Status: COMPLETED | OUTPATIENT
Start: 2020-01-31 | End: 2020-01-31

## 2020-01-31 RX ADMIN — HEPARIN SODIUM 5000 UNITS: 5000 INJECTION INTRAVENOUS; SUBCUTANEOUS at 20:48

## 2020-01-31 RX ADMIN — KETOROLAC TROMETHAMINE 10 MG: 10 TABLET, FILM COATED ORAL at 16:40

## 2020-01-31 RX ADMIN — ALPRAZOLAM 1 MG: 0.5 TABLET ORAL at 17:11

## 2020-01-31 RX ADMIN — Medication 100 MG: at 09:43

## 2020-01-31 RX ADMIN — VENLAFAXINE 200 MG: 37.5 TABLET ORAL at 09:43

## 2020-01-31 RX ADMIN — CARVEDILOL 12.5 MG: 12.5 TABLET, FILM COATED ORAL at 17:11

## 2020-01-31 RX ADMIN — HEPARIN SODIUM 5000 UNITS: 5000 INJECTION INTRAVENOUS; SUBCUTANEOUS at 06:01

## 2020-01-31 RX ADMIN — FENOFIBRATE 145 MG: 145 TABLET ORAL at 09:44

## 2020-01-31 RX ADMIN — ACETAMINOPHEN 650 MG: 325 TABLET ORAL at 13:21

## 2020-01-31 RX ADMIN — LOSARTAN POTASSIUM 50 MG: 25 TABLET ORAL at 09:44

## 2020-01-31 RX ADMIN — AMITRIPTYLINE HYDROCHLORIDE 50 MG: 25 TABLET, FILM COATED ORAL at 21:21

## 2020-01-31 RX ADMIN — ALPRAZOLAM 1 MG: 0.5 TABLET ORAL at 09:44

## 2020-01-31 RX ADMIN — HEPARIN SODIUM 5000 UNITS: 5000 INJECTION INTRAVENOUS; SUBCUTANEOUS at 13:21

## 2020-01-31 RX ADMIN — FOLIC ACID 1 MG: 1 TABLET ORAL at 09:44

## 2020-01-31 RX ADMIN — GABAPENTIN 100 MG: 100 CAPSULE ORAL at 21:21

## 2020-01-31 RX ADMIN — GABAPENTIN 100 MG: 100 CAPSULE ORAL at 15:55

## 2020-01-31 RX ADMIN — VENLAFAXINE 200 MG: 37.5 TABLET ORAL at 17:17

## 2020-01-31 RX ADMIN — ASPIRIN 81 MG: 81 TABLET ORAL at 09:44

## 2020-01-31 RX ADMIN — GABAPENTIN 100 MG: 100 CAPSULE ORAL at 09:43

## 2020-01-31 RX ADMIN — Medication 10 ML: at 14:00

## 2020-01-31 RX ADMIN — HYDROCHLOROTHIAZIDE 25 MG: 25 TABLET ORAL at 09:44

## 2020-01-31 RX ADMIN — CARVEDILOL 12.5 MG: 12.5 TABLET, FILM COATED ORAL at 09:43

## 2020-01-31 NOTE — PROGRESS NOTES
FRIDA:   1) Transfer to inpatient psych     3:50 PM- CM contacted the [facility] transfer center- [phone] and spoke with Reji Melendez was unavailable. Maria Esther Memory states pt is still being reviewed. CM provided Maria Esther Memory with RN supervisors phone number, oncology floor number and weekend CM phone number. CM received a phone call from pt's friend [facility] stating pt was locked out of her apartment due to it being dirty, friend [facility] is not able to get pt clothes. CM advised we cannot assist with getting pt's door opened but to contact the landlord. Rashawn Gomes will buy pt clothes and bring them to [facility] once pt transfers. Weekend CM will follow up with [facility] transfer center and continue to follow pt for discharge planning. 1:38 PM-[facility] has 1 female bed available, Tyrel Roman with [facility] is checking to see if pt would be able to have that bed. Updated clinicals faxed to [phone], waiting on return phone call. 1:33 PM- CM spoke with Tyrel Roman at [facility]- they are checking on bed avaialbilty and will return phone call shortly. 10:30 AM- CM attempted to contact the transfer center and was on hold for 20+ minutes. CM will attempt again shortly.      PB Lopez, [facility]   [phone]

## 2020-01-31 NOTE — PROGRESS NOTES
Hospitalist Progress Note    NAME: Sebastien Howard   :  1955   MRN:  [id number]     I reviewed with Dr. Barb Ramos about the medical history and the findings on the physical examination. I discussed with Dr. Barb Ramos the patient's diagnosis and concur with the plan. Interim Hospital Summary: 60 y.o. female whom presented on 2020 with      Assessment / Plan:  called Access Resource Line: [phone] in regards bed availability. I was informed that there was no request made for bed request. However, our CM confirmed me that the request was made it. At this point,  I will wait to hear from CM for transfer. Altered mental status secondary tobenzodiazapine withdrawal POA (resolved)  Hx of Chronic benzodiazapine use  - pt is alert and orient x 3. Patient had previously been taking Xanax 1 mg QID and then began to have difficulty obtaining the medication. Mental status returned to baseline after receiving IV Ativan in ED    Decreased Xanax dose to 1 mg BID    UDS +benzos and THC    Appreciate psychiatry input; recommending IP detox    CM working on placement     Denies SI/HI     Hypokalemia   - K 3.3 on ; ordered BMP check for this morning     Anxiety  Depression  - mood stable    Continue amitriptyline, gabapentin, and effexor     GERD  - Symptomatic care; currently denies any discomfort     HLD  - continue with Tricor    HTN  - Continue ASA, Coreg, HCTZ, and losartan     Hx of breast cancer  - CT of Chest and ABD/PEL:  New sclerosis and mild expansion of the sternum suspicious for metastasis    Was following with Dr. Arley Giles at [address] [place] [address].  Advised continue to follow up with Dr. Chelsea Feng as outpatient     30.0 - 39.9 Obese / Body mass index is 33.28 kg/m².     Code status: Full  Prophylaxis: Hep SQ  Recommended Disposition: IP psych    Code status: Full  Prophylaxis: SCD's  Recommended Disposition: transfer to impatient psychiatry when the bed is avilable     Subjective:     Chief Complaint / Sherrilyn Bumps for Physician Visit  \"I feel fine, low back pain\". Tylenol to be given. Discussed with RN events overnight. Review of Systems:  Symptom Y/N Comments  Symptom Y/N Comments   Fever/Chills n   Chest Pain n    Poor Appetite    Edema     Cough    Abdominal Pain     Sputum    Joint Pain     SOB/LOBO n   Pruritis/Rash     Nausea/vomit n   Tolerating PT/OT     Diarrhea n   Tolerating Diet     Constipation n   Other       Could NOT obtain due to:      Objective:     VITALS:   Last 24hrs VS reviewed since prior progress note. Most recent are:  Patient Vitals for the past 24 hrs:   Temp Pulse Resp BP SpO2   01/30/20 2301 97.9 °F (36.6 °C) 89 18 149/84 97 %   01/30/20 1923 97.9 °F (36.6 °C) 89 18 115/63 96 %   01/30/20 1520 98.3 °F (36.8 °C) 95 18 119/77 99 %   01/30/20 1312 97.4 °F (36.3 °C) 98 16 113/83 100 %   01/30/20 0936  98  109/78    01/30/20 0720 97.6 °F (36.4 °C) (!) 101 16 (!) 120/93 97 %     No intake or output data in the 24 hours ending 01/31/20 0710     PHYSICAL EXAM:  General: WD, WN. Alert, cooperative, no acute distress    EENT:  EOMI. Anicteric sclerae. MMM  Resp:  CTA bilaterally, no wheezing or rales. No accessory muscle use  CV:  Regular  rhythm,  No edema  GI:  Soft, Non distended, Non tender. +Bowel sounds  Neurologic:  Alert and oriented X 3, normal speech,   Psych:   Good insight. Not anxious nor agitated. Flat affect  Skin:  No rashes.   No jaundice    Reviewed most current lab test results and cultures  YES  Reviewed most current radiology test results   YES  Review and summation of old records today    NO  Reviewed patient's current orders and MAR    YES  PMH/SH reviewed - no change compared to H&P  ________________________________________________________________________  Care Plan discussed with:    Comments   Patient y    Family      RN y    Care Manager     Consultant                        Multidiciplinary team rounds were held today with , nursing, pharmacist and clinical coordinator. Patient's plan of care was discussed; medications were reviewed and discharge planning was addressed. ________________________________________________________________________  Ely Barrett NP     Procedures: see electronic medical records for all procedures/Xrays and details which were not copied into this note but were reviewed prior to creation of Plan. LABS:  I reviewed today's most current labs and imaging studies. Pertinent labs include:  No results for input(s): WBC, HGB, HCT, PLT, HGBEXT, HCTEXT, PLTEXT in the last 72 hours. No results for input(s): NA, K, CL, CO2, GLU, BUN, CREA, CA, MG, PHOS, ALB, TBIL, TBILI, SGOT, ALT, INR, INREXT in the last 72 hours.     Signed: )Ely Barrett NP

## 2020-01-31 NOTE — PROGRESS NOTES
Nutrition Assessment:    INTERVENTIONS/RECOMMENDATIONS:   Continue regular diet     ASSESSMENT:   Chart reviewed. Pt reports good appetite and consuming 100% of meals. Per CM note, plan is to transfer inpatient psych. No nutrition interventions at this time. Diet Order: Regular  % Eaten:  No data found. Pertinent Medications: [x]Reviewed: folic acid, thiamine,   Pertinent Labs: [x]Reviewed: K+ 3.3  Food Allergies: [x]NKFA  []Other   Last BM:  1/28  Edema:  n/a   []RUE   []LUE   []RLE   []LLE      Pressure Ulcer:  n/a  [] Stage I   [] Stage II   [] Stage III   [] Stage IV      Anthropometrics: Height: 5' 5\" (165.1 cm) Weight: 95 kg (209 lb 7 oz)    IBW (%IBW):   ( ) UBW (%UBW):   (  %)    BMI: Body mass index is 34.85 kg/m². This BMI is indicative of:  []Underweight   []Normal   []Overweight   [x] Obesity   [] Extreme Obesity (BMI>40)  Last Weight Metrics:  Weight Loss Metrics 1/31/2020 12/28/2019 12/25/2019 6/20/2019 4/4/2018 3/9/2018 3/2/2018   Today's Wt 209 lb 7 oz 180 lb 213 lb 10 oz 207 lb 14.3 oz 194 lb 6.4 oz 195 lb 196 lb   BMI 34.85 kg/m2 29.95 kg/m2 36.67 kg/m2 34.6 kg/m2 32.35 kg/m2 32.45 kg/m2 32.62 kg/m2   Some encounter information is confidential and restricted. Go to Review Flowsheets activity to see all data. Estimated Nutrition Needs (Based on): 4666 Kcals/day(BMR: 1425 x 1.1) , 70 g(0.8 g/kg) Protein  Carbohydrate: At Least 130 g/day  Fluids: 1625 mL/day or per primary team    NUTRITION DIAGNOSES:   Problem:  No nutritional diagnosis at this time      Etiology: related to       Signs/Symptoms: as evidenced by      Previous Nutrition Dx:  [] Resolved  [] Unresolved           [] Progressing    NUTRITION INTERVENTIONS:  Meals/Snacks: General/healthful diet                  GOAL:   consume >75% of meals in 5-7 days    NUTRITION MONITORING AND EVALUATION      Food/Nutrient Intake Outcomes:  Total energy intake  Physical Signs/Symptoms Outcomes: Weight/weight change, Electrolyte and renal profile, GI    Previous Goal Met:   [x] Met              [] Progressing Towards Goal              [] Not Progressing Towards Goal   Previous Recommendations:   [] Implemented          [] Not Implemented          [x] Not Applicable    LEARNING NEEDS (Diet, Food/Nutrient-Drug Interaction):    [x] None Identified   [] Identified and Education Provided/Documented   [] Identified and Pt declined/was not appropriate     Cultural, Baptism, OR Ethnic Dietary Needs:    [x] None Identified   [] Identified and Addressed     [x] Interdisciplinary Care Plan Reviewed/Documented    [x] Discharge Planning: General healthy diet    [] Participated in Interdisciplinary Rounds    NUTRITION RISK:    [] High              [] Moderate           [x]  Low  []  Minimal/Uncompromised      Catalino Wise RDN  Pager [phone]  Weekend Pager [phone]

## 2020-01-31 NOTE — PROGRESS NOTES
Bedside and Verbal shift change report given to [facility] (oncoming nurse) by Nicki Borrego (offgoing nurse). Report included the following information SBAR, Kardex, Intake/Output and MAR.  Patient rested well this night sleeping at long intervals, complained of generalized back pain relieved by Toradol 30mg IM

## 2020-02-01 ENCOUNTER — HOSPITAL ENCOUNTER (INPATIENT)
Age: 65
LOS: 4 days | Discharge: HOME | DRG: 885 | End: 2020-02-05
Attending: PSYCHIATRY & NEUROLOGY | Admitting: PSYCHIATRY & NEUROLOGY
Payer: MEDICARE

## 2020-02-01 VITALS
SYSTOLIC BLOOD PRESSURE: 110 MMHG | WEIGHT: 209.22 LBS | RESPIRATION RATE: 18 BRPM | DIASTOLIC BLOOD PRESSURE: 73 MMHG | OXYGEN SATURATION: 97 % | HEART RATE: 95 BPM | BODY MASS INDEX: 34.86 KG/M2 | TEMPERATURE: 97.7 F | HEIGHT: 65 IN

## 2020-02-01 DIAGNOSIS — F41.9 ANXIETY: Primary | ICD-10-CM

## 2020-02-01 DIAGNOSIS — F13.931 BENZODIAZEPINE WITHDRAWAL WITH DELIRIUM (HCC): ICD-10-CM

## 2020-02-01 PROBLEM — F32.9 MAJOR DEPRESSION: Status: ACTIVE | Noted: 2020-02-01

## 2020-02-01 LAB
ANION GAP SERPL CALC-SCNC: 6 MMOL/L (ref 5–15)
BUN SERPL-MCNC: 26 MG/DL (ref 6–20)
BUN/CREAT SERPL: 25 (ref 12–20)
CALCIUM SERPL-MCNC: 9.3 MG/DL (ref 8.5–10.1)
CHLORIDE SERPL-SCNC: 103 MMOL/L (ref 97–108)
CO2 SERPL-SCNC: 27 MMOL/L (ref 21–32)
CREAT SERPL-MCNC: 1.03 MG/DL (ref 0.55–1.02)
GLUCOSE SERPL-MCNC: 140 MG/DL (ref 65–100)
POTASSIUM SERPL-SCNC: 3.7 MMOL/L (ref 3.5–5.1)
SODIUM SERPL-SCNC: 136 MMOL/L (ref 136–145)

## 2020-02-01 PROCEDURE — 65220000003 HC RM SEMIPRIVATE PSYCH

## 2020-02-01 PROCEDURE — 74011250637 HC RX REV CODE- 250/637: Performed by: GENERAL ACUTE CARE HOSPITAL

## 2020-02-01 PROCEDURE — 74011250637 HC RX REV CODE- 250/637: Performed by: NURSE PRACTITIONER

## 2020-02-01 PROCEDURE — 36415 COLL VENOUS BLD VENIPUNCTURE: CPT

## 2020-02-01 PROCEDURE — 74011250637 HC RX REV CODE- 250/637: Performed by: INTERNAL MEDICINE

## 2020-02-01 PROCEDURE — 94760 N-INVAS EAR/PLS OXIMETRY 1: CPT

## 2020-02-01 PROCEDURE — 74011250636 HC RX REV CODE- 250/636: Performed by: GENERAL ACUTE CARE HOSPITAL

## 2020-02-01 PROCEDURE — 80048 BASIC METABOLIC PNL TOTAL CA: CPT

## 2020-02-01 RX ORDER — CARVEDILOL 12.5 MG/1
12.5 TABLET ORAL 2 TIMES DAILY WITH MEALS
Status: DISCONTINUED | OUTPATIENT
Start: 2020-02-01 | End: 2020-02-05 | Stop reason: HOSPADM

## 2020-02-01 RX ORDER — HALOPERIDOL 5 MG/ML
5 INJECTION INTRAMUSCULAR
Status: DISCONTINUED | OUTPATIENT
Start: 2020-02-01 | End: 2020-02-05 | Stop reason: HOSPADM

## 2020-02-01 RX ORDER — BENZTROPINE MESYLATE 1 MG/1
1 TABLET ORAL
Status: DISCONTINUED | OUTPATIENT
Start: 2020-02-01 | End: 2020-02-05 | Stop reason: HOSPADM

## 2020-02-01 RX ORDER — DIPHENHYDRAMINE HYDROCHLORIDE 50 MG/ML
50 INJECTION, SOLUTION INTRAMUSCULAR; INTRAVENOUS
Status: DISCONTINUED | OUTPATIENT
Start: 2020-02-01 | End: 2020-02-05 | Stop reason: HOSPADM

## 2020-02-01 RX ORDER — AMITRIPTYLINE HYDROCHLORIDE 50 MG/1
50 TABLET, FILM COATED ORAL
Status: DISCONTINUED | OUTPATIENT
Start: 2020-02-01 | End: 2020-02-05 | Stop reason: HOSPADM

## 2020-02-01 RX ORDER — OLANZAPINE 5 MG/1
5 TABLET ORAL
Status: DISCONTINUED | OUTPATIENT
Start: 2020-02-01 | End: 2020-02-05 | Stop reason: HOSPADM

## 2020-02-01 RX ORDER — FOLIC ACID 1 MG/1
1 TABLET ORAL DAILY
Status: DISCONTINUED | OUTPATIENT
Start: 2020-02-02 | End: 2020-02-05 | Stop reason: HOSPADM

## 2020-02-01 RX ORDER — ALPRAZOLAM 1 MG/1
1 TABLET ORAL 2 TIMES DAILY
Status: DISCONTINUED | OUTPATIENT
Start: 2020-02-01 | End: 2020-02-05 | Stop reason: HOSPADM

## 2020-02-01 RX ORDER — LANOLIN ALCOHOL/MO/W.PET/CERES
100 CREAM (GRAM) TOPICAL DAILY
Status: DISCONTINUED | OUTPATIENT
Start: 2020-02-02 | End: 2020-02-05 | Stop reason: HOSPADM

## 2020-02-01 RX ORDER — LOSARTAN POTASSIUM 50 MG/1
50 TABLET ORAL DAILY
Status: DISCONTINUED | OUTPATIENT
Start: 2020-02-02 | End: 2020-02-05 | Stop reason: HOSPADM

## 2020-02-01 RX ORDER — GUAIFENESIN 100 MG/5ML
81 LIQUID (ML) ORAL DAILY
Status: DISCONTINUED | OUTPATIENT
Start: 2020-02-02 | End: 2020-02-05 | Stop reason: HOSPADM

## 2020-02-01 RX ORDER — ALPRAZOLAM 1 MG/1
1 TABLET ORAL 2 TIMES DAILY
Qty: 10 TAB | Refills: 0 | Status: ON HOLD
Start: 2020-02-01 | End: 2020-10-24 | Stop reason: SDUPTHER

## 2020-02-01 RX ORDER — GABAPENTIN 100 MG/1
100 CAPSULE ORAL 3 TIMES DAILY
Status: DISCONTINUED | OUTPATIENT
Start: 2020-02-01 | End: 2020-02-04

## 2020-02-01 RX ORDER — IBUPROFEN 200 MG
1 TABLET ORAL DAILY
Status: DISCONTINUED | OUTPATIENT
Start: 2020-02-02 | End: 2020-02-05 | Stop reason: HOSPADM

## 2020-02-01 RX ORDER — ADHESIVE BANDAGE
30 BANDAGE TOPICAL DAILY PRN
Status: DISCONTINUED | OUTPATIENT
Start: 2020-02-01 | End: 2020-02-05 | Stop reason: HOSPADM

## 2020-02-01 RX ORDER — TRAZODONE HYDROCHLORIDE 50 MG/1
50 TABLET ORAL
Status: DISCONTINUED | OUTPATIENT
Start: 2020-02-01 | End: 2020-02-05 | Stop reason: HOSPADM

## 2020-02-01 RX ORDER — HYDROCHLOROTHIAZIDE 25 MG/1
25 TABLET ORAL DAILY
Status: DISCONTINUED | OUTPATIENT
Start: 2020-02-02 | End: 2020-02-05 | Stop reason: HOSPADM

## 2020-02-01 RX ORDER — FENOFIBRATE 145 MG/1
145 TABLET, COATED ORAL DAILY
Status: DISCONTINUED | OUTPATIENT
Start: 2020-02-02 | End: 2020-02-05 | Stop reason: HOSPADM

## 2020-02-01 RX ORDER — LORAZEPAM 2 MG/ML
1 INJECTION INTRAMUSCULAR
Status: DISCONTINUED | OUTPATIENT
Start: 2020-02-01 | End: 2020-02-05 | Stop reason: HOSPADM

## 2020-02-01 RX ORDER — ACETAMINOPHEN 325 MG/1
650 TABLET ORAL
Status: DISCONTINUED | OUTPATIENT
Start: 2020-02-01 | End: 2020-02-03

## 2020-02-01 RX ORDER — HYDROXYZINE 50 MG/1
50 TABLET, FILM COATED ORAL
Status: DISCONTINUED | OUTPATIENT
Start: 2020-02-01 | End: 2020-02-05 | Stop reason: HOSPADM

## 2020-02-01 RX ORDER — VENLAFAXINE 100 MG/1
200 TABLET ORAL 2 TIMES DAILY WITH MEALS
Status: DISCONTINUED | OUTPATIENT
Start: 2020-02-01 | End: 2020-02-02

## 2020-02-01 RX ORDER — THERA TABS 400 MCG
1 TAB ORAL DAILY
Status: DISCONTINUED | OUTPATIENT
Start: 2020-02-02 | End: 2020-02-05 | Stop reason: HOSPADM

## 2020-02-01 RX ADMIN — HYDROCHLOROTHIAZIDE 25 MG: 25 TABLET ORAL at 09:08

## 2020-02-01 RX ADMIN — AMITRIPTYLINE HYDROCHLORIDE 50 MG: 50 TABLET, FILM COATED ORAL at 20:34

## 2020-02-01 RX ADMIN — GABAPENTIN 100 MG: 100 CAPSULE ORAL at 20:34

## 2020-02-01 RX ADMIN — FOLIC ACID 1 MG: 1 TABLET ORAL at 09:09

## 2020-02-01 RX ADMIN — LOSARTAN POTASSIUM 50 MG: 25 TABLET ORAL at 09:08

## 2020-02-01 RX ADMIN — ALPRAZOLAM 1 MG: 1 TABLET ORAL at 20:34

## 2020-02-01 RX ADMIN — Medication 10 ML: at 14:00

## 2020-02-01 RX ADMIN — ALPRAZOLAM 1 MG: 0.5 TABLET ORAL at 09:07

## 2020-02-01 RX ADMIN — ASPIRIN 81 MG: 81 TABLET ORAL at 09:09

## 2020-02-01 RX ADMIN — GABAPENTIN 100 MG: 100 CAPSULE ORAL at 09:07

## 2020-02-01 RX ADMIN — Medication 100 MG: at 09:07

## 2020-02-01 RX ADMIN — HEPARIN SODIUM 5000 UNITS: 5000 INJECTION INTRAVENOUS; SUBCUTANEOUS at 04:34

## 2020-02-01 RX ADMIN — VENLAFAXINE 200 MG: 37.5 TABLET ORAL at 09:53

## 2020-02-01 RX ADMIN — FENOFIBRATE 145 MG: 145 TABLET ORAL at 09:07

## 2020-02-01 RX ADMIN — CARVEDILOL 12.5 MG: 12.5 TABLET, FILM COATED ORAL at 09:08

## 2020-02-01 NOTE — PROGRESS NOTES
FRIDA: Inpatient Psych @ [address] contacted the [facility] transfer center ([phone]) informed that they have no beds available for pt at this time. Pt has verbalized that her preference from placement is at [facility] since her outpatient psychiatrist (Dr. Pedro Denney) works for their outpatient office. However, they still have no beds available at this time. CM contacted [facility] and spoke with Erica Wooten ([phone]) who reported that they will review the chart, speaking with hospitalist as needed, and call back. They currently have beds available but need to review with their MD. Plan to call back CM shortly. Per discussion with NP, if pt refuses inpatient psych admission to another facility since [facility] is not available - will likely need TDO.     1:20PM UPDATE  Pt is agreeable to discharging to [facility] for inpatient psych, will arrange for discharge today. NP & nursing to complete EMTALA. CM called [facility] - transport arranged for 4:30PM. CM confirmed with [facility] that they are able to accept pt this afternoon. Pt will need to schedule PCP, Mental Health, and psychiatry f/u appointments after discharge from inpatient psych. All information entered into pt AVS.     Pt has no additional CM needs at this time. Accepting MD: Dr. Becky Lloyd working with physician for admission [facility])  Room  Bed #2  Call Report: [phone]  [facility] [phone]    Care Management Interventions  PCP Verified by CM: Yes  Palliative Care Criteria Met (RRAT>21 & CHF Dx)?: No  Mode of Transport at Discharge: BLS([facility])  Transition of Care Consult (CM Consult):  Other(Inpatient Psychiatry)  MyChart Signup: No  Discharge Durable Medical Equipment: No  Physical Therapy Consult: No  Occupational Therapy Consult: No  Speech Therapy Consult: No  Current Support Network: Lives Alone  Confirm Follow Up Transport: Self  The Plan for Transition of Care is Related to the Following Treatment Goals : Inpatient Psychiatry  The Patient and/or Patient Representative was Provided with a Choice of Provider and Agrees with the Discharge Plan?: Yes  Freedom of Choice List was Provided with Basic Dialogue that Supports the Patient's Individualized Plan of Care/Goals, Treatment Preferences and Shares the Quality Data Associated with the Providers?: Yes  Discharge Location  Discharge Placement: Psychiatric Unit([facility] Inpatient Psych)    [facility]e in Social Work, [address] [place] [address]  [phone]

## 2020-02-01 NOTE — DISCHARGE SUMMARY
Hospitalist Discharge Summary     Patient ID:  Isael David  [id number]  8 y.o.  1955    PCP on record: Lewis Smith MD    Admit date: 1/27/2020  Discharge date and time: 2/1/2020      DISCHARGE DIAGNOSIS:  Altered mental status secondary tobenzodiazapine withdrawal POA (resolved)  Hx of Chronic benzodiazapine use  Hypokalemia   Anxiety  Depression  GERD  HLD  HTN  Hx of breast cancer  30.0 - 39.9 Obese / Body mass index is 33.28 kg/m².     Code status: Full  Prophylaxis: Hep SQ  Recommended Disposition: IP psych  CONSULTATIONS:  IP CONSULT TO HOSPITALIST  IP CONSULT TO PSYCHIATRY    Excerpted HPI from H&P of Ashlie Addison MD:    Riddhi Lopez is a 60 y.o.  female who presents with CC listed above. When pt initially arrived to the ER, her condition was described to me as her being altered, speaking in 1 word maximum sentences with inappropriate answers. She was also apparently tremulous and experiencing visual hallucinations. She was given Ativan 2mg IV once and her condition has markedly improved. Upon my evaluation at bedside, pt feels feel. She states that she tried to obtain her Xanax pills but had some issue with her bank card and therefore was unable to get them. She was not expecting to have to pay but realizes that because of the new year, she has not met her deductible yet. She denies any illicit drug abuse.   ______________________________________________________________________  DISCHARGE SUMMARY/HOSPITAL COURSE:  for full details see H&P, daily progress notes, labs, consult notes. 60 y.o. WHITE OR  female admitted to [facility] on 1/27/2020 for the treatment of benzo withdrawal. Patient reports she has a history of depression and has been seeing a psychiatrist at [facility] for years. Patient reports being on a total of 4 Xanax daily for over 12 years.  Patient reports that she stopped taking the Xanax and began to withdraw, she reports anxiety, sweating, palpitations, and hallucinations. 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 Patient also reports being diagnosed with cancer and having it metastasize in to her bone. Patient reports several suicide attempts in the past. Patient denies SI/HI/AVH    Patient was seen by Ms. Matthew Henson, psychiatry nurse practitioner who recommend her to continue with inpatient psychiatric treatment for depression, anxiety, and benzo detox. BMP reviewed. No further intervention needed. Pt is medically stable to transfer to [facility]. Accepting physician: Dr. Baldemar Duckworth    Altered mental status secondary tobenzodiazapine withdrawal POA (resolved)  Hx of Chronic benzodiazapine use  - pt is alert and orient x 3. Patient had previously been taking Xanax 1 mg QID and then began to have difficulty obtaining the medication. Decreased Xanax dose to 1 mg BID    UDS +benzos and THC    Appreciate psychiatry input; recommending IP detox    Pt agreed to go [facility] psychiatry unit    Denies SI/HI     Hypokalemia   - K 3.3 on 1`/28 which was repleted     Anxiety  Depression  - mood stable    Continue amitriptyline, gabapentin, and effexor     GERD  - Symptomatic care; currently denies any discomfort     HLD  - continue with Tricor     HTN  - Continue ASA, Coreg, HCTZ, and losartan     Hx of breast cancer  - CT of Chest and ABD/PEL:  New sclerosis and mild expansion of the sternum suspicious for metastasis    Was following with Dr. Sherry Toussaint at [address] [place] [address]. Advised continue to follow up with Dr. Seth Sharp as outpatient     30.0 - 39.9 Obese / Body mass index is 33.28 kg/m².      _______________________________________________________________________  Patient seen and examined by me on discharge day. Pertinent Findings:  Gen:    Not in distress  Chest: Clear lungs  CVS:   Regular rhythm.   No edema  Abd:  Soft, not distended, not tender  Neuro:  Alert, orient x 4  _______________________________________________________________________  DISCHARGE MEDICATIONS:   Current Discharge Medication List      START taking these medications    Details   ALPRAZolam (XANAX) 1 mg tablet Take 1 Tab by mouth two (2) times a day. Max Daily Amount: 2 mg. Qty: 10 Tab, Refills: 0    Associated Diagnoses: Benzodiazepine withdrawal with delirium ([address]         CONTINUE these medications which have NOT CHANGED    Details   thiamine mononitrate (B-1) 100 mg tablet Take 1 Tab by mouth daily. Qty: 60 Tab, Refills: 3      folic acid (FOLVITE) 1 mg tablet Take 1 Tab by mouth daily. Qty: 60 Tab, Refills: 3      venlafaxine (EFFEXOR) 100 mg tablet Take 200 mg by mouth two (2) times a day. hydroCHLOROthiazide (HYDRODIURIL) 25 mg tablet Take 25 mg by mouth daily. carvedilol (COREG) 12.5 mg tablet TAKE 1 TABLET BY MOUTH TWICE A DAY  Qty: 180 Tab, Refills: 3    Comments: **Patient requests 90 days supply**      losartan (COZAAR) 50 mg tablet TAKE 1 TABLET BY MOUTH ONCE DAILY  Qty: 90 Tab, Refills: 5    Comments: **Patient requests 90 days supply**      gabapentin (NEURONTIN) 100 mg capsule take 3 capsules by mouth three times a day  Qty: 270 Cap, Refills: 1    Associated Diagnoses: Other chronic pain      amitriptyline (ELAVIL) 50 mg tablet TAKE 1 TABLET BY MOUTH EVERY EVENING  Qty: 90 Tab, Refills: 0    Comments: **Patient requests 90 days supply**      aspirin delayed-release 81 mg tablet Take 1 Tab by mouth daily. Indications: prevention of transient ischemic attack  Qty: 30 Tab, Refills: 0      naproxen (NAPROSYN) 250 mg tablet Take 250 mg by mouth as needed for Pain. Omeprazole delayed release (PRILOSEC D/R) 20 mg tablet Take 20 mg by mouth daily. cefUROXime (CEFTIN) 500 mg tablet Take 1 Tab by mouth every twelve (12) hours.   Qty: 4 Tab, Refills: 0      fenofibrate micronized (LOFIBRA) 134 mg capsule Take 134 mg by mouth every morning.      multivitamin (ONE A DAY) tablet Take 1 Tab by mouth daily. Qty: 30 Tab, Refills: 1         STOP taking these medications       HYDROcodone-acetaminophen (NORCO) 5-325 mg per tablet Comments:   Reason for Stopping:               My Recommended Diet, Activity, Wound Care, and follow-up labs are listed in the patient's Discharge Insturctions which I have personally completed and reviewed.     _______________________________________________________________________  DISPOSITION:    Home with Family:    Home with HH/PT/OT/RN:    SNF/LTC:    JATIN:    OTHER: y       Condition at Discharge:  Stable to transfer to inpatient psychiatry unit at [facility]  _______________________________________________________________________  Follow up with:   PCP : Yanet Willis MD  Follow-up Information     Follow up With Specialties Details Why Florinda Pruett MD [address] [address] [phone]      Mental health provider   Schedule an appointment as soon as possible for a visit  Ana Laura Rizvi               Total time in minutes spent coordinating this discharge (includes going over instructions, follow-up, prescriptions, and preparing report for sign off to her PCP) :  45 minutes    Signed:  Sukh George NP

## 2020-02-01 NOTE — PROGRESS NOTES
Hospitalist Progress Note    NAME: Vesna Bass   :  1955   MRN:  [id number]     I reviewed with Dr. Clemente Mcclelland about the medical history and the findings on the physical examination. I discussed with Dr. Clemente Mcclelland the patient's diagnosis and concur with the plan. Interim Hospital Summary: 60 y.o. female whom presented on 2020 with      Assessment / Plan:   I called Access Resource Line: [phone] this morning in regards bed availability. I was informed that pt's case has been closed due to [facility] psychiatry unit at full capacity. The day time  informed me that this information was shared to our team at [facility] yesterday. I reviewed Ms. Chana Resendiz note and shared the feedback from [facility] information to our weekend care management team. Our care management team will seek out for other psychiatry facility at this time. We may have to obtain TDO if she refuses to enter other facility for help based on Ms. Kayla Irving, a nurse practitioner from psychiatry team who evaluated the pt on 2020. Altered mental status secondary tobenzodiazapine withdrawal POA (resolved)  Hx of Chronic benzodiazapine use  - pt is alert and orient x 3. Patient had previously been taking Xanax 1 mg QID and then began to have difficulty obtaining the medication. Decreased Xanax dose to 1 mg BID    UDS +benzos and [facility]    Appreciate psychiatry input; recommending IP detox    CM working on transfer to inpatient psychiatry    Denies SI/HI     Hypokalemia   - K 3.3 on ; ordered BMP check  which was not done.  I ordered another BMP to be done today     Anxiety  Depression  - mood stable    Continue amitriptyline, gabapentin, and effexor     GERD  - Symptomatic care; currently denies any discomfort     HLD  - continue with Tricor    HTN  - Continue ASA, Coreg, HCTZ, and losartan     Hx of breast cancer  - CT of Chest and ABD/PEL:  New sclerosis and mild expansion of the sternum suspicious for metastasis    Was following with Dr. Imani Marc at [facility]. Advised continue to follow up with Dr. Haven Clark as outpatient     30.0 - 39.9 Obese / Body mass index is 33.28 kg/m².     Code status: Full  Prophylaxis: Hep SQ  Recommended Disposition: IP psych    Code status: Full  Prophylaxis: SCD's  Recommended Disposition: transfer to impatient psychiatry when the bed is avilable     Subjective:     Chief Complaint / Reason for Physician Visit  \"I don't understand why you guys can give me the xanax and send me home. \" I discussed the most recent psychiatry recommendation. Discussed with RN events overnight. Review of Systems:  Symptom Y/N Comments  Symptom Y/N Comments   Fever/Chills n   Chest Pain n    Poor Appetite    Edema     Cough    Abdominal Pain     Sputum    Joint Pain     SOB/LOBO n   Pruritis/Rash     Nausea/vomit n   Tolerating PT/OT     Diarrhea n   Tolerating Diet     Constipation n   Other       Could NOT obtain due to:      Objective:     VITALS:   Last 24hrs VS reviewed since prior progress note. Most recent are:  Patient Vitals for the past 24 hrs:   Temp Pulse Resp BP SpO2   02/01/20 0750 97.7 °F (36.5 °C) 93 18 112/85 96 %   01/31/20 2310 98.4 °F (36.9 °C) 99 18 109/78 94 %   01/31/20 1935 98.2 °F (36.8 °C) 88 18 125/76 96 %   01/31/20 1556 97.7 °F (36.5 °C) 89 16 (!) 137/97 96 %       Intake/Output Summary (Last 24 hours) at 2/1/2020 0951  Last data filed at 1/31/2020 1740  Gross per 24 hour   Intake 320 ml   Output    Net 320 ml        PHYSICAL EXAM:  General: WD, WN. Alert, cooperative, no acute distress    EENT:  EOMI. Anicteric sclerae. MMM  Resp:  CTA bilaterally, no wheezing or rales. No accessory muscle use  CV:  Regular  rhythm,  No edema  GI:  Soft, Non distended, Non tender. +Bowel sounds  Neurologic:  Alert and oriented X 3, normal speech,   Psych:   Good insight. Not anxious nor agitated. Flat affect  Skin:  No rashes.   No jaundice    Reviewed most current lab test results and cultures  YES  Reviewed most current radiology test results   YES  Review and summation of old records today    NO  Reviewed patient's current orders and MAR    YES  PMH/SH reviewed - no change compared to H&P  ________________________________________________________________________  Care Plan discussed with:    Comments   Patient y    Family      RN y    Care Manager     Consultant                        Multidiciplinary team rounds were held today with , nursing, pharmacist and clinical coordinator. Patient's plan of care was discussed; medications were reviewed and discharge planning was addressed. ________________________________________________________________________  Johanna Fuentes NP     Procedures: see electronic medical records for all procedures/Xrays and details which were not copied into this note but were reviewed prior to creation of Plan. LABS:  I reviewed today's most current labs and imaging studies. Pertinent labs include:  No results for input(s): WBC, HGB, HCT, PLT, HGBEXT, HCTEXT, PLTEXT, HGBEXT, HCTEXT, PLTEXT in the last 72 hours. No results for input(s): NA, K, CL, CO2, GLU, BUN, CREA, CA, MG, PHOS, ALB, TBIL, TBILI, SGOT, ALT, INR, INREXT, INREXT in the last 72 hours.     Signed: )Johanna Fuentes NP

## 2020-02-01 NOTE — DISCHARGE INSTRUCTIONS
Patient Discharge Instructions     Pt Name  Hermelinda Ashley   Date of Birth 1955   Age  60 y.o. Medical Record Number  [id number]   PCP Yoel Yadav MD    Admit date:  1/27/2020 @    [place] [address]    Room Number  1115/01   Date of Discharge 2/1/2020     Admission Diagnoses:     Benzodiazepine withdrawal ([facility]          Allergies   Allergen Reactions    Sulfa (Sulfonamide Antibiotics) Unknown (comments)    Wellbutrin [Bupropion Hcl] Unknown (comments)        You were admitted to [address] for  Benzodiazepine withdrawal ([facility]    [place] (BUT NOT LIMITED TO ):  Present on Admission:   Altered mental status   Anxiety   Depression   GERD (gastroesophageal reflux disease)   HTN (hypertension)   Hypercholesterolemia   Benzodiazepine withdrawal (HCC)   Chronic prescription benzodiazepine use      DIET:  Cardiac Diet       Recommended activity: Activity as tolerated  Follow up : Follow-up Information     Follow up With Specialties Details Why Dedra Melvin MD [place] [id number] [place] [address]  [phone]      Mental health provider   Schedule an appointment as soon as possible for a visit  Ana Laura Rizvi             · It is important that you take the medication exactly as they are prescribed. · Keep your medication in the bottles provided by the pharmacist and keep a list of the medication names, dosages, and times to be taken in your wallet. · Do not take other medications without consulting your doctor. ADDITIONAL INFORMATION: If you experience any of the following symptoms or have any health problem not listed below, then please call your primary care physician or return to the emergency room if you cannot get hold of your doctor: Fever, chills, nausea, vomiting, diarrhea, change in mentation, falling, bleeding, shortness of breath.       I understand that if any problems occur once I am discharged, I am supposed to call my Primary care physician for further care or seek help in the Emergency Department at the nearest Healthcare facility. I have had an opportunity to discuss my clinical issues with my doctor and nursing staff. I understand and acknowledge receipt of the above instructions.                                                                                                                                            Physician's or R.N.'s Signature                                                            Date/Time                                                                                                                                              Patient or Representative Signature                                                 Date/Time

## 2020-02-01 NOTE — PROGRESS NOTES
Oncology End of Shift Note      Bedside shift change report given to ZHANG Salomon (incoming nurse) by Sandra Ruiz RN (outgoing nurse) on [facility]. Report included the following information SBAR, Kardex and Intake/Output. Shift Summary: Pt slept during night, no complaints. Got up this am, ambulatory to bathroom with steady gait to urinate. Issues for Physician to Address:  --     Patient on Cardiac Monitoring?     [] Yes  [x] No    Rhythm:          Shift Events  --      Sandra Ruiz RN

## 2020-02-01 NOTE — PROGRESS NOTES
Problem: Falls - Risk of  Goal: *Absence of Falls  Description  Document [facility] Fall Risk and appropriate interventions in the flowsheet.   Outcome: Progressing Towards Goal  Note: Fall Risk Interventions:  Mobility Interventions: Patient to call before getting OOB    Mentation Interventions: Adequate sleep, hydration, pain control, Door open when patient unattended    Medication Interventions: Patient to call before getting OOB    Elimination Interventions: Call light in reach

## 2020-02-01 NOTE — PROGRESS NOTES
Bedside shift change report given to [facility] DEMI Gibbons (oncoming nurse) by Carrie Cross RN (offgoing nurse). Report included the following information SBAR, Kardex, Intake/Output, MAR and Recent Results.

## 2020-02-01 NOTE — BH NOTES
[facility] Dr. Lorelei Mccurdy for [address]. Awaiting return call back    2912 received telephone call back from Dr. Lorelei Mccurdy.  Advised pt would be seen for H & P

## 2020-02-01 NOTE — PROGRESS NOTES
Problem: Falls - Risk of  Goal: *Absence of Falls  Description  Document Az Rosario Fall Risk and appropriate interventions in the flowsheet.   Outcome: Progressing Towards Goal  Note: Fall Risk Interventions:  Mobility Interventions: Bed/chair exit alarm, Communicate number of staff needed for ambulation/transfer, Utilize walker, cane, or other assistive device    Mentation Interventions: Adequate sleep, hydration, pain control, Bed/chair exit alarm    Medication Interventions: Patient to call before getting OOB, Teach patient to arise slowly    Elimination Interventions: Call light in reach, Bed/chair exit alarm, Toileting schedule/hourly rounds

## 2020-02-01 NOTE — PROGRESS NOTES
Pt was given discharge instructions, she was given time to ask questions, she verbalized understanding. EMTALA completed. Pt left on a stretcher with [facility] to be transferred to [address] [place] [address]. Report called & given to Carrie Ga

## 2020-02-01 NOTE — PROGRESS NOTES
TRANSFER - IN REPORT:    Verbal report received from Leonor Figueroa RN(name) on Aguilar Balzarine  being received from [facility] Medical Oncology(unit) for routine progression of care      Report consisted of patients Situation, Background, Assessment and   Recommendations(SBAR). Information from the following report(s) SBAR, Kardex and Recent Results was reviewed with the receiving nurse. Opportunity for questions and clarification was provided. Assessment completed upon patients arrival to unit and care assumed.

## 2020-02-02 PROCEDURE — 65220000003 HC RM SEMIPRIVATE PSYCH

## 2020-02-02 PROCEDURE — 74011250637 HC RX REV CODE- 250/637: Performed by: NURSE PRACTITIONER

## 2020-02-02 PROCEDURE — 74011250637 HC RX REV CODE- 250/637: Performed by: INTERNAL MEDICINE

## 2020-02-02 PROCEDURE — 74011250637 HC RX REV CODE- 250/637: Performed by: PSYCHIATRY & NEUROLOGY

## 2020-02-02 RX ORDER — VENLAFAXINE 100 MG/1
100 TABLET ORAL
Status: DISCONTINUED | OUTPATIENT
Start: 2020-02-02 | End: 2020-02-04

## 2020-02-02 RX ORDER — NAPROXEN 250 MG/1
250 TABLET ORAL
Status: DISCONTINUED | OUTPATIENT
Start: 2020-02-02 | End: 2020-02-03

## 2020-02-02 RX ADMIN — ALPRAZOLAM 1 MG: 1 TABLET ORAL at 09:12

## 2020-02-02 RX ADMIN — ASPIRIN 81 MG 81 MG: 81 TABLET ORAL at 09:12

## 2020-02-02 RX ADMIN — THERA TABS 1 TABLET: TAB at 09:13

## 2020-02-02 RX ADMIN — VENLAFAXINE 200 MG: 100 TABLET ORAL at 09:12

## 2020-02-02 RX ADMIN — LOSARTAN POTASSIUM 50 MG: 50 TABLET, FILM COATED ORAL at 09:12

## 2020-02-02 RX ADMIN — GABAPENTIN 100 MG: 100 CAPSULE ORAL at 17:31

## 2020-02-02 RX ADMIN — AMITRIPTYLINE HYDROCHLORIDE 50 MG: 50 TABLET, FILM COATED ORAL at 21:22

## 2020-02-02 RX ADMIN — NAPROXEN 250 MG: 250 TABLET ORAL at 18:38

## 2020-02-02 RX ADMIN — GABAPENTIN 100 MG: 100 CAPSULE ORAL at 21:22

## 2020-02-02 RX ADMIN — FENOFIBRATE 145 MG: 145 TABLET ORAL at 09:12

## 2020-02-02 RX ADMIN — FOLIC ACID 1 MG: 1 TABLET ORAL at 09:12

## 2020-02-02 RX ADMIN — TRAZODONE HYDROCHLORIDE 50 MG: 50 TABLET ORAL at 22:29

## 2020-02-02 RX ADMIN — CARVEDILOL 12.5 MG: 12.5 TABLET, FILM COATED ORAL at 09:12

## 2020-02-02 RX ADMIN — ALPRAZOLAM 1 MG: 1 TABLET ORAL at 17:31

## 2020-02-02 RX ADMIN — GABAPENTIN 100 MG: 100 CAPSULE ORAL at 09:13

## 2020-02-02 RX ADMIN — ACETAMINOPHEN 650 MG: 325 TABLET ORAL at 09:12

## 2020-02-02 RX ADMIN — VENLAFAXINE 100 MG: 100 TABLET ORAL at 17:31

## 2020-02-02 RX ADMIN — HYDROCHLOROTHIAZIDE 25 MG: 25 TABLET ORAL at 09:12

## 2020-02-02 RX ADMIN — Medication 100 MG: at 09:12

## 2020-02-02 NOTE — PROGRESS NOTES
Problem: Falls - Risk of  Goal: *Absence of Falls  Description  Document Danisha Salas Fall Risk and appropriate interventions in the flowsheet. Outcome: Progressing Towards Goal  Note: Fall Risk Interventions:  Mobility Interventions: Bed/chair exit alarm, Communicate number of staff needed for ambulation/transfer, Patient to call before getting OOB    Mentation Interventions: Adequate sleep, hydration, pain control, Door open when patient unattended, Room close to nurse's station    Medication Interventions: Patient to call before getting OOB, Teach patient to arise slowly    Elimination Interventions: Toilet paper/wipes in reach, Toileting schedule/hourly rounds    History of Falls Interventions: Door open when patient unattended, Room close to nurse's station         Problem: Depressed Mood (Adult/Pediatric)  Goal: *STG: Participates in treatment plan  Outcome: Progressing Towards Goal  Note:   Pt is pleasant calm and cooperative   Alert and oriented X 4   Denies S/I  NAD noted   Will continue to monitor.       Problem: Depressed Mood (Adult/Pediatric)  Goal: *STG: Remains safe in hospital  Outcome: Progressing Towards Goal     Problem: Depressed Mood (Adult/Pediatric)  Goal: *STG: Complies with medication therapy  Outcome: Progressing Towards Goal

## 2020-02-02 NOTE — GROUP NOTE
[facility] GROUP DOCUMENTATION INDIVIDUAL Group Therapy Note Date: 2/1/2020 Group Start Time: 2100Group End Time:2130Group Topic: Recreational/Music Therapy [facility] ANITA Coronel  GROUP DOCUMENTATION GROUP Group Therapy Note Attendees:  
  
 
Attendance: Attended Patient's Goal: decrease anxiety Interventions/techniques: Supported Follows Directions: Followed directions Interactions: Interacted appropriately Mental Status: Calm Behavior/appearance: Cooperative Goals Achieved: Able to engage in interactions Additional Notes:  Pt was able to verbalize feeling ,emotional support given Miguelina Solano LPN

## 2020-02-02 NOTE — PROGRESS NOTES
Problem: Depressed Mood (Adult/Pediatric)  Goal: *STG: Participates in treatment plan  Outcome: Progressing Towards Goal     Problem: Depressed Mood (Adult/Pediatric)  Goal: *STG: Attends activities and groups  Outcome: Progressing Towards Goal     Problem: Depressed Mood (Adult/Pediatric)  Goal: *STG: Remains safe in hospital  Outcome: Progressing Towards Goal     Problem: Depressed Mood (Adult/Pediatric)  Goal: *STG: Complies with medication therapy  Outcome: Progressing Towards Goal  Note:   0900 Pt calm and pleasant, alert and oriented. Denies depression/SI. Med compliant. No distress noted. Will monitor with Q 15 safety checks.

## 2020-02-02 NOTE — BH NOTES
PRN Medication Documentation    Specific patient behavior that led to need for PRN medication: back pain   Staff interventions attempted prior to PRN being given: reposition  PRN medication given: Naproxyn PO given   Patient response/effectiveness of PRN medication: will ask next shift to reassess

## 2020-02-02 NOTE — PROGRESS NOTES
Problem: Falls - Risk of  Goal: *Absence of Falls  Description  Document Az Rosario Fall Risk and appropriate interventions in the flowsheet.   Outcome: Progressing Towards Goal  Note: Fall Risk Interventions:  Mobility Interventions: Bed/chair exit alarm, Communicate number of staff needed for ambulation/transfer    Mentation Interventions: Adequate sleep, hydration, pain control, Bed/chair exit alarm, Door open when patient unattended, More frequent rounding    Medication Interventions: Bed/chair exit alarm, Teach patient to arise slowly    Elimination Interventions: Bed/chair exit alarm

## 2020-02-02 NOTE — CONSULTS
Hospitalist History and Physical  Hermes Child MD  Answering service: [phone] OR 36 from in house phone        Date of Service:  2020  NAME:  Dayami Sidhu  :  1955  MRN:  [id number]  Primary Care Provider: Isael Rock MD    Chief Complaint: No chief complaint on file. History of Present Illness: Dayami Sidhu is a 60 y.o. female who was brought from [facility] due to benzodiazepine withdrawal.  Patient is awake alert and oriented, able to answer my question follow my request.  Data also obtained in the ED staff, extensive chart review and nursing staff. As per history, patient has extensive history of depression and has been seeing a psychiatrist at outside facility for which she has been getting Xanax every day. She takes about 4 Xanax tablets every day for last 12 years and a few days prior to admission at [address] [place] [address], she stopped taking Xanax as she did not get her prescription refilled. She became anxious, was reporting palpitations, hallucinations, sweating, and was withdrawing. Patient has history of breast cancer which is also metastasizing and patient has history of suicidal ideations and attempt in the past but currently she is denying any SI/HI. Due to her confusion and acute encephalopathy, she is brought to the ER at [facility] [address] where she was diagnosed with benzodiazepine withdrawal and was given Ativan 2 mg and after which she improved significantly. Thorough work-up was done, no other source of confusion was noted and patient symptoms improved when she was giving benzodiazepines. Patient was then transferred from [facility] acute psych facility for further admission and examination.   Patient is currently hemodynamically stable, offering no new complaints, denies any chest pain, nausea, vomiting, complains of back pain for sitting in the bed for too long, has no abdominal pain no headache, blurring of vision, no loss of vision or any other medical concerns. Chart reviewed at length. Review of Systems:  Pertinent positives noted in HPI. All other systems were reviewed and are negative. Past Medical and surgical history:   Past Medical History:   Diagnosis Date    Acute hyponatremia 6/18/2019    Anxiety     Bimalleolar fracture of left ankle 2/3/2016    Comminuted and displaced     Cancer [facility])     breast    Closed left ankle fracture 2/4/2016    Depression     Gastroenteritis due to norovirus 2/21/2018    GERD (gastroesophageal reflux disease)     HTN (hypertension)     Hypercholesterolemia     Hypotension 9/12/2012    Metastatic breast cancer ([facility] [address] 5/3/2017    Neoplastic malignant related fatigue 4/4/2018    Pain due to neoplasm 4/4/2018    Secondary cancer of bone ([facility] 5/3/2017    Sepsis ([facility] [address] 2/12/2018    Urinary tract infection without hematuria 2/13/2018      Past Surgical History:   Procedure Laterality Date    BREAST SURGERY PROCEDURE UNLISTED  march 13    carina masectomy       Home medications:  Prior to Admission medications    Medication Sig Start Date End Date Taking? Authorizing Provider   ALPRAZolam Cleve Brisk) 1 mg tablet Take 1 Tab by mouth two (2) times a day. Max Daily Amount: 2 mg. 2/1/20   Sukh Velazquez, BATSHEVA   naproxen (NAPROSYN) 250 mg tablet Take 250 mg by mouth as needed for Pain. Aleida Dumont MD   Omeprazole delayed release (PRILOSEC D/R) 20 mg tablet Take 20 mg by mouth daily. 1/2/20   Aleida Dumont MD   cefUROXime (CEFTIN) 500 mg tablet Take 1 Tab by mouth every twelve (12) hours. 12/26/19   Jad Aguirre,    thiamine mononitrate (B-1) 100 mg tablet Take 1 Tab by mouth daily. 12/25/19   Jad Aguirre DO   folic acid (FOLVITE) 1 mg tablet Take 1 Tab by mouth daily. 12/25/19   Jad Aguirre DO   venlafaxine (EFFEXOR) 100 mg tablet Take 200 mg by mouth two (2) times a day.     Provider, Historical   hydroCHLOROthiazide (HYDRODIURIL) 25 mg tablet Take 25 mg by mouth daily. Provider, Historical   fenofibrate micronized (LOFIBRA) 134 mg capsule Take 134 mg by mouth every morning. Provider, Historical   carvedilol (COREG) 12.5 mg tablet TAKE 1 TABLET BY MOUTH TWICE A DAY 7/19/19   Harjinder Newby MD   losartan (COZAAR) 50 mg tablet TAKE 1 TABLET BY MOUTH ONCE DAILY 7/19/19   Harjinder Newby MD   gabapentin (NEURONTIN) 100 mg capsule take 3 capsules by mouth three times a day 7/19/19   Harjinder Newby MD   amitriptyline (ELAVIL) 50 mg tablet TAKE 1 TABLET BY MOUTH EVERY EVENING 6/28/19   Harjinder Newby MD   aspirin delayed-release 81 mg tablet Take 1 Tab by mouth daily. Indications: prevention of transient ischemic attack 4/11/18   Harjinder Newby MD   multivitamin (ONE A DAY) tablet Take 1 Tab by mouth daily. 4/11/18   Harjinder Newby MD       Allergies: Allergies   Allergen Reactions    Sulfa (Sulfonamide Antibiotics) Unknown (comments)    Wellbutrin [Bupropion Hcl] Unknown (comments)       Family history:   Family History   Problem Relation Age of Onset    Hypertension Mother     Heart Disease Mother     Depression Mother     Hypertension Father         SOCIAL HISTORY:  Patient resides at Home. Patient ambulates with without any device. Smoking history: reports that she has quit smoking. She has a 10.00 pack-year smoking history. She has never used smokeless tobacco.  Drug History:  reports no history of drug use. Alcohol history:  reports no history of alcohol use. Objective:       Physical Exam:   Visit Vitals  /67   Pulse 95   Temp 97.4 °F (36.3 °C)   Resp 18   Ht 5' 5\" (1.651 m)   Wt 94.8 kg (209 lb)   SpO2 99%   BMI 34.78 kg/m²     General:  Alert, cooperative, no distress, appears stated age. Head:  Normocephalic, without obvious abnormality, atraumatic. Eyes:  Conjunctivae/corneas clear. PERRL, EOMs intact       Nose: Nares normal. Septum midline.    Throat: Lips, mucosa, and tongue normal. Teeth and gums normal.   Neck: Supple, symmetrical, trachea midline, no adenopathy       Lungs:   Clear to auscultation bilaterally. No wheezing rales or rhonchi   Chest wall:  No tenderness or deformity. Heart:  Regular rate and rhythm, S1, S2 normal.       Abdomen:   Soft, non-tender. Bowel sounds normal. No masses,  No organomegaly. Extremities: Extremities normal, atraumatic, no cyanosis or edema. Pulses: 2+ and symmetric all extremities. Skin: Skin color, texture, turgor normal.        Neurologic: CNII-XII intact. Normal strength, sensation and reflexes throughout. Laboratory and other diagnostic Data Review: All diagnostic labs and studies have been reviewed. Ct Head Wo Cont    Result Date: 1/27/2020  EXAM: CT HEAD WO CONT INDICATION: AMS COMPARISON: December 20, 2019. CONTRAST: None. TECHNIQUE: Unenhanced CT of the head was performed using 5 mm images. Brain and bone windows were generated. CT dose reduction was achieved through use of a standardized protocol tailored for this examination and automatic exposure control for dose modulation. FINDINGS: The ventricles and sulci are normal in size, shape and configuration and midline. There is no significant white matter disease. There is no intracranial hemorrhage, extra-axial collection, mass, mass effect or midline shift. The basilar cisterns are open. No acute infarct is identified. The bone windows demonstrate no abnormalities. The visualized portions of the paranasal sinuses and mastoid air cells are clear. IMPRESSION: No acute intracranial process. No significant change from the prior study. Patient Vitals for the past 12 hrs:   Temp Pulse Resp BP SpO2   02/01/20 2044  95  113/67 99 %   02/01/20 1730 97.4 °F (36.3 °C) 100 18 (!) 125/101 100 %       No results for input(s): WBC, HGB, HCT, PLT, HGBEXT, HCTEXT, PLTEXT in the last 72 hours.   Recent Labs     02/01/20  1416      K 3.7      CO2 27   BUN 26*   CREA 1.03* *   CA 9.3     No results for input(s): SGOT, GPT, ALT, AP, TBIL, TBILI, TP, ALB, GLOB, GGT, AML, LPSE in the last 72 hours. No lab exists for component: AMYP, HLPSE  No results for input(s): INR, PTP, APTT, INREXT in the last 72 hours. No results for input(s): FE, TIBC, PSAT, FERR in the last 72 hours. No results found for: FOL, RBCF   No results for input(s): PH, PCO2, PO2 in the last 72 hours. No results for input(s): CPK, CKNDX, TROIQ in the last 72 hours. No lab exists for component: CPKMB  No results found for: CHOL, CHOLX, CHLST, CHOLV, HDL, HDLP, LDL, LDLC, DLDLP, TGLX, TRIGL, TRIGP, CHHD, CHHDX  Lab Results   Component Value Date/Time    Glucose POC 83 06/14/2011 10:33 AM     Lab Results   Component Value Date/Time    Color YELLOW/STRAW 01/27/2020 09:59 AM    Appearance CLOUDY (A) 01/27/2020 09:59 AM    Specific gravity 1.026 01/27/2020 09:59 AM    Specific gravity 1.025 02/12/2018 01:41 PM    pH (UA) 6.5 01/27/2020 09:59 AM    Protein NEGATIVE  01/27/2020 09:59 AM    Glucose NEGATIVE  01/27/2020 09:59 AM    Ketone NEGATIVE  01/27/2020 09:59 AM    Bilirubin NEGATIVE  01/27/2020 09:59 AM    Urobilinogen 0.2 01/27/2020 09:59 AM    Nitrites NEGATIVE  01/27/2020 09:59 AM    Leukocyte Esterase NEGATIVE  01/27/2020 09:59 AM    Epithelial cells MANY (A) 01/27/2020 09:59 AM    Bacteria 1+ (A) 01/27/2020 09:59 AM    WBC 0-4 01/27/2020 09:59 AM    RBC 0-5 01/27/2020 09:59 AM       Assessment:   Given the patient's current clinical presentation, I have a high level of concern for decompensation if discharged from the emergency department. Complex decision making was performed, which includes reviewing the patient's available past medical records, laboratory results, and x-ray films. My assessment of this patient's clinical condition and my plan of care is as follows.       Active Problems:    Major depression (2/1/2020)        Plan:     -Major depressive disorder, recent benzo withdrawalmanagement as per psych     Hyperlipidemiastable, continue [place]     Hypertension, controlled continue HCTZ, losartan, Coreg     Hypokalemiaresolved     history of alcohol abuse, no ongoing signs or symptoms of alcohol withdrawal, continue folate, multivitamins, thiamine     History of breast cancer: CT of the chest and abdomen pelvis reveals new sclerosis and mild expansion of the sternum, suspicious for metastasis, patient follows with Dr. Cirilo Miles at [address] [place] [address]. I will consult oncology for examination and for continuity of care.  Obesity, BMI is Body mass index is 34.78 kg/m². Weight loss recommended    Hospitalist signing off, please call if questions.     Signed By: Jenni Dubon MD     02/01/20  9:23 PM        Patient's emergency contacts:  Extended Emergency Contact Information  Primary Emergency Contact: Patient,Listed No One  Home Phone: [phone]  Relation: Unknown

## 2020-02-02 NOTE — BH NOTES
Primary Nurse Kate Dunn RN and Jose D Brunner RN performed a dual skin assessment on this patient No impairment noted  Adama score is 23    Pt has hx of bilateral mastectomy some swelling noted on left lower arm  Several small bruises noted on left lower abdominal area  Port catheter on right upper chest area    Pt is calm and cooperative during admission process. Pt currently denies any si/hi. Pt denies any ah/vh currently. Pt admits to using marijuana every few months. Denies any alcohol abuse.

## 2020-02-02 NOTE — INTERDISCIPLINARY ROUNDS
Behavioral Health Interdisciplinary Rounds Patient Name: Shirlee Severs  Age: 60 y.o. Room/Bed:  744/ Primary Diagnosis: <principal problem not specified> Admission Status: Voluntary Readmission within 30 days: no 
Power of  in place: no 
Patient requires a blocked bed: no          Reason for blocked bed: VTE Prophylaxis: No 
 
Mobility needs/Fall risk: yes Flu Vaccine : yes Nutritional Plan: no 
Consults: Oncology Labs/Testing due today?: no 
 
Sleep hours: 6 Participation in Care/Groups:  yes Medication Compliant?: Yes PRNS (last 24 hours): None Restraints (last 24 hours):  no 
  
CIWA (range last 24 hours): COWS (range last 24 hours): Alcohol screening (AUDIT) completed - If applicable, date SBIRT discussed in treatment team AND documented:  
AUDIT Screen Score: Document Brief Intervention (corresponds directly with the 5 A's, Ask, Advise, Assess, Assist, and Arrange): At- Risk Patients (Score 7-15 for women; 8-15 for men) Discuss concern patient is drinking at unhealthy levels known to increase risk of alcohol-related health problems. Is Patient ready to commit to change? If No: 
? Encourage reflection ? Discuss short term and long term health risks of consuming alcohol ? Barriers to change ? Reaffirm willingness to help / Educational materials provided If Yes: 
? Set goal 
? Plan 
? Educational materials provided Harmful use or Dependence (Score 16 or greater) ? Discuss short term and long term health risks of consuming alcohol ? Recommendations ? Negotiate drinking goal 
? Recommend addiction specialist/center ? Arrange follow-up appointments. Tobacco - patient is a smoker: Have You Used Tobacco in the Past 30 Days: Yes Illegal Drugs use: Have You Used Any Illegal Substances Over the Past 12 Months: Yes 
 
24 hour chart check complete: yes Patient goal(s) for today:  
Treatment team focus/goals: Progress note LOS:  1  Expected LOS:  
 
Financial concerns/prescription coverage:   
Family contact:      
Family requesting physician contact today:   
Discharge plan: Access to weapons :        
Outpatient provider(s):  
Patient's preferred phone number for follow up call :  
 
Participating treatment team members:  Perfecto Bishop, * (assigned SW),

## 2020-02-02 NOTE — MASTER TREATMENT PLAN
[address] [facility] 60 Master Treatment Plan for Emmy Pickens Date Treatment Plan Initiated: 2/2/20 Treatment Plan Modalities: 
Type of Modality Amount (x minutes) Frequency (x/week) Duration (x days) Name of Responsible Staff Community & wrap-up meetings to encourage peer interactions [address] [place] [address] [address]kw[address] Group psychotherapy to assist in building coping skills and internal controls 60 7 [address] [facility] Therapeutic activity groups to build coping skills 60 7 1 Alireza Pisano Psychoeducation in group setting to address:  
Medication education [facility] Coping skills Relaxation techniques Symptom management Discharge planning [address] [place] [address] 71 [facility] [address] [facility] Jan Speck 60 1 1 volunteer Recovery/AA/NA 
    volunteer Physician medication management 15 7 1 Dr. Neel Carl Family meeting/discharge planning [facility] Deepti Troy

## 2020-02-03 PROCEDURE — 65220000003 HC RM SEMIPRIVATE PSYCH

## 2020-02-03 PROCEDURE — 74011250637 HC RX REV CODE- 250/637: Performed by: PSYCHIATRY & NEUROLOGY

## 2020-02-03 PROCEDURE — 74011250637 HC RX REV CODE- 250/637: Performed by: NURSE PRACTITIONER

## 2020-02-03 PROCEDURE — 74011250637 HC RX REV CODE- 250/637: Performed by: INTERNAL MEDICINE

## 2020-02-03 RX ORDER — NAPROXEN 250 MG/1
250 TABLET ORAL
Status: DISCONTINUED | OUTPATIENT
Start: 2020-02-03 | End: 2020-02-05 | Stop reason: HOSPADM

## 2020-02-03 RX ORDER — ACETAMINOPHEN 325 MG/1
650 TABLET ORAL
Status: DISCONTINUED | OUTPATIENT
Start: 2020-02-03 | End: 2020-02-05 | Stop reason: HOSPADM

## 2020-02-03 RX ADMIN — VENLAFAXINE 100 MG: 100 TABLET ORAL at 09:20

## 2020-02-03 RX ADMIN — VENLAFAXINE 100 MG: 100 TABLET ORAL at 17:16

## 2020-02-03 RX ADMIN — VENLAFAXINE 100 MG: 100 TABLET ORAL at 11:05

## 2020-02-03 RX ADMIN — ALPRAZOLAM 1 MG: 1 TABLET ORAL at 17:16

## 2020-02-03 RX ADMIN — FOLIC ACID 1 MG: 1 TABLET ORAL at 09:19

## 2020-02-03 RX ADMIN — ACETAMINOPHEN 650 MG: 325 TABLET ORAL at 15:59

## 2020-02-03 RX ADMIN — ALPRAZOLAM 1 MG: 1 TABLET ORAL at 09:19

## 2020-02-03 RX ADMIN — CARVEDILOL 12.5 MG: 12.5 TABLET, FILM COATED ORAL at 17:25

## 2020-02-03 RX ADMIN — LOSARTAN POTASSIUM 50 MG: 50 TABLET, FILM COATED ORAL at 09:19

## 2020-02-03 RX ADMIN — GABAPENTIN 100 MG: 100 CAPSULE ORAL at 09:19

## 2020-02-03 RX ADMIN — FENOFIBRATE 145 MG: 145 TABLET ORAL at 09:21

## 2020-02-03 RX ADMIN — THERA TABS 1 TABLET: TAB at 09:19

## 2020-02-03 RX ADMIN — GABAPENTIN 100 MG: 100 CAPSULE ORAL at 17:16

## 2020-02-03 RX ADMIN — AMITRIPTYLINE HYDROCHLORIDE 50 MG: 50 TABLET, FILM COATED ORAL at 20:56

## 2020-02-03 RX ADMIN — HYDROCHLOROTHIAZIDE 25 MG: 25 TABLET ORAL at 09:20

## 2020-02-03 RX ADMIN — NAPROXEN 250 MG: 250 TABLET ORAL at 11:05

## 2020-02-03 RX ADMIN — TRAZODONE HYDROCHLORIDE 50 MG: 50 TABLET ORAL at 20:57

## 2020-02-03 RX ADMIN — ASPIRIN 81 MG 81 MG: 81 TABLET ORAL at 09:20

## 2020-02-03 RX ADMIN — GABAPENTIN 100 MG: 100 CAPSULE ORAL at 20:56

## 2020-02-03 RX ADMIN — Medication 100 MG: at 09:20

## 2020-02-03 RX ADMIN — CARVEDILOL 12.5 MG: 12.5 TABLET, FILM COATED ORAL at 09:23

## 2020-02-03 NOTE — BH NOTES
PSYCHOSOCIAL ASSESSMENT  :Patient identifying info: Dee Dee Solis is a 60 y.o., female admitted 2020  6:02 PM     Presenting problem and precipitating factors: Pt was admitted voluntarily to [facility] as transfer from [address] [place] [address] for SI related to breast cancer. She was brought to the ER  confusion and acute encephalopathy and was diagnosed with benzodiazepine withdrawal and was given Ativan 2 mg and after which she improved significantly. Mental status assessment: alert, oriented, calm, cooperative. Strengths: voluntary, stable housing    Collateral information: none indicated    Current psychiatric /substance abuse providers and contact info: Dr Valentine Manriquez at [facility]    Previous psychiatric/substance abuse providers and response to treatment:  Dr Valentine Manriquez at [address] [facility] history of mental illness or substance abuse: none inidicated    Substance abuse history:  cannabis  Social History     Tobacco Use    Smoking status: Former Smoker     Packs/day: 0.50     Years: 20.00     Pack years: 10.00    Smokeless tobacco: Never Used   Substance Use Topics    Alcohol use: No       History of biomedical complications associated with substance abuse : none inidicated    Patient's current acceptance of treatment or motivation for change: voluntary    Family constellation: none inidicated    Is significant other involved?  No    Describe support system: limited    Describe living arrangements and home environment: lives alone    Health issues:   Hospital Problems  Date Reviewed: 2020          Codes Class Noted POA    Major depression ICD-10-CM: F32.9  ICD-9-CM: 296.20  2020 Unknown              Trauma history: none inidicated    Legal issues: none inidicated    History of  service: no    Financial status: not assessed    Yazidi/cultural factors: Uatsdin    Education/work history: retired nurse     Have you been licensed as a health care professional (current or ):     Leisure and recreation preferences: not assessed    Describe coping skills: toshia Avalos  2/3/2020

## 2020-02-03 NOTE — BH NOTES
Admission reviewed for medical necessity. Will follow with care [facility].

## 2020-02-03 NOTE — PROGRESS NOTES
Problem: Depressed Mood (Adult/Pediatric)  Goal: *STG: Remains safe in hospital  Outcome: Progressing Towards Goal  Note:   Out  in milieu with peers during meal time. Cooperative. Expresses some anxiety over concerns of paying rent. Writer has provided number to leasing office for pt to contact staff.

## 2020-02-03 NOTE — BH NOTES
GROUP THERAPY PROGRESS NOTE    Reginaldo Medina did not participate in a Process Group on the Geriatric Unit or the Process Group on the General Unit today.

## 2020-02-03 NOTE — H&P
INITIAL PSYCHIATRIC EVALUATION            IDENTIFICATION:    Patient Name  Tamela Zimmerman   Date of Birth 1955   [facility] [id number]   Medical Record Number  [id number]      Age  60 y.o. PCP Charles Baeza MD   Admit date:  2/1/2020    Room Number  [phone]  @ [address]   Date of Service  2/2/2020            HISTORY         REASON FOR HOSPITALIZATION:  Benzodiazepine w/d. HISTORY OF PRESENT ILLNESS:    The patient, Tamela Zimmerman, is a 60 y.o. WHITE [place]  female with a past psychiatric history significant for depression and anxiety who follows up with Dr. uYe Godinez at [facility]. Pt was admitted from [facility]. She takes about 4 Xanax tablets every day for last 12 years and a few days prior to admission at [facility], she stopped taking Xanax as she did not get her prescription refilled. She became anxious, had palpitations, hallucinations, sweating, and was withdrawing. Patient has history of breast cancer which is also metastasizing and patient has history of suicidal ideations and attempt in the past but currently she is denying any SI/HI. Due to her confusion and acute encephalopathy, she was brought to the ER at [facility] where she was diagnosed with benzodiazepine withdrawal and was given Ativan 2 mg and after which she improved significantly. Xanax was then restarted at BID dose and she seems to be doing better. 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 Says her chemo was stopped due to decreased EF. Says she is following at [facility] every 3 months now. She denies alcohol use but smokes Mj occasionally. Notes some back pain. Denies SI at present. Lives alone.     ALLERGIES:   Allergies   Allergen Reactions    Sulfa (Sulfonamide Antibiotics) Unknown (comments)    Wellbutrin [Bupropion Hcl] Unknown (comments)      MEDICATIONS PRIOR TO ADMISSION:   Medications Prior to Admission   Medication Sig    ALPRAZolam (XANAX) 1 mg tablet Take 1 Tab by mouth two (2) times a day. Max Daily Amount: 2 mg.  naproxen (NAPROSYN) 250 mg tablet Take 250 mg by mouth as needed for Pain.  Omeprazole delayed release (PRILOSEC D/R) 20 mg tablet Take 20 mg by mouth daily.  cefUROXime (CEFTIN) 500 mg tablet Take 1 Tab by mouth every twelve (12) hours.  thiamine mononitrate (B-1) 100 mg tablet Take 1 Tab by mouth daily.  folic acid (FOLVITE) 1 mg tablet Take 1 Tab by mouth daily.  venlafaxine (EFFEXOR) 100 mg tablet Take 200 mg by mouth two (2) times a day.  hydroCHLOROthiazide (HYDRODIURIL) 25 mg tablet Take 25 mg by mouth daily.  fenofibrate micronized (LOFIBRA) 134 mg capsule Take 134 mg by mouth every morning.  carvedilol (COREG) 12.5 mg tablet TAKE 1 TABLET BY MOUTH TWICE A DAY    losartan (COZAAR) 50 mg tablet TAKE 1 TABLET BY MOUTH ONCE DAILY    gabapentin (NEURONTIN) 100 mg capsule take 3 capsules by mouth three times a day    amitriptyline (ELAVIL) 50 mg tablet TAKE 1 TABLET BY MOUTH EVERY EVENING    aspirin delayed-release 81 mg tablet Take 1 Tab by mouth daily. Indications: prevention of transient ischemic attack    multivitamin (ONE A DAY) tablet Take 1 Tab by mouth daily.       PAST MEDICAL HISTORY:   Past Medical History:   Diagnosis Date    Acute hyponatremia 6/18/2019    Anxiety     Bimalleolar fracture of left ankle 2/3/2016    Comminuted and displaced     Cancer [facility])     breast    Closed left ankle fracture 2/4/2016    Depression     Gastroenteritis due to norovirus 2/21/2018    GERD (gastroesophageal reflux disease)     HTN (hypertension)     Hypercholesterolemia     Hypotension 9/12/2012    Metastatic breast cancer ([address] 5/3/2017    Neoplastic malignant related fatigue 4/4/2018    Pain due to neoplasm 4/4/2018    Secondary cancer of bone ([address] 5/3/2017    Sepsis ([address] 2/12/2018    Urinary tract infection without hematuria 2/13/2018     Past Surgical History:   Procedure Laterality Date    BREAST SURGERY PROCEDURE UNLISTED  march 13 carina masectomy      SOCIAL HISTORY:   Social History     Socioeconomic History    Marital status: SINGLE     Spouse name: Not on file    Number of children: Not on file    Years of education: Not on file    Highest education level: Not on file   Occupational History    Not on file   Social Needs    Financial resource strain: Not on file    Food insecurity:     Worry: Not on file     Inability: Not on file    Transportation needs:     Medical: Not on file     Non-medical: Not on file   Tobacco Use    Smoking status: Former Smoker     Packs/day: 0.50     Years: 20.00     Pack years: 10.00    Smokeless tobacco: Never Used   Substance and Sexual Activity    Alcohol use: No    Drug use: No    Sexual activity: Never   Lifestyle    Physical activity:     Days per week: Not on file     Minutes per session: Not on file    Stress: Not on file   Relationships    Social connections:     Talks on phone: Not on file     Gets together: Not on file     Attends [employer] service: Not on file     Active member of club or organization: Not on file     Attends meetings of clubs or organizations: Not on file     Relationship status: Not on file    Intimate partner violence:     Fear of current or ex partner: Not on file     Emotionally abused: Not on file     Physically abused: Not on file     Forced sexual activity: Not on file   Other Topics Concern     Service Not Asked    Blood Transfusions Not Asked    Caffeine Concern Not Asked    Occupational Exposure Not Asked   [place] Not Asked    Sleep Concern Not Asked    Stress Concern Not Asked    Weight Concern Not Asked    Special Diet Not Asked    Back Care Not Asked    Exercise Not Asked    Bike Helmet Not Asked   [address] Not Asked    Self-Exams Not Asked   Social History Narrative    63year old  female admitted for depression after suicide attempt on Xanax and opiods. Py is follwed by DR. Hazel Contreras on the outside.  She has breast and bone cancer. She is living by herself with no family supports. FAMILY HISTORY: History reviewed. Family History   Problem Relation Age of Onset    Hypertension Mother     Heart Disease Mother     Depression Mother     Hypertension Father        REVIEW OF SYSTEMS:   As above. All other Systems reviewed and are considered negative. MENTAL STATUS EXAM & VITALS             Mental Status exam:   Oriented X4. Mood: ok. Affect: Constricted/calm. Normal speech. Denies SI/HI. no delusions. no hallucinations. Thought process logical and goal directed. Concentration limited. Insight/judgement fair.        VITALS:     Patient Vitals for the past 24 hrs:   Temp Pulse Resp BP SpO2   02/02/20 2021 97.9 °F (36.6 °C) 100 16 95/59 99 %   02/02/20 1725 98 °F (36.7 °C) (!) 108 16 111/63    02/02/20 1526 98 °F (36.7 °C) 83 16  96 %   02/02/20 1214 97.6 °F (36.4 °C) 93 16 104/70 97 %   02/02/20 0736 97.5 °F (36.4 °C) (!) 109 16 108/76 96 %     Wt Readings from Last 3 Encounters:   02/01/20 94.8 kg (209 lb)   02/01/20 94.9 kg (209 lb 3.5 oz)   12/28/19 81.6 kg (180 lb)     Temp Readings from Last 3 Encounters:   02/02/20 97.9 °F (36.6 °C)   02/01/20 97.7 °F (36.5 °C)   01/08/20 98.4 °F (36.9 °C) (Temporal)     BP Readings from Last 3 Encounters:   02/02/20 95/59   02/01/20 110/73   01/08/20 130/62     Pulse Readings from Last 3 Encounters:   02/02/20 100   02/01/20 95   01/08/20 84            DATA     LABORATORY DATA:  Labs Reviewed - No data to display  Admission on 01/27/2020, Discharged on 02/01/2020   Component Date Value Ref Range Status    Ventricular Rate 01/27/2020 100  BPM Final    Atrial Rate 01/27/2020 100  BPM Final    P-R Interval 01/27/2020 150  ms Final    QRS Duration 01/27/2020 68  ms Final    Q-T Interval 01/27/2020 352  ms Final    QTC Calculation (Bezet) 01/27/2020 454  ms Final    Calculated R Axis 01/27/2020 50  degrees Final    Calculated T Axis 01/27/2020 72  degrees Final  Diagnosis 01/27/2020    Final                    Value:Baseline artifact  Sinus rhythm with premature supraventricular complexes  Nonspecific ST and T wave abnormality  Confirmed by Rubina Arriaga ([id number]) on 1/27/2020 9:10:10 AM      WBC 01/27/2020 11.3* 3.6 - 11.0 K/uL Final    RBC 01/27/2020 4.55  3.80 - 5.20 M/uL Final    HGB 01/27/2020 14.2  11.5 - 16.0 g/dL Final    HCT 01/27/2020 40.7  35.0 - 47.0 % Final    MCV 01/27/2020 89.5  80.0 - 99.0 FL Final    MCH 01/27/2020 31.2  26.0 - 34.0 PG Final    MCHC 01/27/2020 34.9  30.0 - 36.5 g/dL Final    RDW 01/27/2020 12.4  11.5 - 14.5 % Final    PLATELET 39/53/3519 628  150 - 400 K/uL Final    MPV 01/27/2020 11.0  8.9 - 12.9 FL Final    NRBC 01/27/2020 0.0  0  WBC Final    ABSOLUTE NRBC 01/27/2020 0.00  0.00 - 0.01 K/uL Final    NEUTROPHILS 01/27/2020 76* 32 - 75 % Final    LYMPHOCYTES 01/27/2020 16  12 - 49 % Final    MONOCYTES 01/27/2020 7  5 - 13 % Final    EOSINOPHILS 01/27/2020 0  0 - 7 % Final    BASOPHILS 01/27/2020 1  0 - 1 % Final    IMMATURE GRANULOCYTES 01/27/2020 0  0.0 - 0.5 % Final    ABS. NEUTROPHILS 01/27/2020 8.6* 1.8 - 8.0 K/UL Final    ABS. LYMPHOCYTES 01/27/2020 1.8  0.8 - 3.5 K/UL Final    ABS. MONOCYTES 01/27/2020 0.8  0.0 - 1.0 K/UL Final    ABS. EOSINOPHILS 01/27/2020 0.0  0.0 - 0.4 K/UL Final    ABS. BASOPHILS 01/27/2020 0.1  0.0 - 0.1 K/UL Final    ABS. IMM.  GRANS. 01/27/2020 0.1* 0.00 - 0.04 K/UL Final    DF 01/27/2020 AUTOMATED    Final    Sodium 01/27/2020 135* 136 - 145 mmol/L Final    Potassium 01/27/2020 3.2* 3.5 - 5.1 mmol/L Final    Chloride 01/27/2020 101  97 - 108 mmol/L Final    CO2 01/27/2020 23  21 - 32 mmol/L Final    Anion gap 01/27/2020 11  5 - 15 mmol/L Final    Glucose 01/27/2020 137* 65 - 100 mg/dL Final    BUN 01/27/2020 16  6 - 20 MG/DL Final    Creatinine 01/27/2020 1.11* 0.55 - 1.02 MG/DL Final    BUN/Creatinine ratio 01/27/2020 14  12 - 20   Final    GFR est AA 01/27/2020 >60  >60 ml/min/1.73m2 Final    GFR est non-AA 01/27/2020 50* >60 ml/min/1.73m2 Final    Calcium 01/27/2020 9.6  8.5 - 10.1 MG/DL Final    Color 01/27/2020 YELLOW/STRAW    Final    Appearance 01/27/2020 CLOUDY* CLEAR   Final    Specific gravity 01/27/2020 1.026  1.003 - 1.030   Final    pH (UA) 01/27/2020 6.5  5.0 - 8.0   Final    Protein 01/27/2020 NEGATIVE   NEG mg/dL Final    Glucose 01/27/2020 NEGATIVE   NEG mg/dL Final    Ketone 01/27/2020 NEGATIVE   NEG mg/dL Final    Bilirubin 01/27/2020 NEGATIVE   NEG   Final    Blood 01/27/2020 NEGATIVE   NEG   Final    Urobilinogen 01/27/2020 0.2  0.2 - 1.0 EU/dL Final    Nitrites 01/27/2020 NEGATIVE   NEG   Final    Leukocyte Esterase 01/27/2020 NEGATIVE   NEG   Final    WBC 01/27/2020 0-4  0 - 4 /hpf Final    RBC 01/27/2020 0-5  0 - 5 /hpf Final    Epithelial cells 01/27/2020 MANY* FEW /lpf Final    Bacteria 01/27/2020 1+* NEG /hpf Final    UA:UC IF INDICATED 01/27/2020 CULTURE NOT INDICATED BY UA RESULT  CNI   Final    CA Oxalate crystals 01/27/2020 3+* NEG Final    CK 01/27/2020 83  26 - 192 U/L Final    AMPHETAMINES 01/27/2020 NEGATIVE   NEG   Final    BARBITURATES 01/27/2020 NEGATIVE   NEG   Final    BENZODIAZEPINES 01/27/2020 POSITIVE* NEG   Final    COCAINE 01/27/2020 NEGATIVE   NEG   Final    METHADONE 01/27/2020 NEGATIVE   NEG   Final    OPIATES 01/27/2020 NEGATIVE   NEG   Final    PCP(PHENCYCLIDINE) 01/27/2020 NEGATIVE   NEG   Final    THC (TH-CANNABINOL) 01/27/2020 POSITIVE* NEG   Final    Drug screen comment 01/27/2020 (NOTE)   Final    Salicylate level 43/64/8680 5.3  2.8 - 20.0 MG/DL Final    Acetaminophen level 01/27/2020 <2* 10 - 30 ug/mL Final    Sodium 01/28/2020 139  136 - 145 mmol/L Final    Potassium 01/28/2020 3.3* 3.5 - 5.1 mmol/L Final    Chloride 01/28/2020 107  97 - 108 mmol/L Final    CO2 01/28/2020 25  21 - 32 mmol/L Final    Anion gap 01/28/2020 7  5 - 15 mmol/L Final    Glucose 01/28/2020 103* 65 - 100 mg/dL Final    BUN 01/28/2020 14  6 - 20 MG/DL Final    Creatinine 01/28/2020 0.78  0.55 - 1.02 MG/DL Final    BUN/Creatinine ratio 01/28/2020 18  12 - 20   Final    GFR est AA 01/28/2020 >60  >60 ml/min/1.73m2 Final    GFR est non-AA 01/28/2020 >60  >60 ml/min/1.73m2 Final    Calcium 01/28/2020 8.9  8.5 - 10.1 MG/DL Final    Magnesium 01/28/2020 1.8  1.6 - 2.4 mg/dL Final    Phosphorus 01/28/2020 4.0  2.6 - 4.7 MG/DL Final    Sodium 02/01/2020 136  136 - 145 mmol/L Final    Potassium 02/01/2020 3.7  3.5 - 5.1 mmol/L Final    Chloride 02/01/2020 103  97 - 108 mmol/L Final    CO2 02/01/2020 27  21 - 32 mmol/L Final    Anion gap 02/01/2020 6  5 - 15 mmol/L Final    Glucose 02/01/2020 140* 65 - 100 mg/dL Final    BUN 02/01/2020 26* 6 - 20 MG/DL Final    Creatinine 02/01/2020 1.03* 0.55 - 1.02 MG/DL Final    BUN/Creatinine ratio 02/01/2020 25* 12 - 20   Final    GFR est AA 02/01/2020 >60  >60 ml/min/1.73m2 Final    GFR est non-AA 02/01/2020 54* >60 ml/min/1.73m2 Final    Calcium 02/01/2020 9.3  8.5 - 10.1 MG/DL Final        RADIOLOGY REPORTS:  Results from Hospital Encounter encounter on 12/20/19   XR ELBOW RT MIN 3 V    Narrative EXAM: XR ELBOW RT MIN 3 V    INDICATION: s/p fall. Right elbow pain. COMPARISON: None. FINDINGS: Three views of the right elbow demonstrate no fracture, dislocation,  effusion or other acute abnormality. Impression IMPRESSION: No acute abnormality. Xr Elbow Rt Min 3 V    Result Date: 12/21/2019  EXAM: XR ELBOW RT MIN 3 V INDICATION: s/p fall. Right elbow pain. COMPARISON: None. FINDINGS: Three views of the right elbow demonstrate no fracture, dislocation, effusion or other acute abnormality. IMPRESSION: No acute abnormality. Ct Head Wo Cont    Result Date: 1/27/2020  EXAM: CT HEAD WO CONT INDICATION: AMS COMPARISON: December 20, 2019. CONTRAST: None. TECHNIQUE: Unenhanced CT of the head was performed using 5 mm images.  Brain and bone windows were generated. CT dose reduction was achieved through use of a standardized protocol tailored for this examination and automatic exposure control for dose modulation. FINDINGS: The ventricles and sulci are normal in size, shape and configuration and midline. There is no significant white matter disease. There is no intracranial hemorrhage, extra-axial collection, mass, mass effect or midline shift. The basilar cisterns are open. No acute infarct is identified. The bone windows demonstrate no abnormalities. The visualized portions of the paranasal sinuses and mastoid air cells are clear. IMPRESSION: No acute intracranial process. No significant change from the prior study. Ct Head Wo Cont    Result Date: 12/20/2019  EXAM: CT HEAD WO CONT INDICATION: Confusion/delirium, altered LOC, unexplained COMPARISON: 9/13/2012. CONTRAST: None. TECHNIQUE: Unenhanced CT of the head was performed using 5 mm images. Brain and bone windows were generated. CT dose reduction was achieved through use of a standardized protocol tailored for this examination and automatic exposure control for dose modulation. FINDINGS: The ventricles and sulci are normal in size, shape and configuration and midline. There is no significant white matter disease. There is no intracranial hemorrhage, extra-axial collection, mass, mass effect or midline shift. The basilar cisterns are open. No acute infarct is identified. The bone windows demonstrate no abnormalities. The visualized portions of the paranasal sinuses and mastoid air cells are clear. IMPRESSION: No acute abnormality. Ct Chest Wo Cont    Result Date: 12/20/2019  EXAM: CT CHEST WO CONT, CT ABD PELV WO CONT INDICATION: Shortness of breath; sob with leukocystosis and tachcyardia. eval for pna. History of breast cancer COMPARISON: Radiographs same day. Chest CT 8/30/2012 CONTRAST: None TECHNIQUE: Thin axial images were obtained through the chest, abdomen and pelvis.  Coronal and sagittal reconstructions were generated. Oral contrast was not administered. CT dose reduction was achieved through use of a standardized protocol tailored for this examination and automatic exposure control for dose modulation. FINDINGS: A right-sided Port-A-Cath is in place. THYROID: No nodule. MEDIASTINUM: No mass or lymphadenopathy. NOLVIA: No mass or lymphadenopathy. THORACIC AORTA: No  aneurysm. MAIN PULMONARY ARTERY: Normal in caliber. TRACHEA/BRONCHI: Patent. ESOPHAGUS: No wall thickening or dilatation. HEART: Normal in size. PLEURA: No effusion or pneumothorax. LUNGS: No nodule, mass, or airspace disease. A calcified granuloma is seen in the right lower lobe. LIVER: No mass or biliary dilatation. Several calcifications likely related to prior granulomatous disease. Hepatic steatosis. GALLBLADDER: Several calcifications likely related to prior granulomatous disease. SPLEEN: No mass. PANCREAS: No mass or ductal dilatation. ADRENALS: Unremarkable. KIDNEYS: No mass, calculus, or hydronephrosis. STOMACH: Unremarkable. SMALL BOWEL: No dilatation or wall thickening. COLON: No dilatation or wall thickening. APPENDIX: Unremarkable. PERITONEUM: No ascites or pneumoperitoneum. RETROPERITONEUM: No lymphadenopathy or aortic aneurysm. REPRODUCTIVE ORGANS: Unremarkable URINARY BLADDER: No mass or calculus. BONES: There is sclerosis of the mid sternum with mild expansion that is new since 8/30/2012. This is best appreciated on the sagittal. ADDITIONAL COMMENTS: N/A     IMPRESSION: 1. No acute abnormality. 2. New sclerosis and mild expansion of the sternum suspicious for metastasis. Recommend bone scan for further evaluation. Ct Abd Pelv Wo Cont    Result Date: 12/20/2019  EXAM: CT CHEST WO CONT, CT ABD PELV WO CONT INDICATION: Shortness of breath; sob with leukocystosis and tachcyardia. eval for pna. History of breast cancer COMPARISON: Radiographs same day.  Chest CT 8/30/2012 CONTRAST: None TECHNIQUE: Thin axial images were obtained through the chest, abdomen and pelvis. Coronal and sagittal reconstructions were generated. Oral contrast was not administered. CT dose reduction was achieved through use of a standardized protocol tailored for this examination and automatic exposure control for dose modulation. FINDINGS: A right-sided Port-A-Cath is in place. THYROID: No nodule. MEDIASTINUM: No mass or lymphadenopathy. NOLVIA: No mass or lymphadenopathy. THORACIC AORTA: No  aneurysm. MAIN PULMONARY ARTERY: Normal in caliber. TRACHEA/BRONCHI: Patent. ESOPHAGUS: No wall thickening or dilatation. HEART: Normal in size. PLEURA: No effusion or pneumothorax. LUNGS: No nodule, mass, or airspace disease. A calcified granuloma is seen in the right lower lobe. LIVER: No mass or biliary dilatation. Several calcifications likely related to prior granulomatous disease. Hepatic steatosis. GALLBLADDER: Several calcifications likely related to prior granulomatous disease. SPLEEN: No mass. PANCREAS: No mass or ductal dilatation. ADRENALS: Unremarkable. KIDNEYS: No mass, calculus, or hydronephrosis. STOMACH: Unremarkable. SMALL BOWEL: No dilatation or wall thickening. COLON: No dilatation or wall thickening. APPENDIX: Unremarkable. PERITONEUM: No ascites or pneumoperitoneum. RETROPERITONEUM: No lymphadenopathy or aortic aneurysm. REPRODUCTIVE ORGANS: Unremarkable URINARY BLADDER: No mass or calculus. BONES: There is sclerosis of the mid sternum with mild expansion that is new since 8/30/2012. This is best appreciated on the sagittal. ADDITIONAL COMMENTS: N/A     IMPRESSION: 1. No acute abnormality. 2. New sclerosis and mild expansion of the sternum suspicious for metastasis. Recommend bone scan for further evaluation. Xr Chest Port    Result Date: 12/20/2019  Chest portable AP History: Short of breath. Tachycardia. Comparison: 6/18/2019 Findings: A right IJ Port-A-Cath is in place.  Cardiac monitoring leads overlie the chest The lungs are well expanded. No focal consolidation, pleural effusion, or pneumothorax. The cardiomediastinal silhouette is unremarkable. The visualized osseous structures are unremarkable. Surgical clips are seen in the left axilla. Impression: No acute cardiopulmonary process.              MEDICATIONS       ALL MEDICATIONS  Current Facility-Administered Medications   Medication Dose Route Frequency    venlafaxine (EFFEXOR) tablet 100 mg  100 mg Oral TID WITH MEALS    naproxen (NAPROSYN) tablet 250 mg  250 mg Oral BID PRN    OLANZapine (ZyPREXA) tablet 5 mg  5 mg Oral Q6H PRN    haloperidol lactate (HALDOL) injection 5 mg  5 mg IntraMUSCular Q6H PRN    benztropine (COGENTIN) tablet 1 mg  1 mg Oral BID PRN    diphenhydrAMINE (BENADRYL) injection 50 mg  50 mg IntraMUSCular BID PRN    hydroxyzine HCL (ATARAX) tablet 50 mg  50 mg Oral TID PRN    LORazepam (ATIVAN) injection 1 mg  1 mg IntraMUSCular Q4H PRN    traZODone (DESYREL) tablet 50 mg  50 mg Oral QHS PRN    acetaminophen (TYLENOL) tablet 650 mg  650 mg Oral Q4H PRN    magnesium hydroxide (MILK OF MAGNESIA) 400 mg/5 mL oral suspension 30 mL  30 mL Oral DAILY PRN    nicotine (NICODERM CQ) 14 mg/24 hr patch 1 Patch  1 Patch TransDERmal DAILY    ALPRAZolam (XANAX) tablet 1 mg  1 mg Oral BID    gabapentin (NEURONTIN) capsule 100 mg  100 mg Oral TID    amitriptyline (ELAVIL) tablet 50 mg  50 mg Oral QHS    aspirin chewable tablet 81 mg  81 mg Oral DAILY    folic acid (FOLVITE) tablet 1 mg  1 mg Oral DAILY    hydroCHLOROthiazide (HYDRODIURIL) tablet 25 mg  25 mg Oral DAILY    losartan (COZAAR) tablet 50 mg  50 mg Oral DAILY    thiamine HCL (B-1) tablet 100 mg  100 mg Oral DAILY    carvediloL (COREG) tablet 12.5 mg  12.5 mg Oral BID WITH MEALS    fenofibrate nanocrystallized (TRICOR) tablet 145 mg  145 mg Oral DAILY    therapeutic multivitamin (THERAGRAN) tablet 1 Tab  1 Tab Oral DAILY      SCHEDULED MEDICATIONS  Current Facility-Administered Medications Medication Dose Route Frequency    venlafaxine (EFFEXOR) tablet 100 mg  100 mg Oral TID WITH MEALS    nicotine (NICODERM CQ) 14 mg/24 hr patch 1 Patch  1 Patch TransDERmal DAILY    ALPRAZolam (XANAX) tablet 1 mg  1 mg Oral BID    gabapentin (NEURONTIN) capsule 100 mg  100 mg Oral TID    amitriptyline (ELAVIL) tablet 50 mg  50 mg Oral QHS    aspirin chewable tablet 81 mg  81 mg Oral DAILY    folic acid (FOLVITE) tablet 1 mg  1 mg Oral DAILY    hydroCHLOROthiazide (HYDRODIURIL) tablet 25 mg  25 mg Oral DAILY    losartan (COZAAR) tablet 50 mg  50 mg Oral DAILY    thiamine HCL (B-1) tablet 100 mg  100 mg Oral DAILY    carvediloL (COREG) tablet 12.5 mg  12.5 mg Oral BID WITH MEALS    fenofibrate nanocrystallized (TRICOR) tablet 145 mg  145 mg Oral DAILY    therapeutic multivitamin (THERAGRAN) tablet 1 Tab  1 Tab Oral DAILY                ASSESSMENT & PLAN        The patient Gonsalo Boone is a 60 y.o.  female who presents at this time for treatment of the following diagnoses:    GILBERT  MDD Recurrent  moderate  Benzodiazepine w/d      Continue Xanax bid. Continue effexor which has helped per pt. Add naprosyn  for pain. Risks and benefits of medications discussed and patient gave verbal informed consent for treatment with medications. Disposition planning to continue. I certify that this patients inpatient psychiatric hospital services furnished since the previous certification were, and continue to be, required for treatment that could reasonably be expected to improve the patient's condition, or for diagnostic study, and that the patient continues to need, on a daily basis, active treatment furnished directly by or requiring the supervision of inpatient psychiatric facility personnel. In addition, the hospital records show that services furnished were intensive treatment services, admission or related services, or equivalent services.         ESTIMATED LENGTH OF STAY:    3-5 days SIGNED:    Michael Marie MD  2/2/2020

## 2020-02-03 NOTE — PROGRESS NOTES
Problem: Falls - Risk of  Goal: *Absence of Falls  Description  Document Tia Manus Fall Risk and appropriate interventions in the flowsheet.   Outcome: Progressing Towards Goal  Note: Fall Risk Interventions:  Mobility Interventions: Bed/chair exit alarm, Communicate number of staff needed for ambulation/transfer, Utilize walker, cane, or other assistive device    Mentation Interventions: Bed/chair exit alarm, Door open when patient unattended    Medication Interventions: Bed/chair exit alarm, Patient to call before getting OOB    Elimination Interventions: Bed/chair exit alarm, Patient to call for help with toileting needs    History of Falls Interventions: Door open when patient unattended, Bed/chair exit alarm

## 2020-02-03 NOTE — BH NOTES
PRN Medication Documentation    Specific patient behavior that led to need for PRN medication: lower back pain  Staff interventions attempted prior to PRN being given: rest, decrease in stimuli, dimming of lights, etc  PRN medication given:  mg of tylenol  Patient response/effectiveness of PRN medication:    1800 pt appears to be resting quietly in bed.

## 2020-02-03 NOTE — INTERDISCIPLINARY ROUNDS
Behavioral Health Interdisciplinary Rounds Patient Name: Aguilar Hartman  Age: 60 y.o. Room/Bed:  744/02 Primary Diagnosis: <principal problem not specified> Admission Status: Voluntary Readmission within 30 days: no 
Power of  in place: no 
Patient requires a blocked bed: no          Reason for blocked bed: VTE Prophylaxis: No 
 
Mobility needs/Fall risk: yes Flu Vaccine : yes Nutritional Plan: no 
Consults:         
Labs/Testing due today?: no 
 
Sleep hours:  7 Participation in Care/Groups:  yes Medication Compliant?: Yes PRNS (last 24 hours): Sleep Aid and Pain Restraints (last 24 hours):  no 
  
CIWA (range last 24 hours): COWS (range last 24 hours): Alcohol screening (AUDIT) completed -   AUDIT Score: 0 If applicable, date SBIRT discussed in treatment team AND documented:  
AUDIT Screen Score: AUDIT Score: 0 Document Brief Intervention (corresponds directly with the 5 A's, Ask, Advise, Assess, Assist, and Arrange): At- Risk Patients (Score 7-15 for women; 8-15 for men) Discuss concern patient is drinking at unhealthy levels known to increase risk of alcohol-related health problems. Is Patient ready to commit to change? If No: 
? Encourage reflection ? Discuss short term and long term health risks of consuming alcohol ? Barriers to change ? Reaffirm willingness to help / Educational materials provided If Yes: 
? Set goal 
? Plan 
? Educational materials provided Harmful use or Dependence (Score 16 or greater) ? Discuss short term and long term health risks of consuming alcohol ? Recommendations ? Negotiate drinking goal 
? Recommend addiction specialist/center ? Arrange follow-up appointments. Tobacco - patient is a smoker: Have You Used Tobacco in the Past 30 Days: Yes Illegal Drugs use: Have You Used Any Illegal Substances Over the Past 12 Months: Yes 
 
24 hour chart check complete: yes Patient goal(s) for today: Treatment team focus/goals:  
Progress note LOS:  2  Expected LOS:  
 
Financial concerns/prescription coverage:   
Family contact:      
Family requesting physician contact today:   
Discharge plan: Access to weapons :        
Outpatient provider(s):  
Patient's preferred phone number for follow up call :  
 
Participating treatment team members:  Emmy Pickens, * (assigned SW),

## 2020-02-03 NOTE — PROGRESS NOTES
Problem: Depressed Mood (Adult/Pediatric)  Goal: *STG: Participates in treatment plan  Outcome: Progressing Towards Goal     Problem: Depressed Mood (Adult/Pediatric)  Goal: *STG: Verbalizes anger, guilt, and other feelings in a constructive manor  Outcome: Progressing Towards Goal     Problem: Depressed Mood (Adult/Pediatric)  Goal: *STG: Complies with medication therapy  Outcome: Progressing Towards Goal     Problem: Depressed Mood (Adult/Pediatric)  Goal: *STG: Remains safe in hospital  Outcome: Progressing Towards Goal  Note:   0830 Pt alert and calm, interacting appropriately with peers and staff. Med and meal compliant. States she's not depressed but rests in bed due to being tired from cancer. Hopeful for discharge soon. No distress noted. Will monitor with Q 15 safety checks.

## 2020-02-04 PROCEDURE — 74011250637 HC RX REV CODE- 250/637: Performed by: PSYCHIATRY & NEUROLOGY

## 2020-02-04 PROCEDURE — 65220000003 HC RM SEMIPRIVATE PSYCH

## 2020-02-04 PROCEDURE — 74011250637 HC RX REV CODE- 250/637: Performed by: NURSE PRACTITIONER

## 2020-02-04 PROCEDURE — 74011250637 HC RX REV CODE- 250/637: Performed by: INTERNAL MEDICINE

## 2020-02-04 RX ORDER — GABAPENTIN 300 MG/1
300 CAPSULE ORAL 3 TIMES DAILY
Status: DISCONTINUED | OUTPATIENT
Start: 2020-02-04 | End: 2020-02-05 | Stop reason: HOSPADM

## 2020-02-04 RX ORDER — VENLAFAXINE 100 MG/1
100 TABLET ORAL
Status: COMPLETED | OUTPATIENT
Start: 2020-02-04 | End: 2020-02-04

## 2020-02-04 RX ORDER — VENLAFAXINE HYDROCHLORIDE 150 MG/1
300 CAPSULE, EXTENDED RELEASE ORAL
Status: DISCONTINUED | OUTPATIENT
Start: 2020-02-05 | End: 2020-02-05 | Stop reason: HOSPADM

## 2020-02-04 RX ADMIN — FENOFIBRATE 145 MG: 145 TABLET ORAL at 10:01

## 2020-02-04 RX ADMIN — LOSARTAN POTASSIUM 50 MG: 50 TABLET, FILM COATED ORAL at 10:00

## 2020-02-04 RX ADMIN — Medication 100 MG: at 10:01

## 2020-02-04 RX ADMIN — VENLAFAXINE 100 MG: 100 TABLET ORAL at 10:01

## 2020-02-04 RX ADMIN — CARVEDILOL 12.5 MG: 12.5 TABLET, FILM COATED ORAL at 10:01

## 2020-02-04 RX ADMIN — GABAPENTIN 300 MG: 300 CAPSULE ORAL at 17:33

## 2020-02-04 RX ADMIN — AMITRIPTYLINE HYDROCHLORIDE 50 MG: 50 TABLET, FILM COATED ORAL at 21:14

## 2020-02-04 RX ADMIN — NAPROXEN 250 MG: 250 TABLET ORAL at 10:07

## 2020-02-04 RX ADMIN — GABAPENTIN 300 MG: 300 CAPSULE ORAL at 21:14

## 2020-02-04 RX ADMIN — VENLAFAXINE 100 MG: 100 TABLET ORAL at 13:00

## 2020-02-04 RX ADMIN — ALPRAZOLAM 1 MG: 1 TABLET ORAL at 17:33

## 2020-02-04 RX ADMIN — FOLIC ACID 1 MG: 1 TABLET ORAL at 10:00

## 2020-02-04 RX ADMIN — HYDROCHLOROTHIAZIDE 25 MG: 25 TABLET ORAL at 10:01

## 2020-02-04 RX ADMIN — ALPRAZOLAM 1 MG: 1 TABLET ORAL at 10:01

## 2020-02-04 RX ADMIN — CARVEDILOL 12.5 MG: 12.5 TABLET, FILM COATED ORAL at 17:33

## 2020-02-04 RX ADMIN — GABAPENTIN 100 MG: 100 CAPSULE ORAL at 10:01

## 2020-02-04 RX ADMIN — VENLAFAXINE 100 MG: 100 TABLET ORAL at 17:32

## 2020-02-04 RX ADMIN — ASPIRIN 81 MG 81 MG: 81 TABLET ORAL at 10:00

## 2020-02-04 RX ADMIN — THERA TABS 1 TABLET: TAB at 10:00

## 2020-02-04 NOTE — PROGRESS NOTES
Problem: Falls - Risk of  Goal: *Absence of Falls  Description  Document Manfred Kevan Fall Risk and appropriate interventions in the flowsheet.   Outcome: Progressing Towards Goal  Note: Fall Risk Interventions:  Mobility Interventions: Bed/chair exit alarm, Communicate number of staff needed for ambulation/transfer, Utilize walker, cane, or other assistive device    Mentation Interventions: Bed/chair exit alarm, Door open when patient unattended    Medication Interventions: Teach patient to arise slowly    Elimination Interventions: Bed/chair exit alarm, Patient to call for help with toileting needs    History of Falls Interventions: Door open when patient unattended, Bed/chair exit alarm  Lying quietly in bed with eyes closed, respirations even and unlabored   Q!5 min safety monitoring continues

## 2020-02-04 NOTE — BH NOTES
Patient was allowed to eat dinner on [facility]. Patient was demanding and taking [facility] staff away from [facility] patients. Patient was advised that she could no longer attend [facility] for meals or for \"peace and quiet\". Patient educated on remaining and staying on General Unit as assigned. Will advise in shift report.

## 2020-02-04 NOTE — BH NOTES
GROUP THERAPY PROGRESS NOTE    Felicitas Plaza partially participated in a Morning Process Group on the Geriatric Unit, with a focus identifying feelings, planning for the day, and live music. Group time: 0654 - 0803     Personal goal for participation: To increase the capacity to shift ones mood, prepare for the day, and experience music. Goal orientation: The patient will be able to prepare for the day through music. Therapeutic interventions reviewed and discussed: The group members were asked to introduce themselves by first names and to sing or listen to the music as a way to begin their day on a positive note. Impression of participation: This patient actively responded to direct prompting through the first half of the group but decided to leave when the music started. She was alert, generally oriented, and initially said she was feeling, \"okay. Kayli Gearing Kayli Gearing Kayli Gearing I hope I'm going home. .I haven't seen my doctor yet. \" She was told that she probably would see her physician later today. She also admitted that she had been treated for depression and anxiety, including Effexor and Xanax 1 mg X 4 daily, until recently, when her Xanax had been cut back to 1 mg twice daily. She also said she has stage 4 cancer but is no longer being treated with chemo. She said the only time she gets out of her house is go shopping and see her 80year old father who still lives alone near [facility]. \"I often feel tired,\" she concluded. She also agreed to accept some information on the [employer] in the [place] area. Her affect was depressed, although she appeared to want to minimize her emotional struggle. She also reported some increased anxiety while having her Xanax reduced on an outpatient basis. Her mood matched her affect. This was the patient's first process group with the undersigned. The patient expressed no current SI/HI and displayed no overt psychosis.

## 2020-02-04 NOTE — INTERDISCIPLINARY ROUNDS
Behavioral Health Interdisciplinary Rounds Patient Name: Aguilar Hartman  Age: 60 y.o. Room/Bed:  748/02 Primary Diagnosis: <principal problem not specified> Admission Status: Voluntary Readmission within 30 days: no 
Power of  in place: no 
Patient requires a blocked bed: no          Reason for blocked bed: VTE Prophylaxis: No 
 
Mobility needs/Fall risk:  Yes Flu Vaccine : yes Nutritional Plan: no 
Consults:        
Labs/Testing due today?: no 
 
Sleep hours:  7 Participation in Care/Groups:  no 
Medication Compliant?: Yes PRNS (last 24 hours): Sleep Aid and Pain Restraints (last 24 hours):  no 
  
CIWA (range last 24 hours): COWS (range last 24 hours): Alcohol screening (AUDIT) completed -   AUDIT Score: 0 If applicable, date SBIRT discussed in treatment team AND documented:  
AUDIT Screen Score: AUDIT Score: 0 Tobacco - patient is a smoker: Have You Used Tobacco in the Past 30 Days: Yes Illegal Drugs use: Have You Used Any Illegal Substances Over the Past 12 Months: Yes 
 
24 hour chart check complete: yes Patient goal(s) for today: attend groups, take medications Treatment team focus/goals: titrate medication, adjust Effexor for short acting TID to long acting, coordinate follow up. Progress note:  Pt is alert, oriented, calm, and engaged with treatment team.  
 
LOS:  3  Expected LOS: 4 Financial concerns/prescription coverage: [place] Family contact: none indicated Family requesting physician contact today:  no 
Discharge plan: return home Access to weapons :  no Outpatient provider(s): Zoya Moreno Degree at [address] declined to have a therapy appointment. Patient's preferred phone number for follow up call : [phone] Participating treatment team members:  Pilo Soni; Jarek Wharton, ZHANG; Braeden Thayer, PharmD; Ale Robertson MSW

## 2020-02-04 NOTE — BH NOTES
Chief Complaint:  I am better today. Length of Stay: 3 Days    Interval History:  Ms. Mariana Khanna reports feeling better today. Says her anxiety has decreased somewhat and her \"foggy mind\" appears to be clearing up. She remains isolative and is minimally interactive with peers. Denies any SI or plan. No AH or Vh. Remains unwilling to consider going off Xanax. No adverse events are noted from her medications currently.        Past Medical History:  Past Medical History:   Diagnosis Date    Acute hyponatremia 6/18/2019    Anxiety     Bimalleolar fracture of left ankle 2/3/2016    Comminuted and displaced     Cancer [facility])     breast    Closed left ankle fracture 2/4/2016    Depression     Gastroenteritis due to norovirus 2/21/2018    GERD (gastroesophageal reflux disease)     HTN (hypertension)     Hypercholesterolemia     Hypotension 9/12/2012    Metastatic breast cancer ([address] 5/3/2017    Neoplastic malignant related fatigue 4/4/2018    Pain due to neoplasm 4/4/2018    Secondary cancer of bone ([address] 5/3/2017    Sepsis ([address] 2/12/2018    Urinary tract infection without hematuria 2/13/2018           Labs:  Lab Results   Component Value Date/Time    WBC 11.3 (H) 01/27/2020 08:25 AM    HGB (POC) 15.5 02/02/2018 12:29 PM    HGB 14.2 01/27/2020 08:25 AM    HCT (POC) 45.6 02/02/2018 12:29 PM    HCT 40.7 01/27/2020 08:25 AM    PLATELET 709 36/02/7289 08:25 AM    MCV 89.5 01/27/2020 08:25 AM      Lab Results   Component Value Date/Time    Sodium 136 02/01/2020 02:16 PM    Potassium 3.7 02/01/2020 02:16 PM    Chloride 103 02/01/2020 02:16 PM    CO2 27 02/01/2020 02:16 PM    Anion gap 6 02/01/2020 02:16 PM    Glucose 140 (H) 02/01/2020 02:16 PM    BUN 26 (H) 02/01/2020 02:16 PM    Creatinine 1.03 (H) 02/01/2020 02:16 PM    BUN/Creatinine ratio 25 (H) 02/01/2020 02:16 PM    GFR est AA >60 02/01/2020 02:16 PM    GFR est non-AA 54 (L) 02/01/2020 02:16 PM    Calcium 9.3 02/01/2020 02:16 PM    Bilirubin, total 0.3 12/24/2019 04:01 AM    AST (SGOT) 33 12/24/2019 04:01 AM    Alk.  phosphatase 75 12/24/2019 04:01 AM    Protein, total 6.1 (L) 12/24/2019 04:01 AM    Albumin 2.9 (L) 12/24/2019 04:01 AM    Globulin 3.2 12/24/2019 04:01 AM    A-G Ratio 0.9 (L) 12/24/2019 04:01 AM    ALT (SGPT) 62 12/24/2019 04:01 AM      Vitals:    02/03/20 1724 02/03/20 2033 02/04/20 0912 02/04/20 0959   BP: 103/79 98/72 (!) 86/54 141/82   Pulse: (!) 109 88 100 (!) 112   Resp:  16 12    Temp:  97.6 °F (36.4 °C) 97.2 °F (36.2 °C)    SpO2:   98%    Weight:       Height:             Current Facility-Administered Medications   Medication Dose Route Frequency Provider Last Rate Last Dose    gabapentin (NEURONTIN) capsule 300 mg  300 mg Oral TID Shahriar Giles MD        [START ON 2/5/2020] venlafaxine-SR (EFFEXOR-XR) capsule 300 mg  300 mg Oral DAILY WITH BREAKFAST Shahriar Giles MD        venlafaxine [facility]) tablet 100 mg  100 mg Oral TID WITH MEALS Shahriar Giels MD        naproxen (NAPROSYN) tablet 250 mg  250 mg Oral BID PRN Shahriar Giles MD   250 mg at 02/04/20 1007    acetaminophen (TYLENOL) tablet 650 mg  650 mg Oral Q4H PRN Shahriar Giles MD   650 mg at 02/03/20 1559    OLANZapine (ZyPREXA) tablet 5 mg  5 mg Oral Q6H PRN Orlando Passlatricia, NP        haloperidol lactate (HALDOL) injection 5 mg  5 mg IntraMUSCular Q6H PRN Vidal May, BATSHEVA        benztropine (COGENTIN) tablet 1 mg  1 mg Oral BID PRN Orlando Passy, NP        diphenhydrAMINE (BENADRYL) injection 50 mg  50 mg IntraMUSCular BID PRN Vidal May, NP        hydroxyzine HCL (ATARAX) tablet 50 mg  50 mg Oral TID PRN Orlando Passy, NP        LORazepam (ATIVAN) injection 1 mg  1 mg IntraMUSCular Q4H PRN Vidal May NP        traZODone (DESYREL) tablet 50 mg  50 mg Oral QHS PRN Orlando Echols NP   50 mg at 02/03/20 2057    magnesium hydroxide (MILK OF MAGNESIA) 400 mg/5 mL oral suspension 30 mL  30 mL Oral DAILY PRN Orlando Echols NP        nicotine (NICODERM CQ) 14 mg/24 hr patch 1 Patch  1 Patch TransDERmal DAILY May, [place], NP        ALPRAZolam Marjean Pepper) tablet 1 mg  1 mg Oral BID Margarita Waldo, NP   1 mg at 02/04/20 1001    amitriptyline (ELAVIL) tablet 50 mg  50 mg Oral QHS Margarita Waldo, NP   50 mg at 02/03/20 2056    aspirin chewable tablet 81 mg  81 mg Oral DAILY Margarita Waldo, NP   81 mg at 85/77/78 7233    folic acid (FOLVITE) tablet 1 mg  1 mg Oral DAILY May, [place], NP   1 mg at 02/04/20 1000    hydroCHLOROthiazide (HYDRODIURIL) tablet 25 mg  25 mg Oral DAILY May, [place], NP   25 mg at 02/04/20 1001    losartan (COZAAR) tablet 50 mg  50 mg Oral DAILY May, [place], NP   50 mg at 02/04/20 1000    thiamine HCL (B-1) tablet 100 mg  100 mg Oral DAILY May, [place], NP   100 mg at 02/04/20 1001    carvediloL (COREG) tablet 12.5 mg  12.5 mg Oral BID WITH MEALS May, [place], NP   12.5 mg at 02/04/20 1001    fenofibrate nanocrystallized (TRICOR) tablet 145 mg  145 mg Oral DAILY Madelyn St MD   145 mg at 02/04/20 1001    therapeutic multivitamin (THERAGRAN) tablet 1 Tab  1 Tab Oral DAILY Madelyn St MD   1 Tab at 02/04/20 1000         Mental Status Exam:  Eye contact: limited  Grooming: fair  Psychomotor activity: decreased. Speech is spontaneous  Mood is \"low\"  Affect: flat  Perception: Denies any Ah or Vh.   Suicidal ideation: Denies any SI or plan. Cognition is grossly intact       Physical Exam:  Body habitus: obese  Musculoskeletal system: normal gait  Tremor - neg  Cog wheeling - neg      Assessment and Plan: Carlos Haddad meets criteria for a diagnosis of Recurrent MDD, severe, Iatrogenic Benzodiazepine use disorder. Switch to long acting Effexor. Gabapentin increased to 300mg TID. Continue the medication regimen as prescribed  Disposition planning to continue.    I certify that this patients inpatient psychiatric hospital services furnished since the previous certification were, and continue to be, required for treatment that could reasonably be expected to improve the patient's condition, or for diagnostic study, and that the patient continues to need, on a daily basis, active treatment furnished directly by or requiring the supervision of inpatient psychiatric facility personnel. In addition, the hospital records show that services furnished were intensive treatment services, admission or related services, or equivalent services.

## 2020-02-04 NOTE — PROGRESS NOTES
Problem: Discharge Planning  Goal: *Discharge to safe environment  Outcome: Progressing Towards Goal  Note:   Patient identifies home as a safe environment and plans to return upon discharge. Goal: *Knowledge of medication management  Outcome: Progressing Towards Goal  Note:   Patient is taking medications as prescribed. Goal: *Knowledge of discharge instructions  Outcome: Progressing Towards Goal  Note:   Patient verbalizes understanding of goals for treatment and safe discharge.

## 2020-02-04 NOTE — PROGRESS NOTES
Problem: Falls - Risk of  Goal: *Absence of Falls  Description  Document Max Morales Fall Risk and appropriate interventions in the flowsheet. Outcome: Progressing Towards Goal  Note: Fall Risk Interventions:  Mobility Interventions: Bed/chair exit alarm, Communicate number of staff needed for ambulation/transfer, Utilize walker, cane, or other assistive device    Mentation Interventions: Bed/chair exit alarm, Door open when patient unattended    Medication Interventions: Teach patient to arise slowly    Elimination Interventions: Bed/chair exit alarm, Patient to call for help with toileting needs    History of Falls Interventions: Door open when patient unattended, Bed/chair exit alarm         Problem: Depressed Mood (Adult/Pediatric)  Goal: *STG: Participates in treatment plan  Outcome: Progressing Towards Goal  Note:   Out on unit passively engaged. Mood and affect brighter when engaged. Mood improved describes stable, affect full range. Denies AH/VH and states her anxiety is tolerable. Daily goal is to d/c home and follow up with Dr. Fiona Campo.  Staff focus is on d/c planning   Goal: *STG: Verbalizes anger, guilt, and other feelings in a constructive manor  Outcome: Progressing Towards Goal  Goal: *STG: Attends activities and groups  Outcome: Progressing Towards Goal  Goal: *STG: Demonstrates reduction in symptoms and increase in insight into coping skills/future focused  Outcome: Progressing Towards Goal
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
## 2020-02-05 VITALS
TEMPERATURE: 97.5 F | HEIGHT: 65 IN | BODY MASS INDEX: 34.82 KG/M2 | DIASTOLIC BLOOD PRESSURE: 92 MMHG | SYSTOLIC BLOOD PRESSURE: 128 MMHG | WEIGHT: 209 LBS | HEART RATE: 101 BPM | RESPIRATION RATE: 16 BRPM | OXYGEN SATURATION: 95 %

## 2020-02-05 PROCEDURE — 74011250637 HC RX REV CODE- 250/637: Performed by: NURSE PRACTITIONER

## 2020-02-05 PROCEDURE — 74011250637 HC RX REV CODE- 250/637: Performed by: PSYCHIATRY & NEUROLOGY

## 2020-02-05 PROCEDURE — 74011250637 HC RX REV CODE- 250/637: Performed by: INTERNAL MEDICINE

## 2020-02-05 RX ORDER — HYDROXYZINE 50 MG/1
50 TABLET, FILM COATED ORAL
Qty: 30 TAB | Refills: 0 | Status: SHIPPED | OUTPATIENT
Start: 2020-02-05 | End: 2020-02-15

## 2020-02-05 RX ORDER — GABAPENTIN 300 MG/1
300 CAPSULE ORAL 3 TIMES DAILY
Qty: 90 CAP | Refills: 0 | Status: ON HOLD | OUTPATIENT
Start: 2020-02-05 | End: 2020-10-24 | Stop reason: SDUPTHER

## 2020-02-05 RX ORDER — VENLAFAXINE HYDROCHLORIDE 150 MG/1
300 CAPSULE, EXTENDED RELEASE ORAL
Qty: 60 CAP | Refills: 0 | Status: ON HOLD | OUTPATIENT
Start: 2020-02-06 | End: 2020-10-24 | Stop reason: SDUPTHER

## 2020-02-05 RX ADMIN — ALPRAZOLAM 1 MG: 1 TABLET ORAL at 08:30

## 2020-02-05 RX ADMIN — GABAPENTIN 300 MG: 300 CAPSULE ORAL at 08:31

## 2020-02-05 RX ADMIN — ASPIRIN 81 MG 81 MG: 81 TABLET ORAL at 08:30

## 2020-02-05 RX ADMIN — CARVEDILOL 12.5 MG: 12.5 TABLET, FILM COATED ORAL at 08:31

## 2020-02-05 RX ADMIN — Medication 100 MG: at 08:31

## 2020-02-05 RX ADMIN — LOSARTAN POTASSIUM 50 MG: 50 TABLET, FILM COATED ORAL at 08:31

## 2020-02-05 RX ADMIN — HYDROCHLOROTHIAZIDE 25 MG: 25 TABLET ORAL at 08:30

## 2020-02-05 RX ADMIN — VENLAFAXINE HYDROCHLORIDE 300 MG: 150 CAPSULE, EXTENDED RELEASE ORAL at 08:30

## 2020-02-05 RX ADMIN — FENOFIBRATE 145 MG: 145 TABLET ORAL at 08:30

## 2020-02-05 RX ADMIN — FOLIC ACID 1 MG: 1 TABLET ORAL at 08:30

## 2020-02-05 RX ADMIN — THERA TABS 1 TABLET: TAB at 08:30

## 2020-02-05 NOTE — BH NOTES
GROUP THERAPY PROGRESS NOTE    Honey Milian did not participate in a Process Group on the General Unit with a focus identifying feelings, planning for the day, and reviewing DBT coping skills related to distress management and coping with grief and loss.

## 2020-02-05 NOTE — PROGRESS NOTES
Pt resting quietly in bed with no distress noted. Respirations equal. Will continue to monitor. Problem: Falls - Risk of  Goal: *Absence of Falls  Description  Document Amy Reeves Fall Risk and appropriate interventions in the flowsheet.   Outcome: Progressing Towards Goal  Note: Fall Risk Interventions:  Mobility Interventions: Bed/chair exit alarm, Communicate number of staff needed for ambulation/transfer, Utilize walker, cane, or other assistive device    Mentation Interventions: Bed/chair exit alarm, Door open when patient unattended    Medication Interventions: Teach patient to arise slowly    Elimination Interventions: Bed/chair exit alarm, Patient to call for help with toileting needs    History of Falls Interventions: Door open when patient unattended, Bed/chair exit alarm         Problem: Depressed Mood (Adult/Pediatric)  Goal: *STG: Remains safe in hospital  Outcome: Progressing Towards Goal

## 2020-02-05 NOTE — DISCHARGE SUMMARY
Some parts of the discharge summary are from the initial Psychiatric interview that was done on admission by the admitting psychiatrist.     Date of Admission: 2/1/2020    Date of Discharge: 2/5/2020     TYPE OF DISCHARGE:   REGULAR -  YES    AMA  RELEASED BY THE TDO COURT    HISTORY OF PRESENT ILLNESS:    The patient, Cata Mir, is a 60 y.o. WHITE [place]  female with a past psychiatric history significant for depression and anxiety who follows up with Dr. Casper Garner at [facility]. Pt was admitted from [facility].     She takes about 4 Xanax tablets every day for last 12 years and a few days prior to admission at [facility], she stopped taking Xanax as she did not get her prescription refilled.  She became anxious, had palpitations, hallucinations, sweating, and was withdrawing.  Patient has history of breast cancer which is also metastasizing and patient has history of suicidal ideations and attempt in the past but currently she is denying any SI/HI.  Due to her confusion and acute encephalopathy, she was brought to the ER at [facility] where she was diagnosed with benzodiazepine withdrawal and was given Ativan 2 mg and after which she improved significantly. Xanax was then restarted at BID dose and she seems to be doing better.     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 Says her chemo was stopped due to decreased EF. Says she is following at [facility] every 3 months now.      She denies alcohol use but smokes Mj occasionally.      Notes some back pain. Denies SI at present. COURSE IN THE HOSPITAL:    Patient was admitted to the inpatient psychiatry unit for acute psychiatric stabilization in regards to symptomatology as described in the HPI above and placed on Q15 minute checks and withdrawal precautions. While on the unit Cata Mir was involved in individual, group, occupational and milieu therapy.  She was started back on her usual medication regimen as well as PRN medications including hydroxyzine. She improved gradually and was able to integrate into the milieu with help from the nursing staff. Patients symptoms improved gradually including improved mood. She was quite on the unit, appropriate in her interactions, and cooperative with medications and the unit routine. Please see individual progress notes for more specific details regarding patient's hospitalization course. Patient was discharged as per the plan. She had been doing well on the unit as per the report of the nursing staff and my observations. No PRN medication for agitation, seclusion or restraints were required during the last 48 hours of her stay. Ez Shabazz had improved progressively to the point of being stable for discharge and outpatient FU. At this time she did not offer any complaints. Patient denied any SI or HI. Denied any AH or VH. She denied any delusions. Was not considered a danger to self or to others and is safe for discharge. Will FU with her appointments and remains motivated to be in treatment. The patient verbalized understanding of her discharge instructions. DISCHARGE DIAGNOSIS:  Major Depressive Disorder, recurrent, severe      Current Discharge Medication List      START taking these medications    Details   venlafaxine-SR (EFFEXOR-XR) 150 mg capsule Take 2 Caps by mouth daily (with breakfast). Indications: major depressive disorder  Qty: 60 Cap, Refills: 0      hydroxyzine HCL (ATARAX) 50 mg tablet Take 1 Tab by mouth three (3) times daily as needed for Anxiety for up to 10 days. Indications: anxious  Qty: 30 Tab, Refills: 0         CONTINUE these medications which have CHANGED    Details   gabapentin (NEURONTIN) 300 mg capsule Take 1 Cap by mouth three (3) times daily. Max Daily Amount: 900 mg.  Indications: anxiety  Qty: 90 Cap, Refills: 0    Associated Diagnoses: Anxiety         CONTINUE these medications which have NOT CHANGED    Details   ALPRAZolam (XANAX) 1 mg tablet Take 1 Tab by mouth two (2) times a day. Max Daily Amount: 2 mg. Qty: 10 Tab, Refills: 0    Associated Diagnoses: Benzodiazepine withdrawal with delirium (HCC)      folic acid (FOLVITE) 1 mg tablet Take 1 Tab by mouth daily. Qty: 60 Tab, Refills: 3      hydroCHLOROthiazide (HYDRODIURIL) 25 mg tablet Take 25 mg by mouth daily. fenofibrate micronized (LOFIBRA) 134 mg capsule Take 134 mg by mouth every morning. carvedilol (COREG) 12.5 mg tablet TAKE 1 TABLET BY MOUTH TWICE A DAY  Qty: 180 Tab, Refills: 3    Comments: **Patient requests 90 days supply**      losartan (COZAAR) 50 mg tablet TAKE 1 TABLET BY MOUTH ONCE DAILY  Qty: 90 Tab, Refills: 5    Comments: **Patient requests 90 days supply**      amitriptyline (ELAVIL) 50 mg tablet TAKE 1 TABLET BY MOUTH EVERY EVENING  Qty: 90 Tab, Refills: 0    Comments: **Patient requests 90 days supply**      aspirin delayed-release 81 mg tablet Take 1 Tab by mouth daily. Indications: prevention of transient ischemic attack  Qty: 30 Tab, Refills: 0         STOP taking these medications       naproxen (NAPROSYN) 250 mg tablet Comments:   Reason for Stopping:         venlafaxine (EFFEXOR) 100 mg tablet Comments:   Reason for Stopping:         multivitamin (ONE A DAY) tablet Comments:   Reason for Stopping: Follow-up Information     Follow up With Specialties Details Why Contact Info    Dr. Warren Vaca on 2/7/2020 10:30AM appointment for psychiatric medication managmenet. [address], [address]  phone: [phone]  fax: [phone]    [place]  On 2/10/2020 4:00PM follow up appointment for hospital discharge [address], [address] [place] [address] Avenue  [phone]    Kiet Morel MD [facility]   [address]  [phone]          WOUND CARE: none needed. PROGNOSIS:   Good / Fair based on nature of patient's pathology/ies and treatment compliance issues. Prognosis is greatly dependent upon patient's ability to  follow up on psychiatric/psychotherapy appointments as well as to comply with psychiatric medications as prescribed.

## 2020-02-05 NOTE — PROGRESS NOTES
Problem: Falls - Risk of  Goal: *Absence of Falls  Description  Document Miguel Wilkerson Fall Risk and appropriate interventions in the flowsheet. Outcome: Progressing Towards Goal  Note: Fall Risk Interventions:  Mobility Interventions: Bed/chair exit alarm, Communicate number of staff needed for ambulation/transfer, Utilize walker, cane, or other assistive device    Mentation Interventions: Bed/chair exit alarm, Door open when patient unattended    Medication Interventions: Teach patient to arise slowly    Elimination Interventions: Bed/chair exit alarm, Patient to call for help with toileting needs    History of Falls Interventions: Door open when patient unattended, Bed/chair exit alarm         Problem: Depressed Mood (Adult/Pediatric)  Goal: *STG: Participates in treatment plan  Outcome: Progressing Towards Goal       Pt. Educated on medications and importance of rising slowly when getting up, Interacting with staff and peers during breakfast, meal and med compliant. She expressed possibly being discharged today and not being able to sleep to well last night.

## 2020-02-05 NOTE — BH NOTES
Patient will be discharged to home today. Denies si/hi. Feels ready to go home. Questions regarding medication have been answered.

## 2020-02-05 NOTE — INTERDISCIPLINARY ROUNDS
Behavioral Health Interdisciplinary Rounds Patient Name: Dayami Sidhu  Age: 60 y.o. Room/Bed:  748/02 Primary Diagnosis: <principal problem not specified> Admission Status: Voluntary Readmission within 30 days: no 
Power of  in place: no 
Patient requires a blocked bed: no          Reason for blocked bed: VTE Prophylaxis: No 
 
Mobility needs/Fall risk: yes Flu Vaccine : yes Nutritional Plan: no 
Consults:         
Labs/Testing due today?: no 
 
Sleep hours:  6.5 Participation in Care/Groups:  yes Medication Compliant?: Yes PRNS (last 24 hours): Pain Restraints (last 24 hours):  no 
  
CIWA (range last 24 hours): COWS (range last 24 hours): Alcohol screening (AUDIT) completed -   AUDIT Score: 0 If applicable, date SBIRT discussed in treatment team AND documented:  
AUDIT Screen Score: AUDIT Score: 0 Tobacco - patient is a smoker: Have You Used Tobacco in the Past 30 Days: Yes Illegal Drugs use: Have You Used Any Illegal Substances Over the Past 12 Months: Yes 
 
24 hour chart check complete: 
 
Patient goal(s) for today: prepare for discharge Treatment team focus/goals: reconcile medications and facilitate discharge Progress note: Pt is alert, oriented, clam, cooperative and psychiatrically stable for discharge. LOS:  4  Expected LOS: 4 
  
Financial concerns/prescription coverage: [place] Family contact: none indicated Family requesting physician contact today:  no 
Discharge plan: return home Access to weapons :   no Outpatient provider(s): Jarrte Mendoza  at [address] declined to have a therapy appointment made. Patient's preferred phone number for follow up call : [phone]  
  
Participating treatment team members:  Dee Mohamud NP; Jamey Marroquin RN; Josi Dalton, ShaniaD; PB Kaye

## 2020-02-05 NOTE — BH NOTES
Behavioral Health Transition Record to Provider    Patient Name: Dayami Sidhu  YOB: 1955  Medical Record Number: [id number]  Date of Admission: 2/1/2020  Date of Discharge: 2/5/2020    Attending Provider: No att. providers found  Discharging Provider: Marti Richmond NP  To contact this individual call [phone] and ask the  to page. If unavailable, ask to be transferred to [facility] Provider on call. [facility] Provider will be available on call 24/7 and during holidays. Primary Care Provider: Isael Rock MD    Allergies   Allergen Reactions    Sulfa (Sulfonamide Antibiotics) Unknown (comments)    Wellbutrin [Bupropion Hcl] Unknown (comments)       Reason for Admission: Patient was admitted to [facility] for worsening depression and SI. Admission Diagnosis: Major depression [F32.9]    * No surgery found *    Results for orders placed or performed during the hospital encounter of 01/27/20   CBC WITH AUTOMATED DIFF   Result Value Ref Range    WBC 11.3 (H) 3.6 - 11.0 K/uL    RBC 4.55 3.80 - 5.20 M/uL    HGB 14.2 11.5 - 16.0 g/dL    HCT 40.7 35.0 - 47.0 %    MCV 89.5 80.0 - 99.0 FL    MCH 31.2 26.0 - 34.0 PG    MCHC 34.9 30.0 - 36.5 g/dL    RDW 12.4 11.5 - 14.5 %    PLATELET 827 136 - 993 K/uL    MPV 11.0 8.9 - 12.9 FL    NRBC 0.0 0  WBC    ABSOLUTE NRBC 0.00 0.00 - 0.01 K/uL    NEUTROPHILS 76 (H) 32 - 75 %    LYMPHOCYTES 16 12 - 49 %    MONOCYTES 7 5 - 13 %    EOSINOPHILS 0 0 - 7 %    BASOPHILS 1 0 - 1 %    IMMATURE GRANULOCYTES 0 0.0 - 0.5 %    ABS. NEUTROPHILS 8.6 (H) 1.8 - 8.0 K/UL    ABS. LYMPHOCYTES 1.8 0.8 - 3.5 K/UL    ABS. MONOCYTES 0.8 0.0 - 1.0 K/UL    ABS. EOSINOPHILS 0.0 0.0 - 0.4 K/UL    ABS. BASOPHILS 0.1 0.0 - 0.1 K/UL    ABS. IMM.  GRANS. 0.1 (H) 0.00 - 0.04 K/UL    DF AUTOMATED     METABOLIC PANEL, BASIC   Result Value Ref Range    Sodium 135 (L) 136 - 145 mmol/L    Potassium 3.2 (L) 3.5 - 5.1 mmol/L    Chloride 101 97 - 108 mmol/L    CO2 23 21 - 32 mmol/L    Anion gap 11 5 - 15 mmol/L    Glucose 137 (H) 65 - 100 mg/dL    BUN 16 6 - 20 MG/DL    Creatinine 1.11 (H) 0.55 - 1.02 MG/DL    BUN/Creatinine ratio 14 12 - 20      GFR est AA >60 >60 ml/min/1.73m2    GFR est non-AA 50 (L) >60 ml/min/1.73m2    Calcium 9.6 8.5 - 10.1 MG/DL   URINALYSIS W/ REFLEX CULTURE   Result Value Ref Range    Color YELLOW/STRAW      Appearance CLOUDY (A) CLEAR      Specific gravity 1.026 1.003 - 1.030      pH (UA) 6.5 5.0 - 8.0      Protein NEGATIVE  NEG mg/dL    Glucose NEGATIVE  NEG mg/dL    Ketone NEGATIVE  NEG mg/dL    Bilirubin NEGATIVE  NEG      Blood NEGATIVE  NEG      Urobilinogen 0.2 0.2 - 1.0 EU/dL    Nitrites NEGATIVE  NEG      Leukocyte Esterase NEGATIVE  NEG      WBC 0-4 0 - 4 /hpf    RBC 0-5 0 - 5 /hpf    Epithelial cells MANY (A) FEW /lpf    Bacteria 1+ (A) NEG /hpf    UA:UC IF INDICATED CULTURE NOT INDICATED BY UA RESULT CNI      CA Oxalate crystals 3+ (A) NEG   CK   Result Value Ref Range    CK 83 26 - 192 U/L   DRUG SCREEN, URINE   Result Value Ref Range    AMPHETAMINES NEGATIVE  NEG      BARBITURATES NEGATIVE  NEG      BENZODIAZEPINES POSITIVE (A) NEG      COCAINE NEGATIVE  NEG      METHADONE NEGATIVE  NEG      OPIATES NEGATIVE  NEG      PCP(PHENCYCLIDINE) NEGATIVE  NEG      THC (TH-CANNABINOL) POSITIVE (A) NEG      Drug screen comment (NOTE)    SALICYLATE   Result Value Ref Range    Salicylate level 5.3 2.8 - 20.0 MG/DL   ACETAMINOPHEN   Result Value Ref Range    Acetaminophen level <2 (L) 10 - 30 ug/mL   METABOLIC PANEL, BASIC   Result Value Ref Range    Sodium 139 136 - 145 mmol/L    Potassium 3.3 (L) 3.5 - 5.1 mmol/L    Chloride 107 97 - 108 mmol/L    CO2 25 21 - 32 mmol/L    Anion gap 7 5 - 15 mmol/L    Glucose 103 (H) 65 - 100 mg/dL    BUN 14 6 - 20 MG/DL    Creatinine 0.78 0.55 - 1.02 MG/DL    BUN/Creatinine ratio 18 12 - 20      GFR est AA >60 >60 ml/min/1.73m2    GFR est non-AA >60 >60 ml/min/1.73m2    Calcium 8.9 8.5 - 10.1 MG/DL   MAGNESIUM   Result Value Ref Range    Magnesium 1.8 1.6 - 2.4 mg/dL   PHOSPHORUS   Result Value Ref Range    Phosphorus 4.0 2.6 - 4.7 MG/DL   METABOLIC PANEL, BASIC   Result Value Ref Range    Sodium 136 136 - 145 mmol/L    Potassium 3.7 3.5 - 5.1 mmol/L    Chloride 103 97 - 108 mmol/L    CO2 27 21 - 32 mmol/L    Anion gap 6 5 - 15 mmol/L    Glucose 140 (H) 65 - 100 mg/dL    BUN 26 (H) 6 - 20 MG/DL    Creatinine 1.03 (H) 0.55 - 1.02 MG/DL    BUN/Creatinine ratio 25 (H) 12 - 20      GFR est AA >60 >60 ml/min/1.73m2    GFR est non-AA 54 (L) >60 ml/min/1.73m2    Calcium 9.3 8.5 - 10.1 MG/DL   EKG, 12 LEAD, INITIAL   Result Value Ref Range    Ventricular Rate 100 BPM    Atrial Rate 100 BPM    P-R Interval 150 ms    QRS Duration 68 ms    Q-T Interval 352 ms    QTC Calculation (Bezet) 454 ms    Calculated R Axis 50 degrees    Calculated T Axis 72 degrees    Diagnosis       Baseline artifact  Sinus rhythm with premature supraventricular complexes  Nonspecific ST and T wave abnormality  Confirmed by Shai Garcia ([id number]) on 1/27/2020 9:10:10 AM         Immunizations administered during this encounter:   Immunization History   Administered Date(s) Administered    Influenza Vaccine (Quad) PF 02/06/2016, 12/26/2019    Influenza Vaccine PF 11/09/2017    Pneumococcal Polysaccharide (PPSV-23) 02/06/2016    TB Skin Test (PPD) Intradermal 09/16/2014    Varicella Virus Vaccine 08/01/2014       Screening for Metabolic Disorders for Patients on Antipsychotic Medications  (Data obtained from the EMR)    Estimated Body Mass Index  Estimated body mass index is 34.78 kg/m² as calculated from the following:    Height as of this encounter: 5' 5\" (1.651 m). Weight as of this encounter: 94.8 kg (209 lb).      Vital Signs/Blood Pressure  Visit Vitals  BP (!) 128/92   Pulse (!) 101   Temp 97.5 °F (36.4 °C)   Resp 16   Ht 5' 5\" (1.651 m)   Wt 94.8 kg (209 lb)   SpO2 95%   BMI 34.78 kg/m²       Blood Glucose/Hemoglobin A1c  Lab Results   Component Value Date/Time    Glucose 140 (H) 02/01/2020 02:16 PM    Glucose POC 83 06/14/2011 10:33 AM       No results found for: HBA1C, HGBE8, QHH1RLGE     Lipid Panel  No results found for: CHOL, CHOLX, CHLST, CHOLV, 817032, HDL, HDLP, LDL, LDLC, DLDLP, TGLX, TRIGL, TRIGP, CHHD, CHHDX     Discharge Diagnosis: Major Depressive Disorder, recurrent, severe    Discharge Plan: Patient was discharged home via Lyft with [facility]. The patient Aguilar Hartman exhibits the ability to control behavior in a less restrictive environment. Patient's level of functioning is improving. No assaultive/destructive behavior has been observed for the past 24 hours. No suicidal/homicidal threat or behavior has been observed for the past 24 hours. There is no evidence of serious medication side effects. Patient has not been in physical or protective restraints for at least the past 24 hours. If weapons involved, how are they secured? NA    Is patient aware of and in agreement with discharge plan? Yes    Arrangements for medication:  Prescriptions given to patient. Copy of discharge instructions to provider?:  [facility] attn : Dr. Moreno Degree ([phone])    Arrangements for transportation home:  [place] all follow up appointments as scheduled, continue to take prescribed medications per physician instructions. Mental health crisis number:  954 or your local mental health crisis line number at [phone]. Discharge Medication List and Instructions:   Discharge Medication List as of 2/5/2020 11:10 AM      START taking these medications    Details   venlafaxine-SR (EFFEXOR-XR) 150 mg capsule Take 2 Caps by mouth daily (with breakfast). Indications: major depressive disorder, Print, Disp-60 Cap, R-0      hydroxyzine HCL (ATARAX) 50 mg tablet Take 1 Tab by mouth three (3) times daily as needed for Anxiety for up to 10 days.  Indications: anxious, Print, Disp-30 Tab, R-0 CONTINUE these medications which have CHANGED    Details   gabapentin (NEURONTIN) 300 mg capsule Take 1 Cap by mouth three (3) times daily. Max Daily Amount: 900 mg. Indications: anxiety, Print, Disp-90 Cap, R-0         CONTINUE these medications which have NOT CHANGED    Details   ALPRAZolam (XANAX) 1 mg tablet Take 1 Tab by mouth two (2) times a day. Max Daily Amount: 2 mg., No Print, Disp-10 Tab, R-0      folic acid (FOLVITE) 1 mg tablet Take 1 Tab by mouth daily. , Normal, Disp-60 Tab, R-3      hydroCHLOROthiazide (HYDRODIURIL) 25 mg tablet Take 25 mg by mouth daily. , Historical Med      fenofibrate micronized (LOFIBRA) 134 mg capsule Take 134 mg by mouth every morning., Historical Med      carvedilol (COREG) 12.5 mg tablet TAKE 1 TABLET BY MOUTH TWICE A DAY, Normal, Disp-180 Tab, R-3**Patient requests 90 days supply**      losartan (COZAAR) 50 mg tablet TAKE 1 TABLET BY MOUTH ONCE DAILY, Normal, Disp-90 Tab, R-5**Patient requests 90 days supply**      amitriptyline (ELAVIL) 50 mg tablet TAKE 1 TABLET BY MOUTH EVERY EVENING, Normal, Disp-90 Tab, R-0**Patient requests 90 days supply**      aspirin delayed-release 81 mg tablet Take 1 Tab by mouth daily. Indications: prevention of transient ischemic attack, Normal, Disp-30 Tab, R-0         STOP taking these medications       naproxen (NAPROSYN) 250 mg tablet Comments:   Reason for Stopping:         venlafaxine (EFFEXOR) 100 mg tablet Comments:   Reason for Stopping:         HYDROcodone-acetaminophen (NORCO) 5-325 mg per tablet Comments:   Reason for Stopping:         multivitamin (ONE A DAY) tablet Comments:   Reason for Stopping:               Unresulted Labs (24h ago, onward)    None        To obtain results of studies pending at discharge, please contact [phone]    Follow-up Information     Follow up With Specialties Details Why Contact Info    Dr. Perry Manzano on 2/7/2020 10:30AM [facility]  [address], [address]  phone: [phone]  fax: [phone]    [place]  On 2/10/2020 4:00PM follow up appointment for hospital discharge [address]  [phone]    Kiet Morel MD [place] [address]  [phone]            Advanced Directive:   Does the patient have an appointed surrogate decision maker? Yes  Does the patient have a Medical Advance Directive? Yes  Does the patient have a Psychiatric Advance Directive? No  If the patient does not have a surrogate or Medical Advance Directive AND Psychiatric Advance Directive, the patient was offered information on these advance directives Patient declined to complete    Patient Instructions: Please continue all medications until otherwise directed by physician. Tobacco Cessation Discharge Plan:   Is the patient a smoker and needs referral for smoking cessation? Yes  Patient referred to the following for smoking cessation with an appointment? Refused     Patient was offered medication to assist with smoking cessation at discharge? Refused  Was education for smoking cessation added to the discharge instructions? Yes    Alcohol/Substance Abuse Discharge Plan:   Does the patient have a history of substance/alcohol abuse and requires a referral for treatment? No  Patient referred to the following for substance/alcohol abuse treatment with an appointment? Not applicable  Patient was offered medication to assist with alcohol cessation at discharge? Not applicable  Was education for substance/alcohol abuse added to discharge instructions? No    Patient discharged to Home; discussed with patient/caregiver and provided to the patient/caregiver either in hard copy or electronically.

## 2020-02-05 NOTE — DISCHARGE INSTRUCTIONS
DISCHARGE SUMMARY    NAME:Edith Marquez  : 1955  MRN: [id number]    The patient Pia Dickerson exhibits the ability to control behavior in a less restrictive environment. Patient's level of functioning is improving. No assaultive/destructive behavior has been observed for the past 24 hours. No suicidal/homicidal threat or behavior has been observed for the past 24 hours. There is no evidence of serious medication side effects. Patient has not been in physical or protective restraints for at least the past 24 hours. If weapons involved, how are they secured? NA    Is patient aware of and in agreement with discharge plan? Yes    Arrangements for medication:  Prescriptions given to patient. Copy of discharge instructions to provider?:  [facility] attn : Dr. Kanika Vasquez ([phone])    Arrangements for transportation home:  [place] all follow up appointments as scheduled, continue to take prescribed medications per physician instructions. Mental health crisis number:  522 or your local mental health crisis line number at [phone]. DISCHARGE SUMMARY from Nurse    PATIENT INSTRUCTIONS:    What to do at Home:  Recommended activity: Activity as tolerated,     If you experience any of the following symptoms hopelessness,thoughts of hurting yourself or others please follow up with 911 or your local mental health crisis [phone]. *  Please give a list of your current medications to your Primary Care Provider. *  Please update this list whenever your medications are discontinued, doses are      changed, or new medications (including over-the-counter products) are added. *  Please carry medication information at all times in case of emergency situations.     These are general instructions for a healthy lifestyle:    No smoking/ No tobacco products/ Avoid exposure to second hand smoke  Surgeon General's Warning:  Quitting smoking now greatly reduces serious risk to your health. Obesity, smoking, and sedentary lifestyle greatly increases your risk for illness    A healthy diet, regular physical exercise & weight monitoring are important for maintaining a healthy lifestyle    You may be retaining fluid if you have a history of heart failure or if you experience any of the following symptoms:  Weight gain of 3 pounds or more overnight or 5 pounds in a week, increased swelling in our hands or feet or shortness of breath while lying flat in bed. Please call your doctor as soon as you notice any of these symptoms; do not wait until your next office visit. The discharge information has been reviewed with the patient. The patient verbalized understanding. Discharge medications reviewed with the patient and appropriate educational materials and side effects teaching were provided.   ___________________________________________________________________________________________________________________________________

## 2020-02-05 NOTE — PROGRESS NOTES
Pharmacist Discharge Medication Reconciliation    Discharging Provider: Lulu Horton NP    Significant PMH:   Past Medical History:   Diagnosis Date    Acute hyponatremia 6/18/2019    Anxiety     Bimalleolar fracture of left ankle 2/3/2016    Comminuted and displaced     Cancer [facility])     breast    Closed left ankle fracture 2/4/2016    Depression     Gastroenteritis due to norovirus 2/21/2018    GERD (gastroesophageal reflux disease)     HTN (hypertension)     Hypercholesterolemia     Hypotension 9/12/2012    Metastatic breast cancer ([facility] [address] 5/3/2017    Neoplastic malignant related fatigue 4/4/2018    Pain due to neoplasm 4/4/2018    Secondary cancer of bone ([facility] [address] 5/3/2017    Sepsis ([facility] [address] 2/12/2018    Urinary tract infection without hematuria 2/13/2018     Chief Complaint for this Admission: No chief complaint on file. Allergies: Sulfa (sulfonamide antibiotics) and Wellbutrin [bupropion hcl]    Discharge Medications:   Current Discharge Medication List        START taking these medications    Details   venlafaxine-SR (EFFEXOR-XR) 150 mg capsule Take 2 Caps by mouth daily (with breakfast). Indications: major depressive disorder  Qty: 60 Cap, Refills: 0      hydroxyzine HCL (ATARAX) 50 mg tablet Take 1 Tab by mouth three (3) times daily as needed for Anxiety for up to 10 days. Indications: anxious  Qty: 30 Tab, Refills: 0           CONTINUE these medications which have CHANGED    Details   gabapentin (NEURONTIN) 300 mg capsule Take 1 Cap by mouth three (3) times daily. Max Daily Amount: 900 mg. Indications: anxiety  Qty: 90 Cap, Refills: 0    Associated Diagnoses: Anxiety           CONTINUE these medications which have NOT CHANGED    Details   ALPRAZolam (XANAX) 1 mg tablet Take 1 Tab by mouth two (2) times a day. Max Daily Amount: 2 mg. Qty: 10 Tab, Refills: 0    Associated Diagnoses: Benzodiazepine withdrawal with delirium (HCC)      folic acid (FOLVITE) 1 mg tablet Take 1 Tab by mouth daily.   Qty: 60 Tab, Refills: 3      hydroCHLOROthiazide (HYDRODIURIL) 25 mg tablet Take 25 mg by mouth daily. fenofibrate micronized (LOFIBRA) 134 mg capsule Take 134 mg by mouth every morning. carvedilol (COREG) 12.5 mg tablet TAKE 1 TABLET BY MOUTH TWICE A DAY  Qty: 180 Tab, Refills: 3    Comments: **Patient requests 90 days supply**      losartan (COZAAR) 50 mg tablet TAKE 1 TABLET BY MOUTH ONCE DAILY  Qty: 90 Tab, Refills: 5    Comments: **Patient requests 90 days supply**      amitriptyline (ELAVIL) 50 mg tablet TAKE 1 TABLET BY MOUTH EVERY EVENING  Qty: 90 Tab, Refills: 0    Comments: **Patient requests 90 days supply**      aspirin delayed-release 81 mg tablet Take 1 Tab by mouth daily.  Indications: prevention of transient ischemic attack  Qty: 30 Tab, Refills: 0           STOP taking these medications       naproxen (NAPROSYN) 250 mg tablet Comments:   Reason for Stopping:         venlafaxine (EFFEXOR) 100 mg tablet Comments:   Reason for Stopping:         multivitamin (ONE A DAY) tablet Comments:   Reason for Stopping:             The patient's chart, MAR and AVS were reviewed by Arnie Bosch, ALEJANDRAD.

## 2020-02-05 NOTE — GROUP NOTE
DANIEL  GROUP DOCUMENTATION INDIVIDUAL Group Therapy Note Date: 2/4/2020 Group Start Time: 2100 Group End Time: 2115 Group Topic: Reflection/Relaxation [place] [facility] 
 
IP  GROUP DOCUMENTATION GROUP Group Therapy Note Attendees:  
 
  
 
Attendance: Attended Patient's Goal: Interventions/techniques: Informed Follows Directions: Followed directions Interactions: Interacted appropriately Mental Status: Flat Behavior/appearance: Cooperative Goals Achieved: Able to listen to others Additional Notes:    
 
Conchita Jc

## 2020-02-05 NOTE — BH NOTES
Pt discharged per MD order. Pt discharge instructions provided and pt verbalizes understanding. All belongings returned. Pt escorted to transportation service.

## 2020-08-02 ENCOUNTER — HOSPITAL ENCOUNTER (EMERGENCY)
Age: 65
Discharge: HOME | End: 2020-08-02
Attending: EMERGENCY MEDICINE | Admitting: EMERGENCY MEDICINE
Payer: MEDICARE

## 2020-08-02 ENCOUNTER — APPOINTMENT (OUTPATIENT)
Dept: GENERAL RADIOLOGY | Age: 65
End: 2020-08-02
Attending: EMERGENCY MEDICINE
Payer: MEDICARE

## 2020-08-02 VITALS
SYSTOLIC BLOOD PRESSURE: 160 MMHG | DIASTOLIC BLOOD PRESSURE: 80 MMHG | OXYGEN SATURATION: 98 % | RESPIRATION RATE: 18 BRPM | WEIGHT: 212 LBS | HEIGHT: 65 IN | BODY MASS INDEX: 35.32 KG/M2 | HEART RATE: 98 BPM | TEMPERATURE: 98.4 F

## 2020-08-02 DIAGNOSIS — F41.0 PANIC ATTACK: Primary | ICD-10-CM

## 2020-08-02 LAB
ALBUMIN SERPL-MCNC: 3.8 G/DL (ref 3.5–5)
ALBUMIN/GLOB SERPL: 1 {RATIO} (ref 1.1–2.2)
ALP SERPL-CCNC: 95 U/L (ref 45–117)
ALT SERPL-CCNC: 41 U/L (ref 12–78)
ANION GAP SERPL CALC-SCNC: 6 MMOL/L (ref 5–15)
AST SERPL-CCNC: 5 U/L (ref 15–37)
BASOPHILS # BLD: 0.1 K/UL (ref 0–0.1)
BASOPHILS NFR BLD: 1 % (ref 0–1)
BILIRUB SERPL-MCNC: 0.8 MG/DL (ref 0.2–1)
BUN SERPL-MCNC: 34 MG/DL (ref 6–20)
BUN/CREAT SERPL: 37 (ref 12–20)
CALCIUM SERPL-MCNC: 9.4 MG/DL (ref 8.5–10.1)
CHLORIDE SERPL-SCNC: 104 MMOL/L (ref 97–108)
CO2 SERPL-SCNC: 26 MMOL/L (ref 21–32)
CREAT SERPL-MCNC: 0.91 MG/DL (ref 0.55–1.02)
DIFFERENTIAL METHOD BLD: ABNORMAL
EOSINOPHIL # BLD: 0.1 K/UL (ref 0–0.4)
EOSINOPHIL NFR BLD: 1 % (ref 0–7)
ERYTHROCYTE [DISTWIDTH] IN BLOOD BY AUTOMATED COUNT: 14.1 % (ref 11.5–14.5)
GLOBULIN SER CALC-MCNC: 3.8 G/DL (ref 2–4)
GLUCOSE SERPL-MCNC: 140 MG/DL (ref 65–100)
HCT VFR BLD AUTO: 50.5 % (ref 35–47)
HGB BLD-MCNC: 16.8 G/DL (ref 11.5–16)
IMM GRANULOCYTES # BLD AUTO: 0.1 K/UL (ref 0–0.04)
IMM GRANULOCYTES NFR BLD AUTO: 1 % (ref 0–0.5)
LYMPHOCYTES # BLD: 2.5 K/UL (ref 0.8–3.5)
LYMPHOCYTES NFR BLD: 17 % (ref 12–49)
MCH RBC QN AUTO: 30.7 PG (ref 26–34)
MCHC RBC AUTO-ENTMCNC: 33.3 G/DL (ref 30–36.5)
MCV RBC AUTO: 92.2 FL (ref 80–99)
MONOCYTES # BLD: 0.9 K/UL (ref 0–1)
MONOCYTES NFR BLD: 6 % (ref 5–13)
NEUTS SEG # BLD: 11.2 K/UL (ref 1.8–8)
NEUTS SEG NFR BLD: 74 % (ref 32–75)
NRBC # BLD: 0 K/UL (ref 0–0.01)
NRBC BLD-RTO: 0 PER 100 WBC
PLATELET # BLD AUTO: 397 K/UL (ref 150–400)
PMV BLD AUTO: 10.9 FL (ref 8.9–12.9)
POTASSIUM SERPL-SCNC: 3.9 MMOL/L (ref 3.5–5.1)
PROT SERPL-MCNC: 7.6 G/DL (ref 6.4–8.2)
RBC # BLD AUTO: 5.48 M/UL (ref 3.8–5.2)
SODIUM SERPL-SCNC: 136 MMOL/L (ref 136–145)
TROPONIN I SERPL-MCNC: <0.05 NG/ML
WBC # BLD AUTO: 14.8 K/UL (ref 3.6–11)

## 2020-08-02 PROCEDURE — 93005 ELECTROCARDIOGRAM TRACING: CPT

## 2020-08-02 PROCEDURE — 71045 X-RAY EXAM CHEST 1 VIEW: CPT

## 2020-08-02 PROCEDURE — 74011250636 HC RX REV CODE- 250/636: Performed by: EMERGENCY MEDICINE

## 2020-08-02 PROCEDURE — 85025 COMPLETE CBC W/AUTO DIFF WBC: CPT

## 2020-08-02 PROCEDURE — 96361 HYDRATE IV INFUSION ADD-ON: CPT

## 2020-08-02 PROCEDURE — 96374 THER/PROPH/DIAG INJ IV PUSH: CPT

## 2020-08-02 PROCEDURE — 80053 COMPREHEN METABOLIC PANEL: CPT

## 2020-08-02 PROCEDURE — 36415 COLL VENOUS BLD VENIPUNCTURE: CPT

## 2020-08-02 PROCEDURE — 99285 EMERGENCY DEPT VISIT HI MDM: CPT

## 2020-08-02 PROCEDURE — 84484 ASSAY OF TROPONIN QUANT: CPT

## 2020-08-02 RX ORDER — LORAZEPAM 2 MG/ML
1 INJECTION INTRAMUSCULAR
Status: COMPLETED | OUTPATIENT
Start: 2020-08-02 | End: 2020-08-02

## 2020-08-02 RX ADMIN — LORAZEPAM 1 MG: 2 INJECTION INTRAMUSCULAR; INTRAVENOUS at 17:51

## 2020-08-02 RX ADMIN — SODIUM CHLORIDE 1000 ML: 9 INJECTION, SOLUTION INTRAVENOUS at 17:57

## 2020-08-02 NOTE — DISCHARGE INSTRUCTIONS
Patient Education        Panic Attacks: Care Instructions  Your Care Instructions     During a panic attack, you may have a feeling of intense fear or terror, trouble breathing, chest pain or tightness, heartbeat changes, dizziness, sweating, and shaking. A panic attack starts suddenly and usually lasts from 5 to 20 minutes but may last even longer. You have the most anxiety about 10 minutes after the attack starts. An attack can begin with a stressful event, or it can happen without a cause. Although panic attacks can cause scary symptoms, you can learn to manage them with self-care, counseling, and medicine. Follow-up care is a key part of your treatment and safety. Be sure to make and go to all appointments, and call your doctor if you are having problems. It's also a good idea to know your test results and keep a list of the medicines you take. How can you care for yourself at home? · Take your medicine exactly as directed. Call your doctor if you think you are having a problem with your medicine. · Go to your counseling sessions and follow-up appointments. · Recognize and accept your anxiety. Then, when you are in a situation that makes you anxious, say to yourself, \"This is not an emergency. I feel uncomfortable, but I am not in danger. I can keep going even if I feel anxious. \"  · Be kind to your body:  ? Relieve tension with exercise or a massage. ? Get enough rest.  ? Avoid alcohol, caffeine, nicotine, and illegal drugs. They can increase your anxiety level, cause sleep problems, or trigger a panic attack. ? Learn and do relaxation techniques. See below for more about these techniques. · Engage your mind. Get out and do something you enjoy. Go to a funny movie, or take a walk or hike. Plan your day. Having too much or too little to do can make you anxious. · Keep a record of your symptoms.  Discuss your fears with a good friend or family member, or join a support group for people with similar problems. Talking to others sometimes relieves stress. · Get involved in social groups, or volunteer to help others. Being alone sometimes makes things seem worse than they are. · Get at least 30 minutes of exercise on most days of the week to relieve stress. Walking is a good choice. You also may want to do other activities, such as running, swimming, cycling, or playing tennis or team sports. Relaxation techniques  Do relaxation exercises for 10 to 20 minutes a day. You can play soothing, relaxing music while you do them, if you wish. · Tell others in your house that you are going to do your relaxation exercises. Ask them not to disturb you. · Find a comfortable place, away from all distractions and noise. · Lie down on your back, or sit with your back straight. · Focus on your breathing. Make it slow and steady. · Breathe in through your nose. Breathe out through either your nose or mouth. · Breathe deeply, filling up the area between your navel and your rib cage. Breathe so that your belly goes up and down. · Do not hold your breath. · Breathe like this for 5 to 10 minutes. Notice the feeling of calmness throughout your whole body. As you continue to breathe slowly and deeply, relax by doing the following for another 5 to 10 minutes:  · Tighten and relax each muscle group in your body. You can begin at your toes and work your way up to your head. · Imagine your muscle groups relaxing and becoming heavy. · Empty your mind of all thoughts. · Let yourself relax more and more deeply. · Become aware of the state of calmness that surrounds you. · When your relaxation time is over, you can bring yourself back to alertness by moving your fingers and toes and then your hands and feet and then stretching and moving your entire body. Sometimes people fall asleep during relaxation, but they usually wake up shortly afterward.   · Always give yourself time to return to full alertness before you drive a car or do anything that might cause an accident if you are not fully alert. Never play a relaxation tape while driving a car. When should you call for help? [id number] anytime you think you may need emergency care. For example, call if:  · You feel you cannot stop from hurting yourself or someone else. Watch closely for changes in your health, and be sure to contact your doctor if:  · Your panic attacks get worse. · You have new or different anxiety. · You are not getting better as expected. Where can you learn more? Go to http://karey-kayla.info/  Enter H601 in the search box to learn more about \"Panic Attacks: Care Instructions. \"  Current as of: January 31, 2020               Content Version: 12.5  © 7883-3954 [employer], [employer]. Care instructions adapted under license by [employer] (which disclaims liability or warranty for this information). If you have questions about a medical condition or this instruction, always ask your healthcare professional. [place] [address] any warranty or liability for your use of this information.

## 2020-08-02 NOTE — ED PROVIDER NOTES
EMERGENCY DEPARTMENT HISTORY AND PHYSICAL EXAM      Date: 8/2/2020  Patient Name: Senia Keller  Patient Age and Sex: 60 y.o. female     History of Presenting Illness     Chief Complaint   Patient presents with    Anxiety       History Provided By: Patient    HPI: Senia Keller is a 22-year-old female with a history of anxiety presenting with anxiety attack. Patient states that she has had anxiety for a long time however today her anxiety got much worse and feels like she is having a panic attack. States that she has been having palpitations, shortness of breath, diaphoresis and very anxious. States that she also feels like she is dehydrated with dry mucous membranes. Patient denies any chest pain, nausea, vomiting, cough, fevers. There are no other complaints, changes, or physical findings at this time. PCP: Day Duff MD    No current facility-administered medications on file prior to encounter. Current Outpatient Medications on File Prior to Encounter   Medication Sig Dispense Refill    venlafaxine-SR (EFFEXOR-XR) 150 mg capsule Take 2 Caps by mouth daily (with breakfast). Indications: major depressive disorder 60 Cap 0    gabapentin (NEURONTIN) 300 mg capsule Take 1 Cap by mouth three (3) times daily. Max Daily Amount: 900 mg. Indications: anxiety 90 Cap 0    ALPRAZolam (XANAX) 1 mg tablet Take 1 Tab by mouth two (2) times a day. Max Daily Amount: 2 mg. 10 Tab 0    folic acid (FOLVITE) 1 mg tablet Take 1 Tab by mouth daily. 60 Tab 3    hydroCHLOROthiazide (HYDRODIURIL) 25 mg tablet Take 25 mg by mouth daily.  fenofibrate micronized (LOFIBRA) 134 mg capsule Take 134 mg by mouth every morning.       carvedilol (COREG) 12.5 mg tablet TAKE 1 TABLET BY MOUTH TWICE A  Tab 3    losartan (COZAAR) 50 mg tablet TAKE 1 TABLET BY MOUTH ONCE DAILY 90 Tab 5    amitriptyline (ELAVIL) 50 mg tablet TAKE 1 TABLET BY MOUTH EVERY EVENING 90 Tab 0    aspirin delayed-release 81 mg tablet Take 1 Tab by mouth daily. Indications: prevention of transient ischemic attack 30 Tab 0       Past History     Past Medical History:  Past Medical History:   Diagnosis Date    Acute hyponatremia 6/18/2019    Anxiety     Bimalleolar fracture of left ankle 2/3/2016    Comminuted and displaced     Cancer [facility])     breast    Closed left ankle fracture 2/4/2016    Depression     Gastroenteritis due to norovirus 2/21/2018    GERD (gastroesophageal reflux disease)     HTN (hypertension)     Hypercholesterolemia     Hypotension 9/12/2012    Metastatic breast cancer ([address] 5/3/2017    Neoplastic malignant related fatigue 4/4/2018    Pain due to neoplasm 4/4/2018    Secondary cancer of bone ([address] 5/3/2017    Sepsis ([facility] 2/12/2018    Urinary tract infection without hematuria 2/13/2018       Past Surgical History:  Past Surgical History:   Procedure Laterality Date    BREAST SURGERY PROCEDURE UNLISTED  march 13    carina masectomy       Family History:  Family History   Problem Relation Age of Onset    Hypertension Mother     Heart Disease Mother     Depression Mother     Hypertension Father        Social History:  Social History     Tobacco Use    Smoking status: Former Smoker     Packs/day: 0.50     Years: 20.00     Pack years: 10.00    Smokeless tobacco: Never Used   Substance Use Topics    Alcohol use: No    Drug use: No       Allergies: Allergies   Allergen Reactions    Sulfa (Sulfonamide Antibiotics) Unknown (comments)    Wellbutrin [Bupropion Hcl] Unknown (comments)         Review of Systems   Review of Systems   Constitutional: Positive for diaphoresis. Negative for chills and fever. Respiratory: Positive for shortness of breath. Negative for cough. Cardiovascular: Positive for palpitations. Negative for chest pain. Gastrointestinal: Negative for abdominal pain, constipation, diarrhea, nausea and vomiting. Genitourinary: Negative for dysuria, frequency and hematuria.    Neurological: Negative for weakness and numbness. Psychiatric/Behavioral: The patient is nervous/anxious. All other systems reviewed and are negative. Physical Exam   Physical Exam  Vitals signs and nursing note reviewed. Constitutional:       General: She is in acute distress. Appearance: She is well-developed. HENT:      Head: Normocephalic and atraumatic. Nose: Nose normal.      Mouth/Throat:      Mouth: Mucous membranes are dry. Eyes:      Extraocular Movements: Extraocular movements intact. Conjunctiva/sclera: Conjunctivae normal.   Neck:      Musculoskeletal: Normal range of motion and neck supple. Cardiovascular:      Rate and Rhythm: Regular rhythm. Tachycardia present. Pulmonary:      Effort: Pulmonary effort is normal. No respiratory distress. Breath sounds: Normal breath sounds. Abdominal:      General: There is no distension. Palpations: Abdomen is soft. Tenderness: There is no abdominal tenderness. Musculoskeletal: Normal range of motion. Skin:     General: Skin is warm and dry. Comments: Patient's forehead is diaphoretic   Neurological:      General: No focal deficit present. Mental Status: She is alert and oriented to person, place, and time. Mental status is at baseline.    Psychiatric:      Comments: Patient is very anxious,          Diagnostic Study Results     Labs -     Recent Results (from the past 12 hour(s))   EKG, 12 LEAD, INITIAL    Collection Time: 08/02/20  5:06 PM   Result Value Ref Range    Ventricular Rate 103 BPM    Atrial Rate 103 BPM    P-R Interval 152 ms    QRS Duration 64 ms    Q-T Interval 328 ms    QTC Calculation (Bezet) 429 ms    Calculated P Axis 56 degrees    Calculated R Axis 42 degrees    Calculated T Axis 50 degrees    Diagnosis       Sinus tachycardia  Nonspecific ST and T wave abnormality  When compared with ECG of 27-JAN-2020 07:49,  premature supraventricular complexes are no longer present     CBC WITH AUTOMATED DIFF    Collection Time: 08/02/20  5:44 PM   Result Value Ref Range    WBC 14.8 (H) 3.6 - 11.0 K/uL    RBC 5.48 (H) 3.80 - 5.20 M/uL    HGB 16.8 (H) 11.5 - 16.0 g/dL    HCT 50.5 (H) 35.0 - 47.0 %    MCV 92.2 80.0 - 99.0 FL    MCH 30.7 26.0 - 34.0 PG    MCHC 33.3 30.0 - 36.5 g/dL    RDW 14.1 11.5 - 14.5 %    PLATELET 116 522 - 876 K/uL    MPV 10.9 8.9 - 12.9 FL    NRBC 0.0 0  WBC    ABSOLUTE NRBC 0.00 0.00 - 0.01 K/uL    NEUTROPHILS 74 32 - 75 %    LYMPHOCYTES 17 12 - 49 %    MONOCYTES 6 5 - 13 %    EOSINOPHILS 1 0 - 7 %    BASOPHILS 1 0 - 1 %    IMMATURE GRANULOCYTES 1 (H) 0.0 - 0.5 %    ABS. NEUTROPHILS 11.2 (H) 1.8 - 8.0 K/UL    ABS. LYMPHOCYTES 2.5 0.8 - 3.5 K/UL    ABS. MONOCYTES 0.9 0.0 - 1.0 K/UL    ABS. EOSINOPHILS 0.1 0.0 - 0.4 K/UL    ABS. BASOPHILS 0.1 0.0 - 0.1 K/UL    ABS. IMM. GRANS. 0.1 (H) 0.00 - 0.04 K/UL    DF AUTOMATED     TROPONIN I    Collection Time: 08/02/20  6:34 PM   Result Value Ref Range    Troponin-I, Qt. <0.05 <3.32 ng/mL   METABOLIC PANEL, COMPREHENSIVE    Collection Time: 08/02/20  6:34 PM   Result Value Ref Range    Sodium 136 136 - 145 mmol/L    Potassium 3.9 3.5 - 5.1 mmol/L    Chloride 104 97 - 108 mmol/L    CO2 26 21 - 32 mmol/L    Anion gap 6 5 - 15 mmol/L    Glucose 140 (H) 65 - 100 mg/dL    BUN 34 (H) 6 - 20 MG/DL    Creatinine 0.91 0.55 - 1.02 MG/DL    BUN/Creatinine ratio 37 (H) 12 - 20      GFR est AA >60 >60 ml/min/1.73m2    GFR est non-AA >60 >60 ml/min/1.73m2    Calcium 9.4 8.5 - 10.1 MG/DL    Bilirubin, total 0.8 0.2 - 1.0 MG/DL    ALT (SGPT) 41 12 - 78 U/L    AST (SGOT) 5 (L) 15 - 37 U/L    Alk.  phosphatase 95 45 - 117 U/L    Protein, total 7.6 6.4 - 8.2 g/dL    Albumin 3.8 3.5 - 5.0 g/dL    Globulin 3.8 2.0 - 4.0 g/dL    A-G Ratio 1.0 (L) 1.1 - 2.2         Radiologic Studies -   XR CHEST PORT    (Results Pending)     CT Results  (Last 48 hours)    None        CXR Results  (Last 48 hours)    None            Medical Decision Making   I am the first provider for this patient. I reviewed the vital signs, available nursing notes, past medical history, past surgical history, family history and social history. Vital Signs-Reviewed the patient's vital signs. Patient Vitals for the past 12 hrs:   Temp Pulse Resp BP SpO2   08/02/20 1700 98.1 °F (36.7 °C) (!) 106 16 (!) 180/99 100 %       Records Reviewed: Nursing Notes and Old Medical Records    Provider Notes (Medical Decision Making):   Patient presenting with anxiety, diaphoresis, shortness of breath. While most likely she is having a panic attack, her age, and history of hypertension I do have to get EKG and a troponin and lab work on her. We will give her IV fluids as she appears dry as well as anxiolytic    ED Course:   Initial assessment performed. The patients presenting problems have been discussed, and they are in agreement with the care plan formulated and outlined with them. I have encouraged them to ask questions as they arise throughout their visit. Critical Care Time:   0    Disposition:  Discharge Note:  The patient has been re-evaluated and is ready for discharge. Reviewed available results with patient. Counseled patient on diagnosis and care plan. Patient has expressed understanding, and all questions have been answered. Patient agrees with plan and agrees to follow up as recommended, or to return to the ED if their symptoms worsen. Discharge instructions have been provided and explained to the patient, along with reasons to return to the ED. PLAN:  Current Discharge Medication List        2. Follow-up Information     Follow up With Specialties Details Why Contact Info    Claudette Postal, MD Family Medicine Schedule an appointment as soon as possible for a visit  [facility] [place] [address] [id number]          3. Return to ED if worse     Diagnosis     Clinical Impression:   1. Panic attack        Attestations:    Paresh Zambrano M.D.         Please note that this dictation was completed with [employer] the [employer] voice recognition software. Quite often unanticipated grammatical, syntax, homophones, and other interpretive errors are inadvertently transcribed by the computer software. Please disregard these errors. Please excuse any errors that have escaped final proofreading. Thank you.

## 2020-08-02 NOTE — ED NOTES
1901 Pt resting comfortably in bed. Call bell within reach. Pt denies pain and reported anxiety has improved. 1915 Md to bedside    1917 Assisted pt to bedside commode. Pt able to urinate without difficulty    1930 I have reviewed discharge instructions with the patient. The patient verbalized understanding.     2000 Pt wheeled out of ER to ride

## 2020-08-03 ENCOUNTER — PATIENT OUTREACH (OUTPATIENT)
Dept: CASE MANAGEMENT | Age: 65
End: 2020-08-03

## 2020-08-03 LAB
ATRIAL RATE: 103 BPM
CALCULATED P AXIS, ECG09: 56 DEGREES
CALCULATED R AXIS, ECG10: 42 DEGREES
CALCULATED T AXIS, ECG11: 50 DEGREES
DIAGNOSIS, 93000: NORMAL
P-R INTERVAL, ECG05: 152 MS
Q-T INTERVAL, ECG07: 328 MS
QRS DURATION, ECG06: 64 MS
QTC CALCULATION (BEZET), ECG08: 429 MS
VENTRICULAR RATE, ECG03: 103 BPM

## 2020-08-03 NOTE — PROGRESS NOTES
Patient contacted regarding recent discharge and COVID-19 risk. Discussed COVID-19 related testing which was not done at this time. Care Transition Nurse/ Ambulatory Care Manager contacted the patient by telephone to perform post discharge assessment. Verified name and  with patient as identifiers. Patient has following risk factors of: history of cancer. CTN/ACM reviewed discharge instructions, medical action plan and red flags related to discharge diagnosis. Reviewed and educated them on any new and changed medications related to discharge diagnosis. Advised obtaining a 90-day supply of all daily and as-needed medications. Advance Care Planning:   Does patient have an Advance Directive: yes, reviewed and current     Education provided regarding infection prevention, and signs and symptoms of COVID-19 and when to seek medical attention with patient who verbalized understanding. Discussed exposure protocols and quarantine from [address] you at higher risk for severe illness  and given an opportunity for questions and concerns. The patient agrees to contact the COVID-19 hotline [phone] or PCP office for questions related to their healthcare. CTN/ACM provided contact information for future reference. From CDC: Are you at higher risk for severe illness?  Wash your hands often.  Avoid close contact (6 feet, which is about two arm lengths) with people who are sick.  Put distance between yourself and other people if COVID-19 is spreading in your community.  Clean and disinfect frequently touched surfaces.  Avoid all cruise travel and non-essential air travel.  Call your healthcare professional if you have concerns about COVID-19 and your underlying condition or if you are sick.     For more information on steps you can take to protect yourself, see CDC's How to Protect Yourself      Patient/family/caregiver given information for Ernestina Izaguirre and agrees to enroll no      Plan for follow-up call in 7-14 days based on severity of symptoms and risk factors.

## 2020-08-17 ENCOUNTER — PATIENT OUTREACH (OUTPATIENT)
Dept: CASE MANAGEMENT | Age: 65
End: 2020-08-17

## 2020-08-17 NOTE — PROGRESS NOTES
Patient resolved from Transition of Care episode on 8/17/2020  Discussed COVID-19 related testing which was not done at this time. Patient/family has been provided the following resources and education related to COVID-19:                         Signs, symptoms and red flags related to COVID-19            CDC exposure and quarantine guidelines            [facility] exposure contact - [phone]            Contact for their local Department of Health                 Patient currently reports that the following symptoms have improved:  no new symptoms. No further outreach scheduled with this CTN/ACM/LPN/HC/ MA. Episode of Care resolved. Patient has this CTN/ACM/LPN/HC/MA contact information if future needs arise.

## 2020-10-23 ENCOUNTER — APPOINTMENT (OUTPATIENT)
Dept: GENERAL RADIOLOGY | Age: 65
DRG: 897 | End: 2020-10-23
Attending: EMERGENCY MEDICINE
Payer: MEDICARE

## 2020-10-23 ENCOUNTER — APPOINTMENT (OUTPATIENT)
Dept: CT IMAGING | Age: 65
DRG: 897 | End: 2020-10-23
Attending: EMERGENCY MEDICINE
Payer: MEDICARE

## 2020-10-23 ENCOUNTER — HOSPITAL ENCOUNTER (INPATIENT)
Age: 65
LOS: 1 days | Discharge: HOME | DRG: 897 | End: 2020-10-24
Attending: EMERGENCY MEDICINE | Admitting: INTERNAL MEDICINE
Payer: MEDICARE

## 2020-10-23 DIAGNOSIS — F41.9 ANXIETY: ICD-10-CM

## 2020-10-23 DIAGNOSIS — R56.9 SEIZURE (HCC): Primary | ICD-10-CM

## 2020-10-23 DIAGNOSIS — F13.931 BENZODIAZEPINE WITHDRAWAL WITH DELIRIUM (HCC): ICD-10-CM

## 2020-10-23 DIAGNOSIS — F13.939 BENZODIAZEPINE WITHDRAWAL WITH COMPLICATION (HCC): ICD-10-CM

## 2020-10-23 LAB
ALBUMIN SERPL-MCNC: 4.2 G/DL (ref 3.5–5)
ALBUMIN/GLOB SERPL: 1.2 {RATIO} (ref 1.1–2.2)
ALP SERPL-CCNC: 87 U/L (ref 45–117)
ALT SERPL-CCNC: 23 U/L (ref 12–78)
AMPHET UR QL SCN: NEGATIVE
ANION GAP SERPL CALC-SCNC: 13 MMOL/L (ref 5–15)
APPEARANCE UR: CLEAR
ARTERIAL PATENCY WRIST A: YES
AST SERPL-CCNC: 19 U/L (ref 15–37)
ATRIAL RATE: 100 BPM
BACTERIA URNS QL MICRO: NEGATIVE /HPF
BARBITURATES UR QL SCN: NEGATIVE
BASE DEFICIT BLD-SCNC: 4 MMOL/L
BASOPHILS # BLD: 0.1 K/UL (ref 0–0.1)
BASOPHILS NFR BLD: 1 % (ref 0–1)
BDY SITE: ABNORMAL
BENZODIAZ UR QL: POSITIVE
BILIRUB SERPL-MCNC: 0.5 MG/DL (ref 0.2–1)
BILIRUB UR QL: NEGATIVE
BNP SERPL-MCNC: 67 PG/ML
BUN SERPL-MCNC: 24 MG/DL (ref 6–20)
BUN/CREAT SERPL: 18 (ref 12–20)
CA-I BLD-SCNC: 1.31 MMOL/L (ref 1.12–1.32)
CALCIUM SERPL-MCNC: 9.6 MG/DL (ref 8.5–10.1)
CALCULATED P AXIS, ECG09: -17 DEGREES
CALCULATED R AXIS, ECG10: 20 DEGREES
CALCULATED T AXIS, ECG11: -113 DEGREES
CANNABINOIDS UR QL SCN: NEGATIVE
CHLORIDE SERPL-SCNC: 97 MMOL/L (ref 97–108)
CO2 SERPL-SCNC: 20 MMOL/L (ref 21–32)
COCAINE UR QL SCN: NEGATIVE
COLOR UR: ABNORMAL
COMMENT, HOLDF: NORMAL
CREAT SERPL-MCNC: 1.33 MG/DL (ref 0.55–1.02)
DIAGNOSIS, 93000: NORMAL
DIFFERENTIAL METHOD BLD: ABNORMAL
DRUG SCRN COMMENT,DRGCM: ABNORMAL
EOSINOPHIL # BLD: 0 K/UL (ref 0–0.4)
EOSINOPHIL NFR BLD: 0 % (ref 0–7)
EPITH CASTS URNS QL MICRO: ABNORMAL /LPF
ERYTHROCYTE [DISTWIDTH] IN BLOOD BY AUTOMATED COUNT: 11.9 % (ref 11.5–14.5)
ETHANOL SERPL-MCNC: <10 MG/DL
GAS FLOW.O2 O2 DELIVERY SYS: ABNORMAL L/MIN
GAS FLOW.O2 SETTING OXYMISER: 3 L/M
GLOBULIN SER CALC-MCNC: 3.5 G/DL (ref 2–4)
GLUCOSE BLD STRIP.AUTO-MCNC: 203 MG/DL (ref 65–100)
GLUCOSE SERPL-MCNC: 190 MG/DL (ref 65–100)
GLUCOSE UR STRIP.AUTO-MCNC: 500 MG/DL
HCO3 BLD-SCNC: 21.9 MMOL/L (ref 22–26)
HCT VFR BLD AUTO: 41 % (ref 35–47)
HGB BLD-MCNC: 13.9 G/DL (ref 11.5–16)
HGB UR QL STRIP: NEGATIVE
HYALINE CASTS URNS QL MICRO: ABNORMAL /LPF (ref 0–5)
IMM GRANULOCYTES # BLD AUTO: 0 K/UL (ref 0–0.04)
IMM GRANULOCYTES NFR BLD AUTO: 0 % (ref 0–0.5)
KETONES UR QL STRIP.AUTO: ABNORMAL MG/DL
LACTATE SERPL-SCNC: 2.3 MMOL/L (ref 0.4–2)
LEUKOCYTE ESTERASE UR QL STRIP.AUTO: NEGATIVE
LYMPHOCYTES # BLD: 0.8 K/UL (ref 0.8–3.5)
LYMPHOCYTES NFR BLD: 9 % (ref 12–49)
MAGNESIUM SERPL-MCNC: 2 MG/DL (ref 1.6–2.4)
MCH RBC QN AUTO: 31.3 PG (ref 26–34)
MCHC RBC AUTO-ENTMCNC: 33.9 G/DL (ref 30–36.5)
MCV RBC AUTO: 92.3 FL (ref 80–99)
METHADONE UR QL: NEGATIVE
MONOCYTES # BLD: 0.4 K/UL (ref 0–1)
MONOCYTES NFR BLD: 4 % (ref 5–13)
NEUTS SEG # BLD: 8.5 K/UL (ref 1.8–8)
NEUTS SEG NFR BLD: 86 % (ref 32–75)
NITRITE UR QL STRIP.AUTO: NEGATIVE
NRBC # BLD: 0 K/UL (ref 0–0.01)
NRBC BLD-RTO: 0 PER 100 WBC
OPIATES UR QL: NEGATIVE
P-R INTERVAL, ECG05: 194 MS
PCO2 BLD: 43.3 MMHG (ref 35–45)
PCP UR QL: NEGATIVE
PH BLD: 7.31 [PH] (ref 7.35–7.45)
PH UR STRIP: 5 [PH] (ref 5–8)
PLATELET # BLD AUTO: 255 K/UL (ref 150–400)
PMV BLD AUTO: 10.9 FL (ref 8.9–12.9)
PO2 BLD: 94 MMHG (ref 80–100)
POTASSIUM SERPL-SCNC: 3.5 MMOL/L (ref 3.5–5.1)
PROT SERPL-MCNC: 7.7 G/DL (ref 6.4–8.2)
PROT UR STRIP-MCNC: NEGATIVE MG/DL
Q-T INTERVAL, ECG07: 338 MS
QRS DURATION, ECG06: 100 MS
QTC CALCULATION (BEZET), ECG08: 436 MS
RBC # BLD AUTO: 4.44 M/UL (ref 3.8–5.2)
RBC #/AREA URNS HPF: ABNORMAL /HPF (ref 0–5)
SAMPLES BEING HELD,HOLD: NORMAL
SAO2 % BLD: 96 % (ref 92–97)
SERVICE CMNT-IMP: ABNORMAL
SODIUM SERPL-SCNC: 130 MMOL/L (ref 136–145)
SP GR UR REFRACTOMETRY: 1.02 (ref 1–1.03)
SPECIMEN TYPE: ABNORMAL
TROPONIN I SERPL-MCNC: <0.05 NG/ML
UR CULT HOLD, URHOLD: NORMAL
UROBILINOGEN UR QL STRIP.AUTO: 0.2 EU/DL (ref 0.2–1)
VENTRICULAR RATE, ECG03: 100 BPM
WBC # BLD AUTO: 9.9 K/UL (ref 3.6–11)
WBC URNS QL MICRO: ABNORMAL /HPF (ref 0–4)

## 2020-10-23 PROCEDURE — 96374 THER/PROPH/DIAG INJ IV PUSH: CPT

## 2020-10-23 PROCEDURE — 82803 BLOOD GASES ANY COMBINATION: CPT

## 2020-10-23 PROCEDURE — 36600 WITHDRAWAL OF ARTERIAL BLOOD: CPT

## 2020-10-23 PROCEDURE — 96376 TX/PRO/DX INJ SAME DRUG ADON: CPT

## 2020-10-23 PROCEDURE — 80307 DRUG TEST PRSMV CHEM ANLYZR: CPT

## 2020-10-23 PROCEDURE — 83735 ASSAY OF MAGNESIUM: CPT

## 2020-10-23 PROCEDURE — 74011250637 HC RX REV CODE- 250/637: Performed by: INTERNAL MEDICINE

## 2020-10-23 PROCEDURE — 74011250636 HC RX REV CODE- 250/636: Performed by: INTERNAL MEDICINE

## 2020-10-23 PROCEDURE — 81001 URINALYSIS AUTO W/SCOPE: CPT

## 2020-10-23 PROCEDURE — 96372 THER/PROPH/DIAG INJ SC/IM: CPT

## 2020-10-23 PROCEDURE — 83605 ASSAY OF LACTIC ACID: CPT

## 2020-10-23 PROCEDURE — 87040 BLOOD CULTURE FOR BACTERIA: CPT

## 2020-10-23 PROCEDURE — 99285 EMERGENCY DEPT VISIT HI MDM: CPT

## 2020-10-23 PROCEDURE — 93005 ELECTROCARDIOGRAM TRACING: CPT

## 2020-10-23 PROCEDURE — 71045 X-RAY EXAM CHEST 1 VIEW: CPT

## 2020-10-23 PROCEDURE — 83880 ASSAY OF NATRIURETIC PEPTIDE: CPT

## 2020-10-23 PROCEDURE — 99218 HC RM OBSERVATION: CPT

## 2020-10-23 PROCEDURE — 84484 ASSAY OF TROPONIN QUANT: CPT

## 2020-10-23 PROCEDURE — 85025 COMPLETE CBC W/AUTO DIFF WBC: CPT

## 2020-10-23 PROCEDURE — 36415 COLL VENOUS BLD VENIPUNCTURE: CPT

## 2020-10-23 PROCEDURE — 74011250636 HC RX REV CODE- 250/636: Performed by: EMERGENCY MEDICINE

## 2020-10-23 PROCEDURE — 82962 GLUCOSE BLOOD TEST: CPT

## 2020-10-23 PROCEDURE — 80053 COMPREHEN METABOLIC PANEL: CPT

## 2020-10-23 PROCEDURE — 51701 INSERT BLADDER CATHETER: CPT

## 2020-10-23 PROCEDURE — 65270000029 HC RM PRIVATE

## 2020-10-23 PROCEDURE — 99204 OFFICE O/P NEW MOD 45 MIN: CPT | Performed by: PSYCHIATRY & NEUROLOGY

## 2020-10-23 PROCEDURE — 70450 CT HEAD/BRAIN W/O DYE: CPT

## 2020-10-23 RX ORDER — SODIUM CHLORIDE 0.9 % (FLUSH) 0.9 %
5-40 SYRINGE (ML) INJECTION AS NEEDED
Status: DISCONTINUED | OUTPATIENT
Start: 2020-10-23 | End: 2020-10-24 | Stop reason: HOSPADM

## 2020-10-23 RX ORDER — ACETAMINOPHEN 325 MG/1
650 TABLET ORAL
Status: DISCONTINUED | OUTPATIENT
Start: 2020-10-23 | End: 2020-10-24 | Stop reason: HOSPADM

## 2020-10-23 RX ORDER — SODIUM CHLORIDE 0.9 % (FLUSH) 0.9 %
5-40 SYRINGE (ML) INJECTION EVERY 8 HOURS
Status: DISCONTINUED | OUTPATIENT
Start: 2020-10-23 | End: 2020-10-24 | Stop reason: HOSPADM

## 2020-10-23 RX ORDER — HEPARIN SODIUM 5000 [USP'U]/ML
5000 INJECTION, SOLUTION INTRAVENOUS; SUBCUTANEOUS EVERY 8 HOURS
Status: DISCONTINUED | OUTPATIENT
Start: 2020-10-23 | End: 2020-10-24 | Stop reason: HOSPADM

## 2020-10-23 RX ORDER — SODIUM CHLORIDE 9 MG/ML
100 INJECTION, SOLUTION INTRAVENOUS CONTINUOUS
Status: DISCONTINUED | OUTPATIENT
Start: 2020-10-23 | End: 2020-10-24 | Stop reason: HOSPADM

## 2020-10-23 RX ORDER — ONDANSETRON 2 MG/ML
4 INJECTION INTRAMUSCULAR; INTRAVENOUS
Status: DISCONTINUED | OUTPATIENT
Start: 2020-10-23 | End: 2020-10-24 | Stop reason: HOSPADM

## 2020-10-23 RX ORDER — GABAPENTIN 100 MG/1
300 CAPSULE ORAL 3 TIMES DAILY
Status: DISCONTINUED | OUTPATIENT
Start: 2020-10-23 | End: 2020-10-24 | Stop reason: HOSPADM

## 2020-10-23 RX ORDER — POLYETHYLENE GLYCOL 3350 17 G/17G
17 POWDER, FOR SOLUTION ORAL DAILY PRN
Status: DISCONTINUED | OUTPATIENT
Start: 2020-10-23 | End: 2020-10-24 | Stop reason: HOSPADM

## 2020-10-23 RX ORDER — ASPIRIN 81 MG/1
81 TABLET ORAL DAILY
Status: DISCONTINUED | OUTPATIENT
Start: 2020-10-23 | End: 2020-10-24 | Stop reason: HOSPADM

## 2020-10-23 RX ORDER — FOLIC ACID 1 MG/1
1 TABLET ORAL DAILY
Status: DISCONTINUED | OUTPATIENT
Start: 2020-10-23 | End: 2020-10-24 | Stop reason: HOSPADM

## 2020-10-23 RX ORDER — VENLAFAXINE HYDROCHLORIDE 150 MG/1
300 CAPSULE, EXTENDED RELEASE ORAL
Status: DISCONTINUED | OUTPATIENT
Start: 2020-10-24 | End: 2020-10-24 | Stop reason: HOSPADM

## 2020-10-23 RX ORDER — CARVEDILOL 12.5 MG/1
12.5 TABLET ORAL 2 TIMES DAILY WITH MEALS
Status: DISCONTINUED | OUTPATIENT
Start: 2020-10-24 | End: 2020-10-24 | Stop reason: HOSPADM

## 2020-10-23 RX ORDER — AMITRIPTYLINE HYDROCHLORIDE 50 MG/1
50 TABLET, FILM COATED ORAL
Status: DISCONTINUED | OUTPATIENT
Start: 2020-10-23 | End: 2020-10-24 | Stop reason: HOSPADM

## 2020-10-23 RX ORDER — ACETAMINOPHEN 650 MG/1
650 SUPPOSITORY RECTAL
Status: DISCONTINUED | OUTPATIENT
Start: 2020-10-23 | End: 2020-10-24 | Stop reason: HOSPADM

## 2020-10-23 RX ORDER — ONDANSETRON 4 MG/1
4 TABLET, ORALLY DISINTEGRATING ORAL
Status: DISCONTINUED | OUTPATIENT
Start: 2020-10-23 | End: 2020-10-24 | Stop reason: HOSPADM

## 2020-10-23 RX ORDER — ALPRAZOLAM 1 MG/1
1 TABLET ORAL 2 TIMES DAILY
Status: DISCONTINUED | OUTPATIENT
Start: 2020-10-23 | End: 2020-10-24 | Stop reason: HOSPADM

## 2020-10-23 RX ORDER — LORAZEPAM 2 MG/ML
1 INJECTION INTRAMUSCULAR
Status: DISCONTINUED | OUTPATIENT
Start: 2020-10-23 | End: 2020-10-24 | Stop reason: HOSPADM

## 2020-10-23 RX ORDER — LORAZEPAM 2 MG/ML
1 INJECTION INTRAMUSCULAR ONCE
Status: COMPLETED | OUTPATIENT
Start: 2020-10-23 | End: 2020-10-23

## 2020-10-23 RX ADMIN — ALPRAZOLAM 1 MG: 0.5 TABLET ORAL at 17:34

## 2020-10-23 RX ADMIN — GABAPENTIN 300 MG: 100 CAPSULE ORAL at 17:34

## 2020-10-23 RX ADMIN — AMITRIPTYLINE HYDROCHLORIDE 50 MG: 50 TABLET, FILM COATED ORAL at 21:01

## 2020-10-23 RX ADMIN — ALPRAZOLAM 1 MG: 0.5 TABLET ORAL at 11:02

## 2020-10-23 RX ADMIN — Medication 10 ML: at 21:02

## 2020-10-23 RX ADMIN — Medication 10 ML: at 17:34

## 2020-10-23 RX ADMIN — FOLIC ACID 1 MG: 1 TABLET ORAL at 11:02

## 2020-10-23 RX ADMIN — HEPARIN SODIUM 5000 UNITS: 5000 INJECTION INTRAVENOUS; SUBCUTANEOUS at 10:22

## 2020-10-23 RX ADMIN — GABAPENTIN 300 MG: 100 CAPSULE ORAL at 11:02

## 2020-10-23 RX ADMIN — HEPARIN SODIUM 5000 UNITS: 5000 INJECTION INTRAVENOUS; SUBCUTANEOUS at 17:34

## 2020-10-23 RX ADMIN — SODIUM CHLORIDE 1000 ML: 9 INJECTION, SOLUTION INTRAVENOUS at 06:59

## 2020-10-23 RX ADMIN — Medication 10 ML: at 09:21

## 2020-10-23 RX ADMIN — SODIUM CHLORIDE 100 ML/HR: 900 INJECTION, SOLUTION INTRAVENOUS at 10:22

## 2020-10-23 RX ADMIN — LORAZEPAM 1 MG: 2 INJECTION INTRAMUSCULAR; INTRAVENOUS at 06:00

## 2020-10-23 RX ADMIN — ASPIRIN 81 MG: 81 TABLET, COATED ORAL at 11:02

## 2020-10-23 RX ADMIN — SODIUM CHLORIDE 1000 ML: 9 INJECTION, SOLUTION INTRAVENOUS at 09:19

## 2020-10-23 RX ADMIN — ACETAMINOPHEN 650 MG: 325 TABLET ORAL at 21:02

## 2020-10-23 RX ADMIN — GABAPENTIN 300 MG: 100 CAPSULE ORAL at 21:01

## 2020-10-23 RX ADMIN — LORAZEPAM 1 MG: 2 INJECTION INTRAMUSCULAR; INTRAVENOUS at 10:32

## 2020-10-23 NOTE — PROGRESS NOTES
Problem: Seizure Disorder (Adult)  Goal: *STG: Remains free of seizure activity  Outcome: Progressing Towards Goal  Goal: *STG: Maintains lab values within therapeutic range  Outcome: Progressing Towards Goal  Goal: *STG/LTG: Complies with medication therapy  Outcome: Progressing Towards Goal  Goal: *STG: Remains free of injury during seizure activity  Outcome: Progressing Towards Goal  Goal: *STG: Remains safe in hospital  Outcome: Progressing Towards Goal  Goal: Interventions  Outcome: Progressing Towards Goal

## 2020-10-23 NOTE — ED PROVIDER NOTES
HPI 71-year-old female with a history of anxiety, breast cancer, acid reflux, hypertension, high cholesterol, presents the emergency department complaining of generally not feeling well. She states she has not had any of her medications for over a week. She states she has been having intermittent episodes of shortness of breath and sweating usually when she gets panicked. She has nausea but no vomiting. She denies a fever. She denies a headache. She denies chest pain. She denies abdominal pain. She states she is urinating normally. She is having some jerking movements of her body which she states is happened before when she has been without her medications.     Past Medical History:   Diagnosis Date    Acute hyponatremia 6/18/2019    Anxiety     Bimalleolar fracture of left ankle 2/3/2016    Comminuted and displaced     Cancer [facility])     breast    Closed left ankle fracture 2/4/2016    Depression     Gastroenteritis due to norovirus 2/21/2018    GERD (gastroesophageal reflux disease)     HTN (hypertension)     Hypercholesterolemia     Hypotension 9/12/2012    Metastatic breast cancer ([address] 5/3/2017    Neoplastic malignant related fatigue 4/4/2018    Pain due to neoplasm 4/4/2018    Secondary cancer of bone ([address] 5/3/2017    Sepsis ([facility] 2/12/2018    Urinary tract infection without hematuria 2/13/2018       Past Surgical History:   Procedure Laterality Date    BREAST SURGERY PROCEDURE UNLISTED  march 13    carina masectomy         Family History:   Problem Relation Age of Onset    Hypertension Mother     Heart Disease Mother     Depression Mother     Hypertension Father        Social History     Socioeconomic History    Marital status: SINGLE     Spouse name: Not on file    Number of children: Not on file    Years of education: Not on file    Highest education level: Not on file   Occupational History    Not on file   Social Needs    Financial resource strain: Not on file   Swati-Mayi insecurity     Worry: Not on file     Inability: Not on file    Transportation needs     Medical: Not on file     Non-medical: Not on file   Tobacco Use    Smoking status: Former Smoker     Packs/day: 0.50     Years: 20.00     Pack years: 10.00    Smokeless tobacco: Never Used   Substance and Sexual Activity    Alcohol use: No    Drug use: No    Sexual activity: Never   Lifestyle    Physical activity     Days per week: Not on file     Minutes per session: Not on file    Stress: Not on file   Relationships    Social connections     Talks on phone: Not on file     Gets together: Not on file     Attends Mosque service: Not on file     Active member of club or organization: Not on file     Attends meetings of clubs or organizations: Not on file     Relationship status: Not on file    Intimate partner violence     Fear of current or ex partner: Not on file     Emotionally abused: Not on file     Physically abused: Not on file     Forced sexual activity: Not on file   Other Topics Concern     Service Not Asked    Blood Transfusions Not Asked    Caffeine Concern Not Asked    Occupational Exposure Not Asked   Slim [place] Hazards Not Asked    Sleep Concern Not Asked    Stress Concern Not Asked    Weight Concern Not Asked    Special Diet Not Asked    Back Care Not Asked    Exercise Not Asked    Bike Helmet Not Asked   [address] Not Asked    Self-Exams Not Asked   Social History Narrative    63year old  female admitted for depression after suicide attempt on Xanax and opiods. Py is follwed by DR. Buzz Figueroa on the outside. She has breast and bone cancer. She is living by herself with no family supports. ALLERGIES: Sulfa (sulfonamide antibiotics) and Wellbutrin [bupropion hcl]    Review of Systems   Constitutional: Negative for fever. HENT: Negative for congestion. Eyes: Negative for visual disturbance. Respiratory: Positive for shortness of breath.  Negative for chest tightness. Cardiovascular: Negative for chest pain, palpitations and leg swelling. Gastrointestinal: Positive for nausea. Negative for abdominal pain and vomiting. Genitourinary: Negative for dysuria. Musculoskeletal: Negative for gait problem. Skin: Negative for rash. Neurological: Negative for headaches. Psychiatric/Behavioral: The patient is nervous/anxious. There were no vitals filed for this visit. Physical Exam  Constitutional:       General: She is not in acute distress. Appearance: She is well-developed. HENT:      Head: Normocephalic and atraumatic. Mouth/Throat:      Pharynx: No oropharyngeal exudate. Eyes:      General: No scleral icterus. Right eye: No discharge. Left eye: No discharge. Pupils: Pupils are equal, round, and reactive to light. Neck:      Musculoskeletal: Normal range of motion and neck supple. Vascular: No JVD. Cardiovascular:      Rate and Rhythm: Normal rate and regular rhythm. Heart sounds: Normal heart sounds. No murmur. Pulmonary:      Effort: Pulmonary effort is normal. Tachypnea present. No respiratory distress. Breath sounds: Normal breath sounds. No stridor. No wheezing or rales. Chest:      Chest wall: No tenderness. Abdominal:      General: Bowel sounds are normal. There is no distension. Palpations: Abdomen is soft. There is no mass. Tenderness: There is no abdominal tenderness. There is no guarding or rebound. Musculoskeletal: Normal range of motion. Skin:     General: Skin is warm and dry. Capillary Refill: Capillary refill takes less than 2 seconds. Findings: No rash. Comments: diaphoretic   Neurological:      Mental Status: She is oriented to person, place, and time. Comments: ? akathesia? Psychiatric:         Behavior: Behavior normal.         Thought Content:  Thought content normal.         Judgment: Judgment normal.          MDM  Number of Diagnoses or Management Options  Benzodiazepine withdrawal with complication [facility]):   Seizure [facility]):   40 minutes of critical care time       Procedures      Effexor 100mg bid  Gabapentin 300mg x2 TID  Losartan 50mg every day  Carvedilol 12.5mg bid  HCTZ 25mg every day  Atorvastatin 10mg every day  Fenofibrate 134mg qday  Alprazolam 1mg qid  amitriptyline  50mg x2 qhs  Quetiapine 50mg x 2 qhs         Ativan was ordered for presumed withdrawal symptoms. Prior to receiving, she had a brief witnessed seizure with tongue biting. She is currently responding to name with eyes opening but not following commands. She remains diaphoretic. ED EKG interpretation:  Rhythm: normal sinus rhythm; and regular . Rate (approx.): 100; Axis: normal; P wave: normal; QRS interval: prolonged; ST/T wave: non-specific changes;  ST depression lateral leads which is new. This EKG was interpreted by Haley Montalvo MD,ED Provider. Patient is more awake, able to follow commands but speech is not clear. Patient continues to be more and more responsive. Labs are relatively normal.  Head CT pending. Suspect seizure related to benzo withdrawal. Will admit for further monitoring and treatment. Perfect Serve Consult for Admission  7:37 AM    ED Room Number: PO18/67  Patient Name and age: Marion Lew 60 y.o.  female  Working Diagnosis:   1. Seizure ([facility]    2. Benzodiazepine withdrawal with complication ([facility]        COVID-19 Suspicion:  no  Sepsis present:  no  Reassessment needed: no  Code Status:  Full Code  Readmission: no  Isolation Requirements:  no  Recommended Level of Care:  telemetry  Department:[facility] Adult ED - 21   Other:  60year old female with anxiety, breast cancer, htn, chol, presents to the ED with chief complaint of \"feeling weird\"  She states she has been out of all her meds, including benzos, for 1 week. She Had a witnessed seizure here treated with ativan.  ON arrival she was diaphoretic with some sort of movement disorder but awake and alert. She had post ictal period and is slowly clearing. She complained of SOB but no chest pain and her EKG shows ST depression in lateral leads, but troponin is neg.  I'm not seeing anything obvious on head CT, official read is pending

## 2020-10-23 NOTE — H&P
[address] [facility] Adult  Hospitalist Group  History and Physical    Primary Care Provider: Bull Herndon MD  Date of Service:  10/23/2020    Subjective: Josefina Rockwell is a 60 y.o. female with metastatic breast cancer (off chemo), Anxiety, HTN who presented to the ED with feeling generally unwell. She has been out of her medications for the last week including antihypertensives, antianxiety medications, and benzodiazepines. She had a witnessed seizure in the ED, and received ativan. Referred for admission. On my evaluation patient remians confused and post ictal. She was able to tell me she was out of her medications because her insurance changed, and she was waiting for mail in order. She is currently complaining of tachycardia and generalized anxiety. In the ED she also complained of some jerky movements, but was unable to tell me that at the time of my interview. She stated No to all review of system questions, but does appear confused. Review of Systems:    A comprehensive review of systems was negative except for that written in the History of Present Illness.      Past Medical History:   Diagnosis Date    Acute hyponatremia 6/18/2019    Anxiety     Bimalleolar fracture of left ankle 2/3/2016    Comminuted and displaced     Cancer [facility])     breast    Closed left ankle fracture 2/4/2016    Depression     Gastroenteritis due to norovirus 2/21/2018    GERD (gastroesophageal reflux disease)     HTN (hypertension)     Hypercholesterolemia     Hypotension 9/12/2012    Metastatic breast cancer ([address] 5/3/2017    Neoplastic malignant related fatigue 4/4/2018    Pain due to neoplasm 4/4/2018    Secondary cancer of bone ([address] 5/3/2017    Sepsis ([address] 2/12/2018    Urinary tract infection without hematuria 2/13/2018      Past Surgical History:   Procedure Laterality Date    BREAST SURGERY PROCEDURE UNLISTED  march 13    carina masectomy     Prior to Admission medications    Medication Sig Start Date End Date Taking? Authorizing Provider   venlafaxine-SR [facility]) 150 mg capsule Take 2 Caps by mouth daily (with breakfast). Indications: major depressive disorder 2/6/20   Ximena SILVER NP   gabapentin (NEURONTIN) 300 mg capsule Take 1 Cap by mouth three (3) times daily. Max Daily Amount: 900 mg. Indications: anxiety 2/5/20   Ximena Gera SILVER NP   ALPRAZolam Neelam Dollar) 1 mg tablet Take 1 Tab by mouth two (2) times a day. Max Daily Amount: 2 mg. 2/1/20   Sukh Velazquez NP   folic acid (FOLVITE) 1 mg tablet Take 1 Tab by mouth daily. 12/25/19   Apurva Aguirre,    hydroCHLOROthiazide (HYDRODIURIL) 25 mg tablet Take 25 mg by mouth daily. Provider, Historical   fenofibrate micronized (LOFIBRA) 134 mg capsule Take 134 mg by mouth every morning. Provider, Historical   carvedilol (COREG) 12.5 mg tablet TAKE 1 TABLET BY MOUTH TWICE A DAY 7/19/19   Selma Lewis MD   losartan (COZAAR) 50 mg tablet TAKE 1 TABLET BY MOUTH ONCE DAILY 7/19/19   Selma Lewis MD   amitriptyline (ELAVIL) 50 mg tablet TAKE 1 TABLET BY MOUTH EVERY EVENING 6/28/19   Selma Lewis MD   aspirin delayed-release 81 mg tablet Take 1 Tab by mouth daily. Indications: prevention of transient ischemic attack 4/11/18   Selma Lewis MD     Allergies   Allergen Reactions    Sulfa (Sulfonamide Antibiotics) Unknown (comments)    Wellbutrin [Bupropion Hcl] Unknown (comments)      Family History   Problem Relation Age of Onset    Hypertension Mother     Heart Disease Mother     Depression Mother     Hypertension Father         SOCIAL HISTORY:  Patient resides at Home. Patient ambulates without assistance.    Smoking history: 4 cigarettes per day  Alcohol history: Denies        Objective:       Physical Exam:   Visit Vitals  /81   Pulse 94   Temp 99 °F (37.2 °C)   Resp 19   SpO2 96%     General appearance: alert, cooperative, no distress, appears older than stated age  Head: Normocephalic, without obvious abnormality, atraumatic  Eyes: conjunctivae/corneas clear. PERRL, EOM's intact. Lungs: clear to auscultation bilaterally  Heart: regular rate and rhythm, S1, S2 normal, no murmur, click, rub or gallop  Abdomen: soft, non-tender. Bowel sounds normal. No masses,  no organomegaly  Extremities: extremities normal, atraumatic, no cyanosis or edema  Pulses: 2+ and symmetric  Skin: Skin color, texture, turgor normal. No rashes or lesions  Neurologic: slow to respond, oriented x 2 (not to place), no obvious focal deficits   Cap refill: Brisk, less than 3 seconds  Pulses: 2+, symmetric in all extremities    ECG:  Sinus Tachycardia, Non specific ST-T wave changes     Data Review: All diagnostic labs and studies have been reviewed. Chest x-ray was negative for infiltrate, effusion, pneumothorax, or wide mediastinum. Assessment:     Active Problems:    Seizure ([facility] (10/23/2020)      Plan:     Seizure - in the setting of benzodiazepine withdrawal   Lactic acidosis   - Resume home medications, including benzo  - Seizure precautions  - Neurology consult  - PRN IV ativan if having seizure over 5 minutes. - Will need home medications filled here prior to DC     Acute Kidney Injury - pre-renal likely, poor PO intake  - Add IVF  - Monitor I and O   - avoid nephrotoxins     Anxiety - resume home medications, xanax, elavil,     HTN - BP low, hold home medications for now     Metastatic breast cancer - currently not on treatment.   - Has DDNR on file, patient currently unable to make decisions. Will place DNR order, but should be asked again once no longer post ictal   - Can consider palliative consult if patient is interested.        DNR   DVT PPx - heparin       FUNCTIONAL STATUS PRIOR TO HOSPITALIZATION Ambulates Independently (including history of recent falls): None     Signed By: Fariba Kirk MD     October 23, 2020

## 2020-10-23 NOTE — PROGRESS NOTES
Care Management:    Transition of Care Plan:     · RUR: NA  · Disposition: home  · Patient will need prescriptions for one month at time of discharge to give her time to work things out with [facility] mail order pharmacy. · Patient is set up with [employer] to deliver food on 10- via [employer]. Patient must be home when they are delivered. · Gave information for food pantry at [address]. · Transportation: friend or CM will need to arrange transportation via Roundtrip    Per chart review, patient has been out of her medications for 7 days. Patient had an admission 2-1-20 to 2-5-20 to [address] [place] [address] for not taking her Xanax for 4 days. She had been on Xanax for 12 years when she stopped taking it. Met with patient in room. Patient confirmed her address and phone number. Patient lives in a 1 level apartment. She uses elevator to get to the apartment. Patient is independent with her ADLs and drives. She has a walker, but does not use it. Discussed discharge transportation. He father cannot drive to [facility] because of the distance and him not knowing how to get back home from [address] [place] [address]. She can check with her friend Cinthya Small, but she is currently care for her  who is really sick. Patient has no money. If her friend cannot come pick her up, she will need CM to arrange Roundtrip transportation. Asked about not taking her medications. She said she was out of the gabapentin, lorazepam, and a sleeping medication that starts with \"Q\". She had asked her PCP to send these scripts over to [facility] mail order pharmacy, but she had not received them. Attempted to call PCP to check status of prescriptions, when CM pushed option to speak to a person there was no answer. She will need scripts for a month of these at time of discharge. Patient said she had also not eaten for a week. She said she does not have food in her home.  She usually eats frozen meals or take out. CM called [employer] ([phone]) which is paid for through [address]. CM spoke with Joce Tobar. The meals will be delivered to patient's home via [employer] on 10-29-30. Patient must be home. CM found food pantry close to patient's home. The pantry is located at The [address] at [address] [facility]. Next time they are open is Tuesday (10-27-20). CM gave both of these things to patient and told her she had to be home for the delivery from [employer]. Placed information for both on AVS and gave copy of the information to patient. Reason for Admission:   seizure                   RUR Score:          NA (observation)           Plan for utilizing home health:     Likely no needs. PCP: First and Last name:  Dr. Dionna Moreau   Name of Practice: [facility]   Are you a current patient: Yes/No: yes   Approximate date of last visit: 2 months ago                       Current Advanced Directive/Advance Care Plan: None on file. Patient does not wish to complete an AMD. Next of Kin is her father Sherif Ríos. She says he is forgetful sometimes. He only drives short distances because he cannot remember how to get back home. Her next closest relative is her cousin Lizeth Foy who lives in [facility]. Her other cousin is Teresita Louise (?last name) lives in [place]. She is not close to her at all.                             Care Management Interventions  PCP Verified by CM: Yes(Dr. Gonsalo Alcocer)  Last Visit to PCP: 08/01/20  Palliative Care Criteria Met (RRAT>21 & CHF Dx)?: No  Mode of Transport at Discharge: (Friend or will assistance with [employer] home. )  Transition of Care Consult (CM Consult): Discharge Planning  Discharge Durable Medical Equipment: No  Physical Therapy Consult: No  Occupational Therapy Consult: No  Speech Therapy Consult: No  Current Support Network: Lives Alone  Confirm Follow Up Transport: [address] Provided?: No  Discharge Location  Discharge Placement: [address] [facility], [facility]

## 2020-10-23 NOTE — ED TRIAGE NOTES
Pt arriving by EMS from [employer]. Pt set off personal alarm, EMS arrived at the scene and pt stated \"Feels like I am wired\". At bedside pt stated she haven't had any of her medication for about x1 week due to insurance changing. Pt stated, she tried to get her medication refilled and it wasn't delivered. Pt has Hx of anxiety, depression, and Breast CA (metastasis). Pt arrived diaphoretic, cool, and clammy to the touch.   B/S per

## 2020-10-23 NOTE — ED NOTES
0545: Pt started to present with generalized body shaking, and bit her tongue. Pt in Post ictal state, 2L O2 NC applied to pt, and seizure pads. RN and MD at beside. 0630: Pt started to become more alert and stated she doesn't have a Hx of seizures. Pt able to follow command.  MD and RN at bedside

## 2020-10-23 NOTE — CONSULTS
INPATIENT NEUROLOGY CONSULTATION  10/23/2020     Consulted by: Pradeep Durand MD        Patient ID:  Scott Gonzales  [id number]  60 y.o.  1955    Chief Complaint   Patient presents with    Altered mental status       HPI    Cierra is a 53-year-old woman with metastatic breast cancer, anxiety, hypertension, chronic hyponatremia here because for the past week she has been feeling ill. During this time she has been off of all of her medications to include gabapentin, Xanax, amitriptyline, venlafaxine. There was a change with her mail-order pharmacy so she has been off of all medications for several days. During that time she tells me she has felt very uncomfortable agitated fidgety. She has no history of seizure. Here in the emergency room she had a seizure breakthrough. Head CT negative. Labs unrevealing.       ROS    Past Medical History:   Diagnosis Date    Acute hyponatremia 6/18/2019    Anxiety     Bimalleolar fracture of left ankle 2/3/2016    Comminuted and displaced     [facility])     breast    Closed left ankle fracture 2/4/2016    Depression     Gastroenteritis due to norovirus 2/21/2018    GERD (gastroesophageal reflux disease)     HTN (hypertension)     Hypercholesterolemia     Hypotension 9/12/2012    Metastatic breast cancer ([address] 5/3/2017    Neoplastic malignant related fatigue 4/4/2018    Pain due to neoplasm 4/4/2018    Secondary cancer of bone ([address] 5/3/2017    Sepsis ([facility] 2/12/2018    Urinary tract infection without hematuria 2/13/2018     Family History   Problem Relation Age of Onset    Hypertension Mother     Heart Disease Mother     Depression Mother     Hypertension Father      Social History     Socioeconomic History    Marital status: SINGLE     Spouse name: Not on file    Number of children: Not on file    Years of education: Not on file    Highest education level: Not on file   Occupational History    Not on file   Social Needs    Financial resource strain: Not on file    Food insecurity     Worry: Not on file     Inability: Not on file    Transportation needs     Medical: Not on file     Non-medical: Not on file   Tobacco Use    Smoking status: Former Smoker     Packs/day: 0.50     Years: 20.00     Pack years: 10.00    Smokeless tobacco: Never Used   Substance and Sexual Activity    Alcohol use: No    Drug use: No    Sexual activity: Never   Lifestyle    Physical activity     Days per week: Not on file     Minutes per session: Not on file    Stress: Not on file   Relationships    Social connections     Talks on phone: Not on file     Gets together: Not on file     Attends [employer] service: Not on file     Active member of club or organization: Not on file     Attends meetings of clubs or organizations: Not on file     Relationship status: Not on file    Intimate partner violence     Fear of current or ex partner: Not on file     Emotionally abused: Not on file     Physically abused: Not on file     Forced sexual activity: Not on file   Other Topics Concern     Service Not Asked    Blood Transfusions Not Asked    Caffeine Concern Not Asked    Occupational Exposure Not Asked   [employer] Heymann Hazards Not Asked    Sleep Concern Not Asked    Stress Concern Not Asked    Weight Concern Not Asked    Special Diet Not Asked    Back Care Not Asked    Exercise Not Asked    Bike Helmet Not Asked   [address] Not Asked    Self-Exams Not Asked   Social History Narrative    63year old  female admitted for depression after suicide attempt on Xanax and opiods. Py is follwed by DR. Kerrie Marr on the outside. She has breast and bone cancer. She is living by herself with no family supports.      Current Facility-Administered Medications   Medication Dose Route Frequency    sodium chloride (NS) flush 5-40 mL  5-40 mL IntraVENous Q8H    sodium chloride (NS) flush 5-40 mL  5-40 mL IntraVENous PRN    acetaminophen (TYLENOL) tablet 650 mg  650 mg Oral Q6H PRN    Or    acetaminophen (TYLENOL) suppository 650 mg  650 mg Rectal Q6H PRN    polyethylene glycol (MIRALAX) packet 17 g  17 g Oral DAILY PRN    ondansetron (ZOFRAN ODT) tablet 4 mg  4 mg Oral Q8H PRN    Or    ondansetron (ZOFRAN) injection 4 mg  4 mg IntraVENous Q6H PRN    LORazepam (ATIVAN) injection 1 mg  1 mg IntraVENous Q4H PRN    ALPRAZolam (XANAX) tablet 1 mg  1 mg Oral BID    amitriptyline (ELAVIL) tablet 50 mg  50 mg Oral QHS    aspirin delayed-release tablet 81 mg  81 mg Oral DAILY    [START ON 10/24/2020] carvediloL (COREG) tablet 12.5 mg  12.5 mg Oral BID WITH MEALS    folic acid (FOLVITE) tablet 1 mg  1 mg Oral DAILY    gabapentin (NEURONTIN) capsule 300 mg  300 mg Oral TID    [START ON 10/24/2020] venlafaxine-SR (EFFEXOR-XR) capsule 300 mg  300 mg Oral DAILY WITH BREAKFAST    heparin (porcine) injection 5,000 Units  5,000 Units SubCUTAneous Q8H    0.9% sodium chloride infusion  100 mL/hr IntraVENous CONTINUOUS     Current Outpatient Medications   Medication Sig    venlafaxine-SR (EFFEXOR-XR) 150 mg capsule Take 2 Caps by mouth daily (with breakfast). Indications: major depressive disorder    gabapentin (NEURONTIN) 300 mg capsule Take 1 Cap by mouth three (3) times daily. Max Daily Amount: 900 mg. Indications: anxiety    ALPRAZolam (XANAX) 1 mg tablet Take 1 Tab by mouth two (2) times a day. Max Daily Amount: 2 mg.  folic acid (FOLVITE) 1 mg tablet Take 1 Tab by mouth daily.  hydroCHLOROthiazide (HYDRODIURIL) 25 mg tablet Take 25 mg by mouth daily.  fenofibrate micronized (LOFIBRA) 134 mg capsule Take 134 mg by mouth every morning.  carvedilol (COREG) 12.5 mg tablet TAKE 1 TABLET BY MOUTH TWICE A DAY    losartan (COZAAR) 50 mg tablet TAKE 1 TABLET BY MOUTH ONCE DAILY    amitriptyline (ELAVIL) 50 mg tablet TAKE 1 TABLET BY MOUTH EVERY EVENING    aspirin delayed-release 81 mg tablet Take 1 Tab by mouth daily.  Indications: prevention of transient ischemic attack Allergies   Allergen Reactions    Sulfa (Sulfonamide Antibiotics) Unknown (comments)    Wellbutrin [Bupropion Hcl] Unknown (comments)       Visit Vitals  /81   Pulse 95   Temp 98.2 °F (36.8 °C)   Resp 14   SpO2 96%     Physical Exam  Neurologic Exam         Lab Results   Component Value Date/Time    WBC 9.9 10/23/2020 06:27 AM    WBC (POC) 10.2 02/02/2018 12:29 PM    HGB 13.9 10/23/2020 06:27 AM    HGB (POC) 15.5 02/02/2018 12:29 PM    HCT 41.0 10/23/2020 06:27 AM    HCT (POC) 45.6 02/02/2018 12:29 PM    PLATELET 330 28/22/2783 06:27 AM    PLATELET (POC) 203 33/00/2005 12:29 PM    MCV 92.3 10/23/2020 06:27 AM    MCV (POC) 90.0 02/02/2018 12:29 PM     Lab Results   Component Value Date/Time    Glucose 190 (H) 10/23/2020 06:27 AM    Glucose (POC) 203 (H) 10/23/2020 05:50 AM    Creatinine 1.33 (H) 10/23/2020 06:27 AM      Lab Results   Component Value Date/Time    Cholesterol (POC) 224 (A) 02/21/2013 10:15 AM    LDL Cholesterol (POC) 121 02/21/2013 10:15 AM    Triglycerides (POC) 248 (A) 02/21/2013 10:15 AM     Lab Results   Component Value Date/Time    ALT (SGPT) 23 10/23/2020 06:27 AM    Alk. phosphatase 87 10/23/2020 06:27 AM    Bilirubin, direct 0.06 11/05/2013 11:20 AM    Bilirubin, total 0.5 10/23/2020 06:27 AM    Albumin 4.2 10/23/2020 06:27 AM    Protein, total 7.7 10/23/2020 06:27 AM    Ammonia, Plasma 75 11/05/2013 11:20 AM    Ammonia 12 12/20/2019 07:12 PM    INR 1.1 12/20/2019 07:12 PM    Prothrombin time 11.1 12/20/2019 07:12 PM    PLATELET 929 44/21/1333 06:27 AM    PLATELET (POC) 562 39/12/5407 12:29 PM        CT Results (maximum last 3): Results from [place] encounter on 10/23/20   CT HEAD WO CONT    Narrative Indication:  AMS     Comparison: CT January 2020    Findings: 5 mm axial images were obtained from the skull base through the  vertex.      CT dose reduction was achieved through the use of a standardized protocol  tailored for this examination and automatic exposure control for dose  modulation. The ventricles and cortical sulci are prominent, compatible with age related  volume loss. There is no evidence of intracranial hemorrhage, mass, mass effect,  or acute infarct. There is periventricular white matter disease. No extra-axial  fluid collections are seen. The visualized paranasal sinuses and mastoid air  cells are clear. The orbital structures are unremarkable. No osseous  abnormalities are seen. Impression Impression:   1. No evidence of acute infarct or intracranial hemorrhage. 2. Mild periventricular white matter disease is likely secondary to chronic  small vessel ischemic changes. Results from [place] encounter on 01/27/20   CT HEAD WO CONT    Narrative EXAM: CT HEAD WO CONT    INDICATION: AMS    COMPARISON: December 20, 2019. CONTRAST: None. TECHNIQUE: Unenhanced CT of the head was performed using 5 mm images. Brain and  bone windows were generated. CT dose reduction was achieved through use of a  standardized protocol tailored for this examination and automatic exposure  control for dose modulation. FINDINGS:  The ventricles and sulci are normal in size, shape and configuration and  midline. There is no significant white matter disease. There is no intracranial  hemorrhage, extra-axial collection, mass, mass effect or midline shift. The  basilar cisterns are open. No acute infarct is identified. The bone windows  demonstrate no abnormalities. The visualized portions of the paranasal sinuses  and mastoid air cells are clear. Impression IMPRESSION: No acute intracranial process. No significant change from the prior  study. MRI Results (maximum last 3): Results from [place] encounter on 10/24/13   MRI FOOT LT WO CONT    Narrative **Final Report**       ICD Codes / Adm. Diagnosis: 794.9  780.96 / Nonspecific abnormal results o    Generalized pain  Examination:  MR FOOT WO CON LT  - [id number] - Oct 24 2013 6:08PM  Accession No:  [id number]  Reason:  pain, FOOT REGION: Whole      REPORT:  INDICATION:  Abnormal PET scan. Pain in foot    EXAM: MRI left foot without contrast. 3 planes T1 and T2 fat-sat. No contrast    FINDINGS: Alignment of the foot is normal. There is reactive marrow edema at   the second and third tarsometatarsal joints. There is marked irregularity to   the subchondral bone especially the second tarsometatarsal joint. There are   similar findings between the navicular and medial cuneiform. These findings   are compatible with osteoarthritis. There is subtle degenerative change   along the proximal medial margin of the cuboid. There is no evidence of   acute fracture. Sinus tarsi fat is preserved. The visualized talar dome   demonstrates subchondral degenerative changes along the posterior lateral   margin. There is a small ankle and subtalar fusion. Plantar fascia is not   thickened. Intrinsic muscles of the foot are normal. Tendons are intact. IMPRESSION:  1. Severe degeneration of the second tarsometatarsal joint  2. Moderate degeneration of the third tarsometatarsal joint, calcaneocuboid   joint and articulation between the navicular and medial cuneiform  3. Osteochondral injury to along the posterior lateral talar dome with areas   of subchondral cyst formation              Signing/Reading Doctor: Luna Guerra ([id number])    Approved: Luna Spray ([id number])  Oct 25 2013  8:13AM                                   VAS/US/Carotid Doppler Results (maximum last 3): No results found for this or any previous visit. PET Results (maximum last 3): No results found for this or any previous visit. Assessment and Plan        60year-old woman with anxiety and hyponatremia who has been utilizing benzodiazepine chronically 1 mg 4 times daily. She had a breakthrough seizure today in the ER which I think was provoked due to withdrawal.  She seems alert and appropriate for me.   She is a little disoriented of her location. She is not seizing any further. Defer EEG at this time since this is likely provoked due to withdrawal.  Resume her home medications. Care is supportive. We will follow peripherally. Please call if needed. During this evaluation, we also discussed stroke education to include signs and symptoms of stroke and TIA. This clinical note was dictated with an electronic dictation software that can make unintentional errors. If there are any questions, please contact me directly for clarification.       [address] [facility],   NEUROLOGIST  Diplomate YOMI  10/23/2020

## 2020-10-24 VITALS
TEMPERATURE: 98.2 F | WEIGHT: 223.55 LBS | RESPIRATION RATE: 20 BRPM | DIASTOLIC BLOOD PRESSURE: 79 MMHG | HEART RATE: 88 BPM | BODY MASS INDEX: 37.2 KG/M2 | OXYGEN SATURATION: 97 % | SYSTOLIC BLOOD PRESSURE: 127 MMHG

## 2020-10-24 LAB
ANION GAP SERPL CALC-SCNC: 7 MMOL/L (ref 5–15)
BUN SERPL-MCNC: 16 MG/DL (ref 6–20)
BUN/CREAT SERPL: 22 (ref 12–20)
CALCIUM SERPL-MCNC: 8.5 MG/DL (ref 8.5–10.1)
CHLORIDE SERPL-SCNC: 108 MMOL/L (ref 97–108)
CO2 SERPL-SCNC: 23 MMOL/L (ref 21–32)
COMMENT, HOLDF: NORMAL
CREAT SERPL-MCNC: 0.74 MG/DL (ref 0.55–1.02)
GLUCOSE SERPL-MCNC: 154 MG/DL (ref 65–100)
POTASSIUM SERPL-SCNC: 3.4 MMOL/L (ref 3.5–5.1)
SAMPLES BEING HELD,HOLD: NORMAL
SODIUM SERPL-SCNC: 138 MMOL/L (ref 136–145)

## 2020-10-24 PROCEDURE — 99218 HC RM OBSERVATION: CPT

## 2020-10-24 PROCEDURE — 96372 THER/PROPH/DIAG INJ SC/IM: CPT

## 2020-10-24 PROCEDURE — 74011250637 HC RX REV CODE- 250/637: Performed by: INTERNAL MEDICINE

## 2020-10-24 PROCEDURE — 74011250636 HC RX REV CODE- 250/636: Performed by: INTERNAL MEDICINE

## 2020-10-24 PROCEDURE — 80048 BASIC METABOLIC PNL TOTAL CA: CPT

## 2020-10-24 PROCEDURE — 36415 COLL VENOUS BLD VENIPUNCTURE: CPT

## 2020-10-24 RX ORDER — ALPRAZOLAM 1 MG/1
1 TABLET ORAL 2 TIMES DAILY
Qty: 60 TAB | Refills: 0 | Status: ON HOLD | OUTPATIENT
Start: 2020-10-24 | End: 2020-10-27 | Stop reason: SDUPTHER

## 2020-10-24 RX ORDER — VENLAFAXINE HYDROCHLORIDE 150 MG/1
300 CAPSULE, EXTENDED RELEASE ORAL
Qty: 60 CAP | Refills: 0 | Status: ON HOLD | OUTPATIENT
Start: 2020-10-24 | End: 2020-10-27 | Stop reason: SDUPTHER

## 2020-10-24 RX ORDER — AMITRIPTYLINE HYDROCHLORIDE 50 MG/1
50 TABLET, FILM COATED ORAL
Qty: 30 TAB | Refills: 0 | Status: ON HOLD | OUTPATIENT
Start: 2020-10-24 | End: 2020-10-27 | Stop reason: SDUPTHER

## 2020-10-24 RX ORDER — CARVEDILOL 12.5 MG/1
12.5 TABLET ORAL 2 TIMES DAILY
Qty: 180 TAB | Refills: 3 | Status: SHIPPED | OUTPATIENT
Start: 2020-10-24 | End: 2020-11-23

## 2020-10-24 RX ORDER — GABAPENTIN 300 MG/1
300 CAPSULE ORAL 3 TIMES DAILY
Qty: 90 CAP | Refills: 0 | Status: ON HOLD | OUTPATIENT
Start: 2020-10-24 | End: 2020-10-27 | Stop reason: SDUPTHER

## 2020-10-24 RX ORDER — HEPARIN 100 UNIT/ML
300 SYRINGE INTRAVENOUS AS NEEDED
Status: DISCONTINUED | OUTPATIENT
Start: 2020-10-24 | End: 2020-10-24 | Stop reason: HOSPADM

## 2020-10-24 RX ADMIN — VENLAFAXINE HYDROCHLORIDE 300 MG: 150 CAPSULE, EXTENDED RELEASE ORAL at 08:59

## 2020-10-24 RX ADMIN — HEPARIN SODIUM 5000 UNITS: 5000 INJECTION INTRAVENOUS; SUBCUTANEOUS at 01:51

## 2020-10-24 RX ADMIN — CARVEDILOL 12.5 MG: 12.5 TABLET, FILM COATED ORAL at 08:59

## 2020-10-24 RX ADMIN — ALPRAZOLAM 1 MG: 0.5 TABLET ORAL at 08:58

## 2020-10-24 RX ADMIN — HEPARIN SODIUM 5000 UNITS: 5000 INJECTION INTRAVENOUS; SUBCUTANEOUS at 09:01

## 2020-10-24 RX ADMIN — CARVEDILOL 12.5 MG: 12.5 TABLET, FILM COATED ORAL at 16:29

## 2020-10-24 RX ADMIN — GABAPENTIN 300 MG: 100 CAPSULE ORAL at 16:29

## 2020-10-24 RX ADMIN — Medication 10 ML: at 16:30

## 2020-10-24 RX ADMIN — ASPIRIN 81 MG: 81 TABLET, COATED ORAL at 08:58

## 2020-10-24 RX ADMIN — FOLIC ACID 1 MG: 1 TABLET ORAL at 08:59

## 2020-10-24 RX ADMIN — Medication 10 ML: at 06:00

## 2020-10-24 RX ADMIN — GABAPENTIN 300 MG: 100 CAPSULE ORAL at 08:58

## 2020-10-24 RX ADMIN — SODIUM CHLORIDE 100 ML/HR: 900 INJECTION, SOLUTION INTRAVENOUS at 05:57

## 2020-10-24 NOTE — PROGRESS NOTES
Patient ambulated to and from bathroom without assistance, gait steady. Patient also voided a small amount. Jolly was removed today.

## 2020-10-24 NOTE — PROGRESS NOTES
Bedside and Verbal shift change report given to [id number] (oncoming nurse) by Mp Rasheed RN (offgoing nurse). Report included the following information SBAR, Kardex, ED Summary, Procedure Summary, Intake/Output, MAR, Recent Results, Cardiac Rhythm NSR-ST, Quality Measures and Dual Neuro Assessment.

## 2020-10-24 NOTE — PROGRESS NOTES
Transitions of Care    Ms. Lynda Ferris was seen. She was informed that she had prescription that needed to be filled. She stated that she could not afford anything. She does not have any money until next mnth when she gets her social security,  She can use the [facility] in [place]. Se also stated that she does not have a way home  She was informed that we could assist with the transportation. She reported that she is anxious about going home as she lives alone. I inquired if she had any family or friends that could come a stay with her. She stated that all of her friends have grandchildren staying with them. Her father is 81yo and cannot stay with her. She does not have a paramour. She also identified that she does not have a way to  her medications. [facility]'s pharmacy was called. The cash price is $643.75. They would not run the price with her insurance. Administration was called. It was decided to send the prescriptions to see what the cost was with her insurance to [facility]. They do have a drive through. Ms. Lynda Ferris was seen. She was informed that we would have [employer] go through the drive through to  her medications on her way home. She requested to stay until tomorrow because she is afraid to be by herself. Nursing was updated. Will continue to follow for discharge planning.   Signed By: Anshu Mayberry LCSW     October 24, 2020

## 2020-10-24 NOTE — DISCHARGE SUMMARY
Discharge Summary       PATIENT ID: Francisco Oglesby  MRN: [id number]   YOB: 1955    DATE OF ADMISSION: 10/23/2020  5:18 AM    DATE OF DISCHARGE: 10/24/2020   PRIMARY CARE PROVIDER: Layton Ivan MD     DISCHARGING PROVIDER: Nathanael Fernandes MD    To contact this individual call [phone] and ask the  to page. If unavailable ask to be transferred the Adult Hospitalist Department. CONSULTATIONS: IP CONSULT TO NEUROLOGY    PROCEDURES/SURGERIES: * No surgery found *    ADMITTING DIAGNOSES & HOSPITAL COURSE:   Seizure   CORNELIA   Anxiety   HTN     Per H and P   Francisco Oglesby is a 60 y.o. female with metastatic breast cancer (off chemo), Anxiety, HTN who presented to the ED with feeling generally unwell. She has been out of her medications for the last week including antihypertensives, antianxiety medications, and benzodiazepines. She had a witnessed seizure in the ED, and received ativan. Referred for admission. On my evaluation patient remians confused and post ictal. She was able to tell me she was out of her medications because her insurance changed, and she was waiting for mail in order. She is currently complaining of tachycardia and generalized anxiety. In the ED she also complained of some jerky movements, but was unable to tell me that at the time of my interview. Admitted and monitored on IVF, home medications resumed. No further episodes of seizure. Neurology evaluated. Now back to baseline. Will DC home with 30 day scripts for her to fill at local pharmacy.      DISCHARGE DIAGNOSES / PLAN:         Seizure - in the setting of benzodiazepine withdrawal   Lactic acidosis   - Resume home medications, including benzo, provide script for 30 days for home  - Seizure precautions  - Neurology consulted, agree with supportive care, no AED     Acute Kidney Injury - pre-renal likely, poor PO intake  - s/p IVF, awaiting on repeat Cr prior to DC  - Monitor I and O   - avoid nephrotoxins      Anxiety - resume home medications, xanax, elavil,      HTN - BP increased today, may resume home medications.      Metastatic breast cancer - currently not on treatment.   - Not on treatment currently. - DDNR on file. ADDITIONAL CARE RECOMMENDATIONS:   - Please take all medications as prescribed. Note changes as below. ** Resume home medications, no changes. Please make sure to fill these at a local pharmacy. You should never go without your medications as benzodiazapine withdrawal can be life threatening.   - Please make sure to follow up with your primary care physician within 1-2 weeks of discharge for hospital follow up. - Please make sure to continue to monitor for: worsening muscle twitching, difficulty breathing, sudden seizure activity and return to the Emergency Department with any of these symptoms:   - Please get up slowly from a seated or laying position, avoid falls. - Avoid tobacco, alcohol and other illicit drug use. - Please note that you should use the following DME: None        PENDING TEST RESULTS:   At the time of discharge the following test results are still pending: None    FOLLOW UP APPOINTMENTS:    Follow-up Information     Follow up With Specialties Details Why Contact Info    Mom's Meal through [facility] [employer]   10 days of meals will be delivered via UPS on 10-28-20. You must be home. Please call by noon on 10-26-20 if you need to reschedule. [address] at [address] from 9 a.m. to noon and on the third Saturday of each month.   Food Pantry  [address] [facility],[address] Floor, [address] [facility], MD Family Medicine   [address] [phone]               DIET: Resume previous diet    ACTIVITY: Activity as tolerated    WOUND CARE: None    EQUIPMENT needed: None      DISCHARGE MEDICATIONS:  Current Discharge Medication List      CONTINUE these medications which have CHANGED Details   ALPRAZolam (XANAX) 1 mg tablet Take 1 Tab by mouth two (2) times a day for 30 days. Max Daily Amount: 2 mg. Indications: anxious  Qty: 60 Tab, Refills: 0    Associated Diagnoses: Benzodiazepine withdrawal with delirium (HCC)      amitriptyline (ELAVIL) 50 mg tablet Take 1 Tab by mouth nightly for 30 days. Indications: depression  Qty: 30 Tab, Refills: 0    Comments: **Patient requests 90 days supply**      carvediloL (COREG) 12.5 mg tablet Take 1 Tab by mouth two (2) times a day for 30 days. TAKE 1 TABLET BY MOUTH TWICE A DAY  Indications: high blood pressure  Qty: 180 Tab, Refills: 3    Comments: **Patient requests 90 days supply**      gabapentin (NEURONTIN) 300 mg capsule Take 1 Cap by mouth three (3) times daily for 30 days. Max Daily Amount: 900 mg. Indications: anxiety  Qty: 90 Cap, Refills: 0    Associated Diagnoses: Anxiety      venlafaxine-SR (EFFEXOR-XR) 150 mg capsule Take 2 Caps by mouth daily (with breakfast) for 30 days. Indications: major depressive disorder  Qty: 60 Cap, Refills: 0         CONTINUE these medications which have NOT CHANGED    Details   folic acid (FOLVITE) 1 mg tablet Take 1 Tab by mouth daily. Qty: 60 Tab, Refills: 3      hydroCHLOROthiazide (HYDRODIURIL) 25 mg tablet Take 25 mg by mouth daily. fenofibrate micronized (LOFIBRA) 134 mg capsule Take 134 mg by mouth every morning. losartan (COZAAR) 50 mg tablet TAKE 1 TABLET BY MOUTH ONCE DAILY  Qty: 90 Tab, Refills: 5    Comments: **Patient requests 90 days supply**      aspirin delayed-release 81 mg tablet Take 1 Tab by mouth daily. Indications: prevention of transient ischemic attack  Qty: 30 Tab, Refills: 0               NOTIFY YOUR PHYSICIAN FOR ANY OF THE FOLLOWING:   Fever over 101 degrees for 24 hours. Chest pain, shortness of breath, fever, chills, nausea, vomiting, diarrhea, change in mentation, falling, weakness, bleeding. Severe pain or pain not relieved by medications.   Or, any other signs or symptoms that you may have questions about.     DISPOSITION:  X  Home With:   OT  PT  HH  RN       Long term SNF/Inpatient Rehab    Independent/assisted living    Hospice    Other:       PATIENT CONDITION AT DISCHARGE:     Functional status    Poor     Deconditioned    X Independent      Cognition    X Lucid     Forgetful     Dementia      Catheters/lines (plus indication)    Jolly     PICC     PEG    X Port (placed previously)     Code status     Full code    X DNR      PHYSICAL EXAMINATION AT DISCHARGE:  Visit Vitals  /84 (BP 1 Location: Left leg, BP Patient Position: At rest)   Pulse 94   Temp 98.4 °F (36.9 °C)   Resp 20   Wt 101.4 kg (223 lb 8.7 oz)   SpO2 96%   BMI 37.20 kg/m²     Gen: NAD, awake in bed  HEENT: NC/AT, sclera anicteric, PERRL, EOMI  CV: RRR no m/r/g, normal S1 and S2, no pedal edema , port intact  Resp: CTA b/l no increased work of breathing, no wheezing or rhonchi, speaking in full sentences   Abd: NT/ND, normal bowel sounds, no rebound or guarding  Ext: 2+ pulses, no edema  Skin: No rashes or lesions        CHRONIC MEDICAL DIAGNOSES:  Problem List as of 10/24/2020 Date Reviewed: 2/1/2020          Codes Class Noted - Resolved    Seizure ([facility] ICD-10-CM: R56.9  ICD-9-CM: 780.39  10/23/2020 - Present        Major depression ICD-10-CM: F32.9  ICD-9-CM: 296.20  2/1/2020 - Present        Hypokalemia ICD-10-CM: E87.6  ICD-9-CM: 276.8  1/28/2020 - Present        Benzodiazepine withdrawal ([facility] ICD-10-CM: F13.239  ICD-9-CM: 292.0, 304.10  1/28/2020 - Present        Chronic prescription benzodiazepine use ICD-10-CM: Z79.899  ICD-9-CM: V58.69  1/28/2020 - Present        Altered mental status ICD-10-CM: R41.82  ICD-9-CM: 780.97  1/27/2020 - Present        Drug-induced constipation ICD-10-CM: K59.03  ICD-9-CM: 564.09, E980.5  4/5/2018 - Present        Advance care planning ICD-10-CM: Z71.89  ICD-9-CM: V65.49  4/5/2018 - Present        CHF (congestive heart failure) (HCC) ICD-10-CM: I50.9  ICD-9-CM: 428.0 4/2/2018 - Present        Depression ICD-10-CM: F32.9  ICD-9-CM: 510  4/2/2018 - Present        Dementia ([facility] ICD-10-CM: F03.90  ICD-9-CM: 294.20  4/2/2018 - Present        Metastatic breast cancer ([facility] ICD-10-CM: C50.919  ICD-9-CM: 174.9  5/3/2017 - Present        Secondary cancer of bone ([facility] ICD-10-CM: C79.51  ICD-9-CM: 198.5  5/3/2017 - Present        Anxiety ICD-10-CM: F41.9  ICD-9-CM: 300.00  2/3/2016 - Present        HTN (hypertension) ICD-10-CM: I10  ICD-9-CM: 401.9  Unknown - Present        GERD (gastroesophageal reflux disease) ICD-10-CM: K21.9  ICD-9-CM: 530.81  Unknown - Present        Hypercholesterolemia ICD-10-CM: E78.00  ICD-9-CM: 272.0  Unknown - Present        RESOLVED: Acute hyponatremia ICD-10-CM: E87.1  ICD-9-CM: 276.1  6/18/2019 - 1/28/2020        RESOLVED: Pain due to neoplasm ICD-10-CM: G89.3  ICD-9-CM: 338.3  4/4/2018 - 1/28/2020        RESOLVED: Neoplastic malignant related fatigue ICD-10-CM: R53.0  ICD-9-CM: 780.79  4/4/2018 - 1/28/2020        RESOLVED: Overdose ICD-10-CM: T50.901A  ICD-9-CM: 977.9, E980.5  4/2/2018 - 4/5/2018        RESOLVED: Gastroenteritis due to norovirus ICD-10-CM: A08.11  ICD-9-CM: 008.63  2/21/2018 - 1/28/2020        RESOLVED: Urinary tract infection without hematuria ICD-10-CM: N39.0  ICD-9-CM: 599.0  2/13/2018 - 1/28/2020        RESOLVED: Sepsis ([facility] ICD-10-CM: A41.9  ICD-9-CM: 038.9, 995.91  2/12/2018 - 1/28/2020        RESOLVED: Closed left ankle fracture ICD-10-CM: H99.159G  ICD-9-CM: 824.8  2/4/2016 - 1/28/2020        RESOLVED: Bimalleolar fracture of left ankle ICD-10-CM: A04.724K  ICD-9-CM: 824.4  2/3/2016 - 1/28/2020    Overview Signed 2/3/2016 12:49 AM by JUANITA Templeton     Comminuted and displaced             RESOLVED: Hypotension ICD-10-CM: I95.9  ICD-9-CM: 458.9  9/12/2012 - 9/13/2012        RESOLVED: Dyspnea ICD-10-CM: R06.00  ICD-9-CM: 786.09  8/30/2012 - 8/31/2012              Greater than 30 minutes were spent with the patient on counseling and coordination of care    Signed:   Fariba Kirk MD  10/24/2020  10:42 AM

## 2020-10-24 NOTE — PROGRESS NOTES
Bedside and Verbal shift change report given to Kirsten Villa RN (oncoming nurse) by Kunal Miles RN (offgoing nurse). Report included the following information SBAR, Kardex, Intake/Output, MAR, Recent Results, Med Rec Status, Cardiac Rhythm SR and Dual Neuro Assessment.

## 2020-10-24 NOTE — PROGRESS NOTES
Problem: Discharge Planning  Goal: *Discharge to safe environment  Outcome: Progressing Towards Goal     Problem: Falls - Risk of  Goal: *Absence of Falls  Description: Document Amparo Levi Fall Risk and appropriate interventions in the flowsheet. Outcome: Progressing Towards Goal  Note: Fall Risk Interventions:  Mobility Interventions: Bed/chair exit alarm, Communicate number of staff needed for ambulation/transfer, OT consult for ADLs, Patient to call before getting OOB, PT Consult for mobility concerns, PT Consult for assist device competence, Strengthening exercises (ROM-active/passive)    Mentation Interventions: Adequate sleep, hydration, pain control, Bed/chair exit alarm, Door open when patient unattended, Evaluate medications/consider consulting pharmacy, Increase mobility, More frequent rounding, Reorient patient, Toileting rounds, Update white board    Medication Interventions: Bed/chair exit alarm, Evaluate medications/consider consulting pharmacy, Patient to call before getting OOB, Teach patient to arise slowly    Elimination Interventions: Bed/chair exit alarm, Call light in reach, Patient to call for help with toileting needs, Toilet paper/wipes in reach, Toileting schedule/hourly rounds              Problem: Patient Education: Go to Patient Education Activity  Goal: Patient/Family Education  Outcome: Progressing Towards Goal     Problem: Pressure Injury - Risk of  Goal: *Prevention of pressure injury  Description: Document Adama Scale and appropriate interventions in the flowsheet. Outcome: Progressing Towards Goal  Note: Pressure Injury Interventions:  Sensory Interventions: Assess changes in LOC, Avoid rigorous massage over bony prominences, Discuss PT/OT consult with provider, Float heels, Keep linens dry and wrinkle-free, Maintain/enhance activity level, Minimize linen layers, Pressure redistribution bed/mattress (bed type), Turn and reposition approx.  every two hours (pillows and wedges if needed)         Activity Interventions: Increase time out of bed, Pressure redistribution bed/mattress(bed type), PT/OT evaluation    Mobility Interventions: HOB 30 degrees or less, Pressure redistribution bed/mattress (bed type), PT/OT evaluation, Turn and reposition approx.  every two hours(pillow and wedges)    Nutrition Interventions: Document food/fluid/supplement intake, Offer support with meals,snacks and hydration                     Problem: Patient Education: Go to Patient Education Activity  Goal: Patient/Family Education  Outcome: Progressing Towards Goal     Problem: Pain  Goal: *Control of Pain  Outcome: Progressing Towards Goal     Problem: Patient Education: Go to Patient Education Activity  Goal: Patient/Family Education  Outcome: Progressing Towards Goal     Problem: Seizure Disorder (Adult)  Goal: *STG: Remains free of seizure activity  Outcome: Progressing Towards Goal  Goal: *STG: Maintains lab values within therapeutic range  Outcome: Progressing Towards Goal  Goal: *STG/LTG: Complies with medication therapy  Outcome: Progressing Towards Goal  Goal: *STG: Remains free of injury during seizure activity  Outcome: Progressing Towards Goal  Goal: *STG: Remains safe in hospital  Outcome: Progressing Towards Goal  Goal: Interventions  Outcome: Progressing Towards Goal     Problem: Patient Education: Go to Patient Education Activity  Goal: Patient/Family Education  Outcome: Progressing Towards Goal

## 2020-10-24 NOTE — PROGRESS NOTES
I have reviewed discharge instructions with the patient. The patient verbalized understanding. Peripheral IV's removed without any difficulty. Jarrett cath to right chest also deaccessed without any difficulty. Personal belongings packed and sent with patient. Patient complained that she did not want to be home alone. Writer asked if anyone could stay with her or maybe visit with her tomorrow and patient stated \"I don't want to bother anyone, they are busy and my dad doesn't care. \" Patient expressed that she was worried about previously overdrafting her checking account and not having any money. Hospitalist updated on patient not wanting to be home alone.  Patient transported home in a cab provided by the hospital.

## 2020-10-24 NOTE — DISCHARGE INSTRUCTIONS
Dear Miguelina Miner,    Thank you for choosing [address] [place] [address] for your healthcare needs. We strive to provide EXCELLENT care to you and your family. In an effort to explain clearly why you were here in the hospital, I've also written a very brief summary below. Other details including formal diagnosis, medication changes, and follow up appointment recommendations can also be found in this packet. You were admitted for not feeling well, having generalized muscle spasm due to Xanax and Effexor withdrawal for which you were started on IV benzodiazepines and resumed your medications. You also received care from specialist physicians in the following specialties:  [address] [facility]    Here are the updates to your medication list:  ** Resume home medications, no changes. Please make sure to fill these at a local pharmacy. You should never go without your medications as benzodiazapine withdrawal can be life threatening. Remember that it is important for you to take your medications exactly as they are prescribed. It is helpful to keep a list of your medication with the names, dosages, and times to be taken in your wallet. Additionally,   - Please make sure to follow up with your primary care physician within 1-2 weeks of discharge for hospital follow up. - Please make sure to continue to monitor for: worsening muscle twitching, difficulty breathing, sudden seizure activity and return to the Emergency Department with any of these symptoms:   - Please get up slowly from a seated or laying position, avoid falls. - Avoid tobacco, alcohol and other illicit drug use. - Please note that you should use the following DME: None      Make sure to also see your primary care doctor for follow-up. Bring these papers with you and be sure to review your medication list with your doctor. I cannot stress the importance of follow up enough.  I've included the information for your follow-up appointments below:    Follow-up Information     Follow up With Specialties Details Why Contact Info    Mom's Meal through [facility] [employer] [facility]   10 days of meals will be delivered via UPS on 10-28-20. You must be home. Please call by noon on 10-26-20 if you need to reschedule. [address] at [address] from 9 a.m. to noon and on the third Saturday of each month. Food Pantry  [address] [facility],[address], [address]     Lino Christianson MD Family Medicine In 1 week  [address]  [phone]            Should you have any fever over 101 degrees for 24 hours, chest pain, shortness of breath, fever, chills, nausea, vomiting, diarrhea, change in mentation, falling, weakness, bleeding, or worsening pain, please seek medical attention immediately. Finally, as your discharging physician, you may be receiving a survey regarding my care. I would greatly value and appreciate your input in the survey as we strive for excellence. If you have any questions, I can be reached at [phone].   Thank you so much again for allowing me to care for you at [address] [place] [address].    Respectfully yours,  Shai Cedeno MD

## 2020-10-24 NOTE — PROGRESS NOTES
Problem: Discharge Planning  Goal: *Discharge to safe environment  Outcome: Progressing Towards Goal     Problem: Falls - Risk of  Goal: *Absence of Falls  Description: Document Melissa Jason Fall Risk and appropriate interventions in the flowsheet. Outcome: Progressing Towards Goal  Note: Fall Risk Interventions:  Mobility Interventions: Bed/chair exit alarm, Communicate number of staff needed for ambulation/transfer, Patient to call before getting OOB    Mentation Interventions: Adequate sleep, hydration, pain control, Bed/chair exit alarm, Evaluate medications/consider consulting pharmacy    Medication Interventions: Bed/chair exit alarm, Patient to call before getting OOB    Elimination Interventions: Bed/chair exit alarm, Call light in reach, Patient to call for help with toileting needs              Problem: Pressure Injury - Risk of  Goal: *Prevention of pressure injury  Description: Document Adama Scale and appropriate interventions in the flowsheet.   Outcome: Progressing Towards Goal  Note: Pressure Injury Interventions:  Sensory Interventions: Assess changes in LOC, Float heels, Keep linens dry and wrinkle-free, Maintain/enhance activity level, Minimize linen layers, Monitor skin under medical devices, Pad between skin to skin         Activity Interventions: Increase time out of bed, PT/OT evaluation    Mobility Interventions: HOB 30 degrees or less, PT/OT evaluation    Nutrition Interventions: Document food/fluid/supplement intake                     Problem: Seizure Disorder (Adult)  Goal: *STG: Remains free of seizure activity  Outcome: Progressing Towards Goal

## 2020-10-25 ENCOUNTER — HOSPITAL ENCOUNTER (INPATIENT)
Age: 65
LOS: 2 days | Discharge: HOME | DRG: 897 | End: 2020-10-27
Attending: EMERGENCY MEDICINE | Admitting: INTERNAL MEDICINE
Payer: MEDICARE

## 2020-10-25 ENCOUNTER — APPOINTMENT (OUTPATIENT)
Dept: GENERAL RADIOLOGY | Age: 65
DRG: 897 | End: 2020-10-25
Attending: EMERGENCY MEDICINE
Payer: MEDICARE

## 2020-10-25 DIAGNOSIS — R56.9 SEIZURE (HCC): ICD-10-CM

## 2020-10-25 DIAGNOSIS — G89.3 CANCER ASSOCIATED PAIN: ICD-10-CM

## 2020-10-25 DIAGNOSIS — Z65.8 PSYCHOSOCIAL DISTRESS: ICD-10-CM

## 2020-10-25 DIAGNOSIS — Z91.14 NONCOMPLIANCE WITH MEDICATION REGIMEN: ICD-10-CM

## 2020-10-25 DIAGNOSIS — F41.9 ANXIETY: ICD-10-CM

## 2020-10-25 DIAGNOSIS — R78.81 POSITIVE BLOOD CULTURE: Primary | ICD-10-CM

## 2020-10-25 DIAGNOSIS — C50.919 METASTATIC BREAST CANCER (HCC): ICD-10-CM

## 2020-10-25 DIAGNOSIS — F13.931 BENZODIAZEPINE WITHDRAWAL WITH DELIRIUM (HCC): ICD-10-CM

## 2020-10-25 LAB
ALBUMIN SERPL-MCNC: 3.7 G/DL (ref 3.5–5)
ALBUMIN/GLOB SERPL: 1.1 {RATIO} (ref 1.1–2.2)
ALP SERPL-CCNC: 87 U/L (ref 45–117)
ALT SERPL-CCNC: 25 U/L (ref 12–78)
ANION GAP SERPL CALC-SCNC: 8 MMOL/L (ref 5–15)
AST SERPL-CCNC: 17 U/L (ref 15–37)
BASOPHILS # BLD: 0.1 K/UL (ref 0–0.1)
BASOPHILS NFR BLD: 1 % (ref 0–1)
BILIRUB SERPL-MCNC: 0.5 MG/DL (ref 0.2–1)
BUN SERPL-MCNC: 11 MG/DL (ref 6–20)
BUN/CREAT SERPL: 14 (ref 12–20)
CALCIUM SERPL-MCNC: 9.8 MG/DL (ref 8.5–10.1)
CHLORIDE SERPL-SCNC: 105 MMOL/L (ref 97–108)
CO2 SERPL-SCNC: 25 MMOL/L (ref 21–32)
CREAT SERPL-MCNC: 0.76 MG/DL (ref 0.55–1.02)
DIFFERENTIAL METHOD BLD: ABNORMAL
EOSINOPHIL # BLD: 0 K/UL (ref 0–0.4)
EOSINOPHIL NFR BLD: 0 % (ref 0–7)
ERYTHROCYTE [DISTWIDTH] IN BLOOD BY AUTOMATED COUNT: 12.1 % (ref 11.5–14.5)
GLOBULIN SER CALC-MCNC: 3.4 G/DL (ref 2–4)
GLUCOSE SERPL-MCNC: 131 MG/DL (ref 65–100)
HCT VFR BLD AUTO: 38.2 % (ref 35–47)
HGB BLD-MCNC: 12.9 G/DL (ref 11.5–16)
IMM GRANULOCYTES # BLD AUTO: 0 K/UL (ref 0–0.04)
IMM GRANULOCYTES NFR BLD AUTO: 0 % (ref 0–0.5)
LACTATE SERPL-SCNC: 1.3 MMOL/L (ref 0.4–2)
LYMPHOCYTES # BLD: 1 K/UL (ref 0.8–3.5)
LYMPHOCYTES NFR BLD: 10 % (ref 12–49)
MCH RBC QN AUTO: 31.2 PG (ref 26–34)
MCHC RBC AUTO-ENTMCNC: 33.8 G/DL (ref 30–36.5)
MCV RBC AUTO: 92.5 FL (ref 80–99)
MONOCYTES # BLD: 0.5 K/UL (ref 0–1)
MONOCYTES NFR BLD: 6 % (ref 5–13)
NEUTS SEG # BLD: 7.8 K/UL (ref 1.8–8)
NEUTS SEG NFR BLD: 83 % (ref 32–75)
NRBC # BLD: 0 K/UL (ref 0–0.01)
NRBC BLD-RTO: 0 PER 100 WBC
PLATELET # BLD AUTO: 255 K/UL (ref 150–400)
PMV BLD AUTO: 11.2 FL (ref 8.9–12.9)
POTASSIUM SERPL-SCNC: 3.7 MMOL/L (ref 3.5–5.1)
PROT SERPL-MCNC: 7.1 G/DL (ref 6.4–8.2)
RBC # BLD AUTO: 4.13 M/UL (ref 3.8–5.2)
SODIUM SERPL-SCNC: 138 MMOL/L (ref 136–145)
WBC # BLD AUTO: 9.5 K/UL (ref 3.6–11)

## 2020-10-25 PROCEDURE — 65270000032 HC RM SEMIPRIVATE

## 2020-10-25 PROCEDURE — 71045 X-RAY EXAM CHEST 1 VIEW: CPT

## 2020-10-25 PROCEDURE — 74011000258 HC RX REV CODE- 258: Performed by: EMERGENCY MEDICINE

## 2020-10-25 PROCEDURE — 96375 TX/PRO/DX INJ NEW DRUG ADDON: CPT

## 2020-10-25 PROCEDURE — 96365 THER/PROPH/DIAG IV INF INIT: CPT

## 2020-10-25 PROCEDURE — 85025 COMPLETE CBC W/AUTO DIFF WBC: CPT

## 2020-10-25 PROCEDURE — 83605 ASSAY OF LACTIC ACID: CPT

## 2020-10-25 PROCEDURE — 80053 COMPREHEN METABOLIC PANEL: CPT

## 2020-10-25 PROCEDURE — 74011250636 HC RX REV CODE- 250/636: Performed by: INTERNAL MEDICINE

## 2020-10-25 PROCEDURE — 87040 BLOOD CULTURE FOR BACTERIA: CPT

## 2020-10-25 PROCEDURE — 36415 COLL VENOUS BLD VENIPUNCTURE: CPT

## 2020-10-25 PROCEDURE — 99285 EMERGENCY DEPT VISIT HI MDM: CPT

## 2020-10-25 PROCEDURE — 74011250637 HC RX REV CODE- 250/637: Performed by: INTERNAL MEDICINE

## 2020-10-25 PROCEDURE — 74011250636 HC RX REV CODE- 250/636: Performed by: EMERGENCY MEDICINE

## 2020-10-25 PROCEDURE — 74011000258 HC RX REV CODE- 258: Performed by: INTERNAL MEDICINE

## 2020-10-25 RX ORDER — ACETAMINOPHEN 650 MG/1
650 SUPPOSITORY RECTAL
Status: DISCONTINUED | OUTPATIENT
Start: 2020-10-25 | End: 2020-10-27 | Stop reason: HOSPADM

## 2020-10-25 RX ORDER — ASPIRIN 81 MG/1
81 TABLET ORAL DAILY
Status: DISCONTINUED | OUTPATIENT
Start: 2020-10-26 | End: 2020-10-27 | Stop reason: HOSPADM

## 2020-10-25 RX ORDER — CARVEDILOL 12.5 MG/1
12.5 TABLET ORAL 2 TIMES DAILY
Status: DISCONTINUED | OUTPATIENT
Start: 2020-10-25 | End: 2020-10-27 | Stop reason: HOSPADM

## 2020-10-25 RX ORDER — ONDANSETRON 2 MG/ML
4 INJECTION INTRAMUSCULAR; INTRAVENOUS
Status: DISCONTINUED | OUTPATIENT
Start: 2020-10-25 | End: 2020-10-27 | Stop reason: HOSPADM

## 2020-10-25 RX ORDER — VANCOMYCIN 2 GRAM/500 ML IN 0.9 % SODIUM CHLORIDE INTRAVENOUS
2000
Status: COMPLETED | OUTPATIENT
Start: 2020-10-25 | End: 2020-10-25

## 2020-10-25 RX ORDER — FOLIC ACID 1 MG/1
1 TABLET ORAL DAILY
Status: DISCONTINUED | OUTPATIENT
Start: 2020-10-26 | End: 2020-10-27 | Stop reason: HOSPADM

## 2020-10-25 RX ORDER — ACETAMINOPHEN 325 MG/1
650 TABLET ORAL
Status: DISCONTINUED | OUTPATIENT
Start: 2020-10-25 | End: 2020-10-27 | Stop reason: HOSPADM

## 2020-10-25 RX ORDER — LEVOFLOXACIN 5 MG/ML
750 INJECTION, SOLUTION INTRAVENOUS ONCE
Status: COMPLETED | OUTPATIENT
Start: 2020-10-25 | End: 2020-10-25

## 2020-10-25 RX ORDER — VENLAFAXINE HYDROCHLORIDE 150 MG/1
300 CAPSULE, EXTENDED RELEASE ORAL
Status: DISCONTINUED | OUTPATIENT
Start: 2020-10-25 | End: 2020-10-27 | Stop reason: HOSPADM

## 2020-10-25 RX ORDER — PROMETHAZINE HYDROCHLORIDE 25 MG/1
12.5 TABLET ORAL
Status: DISCONTINUED | OUTPATIENT
Start: 2020-10-25 | End: 2020-10-27 | Stop reason: HOSPADM

## 2020-10-25 RX ORDER — FENOFIBRATE 145 MG/1
145 TABLET, COATED ORAL DAILY
Status: DISCONTINUED | OUTPATIENT
Start: 2020-10-25 | End: 2020-10-27 | Stop reason: HOSPADM

## 2020-10-25 RX ORDER — ALPRAZOLAM 0.25 MG/1
1 TABLET ORAL 2 TIMES DAILY
Status: DISCONTINUED | OUTPATIENT
Start: 2020-10-25 | End: 2020-10-27 | Stop reason: HOSPADM

## 2020-10-25 RX ORDER — LORAZEPAM 2 MG/ML
1 INJECTION INTRAMUSCULAR
Status: COMPLETED | OUTPATIENT
Start: 2020-10-25 | End: 2020-10-25

## 2020-10-25 RX ORDER — GABAPENTIN 300 MG/1
300 CAPSULE ORAL 3 TIMES DAILY
Status: DISCONTINUED | OUTPATIENT
Start: 2020-10-25 | End: 2020-10-27 | Stop reason: HOSPADM

## 2020-10-25 RX ORDER — POLYETHYLENE GLYCOL 3350 17 G/17G
17 POWDER, FOR SOLUTION ORAL DAILY PRN
Status: DISCONTINUED | OUTPATIENT
Start: 2020-10-25 | End: 2020-10-27 | Stop reason: HOSPADM

## 2020-10-25 RX ORDER — SODIUM CHLORIDE 0.9 % (FLUSH) 0.9 %
5-40 SYRINGE (ML) INJECTION AS NEEDED
Status: DISCONTINUED | OUTPATIENT
Start: 2020-10-25 | End: 2020-10-27 | Stop reason: HOSPADM

## 2020-10-25 RX ORDER — LORAZEPAM 2 MG/ML
1 INJECTION INTRAMUSCULAR
Status: DISCONTINUED | OUTPATIENT
Start: 2020-10-25 | End: 2020-10-27 | Stop reason: HOSPADM

## 2020-10-25 RX ORDER — SODIUM CHLORIDE 0.9 % (FLUSH) 0.9 %
5-40 SYRINGE (ML) INJECTION EVERY 8 HOURS
Status: DISCONTINUED | OUTPATIENT
Start: 2020-10-25 | End: 2020-10-27 | Stop reason: HOSPADM

## 2020-10-25 RX ORDER — VANCOMYCIN/0.9 % SOD CHLORIDE 1.5G/250ML
1500 PLASTIC BAG, INJECTION (ML) INTRAVENOUS
Status: DISCONTINUED | OUTPATIENT
Start: 2020-10-26 | End: 2020-10-27

## 2020-10-25 RX ORDER — AMITRIPTYLINE HYDROCHLORIDE 50 MG/1
50 TABLET, FILM COATED ORAL
Status: DISCONTINUED | OUTPATIENT
Start: 2020-10-25 | End: 2020-10-27 | Stop reason: HOSPADM

## 2020-10-25 RX ADMIN — GABAPENTIN 300 MG: 300 CAPSULE ORAL at 21:26

## 2020-10-25 RX ADMIN — CEFEPIME 2 G: 2 INJECTION, POWDER, FOR SOLUTION INTRAVENOUS at 22:21

## 2020-10-25 RX ADMIN — CEFEPIME HYDROCHLORIDE 2 G: 2 INJECTION, POWDER, FOR SOLUTION INTRAVENOUS at 11:03

## 2020-10-25 RX ADMIN — LEVOFLOXACIN 750 MG: 5 INJECTION, SOLUTION INTRAVENOUS at 11:53

## 2020-10-25 RX ADMIN — VENLAFAXINE HYDROCHLORIDE 300 MG: 150 CAPSULE, EXTENDED RELEASE ORAL at 14:06

## 2020-10-25 RX ADMIN — VANCOMYCIN HYDROCHLORIDE 2000 MG: 10 INJECTION, POWDER, LYOPHILIZED, FOR SOLUTION INTRAVENOUS at 14:06

## 2020-10-25 RX ADMIN — FENOFIBRATE 145 MG: 145 TABLET, FILM COATED ORAL at 16:02

## 2020-10-25 RX ADMIN — LORAZEPAM 1 MG: 2 INJECTION INTRAMUSCULAR; INTRAVENOUS at 11:03

## 2020-10-25 RX ADMIN — Medication 10 ML: at 14:04

## 2020-10-25 RX ADMIN — Medication 10 ML: at 21:26

## 2020-10-25 RX ADMIN — AMITRIPTYLINE HYDROCHLORIDE 50 MG: 50 TABLET, FILM COATED ORAL at 21:26

## 2020-10-25 RX ADMIN — ALPRAZOLAM 1 MG: 0.25 TABLET ORAL at 17:25

## 2020-10-25 RX ADMIN — CEFEPIME 2 G: 2 INJECTION, POWDER, FOR SOLUTION INTRAVENOUS at 16:08

## 2020-10-25 RX ADMIN — GABAPENTIN 300 MG: 300 CAPSULE ORAL at 16:02

## 2020-10-25 RX ADMIN — ACETAMINOPHEN 650 MG: 325 TABLET ORAL at 22:19

## 2020-10-25 RX ADMIN — CARVEDILOL 12.5 MG: 12.5 TABLET, FILM COATED ORAL at 17:25

## 2020-10-25 RX ADMIN — LORAZEPAM 1 MG: 2 INJECTION INTRAMUSCULAR; INTRAVENOUS at 14:03

## 2020-10-25 NOTE — H&P
[facility] Adult  Hospitalist Group  History and Physical    Primary Care Provider: Sho Petit MD  Date of Service:  10/25/2020    Subjective: Scott Gonzales is a 60 y.o. female with pmh of metastatic breast cancer (off chemo), anxiety, depression, who was recently discharged presents with positive blood cultures and muscle jerking. Patient was admitted from 10/23-10/24, after being off all medications for a week due to insurance change (and mail in pharmacy) seizure activity due to benzodiazepine withdrawal. She was given ativan, and then resumed on home medications. Improved and was discharged home. Since going home, she stopped at the pharmacy to fill medications, but sauys she did not have any money to fill them. I received a call from the lab early this morning, that admission cultures were positive for GPC. Patient had not been febrile during her previous admission, she did have an elevated WBC however was in the context of seizure, and improved without antibiotics. Today she complains of feeling very anxious, having ongoing muscle twiching and diaphoresis and chills. States she thinks she may have had a fever, but did not check. No N/V/D/C, no back pain, no muscle aches. Review of Systems:    A comprehensive review of systems was negative except for that written in the History of Present Illness.      Past Medical History:   Diagnosis Date    Acute hyponatremia 6/18/2019    Anxiety     Bimalleolar fracture of left ankle 2/3/2016    Comminuted and displaced     Cancer [facility])     breast    Closed left ankle fracture 2/4/2016    Depression     Gastroenteritis due to norovirus 2/21/2018    GERD (gastroesophageal reflux disease)     HTN (hypertension)     Hypercholesterolemia     Hypotension 9/12/2012    Metastatic breast cancer ([facility] 5/3/2017    Neoplastic malignant related fatigue 4/4/2018    Pain due to neoplasm 4/4/2018    Secondary cancer of bone ([facility] [facility] 5/3/2017  Sepsis ([address] 2/12/2018    Urinary tract infection without hematuria 2/13/2018      Past Surgical History:   Procedure Laterality Date    BREAST SURGERY PROCEDURE UNLISTED  march 13    carina masectomy     Prior to Admission medications    Medication Sig Start Date End Date Taking? Authorizing Provider   ALPRAZolam Caryn Cuellar) 1 mg tablet Take 1 Tab by mouth two (2) times a day for 30 days. Max Daily Amount: 2 mg. Indications: anxious 10/24/20 11/23/20  Padma Goel MD   amitriptyline (ELAVIL) 50 mg tablet Take 1 Tab by mouth nightly for 30 days. Indications: depression 10/24/20 11/23/20  Daved Homans I, MD   carvediloL (COREG) 12.5 mg tablet Take 1 Tab by mouth two (2) times a day for 30 days. TAKE 1 TABLET BY MOUTH TWICE A DAY  Indications: high blood pressure 10/24/20 11/23/20  Padma Goel MD   gabapentin (NEURONTIN) 300 mg capsule Take 1 Cap by mouth three (3) times daily for 30 days. Max Daily Amount: 900 mg. Indications: anxiety 10/24/20 11/23/20  Padma Goel MD   venlafaxine-SR [facility] 150 mg capsule Take 2 Caps by mouth daily (with breakfast) for 30 days. Indications: major depressive disorder 10/24/20 11/23/20  Padma Goel MD   folic acid (FOLVITE) 1 mg tablet Take 1 Tab by mouth daily. 12/25/19   Anshu Aguirre DO   hydroCHLOROthiazide (HYDRODIURIL) 25 mg tablet Take 25 mg by mouth daily. Provider, Historical   fenofibrate micronized (LOFIBRA) 134 mg capsule Take 134 mg by mouth every morning. Provider, Historical   losartan (COZAAR) 50 mg tablet TAKE 1 TABLET BY MOUTH ONCE DAILY 7/19/19   Frantz El MD   aspirin delayed-release 81 mg tablet Take 1 Tab by mouth daily.  Indications: prevention of transient ischemic attack 4/11/18   Frantz El MD     Allergies   Allergen Reactions    Sulfa (Sulfonamide Antibiotics) Unknown (comments)    Wellbutrin [Bupropion Hcl] Unknown (comments)      Family History   Problem Relation Age of Onset    Hypertension Mother     Heart Disease Mother     Depression Mother     Hypertension Father         SOCIAL HISTORY:  Patient resides at Home. Patient ambulates without assistance. Smoking history: never  Alcohol history: never        Objective:       Physical Exam:   Visit Vitals  /70 (BP 1 Location: Left leg, BP Patient Position: At rest)   Pulse 98   Temp 98.4 °F (36.9 °C)   Resp 21   SpO2 96%     General appearance: alert, cooperative, mild distress, appears older than stated age  Head: Normocephalic, without obvious abnormality, atraumatic  Eyes: conjunctivae/corneas clear. PERRL, EOM's intact. Lungs: clear to auscultation bilaterally  Breasts: prior mastectomy   Heart: regular rate and rhythm, S1, S2 normal, no murmur, click, rub or gallop, + port in place over the L chest c/d/i   Abdomen: soft, non-tender. Bowel sounds normal. No masses,  no organomegaly  Extremities: extremities normal, atraumatic, no cyanosis or edema  Pulses: 2+ and symmetric  Skin: Skin color, texture, turgor normal. No rashes or lesions  Neurologic: Alert and oriented X 3, + tremor  Cap refill: Brisk, less than 3 seconds  Pulses: 2+, symmetric in all extremities    Data Review: All diagnostic labs and studies have been reviewed. Chest x-ray - negative for infection     Assessment:     Active Problems:    Bacteremia (10/25/2020)        Plan:     GPC bacteremia, no documented fever on last admit, but given immunocompromised and + post in place, will need to treat as bacteremia for now   - Repeat blood cultures   - IV Vancomycin and Cefepime for now   - Check Echo  - CBC penidng, F/U WBC  - If true infection, may need to remove port.      Benzodiazpenie withdrawal with recent seizure (2day ago)  - Resume home medications, including benzo, pt will need filled script to go home with   - Seizure precautions     Anxiety/depression - resume home medications, xanax, elavil,      HTN - Holding home meds, monitor, may resume if needed.      Metastatic breast cancer - currently not on treatment.   - Not on treatment currently. - DDNR on file. - Will request palliative consult given poor social support.      DDNR    FUNCTIONAL STATUS PRIOR TO HOSPITALIZATION Ambulates Independently     Signed By: Katie Montejo MD     October 25, 2020

## 2020-10-25 NOTE — ROUTINE PROCESS
TRANSFER - OUT REPORT:    Verbal report given to Pantera RN(name) on Dakotah Murillo  being transferred to (unit) for routine progression of care       Report consisted of patients Situation, Background, Assessment and   Recommendations(SBAR). Information from the following report(s) SBAR, Kardex, ED Summary, [facility] and Recent Results was reviewed with the receiving nurse. Lines:   Venous Access Device 10/25/20 Upper chest (subclavicular area, right (Active)   Central Line Being Utilized Yes 10/25/20 1039   Date Accessed (Medial Site) 10/25/20 10/25/20 1039   Access Time (Medial Site) 1039 10/25/20 1039   Access Needle Size (Site #1) 20 G 10/25/20 1039   Access Needle Length (Medial Site) 1 inch 10/25/20 1039   Positive Blood Return (Medial Site) Yes 10/25/20 1039   Action Taken (Medial Site) Blood drawn;Flushed 10/25/20 1039        Opportunity for questions and clarification was provided.       Patient transported with:   [employer]

## 2020-10-25 NOTE — ED TRIAGE NOTES
Triage note: Pt arrives via EMS from home cc SI and anxiety. Pt has not taken her alprazolam rx. Pt also reports a call from hospitalist who requested her return for positive blood cultures.  No fever on arrival.

## 2020-10-25 NOTE — PROGRESS NOTES
Pharmacist Note - Vancomycin Dosing    Consult provided for this 60 y.o. female for indication of GP bacteremia. Antibiotic regimen(s): vancomycin  Patient on vancomycin PTA? NO     Recent Labs     10/24/20  1056 10/23/20  0627   WBC  --  9.9   CREA 0.74 1.33*   BUN 16 24*     Frequency of BMP: daily x3  Height: 165.1 cm  Weight: 101.4 kg  Est CrCl: > 100 ml/min; Temp (24hrs), Av.6 °F (37 °C), Min:98.4 °F (36.9 °C), Max:98.7 °F (37.1 °C)    Cultures:  10/23 blood  GPC    Goal trough = 15 - 20 mcg/mL    Therapy will be initiated with a loading dose of 2000 mg IV x 1 to be followed by a maintenance dose of 1500 mg IV every 18 hours. Pharmacy to follow patient daily and order levels / make dose adjustments as appropriate.

## 2020-10-25 NOTE — PROGRESS NOTES
Day #1 of cefepime  Indication:  bloodstream infection  Current regimen:  1 gm q8hr  Abx regimen:  vancomycin, cefepime  Recent Labs     10/24/20  1056 10/23/20  0627   WBC  --  9.9   CREA 0.74 1.33*   BUN 16 24*     Est CrCl:   ml/min  Temp (24hrs), Av.6 °F (37 °C), Min:98.4 °F (36.9 °C), Max:98.7 °F (37.1 °C)      Plan: Change to 2 gm q8h per protocol

## 2020-10-25 NOTE — ED PROVIDER NOTES
Ms. Wellington Mendes is a 65yo female who presents to the ER with complaints of positive blood culture. Patient states that the hospitalist caught her because of a positive blood culture result to return to the ER. Patient states that she went to go drop of her prescriptions yesterday after her discharge from the hospital.  However, she did not get them filled because she does not have the money for them. She reports that she is not feeling well but has no specific complaints. No chest pain or trouble breathing. No changes with her urine or bowel movements. No abdominal pain. She denies runny nose, sore throat, cough. Pt. does report feeling anxious. She denies any other complaints.            Past Medical History:   Diagnosis Date    Acute hyponatremia 6/18/2019    Anxiety     Bimalleolar fracture of left ankle 2/3/2016    Comminuted and displaced     Cancer [facility])     breast    Closed left ankle fracture 2/4/2016    Depression     Gastroenteritis due to norovirus 2/21/2018    GERD (gastroesophageal reflux disease)     HTN (hypertension)     Hypercholesterolemia     Hypotension 9/12/2012    Metastatic breast cancer ([address] 5/3/2017    Neoplastic malignant related fatigue 4/4/2018    Pain due to neoplasm 4/4/2018    Secondary cancer of bone ([address] 5/3/2017    Sepsis ([address] 2/12/2018    Urinary tract infection without hematuria 2/13/2018       Past Surgical History:   Procedure Laterality Date    BREAST SURGERY PROCEDURE UNLISTED  march 13    carina masectomy         Family History:   Problem Relation Age of Onset    Hypertension Mother     Heart Disease Mother     Depression Mother     Hypertension Father        Social History     Socioeconomic History    Marital status: SINGLE     Spouse name: Not on file    Number of children: Not on file    Years of education: Not on file    Highest education level: Not on file   Occupational History    Not on file   Social Needs    Financial resource strain: Not on file    Food insecurity     Worry: Not on file     Inability: Not on file    Transportation needs     Medical: Not on file     Non-medical: Not on file   Tobacco Use    Smoking status: Former Smoker     Packs/day: 0.50     Years: 20.00     Pack years: 10.00    Smokeless tobacco: Never Used   Substance and Sexual Activity    Alcohol use: No    Drug use: No    Sexual activity: Never   Lifestyle    Physical activity     Days per week: Not on file     Minutes per session: Not on file    Stress: Not on file   Relationships    Social connections     Talks on phone: Not on file     Gets together: Not on file     Attends [employer] service: Not on file     Active member of club or organization: Not on file     Attends meetings of clubs or organizations: Not on file     Relationship status: Not on file    Intimate partner violence     Fear of current or ex partner: Not on file     Emotionally abused: Not on file     Physically abused: Not on file     Forced sexual activity: Not on file   Other Topics Concern     Service Not Asked    Blood Transfusions Not Asked    Caffeine Concern Not Asked    Occupational Exposure Not Asked   Sebastian Fitzpatrick Hazards Not Asked    Sleep Concern Not Asked    Stress Concern Not Asked    Weight Concern Not Asked    Special Diet Not Asked    Back Care Not Asked    Exercise Not Asked    Bike Helmet Not Asked   [address] Not Asked    Self-Exams Not Asked   Social History Narrative    63year old  female admitted for depression after suicide attempt on Xanax and opiods. Py is follwed by DR. Cheri Ewing on the outside. She has breast and bone cancer. She is living by herself with no family supports. ALLERGIES: Sulfa (sulfonamide antibiotics) and Wellbutrin [bupropion hcl]    Review of Systems   Constitutional: Negative for chills and fever. HENT: Negative for rhinorrhea and sore throat. Respiratory: Negative for cough and shortness of breath. Cardiovascular: Negative for chest pain. Gastrointestinal: Negative for abdominal pain, diarrhea, nausea and vomiting. Genitourinary: Negative for dysuria and urgency. Musculoskeletal: Negative for arthralgias and back pain. Skin: Negative for rash. Neurological: Negative for dizziness, weakness and light-headedness. Vitals:    10/25/20 1011   BP: (!) 152/130   Pulse: 99   Resp: 22   Temp: 98.7 °F (37.1 °C)   SpO2: 99%            Physical Exam     Vital signs reviewed. Nursing notes reviewed. Const:  No acute distress, well developed, well nourished  Head:  Atraumatic, normocephalic  Eyes:  PERRL, conjunctiva normal, no scleral icterus  Neck:  Supple, trachea midline  Cardiovascular: Regular rate  Resp:  No resp distress, no increased work of breathing  Abd:  Soft, non-tender, non-distended  MSK:  No pedal edema, normal ROM  Neuro:  Alert and awake, no cranial nerve defect  Skin:  Warm, dry, intact  Psych: normal mood and affect, behavior is normal, judgement and thought content is normal          MDM  Number of Diagnoses or Management Options     Amount and/or Complexity of Data Reviewed  Clinical lab tests: ordered and reviewed  Tests in the radiology section of CPT®: ordered and reviewed  Review and summarize past medical records: yes    Patient Progress  Patient progress: stable         Procedures      Perfect Serve Consult for Admission  12:00 PM    ED Room Number: ER14/14  Patient Name and age: Rajesh Garduno 60 y.o.  female  Working Diagnosis:   1. Positive blood culture    2. Seizure ([address]    3. Noncompliance with medication regimen        COVID-19 Suspicion:  no  Sepsis present:  no  Reassessment needed: no  Code Status:  Do Not Resuscitate  Readmission: yes  Isolation Requirements:  no  Recommended Level of Care:  med/surg  Department:[facility] Adult ED - 21   Other:  Called by Dr. Mendel Koller today and told to come back to the ER for admission.            Ms. Nolan Moreau is a 65yo female who presents to the ER with a positive blood culture and recent seizures. Pt. Was called by the hospitalist today to be readmitted. Pt.  To be evaluated for admission by the hospitalist.

## 2020-10-26 LAB
ANION GAP SERPL CALC-SCNC: 8 MMOL/L (ref 5–15)
BASOPHILS # BLD: 0.1 K/UL (ref 0–0.1)
BASOPHILS NFR BLD: 1 % (ref 0–1)
BUN SERPL-MCNC: 12 MG/DL (ref 6–20)
BUN/CREAT SERPL: 15 (ref 12–20)
CALCIUM SERPL-MCNC: 8.4 MG/DL (ref 8.5–10.1)
CHLORIDE SERPL-SCNC: 106 MMOL/L (ref 97–108)
CO2 SERPL-SCNC: 24 MMOL/L (ref 21–32)
CREAT SERPL-MCNC: 0.82 MG/DL (ref 0.55–1.02)
DIFFERENTIAL METHOD BLD: ABNORMAL
EOSINOPHIL # BLD: 0.2 K/UL (ref 0–0.4)
EOSINOPHIL NFR BLD: 3 % (ref 0–7)
ERYTHROCYTE [DISTWIDTH] IN BLOOD BY AUTOMATED COUNT: 12.1 % (ref 11.5–14.5)
GLUCOSE SERPL-MCNC: 117 MG/DL (ref 65–100)
HCT VFR BLD AUTO: 33.6 % (ref 35–47)
HGB BLD-MCNC: 11.1 G/DL (ref 11.5–16)
IMM GRANULOCYTES # BLD AUTO: 0 K/UL (ref 0–0.04)
IMM GRANULOCYTES NFR BLD AUTO: 1 % (ref 0–0.5)
LYMPHOCYTES # BLD: 1.4 K/UL (ref 0.8–3.5)
LYMPHOCYTES NFR BLD: 27 % (ref 12–49)
MCH RBC QN AUTO: 31.1 PG (ref 26–34)
MCHC RBC AUTO-ENTMCNC: 33 G/DL (ref 30–36.5)
MCV RBC AUTO: 94.1 FL (ref 80–99)
MONOCYTES # BLD: 0.4 K/UL (ref 0–1)
MONOCYTES NFR BLD: 7 % (ref 5–13)
NEUTS SEG # BLD: 3.2 K/UL (ref 1.8–8)
NEUTS SEG NFR BLD: 61 % (ref 32–75)
NRBC # BLD: 0 K/UL (ref 0–0.01)
NRBC BLD-RTO: 0 PER 100 WBC
PLATELET # BLD AUTO: 209 K/UL (ref 150–400)
PMV BLD AUTO: 11.2 FL (ref 8.9–12.9)
POTASSIUM SERPL-SCNC: 3.1 MMOL/L (ref 3.5–5.1)
RBC # BLD AUTO: 3.57 M/UL (ref 3.8–5.2)
SODIUM SERPL-SCNC: 138 MMOL/L (ref 136–145)
WBC # BLD AUTO: 5.2 K/UL (ref 3.6–11)

## 2020-10-26 PROCEDURE — 36415 COLL VENOUS BLD VENIPUNCTURE: CPT

## 2020-10-26 PROCEDURE — 74011000258 HC RX REV CODE- 258: Performed by: INTERNAL MEDICINE

## 2020-10-26 PROCEDURE — 85025 COMPLETE CBC W/AUTO DIFF WBC: CPT

## 2020-10-26 PROCEDURE — 74011000250 HC RX REV CODE- 250: Performed by: INTERNAL MEDICINE

## 2020-10-26 PROCEDURE — 74011250637 HC RX REV CODE- 250/637: Performed by: INTERNAL MEDICINE

## 2020-10-26 PROCEDURE — 74011250636 HC RX REV CODE- 250/636: Performed by: INTERNAL MEDICINE

## 2020-10-26 PROCEDURE — 99223 1ST HOSP IP/OBS HIGH 75: CPT | Performed by: NURSE PRACTITIONER

## 2020-10-26 PROCEDURE — 80048 BASIC METABOLIC PNL TOTAL CA: CPT

## 2020-10-26 PROCEDURE — 65270000032 HC RM SEMIPRIVATE

## 2020-10-26 RX ORDER — HEPARIN 100 UNIT/ML
500 SYRINGE INTRAVENOUS AS NEEDED
Status: DISCONTINUED | OUTPATIENT
Start: 2020-10-26 | End: 2020-10-27 | Stop reason: HOSPADM

## 2020-10-26 RX ORDER — BACITRACIN 500 UNIT/G
1 PACKET (EA) TOPICAL AS NEEDED
Status: DISCONTINUED | OUTPATIENT
Start: 2020-10-26 | End: 2020-10-27 | Stop reason: HOSPADM

## 2020-10-26 RX ORDER — ENOXAPARIN SODIUM 100 MG/ML
40 INJECTION SUBCUTANEOUS EVERY 24 HOURS
Status: DISCONTINUED | OUTPATIENT
Start: 2020-10-26 | End: 2020-10-27 | Stop reason: HOSPADM

## 2020-10-26 RX ADMIN — GABAPENTIN 300 MG: 300 CAPSULE ORAL at 20:45

## 2020-10-26 RX ADMIN — VANCOMYCIN HYDROCHLORIDE 1500 MG: 100 INJECTION, POWDER, LYOPHILIZED, FOR SOLUTION INTRAVENOUS at 07:57

## 2020-10-26 RX ADMIN — Medication 10 ML: at 13:29

## 2020-10-26 RX ADMIN — CEFEPIME 2 G: 2 INJECTION, POWDER, FOR SOLUTION INTRAVENOUS at 16:16

## 2020-10-26 RX ADMIN — ALPRAZOLAM 1 MG: 0.25 TABLET ORAL at 09:27

## 2020-10-26 RX ADMIN — AMITRIPTYLINE HYDROCHLORIDE 50 MG: 50 TABLET, FILM COATED ORAL at 20:45

## 2020-10-26 RX ADMIN — ASPIRIN 81 MG: 81 TABLET, COATED ORAL at 09:27

## 2020-10-26 RX ADMIN — ACETAMINOPHEN 650 MG: 325 TABLET ORAL at 20:43

## 2020-10-26 RX ADMIN — ALPRAZOLAM 1 MG: 0.25 TABLET ORAL at 18:54

## 2020-10-26 RX ADMIN — ALTEPLASE 1 MG: 2.2 INJECTION, POWDER, LYOPHILIZED, FOR SOLUTION INTRAVENOUS at 03:43

## 2020-10-26 RX ADMIN — CEFEPIME 2 G: 2 INJECTION, POWDER, FOR SOLUTION INTRAVENOUS at 22:55

## 2020-10-26 RX ADMIN — CEFEPIME 2 G: 2 INJECTION, POWDER, FOR SOLUTION INTRAVENOUS at 06:14

## 2020-10-26 RX ADMIN — GABAPENTIN 300 MG: 300 CAPSULE ORAL at 09:27

## 2020-10-26 RX ADMIN — FENOFIBRATE 145 MG: 145 TABLET, FILM COATED ORAL at 09:27

## 2020-10-26 RX ADMIN — Medication 10 ML: at 20:46

## 2020-10-26 RX ADMIN — FOLIC ACID 1 MG: 1 TABLET ORAL at 09:27

## 2020-10-26 RX ADMIN — VENLAFAXINE HYDROCHLORIDE 300 MG: 150 CAPSULE, EXTENDED RELEASE ORAL at 07:20

## 2020-10-26 RX ADMIN — ENOXAPARIN SODIUM 40 MG: 40 INJECTION SUBCUTANEOUS at 18:53

## 2020-10-26 RX ADMIN — Medication 10 ML: at 06:14

## 2020-10-26 RX ADMIN — CARVEDILOL 12.5 MG: 12.5 TABLET, FILM COATED ORAL at 09:27

## 2020-10-26 RX ADMIN — CARVEDILOL 12.5 MG: 12.5 TABLET, FILM COATED ORAL at 18:54

## 2020-10-26 RX ADMIN — GABAPENTIN 300 MG: 300 CAPSULE ORAL at 16:16

## 2020-10-26 NOTE — PROGRESS NOTES
Problem: Falls - Risk of  Goal: *Absence of Falls  Description: Document Zenaida Perez Fall Risk and appropriate interventions in the flowsheet.   Outcome: Progressing Towards Goal  Note: Fall Risk Interventions:  Mobility Interventions: Communicate number of staff needed for ambulation/transfer, Patient to call before getting OOB, Strengthening exercises (ROM-active/passive), Utilize walker, cane, or other assistive device, Utilize gait belt for transfers/ambulation         Medication Interventions: Evaluate medications/consider consulting pharmacy, Patient to call before getting OOB    Elimination Interventions: Call light in reach, Patient to call for help with toileting needs              Problem: Patient Education: Go to Patient Education Activity  Goal: Patient/Family Education  Outcome: Progressing Towards Goal

## 2020-10-26 NOTE — PROGRESS NOTES
Palliative Medicine  [place]: [phone] ([phone])  [facility]: [phone] ([phone])        Code Status: DNR    Advance Care Planning: No AMD  Lnok:   Primary Decision Maker: Ruthie Sanchez - Father - [phone]        Patient / Family Encounter Documentation    Participants (names): Patient, Gia White [facility]    Narrative: This  met with patient in room. She was lying in bed, alert and oriented x 4, calm, normal thought process. Patient engaged in life review, discussed her grief over brother's murder many years ago and self reported avoidance of difficult topics/decisions she is facing with her cancer. She acknowledged that she will die of her cancer but this is the first time she has really considered ACP/MPOA or even a financial will. She said she knows she needs to think about these things but has been avoiding it. Patient struggles with asking for help - she identifies as the caretaker, the one who does not need help. She identified feelings of vulnerability and loss of control related to asking for help. Provided supportive counseling, anticipatory grief counseling, cognitive reframing, education on ACP/financial planning. Psychosocial challenges/Resilience factors:     Patient has limited psycho social support. She counts on 81 yo father, his girlfriend Mary and two high school friends Bety Mercedes and Alexa. She has another close Marivel Garner but feels they are going through rough spot and unsure if she is able to count on her. Patient feels lonely and isolated when having to face difficult decisions around ACP. Patient has stable housing. No financial concerns. Patient says she is overwhelmed with bill paying but that father helps her with that. Patient loss history includes brother (dx at ag 34 from murder) and mother. Patient has mental health history including anxiety and depression. Goals of Care / Plan:      Will follow up tomorrow to discuss MPOA, provide supportive counseling in wake of many decisions re: EOL    [facility] referral    Thank you for the opportunity to be involved in the care of Ms. Hetal Dean and her family.     Alka Nassar LMSW, Supervisee in Social Work  Palliative Medicine   [phone]    Start time: 14:00  End time:  14:45

## 2020-10-26 NOTE — PROGRESS NOTES
[facility] Adult  Hospitalist Group                                                                                          Hospitalist Progress Note  Silvia Fournier MD  Answering service: [phone] from in house phone        Date of Service:  10/26/2020  NAME:  Edilia Schmidt  :  1955  MRN:  [id number]      Admission Summary:   Edilia Schmidt is a 60 y.o. female with pmh of metastatic breast cancer (off chemo), anxiety, depression, who was recently discharged presents with positive blood cultures and muscle jerking. Interval history / Subjective:   Again, looks much improved now that she has received her scheuled Xanax. Denies sweats, fevers, or any redneess at her port site. Very anxious about potential discharge again. Speciation still not back on blood culture from 10/23  Assessment & Plan:     GPC bacteremia, no documented fever on last admit, but given immunocompromised and + post in place, will need to treat as bacteremia for now   - Repeat blood cultures remain negative. - IV Vancomycin and Cefepime for now   - Echo pending   - If true infection, may need to remove port.      Benzodiazpenie withdrawal with recent seizure (2day ago)  - Resume home medications, including benzo, pt will need filled script to go home with   - Seizure precautions     Anxiety/depression - resume home medications, xanax, elavil,      HTN - Holding home meds, monitor, may resume if needed.      Metastatic breast cancer - currently not on treatment.   - Not on treatment currently.    - DDNR on file.   - palliative consulted.      Code status: DDNR  DVT prophylaxis: lovenox    Care Plan discussed with: Patient/Family  Anticipated Disposition: Home w/Family  Anticipated Discharge: Less than 24 hours     Hospital Problems  Date Reviewed: 10/24/2020          Codes Class Noted POA    Bacteremia ICD-10-CM: R78.81  ICD-9-CM: 790.7  10/25/2020 Unknown                Review of Systems:   A comprehensive review of systems was negative except for that written in the HPI. Vital Signs:    Last 24hrs VS reviewed since prior progress note. Most recent are:  Visit Vitals  /75 (BP 1 Location: Left leg, BP Patient Position: At rest)   Pulse 82   Temp 98.6 °F (37 °C)   Resp 18   Ht 5' 5\" (1.651 m)   Wt 98.5 kg (217 lb 3.2 oz)   SpO2 97%   BMI 36.14 kg/m²       No intake or output data in the 24 hours ending 10/26/20 1707     Physical Examination:             Constitutional:  No acute distress, cooperative, pleasant    ENT:  Oral mucosa moist, oropharynx benign. Chest: prior mastectomy    Resp:  CTA bilaterally. No wheezing/rhonchi/rales. No accessory muscle use   CV:  Regular rhythm, normal rate, no murmurs, gallops, rubs, port c/d/i no erythema     GI:  Soft, non distended, non tender. normoactive bowel sounds, no hepatosplenomegaly     Musculoskeletal:  No edema, warm, 2+ pulses throughout    Neurologic:  Moves all extremities. AAOx3, CN II-XII reviewed     Skin:  Good turgor, no rashes or ulcers        Data Review:    Review and/or order of clinical lab test  Review and/or order of tests in the radiology section of CPT  Review and/or order of tests in the medicine section of CPT      Labs:     Recent Labs     10/26/20  0236 10/25/20  1034   WBC 5.2 9.5   HGB 11.1* 12.9   HCT 33.6* 38.2    255     Recent Labs     10/26/20  0236 10/25/20  1034 10/24/20  1056    138 138   K 3.1* 3.7 3.4*    105 108   CO2 24 25 23   BUN 12 11 16   CREA 0.82 0.76 0.74   * 131* 154*   CA 8.4* 9.8 8.5     Recent Labs     10/25/20  1034   ALT 25   AP 87   TBILI 0.5   TP 7.1   ALB 3.7   GLOB 3.4     No results for input(s): INR, PTP, APTT, INREXT in the last 72 hours. No results for input(s): FE, TIBC, PSAT, FERR in the last 72 hours. No results found for: FOL, RBCF   No results for input(s): PH, PCO2, PO2 in the last 72 hours.   No results for input(s): CPK, CKNDX, TROIQ in the last 72 hours.    No lab exists for component: CPKMB  No results found for: CHOL, CHOLX, CHLST, CHOLV, HDL, HDLP, LDL, LDLC, DLDLP, TGLX, TRIGL, TRIGP, CHHD, CHHDX  Lab Results   Component Value Date/Time    Glucose (POC) 203 (H) 10/23/2020 05:50 AM    Glucose POC 83 06/14/2011 10:33 AM     Lab Results   Component Value Date/Time    Color YELLOW/STRAW 10/23/2020 06:46 AM    Appearance CLEAR 10/23/2020 06:46 AM    Specific gravity 1.017 10/23/2020 06:46 AM    Specific gravity 1.025 02/12/2018 01:41 PM    pH (UA) 5.0 10/23/2020 06:46 AM    Protein Negative 10/23/2020 06:46 AM    Glucose 500 (A) 10/23/2020 06:46 AM    Ketone TRACE (A) 10/23/2020 06:46 AM    Bilirubin Negative 10/23/2020 06:46 AM    Urobilinogen 0.2 10/23/2020 06:46 AM    Nitrites Negative 10/23/2020 06:46 AM    Leukocyte Esterase Negative 10/23/2020 06:46 AM    Epithelial cells MODERATE (A) 10/23/2020 06:46 AM    Bacteria Negative 10/23/2020 06:46 AM    WBC 0-4 10/23/2020 06:46 AM    RBC 0-5 10/23/2020 06:46 AM         Medications Reviewed:     Current Facility-Administered Medications   Medication Dose Route Frequency    alteplase (CATHFLO) 1 mg in sterile water (preservative free) 1 mL injection  1 mg InterCATHeter PRN    bacitracin 500 unit/gram packet 1 Packet  1 Packet Topical PRN    heparin (porcine) pf 500 Units  500 Units InterCATHeter PRN    sodium chloride (NS) flush 5-40 mL  5-40 mL IntraVENous Q8H    sodium chloride (NS) flush 5-40 mL  5-40 mL IntraVENous PRN    acetaminophen (TYLENOL) tablet 650 mg  650 mg Oral Q6H PRN    Or    acetaminophen (TYLENOL) suppository 650 mg  650 mg Rectal Q6H PRN    polyethylene glycol (MIRALAX) packet 17 g  17 g Oral DAILY PRN    promethazine (PHENERGAN) tablet 12.5 mg  12.5 mg Oral Q6H PRN    Or    ondansetron (ZOFRAN) injection 4 mg  4 mg IntraVENous Q6H PRN    ALPRAZolam (XANAX) tablet 1 mg  1 mg Oral BID    amitriptyline (ELAVIL) tablet 50 mg  50 mg Oral QHS    aspirin delayed-release tablet 81 mg 81 mg Oral DAILY    carvediloL (COREG) tablet 12.5 mg  12.5 mg Oral BID    fenofibrate nanocrystallized (TRICOR) tablet 145 mg  145 mg Oral DAILY    folic acid (FOLVITE) tablet 1 mg  1 mg Oral DAILY    gabapentin (NEURONTIN) capsule 300 mg  300 mg Oral TID    venlafaxine-SR (EFFEXOR-XR) capsule 300 mg  300 mg Oral DAILY WITH BREAKFAST    LORazepam (ATIVAN) injection 1 mg  1 mg IntraVENous Q4H PRN    Vancomycin Pharmacy Dosing   Other PRN    vancomycin (VANCOCIN) 1500 mg in  ml infusion  1,500 mg IntraVENous Q18H    cefepime (MAXIPIME) 2 g in 0.9% sodium chloride (MBP/ADV) 100 mL  2 g IntraVENous Q8H     ______________________________________________________________________  EXPECTED LENGTH OF STAY: 3d 14h  ACTUAL LENGTH OF STAY:          1                 Wali Doan MD

## 2020-10-26 NOTE — CONSULTS
Palliative Medicine Consult  Antonio: [phone] ([phone])    Patient Name: Diane Holcomb  YOB: 1955    Date of Initial Consult: October 26, 2020  Reason for Consult: End Stage Disease  Requesting Provider: Dr. Paco Mireles  Primary Care Physician: Queen Alexandre MD     SUMMARY:   Diane Holcomb is a 60 y.o. female re-admitted on 10/25/2020 from home after she was informed of positive blood culture results with a diagnosis of GPC bacteremia, benzo withdrawal with recent seizure. Previous admission 10/23-10/24 after she was off of all her medications after her insurance was changed. Pt has been seen by our [address] [facility] team during a previous admission 2018 when she was admitted for suicide attempt. POST was completed. Pt shared she has no next of kin for AMD. She was admitted to the inpatient psychiatric unit at [address] [place] [address] after a suicide attempt. PMH: metastatic breast cancer(bones)s/p bilat mastectomy and chemo, anxiety, left ankle fx, depression, gastroenteritits, GERD, HTN, hypercholesterolemia, hypotension, fatigue, cancer pain, sepsis, UTI,     Current medical issues leading to Palliative Medicine involvement include: End stage disease  DDNR    Social: lives alone at [address] [place] [address] living in [place], never , no children. Only living relative is 81y/o father who is retired missionary. 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  (progress note dated 2/19/18 shows pt resides at [address] [place] [address] - [address])     [address] [place] [address] Dr:   1. Cancer associated pain  2. Anxiety   3. Psychosocial distress  4. Metastatic disease       PLAN:   1. Pt seen without family present. Services introduced. Pt alert and decisional  2. Pt is followed at [facility] for her history of cancer. She is not getting any treatment. They have referred her to a Palliative Medicine physician~Dr. Rivas for her pain. Pt shared that the cancer treatment caused heart failure. 3. Verbalized adequate pain control at this time.   She takes neurontin for pain of her shoulders, arms, and sternum  4. She has been very anxious about various things (how long will this admission be, how will she get home, she needs a will, she doesn't want to depend on anyone)  5. Inquired about AMD completion. Explained the doc in detail. She agrees to complete. Our LCSW will assist  6. Supportive listening provided  7. Discussed ways we could support her while she is here  8. Discussed hospice and how it could help her once she is ready. We agreed that she should discuss it further with her primary team  9. Our LCSW will follow up  10. Initial consult note routed to primary continuity provider and/or primary health care team members  6. Communicated plan of care with: Palliative IDTRisa [facility] Team     GOALS OF CARE / TREATMENT PREFERENCES:     GOALS OF CARE:  Patient/Health Care Proxy Stated Goals: Cure    TREATMENT PREFERENCES:   Code Status: DNR    Advance Care Planning:  [x] [facility] Interdisciplinary Team has updated the ACP Navigator with Health Care Decision Maker and Patient Capacity    Primary Decision Maker: Jannet Russell - Father - [phone]      Advance Care Planning 10/25/2020   Patient's Healthcare Decision Maker is: -   Primary Decision Maker Name -   Primary Decision Maker Phone Number -   Primary Decision Maker Relationship to Patient -   Secondary Decision [facility] Name -   Secondary Decision [facility] Phone Number -   Secondary Decision Maker Relationship to Patient -   Confirm Advance Directive Yes, on file   Patient Would Like to Complete Advance Directive -   Does the patient have other document types -       Medical Interventions: Limited additional interventions     Other Instructions:   Artificially Administered Nutrition: No feeding tube     Other:    As far as possible, the palliative care team has discussed with patient / health care proxy about goals of care / treatment preferences for patient.      HISTORY:     History obtained from: chart, pt, team    CHIEF COMPLAINT: pt admitted with aforementioned history and issues    HPI/SUBJECTIVE:    The patient is:   [x] Verbal and participatory  [] Non-participatory due to:   Pain controlled     Clinical Pain Assessment (nonverbal scale for severity on nonverbal patients):   Clinical Pain Assessment  Severity: 0          Duration: for how long has pt been experiencing pain (e.g., 2 days, 1 month, years)  Frequency: how often pain is an issue (e.g., several times per day, once every few days, constant)     FUNCTIONAL ASSESSMENT:     Palliative Performance Scale (PPS):  PPS: 80       PSYCHOSOCIAL/SPIRITUAL SCREENING:     Palliative IDT has assessed this patient for cultural preferences / practices and a referral made as appropriate to needs (Cultural Services, Patient Advocacy, Ethics, etc.)    Any spiritual / Baptist concerns:  [] Yes /  [x] No    Caregiver Burnout:  [] Yes /  [x] No /  [] No Caregiver Present      Anticipatory grief assessment:   [x] Normal  / [] Maladaptive       ESAS Anxiety: Anxiety: 7    ESAS Depression:          REVIEW OF SYSTEMS:     Positive and pertinent negative findings in ROS are noted above in HPI. The following systems were [x] reviewed / [] unable to be reviewed as noted in HPI  Other findings are noted below. Systems: constitutional, ears/nose/mouth/throat, respiratory, gastrointestinal, genitourinary, musculoskeletal, integumentary, neurologic, psychiatric, endocrine. Positive findings noted below. Modified ESAS Completed by: provider   Fatigue: 0 Drowsiness: 0     Pain: 0   Anxiety: 7 Nausea: 0   Anorexia: 0 Dyspnea: 0                    PHYSICAL EXAM:     From RN flowsheet:  Wt Readings from Last 3 Encounters:   10/26/20 217 lb 3.2 oz (98.5 kg)   10/24/20 223 lb 8.7 oz (101.4 kg)   08/02/20 212 lb (96.2 kg)     Blood pressure 135/75, pulse 82, temperature 98.6 °F (37 °C), resp.  rate 18, height 5' 5\" (1.651 m), weight 217 lb 3.2 oz (98.5 kg), SpO2 97 %. Pain Scale 1: Numeric (0 - 10)  Pain Intensity 1: 7     Pain Location 1: Mouth  Pain Orientation 1: Lateral  Pain Description 1: Sore  Pain Intervention(s) 1: Medication (see MAR)  Last bowel movement, if known:     Constitutional: alert, pleasant, nad, conversant  Eyes: pupils equal, anicteric  ENMT: no nasal discharge, moist mucous membranes  Cardiovascular: regular rhythm, distal pulses intact, neg edema  Respiratory: breathing not labored, symmetric  Gastrointestinal: gross,soft non-tender, +bowel sounds  Musculoskeletal: no deformity, no tenderness to palpation  Skin: warm, dry  Neurologic: following commands, moving all extremities  Psychiatric: full affect, no hallucinations, anxious disposition   Other:       HISTORY:     Active Problems:    Bacteremia (10/25/2020)      Past Medical History:   Diagnosis Date    Acute hyponatremia 6/18/2019    Anxiety     Bimalleolar fracture of left ankle 2/3/2016    Comminuted and displaced     Cancer ([address]     breast    Closed left ankle fracture 2/4/2016    Depression     Gastroenteritis due to norovirus 2/21/2018    GERD (gastroesophageal reflux disease)     HTN (hypertension)     Hypercholesterolemia     Hypotension 9/12/2012    Metastatic breast cancer ([address] 5/3/2017    Neoplastic malignant related fatigue 4/4/2018    Pain due to neoplasm 4/4/2018    Secondary cancer of bone ([address] 5/3/2017    Sepsis ([address] 2/12/2018    Urinary tract infection without hematuria 2/13/2018      Past Surgical History:   Procedure Laterality Date    BREAST SURGERY PROCEDURE UNLISTED  march 13    carina masectomy      Family History   Problem Relation Age of Onset    Hypertension Mother     Heart Disease Mother     Depression Mother     Hypertension Father       History reviewed, no pertinent family history.   Social History     Tobacco Use    Smoking status: Former Smoker     Packs/day: 0.50     Years: 20.00     Pack years: 10.00    Smokeless tobacco: Never Used Substance Use Topics    Alcohol use: No     Allergies   Allergen Reactions    Sulfa (Sulfonamide Antibiotics) Unknown (comments)    Wellbutrin [Bupropion Hcl] Unknown (comments)      Current Facility-Administered Medications   Medication Dose Route Frequency    alteplase (CATHFLO) 1 mg in sterile water (preservative free) 1 mL injection  1 mg InterCATHeter PRN    bacitracin 500 unit/gram packet 1 Packet  1 Packet Topical PRN    heparin (porcine) pf 500 Units  500 Units InterCATHeter PRN    enoxaparin (LOVENOX) injection 40 mg  40 mg SubCUTAneous Q24H    sodium chloride (NS) flush 5-40 mL  5-40 mL IntraVENous Q8H    sodium chloride (NS) flush 5-40 mL  5-40 mL IntraVENous PRN    acetaminophen (TYLENOL) tablet 650 mg  650 mg Oral Q6H PRN    Or    acetaminophen (TYLENOL) suppository 650 mg  650 mg Rectal Q6H PRN    polyethylene glycol (MIRALAX) packet 17 g  17 g Oral DAILY PRN    promethazine (PHENERGAN) tablet 12.5 mg  12.5 mg Oral Q6H PRN    Or    ondansetron (ZOFRAN) injection 4 mg  4 mg IntraVENous Q6H PRN    ALPRAZolam (XANAX) tablet 1 mg  1 mg Oral BID    amitriptyline (ELAVIL) tablet 50 mg  50 mg Oral QHS    aspirin delayed-release tablet 81 mg  81 mg Oral DAILY    carvediloL (COREG) tablet 12.5 mg  12.5 mg Oral BID    fenofibrate nanocrystallized (TRICOR) tablet 145 mg  145 mg Oral DAILY    folic acid (FOLVITE) tablet 1 mg  1 mg Oral DAILY    gabapentin (NEURONTIN) capsule 300 mg  300 mg Oral TID    venlafaxine-SR (EFFEXOR-XR) capsule 300 mg  300 mg Oral DAILY WITH BREAKFAST    LORazepam (ATIVAN) injection 1 mg  1 mg IntraVENous Q4H PRN    Vancomycin Pharmacy Dosing   Other PRN    vancomycin (VANCOCIN) 1500 mg in  ml infusion  1,500 mg IntraVENous Q18H    cefepime (MAXIPIME) 2 g in 0.9% sodium chloride (MBP/ADV) 100 mL  2 g IntraVENous Q8H          LAB AND IMAGING FINDINGS:     Lab Results   Component Value Date/Time    WBC 5.2 10/26/2020 02:36 AM    HGB 11.1 (L) 10/26/2020 02:36 AM    PLATELET 114 56/45/3295 02:36 AM     Lab Results   Component Value Date/Time    Sodium 138 10/26/2020 02:36 AM    Potassium 3.1 (L) 10/26/2020 02:36 AM    Chloride 106 10/26/2020 02:36 AM    CO2 24 10/26/2020 02:36 AM    BUN 12 10/26/2020 02:36 AM    Creatinine 0.82 10/26/2020 02:36 AM    Calcium 8.4 (L) 10/26/2020 02:36 AM    Magnesium 2.0 10/23/2020 06:27 AM    Phosphorus 4.0 01/28/2020 04:36 AM      Lab Results   Component Value Date/Time    Alk. phosphatase 87 10/25/2020 10:34 AM    Protein, total 7.1 10/25/2020 10:34 AM    Albumin 3.7 10/25/2020 10:34 AM    Globulin 3.4 10/25/2020 10:34 AM     Lab Results   Component Value Date/Time    INR 1.1 12/20/2019 07:12 PM    Prothrombin time 11.1 12/20/2019 07:12 PM      No results found for: IRON, FE, TIBC, IBCT, PSAT, FERR   No results found for: PH, PCO2, PO2  No components found for: [place]   Lab Results   Component Value Date/Time    CK 83 01/27/2020 08:25 AM    CK - MB 1.0 08/31/2012 04:00 AM                Total time: 70 min  Counseling / coordination time, spent as noted above: 60 min  > 50% counseling / coordination?: y    Prolonged service was provided for  []30 min   []75 min in face to face time in the presence of the patient, spent as noted above. Time Start:   Time End:   Note: this can only be billed with 72117 (initial) or 61280 (follow up). If multiple start / stop times, list each separately.

## 2020-10-26 NOTE — PROGRESS NOTES
Spiritual Care Assessment/Progress Note  [facility]      NAME: Rajesh Garduno      MRN: [id number]  AGE: 60 y.o. SEX: female  Anglican Affiliation: [place]   Language: English     10/26/2020     Total Time (in minutes): 10     Spiritual Assessment begun in [facility] through conversation with:         []Patient        [] Family    [] Friend(s)        Reason for Consult: Palliative Care, Initial/Spiritual Assessment     Spiritual beliefs: (Please include comment if needed)     [] Identifies with a kimberley tradition:         [] Supported by a kimberley community:            [] Claims no spiritual orientation:           [] Seeking spiritual identity:                [] Adheres to an individual form of spirituality:           [x] Not able to assess:                           Identified resources for coping:      [] Prayer                               [] Music                  [] Guided Imagery     [] Family/friends                 [] Pet visits     [] Devotional reading                         [x] Unknown     [] Other:                                             Interventions offered during this visit: (See comments for more details)    Patient Interventions: Initial visit           Plan of Care:     [] Support spiritual and/or cultural needs    [] Support AMD and/or advance care planning process      [] Support grieving process   [] Coordinate Rites and/or Rituals    [] Coordination with community clergy   [] No spiritual needs identified at this time   [] Detailed Plan of Care below (See Comments)  [] Make referral to Music Therapy  [] Make referral to Pet Therapy     [] Make referral to Addiction services  [] Make referral to [facility]  [] Make referral to Spiritual Care Partner  [] No future visits requested        [x] Follow up visits as needed     Attempted to visit pt for initial spiritual assessment. Unable to complete assessment at this time, pt sleeping and did not awake.  Chaplains will continue to offer support as needed.   Chaplain Galvan MDiv, MS, [facility]  [phone])

## 2020-10-26 NOTE — PROGRESS NOTES
Problem: Falls - Risk of  Goal: *Absence of Falls  Description: Document Juan Cameron Fall Risk and appropriate interventions in the flowsheet.   Outcome: Progressing Towards Goal  Note: Fall Risk Interventions:  Mobility Interventions: Strengthening exercises (ROM-active/passive), Assess mobility with egress test         Medication Interventions: Evaluate medications/consider consulting pharmacy, Teach patient to arise slowly    Elimination Interventions: Call light in reach, Patient to call for help with toileting needs, Toileting schedule/hourly rounds              Problem: Patient Education: Go to Patient Education Activity  Goal: Patient/Family Education  Outcome: Progressing Towards Goal

## 2020-10-27 ENCOUNTER — APPOINTMENT (OUTPATIENT)
Dept: NON INVASIVE DIAGNOSTICS | Age: 65
DRG: 897 | End: 2020-10-27
Attending: INTERNAL MEDICINE
Payer: MEDICARE

## 2020-10-27 ENCOUNTER — HOME HEALTH ADMISSION (OUTPATIENT)
Dept: HOME HEALTH SERVICES | Facility: HOME HEALTH | Age: 65
End: 2020-10-27
Payer: MEDICARE

## 2020-10-27 VITALS
HEIGHT: 65 IN | TEMPERATURE: 98.1 F | RESPIRATION RATE: 18 BRPM | WEIGHT: 217 LBS | SYSTOLIC BLOOD PRESSURE: 154 MMHG | BODY MASS INDEX: 36.15 KG/M2 | DIASTOLIC BLOOD PRESSURE: 62 MMHG | OXYGEN SATURATION: 98 % | HEART RATE: 95 BPM

## 2020-10-27 LAB
ANION GAP SERPL CALC-SCNC: 6 MMOL/L (ref 5–15)
BASOPHILS # BLD: 0.1 K/UL (ref 0–0.1)
BASOPHILS NFR BLD: 1 % (ref 0–1)
BUN SERPL-MCNC: 13 MG/DL (ref 6–20)
BUN/CREAT SERPL: 16 (ref 12–20)
CALCIUM SERPL-MCNC: 8.9 MG/DL (ref 8.5–10.1)
CHLORIDE SERPL-SCNC: 110 MMOL/L (ref 97–108)
CO2 SERPL-SCNC: 25 MMOL/L (ref 21–32)
CREAT SERPL-MCNC: 0.81 MG/DL (ref 0.55–1.02)
DIFFERENTIAL METHOD BLD: ABNORMAL
ECHO AO ROOT DIAM: 3.32 CM
ECHO AV AREA PEAK VELOCITY: 1.32 CM2
ECHO AV AREA/BSA PEAK VELOCITY: 0.6 CM2/M2
ECHO AV PEAK GRADIENT: 7.22 MMHG
ECHO AV PEAK VELOCITY: 134.32 CM/S
ECHO LA MAJOR AXIS: 4.41 CM
ECHO LA MINOR AXIS: 2.15 CM
ECHO LV EDV A2C: 66.79 ML
ECHO LV EDV A4C: 74.95 ML
ECHO LV EDV BP: 74.1 ML (ref 56–104)
ECHO LV EDV INDEX A4C: 36.6 ML/M2
ECHO LV EDV INDEX BP: 36.2 ML/M2
ECHO LV EDV NDEX A2C: 32.6 ML/M2
ECHO LV EJECTION FRACTION A2C: 54 PERCENT
ECHO LV EJECTION FRACTION A4C: 63 PERCENT
ECHO LV EJECTION FRACTION BIPLANE: 60.4 PERCENT (ref 55–100)
ECHO LV ESV A2C: 30.77 ML
ECHO LV ESV A4C: 27.95 ML
ECHO LV ESV BP: 29.37 ML (ref 19–49)
ECHO LV ESV INDEX A2C: 15 ML/M2
ECHO LV ESV INDEX A4C: 13.6 ML/M2
ECHO LV ESV INDEX BP: 14.3 ML/M2
ECHO LV INTERNAL DIMENSION DIASTOLIC: 3.97 CM (ref 3.9–5.3)
ECHO LV INTERNAL DIMENSION SYSTOLIC: 2.67 CM
ECHO LV IVSD: 0.92 CM (ref 0.6–0.9)
ECHO LV MASS 2D: 110 G (ref 67–162)
ECHO LV MASS INDEX 2D: 53.7 G/M2 (ref 43–95)
ECHO LV POSTERIOR WALL DIASTOLIC: 0.9 CM (ref 0.6–0.9)
ECHO LVOT DIAM: 1.5 CM
ECHO LVOT PEAK GRADIENT: 4.06 MMHG
ECHO LVOT PEAK VELOCITY: 100.73 CM/S
ECHO MV A VELOCITY: 87.77 CM/S
ECHO MV AREA PHT: 4.79 CM2
ECHO MV E DECELERATION TIME (DT): 158.39 MS
ECHO MV E VELOCITY: 73.44 CM/S
ECHO MV E/A RATIO: 0.84
ECHO MV PRESSURE HALF TIME (PHT): 45.93 MS
ECHO PV PEAK INSTANTANEOUS GRADIENT SYSTOLIC: 2.49 MMHG
ECHO RV TAPSE: 1.41 CM (ref 1.5–2)
ECHO TV REGURGITANT MAX VELOCITY: 256.72 CM/S
ECHO TV REGURGITANT PEAK GRADIENT: 26.36 MMHG
EOSINOPHIL # BLD: 0.2 K/UL (ref 0–0.4)
EOSINOPHIL NFR BLD: 4 % (ref 0–7)
ERYTHROCYTE [DISTWIDTH] IN BLOOD BY AUTOMATED COUNT: 12 % (ref 11.5–14.5)
GLUCOSE SERPL-MCNC: 110 MG/DL (ref 65–100)
HCT VFR BLD AUTO: 34.9 % (ref 35–47)
HGB BLD-MCNC: 11.9 G/DL (ref 11.5–16)
IMM GRANULOCYTES # BLD AUTO: 0 K/UL (ref 0–0.04)
IMM GRANULOCYTES NFR BLD AUTO: 0 % (ref 0–0.5)
LYMPHOCYTES # BLD: 1.9 K/UL (ref 0.8–3.5)
LYMPHOCYTES NFR BLD: 32 % (ref 12–49)
MAGNESIUM SERPL-MCNC: 1.7 MG/DL (ref 1.6–2.4)
MCH RBC QN AUTO: 31.8 PG (ref 26–34)
MCHC RBC AUTO-ENTMCNC: 34.1 G/DL (ref 30–36.5)
MCV RBC AUTO: 93.3 FL (ref 80–99)
MONOCYTES # BLD: 0.4 K/UL (ref 0–1)
MONOCYTES NFR BLD: 7 % (ref 5–13)
NEUTS SEG # BLD: 3.4 K/UL (ref 1.8–8)
NEUTS SEG NFR BLD: 56 % (ref 32–75)
NRBC # BLD: 0 K/UL (ref 0–0.01)
NRBC BLD-RTO: 0 PER 100 WBC
PHOSPHATE SERPL-MCNC: 3.6 MG/DL (ref 2.6–4.7)
PLATELET # BLD AUTO: 216 K/UL (ref 150–400)
PMV BLD AUTO: 10.9 FL (ref 8.9–12.9)
POTASSIUM SERPL-SCNC: 3.5 MMOL/L (ref 3.5–5.1)
RBC # BLD AUTO: 3.74 M/UL (ref 3.8–5.2)
SODIUM SERPL-SCNC: 141 MMOL/L (ref 136–145)
WBC # BLD AUTO: 6 K/UL (ref 3.6–11)

## 2020-10-27 PROCEDURE — 84100 ASSAY OF PHOSPHORUS: CPT

## 2020-10-27 PROCEDURE — 97116 GAIT TRAINING THERAPY: CPT

## 2020-10-27 PROCEDURE — 93306 TTE W/DOPPLER COMPLETE: CPT | Performed by: INTERNAL MEDICINE

## 2020-10-27 PROCEDURE — 74011000258 HC RX REV CODE- 258: Performed by: INTERNAL MEDICINE

## 2020-10-27 PROCEDURE — 36415 COLL VENOUS BLD VENIPUNCTURE: CPT

## 2020-10-27 PROCEDURE — 85025 COMPLETE CBC W/AUTO DIFF WBC: CPT

## 2020-10-27 PROCEDURE — 74011250637 HC RX REV CODE- 250/637: Performed by: INTERNAL MEDICINE

## 2020-10-27 PROCEDURE — 74011250636 HC RX REV CODE- 250/636: Performed by: INTERNAL MEDICINE

## 2020-10-27 PROCEDURE — 93306 TTE W/DOPPLER COMPLETE: CPT

## 2020-10-27 PROCEDURE — 97161 PT EVAL LOW COMPLEX 20 MIN: CPT

## 2020-10-27 PROCEDURE — 83735 ASSAY OF MAGNESIUM: CPT

## 2020-10-27 PROCEDURE — 80048 BASIC METABOLIC PNL TOTAL CA: CPT

## 2020-10-27 PROCEDURE — 97530 THERAPEUTIC ACTIVITIES: CPT

## 2020-10-27 RX ORDER — ALPRAZOLAM 1 MG/1
1 TABLET ORAL 2 TIMES DAILY
Qty: 60 TAB | Refills: 0 | Status: SHIPPED | OUTPATIENT
Start: 2020-10-27 | End: 2020-10-27 | Stop reason: SDUPTHER

## 2020-10-27 RX ORDER — GABAPENTIN 300 MG/1
300 CAPSULE ORAL 3 TIMES DAILY
Qty: 90 CAP | Refills: 0 | Status: SHIPPED | OUTPATIENT
Start: 2020-10-27 | End: 2020-11-26

## 2020-10-27 RX ORDER — AMITRIPTYLINE HYDROCHLORIDE 50 MG/1
50 TABLET, FILM COATED ORAL
Qty: 30 TAB | Refills: 0 | Status: SHIPPED | OUTPATIENT
Start: 2020-10-27 | End: 2020-11-26

## 2020-10-27 RX ORDER — AMITRIPTYLINE HYDROCHLORIDE 50 MG/1
50 TABLET, FILM COATED ORAL
Qty: 30 TAB | Refills: 0 | Status: SHIPPED | OUTPATIENT
Start: 2020-10-27 | End: 2020-10-27 | Stop reason: SDUPTHER

## 2020-10-27 RX ORDER — VENLAFAXINE HYDROCHLORIDE 150 MG/1
300 CAPSULE, EXTENDED RELEASE ORAL
Qty: 60 CAP | Refills: 0 | Status: SHIPPED | OUTPATIENT
Start: 2020-10-27 | End: 2020-10-27 | Stop reason: SDUPTHER

## 2020-10-27 RX ORDER — GABAPENTIN 300 MG/1
300 CAPSULE ORAL 3 TIMES DAILY
Qty: 90 CAP | Refills: 0 | Status: SHIPPED | OUTPATIENT
Start: 2020-10-27 | End: 2020-10-27 | Stop reason: SDUPTHER

## 2020-10-27 RX ORDER — VENLAFAXINE HYDROCHLORIDE 150 MG/1
300 CAPSULE, EXTENDED RELEASE ORAL
Qty: 60 CAP | Refills: 0 | Status: SHIPPED | OUTPATIENT
Start: 2020-10-27 | End: 2020-11-26

## 2020-10-27 RX ORDER — ALPRAZOLAM 1 MG/1
1 TABLET ORAL 2 TIMES DAILY
Qty: 60 TAB | Refills: 0 | Status: SHIPPED | OUTPATIENT
Start: 2020-10-27 | End: 2020-11-26

## 2020-10-27 RX ADMIN — FENOFIBRATE 145 MG: 145 TABLET, FILM COATED ORAL at 09:23

## 2020-10-27 RX ADMIN — ASPIRIN 81 MG: 81 TABLET, COATED ORAL at 09:23

## 2020-10-27 RX ADMIN — CARVEDILOL 12.5 MG: 12.5 TABLET, FILM COATED ORAL at 09:23

## 2020-10-27 RX ADMIN — FOLIC ACID 1 MG: 1 TABLET ORAL at 09:23

## 2020-10-27 RX ADMIN — ALPRAZOLAM 1 MG: 0.25 TABLET ORAL at 09:22

## 2020-10-27 RX ADMIN — ACETAMINOPHEN 650 MG: 325 TABLET ORAL at 06:42

## 2020-10-27 RX ADMIN — VANCOMYCIN HYDROCHLORIDE 1500 MG: 100 INJECTION, POWDER, LYOPHILIZED, FOR SOLUTION INTRAVENOUS at 01:42

## 2020-10-27 RX ADMIN — GABAPENTIN 300 MG: 300 CAPSULE ORAL at 09:23

## 2020-10-27 RX ADMIN — CEFEPIME 2 G: 2 INJECTION, POWDER, FOR SOLUTION INTRAVENOUS at 06:37

## 2020-10-27 RX ADMIN — Medication 10 ML: at 06:40

## 2020-10-27 RX ADMIN — VENLAFAXINE HYDROCHLORIDE 300 MG: 150 CAPSULE, EXTENDED RELEASE ORAL at 07:38

## 2020-10-27 NOTE — PROGRESS NOTES
Patient Vitals for the past 4 hrs:   BP   10/27/20 1240 (!) 165/107   10/27/20 1230 (!) 155/104   10/27/20 1024 (!) 155/84           Notified MD of patient BP, no new orders given, patient to resume home medication when discharge. Ledon Siemens, RN    I have reviewed discharge instructions with the patient. The patient verbalized understanding.

## 2020-10-27 NOTE — DISCHARGE SUMMARY
Discharge Summary       PATIENT ID: Lucrecia Taylor  MRN: [id number]   YOB: 1955    DATE OF ADMISSION: 10/25/2020 10:02 AM    DATE OF DISCHARGE: 10/27/2020   PRIMARY CARE PROVIDER: Maged Camacho MD     DISCHARGING PROVIDER: Jose Issa MD    To contact this individual call [phone] and ask the  to page. If unavailable ask to be transferred the Adult Hospitalist Department. CONSULTATIONS: IP CONSULT TO PALLIATIVE CARE - PROVIDER    PROCEDURES/SURGERIES: * No surgery found *    ADMITTING DIAGNOSES & HOSPITAL COURSE:   GPC bacteremia - caog neg staph   Benzodiazepine withdrawal     Geovani Rojas is a 60 y. o. female with pmh of metastatic breast cancer (off chemo), anxiety, depression, who was recently discharged presents with positive blood cultures and muscle jerking. Muscle jerking resolved with administration of home medications, including TCA and benzo. Blood cultures returned positive for coag neg staph. Patient afebrile, no WBC prior to administration of abx, and repeat cultures have been negative. DC abx, likely contaminant. Stable for DC home. DISCHARGE DIAGNOSES / PLAN:      + Coag neg staph bacteremia, -contaminant, two colonies   - Repeat blood cultures remain negative. - IV Vancomycin and Cefepime, discontinue  - May cancel echocardiogram   - If true infection, may need to remove port.      Benzodiazpenie withdrawal with recent seizure (2day ago)  - Resume home medications, including benzo, pt will need filled script to go home with   - Seizure precautions     Anxiety/depression - resume home medications, xanax, elavil,      HTN - Holding home meds, monitor, may resume if needed.      Metastatic breast cancer - currently not on treatment.   - Not on treatment currently. - DDNR on file.   - palliative consulted. ADDITIONAL CARE RECOMMENDATIONS:   - Please take all medications as prescribed. Note changes as below.    **No changes to home medications. Have sent refills to [facility], so you have them on day of discharge. - Please make sure to follow up with your primary care physician within 1-2 weeks of discharge for hospital follow up, you should also follow up with your palliative care physician, Dr. Rivas, and your psychiatrist.   - Please make sure to continue to monitor for: sudden onset high fevers, seizure activity, Loss of consciousness, and return to the Emergency Department with any of these symptoms:   - Please get up slowly from a seated or laying position, avoid falls. - Avoid tobacco, alcohol and other illicit drug use. PENDING TEST RESULTS:   At the time of discharge the following test results are still pending: None    FOLLOW UP APPOINTMENTS:    Follow-up Information     Follow up With Specialties Details Why Contact Info    Rula Renee MD [place] [address]  Sreedhar Neil,  Family Medicine Go on 10/30/2020 Hospital follow up appt has been scheduled for Oct 30 @ 10:30AM [address] [place] [address]  [phone]               DIET: Regular Diet    ACTIVITY: Activity as tolerated    WOUND CARE: None    EQUIPMENT needed: None      DISCHARGE MEDICATIONS:  Current Discharge Medication List      CONTINUE these medications which have CHANGED    Details   ALPRAZolam (XANAX) 1 mg tablet Take 1 Tab by mouth two (2) times a day for 30 days. Max Daily Amount: 2 mg. Indications: anxious  Qty: 60 Tab, Refills: 0    Associated Diagnoses: Benzodiazepine withdrawal with delirium (HCC)      amitriptyline (ELAVIL) 50 mg tablet Take 1 Tab by mouth nightly for 30 days. Indications: depression  Qty: 30 Tab, Refills: 0    Comments: **Patient requests 90 days supply**      gabapentin (NEURONTIN) 300 mg capsule Take 1 Cap by mouth three (3) times daily for 30 days. Max Daily Amount: 900 mg.  Indications: anxiety  Qty: 90 Cap, Refills: 0 Associated Diagnoses: Anxiety      venlafaxine-SR (EFFEXOR-XR) 150 mg capsule Take 2 Caps by mouth daily (with breakfast) for 30 days. Indications: major depressive disorder  Qty: 60 Cap, Refills: 0         CONTINUE these medications which have NOT CHANGED    Details   carvediloL (COREG) 12.5 mg tablet Take 1 Tab by mouth two (2) times a day for 30 days. TAKE 1 TABLET BY MOUTH TWICE A DAY  Indications: high blood pressure  Qty: 180 Tab, Refills: 3    Comments: **Patient requests 90 days supply**      folic acid (FOLVITE) 1 mg tablet Take 1 Tab by mouth daily. Qty: 60 Tab, Refills: 3      fenofibrate micronized (LOFIBRA) 134 mg capsule Take 134 mg by mouth every morning. aspirin delayed-release 81 mg tablet Take 1 Tab by mouth daily. Indications: prevention of transient ischemic attack  Qty: 30 Tab, Refills: 0         STOP taking these medications       hydroCHLOROthiazide (HYDRODIURIL) 25 mg tablet Comments:   Reason for Stopping:         losartan (COZAAR) 50 mg tablet Comments:   Reason for Stopping:                 NOTIFY YOUR PHYSICIAN FOR ANY OF THE FOLLOWING:   Fever over 101 degrees for 24 hours. Chest pain, shortness of breath, fever, chills, nausea, vomiting, diarrhea, change in mentation, falling, weakness, bleeding. Severe pain or pain not relieved by medications. Or, any other signs or symptoms that you may have questions about.     DISPOSITION:   X Home With:   OT  PT  HH  RN       Long term SNF/Inpatient Rehab    Independent/assisted living    Hospice    Other:       PATIENT CONDITION AT DISCHARGE:     Functional status    Poor     Deconditioned    X Independent      Cognition   X  Lucid     Forgetful     Dementia      Catheters/lines (plus indication)    Jolly     PICC     PEG    X None      Code status     Full code    X DNR      PHYSICAL EXAMINATION AT DISCHARGE:  Visit Vitals  BP (!) 155/84   Pulse 95   Temp 98.4 °F (36.9 °C)   Resp 18   Ht 5' 5\" (1.651 m)   Wt 98.4 kg (217 lb)   SpO2 96% BMI 36.11 kg/m²     Gen: NAD, awake in bed  HEENT: NC/AT, sclera anicteric, PERRL, EOMI  CV: RRR no m/r/g, normal S1 and S2, no pedal edema, port c/d/i   Resp: CTA b/l no increased work of breathing, no wheezing or rhonchi, speaking in full sentences   Abd: NT/ND, normal bowel sounds, no rebound or guarding  Ext: 2+ pulses, no edema  Skin: No rashes or lesions    CHRONIC MEDICAL DIAGNOSES:  Problem List as of 10/27/2020 Date Reviewed: 10/24/2020          Codes Class Noted - Resolved    Bacteremia ICD-10-CM: R78.81  ICD-9-CM: 790.7  10/25/2020 - Present        Seizure ([facility] ICD-10-CM: R56.9  ICD-9-CM: 780.39  10/23/2020 - Present        Major depression ICD-10-CM: F32.9  ICD-9-CM: 296.20  2/1/2020 - Present        Hypokalemia ICD-10-CM: E87.6  ICD-9-CM: 276.8  1/28/2020 - Present        Benzodiazepine withdrawal ([facility] ICD-10-CM: F13.239  ICD-9-CM: 292.0, 304.10  1/28/2020 - Present        Chronic prescription benzodiazepine use ICD-10-CM: Z79.899  ICD-9-CM: V58.69  1/28/2020 - Present        Altered mental status ICD-10-CM: R41.82  ICD-9-CM: 780.97  1/27/2020 - Present        Drug-induced constipation ICD-10-CM: K59.03  ICD-9-CM: 564.09, E980.5  4/5/2018 - Present        Advance care planning ICD-10-CM: Z71.89  ICD-9-CM: V65.49  4/5/2018 - Present        CHF (congestive heart failure) ([facility] ICD-10-CM: I50.9  ICD-9-CM: 428.0  4/2/2018 - Present        Depression ICD-10-CM: F32.9  ICD-9-CM: 546  4/2/2018 - Present        Dementia ([facility] ICD-10-CM: F03.90  ICD-9-CM: 294.20  4/2/2018 - Present        Metastatic breast cancer ([facility] ICD-10-CM: C50.919  ICD-9-CM: 174.9  5/3/2017 - Present        Secondary cancer of bone ([facility] ICD-10-CM: C79.51  ICD-9-CM: 198.5  5/3/2017 - Present        Anxiety ICD-10-CM: F41.9  ICD-9-CM: 300.00  2/3/2016 - Present        HTN (hypertension) ICD-10-CM: I10  ICD-9-CM: 401.9  Unknown - Present        GERD (gastroesophageal reflux disease) ICD-10-CM: K21.9  ICD-9-CM: 530.81  Unknown - Present Hypercholesterolemia ICD-10-CM: E78.00  ICD-9-CM: 272.0  Unknown - Present        RESOLVED: Acute hyponatremia ICD-10-CM: E87.1  ICD-9-CM: 276.1  6/18/2019 - 1/28/2020        RESOLVED: Pain due to neoplasm ICD-10-CM: G89.3  ICD-9-CM: 338.3  4/4/2018 - 1/28/2020        RESOLVED: Neoplastic malignant related fatigue ICD-10-CM: R53.0  ICD-9-CM: 780.79  4/4/2018 - 1/28/2020        RESOLVED: Overdose ICD-10-CM: T50.901A  ICD-9-CM: 977.9, E980.5  4/2/2018 - 4/5/2018        RESOLVED: Gastroenteritis due to norovirus ICD-10-CM: A08.11  ICD-9-CM: 008.63  2/21/2018 - 1/28/2020        RESOLVED: Urinary tract infection without hematuria ICD-10-CM: N39.0  ICD-9-CM: 599.0  2/13/2018 - 1/28/2020        RESOLVED: Sepsis ([facility] ICD-10-CM: A41.9  ICD-9-CM: 038.9, 995.91  2/12/2018 - 1/28/2020        RESOLVED: Closed left ankle fracture ICD-10-CM: Q70.149S  ICD-9-CM: 824.8  2/4/2016 - 1/28/2020        RESOLVED: Bimalleolar fracture of left ankle ICD-10-CM: G16.662G  ICD-9-CM: 824.4  2/3/2016 - 1/28/2020    Overview Signed 2/3/2016 12:49 AM by JUANITA Baca     Comminuted and displaced             RESOLVED: Hypotension ICD-10-CM: I95.9  ICD-9-CM: 458.9  9/12/2012 - 9/13/2012        RESOLVED: Dyspnea ICD-10-CM: R06.00  ICD-9-CM: 786.09  8/30/2012 - 8/31/2012              Greater than 30 minutes were spent with the patient on counseling and coordination of care    Signed:   Soledad Gilliam MD  10/27/2020  9:42 AM

## 2020-10-27 NOTE — ROUTINE PROCESS
Hospital follow-up PCP transitional care appointment has been scheduled with Dr. Sara Escobar for Oct 30 @ 10:30AM, Dr. Iris Hughes was booked. Pending patient discharge.   Gautam Palomino, Care Management Specialist.

## 2020-10-27 NOTE — PROGRESS NOTES
Palliative Medicine Social Work    This  met with patient for supportive visit. Patient admitted to feelings of anxiety and anger about not having any support at home and not being strong enough to manage own her own when she gets there. She reported not being able to work with PT today due to blood pressure increasing. She is anxious about being discharged and how she will fare at home. She is alert and oriented but confused about details of discharge. We discussed possible supports at home (neighbors) and 81 yo father but she doesn't feel like she can ask for help because she does not have close relationships and because of her dad's older age. Spoke with CM to clarify discharge plan/possible transitional care for patient - appreciate her help. Thank you for the opportunity to be involved in the care of Ms. Pravin Durand and her family.     Esperanza Yuan LMSW, Supervisee in Social Work  Palliative Medicine   [phone]    Start time: 14:30  End time: 15:00

## 2020-10-27 NOTE — PROGRESS NOTES
Problem: Mobility Impaired (Adult and Pediatric)  Goal: *Acute Goals and Plan of Care (Insert Text)  Description: FUNCTIONAL STATUS PRIOR TO ADMISSION: Patient was independent and active without use of DME.    HOME SUPPORT PRIOR TO ADMISSION: The patient lived alone with no local support. 81 yo father lives in the area. She lives in [address] [place] [address] 10/27/2020     2. Patient will transfer from bed to chair and chair to bed with modified independence using the least restrictive device within 7 day(s). 3.  Patient will perform sit to stand with modified independence within 7 day(s). 4.  Patient will ambulate with modified independence for 150 feet with the least restrictive device within 7 day(s). Outcome: Progressing Towards Goal   PHYSICAL THERAPY EVALUATION  Patient: Rolo Murphy (60 y.o. female)  Date: 10/27/2020  Primary Diagnosis: Bacteremia [R78.81]        Precautions: fall         ASSESSMENT  Based on the objective data described below, the patient presents with significantly impaired mobility demonstrating unsafe gait with balance disturbance. She has definite weakness in all extremities. She had elevated blood pressure throughout session and nursing notified. Did assess with use of RW which improved gait somewhat but still shuffling and fearful of falling. Patient lives alone and has history of falls in her apartment. Feel she could benefit from therapy and would benefit from home health services as well to maximize her safety, strength as well as to assess home safety. Current Level of Function Impacting Discharge (mobility/balance): minimal assist without assistive device for gait; poor dynamic balance without AD; fair with AD    Functional Outcome Measure: The patient scored Total Score: 13/28 on the Tinetti outcome measure which is indicative of high fall risk.       Other factors to consider for discharge: multiple falls prior to admission; little support (elderly father and no other family)     Patient will benefit from skilled therapy intervention to address the above noted impairments. PLAN :  Recommendations and Planned Interventions: transfer training, gait training, therapeutic exercises, patient and family training/education, and therapeutic activities    Recommend Occupational Therapy Consult  Frequency/Duration: Patient will be followed by physical therapy:  5 times a week to address goals. Recommendation for discharge: (in order for the patient to meet his/her long term goals)  Physical therapy at least 2 days/week in the home AND ensure assist and/or supervision for safety with community mobility    This discharge recommendation:  Has been made in collaboration with the  case management    IF patient discharges home will need the following DME: rolling walker         SUBJECTIVE:   Patient stated I don't have any food at home.   \"I fall a lot and am embarrassed to tell anyone. \"    OBJECTIVE DATA SUMMARY:   HISTORY:    Past Medical History:   Diagnosis Date    Acute hyponatremia 6/18/2019    Anxiety     Bimalleolar fracture of left ankle 2/3/2016    Comminuted and displaced     Cancer [facility])     breast    Closed left ankle fracture 2/4/2016    Depression     Gastroenteritis due to norovirus 2/21/2018    GERD (gastroesophageal reflux disease)     HTN (hypertension)     Hypercholesterolemia     Hypotension 9/12/2012    Metastatic breast cancer ([address] 5/3/2017    Neoplastic malignant related fatigue 4/4/2018    Pain due to neoplasm 4/4/2018    Secondary cancer of bone ([address] 5/3/2017    Sepsis ([address] 2/12/2018    Urinary tract infection without hematuria 2/13/2018     Past Surgical History:   Procedure Laterality Date    BREAST SURGERY PROCEDURE UNLISTED  march 13    carina masectomy       Personal factors and/or comorbidities impacting plan of care: seizure PTA; has undergone chemo for breast cancer    [address] [facility] Environment: Apartment  # Steps to Enter: 0  One/Two Story Residence: Other (Comment)(elevator)  Living Alone: No  Support Systems: None  Patient Expects to be Discharged to[de-identified] Apartment  Current DME Used/Available at Home: None    EXAMINATION/PRESENTATION/DECISION MAKING:   Critical Behavior:  Neurologic State: Alert  Orientation Level: Oriented X4  Cognition: Appropriate for age attention/concentration, Follows commands     Hearing: Auditory  Auditory Impairment: None  Skin:  see nursing notes    Range Of Motion:  AROM: Generally decreased, functional   BUE shoulder limitation post mastectomies                    Strength:    Strength: Generally decreased, functional      Tone & Sensation:                  Sensation: Impaired(neuropathy bilateral feet and fingertips.)               Coordination:  Coordination: Generally decreased, functional  Functional Mobility:  Bed Mobility:  Rolling: Modified independent  Supine to Sit: Modified independent  Sit to Supine: Modified independent  Scooting: Modified independent  Transfers:  Sit to Stand: Supervision  Stand to Sit: Supervision                       Balance:   Sitting: Intact  Standing: Impaired; Without support  Standing - Static: Fair  Standing - Dynamic : Fair  Ambulation/Gait Training:  Distance (ft): 15 Feet (ft)  Assistive Device: Gait belt  Ambulation - Level of Assistance: Minimal assistance     Gait Description (WDL): Exceptions to WDL  Gait Abnormalities: Altered arm swing;Decreased step clearance;Shuffling gait        Base of Support: Narrowed     Speed/Antonietta: Delayed;Pace decreased (<100 feet/min); Shuffled  Step Length: Right shortened;Left shortened        Functional Measure:  Tinetti test:    Sitting Balance: 1  Arises: 1  Attempts to Rise: 2  Immediate Standing Balance: 0  Standing Balance: 1  Nudged: 1  Eyes Closed: 1  Turn 360 Degrees - Continuous/Discontinuous: 1  Turn 360 Degrees - Steady/Unsteady: 0  Sitting Down: 1  Balance Score: 9 Balance total score  Indication of Gait: 0  R Step Length/Height: 1  L Step Length/Height: 1  R Foot Clearance: 0  L Foot Clearance: 0  Step Symmetry: 1  Step Continuity: 0  Path: 0  Trunk: 1  Walking Time: 0  Gait Score: 4 Gait total score  Total Score: 13/28 Overall total score         Tinetti Tool Score Risk of Falls  <19 = High Fall Risk  19-24 = Moderate Fall Risk  25-28 = Low Fall Risk  Tinetti ME. Performance-Oriented Assessment of Mobility Problems in Elderly Patients. [facility]; [id number]. (Scoring Description: PT Bulletin Feb. 10, 1993)    Older adults: Jasmyne Glasgow et al, 2009; n = 1000 [facility] elderly evaluated with ABC, LINO, ADL, and IADL)  · Mean LINO score for males aged 69-68 years = 26.21(3.40)  · Mean LINO score for females age 69-68 years = 25.16(4.30)  · Mean LINO score for males over 80 years = 23.29(6.02)  · Mean LINO score for females over 80 years = 17.20(8.32)        Physical Therapy Evaluation Charge Determination   History Examination Presentation Decision-Making   MEDIUM  Complexity : 1-2 comorbidities / personal factors will impact the outcome/ POC  MEDIUM Complexity : 3 Standardized tests and measures addressing body structure, function, activity limitation and / or participation in recreation  LOW Complexity : Stable, uncomplicated  Other outcome measures Tinetti  HIGH       Based on the above components, the patient evaluation is determined to be of the following complexity level: LOW   Activity Tolerance:   Poor and elevated BP  Please refer to the flowsheet for vital signs taken during this treatment. After treatment patient left in no apparent distress:   Supine in bed, Call bell within reach, and Side rails x 3    COMMUNICATION/EDUCATION:   The patients plan of care was discussed with: Registered nurse and Case management. Fall prevention education was provided and the patient/caregiver indicated understanding., Patient/family have participated as able in goal setting and plan of care. , and Patient/family agree to work toward stated goals and plan of care.     Thank you for this referral.  Alex Cardona, PT   Time Calculation: 33 mins

## 2020-10-27 NOTE — DISCHARGE INSTRUCTIONS
Dear Diane Holcomb,    Thank you for choosing [address] [place] [address] for your healthcare needs. We strive to provide EXCELLENT care to you and your family. In an effort to explain clearly why you were here in the hospital, I've also written a very brief summary below. Other details including formal diagnosis, medication changes, and follow up appointment recommendations can also be found in this packet. You were admitted for positive blood cultures due to contaminantion for which you were started on IV antibiotics. You also received care from specialist physicians in the following specialties:  Marilynn Shen - PROVIDER    Here are the updates to your medication list:  **No changes to home medications. Have sent refills to [address] [place] [address], so you have them on day of discharge. Remember that it is important for you to take your medications exactly as they are prescribed. It is helpful to keep a list of your medication with the names, dosages, and times to be taken in your wallet. Additionally,   - Please make sure to follow up with your primary care physician within 1-2 weeks of discharge for hospital follow up, you should also follow up with your palliative care physician, Dr. Rivas, and your psychiatrist.   - Please make sure to continue to monitor for: sudden onset high fevers, seizure activity, Loss of consciousness, and return to the Emergency Department with any of these symptoms:   - Please get up slowly from a seated or laying position, avoid falls. - Avoid tobacco, alcohol and other illicit drug use. Make sure to also see your primary care doctor for follow-up. Bring these papers with you and be sure to review your medication list with your doctor. I cannot stress the importance of follow up enough. I've included the information for your follow-up appointments below:     Follow-up Information     Follow up With Specialties Details Why [facility], Gomez Roche MD Family Medicine   [address]  [address] [facility] [address]  Sathya Neil DO Family Medicine Go on 10/30/2020 Hospital follow up appt has been scheduled for Oct 30 @ 10:30AM [address]  [address] [place] [id number]            At this time, the following test results are still pending: Final repeat blood cultures, neg x 48 hours. Again, please follow-up these results with your primary care provider. Should you have any fever over 101 degrees for 24 hours, chest pain, shortness of breath, fever, chills, nausea, vomiting, diarrhea, change in mentation, falling, weakness, bleeding, or worsening pain, please seek medical attention immediately. Finally, as your discharging physician, you may be receiving a survey regarding my care. I would greatly value and appreciate your input in the survey as we strive for excellence. If you have any questions, I can be reached at [phone].   Thank you so much again for allowing me to care for you at [address] [place] [address].    Respectfully yours,  Ruby Davis MD

## 2020-10-27 NOTE — PROGRESS NOTES
I have reviewed discharge instructions with the patient. The patient verbalized understanding.     Neel Palacios RN

## 2020-10-27 NOTE — PROGRESS NOTES
[address] MARIAA N [facility] accepted patient. They will try hard to start tomorrow but willprobably start Thursday. They will reach out to patient. 1510 -  Allscripts Alert: YES response from [facility] re: Referral [id number] for patient in [address] [place] [address] [id number]: Yes, willing to accept patient 1615 ETA. Update to [id number] [place] [address]. CM will continue to follow and assist with FRIDA needs as they arise. Available on Dajie. [address] [employer] Ramsey  MSN, [address] Julius Ramsey, RN, CCM - [phone].    8854 -   Orders entered to arrange for PT/OT . Patients chart reviewed and history noted. CM spoke with patient to introduce role and offer freedom of choice. No preference preferred. Demographic information verified as correct. Patient had no additional questions or concerns at this time. Referral created to [address] [place] [address]) [phone]. Update to [address] [place] [address] . CM will continue to follow. 100 [employer] Braulio MSN, 1400 Julius Ramsey, RN, CCM - [phone].    1339 - Orders received from Dr. Carin Gómez via Popel RN patient may go home. Resource list given to patient for food insecurities. 1300 - Per patient, she had not been out of bed since admission. PT consulted. B/P benoit high. Placed patient on [facility] will call. Allscripts Alert: YES response from [facility] re: Referral [id number] for patient in [address] [place] [address] [id number]: Yes, willing to accept patient WILL CALL    CM will continue to follow and assist with FRIDA needs as they arise. Available on Dajie. 100 Dubizzle  MSN, BSCS, RN, CCM - [phone].    9990  - Transition of Care  (FRIDA) Plan:        RUR:  34 %       LOS: 1 days    GLOS: 3.6     Dx: Bacteremia                  Reason for Admission:  Carolina Wall is a 60 y.o. admitted to [facility] for bacteremia - SEE HPI. Plan for utilizing home health:    Not at this time. She has walker at home.     PCP: Levi Wagner MD      Are you a current patient: Yes/No: Yes    Approximate date of last visit:   3 months ago                    Current Advanced Directive/Advance Care Plan:  None    CM One Stop/Healthcare Agent : None at this time. Transition of Care Plan:    Home. Notes:     -- CM has setup [facility] transportation home. Asked for 2:00p this afternoon. Patient very unsteady an says she was transported to hospital and doesn't have any clothes. --  Allscripts Alert: YES response from [facility] re: Referral [id number] for patient in [address] [place] [address] [id number]: Yes, willing to accept patient ETA 1400. Update to [address]. CM will continue to follow and assist with FRIDA needs as they arise. Available on [employer]. Arnulfo  MSN, 1400 Julius Ramsey, RN, CCM - [phone]. -- Patient has asked to have [facility] be her main pharmacy. He said the \"mailorder\" system was too complicated. That is why she ran out of her seizure medicines. She said she drives to visit her dad 3-4 times a week and that he lives 4 blocks from [facility] and will pickup her refills at the same time. -- Patient lives in an [place] community and has few acquaintances but will work on making new friends. Disposition:   Home     Transport: [facility]      CM will continue to follow and assist with FRIDA needs as they arise. Available on [employer]. Arnulfo  MSN, [address] Julius Ramsey  RN, CCM - [phone]. Care Management Interventions  PCP Verified by CM: Yes  Mode of Transport at Discharge:  Other (see comment)  MyChart Signup: No  Discharge Durable Medical Equipment: Yes  Health Maintenance Reviewed: Yes  Physical Therapy Consult: Yes  Occupational Therapy Consult: Yes  Speech Therapy Consult: No  Current Support Network: Lives Alone  Confirm Follow Up Transport: Other (see comment)  Discharge Location  Discharge Placement: Home

## 2020-10-28 NOTE — PROGRESS NOTES
0700 - 10/28/2020 - Wednesday     -- Email sent to MARIAA ARORA [facility] to add [facility] . Also see palliative note. -- Referral to [facility] luis m via [address] RN   -- Patient has rolling walker at home  -- Patient transported home via BLS - [facility]. From: Ovid Schaumann Sent: Wednesday, October 28, 2020 7:23 AM  To: [email or web address]  Subject: Home Health Referral - [id number]    Good Morning! Can you PLEASE add Home [facility]iju  to the [facility] referral? We really need more eyes in the community on this patient. She couldnt tell me an alternative contact and she lives alone. There were other communication concerns and before discharge, Dr. Tom Tracey was notified by Discharging RN but still wrote discharge order. She was admitted because she forgot her seizure medicine but she has appropriate insurance. She was sent home with her medications (filling at [facility] pharmacy) but she   might need to transfer to a closer pharmacy. Previous to admission, she agreed to mail order delivery but didnt manage the delivery and ultimately ran out. We REALLY need some more community eyes on this patient. ESPECIALLY medication adherence and reconciliation. I have sent [facility] a referral also. Thank you SO much. [address] ? ?

## 2020-10-29 ENCOUNTER — HOME CARE VISIT (OUTPATIENT)
Dept: SCHEDULING | Facility: HOME HEALTH | Age: 65
End: 2020-10-29

## 2020-10-29 ENCOUNTER — HOME CARE VISIT (OUTPATIENT)
Dept: SCHEDULING | Facility: HOME HEALTH | Age: 65
End: 2020-10-29
Payer: MEDICARE

## 2020-10-29 ENCOUNTER — HOME CARE VISIT (OUTPATIENT)
Dept: HOME HEALTH SERVICES | Facility: HOME HEALTH | Age: 65
End: 2020-10-29
Payer: MEDICARE

## 2020-10-29 VITALS
SYSTOLIC BLOOD PRESSURE: 149 MMHG | RESPIRATION RATE: 18 BRPM | DIASTOLIC BLOOD PRESSURE: 83 MMHG | OXYGEN SATURATION: 98 %

## 2020-10-29 LAB
BACTERIA SPEC CULT: ABNORMAL
BACTERIA SPEC CULT: ABNORMAL
SERVICE CMNT-IMP: ABNORMAL

## 2020-10-29 PROCEDURE — G0151 HHCP-SERV OF PT,EA 15 MIN: HCPCS

## 2020-10-29 PROCEDURE — G0299 HHS/HOSPICE OF RN EA 15 MIN: HCPCS

## 2020-10-29 PROCEDURE — 3331090002 HH PPS REVENUE DEBIT

## 2020-10-29 PROCEDURE — 3331090001 HH PPS REVENUE CREDIT

## 2020-10-29 PROCEDURE — 400013 HH SOC

## 2020-10-30 LAB
BACTERIA SPEC CULT: NORMAL
SERVICE CMNT-IMP: NORMAL

## 2020-10-30 PROCEDURE — 3331090002 HH PPS REVENUE DEBIT

## 2020-10-30 PROCEDURE — 3331090001 HH PPS REVENUE CREDIT

## 2020-10-31 ENCOUNTER — HOME CARE VISIT (OUTPATIENT)
Dept: SCHEDULING | Facility: HOME HEALTH | Age: 65
End: 2020-10-31
Payer: MEDICARE

## 2020-10-31 PROCEDURE — 3331090001 HH PPS REVENUE CREDIT

## 2020-10-31 PROCEDURE — G0299 HHS/HOSPICE OF RN EA 15 MIN: HCPCS

## 2020-10-31 PROCEDURE — 3331090002 HH PPS REVENUE DEBIT

## 2020-11-01 VITALS
TEMPERATURE: 98.4 F | DIASTOLIC BLOOD PRESSURE: 72 MMHG | RESPIRATION RATE: 18 BRPM | HEART RATE: 85 BPM | SYSTOLIC BLOOD PRESSURE: 129 MMHG | OXYGEN SATURATION: 97 %

## 2020-11-01 PROCEDURE — 3331090002 HH PPS REVENUE DEBIT

## 2020-11-01 PROCEDURE — 3331090001 HH PPS REVENUE CREDIT

## 2020-11-02 ENCOUNTER — HOME CARE VISIT (OUTPATIENT)
Dept: SCHEDULING | Facility: HOME HEALTH | Age: 65
End: 2020-11-02
Payer: MEDICARE

## 2020-11-02 PROCEDURE — G0157 HHC PT ASSISTANT EA 15: HCPCS

## 2020-11-02 PROCEDURE — G0156 HHCP-SVS OF AIDE,EA 15 MIN: HCPCS

## 2020-11-02 PROCEDURE — 3331090002 HH PPS REVENUE DEBIT

## 2020-11-02 PROCEDURE — 3331090001 HH PPS REVENUE CREDIT

## 2020-11-03 ENCOUNTER — HOME CARE VISIT (OUTPATIENT)
Dept: SCHEDULING | Facility: HOME HEALTH | Age: 65
End: 2020-11-03
Payer: MEDICARE

## 2020-11-03 ENCOUNTER — HOME CARE VISIT (OUTPATIENT)
Dept: HOME HEALTH SERVICES | Facility: HOME HEALTH | Age: 65
End: 2020-11-03
Payer: MEDICARE

## 2020-11-03 VITALS — TEMPERATURE: 98.6 F | SYSTOLIC BLOOD PRESSURE: 120 MMHG | RESPIRATION RATE: 18 BRPM | DIASTOLIC BLOOD PRESSURE: 70 MMHG

## 2020-11-03 VITALS — RESPIRATION RATE: 18 BRPM | DIASTOLIC BLOOD PRESSURE: 70 MMHG | SYSTOLIC BLOOD PRESSURE: 135 MMHG | TEMPERATURE: 98.2 F

## 2020-11-03 PROCEDURE — G0152 HHCP-SERV OF OT,EA 15 MIN: HCPCS

## 2020-11-03 PROCEDURE — 3331090002 HH PPS REVENUE DEBIT

## 2020-11-03 PROCEDURE — G0157 HHC PT ASSISTANT EA 15: HCPCS

## 2020-11-03 PROCEDURE — 3331090001 HH PPS REVENUE CREDIT

## 2020-11-04 ENCOUNTER — HOME CARE VISIT (OUTPATIENT)
Dept: HOME HEALTH SERVICES | Facility: HOME HEALTH | Age: 65
End: 2020-11-04

## 2020-11-04 ENCOUNTER — HOME CARE VISIT (OUTPATIENT)
Dept: SCHEDULING | Facility: HOME HEALTH | Age: 65
End: 2020-11-04
Payer: MEDICARE

## 2020-11-04 PROCEDURE — G0155 HHCP-SVS OF CSW,EA 15 MIN: HCPCS

## 2020-11-04 PROCEDURE — 3331090001 HH PPS REVENUE CREDIT

## 2020-11-04 PROCEDURE — 3331090002 HH PPS REVENUE DEBIT

## 2020-11-05 ENCOUNTER — HOME CARE VISIT (OUTPATIENT)
Dept: SCHEDULING | Facility: HOME HEALTH | Age: 65
End: 2020-11-05
Payer: MEDICARE

## 2020-11-05 VITALS
DIASTOLIC BLOOD PRESSURE: 76 MMHG | SYSTOLIC BLOOD PRESSURE: 125 MMHG | HEART RATE: 76 BPM | OXYGEN SATURATION: 98 % | TEMPERATURE: 98 F

## 2020-11-05 VITALS
HEART RATE: 105 BPM | OXYGEN SATURATION: 95 % | DIASTOLIC BLOOD PRESSURE: 80 MMHG | SYSTOLIC BLOOD PRESSURE: 130 MMHG | TEMPERATURE: 98.5 F

## 2020-11-05 PROCEDURE — 3331090001 HH PPS REVENUE CREDIT

## 2020-11-05 PROCEDURE — 3331090002 HH PPS REVENUE DEBIT

## 2020-11-05 PROCEDURE — G0158 HHC OT ASSISTANT EA 15: HCPCS

## 2020-11-06 ENCOUNTER — HOME CARE VISIT (OUTPATIENT)
Dept: SCHEDULING | Facility: HOME HEALTH | Age: 65
End: 2020-11-06
Payer: MEDICARE

## 2020-11-06 ENCOUNTER — HOME CARE VISIT (OUTPATIENT)
Dept: HOME HEALTH SERVICES | Facility: HOME HEALTH | Age: 65
End: 2020-11-06

## 2020-11-06 PROCEDURE — 3331090001 HH PPS REVENUE CREDIT

## 2020-11-06 PROCEDURE — 3331090002 HH PPS REVENUE DEBIT

## 2020-11-06 PROCEDURE — G0300 HHS/HOSPICE OF LPN EA 15 MIN: HCPCS

## 2020-11-07 PROCEDURE — 3331090002 HH PPS REVENUE DEBIT

## 2020-11-07 PROCEDURE — 3331090001 HH PPS REVENUE CREDIT

## 2020-11-08 PROCEDURE — 3331090001 HH PPS REVENUE CREDIT

## 2020-11-08 PROCEDURE — 3331090002 HH PPS REVENUE DEBIT

## 2020-11-09 ENCOUNTER — HOME CARE VISIT (OUTPATIENT)
Dept: SCHEDULING | Facility: HOME HEALTH | Age: 65
End: 2020-11-09
Payer: MEDICARE

## 2020-11-09 VITALS
TEMPERATURE: 98.1 F | OXYGEN SATURATION: 98 % | DIASTOLIC BLOOD PRESSURE: 88 MMHG | HEART RATE: 61 BPM | SYSTOLIC BLOOD PRESSURE: 136 MMHG

## 2020-11-09 PROCEDURE — 3331090001 HH PPS REVENUE CREDIT

## 2020-11-09 PROCEDURE — 3331090002 HH PPS REVENUE DEBIT

## 2020-11-09 PROCEDURE — G0158 HHC OT ASSISTANT EA 15: HCPCS

## 2020-11-10 ENCOUNTER — HOME CARE VISIT (OUTPATIENT)
Dept: HOME HEALTH SERVICES | Facility: HOME HEALTH | Age: 65
End: 2020-11-10

## 2020-11-10 ENCOUNTER — HOME CARE VISIT (OUTPATIENT)
Dept: SCHEDULING | Facility: HOME HEALTH | Age: 65
End: 2020-11-10
Payer: MEDICARE

## 2020-11-10 ENCOUNTER — HOSPITAL ENCOUNTER (EMERGENCY)
Age: 65
Discharge: HOME | End: 2020-11-10
Attending: EMERGENCY MEDICINE | Admitting: EMERGENCY MEDICINE
Payer: MEDICARE

## 2020-11-10 VITALS
HEART RATE: 84 BPM | OXYGEN SATURATION: 98 % | RESPIRATION RATE: 18 BRPM | TEMPERATURE: 98.3 F | SYSTOLIC BLOOD PRESSURE: 140 MMHG | DIASTOLIC BLOOD PRESSURE: 70 MMHG

## 2020-11-10 VITALS
SYSTOLIC BLOOD PRESSURE: 108 MMHG | HEART RATE: 92 BPM | DIASTOLIC BLOOD PRESSURE: 81 MMHG | RESPIRATION RATE: 16 BRPM | OXYGEN SATURATION: 96 % | TEMPERATURE: 97.2 F

## 2020-11-10 DIAGNOSIS — F32.A ANXIETY AND DEPRESSION: ICD-10-CM

## 2020-11-10 DIAGNOSIS — F41.9 ANXIETY AND DEPRESSION: ICD-10-CM

## 2020-11-10 DIAGNOSIS — Z76.0 MEDICATION REFILL: Primary | ICD-10-CM

## 2020-11-10 LAB
ALBUMIN SERPL-MCNC: 4.2 G/DL (ref 3.5–5)
ALBUMIN/GLOB SERPL: 1.1 {RATIO} (ref 1.1–2.2)
ALP SERPL-CCNC: 93 U/L (ref 45–117)
ALT SERPL-CCNC: 56 U/L (ref 12–78)
AMPHET UR QL SCN: NEGATIVE
ANION GAP SERPL CALC-SCNC: 7 MMOL/L (ref 5–15)
APPEARANCE UR: ABNORMAL
AST SERPL-CCNC: 58 U/L (ref 15–37)
BACTERIA URNS QL MICRO: ABNORMAL /HPF
BARBITURATES UR QL SCN: NEGATIVE
BASOPHILS # BLD: 0.1 K/UL (ref 0–0.1)
BASOPHILS NFR BLD: 1 % (ref 0–1)
BENZODIAZ UR QL: POSITIVE
BILIRUB SERPL-MCNC: 0.4 MG/DL (ref 0.2–1)
BILIRUB UR QL: NEGATIVE
BUN SERPL-MCNC: 14 MG/DL (ref 6–20)
BUN/CREAT SERPL: 15 (ref 12–20)
CALCIUM SERPL-MCNC: 9.9 MG/DL (ref 8.5–10.1)
CANNABINOIDS UR QL SCN: POSITIVE
CAOX CRY URNS QL MICRO: ABNORMAL
CHLORIDE SERPL-SCNC: 106 MMOL/L (ref 97–108)
CO2 SERPL-SCNC: 24 MMOL/L (ref 21–32)
COCAINE UR QL SCN: NEGATIVE
COLOR UR: ABNORMAL
COMMENT, HOLDF: NORMAL
CREAT SERPL-MCNC: 0.93 MG/DL (ref 0.55–1.02)
DIFFERENTIAL METHOD BLD: NORMAL
DRUG SCRN COMMENT,DRGCM: ABNORMAL
EOSINOPHIL # BLD: 0.1 K/UL (ref 0–0.4)
EOSINOPHIL NFR BLD: 2 % (ref 0–7)
EPITH CASTS URNS QL MICRO: ABNORMAL /LPF
ERYTHROCYTE [DISTWIDTH] IN BLOOD BY AUTOMATED COUNT: 11.9 % (ref 11.5–14.5)
GLOBULIN SER CALC-MCNC: 3.8 G/DL (ref 2–4)
GLUCOSE SERPL-MCNC: 89 MG/DL (ref 65–100)
GLUCOSE UR STRIP.AUTO-MCNC: NEGATIVE MG/DL
HCT VFR BLD AUTO: 44.7 % (ref 35–47)
HGB BLD-MCNC: 14.9 G/DL (ref 11.5–16)
HGB UR QL STRIP: NEGATIVE
IMM GRANULOCYTES # BLD AUTO: 0 K/UL (ref 0–0.04)
IMM GRANULOCYTES NFR BLD AUTO: 0 % (ref 0–0.5)
KETONES UR QL STRIP.AUTO: ABNORMAL MG/DL
LEUKOCYTE ESTERASE UR QL STRIP.AUTO: ABNORMAL
LYMPHOCYTES # BLD: 1.6 K/UL (ref 0.8–3.5)
LYMPHOCYTES NFR BLD: 36 % (ref 12–49)
MCH RBC QN AUTO: 31 PG (ref 26–34)
MCHC RBC AUTO-ENTMCNC: 33.3 G/DL (ref 30–36.5)
MCV RBC AUTO: 93.1 FL (ref 80–99)
METHADONE UR QL: NEGATIVE
MONOCYTES # BLD: 0.4 K/UL (ref 0–1)
MONOCYTES NFR BLD: 8 % (ref 5–13)
NEUTS SEG # BLD: 2.4 K/UL (ref 1.8–8)
NEUTS SEG NFR BLD: 53 % (ref 32–75)
NITRITE UR QL STRIP.AUTO: NEGATIVE
NRBC # BLD: 0 K/UL (ref 0–0.01)
NRBC BLD-RTO: 0 PER 100 WBC
OPIATES UR QL: NEGATIVE
PCP UR QL: NEGATIVE
PH UR STRIP: 6.5 [PH] (ref 5–8)
PLATELET # BLD AUTO: 245 K/UL (ref 150–400)
PMV BLD AUTO: 11.8 FL (ref 8.9–12.9)
POTASSIUM SERPL-SCNC: 4.7 MMOL/L (ref 3.5–5.1)
PROT SERPL-MCNC: 8 G/DL (ref 6.4–8.2)
PROT UR STRIP-MCNC: NEGATIVE MG/DL
RBC # BLD AUTO: 4.8 M/UL (ref 3.8–5.2)
RBC #/AREA URNS HPF: ABNORMAL /HPF (ref 0–5)
SAMPLES BEING HELD,HOLD: NORMAL
SODIUM SERPL-SCNC: 137 MMOL/L (ref 136–145)
SP GR UR REFRACTOMETRY: 1.03 (ref 1–1.03)
UR CULT HOLD, URHOLD: NORMAL
UROBILINOGEN UR QL STRIP.AUTO: 2 EU/DL (ref 0.2–1)
WBC # BLD AUTO: 4.5 K/UL (ref 3.6–11)
WBC URNS QL MICRO: ABNORMAL /HPF (ref 0–4)

## 2020-11-10 PROCEDURE — 74011250637 HC RX REV CODE- 250/637: Performed by: PHYSICIAN ASSISTANT

## 2020-11-10 PROCEDURE — G0300 HHS/HOSPICE OF LPN EA 15 MIN: HCPCS

## 2020-11-10 PROCEDURE — 36415 COLL VENOUS BLD VENIPUNCTURE: CPT

## 2020-11-10 PROCEDURE — 3331090001 HH PPS REVENUE CREDIT

## 2020-11-10 PROCEDURE — 85025 COMPLETE CBC W/AUTO DIFF WBC: CPT

## 2020-11-10 PROCEDURE — G0157 HHC PT ASSISTANT EA 15: HCPCS

## 2020-11-10 PROCEDURE — 99283 EMERGENCY DEPT VISIT LOW MDM: CPT

## 2020-11-10 PROCEDURE — 81001 URINALYSIS AUTO W/SCOPE: CPT

## 2020-11-10 PROCEDURE — 3331090002 HH PPS REVENUE DEBIT

## 2020-11-10 PROCEDURE — 80307 DRUG TEST PRSMV CHEM ANLYZR: CPT

## 2020-11-10 PROCEDURE — 93005 ELECTROCARDIOGRAM TRACING: CPT

## 2020-11-10 PROCEDURE — 80053 COMPREHEN METABOLIC PANEL: CPT

## 2020-11-10 RX ORDER — ALPRAZOLAM 0.5 MG/1
1 TABLET ORAL
Status: COMPLETED | OUTPATIENT
Start: 2020-11-10 | End: 2020-11-10

## 2020-11-10 RX ADMIN — ALPRAZOLAM 1 MG: 0.5 TABLET ORAL at 20:09

## 2020-11-10 NOTE — ED TRIAGE NOTES
She arrives by EMS for feelings of depression. She was recently admitted and treated for an infection with antibiotics.  Since being home she has been depressed and \"not feeling right\", but not suicidal.

## 2020-11-10 NOTE — PROGRESS NOTES
Call received from Martha Palmer, RN Liaison [facility] informed patient referred to the ED. History obtained patient has stated not eating in 3 days, initially refusing to come to the ED, friend/caregiver and father unable to help. Patient is stage 4 breast ca. Patient has stated \" thought of hurting self. \" At this time patient is not appropriate for [facility]. Referral placed to APS - Medicaid application started and MOW application per [facility].

## 2020-11-11 ENCOUNTER — HOME CARE VISIT (OUTPATIENT)
Dept: HOME HEALTH SERVICES | Facility: HOME HEALTH | Age: 65
End: 2020-11-11
Payer: MEDICARE

## 2020-11-11 ENCOUNTER — HOME CARE VISIT (OUTPATIENT)
Dept: HOME HEALTH SERVICES | Facility: HOME HEALTH | Age: 65
End: 2020-11-11

## 2020-11-11 VITALS
SYSTOLIC BLOOD PRESSURE: 130 MMHG | RESPIRATION RATE: 18 BRPM | DIASTOLIC BLOOD PRESSURE: 68 MMHG | TEMPERATURE: 98 F | OXYGEN SATURATION: 97 % | HEART RATE: 84 BPM

## 2020-11-11 LAB
ATRIAL RATE: 95 BPM
CALCULATED R AXIS, ECG10: 24 DEGREES
CALCULATED T AXIS, ECG11: 26 DEGREES
DIAGNOSIS, 93000: NORMAL
P-R INTERVAL, ECG05: 158 MS
Q-T INTERVAL, ECG07: 346 MS
QRS DURATION, ECG06: 80 MS
QTC CALCULATION (BEZET), ECG08: 434 MS
VENTRICULAR RATE, ECG03: 95 BPM

## 2020-11-11 PROCEDURE — 3331090001 HH PPS REVENUE CREDIT

## 2020-11-11 PROCEDURE — 3331090002 HH PPS REVENUE DEBIT

## 2020-11-11 NOTE — BSMART NOTE
Comprehensive Assessment Form Part 1 Section I - Disposition Axis I - Unspecified mood disorder Axis II - deferred Axis III - Past Medical History:  
Diagnosis Date  Acute hyponatremia 6/18/2019  Anxiety  Bimalleolar fracture of left ankle 2/3/2016 Comminuted and displaced  Cancer ([facility] breast  
 Closed left ankle fracture 2/4/2016  Depression  Gastroenteritis due to norovirus 2/21/2018  GERD (gastroesophageal reflux disease)  HTN (hypertension)  Hypercholesterolemia  Hypotension 9/12/2012  Metastatic breast cancer ([facility] 5/3/2017  Neoplastic malignant related fatigue 4/4/2018  Pain due to neoplasm 4/4/2018  Secondary cancer of bone ([facility] 5/3/2017  Sepsis ([facility] 2/12/2018  Urinary tract infection without hematuria 2/13/2018 The Medical Doctor to Psychiatrist conference was not completed. The Medical Doctor is in agreement with [facility] disposition because of (reason) pt does not meet inpt criteria, is not requesting psychiatric hospitalization and does not meet TDO criteria. The plan is as noted below. The ED provider consulted is KATHRYN Larson [facility] Domingo Shen. The admitting Psychiatrist will be Dr. Epimenio Hodgkins. The admitting Diagnosis is NA. The Payor source is as noted on face sheet. Section II - Integrated Summary Pt is a 59 y/o female transported to the ED via EMS. Pt was medically admitted here for positive blood culture 10/25/20 - 10/27/20. She reportedly c/o being discharged home to discover during the hospitalization the alprazolam she is prescribed by her psychiatrist (Dr. Marleny Kent at [facility] ) was changed from 1mg QID to 1mg BID. ED provider reports pt called her psychiatrist and was told a new rx will be written but will not be available for pick-up til 11/13. Pt c/o feeling jittery, anxious. \" Pt has metastatic breast cancer and told the ED provider \"sometimes I wish the cancer would take me out. \" Upon assessment pt is jovial, euthymic, denies SI/HI, plan or intent. She reports no h/o SIB, suicide attempts or psychiatric hospitalizations. Pt states she was venting frustrations about her medication issue when she made aforementioned statement. She stated she would never end her life because her father is still living and his son (her brother) was murdered thus she would never want to cause similar grief/loss for him. Pt lamented that her father is not very supportive of her. She reports limited social support. She states she talks about meds with her psychiatrist but has no therapist because she is in financial hardship and cannot afford to see both a therapist and a psychiatrist thus she chose the psychiatrist because she feels the meds are helpful. She has had group therapy in the past and states it was not helpful because she found herself triggered by the emotional issues of the other clients in her group. Pt states she takes her meds as prescribed and has been on the same dose of Alprazolam for 5-6 years thus she has had significant difficulty with reduced daily dosage. Pt states she ran out of her meds early despite being quite regimented about only taking them as prescribed. Writer questioned whether her new home health care staff (assigned to her after discharge from the hospital last bairon) whom pt reports is the only person who has been in her home, may have had access to her narcotics. Pt states she showed the home health staff her rx bottles but cannot be certain if they took the meds. Pt states she plans to guard her meds more closely. Pt is not amenable to hospitalization and does not present with any grounds to pursue TDO. Writer gave pt contact information for agencies that provide low/no cost counseling services. The patient has demonstrated mental capacity to provide informed consent. The information is given by the patient. The Chief Complaint is as noted above. The Precipitant Factors are as noted above. Previous Hospitalizations: no The patient has not previously been in restraints. Current Psychiatrist is at [facility]. Lethality Assessment: 
 
The potential for suicide is not noted. The potential for homicide is not noted. The patient has not been a perpetrator of sexual or physical abuse. There are not pending charges. The patient is not felt to be at risk for self harm or harm to others. Section III - Psychosocial 
The patient's overall mood and attitude is jovial, euthymic, calm, cooperative. Feelings of helplessness and hopelessness are not observed. Generalized anxiety is observed by self report. Panic is not observed. Phobias are not observed. Obsessive compulsive tendencies are not observed. Section IV - Mental Status Exam 
The patient's appearance shows no evidence of impairment. The patient's behavior shows no evidence of impairment. The patient is oriented to time, place, person and situation. The patient's speech shows no evidence of impairment. The patient's mood is euthymic. The range of affect shows no evidence of impairment. The patient's thought content demonstrates no evidence of impairment. The thought process shows no evidence of impairment. The patient's perception shows no evidence of impairment. The patient's memory shows no evidence of impairment. The patient's appetite shows no evidence of impairment. The patient's sleep has evidence of insomnia. The patient's insight shows no evidence of impairment. The patient's judgement shows no evidence of impairment. Section V - Substance Abuse The patient is using substances. The patient is using cannabis route NA, for length of time NA, with last use on NA. The patient has experienced the following withdrawal symptoms: NA. 
 
 
Section VI - Living Arrangements The patient is single. The patient lives alone.  The patient does plan to return home upon discharge. The patient does not have legal issues pending. The patient's source of income comes from group home. Latter-day and cultural practices have not been voiced at this time. The patient's greatest support comes from psychiatrist and this person will be involved with the treatment. It is not known if the patient has been in an event described as horrible or outside the realm of ordinary life experience either currently or in the past. 
It is not known if the patient has been a victim of sexual/physical abuse. 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 The patient is currently retired and speaks [place] as a primary language. The patient has no communication impairments affecting communication. The patient's preference for learning can be described as: can read and write adequately. The patient's hearing is normal.  The patient's vision is normal. 
 
 
Suly Christianson

## 2020-11-11 NOTE — DISCHARGE INSTRUCTIONS
Take you alprazolam as prescribed. Follow-up with Dr. Luigi Sanchez for any future refills or dosing questions.

## 2020-11-11 NOTE — ED PROVIDER NOTES
53-year-old female past medical history of hyponatremia, anxiety, metastatic breast cancer which is now terminal, depression, GERD, hypertension, hypercholesterolemia presents to ED via EMS stating \"I was discharged from here a week ago and my alprazolam prescription was changed from 1 mg 4 times a day to 1 mg 2 times a day. I called to talk to my psychiatrist and he said that this was not correct and that he sent a new prescription in which will not be available till 13th. Overall I feel jittery, anxious, and I wish that somebody in my family would care about me \". Patient states she has home health since discharge from the hospital, states she has a poor familial support system and that this causes her a lot of stress. States that she feels depressed, denies any suicidal or homicidal ideation. Does state \"sometimes I wish the cancer would just take me out \". Denies audio or visual hallucinations. Notes that she is not sure why EMS came to pick her up but she did not call them.            Past Medical History:   Diagnosis Date    Acute hyponatremia 6/18/2019    Anxiety     Bimalleolar fracture of left ankle 2/3/2016    Comminuted and displaced     Cancer [facility])     breast    Closed left ankle fracture 2/4/2016    Depression     Gastroenteritis due to norovirus 2/21/2018    GERD (gastroesophageal reflux disease)     HTN (hypertension)     Hypercholesterolemia     Hypotension 9/12/2012    Metastatic breast cancer ([address] 5/3/2017    Neoplastic malignant related fatigue 4/4/2018    Pain due to neoplasm 4/4/2018    Secondary cancer of bone ([address] 5/3/2017    Sepsis ([address] 2/12/2018    Urinary tract infection without hematuria 2/13/2018       Past Surgical History:   Procedure Laterality Date    BREAST SURGERY PROCEDURE UNLISTED  march 13    carina masectomy         Family History:   Problem Relation Age of Onset    Hypertension Mother     Heart Disease Mother     Depression Mother     Hypertension Father        Social History     Socioeconomic History    Marital status: SINGLE     Spouse name: Not on file    Number of children: Not on file    Years of education: Not on file    Highest education level: Not on file   Occupational History    Not on file   Social Needs    Financial resource strain: Not on file    Food insecurity     Worry: Not on file     Inability: Not on file    Transportation needs     Medical: Not on file     Non-medical: Not on file   Tobacco Use    Smoking status: Former Smoker     Packs/day: 0.50     Years: 20.00     Pack years: 10.00    Smokeless tobacco: Never Used   Substance and Sexual Activity    Alcohol use: No    Drug use: No    Sexual activity: Never   Lifestyle    Physical activity     Days per week: Not on file     Minutes per session: Not on file    Stress: Not on file   Relationships    Social connections     Talks on phone: Not on file     Gets together: Not on file     Attends Pentecostal service: Not on file     Active member of club or organization: Not on file     Attends meetings of clubs or organizations: Not on file     Relationship status: Not on file    Intimate partner violence     Fear of current or ex partner: Not on file     Emotionally abused: Not on file     Physically abused: Not on file     Forced sexual activity: Not on file   Other Topics Concern     Service Not Asked    Blood Transfusions Not Asked    Caffeine Concern Not Asked    Occupational Exposure Not Asked   Earna Shawl Hazards Not Asked    Sleep Concern Not Asked    Stress Concern Not Asked    Weight Concern Not Asked    Special Diet Not Asked    Back Care Not Asked    Exercise Not Asked    Bike Helmet Not Asked   [address] Not Asked    Self-Exams Not Asked   Social History Narrative    63year old  female admitted for depression after suicide attempt on Xanax and opiods. Py is follwed by DR. Maria Teresa Covington on the outside. She has breast and bone cancer.  She is living by herself with no family supports. ALLERGIES: Sulfa (sulfonamide antibiotics) and Wellbutrin [bupropion hcl]    Review of Systems   Constitutional: Positive for fatigue. Negative for fever. HENT: Negative for congestion and sore throat. Respiratory: Negative for cough and shortness of breath. Cardiovascular: Negative for chest pain. Gastrointestinal: Negative for nausea and vomiting. Genitourinary: Negative for dysuria. Musculoskeletal: Negative for myalgias. Skin: Negative for rash. Neurological: Negative for dizziness and weakness. Psychiatric/Behavioral: Positive for decreased concentration, dysphoric mood and sleep disturbance. Negative for suicidal ideas. The patient is nervous/anxious. Vitals:    11/10/20 1608   BP: 108/81   Pulse: 92   Resp: 16   Temp: 97.2 °F (36.2 °C)   SpO2: 96%            Physical Exam  Vitals signs and nursing note reviewed. Constitutional:       General: She is not in acute distress. Appearance: She is not ill-appearing. HENT:      Head: Normocephalic. Nose: No rhinorrhea. Mouth/Throat:      Mouth: Mucous membranes are moist.      Pharynx: Oropharynx is clear. No posterior oropharyngeal erythema. Eyes:      Pupils: Pupils are equal, round, and reactive to light. Neck:      Musculoskeletal: Normal range of motion. Cardiovascular:      Rate and Rhythm: Normal rate and regular rhythm. Heart sounds: Normal heart sounds. Pulmonary:      Effort: Pulmonary effort is normal.      Breath sounds: Normal breath sounds. No wheezing. Abdominal:      General: There is no distension. Palpations: Abdomen is soft. Musculoskeletal:      Right lower leg: No edema. Left lower leg: No edema. Skin:     General: Skin is warm. Capillary Refill: Capillary refill takes less than 2 seconds. Findings: No erythema or rash. Neurological:      Mental Status: She is alert.    Psychiatric:         Attention and Perception: Attention and perception normal.         Mood and Affect: Mood is anxious. Affect is tearful. Speech: Speech is rapid and pressured. Behavior: Behavior is hyperactive. Thought Content: Thought content does not include homicidal or suicidal ideation. Cognition and Memory: Memory is impaired. Medications   ALPRAZolam Imani Pennant) tablet 1 mg (1 mg Oral Given 11/10/20 2009)     Labs Reviewed   METABOLIC PANEL, COMPREHENSIVE - Abnormal; Notable for the following components:       Result Value    AST (SGOT) 58 (*)     All other components within normal limits   URINALYSIS W/MICROSCOPIC - Abnormal; Notable for the following components:    Appearance CLOUDY (*)     Ketone TRACE (*)     Urobilinogen 2.0 (*)     Leukocyte Esterase TRACE (*)     Bacteria 1+ (*)     CA Oxalate crystals 2+ (*)     All other components within normal limits   DRUG SCREEN, URINE - Abnormal; Notable for the following components:    BENZODIAZEPINES Positive (*)     THC (TH-CANNABINOL) Positive (*)     All other components within normal limits   URINE CULTURE HOLD SAMPLE   CBC WITH AUTOMATED DIFF   SAMPLES BEING HELD     No orders to display         MDM  Number of Diagnoses or Management Options  Anxiety and depression:   Medication refill:   Diagnosis management comments: Differential diagnosis includes benzodiazepine withdrawal, anxiety exacerbation, psychosis, and others    Main complaint is that she wishes to be cared for. Reviewed  which reveals multiple alprazolam 1mg prescriptions filled within the last 2 weeks. Pt supply should be sufficient until she has a refill on 11/13, states she does have enough at home. We discussed the importance of taking her medication as prescribed due to hx of benzo withdrawal seizures. Home health is visiting her frequently at this time. Denies SI or HI. Evaluated by Caden Mckeon, who provided therapy resources for her.  Return precautions reviewed Amount and/or Complexity of Data Reviewed  Clinical lab tests: reviewed and ordered  Tests in the medicine section of CPT®: reviewed and ordered           Procedures    Hawa Mckeon PA-C  11/11/2020

## 2020-11-12 ENCOUNTER — HOME CARE VISIT (OUTPATIENT)
Dept: SCHEDULING | Facility: HOME HEALTH | Age: 65
End: 2020-11-12
Payer: MEDICARE

## 2020-11-12 ENCOUNTER — TELEPHONE (OUTPATIENT)
Dept: CASE MANAGEMENT | Age: 65
End: 2020-11-12

## 2020-11-12 VITALS
SYSTOLIC BLOOD PRESSURE: 140 MMHG | OXYGEN SATURATION: 98 % | HEART RATE: 94 BPM | TEMPERATURE: 97.2 F | DIASTOLIC BLOOD PRESSURE: 80 MMHG

## 2020-11-12 PROCEDURE — G0158 HHC OT ASSISTANT EA 15: HCPCS

## 2020-11-12 PROCEDURE — 3331090002 HH PPS REVENUE DEBIT

## 2020-11-12 PROCEDURE — G0300 HHS/HOSPICE OF LPN EA 15 MIN: HCPCS

## 2020-11-12 PROCEDURE — 3331090001 HH PPS REVENUE CREDIT

## 2020-11-12 NOTE — TELEPHONE ENCOUNTER
Faxed Medication Assistance forms to Outpatient Pharmacy. Re: [facility] PharmD for Admission 10/25/2020 - 10/27/2020. CM will continue to follow and assist with FRIDA needs as they arise. Available on [employer]. Arnulfo CLAY, [address] [facility], RN, [facility] - [phone].

## 2020-11-13 ENCOUNTER — HOME CARE VISIT (OUTPATIENT)
Dept: HOME HEALTH SERVICES | Facility: HOME HEALTH | Age: 65
End: 2020-11-13
Payer: MEDICARE

## 2020-11-13 ENCOUNTER — HOME CARE VISIT (OUTPATIENT)
Dept: SCHEDULING | Facility: HOME HEALTH | Age: 65
End: 2020-11-13
Payer: MEDICARE

## 2020-11-13 VITALS
HEART RATE: 70 BPM | DIASTOLIC BLOOD PRESSURE: 70 MMHG | RESPIRATION RATE: 18 BRPM | OXYGEN SATURATION: 99 % | SYSTOLIC BLOOD PRESSURE: 120 MMHG | TEMPERATURE: 98.4 F

## 2020-11-13 PROCEDURE — 3331090001 HH PPS REVENUE CREDIT

## 2020-11-13 PROCEDURE — 3331090002 HH PPS REVENUE DEBIT

## 2020-11-13 PROCEDURE — G0157 HHC PT ASSISTANT EA 15: HCPCS

## 2020-11-14 VITALS
RESPIRATION RATE: 17 BRPM | TEMPERATURE: 98.3 F | OXYGEN SATURATION: 98 % | HEART RATE: 72 BPM | SYSTOLIC BLOOD PRESSURE: 149 MMHG | DIASTOLIC BLOOD PRESSURE: 70 MMHG

## 2020-11-14 PROCEDURE — 3331090001 HH PPS REVENUE CREDIT

## 2020-11-14 PROCEDURE — 3331090002 HH PPS REVENUE DEBIT

## 2020-11-15 PROCEDURE — 3331090002 HH PPS REVENUE DEBIT

## 2020-11-15 PROCEDURE — 3331090001 HH PPS REVENUE CREDIT

## 2020-11-16 ENCOUNTER — HOME CARE VISIT (OUTPATIENT)
Dept: SCHEDULING | Facility: HOME HEALTH | Age: 65
End: 2020-11-16
Payer: MEDICARE

## 2020-11-16 VITALS
TEMPERATURE: 97.8 F | SYSTOLIC BLOOD PRESSURE: 132 MMHG | OXYGEN SATURATION: 97 % | SYSTOLIC BLOOD PRESSURE: 156 MMHG | RESPIRATION RATE: 18 BRPM | DIASTOLIC BLOOD PRESSURE: 72 MMHG | DIASTOLIC BLOOD PRESSURE: 68 MMHG | TEMPERATURE: 97.8 F | HEART RATE: 84 BPM | RESPIRATION RATE: 18 BRPM | OXYGEN SATURATION: 97 % | HEART RATE: 83 BPM

## 2020-11-16 VITALS
OXYGEN SATURATION: 98 % | HEART RATE: 111 BPM | TEMPERATURE: 97.9 F | SYSTOLIC BLOOD PRESSURE: 140 MMHG | DIASTOLIC BLOOD PRESSURE: 88 MMHG

## 2020-11-16 PROCEDURE — G0158 HHC OT ASSISTANT EA 15: HCPCS

## 2020-11-16 PROCEDURE — 3331090001 HH PPS REVENUE CREDIT

## 2020-11-16 PROCEDURE — 3331090002 HH PPS REVENUE DEBIT

## 2020-11-17 ENCOUNTER — HOME CARE VISIT (OUTPATIENT)
Dept: HOME HEALTH SERVICES | Facility: HOME HEALTH | Age: 65
End: 2020-11-17
Payer: MEDICARE

## 2020-11-17 ENCOUNTER — HOME CARE VISIT (OUTPATIENT)
Dept: SCHEDULING | Facility: HOME HEALTH | Age: 65
End: 2020-11-17
Payer: MEDICARE

## 2020-11-17 VITALS
TEMPERATURE: 97.4 F | RESPIRATION RATE: 18 BRPM | HEART RATE: 87 BPM | SYSTOLIC BLOOD PRESSURE: 118 MMHG | DIASTOLIC BLOOD PRESSURE: 70 MMHG | OXYGEN SATURATION: 98 %

## 2020-11-17 PROCEDURE — G0300 HHS/HOSPICE OF LPN EA 15 MIN: HCPCS

## 2020-11-17 PROCEDURE — 3331090001 HH PPS REVENUE CREDIT

## 2020-11-17 PROCEDURE — 3331090002 HH PPS REVENUE DEBIT

## 2020-11-17 PROCEDURE — G0157 HHC PT ASSISTANT EA 15: HCPCS

## 2020-11-18 ENCOUNTER — HOME CARE VISIT (OUTPATIENT)
Dept: HOME HEALTH SERVICES | Facility: HOME HEALTH | Age: 65
End: 2020-11-18
Payer: MEDICARE

## 2020-11-18 ENCOUNTER — HOME CARE VISIT (OUTPATIENT)
Dept: SCHEDULING | Facility: HOME HEALTH | Age: 65
End: 2020-11-18
Payer: MEDICARE

## 2020-11-18 PROCEDURE — 3331090002 HH PPS REVENUE DEBIT

## 2020-11-18 PROCEDURE — G0152 HHCP-SERV OF OT,EA 15 MIN: HCPCS

## 2020-11-18 PROCEDURE — 3331090001 HH PPS REVENUE CREDIT

## 2020-11-19 VITALS
SYSTOLIC BLOOD PRESSURE: 142 MMHG | HEART RATE: 84 BPM | RESPIRATION RATE: 18 BRPM | DIASTOLIC BLOOD PRESSURE: 68 MMHG | TEMPERATURE: 98.4 F | OXYGEN SATURATION: 97 %

## 2020-11-26 ENCOUNTER — HOSPITAL ENCOUNTER (EMERGENCY)
Age: 65
Discharge: HOME | End: 2020-11-27
Attending: EMERGENCY MEDICINE
Payer: MEDICARE

## 2020-11-26 VITALS
TEMPERATURE: 98.4 F | OXYGEN SATURATION: 96 % | HEART RATE: 109 BPM | SYSTOLIC BLOOD PRESSURE: 115 MMHG | RESPIRATION RATE: 19 BRPM | DIASTOLIC BLOOD PRESSURE: 85 MMHG

## 2020-11-26 DIAGNOSIS — E87.1 HYPONATREMIA: ICD-10-CM

## 2020-11-26 DIAGNOSIS — F41.1 ANXIETY STATE: Primary | ICD-10-CM

## 2020-11-26 DIAGNOSIS — E87.6 HYPOKALEMIA: ICD-10-CM

## 2020-11-26 LAB
ANION GAP SERPL CALC-SCNC: 9 MMOL/L (ref 5–15)
BASOPHILS # BLD: 0.1 K/UL (ref 0–0.1)
BASOPHILS NFR BLD: 1 % (ref 0–1)
BUN SERPL-MCNC: 23 MG/DL (ref 6–20)
BUN/CREAT SERPL: 24 (ref 12–20)
CALCIUM SERPL-MCNC: 9.9 MG/DL (ref 8.5–10.1)
CHLORIDE SERPL-SCNC: 100 MMOL/L (ref 97–108)
CO2 SERPL-SCNC: 26 MMOL/L (ref 21–32)
COMMENT, HOLDF: NORMAL
CREAT SERPL-MCNC: 0.97 MG/DL (ref 0.55–1.02)
DIFFERENTIAL METHOD BLD: NORMAL
EOSINOPHIL # BLD: 0.1 K/UL (ref 0–0.4)
EOSINOPHIL NFR BLD: 1 % (ref 0–7)
ERYTHROCYTE [DISTWIDTH] IN BLOOD BY AUTOMATED COUNT: 11.7 % (ref 11.5–14.5)
GLUCOSE SERPL-MCNC: 138 MG/DL (ref 65–100)
HCT VFR BLD AUTO: 44.3 % (ref 35–47)
HGB BLD-MCNC: 15 G/DL (ref 11.5–16)
IMM GRANULOCYTES # BLD AUTO: 0 K/UL (ref 0–0.04)
IMM GRANULOCYTES NFR BLD AUTO: 0 % (ref 0–0.5)
LYMPHOCYTES # BLD: 1.9 K/UL (ref 0.8–3.5)
LYMPHOCYTES NFR BLD: 22 % (ref 12–49)
MCH RBC QN AUTO: 30.5 PG (ref 26–34)
MCHC RBC AUTO-ENTMCNC: 33.9 G/DL (ref 30–36.5)
MCV RBC AUTO: 90.2 FL (ref 80–99)
MONOCYTES # BLD: 0.6 K/UL (ref 0–1)
MONOCYTES NFR BLD: 7 % (ref 5–13)
NEUTS SEG # BLD: 5.9 K/UL (ref 1.8–8)
NEUTS SEG NFR BLD: 69 % (ref 32–75)
NRBC # BLD: 0 K/UL (ref 0–0.01)
NRBC BLD-RTO: 0 PER 100 WBC
PLATELET # BLD AUTO: 313 K/UL (ref 150–400)
PMV BLD AUTO: 11.4 FL (ref 8.9–12.9)
POTASSIUM SERPL-SCNC: 3.4 MMOL/L (ref 3.5–5.1)
RBC # BLD AUTO: 4.91 M/UL (ref 3.8–5.2)
SAMPLES BEING HELD,HOLD: NORMAL
SODIUM SERPL-SCNC: 135 MMOL/L (ref 136–145)
WBC # BLD AUTO: 8.6 K/UL (ref 3.6–11)

## 2020-11-26 PROCEDURE — 96361 HYDRATE IV INFUSION ADD-ON: CPT

## 2020-11-26 PROCEDURE — 74011250636 HC RX REV CODE- 250/636: Performed by: EMERGENCY MEDICINE

## 2020-11-26 PROCEDURE — 93005 ELECTROCARDIOGRAM TRACING: CPT

## 2020-11-26 PROCEDURE — 80048 BASIC METABOLIC PNL TOTAL CA: CPT

## 2020-11-26 PROCEDURE — 36415 COLL VENOUS BLD VENIPUNCTURE: CPT

## 2020-11-26 PROCEDURE — 99285 EMERGENCY DEPT VISIT HI MDM: CPT

## 2020-11-26 PROCEDURE — 74011250637 HC RX REV CODE- 250/637: Performed by: EMERGENCY MEDICINE

## 2020-11-26 PROCEDURE — 85025 COMPLETE CBC W/AUTO DIFF WBC: CPT

## 2020-11-26 PROCEDURE — 96374 THER/PROPH/DIAG INJ IV PUSH: CPT

## 2020-11-26 RX ORDER — LORAZEPAM 2 MG/ML
1 INJECTION INTRAMUSCULAR
Status: COMPLETED | OUTPATIENT
Start: 2020-11-26 | End: 2020-11-26

## 2020-11-26 RX ORDER — CHLORDIAZEPOXIDE HYDROCHLORIDE 25 MG/1
25 CAPSULE, GELATIN COATED ORAL
Status: COMPLETED | OUTPATIENT
Start: 2020-11-26 | End: 2020-11-26

## 2020-11-26 RX ADMIN — SODIUM CHLORIDE 500 ML: 900 INJECTION, SOLUTION INTRAVENOUS at 22:39

## 2020-11-26 RX ADMIN — CHLORDIAZEPOXIDE HYDROCHLORIDE 25 MG: 25 CAPSULE ORAL at 22:39

## 2020-11-26 RX ADMIN — LORAZEPAM 1 MG: 2 INJECTION INTRAMUSCULAR; INTRAVENOUS at 21:35

## 2020-11-27 LAB
ATRIAL RATE: 105 BPM
CALCULATED P AXIS, ECG09: 43 DEGREES
CALCULATED R AXIS, ECG10: 28 DEGREES
CALCULATED T AXIS, ECG11: -71 DEGREES
DIAGNOSIS, 93000: NORMAL
P-R INTERVAL, ECG05: 158 MS
Q-T INTERVAL, ECG07: 336 MS
QRS DURATION, ECG06: 76 MS
QTC CALCULATION (BEZET), ECG08: 444 MS
VENTRICULAR RATE, ECG03: 105 BPM

## 2020-11-27 NOTE — ED TRIAGE NOTES
Pt presents to ED via EMS from [facility]. Per EMS pt quit smoking cigarettes on Tuesday and has forgotten to take home medications since. Pt presents diaphoretic and anxious.  Denies CP and SOB

## 2020-11-27 NOTE — ED PROVIDER NOTES
57-year-old female with history of cancer, depression, anxiety presents by EMS from home with chief complaint of anxiety and withdrawal from nicotine. She tells me she has been smoking for approximately 10 years, 4 to 5 cigarettes daily, and quit on Tuesday. She tells me she has forgotten to take her anxiety medicines during this time period. [facility] multiple recent prescriptions for X[facility]ax which were filled within days of each other. The history is provided by the patient and medical records. Anxiety    This is a new problem. The current episode started more than 2 days ago. The problem has been rapidly worsening. The problem occurs constantly. The patient is experiencing no pain. Associated symptoms include diaphoresis and shortness of breath. Pertinent negatives include no abdominal pain, no back pain, no cough, no exertional chest pressure, no fever, no headaches, no irregular heartbeat, no lower extremity edema, no nausea, no near-syncope, no orthopnea, no palpitations, no vomiting and no weakness.         Past Medical History:   Diagnosis Date    Acute hyponatremia 6/18/2019    Anxiety     Bimalleolar fracture of left ankle 2/3/2016    Comminuted and displaced     Cancer [facility])     breast    Closed left ankle fracture 2/4/2016    Depression     Gastroenteritis due to norovirus 2/21/2018    GERD (gastroesophageal reflux disease)     HTN (hypertension)     Hypercholesterolemia     Hypotension 9/12/2012    Metastatic breast cancer ([address] 5/3/2017    Neoplastic malignant related fatigue 4/4/2018    Pain due to neoplasm 4/4/2018    Secondary cancer of bone ([address] 5/3/2017    Sepsis ([address] 2/12/2018    Urinary tract infection without hematuria 2/13/2018       Past Surgical History:   Procedure Laterality Date    BREAST SURGERY PROCEDURE UNLISTED  march 13    carina masectomy         Family History:   Problem Relation Age of Onset    Hypertension Mother     Heart Disease Mother     Depression Mother     Hypertension Father        Social History     Socioeconomic History    Marital status: SINGLE     Spouse name: Not on file    Number of children: Not on file    Years of education: Not on file    Highest education level: Not on file   Occupational History    Not on file   Social Needs    Financial resource strain: Not on file    Food insecurity     Worry: Not on file     Inability: Not on file    Transportation needs     Medical: Not on file     Non-medical: Not on file   Tobacco Use    Smoking status: Former Smoker     Packs/day: 0.50     Years: 20.00     Pack years: 10.00    Smokeless tobacco: Never Used   Substance and Sexual Activity    Alcohol use: No    Drug use: No    Sexual activity: Never   Lifestyle    Physical activity     Days per week: Not on file     Minutes per session: Not on file    Stress: Not on file   Relationships    Social connections     Talks on phone: Not on file     Gets together: Not on file     Attends Yarsanism service: Not on file     Active member of club or organization: Not on file     Attends meetings of clubs or organizations: Not on file     Relationship status: Not on file    Intimate partner violence     Fear of current or ex partner: Not on file     Emotionally abused: Not on file     Physically abused: Not on file     Forced sexual activity: Not on file   Other Topics Concern     Service Not Asked    Blood Transfusions Not Asked    Caffeine Concern Not Asked    Occupational Exposure Not Asked   Dionne Omar Hazards Not Asked    Sleep Concern Not Asked    Stress Concern Not Asked    Weight Concern Not Asked    Special Diet Not Asked    Back Care Not Asked    Exercise Not Asked    Bike Helmet Not Asked   [place] Not Asked    Self-Exams Not Asked   Social History Narrative    63year old  female admitted for depression after suicide attempt on Xanax and opiods. Py is follwed by DR. Martinez on the outside. She has breast and bone cancer. She is living by herself with no family supports. ALLERGIES: Prednisolone; Sulfa (sulfonamide antibiotics); and Wellbutrin [bupropion hcl]    Review of Systems   Constitutional: Positive for diaphoresis. Negative for fatigue and fever. HENT: Negative for sneezing and sore throat. Respiratory: Positive for shortness of breath. Negative for cough. Cardiovascular: Negative for chest pain, palpitations, orthopnea, leg swelling and near-syncope. Gastrointestinal: Negative for abdominal pain, diarrhea, nausea and vomiting. Genitourinary: Negative for difficulty urinating and dysuria. Musculoskeletal: Negative for arthralgias, back pain and myalgias. Skin: Negative for color change and rash. Neurological: Negative for weakness and headaches. Psychiatric/Behavioral: Negative for agitation and behavioral problems. The patient is nervous/anxious. Vitals:    11/26/20 2040   BP: (!) 141/107   Pulse: (!) 104   Resp: 21   Temp: 98.4 °F (36.9 °C)   SpO2: 93%            Physical Exam  Vitals signs and nursing note reviewed. Constitutional:       General: She is not in acute distress. Appearance: She is well-developed. She is obese. She is diaphoretic. She is not ill-appearing or toxic-appearing. HENT:      Head: Normocephalic and atraumatic. Nose: Nose normal.      Mouth/Throat:      Mouth: Mucous membranes are moist.      Pharynx: Oropharynx is clear. Eyes:      Extraocular Movements: Extraocular movements intact. Conjunctiva/sclera: Conjunctivae normal.      Pupils: Pupils are equal, round, and reactive to light. Neck:      Musculoskeletal: Normal range of motion and neck supple. No muscular tenderness. Cardiovascular:      Rate and Rhythm: Regular rhythm. Tachycardia present. Pulses: Normal pulses. Heart sounds: Normal heart sounds. No murmur. Pulmonary:      Effort: Pulmonary effort is normal. No respiratory distress. Breath sounds: Normal breath sounds. Abdominal:      General: There is no distension. Palpations: Abdomen is soft. Tenderness: There is no abdominal tenderness. There is no guarding or rebound. Musculoskeletal: Normal range of motion. General: No swelling, tenderness, deformity or signs of injury. Skin:     General: Skin is warm. Capillary Refill: Capillary refill takes less than 2 seconds. Neurological:      General: No focal deficit present. Mental Status: She is alert and oriented to person, place, and time. Psychiatric:         Mood and Affect: Mood normal.         Behavior: Behavior normal.          MDM  Number of Diagnoses or Management Options  Diagnosis management comments: 53-year-old female presents as above with likely withdrawal symptoms to benzodiazepines in conjunction with recently quitting nicotine. She is improved in the emergency department with Ativan administration. Her work-up is otherwise been reassuring. Plan to discharge home with a dose of Librium in the ER and instructions to have her continue her medicines as prescribed. She should follow-up with her primary care doctor to manage her anxiety. I suspect that long-term her benzodiazepines are likely detrimental to her overall health. Procedures               Rhythm: Sinus tachycardia rate of approximately 105. Axis: normal.  ST segment:  No concerning ST elevations or depressions. No significant changes when compared with EKG from 11/10/2020. This EKG was interpreted by Joshua Mo MD,ED Provider. 2245: Patient reexamined at this time is significantly improved. Mild tachycardia remains. Her diaphoresis has improved. She is much less anxious.

## 2020-11-27 NOTE — DISCHARGE INSTRUCTIONS
Please be sure to take your medicines as prescribed. Withdrawal from benzodiazepines in conjunction with nicotine cessation can transiently increase your anxiety level. Please follow-up with your primary care doctor soon as possible discuss long-term control of your symptoms. Benzodiazepines can be addictive and may make anxiety worse over the long-term.

## 2020-11-28 ENCOUNTER — HOSPITAL ENCOUNTER (INPATIENT)
Age: 65
LOS: 17 days | Discharge: HOME | DRG: 897 | End: 2020-12-17
Attending: EMERGENCY MEDICINE | Admitting: HOSPITALIST
Payer: MEDICARE

## 2020-11-28 ENCOUNTER — APPOINTMENT (OUTPATIENT)
Dept: CT IMAGING | Age: 65
DRG: 897 | End: 2020-11-28
Attending: EMERGENCY MEDICINE
Payer: MEDICARE

## 2020-11-28 ENCOUNTER — APPOINTMENT (OUTPATIENT)
Dept: GENERAL RADIOLOGY | Age: 65
DRG: 897 | End: 2020-11-28
Attending: EMERGENCY MEDICINE
Payer: MEDICARE

## 2020-11-28 DIAGNOSIS — T50.904A DRUG OVERDOSE, UNDETERMINED INTENT, INITIAL ENCOUNTER: ICD-10-CM

## 2020-11-28 DIAGNOSIS — R56.9 SEIZURE (HCC): ICD-10-CM

## 2020-11-28 DIAGNOSIS — R41.82 ALTERED MENTAL STATUS, UNSPECIFIED ALTERED MENTAL STATUS TYPE: Primary | ICD-10-CM

## 2020-11-28 DIAGNOSIS — F41.9 ANXIETY: ICD-10-CM

## 2020-11-28 DIAGNOSIS — C50.919 METASTATIC BREAST CANCER (HCC): ICD-10-CM

## 2020-11-28 PROBLEM — T50.901A OVERDOSE: Status: ACTIVE | Noted: 2020-11-28

## 2020-11-28 LAB
ALBUMIN SERPL-MCNC: 4.5 G/DL (ref 3.5–5)
ALBUMIN/GLOB SERPL: 1.2 {RATIO} (ref 1.1–2.2)
ALP SERPL-CCNC: 102 U/L (ref 45–117)
ALT SERPL-CCNC: 31 U/L (ref 12–78)
AMMONIA PLAS-SCNC: 23 UMOL/L
AMPHET UR QL SCN: NEGATIVE
ANION GAP SERPL CALC-SCNC: 11 MMOL/L (ref 5–15)
APAP SERPL-MCNC: <2 UG/ML (ref 10–30)
APPEARANCE UR: CLEAR
AST SERPL-CCNC: 21 U/L (ref 15–37)
BACTERIA URNS QL MICRO: ABNORMAL /HPF
BARBITURATES UR QL SCN: NEGATIVE
BASOPHILS # BLD: 0.1 K/UL (ref 0–0.1)
BASOPHILS NFR BLD: 1 % (ref 0–1)
BENZODIAZ UR QL: POSITIVE
BILIRUB SERPL-MCNC: 0.4 MG/DL (ref 0.2–1)
BILIRUB UR QL: NEGATIVE
BUN SERPL-MCNC: 30 MG/DL (ref 6–20)
BUN/CREAT SERPL: 24 (ref 12–20)
CALCIUM SERPL-MCNC: 9.6 MG/DL (ref 8.5–10.1)
CANNABINOIDS UR QL SCN: NEGATIVE
CAOX CRY URNS QL MICRO: ABNORMAL
CHLORIDE SERPL-SCNC: 97 MMOL/L (ref 97–108)
CO2 SERPL-SCNC: 24 MMOL/L (ref 21–32)
COCAINE UR QL SCN: NEGATIVE
COLOR UR: ABNORMAL
COMMENT, HOLDF: NORMAL
CREAT SERPL-MCNC: 1.26 MG/DL (ref 0.55–1.02)
DIFFERENTIAL METHOD BLD: ABNORMAL
DRUG SCRN COMMENT,DRGCM: ABNORMAL
EOSINOPHIL # BLD: 0 K/UL (ref 0–0.4)
EOSINOPHIL NFR BLD: 0 % (ref 0–7)
EPITH CASTS URNS QL MICRO: ABNORMAL /LPF
ERYTHROCYTE [DISTWIDTH] IN BLOOD BY AUTOMATED COUNT: 11.5 % (ref 11.5–14.5)
ETHANOL SERPL-MCNC: <10 MG/DL
GLOBULIN SER CALC-MCNC: 3.8 G/DL (ref 2–4)
GLUCOSE SERPL-MCNC: 157 MG/DL (ref 65–100)
GLUCOSE UR STRIP.AUTO-MCNC: 250 MG/DL
HCT VFR BLD AUTO: 45.7 % (ref 35–47)
HGB BLD-MCNC: 15.4 G/DL (ref 11.5–16)
HGB UR QL STRIP: NEGATIVE
HYALINE CASTS URNS QL MICRO: ABNORMAL /LPF (ref 0–5)
IMM GRANULOCYTES # BLD AUTO: 0 K/UL (ref 0–0.04)
IMM GRANULOCYTES NFR BLD AUTO: 0 % (ref 0–0.5)
KETONES UR QL STRIP.AUTO: NEGATIVE MG/DL
LEUKOCYTE ESTERASE UR QL STRIP.AUTO: NEGATIVE
LYMPHOCYTES # BLD: 1.4 K/UL (ref 0.8–3.5)
LYMPHOCYTES NFR BLD: 12 % (ref 12–49)
MCH RBC QN AUTO: 30.5 PG (ref 26–34)
MCHC RBC AUTO-ENTMCNC: 33.7 G/DL (ref 30–36.5)
MCV RBC AUTO: 90.5 FL (ref 80–99)
METHADONE UR QL: NEGATIVE
MONOCYTES # BLD: 0.7 K/UL (ref 0–1)
MONOCYTES NFR BLD: 6 % (ref 5–13)
MUCOUS THREADS URNS QL MICRO: ABNORMAL /LPF
NEUTS SEG # BLD: 9.2 K/UL (ref 1.8–8)
NEUTS SEG NFR BLD: 81 % (ref 32–75)
NITRITE UR QL STRIP.AUTO: NEGATIVE
NRBC # BLD: 0 K/UL (ref 0–0.01)
NRBC BLD-RTO: 0 PER 100 WBC
OPIATES UR QL: NEGATIVE
PCP UR QL: NEGATIVE
PH UR STRIP: 5.5 [PH] (ref 5–8)
PLATELET # BLD AUTO: 343 K/UL (ref 150–400)
PMV BLD AUTO: 11.3 FL (ref 8.9–12.9)
POTASSIUM SERPL-SCNC: 3.3 MMOL/L (ref 3.5–5.1)
PROT SERPL-MCNC: 8.3 G/DL (ref 6.4–8.2)
PROT UR STRIP-MCNC: NEGATIVE MG/DL
RBC # BLD AUTO: 5.05 M/UL (ref 3.8–5.2)
RBC #/AREA URNS HPF: ABNORMAL /HPF (ref 0–5)
SALICYLATES SERPL-MCNC: 2.2 MG/DL (ref 2.8–20)
SAMPLES BEING HELD,HOLD: NORMAL
SODIUM SERPL-SCNC: 132 MMOL/L (ref 136–145)
SP GR UR REFRACTOMETRY: 1.03 (ref 1–1.03)
T4 FREE SERPL-MCNC: 1.1 NG/DL (ref 0.8–1.5)
TSH SERPL DL<=0.05 MIU/L-ACNC: 3.57 UIU/ML (ref 0.36–3.74)
UROBILINOGEN UR QL STRIP.AUTO: 0.2 EU/DL (ref 0.2–1)
VIT B12 SERPL-MCNC: 604 PG/ML (ref 193–986)
WBC # BLD AUTO: 11.4 K/UL (ref 3.6–11)
WBC URNS QL MICRO: ABNORMAL /HPF (ref 0–4)
YEAST BUDDING URNS QL: PRESENT

## 2020-11-28 PROCEDURE — 74011250637 HC RX REV CODE- 250/637: Performed by: NURSE PRACTITIONER

## 2020-11-28 PROCEDURE — 94761 N-INVAS EAR/PLS OXIMETRY MLT: CPT

## 2020-11-28 PROCEDURE — 70450 CT HEAD/BRAIN W/O DYE: CPT

## 2020-11-28 PROCEDURE — 36415 COLL VENOUS BLD VENIPUNCTURE: CPT

## 2020-11-28 PROCEDURE — 85025 COMPLETE CBC W/AUTO DIFF WBC: CPT

## 2020-11-28 PROCEDURE — 71045 X-RAY EXAM CHEST 1 VIEW: CPT

## 2020-11-28 PROCEDURE — 80053 COMPREHEN METABOLIC PANEL: CPT

## 2020-11-28 PROCEDURE — 82140 ASSAY OF AMMONIA: CPT

## 2020-11-28 PROCEDURE — 96376 TX/PRO/DX INJ SAME DRUG ADON: CPT

## 2020-11-28 PROCEDURE — 93005 ELECTROCARDIOGRAM TRACING: CPT

## 2020-11-28 PROCEDURE — 82607 VITAMIN B-12: CPT

## 2020-11-28 PROCEDURE — 99218 HC RM OBSERVATION: CPT

## 2020-11-28 PROCEDURE — 74011250636 HC RX REV CODE- 250/636: Performed by: NURSE PRACTITIONER

## 2020-11-28 PROCEDURE — 84439 ASSAY OF FREE THYROXINE: CPT

## 2020-11-28 PROCEDURE — 96374 THER/PROPH/DIAG INJ IV PUSH: CPT

## 2020-11-28 PROCEDURE — 81003 URINALYSIS AUTO W/O SCOPE: CPT

## 2020-11-28 PROCEDURE — 84443 ASSAY THYROID STIM HORMONE: CPT

## 2020-11-28 PROCEDURE — 99285 EMERGENCY DEPT VISIT HI MDM: CPT

## 2020-11-28 PROCEDURE — 80307 DRUG TEST PRSMV CHEM ANLYZR: CPT

## 2020-11-28 PROCEDURE — 87086 URINE CULTURE/COLONY COUNT: CPT

## 2020-11-28 RX ORDER — LOSARTAN POTASSIUM 50 MG/1
50 TABLET ORAL DAILY
Status: DISCONTINUED | OUTPATIENT
Start: 2020-11-29 | End: 2020-12-04

## 2020-11-28 RX ORDER — POTASSIUM CHLORIDE 750 MG/1
20 TABLET, FILM COATED, EXTENDED RELEASE ORAL
Status: COMPLETED | OUTPATIENT
Start: 2020-11-28 | End: 2020-11-28

## 2020-11-28 RX ORDER — SODIUM CHLORIDE 0.9 % (FLUSH) 0.9 %
5-40 SYRINGE (ML) INJECTION EVERY 8 HOURS
Status: DISCONTINUED | OUTPATIENT
Start: 2020-11-28 | End: 2020-12-17 | Stop reason: HOSPADM

## 2020-11-28 RX ORDER — FENOFIBRATE 145 MG/1
145 TABLET, COATED ORAL DAILY
Status: DISCONTINUED | OUTPATIENT
Start: 2020-11-29 | End: 2020-12-17 | Stop reason: HOSPADM

## 2020-11-28 RX ORDER — FOLIC ACID 1 MG/1
1 TABLET ORAL DAILY
Status: DISCONTINUED | OUTPATIENT
Start: 2020-11-29 | End: 2020-12-17 | Stop reason: HOSPADM

## 2020-11-28 RX ORDER — SODIUM CHLORIDE 0.9 % (FLUSH) 0.9 %
5-40 SYRINGE (ML) INJECTION AS NEEDED
Status: DISCONTINUED | OUTPATIENT
Start: 2020-11-28 | End: 2020-12-17 | Stop reason: HOSPADM

## 2020-11-28 RX ORDER — LORAZEPAM 2 MG/ML
1 INJECTION INTRAMUSCULAR
Status: DISCONTINUED | OUTPATIENT
Start: 2020-11-28 | End: 2020-11-29

## 2020-11-28 RX ORDER — ACETAMINOPHEN 325 MG/1
650 TABLET ORAL
Status: DISCONTINUED | OUTPATIENT
Start: 2020-11-28 | End: 2020-12-17 | Stop reason: HOSPADM

## 2020-11-28 RX ORDER — SODIUM CHLORIDE 9 MG/ML
75 INJECTION, SOLUTION INTRAVENOUS CONTINUOUS
Status: DISPENSED | OUTPATIENT
Start: 2020-11-28 | End: 2020-11-29

## 2020-11-28 RX ADMIN — LORAZEPAM 1 MG: 2 INJECTION INTRAMUSCULAR; INTRAVENOUS at 16:45

## 2020-11-28 RX ADMIN — ACETAMINOPHEN 650 MG: 325 TABLET ORAL at 22:04

## 2020-11-28 RX ADMIN — SODIUM CHLORIDE 75 ML/HR: 900 INJECTION, SOLUTION INTRAVENOUS at 14:02

## 2020-11-28 RX ADMIN — LORAZEPAM 1 MG: 2 INJECTION INTRAMUSCULAR; INTRAVENOUS at 22:04

## 2020-11-28 RX ADMIN — POTASSIUM CHLORIDE 20 MEQ: 750 TABLET, FILM COATED, EXTENDED RELEASE ORAL at 16:42
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
## 2020-11-28 NOTE — ED PROVIDER NOTES
This is a 24-year-old female with a history of anxiety disorder, hyponatremia, hypertension and metastatic breast cancer. She has a port in place currently. Patient was seen here 2 days ago for anxiety. She had related to Dr. Mary Ann Izaguirre that she had stopped smoking earlier that week and was quite anxious. She had no other acute complaints at the time. She was treated with the benzo. He noted that she had multiple prescriptions for Xanax that had been filled in very close proximity. She was treated conservatively and discharged back to the [facility] in [place]. She comes back today in the gown she was then when she was discharged from here, she was soiled with urine, the name shawn was still on her wrist, and she was a bit confused and hyper. She has fleeting ideas and is very vague in her answers to questions. She was asked if she had taken too many of her regular medicines and she could not recall. There was some history that she had attempted to stab herself in the forehead with a knife and that was what prompted the call to bring her back to the hospital.  She is equivocal in terms of whether or not that truly occurred. She admits to being somewhat short winded. He denies any pain and has had no vomiting. There have apparently been no urinary or bowel symptoms. The patient is a poor historian at this point.            Past Medical History:   Diagnosis Date    Acute hyponatremia 6/18/2019    Anxiety     Bimalleolar fracture of left ankle 2/3/2016    Comminuted and displaced     Cancer [facility])     breast    Closed left ankle fracture 2/4/2016    Depression     Gastroenteritis due to norovirus 2/21/2018    GERD (gastroesophageal reflux disease)     HTN (hypertension)     Hypercholesterolemia     Hypotension 9/12/2012    Metastatic breast cancer ([facility] [address] 5/3/2017    Neoplastic malignant related fatigue 4/4/2018    Pain due to neoplasm 4/4/2018    Secondary cancer of bone ([facility] [address] 5/3/2017  Sepsis ([facility] [address] 2/12/2018    Urinary tract infection without hematuria 2/13/2018       Past Surgical History:   Procedure Laterality Date    BREAST SURGERY PROCEDURE UNLISTED  march 13    carina masectomy         Family History:   Problem Relation Age of Onset    Hypertension Mother     Heart Disease Mother     Depression Mother     Hypertension Father        Social History     Socioeconomic History    Marital status: SINGLE     Spouse name: Not on file    Number of children: Not on file    Years of education: Not on file    Highest education level: Not on file   Occupational History    Not on file   Social Needs    Financial resource strain: Not on file    Food insecurity     Worry: Not on file     Inability: Not on file    Transportation needs     Medical: Not on file     Non-medical: Not on file   Tobacco Use    Smoking status: Former Smoker     Packs/day: 0.50     Years: 20.00     Pack years: 10.00    Smokeless tobacco: Never Used   Substance and Sexual Activity    Alcohol use: No    Drug use: No    Sexual activity: Never   Lifestyle    Physical activity     Days per week: Not on file     Minutes per session: Not on file    Stress: Not on file   Relationships    Social connections     Talks on phone: Not on file     Gets together: Not on file     Attends Caodaism service: Not on file     Active member of club or organization: Not on file     Attends meetings of clubs or organizations: Not on file     Relationship status: Not on file    Intimate partner violence     Fear of current or ex partner: Not on file     Emotionally abused: Not on file     Physically abused: Not on file     Forced sexual activity: Not on file   Other Topics Concern     Service Not Asked    Blood Transfusions Not Asked    Caffeine Concern Not Asked    Occupational Exposure Not Asked   Dionne Omar Hazards Not Asked    Sleep Concern Not Asked    Stress Concern Not Asked    Weight Concern Not Asked    Special Diet Not Asked    Back Care Not Asked    Exercise Not Asked    Bike Helmet Not Asked   [address] [facility] Not Asked    Self-Exams Not Asked   Social History Narrative    63year old  female admitted for depression after suicide attempt on Xanax and opiods. Py is follwed by DR. Buzz Figueroa on the outside. She has breast and bone cancer. She is living by herself with no family supports. ALLERGIES: Prednisolone; Sulfa (sulfonamide antibiotics); and Wellbutrin [bupropion hcl]    Review of Systems   Unable to perform ROS: Mental status change       Vitals:    11/28/20 1130   BP: (!) 156/89   Pulse: (!) 118   Resp: 16   Temp: 98.9 °F (37.2 °C)   SpO2: 100%            Physical Exam  Vitals signs and nursing note reviewed. Constitutional:       General: She is in acute distress ( In terms of some agitation and repetitive movements and some confusion). Appearance: She is well-developed. She is obese. She is ill-appearing. She is not diaphoretic. Comments: The patient appears to be somewhat agitated and restless. She has constant movements but will respond to verbal stimulus. She tries to answer questions but thought processes not quite intact. Vital signs are as noted. HENT:      Head: Normocephalic and atraumatic. Nose: Nose normal.   Eyes:      General: No scleral icterus. Conjunctiva/sclera: Conjunctivae normal.      Pupils: Pupils are equal, round, and reactive to light. Neck:      Musculoskeletal: Normal range of motion and neck supple. Thyroid: No thyromegaly. Vascular: No JVD. Trachea: No tracheal deviation. Comments: No carotid bruits noted. Cardiovascular:      Rate and Rhythm: Normal rate and regular rhythm. Heart sounds: Normal heart sounds. No murmur. No friction rub. No gallop. Pulmonary:      Effort: Pulmonary effort is normal. No respiratory distress. Breath sounds: Normal breath sounds. No wheezing or rales.    Chest:      Chest wall: No tenderness. Abdominal:      General: Bowel sounds are normal. There is no distension. Palpations: Abdomen is soft. There is no mass. Tenderness: There is no abdominal tenderness. There is no guarding or rebound. Musculoskeletal: Normal range of motion. General: No tenderness. Lymphadenopathy:      Cervical: No cervical adenopathy. Skin:     General: Skin is warm and dry. Coloration: Skin is pale. Findings: No erythema or rash. Neurological:      Mental Status: She is alert. She is disoriented. Cranial Nerves: No cranial nerve deficit. Motor: No weakness. Coordination: Coordination abnormal.      Deep Tendon Reflexes: Reflexes are normal and symmetric. Comments: Patient appears to have some athetoid type movements. Her pupils are significantly dilated bilaterally. She is pale. She has flight of ideas and cannot focus. She has no ability to admit as to why she is in the ED. Does state that she went back to independent living when she was discharged from here 2 days ago. There is no definitive focal weakness noted. Psychiatric:         Behavior: Behavior normal.         Thought Content: Thought content normal.         Judgment: Judgment normal.      Comments: Patient appears to be experiencing medication effects. She is not able to answer questions with any degree of validity. She does follow some commands. She is very vague in her responses to questions.           MDM  Number of Diagnoses or Management Options  Altered mental status, unspecified altered mental status type: new and requires workup  Drug overdose, undetermined intent, initial encounter: new and requires workup     Amount and/or Complexity of Data Reviewed  Clinical lab tests: ordered and reviewed  Tests in the radiology section of CPT®: ordered and reviewed  Decide to obtain previous medical records or to obtain history from someone other than the patient: yes  Review and summarize past medical records: yes  Discuss the patient with other providers: yes  Independent visualization of images, tracings, or specimens: yes    Risk of Complications, Morbidity, and/or Mortality  Presenting problems: high  Diagnostic procedures: high  Management options: high    Patient Progress  Patient progress: stable         Procedures      The clinical impression is that either this patient has an acute psychological issue or she is taken an overdose of her medications. The latter is favored. Hemodynamically she appears stable. We will check her blood work and a head CT. She will require admission likely to the medical service to rule out overdose. ED MD EKG interpretation: There appears to be a sinus tachycardia with an occasional PAC. Axis is normal and intervals appear normal.  This is technically a poor tracing. Nonspecific ST and T wave changes are noted. Lizzeth Marino MD    [employer] Consult for Admission  1:23 PM    ED Room Number: TG45/02  Patient Name and age: Edilia Schmidt 60 y.o.  female  Working Diagnosis:   1. Altered mental status, unspecified altered mental status type    2.  Drug overdose, undetermined intent, initial encounter        COVID-19 Suspicion:  no  Sepsis present:  no  Reassessment needed: no  Code Status:  Full Code  Readmission: no  Isolation Requirements:  no  Recommended Level of Care:  telemetry  Department:[facility] Adult ED - 21   Other:  Patient has been hemodynamically stable in the ED

## 2020-11-28 NOTE — ED TRIAGE NOTES
Triage: Pt arrives from independent living facility after the police where called by her neighbors. Reportedly her neighbors heard a \"commotion\" and called 059 and when police arrived they found the patient attempting to stab herself in the head. Pt reportedly dropped the knife prior to harming herself. Pt tremulous, diaphoretic, with dilated pupils on arrival. Pt denies overdose of medication. Is out of her lorazepam. Hx of benzo withdrawal seizures.

## 2020-11-28 NOTE — ROUTINE PROCESS
TRANSFER - OUT REPORT: 
 
Verbal report given to Staff RN(name) on Damaris Wise  being transferred to 5E(unit) for routine progression of care Report consisted of patients Situation, Background, Assessment and  
Recommendations(SBAR). Information from the following report(s) SBAR, Kardex, ED Summary, [facility] and Recent Results was reviewed with the receiving nurse. Lines:  
Venous Access Device Power Port 11/28/20 Upper chest (subclavicular area, right (Active) Opportunity for questions and clarification was provided. Patient transported with: 
 Home-Account

## 2020-11-29 LAB
ALBUMIN SERPL-MCNC: 3.9 G/DL (ref 3.5–5)
ALBUMIN/GLOB SERPL: 1.2 {RATIO} (ref 1.1–2.2)
ALP SERPL-CCNC: 88 U/L (ref 45–117)
ALT SERPL-CCNC: 26 U/L (ref 12–78)
ANION GAP SERPL CALC-SCNC: 10 MMOL/L (ref 5–15)
AST SERPL-CCNC: 20 U/L (ref 15–37)
BASOPHILS # BLD: 0.1 K/UL (ref 0–0.1)
BASOPHILS NFR BLD: 1 % (ref 0–1)
BILIRUB SERPL-MCNC: 0.5 MG/DL (ref 0.2–1)
BUN SERPL-MCNC: 23 MG/DL (ref 6–20)
BUN/CREAT SERPL: 24 (ref 12–20)
CALCIUM SERPL-MCNC: 8.8 MG/DL (ref 8.5–10.1)
CHLORIDE SERPL-SCNC: 102 MMOL/L (ref 97–108)
CO2 SERPL-SCNC: 24 MMOL/L (ref 21–32)
CREAT SERPL-MCNC: 0.95 MG/DL (ref 0.55–1.02)
DIFFERENTIAL METHOD BLD: NORMAL
EOSINOPHIL # BLD: 0.1 K/UL (ref 0–0.4)
EOSINOPHIL NFR BLD: 2 % (ref 0–7)
ERYTHROCYTE [DISTWIDTH] IN BLOOD BY AUTOMATED COUNT: 11.7 % (ref 11.5–14.5)
EST. AVERAGE GLUCOSE BLD GHB EST-MCNC: 131 MG/DL
GLOBULIN SER CALC-MCNC: 3.3 G/DL (ref 2–4)
GLUCOSE SERPL-MCNC: 138 MG/DL (ref 65–100)
HBA1C MFR BLD: 6.2 % (ref 4–5.6)
HCT VFR BLD AUTO: 40.4 % (ref 35–47)
HGB BLD-MCNC: 13.5 G/DL (ref 11.5–16)
IMM GRANULOCYTES # BLD AUTO: 0 K/UL (ref 0–0.04)
IMM GRANULOCYTES NFR BLD AUTO: 0 % (ref 0–0.5)
LYMPHOCYTES # BLD: 1.9 K/UL (ref 0.8–3.5)
LYMPHOCYTES NFR BLD: 26 % (ref 12–49)
MCH RBC QN AUTO: 30.3 PG (ref 26–34)
MCHC RBC AUTO-ENTMCNC: 33.4 G/DL (ref 30–36.5)
MCV RBC AUTO: 90.6 FL (ref 80–99)
MONOCYTES # BLD: 0.6 K/UL (ref 0–1)
MONOCYTES NFR BLD: 9 % (ref 5–13)
NEUTS SEG # BLD: 4.7 K/UL (ref 1.8–8)
NEUTS SEG NFR BLD: 62 % (ref 32–75)
NRBC # BLD: 0 K/UL (ref 0–0.01)
NRBC BLD-RTO: 0 PER 100 WBC
PLATELET # BLD AUTO: 255 K/UL (ref 150–400)
PMV BLD AUTO: 10.9 FL (ref 8.9–12.9)
POTASSIUM SERPL-SCNC: 3.1 MMOL/L (ref 3.5–5.1)
PROT SERPL-MCNC: 7.2 G/DL (ref 6.4–8.2)
RBC # BLD AUTO: 4.46 M/UL (ref 3.8–5.2)
SODIUM SERPL-SCNC: 136 MMOL/L (ref 136–145)
WBC # BLD AUTO: 7.4 K/UL (ref 3.6–11)

## 2020-11-29 PROCEDURE — 99218 HC RM OBSERVATION: CPT

## 2020-11-29 PROCEDURE — 83036 HEMOGLOBIN GLYCOSYLATED A1C: CPT

## 2020-11-29 PROCEDURE — 74011250637 HC RX REV CODE- 250/637: Performed by: NURSE PRACTITIONER

## 2020-11-29 PROCEDURE — 96375 TX/PRO/DX INJ NEW DRUG ADDON: CPT

## 2020-11-29 PROCEDURE — 74011250636 HC RX REV CODE- 250/636: Performed by: NURSE PRACTITIONER

## 2020-11-29 PROCEDURE — 85025 COMPLETE CBC W/AUTO DIFF WBC: CPT

## 2020-11-29 PROCEDURE — 80053 COMPREHEN METABOLIC PANEL: CPT

## 2020-11-29 PROCEDURE — 36415 COLL VENOUS BLD VENIPUNCTURE: CPT

## 2020-11-29 PROCEDURE — 74011250637 HC RX REV CODE- 250/637: Performed by: INTERNAL MEDICINE

## 2020-11-29 PROCEDURE — 74011250636 HC RX REV CODE- 250/636: Performed by: INTERNAL MEDICINE

## 2020-11-29 PROCEDURE — 96376 TX/PRO/DX INJ SAME DRUG ADON: CPT

## 2020-11-29 PROCEDURE — 74011000258 HC RX REV CODE- 258: Performed by: INTERNAL MEDICINE

## 2020-11-29 RX ORDER — LORAZEPAM 0.5 MG/1
0.5 TABLET ORAL EVERY 6 HOURS
Status: DISCONTINUED | OUTPATIENT
Start: 2020-11-29 | End: 2020-12-01

## 2020-11-29 RX ORDER — LORAZEPAM 0.5 MG/1
1 TABLET ORAL 2 TIMES DAILY
Status: DISCONTINUED | OUTPATIENT
Start: 2020-11-29 | End: 2020-11-29

## 2020-11-29 RX ORDER — LORAZEPAM 0.5 MG/1
1 TABLET ORAL EVERY 6 HOURS
Status: DISCONTINUED | OUTPATIENT
Start: 2020-11-29 | End: 2020-11-29

## 2020-11-29 RX ORDER — LORAZEPAM 1 MG/1
4 TABLET ORAL
Status: DISCONTINUED | OUTPATIENT
Start: 2020-11-29 | End: 2020-11-30

## 2020-11-29 RX ORDER — LORAZEPAM 1 MG/1
2 TABLET ORAL
Status: DISCONTINUED | OUTPATIENT
Start: 2020-11-29 | End: 2020-11-30

## 2020-11-29 RX ORDER — HYDROXYZINE 25 MG/1
25 TABLET, FILM COATED ORAL
Status: DISCONTINUED | OUTPATIENT
Start: 2020-11-29 | End: 2020-11-30

## 2020-11-29 RX ORDER — GABAPENTIN 250 MG/5ML
300 SOLUTION ORAL EVERY 8 HOURS
Status: DISCONTINUED | OUTPATIENT
Start: 2020-11-29 | End: 2020-12-01

## 2020-11-29 RX ORDER — LORAZEPAM 2 MG/ML
2 INJECTION INTRAMUSCULAR ONCE
Status: COMPLETED | OUTPATIENT
Start: 2020-11-29 | End: 2020-11-29

## 2020-11-29 RX ORDER — LORAZEPAM 2 MG/ML
INJECTION INTRAMUSCULAR
Status: DISPENSED
Start: 2020-11-29 | End: 2020-11-30

## 2020-11-29 RX ORDER — POTASSIUM CHLORIDE 750 MG/1
40 TABLET, FILM COATED, EXTENDED RELEASE ORAL
Status: COMPLETED | OUTPATIENT
Start: 2020-11-29 | End: 2020-11-29

## 2020-11-29 RX ORDER — LANOLIN ALCOHOL/MO/W.PET/CERES
100 CREAM (GRAM) TOPICAL DAILY
Status: DISCONTINUED | OUTPATIENT
Start: 2020-11-29 | End: 2020-12-17 | Stop reason: HOSPADM

## 2020-11-29 RX ORDER — ALPRAZOLAM 1 MG/1
1 TABLET ORAL EVERY 12 HOURS
Status: DISCONTINUED | OUTPATIENT
Start: 2020-11-29 | End: 2020-11-29

## 2020-11-29 RX ADMIN — POTASSIUM CHLORIDE 40 MEQ: 750 TABLET, FILM COATED, EXTENDED RELEASE ORAL at 09:41

## 2020-11-29 RX ADMIN — Medication 10 ML: at 15:51

## 2020-11-29 RX ADMIN — LOSARTAN POTASSIUM 50 MG: 50 TABLET, FILM COATED ORAL at 09:40

## 2020-11-29 RX ADMIN — LORAZEPAM 0.5 MG: 0.5 TABLET ORAL at 23:57

## 2020-11-29 RX ADMIN — FOLIC ACID 1 MG: 1 TABLET ORAL at 09:41

## 2020-11-29 RX ADMIN — LORAZEPAM 2 MG: 2 INJECTION INTRAMUSCULAR; INTRAVENOUS at 15:32

## 2020-11-29 RX ADMIN — FENOFIBRATE 145 MG: 145 TABLET ORAL at 09:40

## 2020-11-29 RX ADMIN — LORAZEPAM 1 MG: 0.5 TABLET ORAL at 12:27

## 2020-11-29 RX ADMIN — Medication 100 MG: at 16:30

## 2020-11-29 RX ADMIN — LEVETIRACETAM 1000 MG: 100 INJECTION, SOLUTION INTRAVENOUS at 16:16

## 2020-11-29 RX ADMIN — LORAZEPAM 1 MG: 2 INJECTION INTRAMUSCULAR; INTRAVENOUS at 02:27

## 2020-11-29 RX ADMIN — LORAZEPAM 0.5 MG: 0.5 TABLET ORAL at 20:42

## 2020-11-29 RX ADMIN — GABAPENTIN 300 MG: 250 SOLUTION ORAL at 15:37

## 2020-11-29 RX ADMIN — Medication 5 ML: at 06:00

## 2020-11-29 RX ADMIN — GABAPENTIN 300 MG: 250 SOLUTION ORAL at 23:57

## 2020-11-29 NOTE — PROGRESS NOTES
Bedside shift change report given to Gildardo Romberg (oncoming nurse) by Re Sanchez (offgoing nurse). Report included the following information SBAR, Kardex and MAR.

## 2020-11-29 NOTE — PROGRESS NOTES
Patient asked for ativan at this time. This RN explained admission dx and how we cannot give more benzo's at this time.

## 2020-11-29 NOTE — PROGRESS NOTES
CM reviewed chart. Pt lives alone in an apartment and is independent with her ADLs and IADLs. Pt's main support is her elderly father. Pt has used [facility] for home health services recently. Jodi has been working with patient during other admissions to apply for Medicaid, this process takes about 45-60 days. Currently waiting for Behavioral Health Unit to evaluate. (Pt has been admitted to Behavioral health Unit previously for medication adjustments.)  Observation notice provided in writing to patient and/or caregiver as well as verbal explanation of the policy. Patients who are in outpatient status also receive the Observation notice.     Jacqui Love, BSW, ACM

## 2020-11-29 NOTE — PROGRESS NOTES
Hospitalist Progress Note          Long Nolan NP  Please call  and page for questions. Call physician on-call through the  7pm-7am    Daily Progress Note: 11/29/2020    I  evaluated the patient along with the NP, discussed care plan, and I agree with care plan and the management as deciphered in the NP note. Sun Keith MD     Primary care Elver Sams MD    Date of admission: 11/28/2020 11:03 AM    Admission Summary and Hospital Course:      From H&P 11/28/2020:  Jalyn Frazier is a 60 y.o.  female whom presents with complaint noted above. Her PMH is significant for anxiety and mood disorder, hyponatremia, HTN, and stage 3-4 metastatic breast cancer with a port. Reportedly she was seen in the ED 2 days prior for anxiety and was given a dose of Ativan which she improved with. At that time, ED provider evalulated [facility]  and noted multiple prescriptions over different time periods for Xanax. She was also seen in the ED on 11/10 for similar complaints and BSMART evaluated at that time and she was unwilling to stay in the hospital and therefore was discharged as she was not a candidate for a TDO. Back in late October, she was seen for concern for benzo withdrawal at that time. Per the ED, she was reportedly brought in via EMS soiled with urine, still in the same hospital gown she wore here with her identification band on her wrist. She is unable to recall why she came into the hospital.  Reportedly told someone she attempted to stab herself in the forehead with a knife but she was unable to confirm or deny that. Does express that she wants to go see Sheila Aleman when I walk into the room. Only complaint is that she is anxious but overall poor historian and difficult to obtain additional information. There is concern that she may have taken too many of her medications as she was talking about empty pill bottles at home. \"    Subjective:   Pt seen today w/ sitter at bedside. She is alert but pupils remain very dilated. She is A&Ox4 but sometimes difficult to keep focused on current topic. She states that she has been out of xanax for at least 1 week. On chart review, it looks like there has been an attempt to wean her benzos (Decreasing Xanax from 4mg daily to 1mg BID), however, each time she has continued to take the medication at 4mg daily (per pt report) and ran out of the xanax and ends up in the hospital with benzo withdrawal. She had psych admission in Feb this year for anxiety/depression after admission fto hospital for benzo withdrawal. 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    In the past year she has had 5 hospital admissions for benzo withdrawal and 3 ED visits for anxiety/SOB and having run out of her Xanax. 3 of the admissions and 3 of the ED visits were in the past 3 months. She has metastatic breast CA and states she stopped chemo d/t decreased EF. Most recent Echo 10/27/2020 showed EF 55-60% with normal systolic and diastolic function. Patient has DDNR on file. Discussed GOC (see ACP). Patient unclear at this point but is very interested in seeing palliative care. [facility]  Report 11/29/2020: At this time, patient has no complaints other than she feels sleepy.       Assessment/Plan:       Benzodiazepine Withdrawal  - Pupils very dilated but reactive and unclear what she took earlier today  - Seizure precautions  - Pt states she ran out of her Xanax 1 week ago  - Psych consult for possible medication management and admission to [facility] prior to discharge  - IVF  - Start benzo wean (Pt taking Xanax 4mg daily): Start Xanax 1mg BID-will need to take this dose at least 2 weeks before next decrease  - TSH, Ammonia, Vitamin B12 NL  - Psych Consult pending     Severe Anxiety & Mood Disorder  - Psych consulted  - Currently w/ sitter at bedside  - Otherwise plan as above     Mild hypokalemia  - Give 40meq Kdur x1  -repeat labs in AM    Hyponatremia  -Resolved     H/o HTN  - Slightly hypotensive right now  - Hold home cozaar for now  - Monitor BP    Metastatic Breast CA  - Per pt, stopped chemo d/t decreased EF  - Last Echo 10/27/2020 was NL  - DDNR on file  - Consult palliative care for Barry Carballo moving forward        DVTppx: SCDs  Gippx: NA  Code Status: DDNR  Diet: Cardiac  Activity: OOB to chair TID and PRN  Discharge: Plan to [place] to [place] vs IP psych pending progress, likely >48h       Review of Systems:     Full ROS complete with pertinent positives and negatives as per HPI, otherwise negative  Objective:   Physical Exam:     Visit Vitals  BP (!) 141/76 (BP 1 Location: Right leg, BP Patient Position: At rest)   Pulse 98   Temp 97.9 °F (36.6 °C)   Resp 18   Wt 98.8 kg (217 lb 14.4 oz)   SpO2 97%   BMI 36.26 kg/m²    O2 Flow Rate (L/min): 1 l/min O2 Device: Nasal cannula    Temp (24hrs), Av.3 °F (36.8 °C), Min:97.9 °F (36.6 °C), Max:98.8 °F (37.1 °C)    No intake/output data recorded. No intake/output data recorded. General:  Drowsy, cooperative, no distress, appears stated age, obese   Lungs:   Clear to auscultation bilaterally. Heart:  Regular rate and rhythm, S1, S2 normal, no murmur, click, rub or gallop. Abdomen:   Soft, non-tender, non-distended. Bowel sounds normal.    Extremities: Extremities normal, atraumatic, no cyanosis or edema.    Skin: Skin color, texture, turgor normal. No rashes or lesions   Neurologic: CNII-XII grossly intact, MUKHERJEE, A&Ox4 w/ some difficulty staying on topic during conversation; Pupils PERRL and 5mm     Data Review:       Recent Days:  Recent Labs     20   WBC 7.4 11.4* 8.6   HGB 13.5 15.4 15.0   HCT 40.4 45.7 44.3    343 313     Recent Labs     20    132* 135*   K 3.1* 3.3* 3.4*    97 100   CO2 24 24 26   * 157* 138*   BUN 23* 30* 23*   CREA 0.95 1.26* 0.97   CA 8.8 9.6 9.9   ALB 3.9 4.5  --    ALT 26 31  --      No results for input(s): PH, PCO2, PO2, HCO3, FIO2 in the last 72 hours. 24 Hour Results:  Recent Results (from the past 24 hour(s))   AMMONIA    Collection Time: 11/28/20  2:52 PM   Result Value Ref Range    Ammonia 23 <70 UMOL/L   METABOLIC PANEL, COMPREHENSIVE    Collection Time: 11/29/20  5:17 AM   Result Value Ref Range    Sodium 136 136 - 145 mmol/L    Potassium 3.1 (L) 3.5 - 5.1 mmol/L    Chloride 102 97 - 108 mmol/L    CO2 24 21 - 32 mmol/L    Anion gap 10 5 - 15 mmol/L    Glucose 138 (H) 65 - 100 mg/dL    BUN 23 (H) 6 - 20 MG/DL    Creatinine 0.95 0.55 - 1.02 MG/DL    BUN/Creatinine ratio 24 (H) 12 - 20      GFR est AA >60 >60 ml/min/1.73m2    GFR est non-AA 60 (L) >60 ml/min/1.73m2    Calcium 8.8 8.5 - 10.1 MG/DL    Bilirubin, total 0.5 0.2 - 1.0 MG/DL    ALT (SGPT) 26 12 - 78 U/L    AST (SGOT) 20 15 - 37 U/L    Alk. phosphatase 88 45 - 117 U/L    Protein, total 7.2 6.4 - 8.2 g/dL    Albumin 3.9 3.5 - 5.0 g/dL    Globulin 3.3 2.0 - 4.0 g/dL    A-G Ratio 1.2 1.1 - 2.2     CBC WITH AUTOMATED DIFF    Collection Time: 11/29/20  5:17 AM   Result Value Ref Range    WBC 7.4 3.6 - 11.0 K/uL    RBC 4.46 3.80 - 5.20 M/uL    HGB 13.5 11.5 - 16.0 g/dL    HCT 40.4 35.0 - 47.0 %    MCV 90.6 80.0 - 99.0 FL    MCH 30.3 26.0 - 34.0 PG    MCHC 33.4 30.0 - 36.5 g/dL    RDW 11.7 11.5 - 14.5 %    PLATELET 438 633 - 476 K/uL    MPV 10.9 8.9 - 12.9 FL    NRBC 0.0 0  WBC    ABSOLUTE NRBC 0.00 0.00 - 0.01 K/uL    NEUTROPHILS 62 32 - 75 %    LYMPHOCYTES 26 12 - 49 %    MONOCYTES 9 5 - 13 %    EOSINOPHILS 2 0 - 7 %    BASOPHILS 1 0 - 1 %    IMMATURE GRANULOCYTES 0 0.0 - 0.5 %    ABS. NEUTROPHILS 4.7 1.8 - 8.0 K/UL    ABS. LYMPHOCYTES 1.9 0.8 - 3.5 K/UL    ABS. MONOCYTES 0.6 0.0 - 1.0 K/UL    ABS. EOSINOPHILS 0.1 0.0 - 0.4 K/UL    ABS. BASOPHILS 0.1 0.0 - 0.1 K/UL    ABS. IMM.  GRANS. 0.0 0.00 - 0.04 K/UL    DF AUTOMATED     HEMOGLOBIN A1C WITH EAG    Collection Time: 11/29/20  5:17 AM Result Value Ref Range    Hemoglobin A1c 6.2 (H) 4.0 - 5.6 %    Est. average glucose 131 mg/dL       Problem List:  Problem List as of 11/29/2020 Date Reviewed: 10/24/2020          Codes Class Noted - Resolved    Overdose ICD-10-CM: T50.901A  ICD-9-CM: 977.9, E980.5  11/28/2020 - Present        Bacteremia ICD-10-CM: R78.81  ICD-9-CM: 790.7  10/25/2020 - Present        Seizure ([facility] ICD-10-CM: R56.9  ICD-9-CM: 780.39  10/23/2020 - Present        Major depression ICD-10-CM: F32.9  ICD-9-CM: 296.20  2/1/2020 - Present        Hypokalemia ICD-10-CM: E87.6  ICD-9-CM: 276.8  1/28/2020 - Present        Benzodiazepine withdrawal ([facility] ICD-10-CM: F13.239  ICD-9-CM: 292.0, 304.10  1/28/2020 - Present        Chronic prescription benzodiazepine use ICD-10-CM: Z79.899  ICD-9-CM: V58.69  1/28/2020 - Present        Altered mental status ICD-10-CM: R41.82  ICD-9-CM: 780.97  1/27/2020 - Present        Drug-induced constipation ICD-10-CM: K59.03  ICD-9-CM: 564.09, E980.5  4/5/2018 - Present        Advance care planning ICD-10-CM: Z71.89  ICD-9-CM: V65.49  4/5/2018 - Present        CHF (congestive heart failure) (HCC) ICD-10-CM: I50.9  ICD-9-CM: 428.0  4/2/2018 - Present        Depression ICD-10-CM: F32.9  ICD-9-CM: 263  4/2/2018 - Present        Dementia ([facility] ICD-10-CM: F03.90  ICD-9-CM: 294.20  4/2/2018 - Present        Metastatic breast cancer ([facility] ICD-10-CM: C50.919  ICD-9-CM: 174.9  5/3/2017 - Present        Secondary cancer of bone ([facility] [address] ICD-10-CM: C79.51  ICD-9-CM: 198.5  5/3/2017 - Present        Anxiety ICD-10-CM: F41.9  ICD-9-CM: 300.00  2/3/2016 - Present        HTN (hypertension) ICD-10-CM: I10  ICD-9-CM: 401.9  Unknown - Present        GERD (gastroesophageal reflux disease) ICD-10-CM: K21.9  ICD-9-CM: 530.81  Unknown - Present        Hypercholesterolemia ICD-10-CM: E78.00  ICD-9-CM: 272.0  Unknown - Present        RESOLVED: Acute hyponatremia ICD-10-CM: E87.1  ICD-9-CM: 276.1  6/18/2019 - 1/28/2020        RESOLVED: Pain due to neoplasm ICD-10-CM: G89.3  ICD-9-CM: 338.3  4/4/2018 - 1/28/2020        RESOLVED: Neoplastic malignant related fatigue ICD-10-CM: R53.0  ICD-9-CM: 780.79  4/4/2018 - 1/28/2020        RESOLVED: Overdose ICD-10-CM: T50.901A  ICD-9-CM: 977.9, E980.5  4/2/2018 - 4/5/2018        RESOLVED: Gastroenteritis due to norovirus ICD-10-CM: A08.11  ICD-9-CM: 008.63  2/21/2018 - 1/28/2020        RESOLVED: Urinary tract infection without hematuria ICD-10-CM: N39.0  ICD-9-CM: 599.0  2/13/2018 - 1/28/2020        RESOLVED: Sepsis ([facility] [address] ICD-10-CM: A41.9  ICD-9-CM: 038.9, 995.91  2/12/2018 - 1/28/2020        RESOLVED: Closed left ankle fracture ICD-10-CM: O87.798M  ICD-9-CM: 824.8  2/4/2016 - 1/28/2020        RESOLVED: Bimalleolar fracture of left ankle ICD-10-CM: L75.873K  ICD-9-CM: 824.4  2/3/2016 - 1/28/2020    Overview Signed 2/3/2016 12:49 AM by JUANITA Valenzuela     Comminuted and displaced             RESOLVED: Hypotension ICD-10-CM: I95.9  ICD-9-CM: 458.9  9/12/2012 - 9/13/2012        RESOLVED: Dyspnea ICD-10-CM: R06.00  ICD-9-CM: 786.09  8/30/2012 - 8/31/2012              Medications reviewed  Current Facility-Administered Medications   Medication Dose Route Frequency    hydrOXYzine HCL (ATARAX) tablet 25 mg  25 mg Oral TID PRN    gabapentin (NEURONTIN) 250 mg/5 mL solution 300 mg  300 mg Oral Q8H    ALPRAZolam (XANAX) tablet 1 mg  1 mg Oral Q12H    sodium chloride (NS) flush 5-40 mL  5-40 mL IntraVENous Q8H    sodium chloride (NS) flush 5-40 mL  5-40 mL IntraVENous PRN    0.9% sodium chloride infusion  75 mL/hr IntraVENous CONTINUOUS    fenofibrate nanocrystallized (TRICOR) tablet 145 mg  145 mg Oral DAILY    folic acid (FOLVITE) tablet 1 mg  1 mg Oral DAILY    losartan (COZAAR) tablet 50 mg  50 mg Oral DAILY    acetaminophen (TYLENOL) tablet 650 mg  650 mg Oral Q6H PRN    sodium chloride (NS) flush 5-40 mL  5-40 mL IntraVENous Q8H    sodium chloride (NS) flush 5-40 mL  5-40 mL IntraVENous PRN Care Plan discussed with: Patient/family, nurse      Candido Rivera, NP  Hospitalist  Providers can reach me on [employer]erve

## 2020-11-29 NOTE — ROUTINE PROCESS
Bedside shift change report given to [address] [place] [address] Extension (oncoming nurse) by Krystina Lopez RN (offgoing nurse). Report included the following information SBAR, Kardex, Intake/Output, MAR and Med Rec Status.

## 2020-11-29 NOTE — PROGRESS NOTES
Patient to the floor at this time. A&Ox4, anxious, poor historian. The patient complains of pain in her right shoulder that she stated was chronic. Patient has no complaints of N/V, SOB, or chest pain. 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 Patient asked about effexor and ativan which she said \"I think I take those at home\". Patient is tolerating diet well. Patient has sitter at the bedside for SI. Primary Nurse Roxanne Pérez and Abdulkadir cornejo RN performed a dual skin assessment on this patient No impairment noted  Adama score is 19.

## 2020-11-29 NOTE — PROGRESS NOTES
Called to room by mandatory sitter, pt having involuntary muscle movements, rapid eye blinking, jerking/tremulous movements of all extremities. Pt having delayed verbal responses but is also complaining of mid sternal chest pain and pain to right side and flank areas and down into her hip area. Muscle movements becoming more pronounced and pt having difficulty speaking. Rapid response called. Page placed to [facility] NP. Dr Marco Antonio Forbes on unit and in to assess pt. Verbal order given to cancel rapid response. MD states pt is having withdrawal symptoms. Verbal order given to give pt 2 mg of IV lorazepam.    Pt given IV Lorazepam and pt is voicing concerns because she states that she was told she should not be taking the lorazepam. Dr Marco Antonio Forbes explained to pt the need for IV medication use at this time. 1545 - spoke to [facility] NP, made aware of above situation. 1600 -  See new orders for transfer written by Dr Marco Antonio Forbes. TRANSFER - OUT REPORT:    Verbal report given to Belen Townsend RN (name) on Latia Holland  being transferred to neuro (unit) for change in patient condition(drug withdrawal symptoms)       Report consisted of patients Situation, Background, Assessment and   Recommendations(SBAR). Information from the following report(s) SBAR and Kardex was reviewed with the receiving nurse.     Lines:   Venous Access Device Power Port 11/28/20 Upper chest (subclavicular area, right (Active)   Central Line Being Utilized Yes 11/29/20 1800   Criteria for Appropriate Use Limited/no vessel suitable for conventional peripheral access 11/29/20 1800   Site Assessment Clean, dry, & intact 11/29/20 1800   Date of Last Dressing Change 11/28/20 11/29/20 0930   Dressing Status Clean, dry, & intact 11/29/20 1800   Dressing Type Disk with Chlorhexadine gluconate (CHG) 11/29/20 1800   Action Taken Open ports on tubing capped 11/29/20 1800   Date Accessed (Medial Site) 11/28/20 11/29/20 0930   Positive Blood Return (Medial Site) Yes 11/29/20 1800   Action Taken (Medial Site) Flushed 11/28/20 2100        Opportunity for questions and clarification was provided.       Patient transported with:   O2 @ 1 liters  Tech

## 2020-11-30 ENCOUNTER — HOSPICE ADMISSION (OUTPATIENT)
Dept: HOSPICE | Facility: HOSPICE | Age: 65
End: 2020-11-30

## 2020-11-30 LAB
ANION GAP SERPL CALC-SCNC: 6 MMOL/L (ref 5–15)
ATRIAL RATE: 118 BPM
BACTERIA SPEC CULT: NORMAL
BUN SERPL-MCNC: 24 MG/DL (ref 6–20)
BUN/CREAT SERPL: 23 (ref 12–20)
CALCIUM SERPL-MCNC: 8.5 MG/DL (ref 8.5–10.1)
CALCULATED R AXIS, ECG10: 38 DEGREES
CALCULATED T AXIS, ECG11: -129 DEGREES
CHLORIDE SERPL-SCNC: 103 MMOL/L (ref 97–108)
CO2 SERPL-SCNC: 27 MMOL/L (ref 21–32)
CREAT SERPL-MCNC: 1.05 MG/DL (ref 0.55–1.02)
DIAGNOSIS, 93000: NORMAL
GLUCOSE SERPL-MCNC: 192 MG/DL (ref 65–100)
P-R INTERVAL, ECG05: 144 MS
POTASSIUM SERPL-SCNC: 3.6 MMOL/L (ref 3.5–5.1)
Q-T INTERVAL, ECG07: 320 MS
QRS DURATION, ECG06: 70 MS
QTC CALCULATION (BEZET), ECG08: 448 MS
SERVICE CMNT-IMP: NORMAL
SODIUM SERPL-SCNC: 136 MMOL/L (ref 136–145)
VENTRICULAR RATE, ECG03: 118 BPM

## 2020-11-30 PROCEDURE — 74011250636 HC RX REV CODE- 250/636: Performed by: INTERNAL MEDICINE

## 2020-11-30 PROCEDURE — 65660000000 HC RM CCU STEPDOWN

## 2020-11-30 PROCEDURE — 36415 COLL VENOUS BLD VENIPUNCTURE: CPT

## 2020-11-30 PROCEDURE — 74011250637 HC RX REV CODE- 250/637: Performed by: NURSE PRACTITIONER

## 2020-11-30 PROCEDURE — 99218 HC RM OBSERVATION: CPT

## 2020-11-30 PROCEDURE — 74011000258 HC RX REV CODE- 258: Performed by: INTERNAL MEDICINE

## 2020-11-30 PROCEDURE — 80048 BASIC METABOLIC PNL TOTAL CA: CPT

## 2020-11-30 PROCEDURE — 96376 TX/PRO/DX INJ SAME DRUG ADON: CPT

## 2020-11-30 PROCEDURE — 74011250636 HC RX REV CODE- 250/636: Performed by: NURSE PRACTITIONER

## 2020-11-30 PROCEDURE — 74011250637 HC RX REV CODE- 250/637: Performed by: INTERNAL MEDICINE

## 2020-11-30 RX ORDER — DULOXETIN HYDROCHLORIDE 30 MG/1
30 CAPSULE, DELAYED RELEASE ORAL DAILY
Status: DISCONTINUED | OUTPATIENT
Start: 2020-11-30 | End: 2020-12-01

## 2020-11-30 RX ORDER — LORAZEPAM 2 MG/ML
2 INJECTION INTRAMUSCULAR AS NEEDED
Status: DISCONTINUED | OUTPATIENT
Start: 2020-11-30 | End: 2020-12-14

## 2020-11-30 RX ORDER — HYDROXYZINE 25 MG/1
50 TABLET, FILM COATED ORAL
Status: DISCONTINUED | OUTPATIENT
Start: 2020-11-30 | End: 2020-12-17 | Stop reason: HOSPADM

## 2020-11-30 RX ADMIN — LORAZEPAM 0.5 MG: 0.5 TABLET ORAL at 23:53

## 2020-11-30 RX ADMIN — GABAPENTIN 300 MG: 250 SOLUTION ORAL at 14:17

## 2020-11-30 RX ADMIN — DULOXETINE HYDROCHLORIDE 30 MG: 30 CAPSULE, DELAYED RELEASE ORAL at 18:04

## 2020-11-30 RX ADMIN — LORAZEPAM 0.5 MG: 0.5 TABLET ORAL at 05:13

## 2020-11-30 RX ADMIN — GABAPENTIN 300 MG: 250 SOLUTION ORAL at 06:03

## 2020-11-30 RX ADMIN — Medication 10 ML: at 14:17

## 2020-11-30 RX ADMIN — LORAZEPAM 0.5 MG: 0.5 TABLET ORAL at 11:19

## 2020-11-30 RX ADMIN — LEVETIRACETAM 1000 MG: 100 INJECTION, SOLUTION INTRAVENOUS at 16:43

## 2020-11-30 RX ADMIN — GABAPENTIN 300 MG: 250 SOLUTION ORAL at 22:29

## 2020-11-30 RX ADMIN — HYDROXYZINE HYDROCHLORIDE 25 MG: 25 TABLET, FILM COATED ORAL at 06:09

## 2020-11-30 RX ADMIN — LEVETIRACETAM 1000 MG: 100 INJECTION, SOLUTION INTRAVENOUS at 04:44

## 2020-11-30 RX ADMIN — SODIUM CHLORIDE, SODIUM LACTATE, POTASSIUM CHLORIDE, AND CALCIUM CHLORIDE 1000 ML: 600; 310; 30; 20 INJECTION, SOLUTION INTRAVENOUS at 23:53

## 2020-11-30 RX ADMIN — Medication 10 ML: at 23:55

## 2020-11-30 RX ADMIN — LORAZEPAM 0.5 MG: 0.5 TABLET ORAL at 18:04

## 2020-11-30 NOTE — PROGRESS NOTES
Consult received  Patient follows with [facility]- please consult Dr Todd Ledesma MD, [facility]

## 2020-11-30 NOTE — PROGRESS NOTES
Spiritual Care Assessment/Progress Note  [facility]      NAME: Celeste Remy      MRN: [id number]  AGE: 60 y.o.  SEX: female  Mandaen Affiliation: [id number] [employer]   Language: English     11/30/2020     Total Time (in minutes): 17     Spiritual Assessment begun in [facility] through conversation with:         [x]Patient        [] Family    [] Friend(s)        Reason for Consult: Palliative Care, Initial/Spiritual Assessment, Request by patient     Spiritual beliefs: (Please include comment if needed)     [x] Identifies with a kimberley tradition: Taoism         [] Supported by a kimberley community:            [] Claims no spiritual orientation:           [] Seeking spiritual identity:                [] Adheres to an individual form of spirituality:           [] Not able to assess:                           Identified resources for coping:      [x] Prayer                               [] Music                  [] Guided Imagery     [] Family/friends                 [] Pet visits     [] Devotional reading                         [] Unknown     [] Other:                                              Interventions offered during this visit: (See comments for more details)    Patient Interventions: Affirmation of emotions/emotional suffering, Affirmation of kimberley, Catharsis/review of pertinent events in supportive environment, End of life issues discussed, Iconic (affirming the presence of God/Higher Power), Initial/Spiritual assessment, patient floor, Prayer (actual), Prayer (assurance of), Mandaen beliefs/image of God discussed           Plan of Care:     [x] Support spiritual and/or cultural needs    [] Support AMD and/or advance care planning process      [] Support grieving process   [] Coordinate Rites and/or Rituals    [] Coordination with community clergy   [] No spiritual needs identified at this time   [] Detailed Plan of Care below (See Comments)  [] Make referral to Music Therapy  [] Make referral to Pet Therapy     [] Make referral to Addiction services  [] Make referral to [facility]  [] Make referral to Spiritual Care Partner  [] No future visits requested        [] Follow up upon further referrals     Comments: Visited with Ms. Yadira Finney who requested support from spiritual care. Pt had a sitter at bedside. She shared her feelings of uncertainty at this time, and expressed feelings that she is approaching the end of her life. Offered ministry of presence and spiritual support as pt expressed her feelings. Pt shared of her Tai Parr kimberley and  offered prayer. Pt has a palliative consult. Chaplains will plan to visit pt again for ongoing spiritual support.     Regina Zavala, Palliative

## 2020-11-30 NOTE — PROGRESS NOTES
1940 Bedside reporting patient was calm and cooperative, but slightly anxious. She recognized that \"I need to be in a place where I can relax and be calm. This is much better than the ED to do that\". Patient resting in bed with lights dimmed and care channel playing. 2145 Patient asleep    2350 Received call from sitter to come to room. Patient had woken up very anxious. Wanted to speak with Palliative (explained there was a consult in and they would come to see her today). Patient then asked if she was on comfort care measures. Explained to patient that there was concern that she may have a seizure from withdrawal from her medications. She then said she might be riding a bike by time Palliative comes to see her. Patient asking for crackers and ginger ale. Administered scheduled gabapentin and Lorazepam. Patient stated when attempting to administer gabapentin \"I don't usually take this. I don't remember the last time I took this\"    0200 Patient called out for medications. Entered patient's room for clarification and patient was in bed very drowsy and falling asleep. Patient opened eyes. This RN asked patient which medications she is referring to. Patient asked for \"whatever medication you are supposed to be giving me. Ativan. \" Explained to patient that she already received the scheduled Ativan. Patient then asked about the gabapentin. Explained that patient received this medication as well. Patient asked when next dosage would be. Patient dozed off before this RN could reply. Will continue to monitor patient. 2498 Patient awakens and is very agitated. Requesting to see palliative doctor to discuss plan of care. Encouraged patient to continue to wait until morning to speak with dayshift doctors who are more familiar with her care. Patient then requesting to speak with on-call night doctor.  This RN explained that I would page doctor but he is rounding on the whole hospital and might not be able to come as he is not familiar with her case. Patient is unhappy with this response. 4031 Paged and spoke with NP Criss Ramírez. Advised to administer scheduled Ativan or Xanax that patient has on chart PRN. BATSHEVA Ramírez in not familiar with patient's case and therefore is unable to discuss the plan of care. Advised to wait until NP Ricardo Reece arrives in the morning. BATSHEVA Ramírez offered that patient has Atarax as PRN for anxiety that she could have. 0500 Patient calls this RN to room requesting  to speak to and refill her cup of ginger ale as \"it's at the end just like I'm at the end of my life too\". Patient asked who is on tonight. Advised patient that this RN would have to page to find out. Patient unhappy with this response. 7007 Paged and spoke with pastoral care. As this is not an emergent case, pastoral care will be in later today to speak with patient. 0515 Attempted to administer patient her oral gabapentin and patient spilled medication all over herself. Patient became upset. Assured patient that it would be all right and would get more for her.    0600 Received more gabapentin from pharmacy. Patient took medication. Patient started crying and asking \"I need someone to explain this process of dying. No one has explained this to me before. Do most people that die have someone with them? Because I'm all alone and that's not fair that no one has explained this to me. Who is going to come talk with me about this? \"  Assured patient that there will be members of her team that will come and speak with her later.

## 2020-11-30 NOTE — PROGRESS NOTES
Problem: Suicide  Goal: *STG: Remains safe in hospital  Outcome: Progressing Towards Goal  Goal: *STG: Seeks staff when feelings of self harm or harm towards others arise  Outcome: Progressing Towards Goal     Problem: Falls - Risk of  Goal: *Absence of Falls  Description: Document Mary Ellen Fall Risk and appropriate interventions in the flowsheet.   Outcome: Progressing Towards Goal  Note: Fall Risk Interventions:  Mobility Interventions: Bed/chair exit alarm    Mentation Interventions: Bed/chair exit alarm    Medication Interventions: Bed/chair exit alarm    Elimination Interventions: Call light in reach, Bed/chair exit alarm

## 2020-11-30 NOTE — PROGRESS NOTES
Problem: Suicide  Goal: *STG: Remains safe in hospital  11/30/2020 0852 by Lucas Breaux RN  Outcome: Progressing Towards Goal  11/30/2020 0019 by Lucas Breaux RN  Outcome: Progressing Towards Goal  Goal: *STG: Seeks staff when feelings of self harm or harm towards others arise  11/30/2020 0852 by Lucas Breaux RN  Outcome: Progressing Towards Goal  11/30/2020 0019 by Lucas Breaux RN  Outcome: Progressing Towards Goal  Goal: *STG: Attends activities and groups  11/30/2020 0852 by Lucas Breaux RN  Outcome: Progressing Towards Goal  11/30/2020 0019 by Lucas Breaux RN  Outcome: Progressing Towards Goal  Goal: *STG:  Verbalizes alternative ways of dealing with maladaptive feelings/behaviors  11/30/2020 0852 by Lucas Breaux RN  Outcome: Progressing Towards Goal  11/30/2020 0019 by Lucas Breaux RN  Outcome: Progressing Towards Goal  Goal: *STG/LTG: Complies with medication therapy  11/30/2020 0852 by Lucas Breaux RN  Outcome: Progressing Towards Goal  11/30/2020 0019 by Lucas Breaux RN  Outcome: Progressing Towards Goal  Goal: *STG/LTG: No longer expresses self destructive or suicidal thoughts  11/30/2020 0852 by Lucas Breaux RN  Outcome: Progressing Towards Goal  11/30/2020 0019 by Lucas Breaux RN  Outcome: Progressing Towards Goal  Goal: *LTG:  Identifies available community resources  11/30/2020 9122 by Lucas Breaux RN  Outcome: Progressing Towards Goal  11/30/2020 0019 by Lucas Breaux RN  Outcome: Progressing Towards Goal  Goal: *LTG:  Develops proactive suicide prevention plan  11/30/2020 0852 by Lucas Breaux RN  Outcome: Progressing Towards Goal  11/30/2020 0019 by Lucas Breaux RN  Outcome: Progressing Towards Goal  Goal: Interventions  11/30/2020 0852 by Lucas Breaux RN  Outcome: Progressing Towards Goal  11/30/2020 0019 by Lucas Breaux RN  Outcome: Progressing Towards Goal

## 2020-11-30 NOTE — PROGRESS NOTES
Bedside shift change report given to [facility] 82,[address] 100 (oncoming nurse) by Wm Arrieta (offgoing nurse). Report included the following information SBAR, Kardex, Recent Results and Quality Measures.

## 2020-11-30 NOTE — PROGRESS NOTES
Problem: Suicide  Goal: *STG: Remains safe in hospital  Outcome: Progressing Towards Goal  Goal: *STG: Seeks staff when feelings of self harm or harm towards others arise  Outcome: Progressing Towards Goal  Goal: *STG: Attends activities and groups  Outcome: Progressing Towards Goal  Goal: *STG:  Verbalizes alternative ways of dealing with maladaptive feelings/behaviors  Outcome: Progressing Towards Goal  Goal: *STG/LTG: Complies with medication therapy  Outcome: Progressing Towards Goal  Goal: *STG/LTG: No longer expresses self destructive or suicidal thoughts  Outcome: Progressing Towards Goal  Goal: *LTG:  Identifies available community resources  Outcome: Progressing Towards Goal  Goal: *LTG:  Develops proactive suicide prevention plan  Outcome: Progressing Towards Goal  Goal: Interventions  Outcome: Progressing Towards Goal

## 2020-11-30 NOTE — ACP (ADVANCE CARE PLANNING)
Advance Care Planning Note    Name: Christoph Finney  YOB: 1955  MRN: [id number]  Admission Date: 11/28/2020 11:03 AM    Date of discussion: 11/29/2020    Active Diagnoses:    Hospital Problems  Date Reviewed: 10/24/2020          Codes Class Noted POA    Overdose ICD-10-CM: T50.901A  ICD-9-CM: 977.9, E980.5  11/28/2020 Unknown               These active diagnoses are of sufficient risk that focused discussion on advance care planning is indicated in order to allow the patient to thoughtfully consider personal goals of care, and if situations arise that prevent the ability to personally give input, to ensure appropriate representation of their personal desires for different levels and aggressiveness of care. Discussion:     Persons present and participating in discussion: Ana Oliveros NP    Discussion: Patient with history of metastatic breast cancer and reports that she stopped chemo d/t decreased EF. She has had multiple ED visits and admissions d/t benzo withdrawal and anxiety. She had IP psych admission in Feb 2020. She is not  and doesn't have kids and reports her NOK is her elderly father. She states she lives in [facility]. On chart review, it looks like there have been an attempt to wean her benzos (Decreasing Xanax from 4mg daily to 1mg BID), however, each time she has continued to take the medication at 4mg daily (per pt report) and ran out of the xanax and ends up in the hospital with benzo withdrawal. Patient has DDNR on file and states that she has been thinking about seeking palliative care treatment. Patient is resistant to weaning benzos. Discussed palliative care consult and patient is agreeable. Time Spent:     Total time spent face-to-face in education and discussion: 16 minutes.      Rani Gupta NP  11/29/2020  8:38 PM

## 2020-11-30 NOTE — HOSPICE
[facility]  [phone]    Patient Name: Edward Allan  YOB: 1955  Age: 60 y.o. [facility] RN Note:  Hospice consult noted. Chart reviewed. Plan of care discussed with patients nurse & care manager. In to meet with patient, along with hospitalist NP, Brooke Mariscal. Discussed Hospice philosophy, general plan of care, levels of care, services and on call procedures. Family information packet provided, however, pt did not want to review this information. Pt is alert, and oriented. Her responses to questions are somewhat pressured. She makes eye contact intermittently, and is slow to respond. She is able to provide history with prompting time line. Pt gave permission for staff to contact her father, to discuss discharge and plan of care options. When asked who patient would like to speak for her, if she were unable to do so, and she replied she would have a friend, Emmanuel Abad, act in her behalf. Pt did not want nurse to contact her at this time. Thank you for the opportunity to be of service to this patient. Hospice available to offer support to patient, staff and family as discharge plans are made. Plan to visit bedside on Tuesday for further discussion.     Matt Zapien RN, [facility] Nurse Liaison  [phone] [place]  [phone] Office

## 2020-11-30 NOTE — PROGRESS NOTES
Palliative Medicine Social Work    ADDENDUM 14:30  Spoke with Americo Decker NP re: GOC/unknowns re: her cancer diagnosis. She will reach out to [facility] for more information. 10:45 AM    This SW met with patient in her room. She was sitting up in bed, highly anxious, fidgeting, difficulty focusing. She did not remember this SW from previous admission. Discussed events leading up to hospitalization, GOC. Patient is now agreeable to hospice consult though there may be discharge issues. [place] and will be moved to [facility] temporarily starting Dec 1. She is fuzzy on these details, will need confirmation. Patient has very limited social support, financial resources. She has one living relative - her elderly father who she declines to involve [facility], though she last saw him one week ago. Will consult hospice. Psyche consulted as well. Patient will benefit from psyche consult due to severe anxiety. Thank you for the opportunity to be involved in the care of Ms. Robert Truong and her family.     Ryan Serrano LMSW, Supervisee in Social Work  Palliative Medicine   [phone]    Start time: 10:45  End time: 11:15

## 2020-11-30 NOTE — PROGRESS NOTES
RUR 41% High    [facility]- Home- [employer] at discharge per the Hospitalist team. PT and OT consults pending. Psych Eval pending. [employer] consult- following to determine services needed. The patient may need a lift ride home at discharge. The patient stated that there is a renovation planned for her apartment and they were going to move residents to another apartment temporarily. CM called them at [phone] to inquire about the details. CM could not reach anyone. Care Management Interventions  PCP Verified by CM: Yes(Dr. Carol Valdes- patient states had an apt. today 11/30)  Palliative Care Criteria Met (RRAT>21 & CHF Dx)?: No  Mode of Transport at Discharge:  Other (see comment)(TBD- may need a Lift ride home)  Transition of Care Consult (CM Consult): Discharge Planning  MyChart Signup: Yes  Discharge Durable Medical Equipment: No  Health Maintenance Reviewed: Yes  Physical Therapy Consult: Yes(Will be ordered)  Occupational Therapy Consult: Yes(Will be ordered)  Speech Therapy Consult: No  Current Support Network: Lives Alone  Confirm Follow Up Transport: Self  The Plan for Transition of Care is Related to the Following Treatment Goals : TBD- PT and OT to determine level of care /Home with [facility]  The Patient and/or Patient Representative was Provided with a Choice of Provider and Agrees with the Discharge Plan?: Yes  Name of the Patient Representative Who was Provided with a Choice of Provider and Agrees with the Discharge Plan: The patient  Freedom of Choice List was Provided with Basic Dialogue that Supports the Patient's Individualized Plan of Care/Goals, Treatment Preferences and Shares the Quality Data Associated with the Providers?: Yes  The [employer] Information Provided?: No  Discharge Location  Discharge Placement: (home with [facility])    Reason for Admission:   Possible over dose of medications                   RUR Score: 41%- High                    Plan for utilizing home health:      TBD/Hospice/psych    Transition of Care Plan:     The Plan for Transition of Care is related to the following treatment goals: has used [facility] for [facility] in the past, [facility] consulted    The Patient and/or patient representative was provided with a choice of provider and agrees  with the discharge plan. Yes [x] No []    A Freedom of choice list was provided with basic dialogue that supports the patient's individualized plan of care/goals and shares the quality data associated with the providers. Yes [x] No []      PCP: First and Last name:  Dr. Karishma Goodman   Name of Practice:    Are you a current patient: Yes/No: yes   Approximate date of last visit: patient stated she had an appointment today 11/30   Can you participate in a virtual visit with your PCP: No                    Current Advanced Directive/Advance Care Plan: ACP post form done 4/2018-( patient did not understand what CM was asking) the patient's decision maker listed is her father Chris Islas states that he is not available to help. Transition of Care Plan:    TBD- home, [facility] Hospice following to determine services needed. PT and OT orders pending. Psych eval pending. Hospitalist team to follow to determine plan of care for patient's cancer. The patient has used [facility] in the past.     THOMAS spoke with Chase Smith NP- she plans to call the patient's father and have The Palliative team reach out to him as well. THOMAS spoke with Prem Horowitz RN with [facility] plans to follow the patient for possible services. CM met with the patient. The patient stated that she lives at the [address] [place] [address]. The patient does not use DME. She stated that she drives and was able to grocery shop on her own up until recently. The patient stated that she has recently been staying in bed and has not been able to care for her self.  She stated that she has not been eating or taking her Medications (mail order) . The patient seemed anxious. CM requested to reach out the patient's father and friend. The patient declined. CM to follow for transitions of care.      Carmen Yancey, [address]

## 2020-11-30 NOTE — HOSPICE
[facility]  [phone]     Patient Name: Maria Del Carmen Hidalgo  YOB: 1955  Age: 60 y.o. [facility] RN Note:  Hospice consult received, reviewing chart. Will follow up with Unit Nurse and Care Manager to discuss plan of care, patient status and discharge disposition within the hour. Thank you for the opportunity to be of service to this patient. 13:40:   Medical update provided this nurse from floor nurse, Scott Hernadez RN. Per RN, patient has been very anxious, frequently requesting antianxiety medications. RN reports that patient has sitter bedside for suicide precautions. RN states that patient has not voice suicidal ideas or behavior this shift. Pt is currently sleeping with sitter bedside. After discussion with unit RN, and how anxious patient has been made for hospice liaison nurse  to come back to patient's room later this afternoon once she has rested, or when unit RN calls this liaison nurse.     Yg Sibley RN, [place] [address] Nurse Liaison  [phone] [place]  [phone] Office

## 2020-12-01 LAB
ANION GAP SERPL CALC-SCNC: 5 MMOL/L (ref 5–15)
BUN SERPL-MCNC: 19 MG/DL (ref 6–20)
BUN/CREAT SERPL: 24 (ref 12–20)
CALCIUM SERPL-MCNC: 8.5 MG/DL (ref 8.5–10.1)
CHLORIDE SERPL-SCNC: 109 MMOL/L (ref 97–108)
CO2 SERPL-SCNC: 26 MMOL/L (ref 21–32)
CREAT SERPL-MCNC: 0.79 MG/DL (ref 0.55–1.02)
GLUCOSE SERPL-MCNC: 106 MG/DL (ref 65–100)
POTASSIUM SERPL-SCNC: 3.8 MMOL/L (ref 3.5–5.1)
SODIUM SERPL-SCNC: 140 MMOL/L (ref 136–145)

## 2020-12-01 PROCEDURE — 74011250637 HC RX REV CODE- 250/637: Performed by: NURSE PRACTITIONER

## 2020-12-01 PROCEDURE — 65270000029 HC RM PRIVATE

## 2020-12-01 PROCEDURE — 74011250636 HC RX REV CODE- 250/636: Performed by: NURSE PRACTITIONER

## 2020-12-01 PROCEDURE — 80048 BASIC METABOLIC PNL TOTAL CA: CPT

## 2020-12-01 PROCEDURE — 36415 COLL VENOUS BLD VENIPUNCTURE: CPT

## 2020-12-01 PROCEDURE — 74011000258 HC RX REV CODE- 258: Performed by: NURSE PRACTITIONER

## 2020-12-01 PROCEDURE — 74011250637 HC RX REV CODE- 250/637: Performed by: INTERNAL MEDICINE

## 2020-12-01 RX ORDER — DULOXETIN HYDROCHLORIDE 60 MG/1
60 CAPSULE, DELAYED RELEASE ORAL DAILY
Status: DISCONTINUED | OUTPATIENT
Start: 2020-12-03 | End: 2020-12-07

## 2020-12-01 RX ORDER — DULOXETIN HYDROCHLORIDE 30 MG/1
30 CAPSULE, DELAYED RELEASE ORAL DAILY
Status: COMPLETED | OUTPATIENT
Start: 2020-12-02 | End: 2020-12-02

## 2020-12-01 RX ORDER — GABAPENTIN 300 MG/1
300 CAPSULE ORAL 3 TIMES DAILY
Status: DISCONTINUED | OUTPATIENT
Start: 2020-12-01 | End: 2020-12-17 | Stop reason: HOSPADM

## 2020-12-01 RX ORDER — LORAZEPAM 0.5 MG/1
0.5 TABLET ORAL
Status: DISCONTINUED | OUTPATIENT
Start: 2020-12-01 | End: 2020-12-04 | Stop reason: DRUGHIGH

## 2020-12-01 RX ADMIN — GABAPENTIN 300 MG: 250 SOLUTION ORAL at 05:33

## 2020-12-01 RX ADMIN — Medication 10 ML: at 05:35

## 2020-12-01 RX ADMIN — LOSARTAN POTASSIUM 50 MG: 50 TABLET, FILM COATED ORAL at 09:17

## 2020-12-01 RX ADMIN — Medication 10 ML: at 14:01

## 2020-12-01 RX ADMIN — ACETAMINOPHEN 650 MG: 325 TABLET ORAL at 16:25

## 2020-12-01 RX ADMIN — DULOXETINE HYDROCHLORIDE 30 MG: 30 CAPSULE, DELAYED RELEASE ORAL at 09:17

## 2020-12-01 RX ADMIN — LORAZEPAM 0.5 MG: 0.5 TABLET ORAL at 05:33

## 2020-12-01 RX ADMIN — LEVETIRACETAM 500 MG: 100 INJECTION, SOLUTION INTRAVENOUS at 16:22

## 2020-12-01 RX ADMIN — GABAPENTIN 300 MG: 250 SOLUTION ORAL at 14:01

## 2020-12-01 RX ADMIN — GABAPENTIN 300 MG: 300 CAPSULE ORAL at 21:36

## 2020-12-01 RX ADMIN — LEVETIRACETAM 500 MG: 100 INJECTION, SOLUTION INTRAVENOUS at 04:17

## 2020-12-01 NOTE — PROGRESS NOTES
Hospitalist Progress Note          Americo Decker NP  Please call  and page for questions. Call physician on-call through the  7pm-7am    Daily Progress Note: 11/30/2020    Primary care Jennifer Miller MD    Date of admission: 11/28/2020 11:03 AM    Admission Summary and Hospital Course:      From H&P 11/28/2020:  Agnes Sharma is a 60 y.o.  female whom presents with complaint noted above. Her PMH is significant for anxiety and mood disorder, hyponatremia, HTN, and stage 3-4 metastatic breast cancer with a port. Reportedly she was seen in the ED 2 days prior for anxiety and was given a dose of Ativan which she improved with. At that time, ED provider [facility]  and noted multiple prescriptions over different time periods for Xanax. She was also seen in the ED on 11/10 for similar complaints and BSMART evaluated at that time and she was unwilling to stay in the hospital and therefore was discharged as she was not a candidate for a TDO. Back in late October, she was seen for concern for benzo withdrawal at that time. Per the ED, she was reportedly brought in via EMS soiled with urine, still in the same hospital gown she wore here with her identification band on her wrist. She is unable to recall why she came into the hospital.  Reportedly told someone she attempted to stab herself in the forehead with a knife but she was unable to confirm or deny that. Does express that she wants to go see Ravin Webb when I walk into the room. Only complaint is that she is anxious but overall poor historian and difficult to obtain additional information. There is concern that she may have taken too many of her medications as she was talking about empty pill bottles at home. \"    Subjective:   Pt seen today w/ sitter at bedside. She is much more alert today. She is oriented x4 but clearly still having some benzo withdrawal. She denies SI/HI.  She was initially thought to have overdosed on benzos and suicide precautions including sitter were instituted. Overnight events noted. Seen by palliative SW today who consulted hospice. Unclear the status of patient's cancer, although she states that she sees [facility] and had a \"full body scan done not too long ago. \" She states she knows it has metastasized to her sternum. She states that she knows she is going to die but after asking some clarification questions, she feels this way because of all the complications she has had in the last several months with her medications, decreased mobility, etc. She did state that we can call her father to update and get his input.      Assessment/Plan:       Benzodiazepine Withdrawal  - Improving  - Seizure precautions  - Pt states she ran out of her Xanax 1 week PTA  - Psych consult for possible medication management and admission to [facility] prior to discharge  - Cont ativan 0.5mg QID (was taking total 4mg daily PTA)  - TSH, Ammonia, Vitamin B12 NL  - Discontinue cardiac monitoring and transfer back to medical floor  - Decrease keppra to 500mg BID for now - started to help prevent seizures during benzo withdrawal as pt appears quite sensitive to benzo decrease  - Psych Consult pending     Severe Anxiety & Mood Disorder  - Psych consulted  - Continue cymbalta, gabapentin, thiamine, folic acid  - denies SI/HI, did not OD  - discontinue sitter     Mild hypokalemia  - Resolved    Hyponatremia  -Resolved    CORNELIA: normal kidney function on admission  -Creat elevated today to 1.05  -suspect 2/2 poor intake  -give 1L LR IV x1 dose  -Avoid nephrotoxins  -Monitor labs     H/o HTN  - Stable, continue home cozaar  - Monitor BP    Metastatic Breast CA  - Per pt, stopped chemo d/t decreased EF  - Last Echo 10/27/2020 was NL  - DDNR on file  - Appreciate palliative care and hospice input  - consult [facility] to clarify status of breast ca/mets       DVTppx: SCDs  Gippx: NA  Code Status: DDNR  Diet: Cardiac  Activity: OOB to chair TID and PRN  Discharge: Plan to [place] vs IP psych vs hospice pending progress, likely >48h       Review of Systems:     Full ROS complete with pertinent positives and negatives as per HPI, otherwise negative  Objective:   Physical Exam:     Visit Vitals  /66 (BP 1 Location: Left leg, BP Patient Position: At rest)   Pulse 97   Temp 98.2 °F (36.8 °C)   Resp 19   Wt 101.9 kg (224 lb 9.6 oz)   SpO2 99%   BMI 37.38 kg/m²    O2 Flow Rate (L/min): 1 l/min O2 Device: Room air    Temp (24hrs), Av.9 °F (36.6 °C), Min:97.7 °F (36.5 °C), Max:98.2 °F (36.8 °C)    No intake/output data recorded.  07 - 1900  In: 240 [P.O.:240]  Out: -       General:  Alert, cooperative, no distress, appears stated age, obese   Lungs:   Clear to auscultation bilaterally. Heart:  Regular rate and rhythm, S1, S2 normal, no murmur, click, rub or gallop. Abdomen:   Soft, non-tender, non-distended. Bowel sounds normal.    Extremities: Extremities normal, atraumatic, no cyanosis or edema. Skin: Skin color, texture, turgor normal. No rashes or lesions   Neurologic: CNII-XII grossly intact, MUKHERJEE, A&Ox4      Data Review:       Recent Days:  Recent Labs     20  0517 20  1138   WBC 7.4 11.4*   HGB 13.5 15.4   HCT 40.4 45.7    343     Recent Labs     20  0449 20  0517 20  1138    136 132*   K 3.6 3.1* 3.3*    102 97   CO2 27 24 24   * 138* 157*   BUN 24* 23* 30*   CREA 1.05* 0.95 1.26*   CA 8.5 8.8 9.6   ALB  --  3.9 4.5   ALT  --  26 31     No results for input(s): PH, PCO2, PO2, HCO3, FIO2 in the last 72 hours.     24 Hour Results:  Recent Results (from the past 24 hour(s))   METABOLIC PANEL, BASIC    Collection Time: 20  4:49 AM   Result Value Ref Range    Sodium 136 136 - 145 mmol/L    Potassium 3.6 3.5 - 5.1 mmol/L    Chloride 103 97 - 108 mmol/L    CO2 27 21 - 32 mmol/L    Anion gap 6 5 - 15 mmol/L    Glucose 192 (H) 65 - 100 mg/dL    BUN 24 (H) 6 - 20 MG/DL    Creatinine 1.05 (H) 0.55 - 1.02 MG/DL    BUN/Creatinine ratio 23 (H) 12 - 20      GFR est AA >60 >60 ml/min/1.73m2    GFR est non-AA 53 (L) >60 ml/min/1.73m2    Calcium 8.5 8.5 - 10.1 MG/DL       Problem List:  Problem List as of 11/30/2020 Date Reviewed: 10/24/2020          Codes Class Noted - Resolved    Bacteremia ICD-10-CM: R78.81  ICD-9-CM: 790.7  10/25/2020 - Present        Seizure ([facility] ICD-10-CM: R56.9  ICD-9-CM: 780.39  10/23/2020 - Present        Major depression ICD-10-CM: F32.9  ICD-9-CM: 296.20  2/1/2020 - Present        Hypokalemia ICD-10-CM: E87.6  ICD-9-CM: 276.8  1/28/2020 - Present        * (Principal) Benzodiazepine withdrawal ([facility] ICD-10-CM: F13.239  ICD-9-CM: 292.0, 304.10  1/28/2020 - Present        Chronic prescription benzodiazepine use ICD-10-CM: Z79.899  ICD-9-CM: V58.69  1/28/2020 - Present        Altered mental status ICD-10-CM: R41.82  ICD-9-CM: 780.97  1/27/2020 - Present        Drug-induced constipation ICD-10-CM: K59.03  ICD-9-CM: 564.09, E980.5  4/5/2018 - Present        Advance care planning ICD-10-CM: Z71.89  ICD-9-CM: V65.49  4/5/2018 - Present        CHF (congestive heart failure) ([facility] ICD-10-CM: I50.9  ICD-9-CM: 428.0  4/2/2018 - Present        Depression ICD-10-CM: F32.9  ICD-9-CM: 948  4/2/2018 - Present        Dementia ([facility] ICD-10-CM: F03.90  ICD-9-CM: 294.20  4/2/2018 - Present        Metastatic breast cancer ([facility] ICD-10-CM: C50.919  ICD-9-CM: 174.9  5/3/2017 - Present        Secondary cancer of bone ([facility] ICD-10-CM: C79.51  ICD-9-CM: 198.5  5/3/2017 - Present        Anxiety ICD-10-CM: F41.9  ICD-9-CM: 300.00  2/3/2016 - Present        HTN (hypertension) ICD-10-CM: I10  ICD-9-CM: 401.9  Unknown - Present        GERD (gastroesophageal reflux disease) ICD-10-CM: K21.9  ICD-9-CM: 530.81  Unknown - Present        Hypercholesterolemia ICD-10-CM: E78.00  ICD-9-CM: 272.0  Unknown - Present        RESOLVED: Acute hyponatremia ICD-10-CM: E87.1  ICD-9-CM: 276.1  6/18/2019 - 1/28/2020        RESOLVED: Pain due to neoplasm ICD-10-CM: G89.3  ICD-9-CM: 338.3  4/4/2018 - 1/28/2020        RESOLVED: Neoplastic malignant related fatigue ICD-10-CM: R53.0  ICD-9-CM: 780.79  4/4/2018 - 1/28/2020        RESOLVED: Overdose ICD-10-CM: T50.901A  ICD-9-CM: 977.9, E980.5  4/2/2018 - 4/5/2018        RESOLVED: Gastroenteritis due to norovirus ICD-10-CM: A08.11  ICD-9-CM: 008.63  2/21/2018 - 1/28/2020        RESOLVED: Urinary tract infection without hematuria ICD-10-CM: N39.0  ICD-9-CM: 599.0  2/13/2018 - 1/28/2020        RESOLVED: Sepsis ([facility] ICD-10-CM: A41.9  ICD-9-CM: 038.9, 995.91  2/12/2018 - 1/28/2020        RESOLVED: Closed left ankle fracture ICD-10-CM: V50.219B  ICD-9-CM: 824.8  2/4/2016 - 1/28/2020        RESOLVED: Bimalleolar fracture of left ankle ICD-10-CM: S59.776V  ICD-9-CM: 824.4  2/3/2016 - 1/28/2020    Overview Signed 2/3/2016 12:49 AM by JUANITA Lake     Comminuted and displaced             RESOLVED: Hypotension ICD-10-CM: I95.9  ICD-9-CM: 458.9  9/12/2012 - 9/13/2012        RESOLVED: Dyspnea ICD-10-CM: R06.00  ICD-9-CM: 786.09  8/30/2012 - 8/31/2012              Medications reviewed  Current Facility-Administered Medications   Medication Dose Route Frequency    DULoxetine (CYMBALTA) capsule 30 mg  30 mg Oral DAILY    hydrOXYzine HCL (ATARAX) tablet 50 mg  50 mg Oral Q6H PRN    LORazepam (ATIVAN) injection 2 mg  2 mg IntraVENous PRN    [START ON 12/1/2020] levETIRAcetam (KEPPRA) 500 mg in 0.9% sodium chloride 100 mL IVPB  500 mg IntraVENous Q12H    gabapentin (NEURONTIN) 250 mg/5 mL solution 300 mg  300 mg Oral Q8H    LORazepam (ATIVAN) tablet 0.5 mg  0.5 mg Oral Q6H    thiamine HCL (B-1) tablet 100 mg  100 mg Oral DAILY    sodium chloride (NS) flush 5-40 mL  5-40 mL IntraVENous Q8H    sodium chloride (NS) flush 5-40 mL  5-40 mL IntraVENous PRN    fenofibrate nanocrystallized (TRICOR) tablet 145 mg  145 mg Oral DAILY    folic acid (FOLVITE) tablet 1 mg  1 mg Oral DAILY    losartan (COZAAR) tablet 50 mg  50 mg Oral DAILY    acetaminophen (TYLENOL) tablet 650 mg  650 mg Oral Q6H PRN    sodium chloride (NS) flush 5-40 mL  5-40 mL IntraVENous Q8H    sodium chloride (NS) flush 5-40 mL  5-40 mL IntraVENous PRN       Care Plan discussed with: Patient/family, nurse      Imelda Mueller NP  Hospitalist  Providers can reach me on Perfect[employer]erve

## 2020-12-01 NOTE — PROGRESS NOTES
Palliative Medicine  [facility]: [phone])  [facility]: [phone])        Code Status: DNR    Advance Care Planning: No AMD - patient declines to complete  Lnok:   Primary Decision Maker: Daniela Tapia - Father - [phone]    Jaqueline Torres is a 60 y.o.  female whom presented to ED with Altered mental status, potential overdose of medications. Her PMH is significant for anxiety and mood disorder, hyponatremia, HTN, and stage 3-4 metastatic breast cancer with a port. Reportedly she was seen in the ED 2 days prior for anxiety and was given a dose of Ativan which she improved with. At that time, ED provider [facility]  and noted multiple prescriptions over different time periods for Xanax. She was also seen in the ED on 11/10 for similar complaints and BSMART evaluated at that time and she was unwilling to stay in the hospital and therefore was discharged as she was not a candidate for a TDO. In late October, she was seen for concern for benzo withdrawal. Patient is known to palliative medicine team from October admission (consult for end stage disease) as well as 2018 ED [facility] admission ([place]).       Palliative consult for care decisions.     Psycho: hx anxiety, depression, P psych admissions, SI/SA, benzodiazepine misuse  Social: lives alone at [address] living in [facility], never , no children. Only living relative is 81y/o father who is retired missionary and Uncle. 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    Baseline: independent, driving, non compliant with doctor f/u and medication management       Patient / Family Encounter Documentation    Participants (names): Patient, Patient father/ROGERIOASHLEY Burns [facility]    Narrative: This  met with patient and father in room. Patient was lying in bed, alert and oriented. She was calmer today than previous visit, but continues to express, anxiety, overwhelm and confusion.     Patient had agreed for  to call father earlier and father came to hospital immediately. Patient agreed to this SW providing update to father re: cancer/current admission related to misuse of anxiety medications. He did not appear to surprised but indicated some of this information was new to him. [facility]  We discussed Dr. Burt Maldonado visit and plan for patient to f/u in January with Dr. Adelaida Muñoz re: cancer. Patient said she does not remember undergoing treatment in October. She stated she was not aware of January f/u. We discussed hospice consult yesterday. Patient reported she is not allowed to have hospice/does not qualify. It's still unclear if her diagnosis meets hospice criteria. Hospice RN Chang Euceda updated and will explore with Hospice MD as it would be good to know if that is even an option for her as C evolve. We also discussed need for patient to f/u with outpatient psychiatry for continued med management as well as OP palliative care. This is all overwhelming for patient. She does not feel she can keep track of everything, said she reports she does not want to to continue with any doctor appointments except for possibly psychiatry. However, she is not sure how she would get to any appts because she does not feel safe driving. Patient volunteers that her goals for how she wants to live life are to [place], wake up, go to sleep and have someone help with mail and paperwork. \" She also said she would like to read books and watch TV. Patient overwhelmed by this discussion and unable to focus on so many moving parts. Provided reassurance that no decisions need to be made to day. Tech entered room to move patient to 5th floor. Patient requesting to get up and move around. Needs PT/OT consult      Psychosocial challenges/Resilience factors:     Patient has very limited psychosocial support.  She counts on 81 yo father for emotional support (they speak daily) but he is not able to drive far d/t eyesight or support her with other activities like transportation, f/u MD appt, medication management. Patient has discussed friend in community in past but does not like to reach out or ask for help. Patient does not have stable housing. She has lived independently but reported that facility undergoing renovations and she was being moved to hotel temporarily starting Dec. 1. This has not been verified by facility. Chart note form oncologist also states patient now lives in group home? Patient on limited income. Patient says she is overwhelmed with bill paying. Patient grief/loss history includes brother (dx at ag 34 from murder) and mother. Patient has mental health history including anxiety and depression. Goals of Care / Plan:     Case discussed with CM     Case discussed with hospice RN    Will follow up Wed AM with palliative NP        Thank you for the opportunity to be involved in the care of Ms. Bianka Madden and her family.     Jarret Jade LMSW, Supervisee in Social Work  Palliative Medicine   [phone]    Start time: 14:30  End time:  15:30

## 2020-12-01 NOTE — PROGRESS NOTES
Bedside shift change report given to Amairani Ortega RN (oncoming nurse) by Harpreet Meyer RN (offgoing nurse). Report included the following information SBAR, Kardex, Intake/Output, MAR, Accordion, Recent Results, Cardiac Rhythm nsr and Dual Neuro Assessment.

## 2020-12-01 NOTE — PROGRESS NOTES
Bedside shift change report given to Delaney (oncoming nurse) by Letitia Hargrove (offgoing nurse). Report included the following information SBAR, Kardex, Cardiac Rhythm NSR and Quality Measures.

## 2020-12-01 NOTE — PROGRESS NOTES
Noted order to obtain signed release of info form from patient. Patient is alert and oriented x4. Patient confirmed that Dr. Magdalena Roberts is her psych doctor and stated she is okay for the hospitalist to have her records. Patient asked RN to assist with filling out the form. RN witnessed that patient signed the form herself and the form was faxed to [facility].

## 2020-12-01 NOTE — PROGRESS NOTES
Hospitalist Progress Note          Carolyn Thomas NP  Please call  and page for questions. Call physician on-call through the  7pm-7am    Daily Progress Note: 12/1/2020    Primary care Lindsay Luciano MD    Date of admission: 11/28/2020 11:03 AM    Admission Summary and Hospital Course:      From H&P 11/28/2020:  Ladan Restrepo is a 60 y.o.  female whom presents with complaint noted above. Her PMH is significant for anxiety and mood disorder, hyponatremia, HTN, and stage 3-4 metastatic breast cancer with a port. Reportedly she was seen in the ED 2 days prior for anxiety and was given a dose of Ativan which she improved with. At that time, ED provider evalulated [facility]  and noted multiple prescriptions over different time periods for Xanax. She was also seen in the ED on 11/10 for similar complaints and BSMART evaluated at that time and she was unwilling to stay in the hospital and therefore was discharged as she was not a candidate for a TDO. Back in late October, she was seen for concern for benzo withdrawal at that time. Per the ED, she was reportedly brought in via EMS soiled with urine, still in the same hospital gown she wore here with her identification band on her wrist. She is unable to recall why she came into the hospital.  Reportedly told someone she attempted to stab herself in the forehead with a knife but she was unable to confirm or deny that. Does express that she wants to go see Kel Lao when I walk into the room. Only complaint is that she is anxious but overall poor historian and difficult to obtain additional information. There is concern that she may have taken too many of her medications as she was talking about empty pill bottles at home. \"    Subjective:   Pt seen today in NAD. She denies concerns or complaints. Mentation significantly better today. Not hospice candidate, still receiving treatment for sternal metastasis of breast CA. Next appt Jan.  Per psych, not candidate for IP benzo wean. Spoke with patient's father who is supportive but reserved and unable to contribute financially. Patient does have an uncle named Marlene Avendaño in [place] who is a therapist and she did talk to him today. I spoke to patient's outpatient psychiatrist Dr. Naveed Fu and he agrees with full benzo weaning. He has been giving her 2-week supplies of benzos for the last approximately 1 year as he feels he cannot dress her with anymore. He also agrees that she likely needs increased supervision/support to ensure safe weaning from benzos. He only treats as OP provider. Assessment/Plan:       Benzodiazepine Withdrawal  - Improving  - Seizure precautions  - Pt states she ran out of her Xanax 1 week PTA  - Cont ativan 0.5mg QID (was taking total 4mg daily PTA)  - TSH, Ammonia, Vitamin B12 NL  - D/C Keppra  - Appreciate psych input; not candidate for IP psych  - Fall precatuions  - Supportive care     Severe Anxiety & Mood Disorder  - Continue cymbalta (started this admission), gabapentin, thiamine, folic acid  - denies SI/HI, did not OD  - supportive care     Mild hypokalemia  - Resolved    Hyponatremia  -Resolved    CORNELIA: normal kidney function on admission  -Resolved     H/o HTN  - Stable, continue home cozaar  - Monitor BP    Metastatic Breast CA: mets to sternum  - on treatment holiday; next appt in Jan 2021  - Last Echo 10/27/2020 was NL  - DDNR on file  - Appreciate palliative care input  - Appreciate hem/onc input; pt not candidate for hospice-hospice consult discontinued       DVTppx: SCDs  Gippx: NA  Code Status: DDNR  Diet: Cardiac  Activity: OOB to chair TID and PRN  Discharge: Pt unsafe to return home, plan to discharge once safe d/c plan established.  Ideally at IP drug rehab; likely >48h       Review of Systems:     Full ROS complete with pertinent positives and negatives as per HPI, otherwise negative  Objective:   Physical Exam:     Visit Vitals  /75   Pulse 99   Temp 98.5 °F (36.9 °C)   Resp 18   Wt 103.3 kg (227 lb 11.2 oz)   SpO2 98%   BMI 37.89 kg/m²    O2 Flow Rate (L/min): 1 l/min O2 Device: Room air    Temp (24hrs), Av.1 °F (36.7 °C), Min:97.6 °F (36.4 °C), Max:98.7 °F (37.1 °C)    No intake/output data recorded. No intake/output data recorded. General:  Alert, cooperative, no distress, appears stated age, obese   Lungs:   Clear to auscultation bilaterally. Heart:  Regular rate and rhythm, S1, S2 normal, no murmur, click, rub or gallop. Abdomen:   Soft, non-tender, non-distended. Bowel sounds normal.    Extremities: Extremities normal, atraumatic, no cyanosis or edema. Skin: Skin color, texture, turgor normal. No rashes or lesions   Neurologic: CNII-XII grossly intact, MUKHERJEE, A&Ox4      Data Review:       Recent Days:  Recent Labs     20  0517   WBC 7.4   HGB 13.5   HCT 40.4        Recent Labs     20  0419 20  0449 20  0517    136 136   K 3.8 3.6 3.1*   * 103 102   CO2 26 27 24   * 192* 138*   BUN 19 24* 23*   CREA 0.79 1.05* 0.95   CA 8.5 8.5 8.8   ALB  --   --  3.9   ALT  --   --  26     No results for input(s): PH, PCO2, PO2, HCO3, FIO2 in the last 72 hours.     24 Hour Results:  Recent Results (from the past 24 hour(s))   METABOLIC PANEL, BASIC    Collection Time: 20  4:19 AM   Result Value Ref Range    Sodium 140 136 - 145 mmol/L    Potassium 3.8 3.5 - 5.1 mmol/L    Chloride 109 (H) 97 - 108 mmol/L    CO2 26 21 - 32 mmol/L    Anion gap 5 5 - 15 mmol/L    Glucose 106 (H) 65 - 100 mg/dL    BUN 19 6 - 20 MG/DL    Creatinine 0.79 0.55 - 1.02 MG/DL    BUN/Creatinine ratio 24 (H) 12 - 20      GFR est AA >60 >60 ml/min/1.73m2    GFR est non-AA >60 >60 ml/min/1.73m2    Calcium 8.5 8.5 - 10.1 MG/DL       Problem List:  Problem List as of 2020 Date Reviewed: 2020          Codes Class Noted - Resolved    Bacteremia ICD-10-CM: R78.81  ICD-9-CM: 790.7  10/25/2020 - Present        Seizure ([place] [address].) ICD-10-CM: R56.9  ICD-9-CM: 780.39  10/23/2020 - Present        Major depression ICD-10-CM: F32.9  ICD-9-CM: 296.20  2/1/2020 - Present        Hypokalemia ICD-10-CM: E87.6  ICD-9-CM: 276.8  1/28/2020 - Present        * (Principal) Benzodiazepine withdrawal ([place] [address].) ICD-10-CM: F13.239  ICD-9-CM: 292.0, 304.10  1/28/2020 - Present        Chronic prescription benzodiazepine use ICD-10-CM: Z79.899  ICD-9-CM: V58.69  1/28/2020 - Present        Altered mental status ICD-10-CM: R41.82  ICD-9-CM: 780.97  1/27/2020 - Present        Drug-induced constipation ICD-10-CM: K59.03  ICD-9-CM: 564.09, E980.5  4/5/2018 - Present        Advance care planning ICD-10-CM: Z71.89  ICD-9-CM: V65.49  4/5/2018 - Present        CHF (congestive heart failure) (HCC) ICD-10-CM: I50.9  ICD-9-CM: 428.0  4/2/2018 - Present        Depression ICD-10-CM: F32.9  ICD-9-CM: 093  4/2/2018 - Present        Dementia ([place] [address].) ICD-10-CM: F03.90  ICD-9-CM: 294.20  4/2/2018 - Present        Metastatic breast cancer ([facility] ICD-10-CM: C50.919  ICD-9-CM: 174.9  5/3/2017 - Present        Secondary cancer of bone ([facility] ICD-10-CM: C79.51  ICD-9-CM: 198.5  5/3/2017 - Present        Anxiety ICD-10-CM: F41.9  ICD-9-CM: 300.00  2/3/2016 - Present        HTN (hypertension) ICD-10-CM: I10  ICD-9-CM: 401.9  Unknown - Present        GERD (gastroesophageal reflux disease) ICD-10-CM: K21.9  ICD-9-CM: 530.81  Unknown - Present        Hypercholesterolemia ICD-10-CM: E78.00  ICD-9-CM: 272.0  Unknown - Present        RESOLVED: Acute hyponatremia ICD-10-CM: E87.1  ICD-9-CM: 276.1  6/18/2019 - 1/28/2020        RESOLVED: Pain due to neoplasm ICD-10-CM: G89.3  ICD-9-CM: 338.3  4/4/2018 - 1/28/2020        RESOLVED: Neoplastic malignant related fatigue ICD-10-CM: R53.0  ICD-9-CM: 780.79  4/4/2018 - 1/28/2020        RESOLVED: Overdose ICD-10-CM: T50.901A  ICD-9-CM: 977.9, E980.5  4/2/2018 - 4/5/2018        RESOLVED: Gastroenteritis due to norovirus ICD-10-CM: A08.11  ICD-9-CM: 008.63  2/21/2018 - 1/28/2020        RESOLVED: Urinary tract infection without hematuria ICD-10-CM: N39.0  ICD-9-CM: 599.0  2/13/2018 - 1/28/2020        RESOLVED: Sepsis ([facility] [address] ICD-10-CM: A41.9  ICD-9-CM: 038.9, 995.91  2/12/2018 - 1/28/2020        RESOLVED: Closed left ankle fracture ICD-10-CM: P72.365L  ICD-9-CM: 824.8  2/4/2016 - 1/28/2020        RESOLVED: Bimalleolar fracture of left ankle ICD-10-CM: I04.500O  ICD-9-CM: 824.4  2/3/2016 - 1/28/2020    Overview Signed 2/3/2016 12:49 AM by JUANITA Ojeda     Comminuted and displaced             RESOLVED: Hypotension ICD-10-CM: I95.9  ICD-9-CM: 458.9  9/12/2012 - 9/13/2012        RESOLVED: Dyspnea ICD-10-CM: R06.00  ICD-9-CM: 786.09  8/30/2012 - 8/31/2012              Medications reviewed  Current Facility-Administered Medications   Medication Dose Route Frequency    LORazepam (ATIVAN) tablet 0.5 mg  0.5 mg Oral Q8H PRN    DULoxetine (CYMBALTA) capsule 30 mg  30 mg Oral DAILY    hydrOXYzine HCL (ATARAX) tablet 50 mg  50 mg Oral Q6H PRN    LORazepam (ATIVAN) injection 2 mg  2 mg IntraVENous PRN    levETIRAcetam (KEPPRA) 500 mg in 0.9% sodium chloride 100 mL IVPB  500 mg IntraVENous Q12H    gabapentin (NEURONTIN) 250 mg/5 mL solution 300 mg  300 mg Oral Q8H    thiamine HCL (B-1) tablet 100 mg  100 mg Oral DAILY    sodium chloride (NS) flush 5-40 mL  5-40 mL IntraVENous Q8H    sodium chloride (NS) flush 5-40 mL  5-40 mL IntraVENous PRN    fenofibrate nanocrystallized (TRICOR) tablet 145 mg  145 mg Oral DAILY    folic acid (FOLVITE) tablet 1 mg  1 mg Oral DAILY    losartan (COZAAR) tablet 50 mg  50 mg Oral DAILY    acetaminophen (TYLENOL) tablet 650 mg  650 mg Oral Q6H PRN    sodium chloride (NS) flush 5-40 mL  5-40 mL IntraVENous Q8H    sodium chloride (NS) flush 5-40 mL  5-40 mL IntraVENous PRN       Care Plan discussed with: Patient/family, nurse      Brooke Mariscal NP  Hospitalist  Providers can reach me on PerfectServe

## 2020-12-01 NOTE — PROGRESS NOTES
Problem: Suicide  Goal: *STG: Remains safe in hospital  Outcome: Progressing Towards Goal     Problem: Falls - Risk of  Goal: *Absence of Falls  Description: Document Mary Ellen Fall Risk and appropriate interventions in the flowsheet.   Outcome: Progressing Towards Goal  Note: Fall Risk Interventions:  Mobility Interventions: Bed/chair exit alarm, Communicate number of staff needed for ambulation/transfer, Strengthening exercises (ROM-active/passive)    Mentation Interventions: Adequate sleep, hydration, pain control, Bed/chair exit alarm, Increase mobility    Medication Interventions: Bed/chair exit alarm, Patient to call before getting OOB    Elimination Interventions: Call light in reach, Patient to call for help with toileting needs

## 2020-12-01 NOTE — CONSULTS
[address]    Name:  Rodney Loja  MR#:  [id number]  :  1955  ACCOUNT #:  [de-identified]  DATE OF SERVICE:    HEMATOLOGY/ONCOLOGY CONSULT NOTE    REASON FOR ADMISSION:  Confusion, hyperactivity, benzodiazepine withdrawal.    HISTORY OF PRESENT ILLNESS:  The patient is a 72-year-old woman, who is followed closely by Dr. Dinesh Stanley in our clinic. She was diagnosed with breast cancer initially in . She was treated with bilateral mastectomy and was found to have 8/13 positive lymph nodes. Unfortunately, she had a recurrence and was treated with radiation therapy to the sternum, followed by fulvestrant, denosumab, trastuzumab. The patient opted to stop this and has been on a treatment holiday. She was last seen by Dr. Dinesh Stanley in 10/2020. The plan was for followup in two months time. Unfortunately, she has exhibited narcotic-seeking behavior in our clinic, and therefore, we have stopped prescribing narcotics for her. She has been referred to [facility] with regards to her pain control; however, it is not clear that she has been seen by them. Recently, she was discharged back to independent living in [place], but they found her confused in a hospital gown with agitation and flight of ideas. Apparently, she had several Xanax bottles from different prescriptions filled in close proximity. The working diagnosis from the primary service is that she is having benzodiazepine withdrawal and that they are giving her declining tapering doses of lorazepam.    PAST MEDICAL HISTORY:  1. Breast cancer as above. 2.  Anxiety and depression. 3.  Allergic rhinitis. 4.  Hypertension. 5.  Hyperlipidemia. 6.  Bilateral mastectomy in . CURRENT MEDICATIONS IN THE HOSPITAL:  1. Lorazepam 0.5 mg q.6. 2.  Cymbalta 30 mg daily. 3.  TriCor 145 mg daily. 4.  Folic acid 1 mg daily. 5.  Neurontin 300 mg q.8.   6.  Keppra 500 mg IV q.12.  8.  Cozaar 50 mg p.o. daily. ALLERGIES:  PREDNISONE, SULFA, WELLBUTRIN. SOCIAL HISTORY:  It appears that she is living at a group home now. She is . She drinks \"occasionally. \"  She smoked for 25 years and then quit. FAMILY HISTORY:  Mother with bilateral breast cancer. REVIEW OF SYSTEMS:  GENERAL:  No fevers or chills. HEENT:  No auditory or visual change. LYMPHATIC:  No lumps or bumps in neck, underarm, or groin. CARDIOVASCULAR:  No chest pain or palpitations. PULMONARY:  No cough or shortness of breath. GASTROINTESTINAL:  No abdominal pain, diarrhea, or constipation. GENITOURINARY:  No dysuria or incontinence. SKIN:  No rash or changing mole. MUSCULOSKELETAL:  No red or swollen joints. NEUROLOGIC:  No irritability or syncope. PHYSICAL EXAMINATION:  GENERAL:  Pleasant, awake, and alert. VITAL SIGNS:  /97, pulse 102, saturating 98% on room air. HEENT:  Sclerae anicteric. Oropharynx clear. NECK:  Supple without lymphadenopathy or thyromegaly. Right side Kxghkr-[id number]-iuai site without surrounding erythema or fluctuance. HEART:  Regular rate and rhythm without murmur, rub, or gallop. LUNGS:  Clear to auscultation bilaterally. ABDOMEN:  Nontender and nondistended. Normoactive bowel sounds. No hepatosplenomegaly. EXTREMITIES:  No clubbing, cyanosis, or edema. PSYCH:  Normal mood and affect. Alert and oriented x3. ASSESSMENT AND PLAN:  A 42-year-old woman seen in our office by Dr. Cameron Patel, diagnosed with breast cancer originally in 2012, unfortunately developed a sternal recurrence and treated with radiation therapy, Faslodex, denosumab, trastuzumab. The patient opted for a treatment holiday, was last seen by Dr. Cameron Patel this October, due to see her back in January, now admitted with benzodiazepine overdose and subsequent withdrawal.  1.  Polypharmacy:   This has been an issue in our clinic as well and Dr. Cameron Patel has referred her [facility], [facility] to help with any medication issues because of several previous issues of overuse and noncompliance. I agree with benzodiazepine taper. I appreciate an excellent hospitalist care. 2.  Breast cancer: Outpatient followup with Dr. Debbie Wang as previously scheduled in January. Thank you for the consult. We will sign off. Please call if needed.       Ernestina Purdy MD      [id number]  D:  12/01/2020 8:43  T:  12/01/2020 11:12  JOB #:  [id number]  CC:  Kerline Andino MD

## 2020-12-01 NOTE — PROGRESS NOTES
FRIDA: d/c to Inpatient Psych if pt amenable and meets criteria; awaiting PT & OT recommendations to determine plan; Transport TBD; Will need 2nd IMM letter prior to d/c    RUR: 37%    -1600- CM reviewed pt chart & discussed in morning rounds. CM discussed pt case with Attending Hospitalist NP & Palliative as well. Hospitalist NP discussed possible Inpatient Psych admission. Palliative following however as per report pt does not want hospice. Disposition plan pending therapy evaluations & pending pt willingness to d/c to Inpatient Psych if pt meets criteria. CM sent perfect serve message to Hospitalist NP in regards to possible need for therapy evaluations to assist in determination of plan. CM to follow for transitions of care.   Sia Luna RN BSN CCM  CRM

## 2020-12-01 NOTE — HOSPICE
[facility]  [phone]     Patient Name: Rolo Murphy  YOB: 1955  Age: 60 y.o. [address] [facility] RN Note:   Per Viral Abraham NP  patient will be continuing chemotherapy. Not a candidate for hospice at this time. Please contact us if this should change  Thank you for the opportunity to be of service to this patient.

## 2020-12-01 NOTE — PROGRESS NOTES
TRANSFER - IN REPORT:    Verbal report received from Delaney (name) on Latrice Hews  being received from [phone] (unit) for routine progression of care      Report consisted of patients Situation, Background, Assessment and   Recommendations(SBAR). Information from the following report(s) SBAR, Kardex, Intake/Output, MAR and Recent Results was reviewed with the receiving nurse. Opportunity for questions and clarification was provided. Assessment completed upon patients arrival to unit and care assumed.

## 2020-12-01 NOTE — PROGRESS NOTES
Problem: Suicide  Goal: *STG: Remains safe in hospital  Outcome: Progressing Towards Goal  Goal: *STG: Seeks staff when feelings of self harm or harm towards others arise  Outcome: Progressing Towards Goal  Goal: *STG: Attends activities and groups  Outcome: Progressing Towards Goal  Goal: *STG:  Verbalizes alternative ways of dealing with maladaptive feelings/behaviors  Outcome: Progressing Towards Goal  Goal: *STG/LTG: Complies with medication therapy  Outcome: Progressing Towards Goal  Goal: *STG/LTG: No longer expresses self destructive or suicidal thoughts  Outcome: Progressing Towards Goal  Goal: *LTG:  Identifies available community resources  Outcome: Progressing Towards Goal  Goal: *LTG:  Develops proactive suicide prevention plan  Outcome: Progressing Towards Goal     Problem: Patient Education: Go to Patient Education Activity  Goal: Patient/Family Education  Outcome: Progressing Towards Goal     Problem: Falls - Risk of  Goal: *Absence of Falls  Description: Document Mary Ellen Fall Risk and appropriate interventions in the flowsheet.   Outcome: Progressing Towards Goal  Note: Fall Risk Interventions:  Mobility Interventions: Bed/chair exit alarm, Communicate number of staff needed for ambulation/transfer, Strengthening exercises (ROM-active/passive)    Mentation Interventions: Adequate sleep, hydration, pain control, Bed/chair exit alarm, Increase mobility    Medication Interventions: Bed/chair exit alarm, Patient to call before getting OOB    Elimination Interventions: Call light in reach, Patient to call for help with toileting needs              Problem: Patient Education: Go to Patient Education Activity  Goal: Patient/Family Education  Outcome: Progressing Towards Goal

## 2020-12-01 NOTE — PROGRESS NOTES
Primary Nurse Susi Chase and Silvia Crook, RN performed a dual skin assessment on this patient No impairment noted  Adama score is 19

## 2020-12-01 NOTE — PROGRESS NOTES
Visited pt at staff request. Often tearful, she spoke of her medical condition and likely being near the EOL. Her mother and only sibling, a brother, are both . Her only close relative is her 80year old father. She noted that she has no one really close to her and feels completely alone, wishing she had someone who could be close to her. She wonders how long her future will be and where she will spend the time she has remaining. These thoughts are troubling to her. Offered presence and listened to her narrative. Assured her of on going  support as needed. Pt requested  to call her father Gissell Ahuja and request that he reach out to her. This was accomplished and he said that he would call her immediately.      Chaplain Mendiola MDiv, MS, [facility]  [phone] [phone])

## 2020-12-01 NOTE — CONSULTS
Patient seen, chart reviewed, note dictated. [id number]    61 y/o woman, seen in our office by Dr. Megan Mix, diagnosed with breast cancer initially in 2012, recently developed a sternal recurrence, treated with XRT, faslodex/denosumab/trastuzumab. Patient opted for treatment holiday. Last seen by Dr. Megan Mix in October, due to see her back in January. Now admitted with benzodiazepine overdose. 1. Polypharmacy: this has been an issue with our clinic as well and Dr. Megan Mix has referred her to [facility] palliative to help with any medications issues because of several previous issues of overuse/noncompliance. Agree with benzo taper. Appreciate excellent care. 2. Breast Ca: outpatient f/u with Dr. Megan iMx as previously scheduled in January. Thank you for consult. Will sign off. Please call if needed.      Gregory Alford MD  Hem/Onc

## 2020-12-02 PROCEDURE — 97530 THERAPEUTIC ACTIVITIES: CPT

## 2020-12-02 PROCEDURE — 97116 GAIT TRAINING THERAPY: CPT

## 2020-12-02 PROCEDURE — 74011250637 HC RX REV CODE- 250/637: Performed by: NURSE PRACTITIONER

## 2020-12-02 PROCEDURE — 97161 PT EVAL LOW COMPLEX 20 MIN: CPT

## 2020-12-02 PROCEDURE — 65270000029 HC RM PRIVATE

## 2020-12-02 PROCEDURE — 74011250637 HC RX REV CODE- 250/637: Performed by: INTERNAL MEDICINE

## 2020-12-02 PROCEDURE — 97535 SELF CARE MNGMENT TRAINING: CPT

## 2020-12-02 PROCEDURE — 97165 OT EVAL LOW COMPLEX 30 MIN: CPT

## 2020-12-02 RX ORDER — DOCUSATE SODIUM 100 MG/1
100 CAPSULE, LIQUID FILLED ORAL 2 TIMES DAILY
Status: DISCONTINUED | OUTPATIENT
Start: 2020-12-02 | End: 2020-12-17 | Stop reason: HOSPADM

## 2020-12-02 RX ADMIN — GABAPENTIN 300 MG: 300 CAPSULE ORAL at 17:12

## 2020-12-02 RX ADMIN — Medication 10 ML: at 17:13

## 2020-12-02 RX ADMIN — DOCUSATE SODIUM 100 MG: 100 CAPSULE, LIQUID FILLED ORAL at 18:25

## 2020-12-02 RX ADMIN — FENOFIBRATE 145 MG: 145 TABLET ORAL at 08:51

## 2020-12-02 RX ADMIN — GABAPENTIN 300 MG: 300 CAPSULE ORAL at 08:51

## 2020-12-02 RX ADMIN — GABAPENTIN 300 MG: 300 CAPSULE ORAL at 21:43

## 2020-12-02 RX ADMIN — Medication 100 MG: at 08:51

## 2020-12-02 RX ADMIN — FOLIC ACID 1 MG: 1 TABLET ORAL at 08:51

## 2020-12-02 RX ADMIN — DULOXETINE HYDROCHLORIDE 30 MG: 30 CAPSULE, DELAYED RELEASE ORAL at 08:51

## 2020-12-02 RX ADMIN — ACETAMINOPHEN 650 MG: 325 TABLET ORAL at 13:42

## 2020-12-02 RX ADMIN — LOSARTAN POTASSIUM 50 MG: 50 TABLET, FILM COATED ORAL at 08:51

## 2020-12-02 NOTE — PROGRESS NOTES
Follow up visit with pt at her request. Pt sitting up in bed having just completed breakfast. She spoke briefly about her father Dulce Carrasquillo who visited her yesterday. She thinks that he does not fully understand what is going on with her. He paternal uncle, a retired Confucianist , called her yesterday. Neither these two nor anyone else seems to be able and or willing to give her the support she reports needing. A consistent refrain for her is that there is no one who really cares for her or supports her. Remained with her for a protracted period during which she said little. She is concerned about how she will live and care for herself after this hospital stay. Assured her of ongoing  support as needed. She is aware that her nurse can page a  if needed.      Chaplain Perez MDiv, MS, [facility]  [phone] ([phone])

## 2020-12-02 NOTE — PROGRESS NOTES
Bedside and Verbal shift change report given to Maryan Zuniga (oncoming nurse) by [employer] (offgoing nurse). Report included the following information SBAR, Kardex and MAR.

## 2020-12-02 NOTE — PROGRESS NOTES
Upon further review of patient's chart, ED triage note and EMS report state patient was brought in after police were called by neighbors who heard a commotion and police found her attempting to kill herself by stabbing herself in the head. She had a 5\" knife that was secured. She also reportedly stated several times that she wanted to die and wanted to \"end it all. \" According to ED note, when police/EMS arrived, patient was still in hospital gown which was soiled with urine and had ID band on still from ED visit 2 days prior. Patient still focused on dying. Yesterday she was informed that her cancer is too stable for her to qualify for hospice and so she decided to stop all cancer treatment, which  altafas done in the past. Even with treatment stopped, she would not qualify for hospice. Will order suicide precautions/sitter and re-consult psych to eval for IP psych admission.

## 2020-12-02 NOTE — PROGRESS NOTES
Palliative Medicine Social Work    This SW met with patient in her room. She was sitting up in bed, alert and oriented x4, appearing anxious though calmer and more focused than previous visit. Thought process and speech within normal limits. Reviewed yesterday's visit with patient's father. She was able to identify and explore feelings related to his presence and their relationship. At this time Sidra Nair NP joined to discuss plan for possible continued support from psyche. Patient agreeable. Patient reports that she continues to not be able to focus on goals of care re: cancer but is amenable to working on new medication regimen. This SW also explored short term goal for the day as a way of coping. Patient identified focusing on deep breaths, resting, and listening to ocean sounds on you tube. Provided guided imagery for relaxation. Will continue to follow for support. [facility] re: cancer on hold at this time. Thank you for the opportunity to be involved in the care of Ms Tyler Lee and her family.     Allan Morataya LMSW, Supervisee in Social Work  Palliative Medicine   [phone]    Start time: 10:00  End time: 10:45

## 2020-12-02 NOTE — PROGRESS NOTES
Music Therapy Assessment  [address] [id number]     1955  60 y.o.  female    Patient Telephone Number: [phone] (home)   Gnosticist Affiliation: Toño Stone   Language: English   Patient Active Problem List    Diagnosis Date Noted    Bacteremia 10/25/2020    Seizure ([facility] 10/23/2020    Major depression 02/01/2020    Hypokalemia 01/28/2020    Benzodiazepine withdrawal ([facility] 01/28/2020    Chronic prescription benzodiazepine use 01/28/2020    Altered mental status 01/27/2020    Drug-induced constipation 04/05/2018    Advance care planning 04/05/2018    CHF (congestive heart failure) ([facility] 04/02/2018    Depression 04/02/2018    Dementia ([facility] 04/02/2018    Metastatic breast cancer ([facility] 05/03/2017    Secondary cancer of bone ([facility] 05/03/2017    Anxiety 02/03/2016    HTN (hypertension)     GERD (gastroesophageal reflux disease)     Hypercholesterolemia         Date: 12/2/2020            Total Time (in minutes): 35          [facility] 5S1 ORTHO JOINT    Mental Status:   [x] Alert [  ] Clemon Ramp [  ]  Confused  [  ] Minimally responsive  [  ] Sleeping    Communication Status: [  ] Impaired Speech [  ] Nonverbal -N/A    Physical Status:   [  ] Oxygen in use  [  ] Hard of Hearing [  ] Vision Impaired  [  ] Ambulatory  [  ] Ambulatory with assistance [  ] Non-ambulatory -N/A    Music Preferences, Background: Blues, pt said she likes artists like [place], [address], [place], and [place].     Clinical Problem addressed: Support relaxation and positive social interaction     Goal(s) met in session:  Physical/Pain management (Scale of 1-10):    Pre-session rating: Pt reported some pain  Post-session rating: Pt didn't report on pain    [x]  Increased relaxation   [  ] Affected breathing patterns  [  ] Decreased muscle tension   [  ] Decreased agitation  [  ] Affected heart rate    [  ] Increased alertness     Emotional/Psychological:  [x] Increased self-expression   [  ] Decreased aggressive behavior   [  ] Decreased feelings of stress  [  ] Discussed healthy coping skills     [  ] Improved mood    [  ] Decreased withdrawn behavior     Social:  [x] Decreased feelings of isolation/loneliness [x] Positive social interaction   [  ] Provided support and/or comfort for family/friends    Spiritual:  [x] Spiritual support    [  ] Expressed peace  [x] Expressed kimberley    [  ] Discussed beliefs    Techniques Utilized (Check all that apply):   [  ] Procedural support MT [x] Music for relaxation [x] Patient preferred music  [  ] Delmis analysis  [x] Song choice  [x] Music for validation  [  ] Entrainment  [  ] Movement to music [  ] Guided visualization  [  ] Oscar Gallus  [  ] Patient instrument playing [  ] ReviewZAPe Fret writing  [  ] Nataliya Gonzalez along   [  ] Richy Curtis  [  ] Sensory stimulation  [x] Active Listening  [x] Music for spiritual support [  ] Making of CDs as gifts    Session Observations:  Referred by Chaplain Bridget. Patient (pt) was alert, lying in bed and a sitter was at bedside. This music therapy intern (MTI) introduced self and asked how the pt was feeling. She responded to this and welcomed music to brighten her day. MTI asked about her music preference and the pt shared these. MTI sang and played Dustin Lyell' Don't Know Why with guitar. Pt increased emotional expression in response to the music as evidenced by (AEB) becoming tearful. MTI asked the pt what caused her to become emotional. She responded saying the song and lyrics were beautiful to her. She shared about seeing various musical artists live in concert and her love of music. MTI sang and played Andrew' Can't Help Falling in Love with guitar. Pt closed her eyes during this and she began to smile. MTI asked if a memory or place came to mind as she listened to the song. She said she thought about seeing Andrew live at the [facility].  MTI asked about the pt's kimberley journey and she responded to this, sharing about her search for a new Faith since moving to a new area. MTI sang and played [place] the [place] with guitar. Pt further increased emotional expression in response to the music AEB becoming tearful. She thanked the MTI for the visit. MTI exited. Dacia Lucero, Music Therapy Intern  Spiritual Care Services Dept.    Referral-based service

## 2020-12-02 NOTE — PROGRESS NOTES
Hospitalist Progress Note          Evan Conrad NP  Please call  and page for questions. Call physician on-call through the  7pm-7am    Daily Progress Note: 12/2/2020    Primary care Marcel Hoover MD    Date of admission: 11/28/2020 11:03 AM    Admission Summary and Hospital Course:      From H&P 11/28/2020:  Marta Rondon is a 60 y.o.  female whom presents with complaint noted above. Her PMH is significant for anxiety and mood disorder, hyponatremia, HTN, and stage 3-4 metastatic breast cancer with a port. Reportedly she was seen in the ED 2 days prior for anxiety and was given a dose of Ativan which she improved with. At that time, ED provider evalulated [facility]  and noted multiple prescriptions over different time periods for Xanax. She was also seen in the ED on 11/10 for similar complaints and BSMART evaluated at that time and she was unwilling to stay in the hospital and therefore was discharged as she was not a candidate for a TDO. Back in late October, she was seen for concern for benzo withdrawal at that time. Per the ED, she was reportedly brought in via EMS soiled with urine, still in the same hospital gown she wore here with her identification band on her wrist. She is unable to recall why she came into the hospital.  Reportedly told someone she attempted to stab herself in the forehead with a knife but she was unable to confirm or deny that. Does express that she wants to go see Isamar Muro when I walk into the room. Only complaint is that she is anxious but overall poor historian and difficult to obtain additional information. There is concern that she may have taken too many of her medications as she was talking about empty pill bottles at home. \"    Subjective:   Pt seen today in NAD. Palliative care SW present. Discussed with patient the report that she was trying stab herself in the head with a knife and stating she wanted to die.  She acknowledged the event but is uninterested in discussing further and frequently changes the subject. Discussed IP psych admission and patient is agreeable. Psych records from patient's OP psychiatrist, Dr. Ancelmo Briones, were faxed over today and on patient's chart.       Assessment/Plan:       Benzodiazepine Withdrawal  - Improving slowly  - Seizure precautions  - Pt states she ran out of her Xanax 1 week PTA  - Cont ativan 0.5mg QID (was taking total 4mg daily PTA)  - TSH, Ammonia, Vitamin B12 NL  - Fall precatuions  - Supportive care  - Psych consult pending    SI: attempted to stab self in head POA  - Suicide precautions  - 1:1 sitter  - Psych consult pending     Severe Anxiety & Mood Disorder  - Continue cymbalta (started this admission), gabapentin, thiamine, folic acid  - supportive care  - Psych consult pending     Mild hypokalemia  - Resolved    Hyponatremia  -Resolved    CORNELIA: normal kidney function on admission  -Resolved     H/o HTN  - Stable, continue home cozaar  - Monitor BP    Metastatic Breast CA: mets to sternum; per hem/onc-low disease burden  - on treatment holiday; next appt in 2021  - Last Echo 10/27/2020 was NL  - DDNR on file  - Appreciate palliative care input  - Appreciate hem/onc input; pt not candidate for hospice  - Supportive care       DVTppx: SCDs  Gippx: NA  Code Status: DDNR  Diet: Cardiac  Activity: OOB to chair TID and PRN  Discharge: Pt medically clear for discharge; plan to discharge to IP psych once bed available       Review of Systems:     Full ROS complete with pertinent positives and negatives as per HPI, otherwise negative  Objective:   Physical Exam:     Visit Vitals  BP (!) 152/75 (BP 1 Location: Left leg, BP Patient Position: At rest)   Pulse 99   Temp 97.9 °F (36.6 °C)   Resp 16   Wt 103.5 kg (228 lb 2.8 oz)   SpO2 97%   BMI 37.97 kg/m²    O2 Flow Rate (L/min): 1 l/min O2 Device: Room air    Temp (24hrs), Av.1 °F (36.7 °C), Min:97.9 °F (36.6 °C), Max:98.4 °F (36.9 °C) No intake/output data recorded. 11/30 1901 - 12/02 0700  In: -   Out: 400 [Urine:400]      General:  Alert, cooperative, no distress, appears stated age, obese   Lungs:   Clear to auscultation bilaterally. Heart:  Regular rate and rhythm, S1, S2 normal, no murmur, click, rub or gallop. Abdomen:   Soft, non-tender, non-distended. Bowel sounds normal.    Extremities: Extremities normal, atraumatic, no cyanosis or edema. Skin: Skin color, texture, turgor normal. No rashes or lesions   Neurologic: CNII-XII grossly intact, MUKHERJEE, A&Ox4      Data Review:       Recent Days:  No results for input(s): WBC, HGB, HCT, PLT, HGBEXT, HCTEXT, PLTEXT, HGBEXT, HCTEXT, PLTEXT in the last 72 hours. Recent Labs     12/01/20  0419 11/30/20  0449    136   K 3.8 3.6   * 103   CO2 26 27   * 192*   BUN 19 24*   CREA 0.79 1.05*   CA 8.5 8.5     No results for input(s): PH, PCO2, PO2, HCO3, FIO2 in the last 72 hours. 24 Hour Results:  No results found for this or any previous visit (from the past 24 hour(s)).     Problem List:  Problem List as of 12/2/2020 Date Reviewed: 12/1/2020          Codes Class Noted - Resolved    Bacteremia ICD-10-CM: R78.81  ICD-9-CM: 790.7  10/25/2020 - Present        Seizure ([facility] ICD-10-CM: R56.9  ICD-9-CM: 326.66  10/23/2020 - Present        Major depression ICD-10-CM: F32.9  ICD-9-CM: 296.20  2/1/2020 - Present        Hypokalemia ICD-10-CM: E87.6  ICD-9-CM: 276.8  1/28/2020 - Present        * (Principal) Benzodiazepine withdrawal ([facility] ICD-10-CM: F13.239  ICD-9-CM: 292.0, 304.10  1/28/2020 - Present        Chronic prescription benzodiazepine use ICD-10-CM: Z79.899  ICD-9-CM: V58.69  1/28/2020 - Present        Altered mental status ICD-10-CM: R41.82  ICD-9-CM: 780.97  1/27/2020 - Present        Drug-induced constipation ICD-10-CM: K59.03  ICD-9-CM: 564.09, E980.5  4/5/2018 - Present        Advance care planning ICD-10-CM: Z71.89  ICD-9-CM: V65.49  4/5/2018 - Present        CHF (congestive heart failure) (HCC) ICD-10-CM: I50.9  ICD-9-CM: 428.0  4/2/2018 - Present        Depression ICD-10-CM: F32.9  ICD-9-CM: 623  4/2/2018 - Present        Dementia ([facility] ICD-10-CM: F03.90  ICD-9-CM: 294.20  4/2/2018 - Present        Metastatic breast cancer ([facility] ICD-10-CM: C50.919  ICD-9-CM: 174.9  5/3/2017 - Present        Secondary cancer of bone ([facility] ICD-10-CM: C79.51  ICD-9-CM: 198.5  5/3/2017 - Present        Anxiety ICD-10-CM: F41.9  ICD-9-CM: 300.00  2/3/2016 - Present        HTN (hypertension) ICD-10-CM: I10  ICD-9-CM: 401.9  Unknown - Present        GERD (gastroesophageal reflux disease) ICD-10-CM: K21.9  ICD-9-CM: 530.81  Unknown - Present        Hypercholesterolemia ICD-10-CM: E78.00  ICD-9-CM: 272.0  Unknown - Present        RESOLVED: Acute hyponatremia ICD-10-CM: E87.1  ICD-9-CM: 276.1  6/18/2019 - 1/28/2020        RESOLVED: Pain due to neoplasm ICD-10-CM: G89.3  ICD-9-CM: 338.3  4/4/2018 - 1/28/2020        RESOLVED: Neoplastic malignant related fatigue ICD-10-CM: R53.0  ICD-9-CM: 780.79  4/4/2018 - 1/28/2020        RESOLVED: Overdose ICD-10-CM: T50.901A  ICD-9-CM: 977.9, E980.5  4/2/2018 - 4/5/2018        RESOLVED: Gastroenteritis due to norovirus ICD-10-CM: A08.11  ICD-9-CM: 008.63  2/21/2018 - 1/28/2020        RESOLVED: Urinary tract infection without hematuria ICD-10-CM: N39.0  ICD-9-CM: 599.0  2/13/2018 - 1/28/2020        RESOLVED: Sepsis ([facility] ICD-10-CM: A41.9  ICD-9-CM: 038.9, 995.91  2/12/2018 - 1/28/2020        RESOLVED: Closed left ankle fracture ICD-10-CM: D43.305C  ICD-9-CM: 824.8  2/4/2016 - 1/28/2020        RESOLVED: Bimalleolar fracture of left ankle ICD-10-CM: G24.785F  ICD-9-CM: 824.4  2/3/2016 - 1/28/2020    Overview Signed 2/3/2016 12:49 AM by Minnette Cheadle, PA     Comminuted and displaced             RESOLVED: Hypotension ICD-10-CM: I95.9  ICD-9-CM: 458.9  9/12/2012 - 9/13/2012        RESOLVED: Dyspnea ICD-10-CM: R06.00  ICD-9-CM: 786.09  8/30/2012 - 8/31/2012 Medications reviewed  Current Facility-Administered Medications   Medication Dose Route Frequency    LORazepam (ATIVAN) tablet 0.5 mg  0.5 mg Oral Q8H PRN    gabapentin (NEURONTIN) capsule 300 mg  300 mg Oral TID    [START ON 12/3/2020] DULoxetine (CYMBALTA) capsule 60 mg  60 mg Oral DAILY    hydrOXYzine HCL (ATARAX) tablet 50 mg  50 mg Oral Q6H PRN    LORazepam (ATIVAN) injection 2 mg  2 mg IntraVENous PRN    thiamine HCL (B-1) tablet 100 mg  100 mg Oral DAILY    sodium chloride (NS) flush 5-40 mL  5-40 mL IntraVENous Q8H    sodium chloride (NS) flush 5-40 mL  5-40 mL IntraVENous PRN    fenofibrate nanocrystallized (TRICOR) tablet 145 mg  145 mg Oral DAILY    folic acid (FOLVITE) tablet 1 mg  1 mg Oral DAILY    losartan (COZAAR) tablet 50 mg  50 mg Oral DAILY    acetaminophen (TYLENOL) tablet 650 mg  650 mg Oral Q6H PRN    sodium chloride (NS) flush 5-40 mL  5-40 mL IntraVENous Q8H    sodium chloride (NS) flush 5-40 mL  5-40 mL IntraVENous PRN       Care Plan discussed with: Patient/family, nurse      Bety Fernandez, NP  Hospitalist  Providers can reach me on Perfect[employer]

## 2020-12-02 NOTE — PROGRESS NOTES
Bedside shift change report given to Meet Cantrell (oncoming nurse) by Faustino Teran (offgoing nurse). Report included the following information SBAR, Kardex, Intake/Output, MAR and Recent Results.

## 2020-12-02 NOTE — PROGRESS NOTES
Problem: Self Care Deficits Care Plan (Adult)  Goal: *Acute Goals and Plan of Care (Insert Text)  Description:   FUNCTIONAL STATUS PRIOR TO ADMISSION: Pt lives alone in single story apartment in [address] [place] [address] and states she was Independent with ADLs/IADLs, without use of DME, showering in walk-in with seated surface and grab bars. HOME SUPPORT: The patient lived alone with no local support. Occupational Therapy Goals  Initiated 12/2/2020  1. Patient will perform RW grooming with supervision within 7 day(s). 2.  Patient will perform EOB/RW bathing with supervision within 7 day(s). 3.  Patient will perform EOB/RW lower body dressing with supervision/set-up within 7 day(s). 4.  Patient will perform RW toilet transfers with supervision within 7 day(s). 5.  Patient will perform all aspects of RW toileting with supervision within 7 day(s). Outcome: Not Met    OCCUPATIONAL THERAPY EVALUATION  Patient: Latia Holland (60 y.o. female)  Date: 12/2/2020  Primary Diagnosis: Overdose [T50.901A]  Benzodiazepine withdrawal ([facility] [F13.239]        Precautions: Falls       ASSESSMENT  Based on the objective data described below, the patient presents with decreased problem solving/sequencing/safety/task initiation, fluctuating attention span, fluctuating orientation/awareness, decreased strength/endurance, decreased mobility/balance, decreased activity tolerance, reports of BLE neuropathy and ataxic gait (pt noted to stomp during bathroom mobility; stating she couldn't feel her feet), all of which limit pt's ability to safely complete self-care routine at level congruent with PLOF. Currently, pt benefits from Supervision for self-feeding (able to bring hand to mouth), CGA for standing grooming and toileting at RW and Min A for bathing and full body dressing. Of note, pt with psych hx, with poor eye contact noted during evaluation and pt demonstrating episodes of delayed response to therapist's questions.  Reporting therapist speaking with NP regarding pt's POC and NP verbalizing attempts to get into inpatient psych unit. OT to follow during acute hospitalization in an attempt to maximize safe ADL engagement. Current Level of Function Impacting Discharge (ADLs/self-care): Supervision for self-feeding (able to bring hand to mouth), CGA for standing grooming and toileting at RW and Min A for bathing and full body dressing    Functional Outcome Measure: The patient scored 40/100 on the Barthel Index outcome measure. Other factors to consider for discharge: Psych hx     Patient will benefit from skilled therapy intervention to address the above noted impairments. PLAN :  Recommendations and Planned Interventions: self care training, functional mobility training, therapeutic exercise, balance training, therapeutic activities, endurance activities, and patient education    Frequency/Duration: Patient will be followed by occupational therapy 3 times a week to address goals. Recommendation for discharge: (in order for the patient to meet his/her long term goals)  To be determined: Inpatient psych    This discharge recommendation:  Has been made in collaboration with the attending provider and/or case management    IF patient discharges home will need the following DME: TBD       SUBJECTIVE:   Patient stated I remember making a loud noise because I was afraid of broken glass.  re: what lead to recent hospitalization    OBJECTIVE DATA SUMMARY:   HISTORY:   Past Medical History:   Diagnosis Date    Acute hyponatremia 6/18/2019    Anxiety     Bimalleolar fracture of left ankle 2/3/2016    Comminuted and displaced     Cancer [facility])     breast    Closed left ankle fracture 2/4/2016    Depression     Gastroenteritis due to norovirus 2/21/2018    GERD (gastroesophageal reflux disease)     HTN (hypertension)     Hypercholesterolemia     Hypotension 9/12/2012    Metastatic breast cancer ([facility] [address] 5/3/2017    Neoplastic malignant related fatigue 4/4/2018    Pain due to neoplasm 4/4/2018    Secondary cancer of bone ([facility] [address] 5/3/2017    Sepsis ([facility] [address] [facility] 2/12/2018    Urinary tract infection without hematuria 2/13/2018     Past Surgical History:   Procedure Laterality Date    BREAST SURGERY PROCEDURE UNLISTED  march 13    carina masectomy       Expanded or extensive additional review of patient history:     Home Situation  Home Environment: Independent living([facility] [address])  # Steps to Enter: 0  One/Two Story Residence: One story  Living Alone: Yes  Current DME Used/Available at Home: Grab bars, Berneta Amsler, Transfer bench  Tub or Shower Type: Shower    Hand dominance: Right    EXAMINATION OF PERFORMANCE DEFICITS:  Cognitive/Behavioral Status:  Neurologic State: Alert  Orientation Level: Oriented to person;Oriented to place;Oriented to time;Oriented to situation  Cognition: Follows commands; Impaired decision making;Poor safety awareness  Perception: Appears intact  Perseveration: Perseverates during mobility(on broken glass)  Safety/Judgement: Decreased awareness of need for assistance;Decreased awareness of need for safety;Decreased insight into deficits    Hearing: Auditory  Auditory Impairment: None    Vision/Perceptual:    Corrective Lenses: Reading glasses    Range of Motion:  AROM: Generally decreased, functional     Strength:  Strength: Generally decreased, functional    Coordination:  Coordination: Generally decreased, functional  Fine Motor Skills-Upper: Left Intact; Right Intact    Gross Motor Skills-Upper: Left Intact; Right Intact         Tone/Sensation:  Tone: Abnormal(tremors in R UE)  Sensation: Impaired(significantly demenished in B LE )    Balance:  Sitting: Impaired; Without support  Sitting - Static: Fair (occasional)  Sitting - Dynamic: Fair (occasional)  Standing: Impaired; With support; Without support  Standing - Static: Poor;Occasional(improved with RW)  Standing - Dynamic : Poor;Occasional(improved with RW)    Functional Mobility and Transfers for ADLs:  Bed Mobility:  Rolling: Minimum assistance  Supine to Sit: Moderate assistance;Assist x1;Additional time;Bed Modified(HOB elevated 50 degrees)  Sit to Supine: Moderate assistance  Scooting: Contact guard assistance    Transfers:  Sit to Stand: Contact guard assistance;Assist x1  Stand to Sit: Contact guard assistance;Assist x1  Bed to Chair: Contact guard assistance;Assist x1  Bathroom Mobility: Contact guard assistance  Toilet Transfer : Contact guard assistance    ADL Assessment:  Feeding: Supervision    Oral Facial Hygiene/Grooming: Contact guard assistance    Bathing: Minimum assistance    Upper Body Dressing: Minimum assistance    Lower Body Dressing: Minimum assistance    Toileting: Contact guard assistance    ADL Intervention and task modifications:  Pt educated on safe transfer techniques, with specific emphasis on proper hand placement to push up from seated surface rather than attempt to pull self up, fully positioning self in-front of desired seated location, feeling chair on back of legs and reaching back with 1-2 UE to slowly lower self to seated position. Patient instructed and indicated understanding the benefits of maintaining activity tolerance, functional mobility, and independence with self care tasks during acute stay  to ensure safe return home and to baseline. Encouraged patient to increase frequency and duration OOB, be out of bed for all meals, perform daily ADLs (as approved by RN/MD regarding bathing etc), and performing functional mobility to/from bathroom.     Cognitive Retraining  Safety/Judgement: Decreased awareness of need for assistance;Decreased awareness of need for safety;Decreased insight into deficits    Functional Measure:  Barthel Index:    Bathin  Bladder: 5  Bowels: 5  Groomin  Dressin  Feeding: 10  Mobility: 0  Stairs: 0  Toilet Use: 5  Transfer (Bed to Chair and Back): 10  Total: 40/100        The Barthel ADL Index: Guidelines  1. The index should be used as a record of what a patient does, not as a record of what a patient could do. 2. The main aim is to establish degree of independence from any help, physical or verbal, however minor and for whatever reason. 3. The need for supervision renders the patient not independent. 4. A patient's performance should be established using the best available evidence. Asking the patient, friends/relatives and nurses are the usual sources, but direct observation and common sense are also important. However direct testing is not needed. 5. Usually the patient's performance over the preceding 24-48 hours is important, but occasionally longer periods will be relevant. 6. Middle categories imply that the patient supplies over 50 per cent of the effort. 7. Use of aids to be independent is allowed. Rosana Lord., Barthel, D.W. (1179). Functional evaluation: the Barthel Index. [facility] (14)2. Alexis Ray sammi RAYSA Gonzales, Sonia García., Brendon Palm., [place], [address] [place] [address] (1999). Measuring the change indisability after inpatient rehabilitation; comparison of the responsiveness of the Barthel Index and Functional [place] Measure. Journal of Neurology, Neurosurgery, and Psychiatry, 66(4), 998-850. Joana Kidd, N.J.A, LAVONNE Andrade, & Patricio Vidal MKAROLYN. (2004.) Assessment of post-stroke quality of life in cost-effectiveness studies: The usefulness of the Barthel Index and the EuroQoL-5D.  Quality of Life Research, 15, 943-40     Occupational Therapy Evaluation Charge Determination   History Examination Decision-Making   LOW Complexity : Brief history review  HIGH Complexity : 5 or more performance deficits relating to physical, cognitive , or psychosocial skils that result in activity limitations and / or participation restrictions LOW Complexity : No comorbidities that affect functional and no verbal or physical assistance needed to complete eval tasks       Based on the above components, the patient evaluation is determined to be of the following complexity level: LOW   Pain Rating:  C/o R shoulder pain, did not quantify; nursing aware and following    Activity Tolerance:   Fair and requires rest breaks    After treatment patient left in no apparent distress:    Supine in bed, Call bell within reach, Side rails x 3, and sitter present    COMMUNICATION/EDUCATION:   The patients plan of care was discussed with: Registered nurse and Case management. Home safety education was provided and the patient/caregiver indicated understanding., Patient/family have participated as able in goal setting and plan of care. , and Patient/family agree to work toward stated goals and plan of care. This patients plan of care is appropriate for delegation to [facility].     Thank you for this referral.  Alexsander Torres OT  Time Calculation: 31 mins

## 2020-12-02 NOTE — PROGRESS NOTES
Problem: Falls - Risk of  Goal: *Absence of Falls  Description: Document Dima Alvarez Fall Risk and appropriate interventions in the flowsheet.   Outcome: Progressing Towards Goal  Note: Fall Risk Interventions:  Mobility Interventions: Bed/chair exit alarm, OT consult for ADLs, Patient to call before getting OOB, PT Consult for mobility concerns, PT Consult for assist device competence, Strengthening exercises (ROM-active/passive)    Mentation Interventions: Adequate sleep, hydration, pain control, Bed/chair exit alarm, Door open when patient unattended    Medication Interventions: Bed/chair exit alarm, Evaluate medications/consider consulting pharmacy, Teach patient to arise slowly, Patient to call before getting OOB    Elimination Interventions: Call light in reach, Patient to call for help with toileting needs

## 2020-12-02 NOTE — PROGRESS NOTES
Problem: Mobility Impaired (Adult and Pediatric)  Goal: *Acute Goals and Plan of Care (Insert Text)  Description: FUNCTIONAL STATUS PRIOR TO ADMISSION: Patient was independent without use of DME. Patient states she was mobilizing, driving, and performing ADLs/IADLs independently until about 2 weeks ago. Since, then she has spent most of her time in the bed with little mobility due to fear of falling. HOME SUPPORT PRIOR TO ADMISSION: The patient lived alone in a senior living facility and has no local support. Physical Therapy Goals  Initiated 12/2/2020  1. Patient will move from supine to sit and sit to supine , scoot up and down, and roll side to side in bed with modified independence within 7 day(s). 2.  Patient will transfer from bed to chair and chair to bed with supervision using the least restrictive device within 7 day(s). 3.  Patient will perform sit to stand with supervision within 7 day(s). 4.  Patient will ambulate with supervision for 50 feet with the least restrictive device within 7 day(s). Outcome: Progressing Towards Goal   PHYSICAL THERAPY EVALUATION  Patient: Libby Rosario (60 y.o. female)  Date: 12/2/2020  Primary Diagnosis: Overdose [T50.901A]  [facility] [F13.239]        Precautions: fall       ASSESSMENT  Based on the objective data described below, the patient presents with significantly diminished sensation in B LE, tremors in UE, limitations in strength/function, decreased activity endurance, significantly impaired balance, hx of falls, and decreased functional mobility. Patient is extremely fearful of falling and has anxiety with mobility. Due to lack of sensation patient experiences balance impairments when standing and during ambulation that raise significant safety concerns. PT believes patient would benefit from rehab stay to further address deficit and improve safety and independence.  PT recommend patient be up to chair at least 3x/day for meals and ambulating to bathroom with RW and nursing assistance. Current Level of Function Impacting Discharge (mobility/balance): Mod assist of 1 for bed mobility, CGA for transfers, Min assistance with ambulation, impaired balance    Functional Outcome Measure: The patient scored 11/28 on the Tinetti outcome measure which is indicative of High fall risk. Other factors to consider for discharge: patient lives alone at senior living facility, patient has limited support per patient, patient has hx of falls     Patient will benefit from skilled therapy intervention to address the above noted impairments. PLAN :  Recommendations and Planned Interventions: bed mobility training, transfer training, gait training, therapeutic exercises, edema management/control, patient and family training/education, and therapeutic activities      Frequency/Duration: Patient will be followed by physical therapy:  5 times a week to address goals. Recommendation for discharge: (in order for the patient to meet his/her long term goals)  Therapy 3 hours per day 5-7 days per week    This discharge recommendation:  A follow-up discussion with the attending provider and/or case management is planned    IF patient discharges home will need the following DME: rolling walker         SUBJECTIVE:   Patient stated Pravin November am just so scared to fall.     OBJECTIVE DATA SUMMARY:   HISTORY:    Past Medical History:   Diagnosis Date    Acute hyponatremia 6/18/2019    Anxiety     Bimalleolar fracture of left ankle 2/3/2016    Comminuted and displaced     Cancer [facility])     breast    Closed left ankle fracture 2/4/2016    Depression     Gastroenteritis due to norovirus 2/21/2018    GERD (gastroesophageal reflux disease)     HTN (hypertension)     Hypercholesterolemia     Hypotension 9/12/2012    Metastatic breast cancer ([facility] [address] 5/3/2017    Neoplastic malignant related fatigue 4/4/2018    Pain due to neoplasm 4/4/2018    Secondary cancer of bone ([facility] [address] 5/3/2017    Sepsis ([facility] [address] 2/12/2018    Urinary tract infection without hematuria 2/13/2018     Past Surgical History:   Procedure Laterality Date    BREAST SURGERY PROCEDURE UNLISTED  march 13    carina masectomy       Personal factors and/or comorbidities impacting plan of care: hx cancer, neuropathy in B LE from [address]: Independent living([facility] [address])  # Steps to Enter: 0  One/Two Story Residence: One story  Living Alone: Yes  Current DME Used/Available at Home: Natalie padilla    EXAMINATION/PRESENTATION/DECISION MAKING:   Critical Behavior:  Neurologic State: Alert  Orientation Level: Oriented X4  Cognition: Follows commands     Range Of Motion:  AROM: Generally decreased, functional     Strength:    Strength: Generally decreased, functional     Tone & Sensation:   Tone: Abnormal(tremors in R UE)  Sensation: Impaired(significantly demenished in B LE )     Coordination:  Coordination: Generally decreased, functional    Functional Mobility:  Bed Mobility:  Supine to Sit: Moderate assistance;Assist x1;Additional time;Bed Modified(HOB elevated 50 degrees)  Scooting: Contact guard assistance    Transfers:  Sit to Stand: Contact guard assistance;Assist x1  Stand to Sit: Contact guard assistance;Assist x1  Bed to Chair: Contact guard assistance;Assist x1    Balance:   Sitting: Impaired; Without support  Sitting - Static: Fair (occasional)  Sitting - Dynamic: Fair (occasional)  Standing: Impaired; With support; Without support  Standing - Static: Poor;Occasional(improved with RW)  Standing - Dynamic : Poor;Occasional(improved with RW)    Ambulation/Gait Training:  Distance (ft): 30 Feet (ft)  Assistive Device: Gait belt;Walker, rolling  Ambulation - Level of Assistance: Minimal assistance;Assist x1;Adaptive equipment  Gait Description (WDL): Exceptions to WDL  Gait Abnormalities: Ataxic; Altered arm swing;Decreased step clearance; Path deviations  Base of Support: Widened  Speed/Antonietta: Fluctuations  Step Length: Left shortened;Right shortened    Therapeutic Exercises:   PT encouraged patient to work on OOB tolerance by sitting up in chair at least 3x/day for meals and ambulating to bathroom with RW and nursing's assistance. PT also instructed and patient demonstrated sitting exercises (ankle pumps, knee extension, marches). PT encourage patient to do these throughout the day when sitting up in chair; patient was agreeable. Functional Measure:  Tinetti test:    Sitting Balance: 1  Arises: 1  Attempts to Rise: 2  Immediate Standing Balance: 1  Standing Balance: 1  Nudged: 0  Eyes Closed: 0  Turn 360 Degrees - Continuous/Discontinuous: 0  Turn 360 Degrees - Steady/Unsteady: 0  Sitting Down: 1  Balance Score: 7 Balance total score  Indication of Gait: 1  R Step Length/Height: 0  L Step Length/Height: 0  R Foot Clearance: 1  L Foot Clearance: 1  Step Symmetry: 0  Step Continuity: 0  Path: 1  Trunk: 0  Walking Time: 0  Gait Score: 4 Gait total score  Total Score: 11/28 Overall total score         Tinetti Tool Score Risk of Falls  <19 = High Fall Risk  19-24 = Moderate Fall Risk  25-28 = Low Fall Risk  Tinetti ME. Performance-Oriented Assessment of Mobility Problems in Elderly Patients. [address]; [id number].  (Scoring Description: PT Bulletin Feb. 10, 1993)    Older adults: Kip Mcintosh et al, 2009; n = 1000 [facility] elderly evaluated with ABC, LINO, ADL, and IADL)  · Mean LINO score for males aged 69-68 years = 26.21(3.40)  · Mean LINO score for females age 69-68 years = 25.16(4.30)  · Mean LINO score for males over 80 years = 23.29(6.02)  · Mean LINO score for females over 80 years = 17.20(8.32)       Physical Therapy Evaluation Charge Determination   History Examination Presentation Decision-Making   HIGH Complexity :3+ comorbidities / personal factors will impact the outcome/ POC  MEDIUM Complexity : 3 Standardized tests and measures addressing body structure, function, activity limitation and / or participation in recreation  LOW Complexity : Stable, uncomplicated  Other outcome measures Tinetii  HIGH       Based on the above components, the patient evaluation is determined to be of the following complexity level: LOW     Pain Rating:  Patient reported 8/10 pain in back and shoulder    Activity Tolerance:   Fair, impaired balance and decreased endurance limited activity    After treatment patient left in no apparent distress:   Sitting in chair, Call bell within reach, and sitter present    COMMUNICATION/EDUCATION:   The patients plan of care was discussed with: Registered nurse. Fall prevention education was provided and the patient/caregiver indicated understanding., Patient/family have participated as able in goal setting and plan of care. , and Patient/family agree to work toward stated goals and plan of care. Regarding student involvement in patient care:  A student participated in this treatment session. Per CMS Medicare statements and APTA guidelines I certify that the following was true:  1. I was present and directly observed the entire session. 2. I made all skilled judgments and clinical decisions regarding care. 3. I am the practitioner responsible for assessment, treatment, and documentation.     Thank you for this referral.  Moy Ron, SPT

## 2020-12-03 PROCEDURE — 65270000029 HC RM PRIVATE

## 2020-12-03 PROCEDURE — 97530 THERAPEUTIC ACTIVITIES: CPT

## 2020-12-03 PROCEDURE — 74011250637 HC RX REV CODE- 250/637: Performed by: NURSE PRACTITIONER

## 2020-12-03 PROCEDURE — 74011250637 HC RX REV CODE- 250/637: Performed by: INTERNAL MEDICINE

## 2020-12-03 PROCEDURE — 74011250636 HC RX REV CODE- 250/636: Performed by: HOSPITALIST

## 2020-12-03 RX ORDER — ONDANSETRON 2 MG/ML
4 INJECTION INTRAMUSCULAR; INTRAVENOUS
Status: DISCONTINUED | OUTPATIENT
Start: 2020-12-03 | End: 2020-12-17 | Stop reason: HOSPADM

## 2020-12-03 RX ADMIN — ACETAMINOPHEN 650 MG: 325 TABLET ORAL at 08:51

## 2020-12-03 RX ADMIN — ACETAMINOPHEN 650 MG: 325 TABLET ORAL at 01:11

## 2020-12-03 RX ADMIN — ONDANSETRON 4 MG: 2 INJECTION INTRAMUSCULAR; INTRAVENOUS at 12:33

## 2020-12-03 RX ADMIN — FENOFIBRATE 145 MG: 145 TABLET ORAL at 08:51

## 2020-12-03 RX ADMIN — LORAZEPAM 0.5 MG: 0.5 TABLET ORAL at 15:12

## 2020-12-03 RX ADMIN — GABAPENTIN 300 MG: 300 CAPSULE ORAL at 08:51

## 2020-12-03 RX ADMIN — GABAPENTIN 300 MG: 300 CAPSULE ORAL at 16:44

## 2020-12-03 RX ADMIN — DULOXETINE HYDROCHLORIDE 60 MG: 60 CAPSULE, DELAYED RELEASE ORAL at 08:51

## 2020-12-03 RX ADMIN — HYDROXYZINE HYDROCHLORIDE 50 MG: 25 TABLET, FILM COATED ORAL at 18:44

## 2020-12-03 RX ADMIN — Medication 100 MG: at 08:52

## 2020-12-03 RX ADMIN — GABAPENTIN 300 MG: 300 CAPSULE ORAL at 22:34

## 2020-12-03 RX ADMIN — LOSARTAN POTASSIUM 50 MG: 50 TABLET, FILM COATED ORAL at 08:51

## 2020-12-03 RX ADMIN — DOCUSATE SODIUM 100 MG: 100 CAPSULE, LIQUID FILLED ORAL at 16:44

## 2020-12-03 RX ADMIN — DOCUSATE SODIUM 100 MG: 100 CAPSULE, LIQUID FILLED ORAL at 08:51

## 2020-12-03 RX ADMIN — Medication 10 ML: at 15:12

## 2020-12-03 RX ADMIN — Medication 10 ML: at 15:13

## 2020-12-03 RX ADMIN — FOLIC ACID 1 MG: 1 TABLET ORAL at 08:51

## 2020-12-03 RX ADMIN — Medication 10 ML: at 12:33

## 2020-12-03 NOTE — HOSPICE
Hospice Liaison Nurse:    Hospice Nurse reviewed hospice criteria with Dr. Jeremiah Forman, and patient appears to meet Hospice Criteria. Hospice nurse reviewed plan of care with Mike Camacho NP. Per Karthikeyan Gonzalez, she has been in communication with patient's oncologist, and received cancer prognosis. At this time, Karthikeyan Gonzalez provided update that it is unclear to cancer prognosis. Per hospital team, hospice is not being considered for patient at this time. Contact Hospice if patient status changes or to offer support.     Lauryn Coleman RN, [place] [address] Nurse Liaison  [phone] [place]  [phone] Office

## 2020-12-03 NOTE — PROGRESS NOTES
Problem: Suicide  Goal: *STG: Remains safe in hospital  Outcome: Progressing Towards Goal  Goal: *STG/LTG: Complies with medication therapy  Outcome: Progressing Towards Goal     Problem: Falls - Risk of  Goal: *Absence of Falls  Description: Document Mary Ellen Fall Risk and appropriate interventions in the flowsheet.   Outcome: Progressing Towards Goal  Note: Fall Risk Interventions:  Mobility Interventions: Bed/chair exit alarm, Patient to call before getting OOB    Mentation Interventions: Adequate sleep, hydration, pain control, Door open when patient unattended, Increase mobility, More frequent rounding    Medication Interventions: Bed/chair exit alarm, Evaluate medications/consider consulting pharmacy, Patient to call before getting OOB    Elimination Interventions: Call light in reach, Patient to call for help with toileting needs

## 2020-12-03 NOTE — PROGRESS NOTES
Bedside shift change report given to [employer] (oncoming nurse) by Froilan Trujillo (offgoing nurse). Report included the following information SBAR, Kardex, Intake/Output, MAR and Recent Results.

## 2020-12-03 NOTE — PROGRESS NOTES
FRIDA plan: Hopeful transfer to the behavioral health unit    -RUR: 38%  -Consult written to assist with medical records being faxed to the floor. Records requests are done through the nurses station, and have already been received. No CM needs at this time.     Rafael CASTRO, ACM-SW

## 2020-12-03 NOTE — PROGRESS NOTES
[facility]  Hospitalist Group                                                                                          Hospitalist Progress Note  Leona Vasquez MD  Answering service: [phone] from in house phone        Date of Service:  12/3/2020  NAME:  Josefina Rockwell  :  1955  MRN:  [id number]      Admission Summary: Edith is a 60 y.o.   female whom presents with complaint with altered mental status. Her PMH is significant for anxiety and mood disorder, hyponatremia, HTN, and stage 3-4 metastatic breast cancer with a port. Reportedly she was seen in the ED 2 days prior for anxiety and was given a dose of Ativan which she improved with. At that time, ED provider evalulated [facility]  and noted multiple prescriptions over different time periods for Xanax. She was also seen in the ED on 11/10 for similar complaints and BSMART evaluated at that time and she was unwilling to stay in the hospital and therefore was discharged as she was not a candidate for a TDO. Back in late October, she was seen for concern for benzo withdrawal at that time. Per the ED, she was reportedly brought in via EMS soiled with urine, still in the same hospital gown she wore here with her identification band on her wrist. She is unable to recall why she came into the hospital.  Reportedly told someone she attempted to stab herself in the forehead with a knife but she was unable to confirm or deny that. Does express that she wants to go see Donny Hutchinson when I walk into the room. Only complaint is that she is anxious but overall poor historian and difficult to obtain additional information. There is concern that she may have taken too many of her medications as she was talking about empty pill bottles at home. \"    Interval history / Subjective:     She said she feels anxious and shaking, denied suicidal thought     Assessment & Plan:     Benzodiazepine Withdrawal  - Improving slowly  - Seizure precautions  - Pt states she ran out of her Xanax 1 week PTA  - Cont ativan 0.5mg QID (was taking total 4mg daily PTA)  - TSH, Ammonia, Vitamin B12 NL  - Fall precatuions  - Supportive care  - Psych consult pending     Suicidal ideation   - attempted to stab self in head POA  - Suicide precautions  - 1:1 sitter  - Psych consult pending     Severe Anxiety & Mood Disorder  - Continue cymbalta (started this admission), gabapentin, thiamine, folic acid  - supportive care  - Psych consult pending     Mild hypokalemia  - Resolved     Hyponatremia  -Resolved     CORNELIA: normal kidney function on admission  -Resolved     H/o HTN  - Stable, continue home cozaar  - Monitor BP     Metastatic Breast CA: mets to sternum; per hem/onc-low disease burden  - on treatment holiday; next appt in Jan 2021  - Last Echo 10/27/2020 was NL  - DDNR on file  - Appreciate palliative care input  - Appreciate hem/onc input; pt not candidate for hospice  - Supportive care            Code status: Full Code  DVT prophylaxis: SCD    Care Plan discussed with: Patient/Family and Nurse  Anticipated Disposition: to transfer to Psych Unit  Anticipated Discharge: 24 hours to 48 hours     Hospital Problems  Date Reviewed: 12/1/2020          Codes Class Noted POA    * (Principal) Ben[facility]odiazepine withdrawal ([facility] ICD-10-CM: Q01.225  ICD-9-CM: 292.0, 304.10  1/28/2020 Yes              Vital Signs:    Last 24hrs VS reviewed since prior progress note. Most recent are:  Visit Vitals  BP (!) 154/100   Pulse (!) 105   Temp 98.2 °F (36.8 °C)   Resp 16   Wt 103.6 kg (228 lb 6.3 oz)   SpO2 97%   BMI 38.01 kg/m²       No intake or output data in the 24 hours ending 12/03/20 1844     Physical Examination:     I had a face to face encounter with this patient and independently examined them on 12/3/2020 as outlined below:          Constitutional:  No acute distress, cooperative, pleasant    ENT:  Oral mucosa moist, oropharynx benign. Resp:  CTA bilaterally.  No wheezing/rhonchi/rales. No accessory muscle use   CV:  Regular rhythm, normal rate, no murmurs, gallops, rubs    GI:  Soft, non distended, non tender. normoactive bowel sounds, no hepatosplenomegaly     Musculoskeletal:  No edema     Neurologic:  Moves all extremities. AAOx3, CN II-XII reviewed     Skin:  Good turgor, no rashes or ulcers       Data Review:    Review and/or order of clinical lab test  Review and/or order of tests in the radiology section of CPT  Review and/or order of tests in the medicine section of CPT      Labs:   No results for input(s): WBC, HGB, HCT, PLT, HGBEXT, HCTEXT, PLTEXT in the last 72 hours. Recent Labs     12/01/20  0419      K 3.8   *   CO2 26   BUN 19   CREA 0.79   *   CA 8.5     No results for input(s): ALT, AP, TBIL, TBILI, TP, ALB, GLOB, GGT, AML, LPSE in the last 72 hours. No lab exists for component: SGOT, GPT, AMYP, HLPSE  No results for input(s): INR, PTP, APTT, INREXT in the last 72 hours. No results for input(s): FE, TIBC, PSAT, FERR in the last 72 hours. No results found for: FOL, RBCF   No results for input(s): PH, PCO2, PO2 in the last 72 hours. No results for input(s): CPK, CKNDX, TROIQ in the last 72 hours.     No lab exists for component: CPKMB  No results found for: CHOL, CHOLX, CHLST, CHOLV, HDL, HDLP, LDL, LDLC, DLDLP, TGLX, TRIGL, TRIGP, CHHD, CHHDX  Lab Results   Component Value Date/Time    Glucose (POC) 203 (H) 10/23/2020 05:50 AM    Glucose POC 83 06/14/2011 10:33 AM     Lab Results   Component Value Date/Time    Color YELLOW/STRAW 11/28/2020 11:38 AM    Appearance CLEAR 11/28/2020 11:38 AM    Specific gravity 1.028 11/28/2020 11:38 AM    Specific gravity 1.025 02/12/2018 01:41 PM    pH (UA) 5.5 11/28/2020 11:38 AM    Protein Negative 11/28/2020 11:38 AM    Glucose 250 (A) 11/28/2020 11:38 AM    Ketone Negative 11/28/2020 11:38 AM    Bilirubin Negative 11/28/2020 11:38 AM    Urobilinogen 0.2 11/28/2020 11:38 AM    Nitrites Negative 11/28/2020 11:38 AM    Leukocyte Esterase Negative 11/28/2020 11:38 AM    Epithelial cells MODERATE (A) 11/28/2020 11:38 AM    Bacteria 1+ (A) 11/28/2020 11:38 AM    WBC 20-50 11/28/2020 11:38 AM    RBC 0-5 11/28/2020 11:38 AM         Medications Reviewed:     Current Facility-Administered Medications   Medication Dose Route Frequency    ondansetron (ZOFRAN) injection 4 mg  4 mg IntraVENous Q6H PRN    docusate sodium (COLACE) capsule 100 mg  100 mg Oral BID    LORazepam (ATIVAN) tablet 0.5 mg  0.5 mg Oral Q8H PRN    gabapentin (NEURONTIN) capsule 300 mg  300 mg Oral TID    DULoxetine (CYMBALTA) capsule 60 mg  60 mg Oral DAILY    hydrOXYzine HCL (ATARAX) tablet 50 mg  50 mg Oral Q6H PRN    LORazepam (ATIVAN) injection 2 mg  2 mg IntraVENous PRN    thiamine HCL (B-1) tablet 100 mg  100 mg Oral DAILY    sodium chloride (NS) flush 5-40 mL  5-40 mL IntraVENous Q8H    sodium chloride (NS) flush 5-40 mL  5-40 mL IntraVENous PRN    fenofibrate nanocrystallized (TRICOR) tablet 145 mg  145 mg Oral DAILY    folic acid (FOLVITE) tablet 1 mg  1 mg Oral DAILY    losartan (COZAAR) tablet 50 mg  50 mg Oral DAILY    acetaminophen (TYLENOL) tablet 650 mg  650 mg Oral Q6H PRN    sodium chloride (NS) flush 5-40 mL  5-40 mL IntraVENous Q8H    sodium chloride (NS) flush 5-40 mL  5-40 mL IntraVENous PRN     ______________________________________________________________________  EXPECTED LENGTH OF STAY: 3d 9h  ACTUAL LENGTH OF STAY:          3                 Kurtis Walsh MD

## 2020-12-03 NOTE — PROGRESS NOTES
Problem: Falls - Risk of  Goal: *Absence of Falls  Description: Document Darren Thompson Fall Risk and appropriate interventions in the flowsheet.   Outcome: Progressing Towards Goal  Note: Fall Risk Interventions:  Mobility Interventions: Bed/chair exit alarm    Mentation Interventions: Adequate sleep, hydration, pain control    Medication Interventions: Bed/chair exit alarm    Elimination Interventions: Bed/chair exit alarm, Patient to call for help with toileting needs              Problem: Patient Education: Go to Patient Education Activity  Goal: Patient/Family Education  Outcome: Progressing Towards Goal

## 2020-12-03 NOTE — PROGRESS NOTES
Problem: Self Care Deficits Care Plan (Adult)  Goal: *Acute Goals and Plan of Care (Insert Text)  Description:   FUNCTIONAL STATUS PRIOR TO ADMISSION: Pt lives alone in single story apartment in [address] [place] [address] and states she was Independent with ADLs/IADLs, without use of DME, showering in walk-in with seated surface and grab bars. HOME SUPPORT: The patient lived alone with no local support. Occupational Therapy Goals  Initiated 12/2/2020  1. Patient will perform RW grooming with supervision within 7 day(s). 2.  Patient will perform EOB/RW bathing with supervision within 7 day(s). 3.  Patient will perform EOB/RW lower body dressing with supervision/set-up within 7 day(s). 4.  Patient will perform RW toilet transfers with supervision within 7 day(s). 5.  Patient will perform all aspects of RW toileting with supervision within 7 day(s). Outcome: Progressing Towards Goal   OCCUPATIONAL THERAPY TREATMENT  Patient: Julio Garcia (60 y.o. female)  Date: 12/3/2020  Diagnosis: Overdose [T50.901A]  Benzodiazepine withdrawal ([facility] [F13.239]   Benzodiazepine withdrawal ([facility]       Precautions:    Chart, occupational therapy assessment, plan of care, and goals were reviewed. ASSESSMENT  Patient continues with skilled OT services and is progressing towards goals. Minimal participation with R UE hand fisted with involuntary shaking/tremors noted, upon entry patient with PT and refusing B LE on floor due to neuropathy pain. EOB only for ADLs, fair sitting balance. Patient continues with fluctuating attention and command following, required max A x2 to return to bed with refusal of standing trial, noted patient walked with OT yesterday. Will continue to follow and encourage participation with ADLs and functional mobility. Will need rehab pending progression.     Current Level of Function Impacting Discharge (ADLs): EOB intermittent support required for balance, refusal to stand this date, max a x2 for sit to supine, L UE use only with ADLs-brushing teeth this session. Other factors to consider for discharge: psych history         PLAN :  Patient continues to benefit from skilled intervention to address the above impairments. Continue treatment per established plan of care. to address goals. Recommend with staff: EOB ADLs if appropriate, BSC this date due to tremors    Recommend next OT session: EOB ADLs, BSC trial/bathroom as appropriate    Recommendation for discharge: (in order for the patient to meet his/her long term goals)  Therapy 3 hours per day 5-7 days per week    This discharge recommendation:  A follow-up discussion with the attending provider and/or case management is planned    IF patient discharges home will need the following DME: bedside commode and walker: rolling       SUBJECTIVE:   Patient stated my feet hurt on the floor.     OBJECTIVE DATA SUMMARY:   Cognitive/Behavioral Status:         Alert, fluctuating attention and participation, mild demanding behavior at times             Functional Mobility and Transfers for ADLs:  Bed Mobility:  Supine to Sit: Bed Modified;Minimum assistance  Sit to Supine: Maximum assistance;Assist x2  Scooting: Contact guard assistance    Transfers:             Balance:  Sitting: Impaired; Without support  Sitting - Static: Fair (occasional)  Sitting - Dynamic: Fair (occasional)    ADL Intervention:       Grooming  Brushing Teeth: Set-up(L arm only, R arm with involuntary shaking)  Upon arrival seated EOB with PT completing brushing of teeth with setup and L hand use only, patient up EOB but declined standing trial, R hand fisted with tremor noted     Lower Body Dressing Assistance  Socks:  Total assistance (dependent)              Therapeutic Exercises:       Pain:  B feet but did not rate    Activity Tolerance:   Fair and requires frequent rest breaks    After treatment patient left in no apparent distress:   Supine in bed, Call bell within reach, and yokasta    COMMUNICATION/COLLABORATION:   The patients plan of care was discussed with: Physical therapist and Registered nurse.      Jose Martin Wallace, OT  Time Calculation: 9 mins

## 2020-12-03 NOTE — PROGRESS NOTES
Bedside and Verbal shift change report given to Ramiro Cannon (oncoming nurse) by Ron Sidhu (offgoing nurse). Report included the following information SBAR, Kardex, Intake/Output and MAR.

## 2020-12-03 NOTE — PROGRESS NOTES
Palliative Medicine  [place]: [phone])  [facility]: [phone] ([phone])        Code Status: DNR    Advance Care Planning: No AMD - patient declines to complete  Lnok:   Primary Decision Maker: Edelmira Roldan - Father - [phone]    Hattie Capellan is a 60 y.o.  female whom presented to ED with altered mental status, potential overdose of medications. Neighbors heard a \"commotion\" in her ind. living unit and called 911. When police arrived they found the patient attempting to stab herself in the head. Pt reportedly dropped the knife prior to harming herself. Pt denied overdose of medication, said she is out of her lorazepam.     Her PMH is significant for anxiety and mood disorder,  benzo withdrawal seizures, hyponatremia, HTN, and stage 3-4 metastatic breast cancer with a port. Multiple recent admissions for xanax misuse, benzo withdraw. Patient is known to palliative medicine team from October admission (consult for end stage disease) as well as 2018 [facility] admission (SA). Patient is followed for breast cancer by Dr. Sriram Estrada at [facility]. Her last treatment was in October and she opted for a treatment holiday until January. Patient does not remember getting treatment and has stated she is unsure she would want to continue with treatment. Per oncology, her cancer is treatable. Hospice has also evaluated patient. Per hospice MD, patient most likely meets criteria for hospice admission if that is her goal. However, patient has not been able to engage in [address] during this admission due to psychiatric issues.     Palliative consult for care decisions.     Psycho: hx anxiety, depression, PP psych admissions, SI/SA, benzodiazepine misuse  Social: lives alone at [address] living in [place], never , no children.  Only living relative is 81y/o father who is retired missionary and Uncle. 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    Baseline: independent, driving, non compliant with doctor f/u and medication management       Patient / Family Encounter Documentation    Participants (names): Patient, Nav Barone ARNOLD    Narrative: This SW met with patient room. She was lying in bed, hands aloft and shaking involuntarily. Patient noticeable more agitated than previous day and endorsed this feeling. \"I'm mad\" She was apologetic but also did not want visitors. Patient also complained of nausea and unable to eat. Provided refocusing on goals for resting, deep breaths, listening to ocean. Goals of Care / Plan:     Updated attending MD via perfectserv re: nausea/shaking    Awaiting IP psyche consult for IP psyche admission (patient currently unsafe to go home. She has no social/family support, has had repeated admissions/ER visits due to xanax misuse/withdraw)    Will continue to follow for psychosocial support. Thank you for the opportunity to be involved in the care of Ms. Laney Stewart and her family.     Obed Moritz, LMSW, Supervisee in Social Work  Palliative Medicine   [phone]    Start time: 11:15  End time:  11:30

## 2020-12-03 NOTE — PROGRESS NOTES
Problem: Mobility Impaired (Adult and Pediatric)  Goal: *Acute Goals and Plan of Care (Insert Text)  Description: FUNCTIONAL STATUS PRIOR TO ADMISSION: Patient was independent without use of DME. Patient states she was mobilizing, driving, and performing ADLs/IADLs independently until about 2 weeks ago. Since, then she has spent most of her time in the bed with little mobility due to fear of falling. HOME SUPPORT PRIOR TO ADMISSION: The patient lived alone in a senior living facility and has no local support. Physical Therapy Goals  Initiated 12/2/2020  1. Patient will move from supine to sit and sit to supine , scoot up and down, and roll side to side in bed with modified independence within 7 day(s). 2.  Patient will transfer from bed to chair and chair to bed with supervision using the least restrictive device within 7 day(s). 3.  Patient will perform sit to stand with supervision within 7 day(s). 4.  Patient will ambulate with supervision for 50 feet with the least restrictive device within 7 day(s). Outcome: Progressing Towards Goal   PHYSICAL THERAPY TREATMENT  Patient: Latrice Betancourt (60 y.o. female)  Date: 12/3/2020  Diagnosis: Overdose [T50.901A]  Benzodiazepine withdrawal ([facility] [F13.239]   Benzodiazepine withdrawal ([facility]       Precautions:    Chart, physical therapy assessment, plan of care and goals were reviewed. ASSESSMENT  Patient continues with skilled PT services and is progressing towards goals. Patient requires heavy encouragement in order to participate. She demonstrates the ability with increased time and HOB raised to transition supine to sit. Once seated EOB patient requires assistance to achieve foot flat. Despite verbal and tactile cues she maintains feet hovering over the ground using her core strength to maintain balance. Patient performs dynamic sitting balance performing ADLs at EOB.  She resists attempts to stand to transfer requiring Max Ax2 for positioning in supine. Current Level of Function Impacting Discharge (mobility/balance): Mod- Max A 1-2 bed mobility (due to patient confusion and tremors and resistance), fair sitting balance     Other factors to consider for discharge: medical stability, inability to ambulate and transfer at this time,increased need for assistance, currently requires 24/7 supervision for safety          PLAN :  Patient continues to benefit from skilled intervention to address the above impairments. Continue treatment per established plan of care. to address goals. Recommendation for discharge: (in order for the patient to meet his/her long term goals)  Therapy 3 hours per day 5-7 days per week    This discharge recommendation:  Has been made in collaboration with the attending provider and/or case management    IF patient discharges home will need the following DME: to be determined (TBD)       SUBJECTIVE:   Patient stated neuropathy.  - patient reports neuropathy in bilateral LE    OBJECTIVE DATA SUMMARY:   Critical Behavior:  Neurologic State: Alert  Orientation Level: Oriented X4  Cognition: Follows commands  Safety/Judgement: Decreased awareness of need for assistance, Decreased awareness of need for safety, Decreased insight into deficits  Functional Mobility Training:  Bed Mobility:     Supine to Sit: Bed Modified;Minimum assistance  Sit to Supine: Maximum assistance;Assist x2  Scooting: Contact guard assistance        Transfers:                                   Balance:  Sitting: Impaired; Without support  Sitting - Static: Fair (occasional)  Sitting - Dynamic: Fair (occasional)  Ambulation/Gait Training:                                                        Stairs:               Therapeutic Exercises:     Pain Rating:      Activity Tolerance:   Fair    After treatment patient left in no apparent distress:   Supine in bed, Call bell within reach, and Side rails x 3    COMMUNICATION/COLLABORATION:   The patients plan of care was discussed with: Registered nurse.      Param Souza, PT   Time Calculation: 21 mins

## 2020-12-03 NOTE — ADT AUTH CERT NOTES
Patient Demographics     Patient Name   Paulina Fraire   [id number]  Sex   Female     1955  Address   [address] [place] [id number]  Phone   [phone] (Home)   [phone] (Mobile) *Preferred*    CSN:    [id number]    Admit Date:  Admit Time  Room  Bed    2020  11:03 AM  577 [[id number]]  01 [[id number]]    Attending Providers     Provider  Pager  From  To    Jovita Dennis MD   20    Sirisha Andrade MD   20    Carmen Garner MD   20    Felipe Marks MD   20    Author MD Lindy   20     Emergency Contact(s)     Name  Relation  Home  Work  [address] [facility] [employer]  Friend    [phone]    Santhoshmandi Dillon  Father  [phone]      Utilization Reviews         Requested clinicals 12/3/20 by Monika Salgado RN         Review Entered  Review Status    12/3/2020 14:55  In Primary        Criteria Review    20  Upon further review of patient's chart, ED triage note and EMS report state patient was brought in after police were called by neighbors who heard a commotion and police found her attempting to kill herself by stabbing herself in the head. She had a 5\" knife that was secured. She also reportedly stated several times that she wanted to die and wanted to \"end it all. \" According to ED note, when police/EMS arrived, patient was still in hospital gown which was soiled with urine and had ID band on still from ED visit 2 days prior. Patient still focused on dying. Yesterday she was informed that her cancer is too stable for her to qualify for hospice and so she decided to stop all cancer treatment, which sh ehas done in the past. Even with treatment stopped, she would not qualify for hospice. Will order suicide precautions/sitter and re-consult psych to eval for IP psych admission.         Subjective:   Pt seen today in NAD. Palliative care SW present.  Discussed with patient the report that she was trying stab herself in the head with a knife and stating she wanted to die. She acknowledged the event but is uninterested in discussing further and frequently changes the subject. Discussed IP psych admission and patient is agreeable.  Psych records from patient's OP psychiatrist, Dr. Tj Wilson, were faxed over today and on patient's chart.      Assessment/Plan:         Benzodiazepine Withdrawal  - Improving slowly  - Seizure precautions  - Pt states she ran out of her Xanax 1 week PTA  - Cont ativan 0.5mg QID (was taking total 4mg daily PTA)  - TSH, Ammonia, Vitamin B12 NL  - Fall precatuions  - Supportive care  - Psych consult pending     SI: attempted to stab self in head POA  - Suicide precautions  - 1:1 sitter  - Psych consult pending     Severe Anxiety & Mood Disorder  - Continue cymbalta (started this admission), gabapentin, thiamine, folic acid  - supportive care  - Psych consult pending     Mild hypokalemia  - Resolved     Hyponatremia  -Resolved     CORNELIA: normal kidney function on admission  -Resolved     H/o HTN  - Stable, continue home cozaar  - Monitor BP     Metastatic Breast CA: mets to sternum; per hem/onc-low disease burden  - on treatment holiday; next appt in Jan 2021  - Last Echo 10/27/2020 was NL  - DDNR on file  - Appreciate palliative care input  - Appreciate hem/onc input; pt not candidate for hospice  - Supportive care        DVTppx: SCDs  Gippx: NA  Code Status: DDNR  Diet: Cardiac  Activity: OOB to chair TID and PRN  Discharge: Pt medically clear for discharge; plan to discharge to IP psych once bed available         Review of Systems:      Full ROS complete with pertinent positives and negatives as per HPI, otherwise negative  Objective:   Physical Exam:      Visit Vitals  BP (!) 152/75 (BP 1 Location: Left leg, BP Patient Position: At rest)   Pulse 99   Temp 97.9 °F (36.6 °C)   Resp 16   Wt 103.5 kg (228 lb 2.8 oz)   SpO2 97%   BMI 37.97 kg/m²    O2 Flow Rate (L/min): 1 l/min O2 Device: Room air     Temp (24hrs), Av.1 °F (36.7 °C), Min:97.9 °F (36.6 °C), Max:98.4 °F (36.9 °C)    No intake/output data recorded.    1901 -  0700  In: -   Out: 400 [Urine:400]        General:  Alert, cooperative, no distress, appears stated age, obese   Lungs:   Clear to auscultation bilaterally. Heart:  Regular rate and rhythm, S1, S2 normal, no murmur, click, rub or gallop. Abdomen:   Soft, non-tender, non-distended. Bowel sounds normal.    Extremities: Extremities normal, atraumatic, no cyanosis or edema.    Skin: Skin color, texture, turgor normal. No rashes or lesions   Neurologic: CNII-XII grossly intact, MUKHERJEE, A&Ox4       Data Review:       Recent Days:  No results for input(s): WBC, HGB, HCT, PLT, HGBEXT, HCTEXT, PLTEXT, HGBEXT, HCTEXT, PLTEXT in the last 72 hours.          Recent Labs     20  0419 20  0449    136   K 3.8 3.6   * 103   CO2 26 27   * 192*   BUN 19 24*   CREA 0.79 1.05*   CA 8.5 8.5         48 Hr-Vitals/Weight (last 2 days)      Date/Time  Temp  Pulse  BP  MAP (Calculated)  Arterial Line 1 BP (mmHg)  BP Patient Position  Resp  SpO2  O2 Device  O2 Flow Rate (L/min)  Pre/Post Ductal  Weight    20 1334  98.2 °F (36.8 °C)  109Abnormal    171/95Abnormal    120      16  97 %            20 0848  98.2 °F (36.8 °C)  101Abnormal    145/106Abnormal    119    At rest  16  97 %            20 0535  98 °F (36.7 °C)  85  141/91Abnormal    108    At rest  16  97 %            20 0411                        103.6 kg (228 lb 6.3 oz)    20 2141  98.2 °F (36.8 °C)  88  131/93Abnormal    106    At rest  16  98 %            20 1515  98.4 °F (36.9 °C)  97  139/81  100      16  97 %            20 0859    99  152/75Abnormal    101    At rest                20 0857    96  170/104Abnormal    126    At rest                20 0803  97.9 °F (36.6 °C)  92  170/79Abnormal    109     16  97 %  Room air          12/02/20 0220  98.1 °F (36.7 °C)  94  141/74Abnormal    96    At rest  16  96 %        103.5 kg (228 lb 2.8 oz)    12/01/20 2147  98 °F (36.7 °C)  95  139/71  94    At rest  16  95 %  Room air          12/01/20 1535  97.9 °F (36.6 °C)  95  142/79Abnormal    100    Sitting  16  95 %  Room air          12/01/20 1413  98.4 °F (36.9 °C)  94  145/79Abnormal    101      17  99 %  Room air          12/01/20 1200                  Room air          12/01/20 1015  98.5 °F (36.9 °C)  99  125/75  92      18  98 %  Room air          12/01/20 0917    100  160/92Abnormal      Clint              12/01/20 0800                  Room air          12/01/20 0614  98.7 °F (37.1 °C)  102Abnormal    128/97Abnormal    107      16  98 %            12/01/20 0215  97.6 °F (36.4 °C)  99  123/74  90    At rest  21  97 %        103.3 kg (227 lb 11.2 oz)            Drug Ingestion or Overdose - Care Day 6 (12/3/2020) by Amy Howard RN         Review Entered  Review Status    12/3/2020 14:48  Completed        Criteria Review       Care Day: 6 Care Date: 12/3/2020 Level of Care: Inpatient Floor    Guideline Day 2    Level Of Care    (X) ICU, intermediate care, or telemetry to discharge    12/3/2020 14:47:14 EST by Cesar daugherty    Clinical Status    ( ) * Mental status at baseline    (X) * Hemodynamic stability    12/3/2020 14:47:14 EST by Patria Hodgkin      98.2 109 16 97 171/95 RA    ( ) * Dangerous arrhythmia absent    ( ) * Toxic manifestations absent or managed    ( ) * Need for continued inpatient monitoring absent    ( ) * Psychiatric risk status acceptable    (X) * Diet tolerated    12/3/2020 14:48:51 EST by Patria Hodgkin      cardiac diet    ( ) * Discharge plans and education understood    Activity    (X) * Ambulatory or acceptable for next level of care    12/3/2020 14:48:25 EST by Patria Hodgkin      out of bed to chair    Routes    (X) * Oral hydration, medications, and diet    12/3/2020 14:48:25 EST by Antonette Andres      Tyl 650 mg po q6 prn x2, Colace 100 mg po bid, Cymbatla 60 mg po qd Tricor 145 mg po qd Floic 1 mg po qd, cozaar 50 mg po qd Zofran 4 mg iv q6 prn x1,    * Milestone    Additional Notes    12/3

## 2020-12-04 LAB
ATRIAL RATE: 103 BPM
CALCULATED P AXIS, ECG09: 14 DEGREES
CALCULATED R AXIS, ECG10: 22 DEGREES
CALCULATED T AXIS, ECG11: 0 DEGREES
COVID-19 RAPID TEST, COVR: NOT DETECTED
DIAGNOSIS, 93000: NORMAL
P-R INTERVAL, ECG05: 156 MS
Q-T INTERVAL, ECG07: 348 MS
QRS DURATION, ECG06: 72 MS
QTC CALCULATION (BEZET), ECG08: 455 MS
SOURCE, COVRS: NORMAL
SPECIMEN SOURCE, FCOV2M: NORMAL
SPECIMEN TYPE, XMCV1T: NORMAL
VENTRICULAR RATE, ECG03: 103 BPM

## 2020-12-04 PROCEDURE — 93005 ELECTROCARDIOGRAM TRACING: CPT

## 2020-12-04 PROCEDURE — 87635 SARS-COV-2 COVID-19 AMP PRB: CPT

## 2020-12-04 PROCEDURE — 65270000029 HC RM PRIVATE

## 2020-12-04 PROCEDURE — 97116 GAIT TRAINING THERAPY: CPT

## 2020-12-04 PROCEDURE — 74011250637 HC RX REV CODE- 250/637: Performed by: NURSE PRACTITIONER

## 2020-12-04 PROCEDURE — 97110 THERAPEUTIC EXERCISES: CPT

## 2020-12-04 PROCEDURE — 74011250637 HC RX REV CODE- 250/637: Performed by: INTERNAL MEDICINE

## 2020-12-04 PROCEDURE — 74011250637 HC RX REV CODE- 250/637: Performed by: HOSPITALIST

## 2020-12-04 RX ORDER — LORAZEPAM 0.5 MG/1
0.5 TABLET ORAL 2 TIMES DAILY
Status: DISCONTINUED | OUTPATIENT
Start: 2020-12-04 | End: 2020-12-04

## 2020-12-04 RX ORDER — LORAZEPAM 0.5 MG/1
0.5 TABLET ORAL DAILY PRN
Status: DISCONTINUED | OUTPATIENT
Start: 2020-12-04 | End: 2020-12-14

## 2020-12-04 RX ORDER — LOSARTAN POTASSIUM 50 MG/1
100 TABLET ORAL DAILY
Status: DISCONTINUED | OUTPATIENT
Start: 2020-12-04 | End: 2020-12-08

## 2020-12-04 RX ORDER — HYDROXYZINE 25 MG/1
25 TABLET, FILM COATED ORAL 3 TIMES DAILY
Status: DISCONTINUED | OUTPATIENT
Start: 2020-12-04 | End: 2020-12-04

## 2020-12-04 RX ORDER — CLONAZEPAM 1 MG/1
0.5 TABLET ORAL 2 TIMES DAILY
Status: DISCONTINUED | OUTPATIENT
Start: 2020-12-04 | End: 2020-12-17 | Stop reason: HOSPADM

## 2020-12-04 RX ADMIN — HYDROXYZINE HYDROCHLORIDE 50 MG: 25 TABLET, FILM COATED ORAL at 17:13

## 2020-12-04 RX ADMIN — FOLIC ACID 1 MG: 1 TABLET ORAL at 09:11

## 2020-12-04 RX ADMIN — CLONAZEPAM 0.5 MG: 1 TABLET ORAL at 21:36

## 2020-12-04 RX ADMIN — HYDROXYZINE HYDROCHLORIDE 50 MG: 25 TABLET, FILM COATED ORAL at 02:10

## 2020-12-04 RX ADMIN — GABAPENTIN 300 MG: 300 CAPSULE ORAL at 09:11

## 2020-12-04 RX ADMIN — FENOFIBRATE 145 MG: 145 TABLET ORAL at 09:11

## 2020-12-04 RX ADMIN — Medication 100 MG: at 09:11

## 2020-12-04 RX ADMIN — DULOXETINE HYDROCHLORIDE 60 MG: 60 CAPSULE, DELAYED RELEASE ORAL at 09:11

## 2020-12-04 RX ADMIN — DOCUSATE SODIUM 100 MG: 100 CAPSULE, LIQUID FILLED ORAL at 09:11

## 2020-12-04 RX ADMIN — GABAPENTIN 300 MG: 300 CAPSULE ORAL at 16:09

## 2020-12-04 RX ADMIN — GABAPENTIN 300 MG: 300 CAPSULE ORAL at 21:36

## 2020-12-04 RX ADMIN — LOSARTAN POTASSIUM 100 MG: 50 TABLET, FILM COATED ORAL at 09:11

## 2020-12-04 RX ADMIN — ACETAMINOPHEN 650 MG: 325 TABLET ORAL at 02:10

## 2020-12-04 RX ADMIN — DOCUSATE SODIUM 100 MG: 100 CAPSULE, LIQUID FILLED ORAL at 17:13

## 2020-12-04 RX ADMIN — ACETAMINOPHEN 650 MG: 325 TABLET ORAL at 07:15

## 2020-12-04 RX ADMIN — LORAZEPAM 0.5 MG: 0.5 TABLET ORAL at 09:16

## 2020-12-04 RX ADMIN — Medication 10 ML: at 21:38

## 2020-12-04 RX ADMIN — LORAZEPAM 0.5 MG: 0.5 TABLET ORAL at 02:35

## 2020-12-04 NOTE — PROGRESS NOTES
435: Patient complained of palpitations. On assessment, patient is tachycardic, but has a regular rhythm. No complaints of chest pain or SOB. Informed NP Vanesa Suazo, who ordered an EKG. EKG showed sinus tachycardia, with PAC's. No further orders received.

## 2020-12-04 NOTE — CONSULTS
Psychiatry Consult Follow Up Note    Reason for consult: anxiety, benzo withdrawal, if admission to inpatient psych is necerssary  Please see full consult note written on 11/30    IMPRESSION:   Ms. Katelyn Morel is being seen at bedside. She is calm and pleasant but shares that she is feeling tired and frustrated \"from being physically weak. \" She states she is frustrated that she is not strong enough to do the things she used to do. She says her shoulder and her back are hurting. She tells me that she was thinking of stabbing herself in the head prior to admission and had a knife right to next to her. Reportedly, neighbors heard a \"commotion\" in her ind. living unit and called 911. When police arrived they found the patient attempting to stab herself in the head. Pt reportedly dropped the knife prior to harming herself. I asked her many times if she has thought of hurting herself, or if she's been having passive death wishes, or been having passive si, she kept saying no. She states she hasn't had thoughts of hurting herself since the incident at home. She tells me she will not do anything to hurt herself but admits that she is feeling very anxious and in pain. She tells me she will be seeing a new psychiatrist. Sheldon Line states she's been pleasant and cooperative. There have been concern about intermittent agitation, which I strongly think is coming from benzo withdrawals. 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 RECCS:    May increase cymbalta to 90mg this Monday. Todd ativan. Start klonopin 0.5mg bid, this has to be continued until she sees a new psychiatrist. It will take some time for benzo to be tapered. And it might need to be increased to tid dosing if agitation becomes an issue again. Please utilize atarax every 6 hours for anxiety. Todd templeton.   She does not meet acute inpatient psychiatric criteria, but she will benefit from SNF or rehab, which I highly recommend. Please seek 's assistance. Discussed case with Dr. Gabi Mojica. Thank you for this kind consult. Please call with questions.      -------------------------------------------------------------------------------------------------------------------  Clinical summary of events since last encounter:    Meds:  Current Facility-Administered Medications   Medication Dose Route Frequency    losartan (COZAAR) tablet 100 mg  100 mg Oral DAILY    LORazepam (ATIVAN) tablet 0.5 mg  0.5 mg Oral BID    LORazepam (ATIVAN) tablet 0.5 mg  0.5 mg Oral DAILY PRN    ondansetron (ZOFRAN) injection 4 mg  4 mg IntraVENous Q6H PRN    docusate sodium (COLACE) capsule 100 mg  100 mg Oral BID    gabapentin (NEURONTIN) capsule 300 mg  300 mg Oral TID    DULoxetine (CYMBALTA) capsule 60 mg  60 mg Oral DAILY    hydrOXYzine HCL (ATARAX) tablet 50 mg  50 mg Oral Q6H PRN    LORazepam (ATIVAN) injection 2 mg  2 mg IntraVENous PRN    thiamine HCL (B-1) tablet 100 mg  100 mg Oral DAILY    sodium chloride (NS) flush 5-40 mL  5-40 mL IntraVENous Q8H    sodium chloride (NS) flush 5-40 mL  5-40 mL IntraVENous PRN    fenofibrate nanocrystallized (TRICOR) tablet 145 mg  145 mg Oral DAILY    folic acid (FOLVITE) tablet 1 mg  1 mg Oral DAILY    acetaminophen (TYLENOL) tablet 650 mg  650 mg Oral Q6H PRN    sodium chloride (NS) flush 5-40 mL  5-40 mL IntraVENous Q8H    sodium chloride (NS) flush 5-40 mL  5-40 mL IntraVENous PRN         Vital Signs:  Blood pressure (!) 125/90, pulse (!) 106, temperature 97.6 °F (36.4 °C), resp. rate 17, weight 99.2 kg (218 lb 9.6 oz), SpO2 99 %.     Labs: (reviewed/updated 12/4/2020)  Recent Results (from the past 24 hour(s))   EKG, 12 LEAD, INITIAL    Collection Time: 12/04/20  5:27 AM   Result Value Ref Range    Ventricular Rate 103 BPM    Atrial Rate 103 BPM    P-R Interval 156 ms    QRS Duration 72 ms    Q-T Interval 348 ms    QTC Calculation (Bezet) 455 ms    Calculated P Axis 14 degrees    Calculated R Axis 22 degrees    Calculated T Axis 0 degrees    Diagnosis       Sinus tachycardia with premature atrial complexes  Nonspecific T wave abnormality  When compared with ECG of 28-NOV-2020 11:20,  ST no longer elevated in Inferior leads  Nonspecific T wave abnormality has replaced inverted T waves in Inferior   leads  T wave inversion no longer evident in Anterolateral leads  Confirmed by Stevie Pagan ([id number]) on 12/4/2020 12:09:12 PM     SARS-COV-2    Collection Time: 12/04/20  2:20 PM   Result Value Ref Range    Specimen source Nasopharyngeal      Specimen source Nasopharyngeal      COVID-19 rapid test Not detected NOTD      Specimen type NP Swab       No results found for: VALF2, VALAC, VALP, VALPR, DS6, CRBAM, CRBAMP, CARB2, XCRBAM  No results found for: [facility]    Radiology (reviewed/updated 12/4/2020)  Ct Head Wo Cont    Result Date: 11/28/2020  EXAM: CT HEAD WO CONT INDICATION: AMS COMPARISON: CT 10/23/2020. CONTRAST: None. TECHNIQUE: Unenhanced CT of the head was performed using 5 mm images. Brain and bone windows were generated. Coronal and sagittal reformats. CT dose reduction was achieved through use of a standardized protocol tailored for this examination and automatic exposure control for dose modulation. FINDINGS: The ventricles and sulci are normal in size, shape and configuration. . There is no significant white matter disease. There is no intracranial hemorrhage, extra-axial collection, or mass effect. The basilar cisterns are open. No CT evidence of acute infarct. The bone windows demonstrate no abnormalities. The visualized portions of the paranasal sinuses and mastoid air cells are clear. IMPRESSION: No acute findings. Ct Head Wo Cont    Result Date: 10/23/2020  Indication:  AMS Comparison: CT January 2020 Findings: 5 mm axial images were obtained from the skull base through the vertex.  CT dose reduction was achieved through the use of a standardized protocol tailored for this examination and automatic exposure control for dose modulation. The ventricles and cortical sulci are prominent, compatible with age related volume loss. There is no evidence of intracranial hemorrhage, mass, mass effect, or acute infarct. There is periventricular white matter disease. No extra-axial fluid collections are seen. The visualized paranasal sinuses and mastoid air cells are clear. The orbital structures are unremarkable. No osseous abnormalities are seen. Impression: 1. No evidence of acute infarct or intracranial hemorrhage. 2. Mild periventricular white matter disease is likely secondary to chronic small vessel ischemic changes. Xr Chest Port    Result Date: 11/28/2020  INDICATION:  SOB Exam: Portable chest 1239. Comparison: 10/25/2020. Findings: Cardiomediastinal silhouette is within normal limits. Pulmonary vasculature is not engorged. There are no focal parenchymal opacities, effusions, or pneumothorax. Right sided port unchanged     Impression: 1. No acute disease      Xr Chest Port    Result Date: 10/25/2020  EXAM:  XR CHEST PORT INDICATION:  Chest Pain COMPARISON:  10/23/2020 FINDINGS: A portable AP radiograph of the chest was obtained at 1049 hours. Port-A-Cath tip overlies SVC right atrial junction. .  Lungs are clear of an acute process allowing for portable technique. Scarring left upper lobe medially is unchanged. Lucian Clara Heart size is stable. Bony structures are unchanged. IMPRESSION: Lungs are clear of an acute process. Xr Chest Port    Result Date: 10/23/2020  INDICATION: short of breath EXAM:  AP CHEST RADIOGRAPH COMPARISON: August 2, 2020 FINDINGS: AP portable view of the chest demonstrates right internal jugular Port-A-Cath, unchanged. Heart size is normal. There is no edema, effusion, consolidation, or pneumothorax. The osseous structures are unremarkable. IMPRESSION: No acute process. Mental Status Exam:  General appearance: Dakotah Murillo is a 60 y.o. WHITE OR  female groomed, psychomotor activity is decreased   Eye contact: good eye contact  Speech: Spontaneous  Affect : blunted  Mood: \"ok\"  Thought Process: Logical, goal directed  Perception: Denies AH or VH. Thought Content: denies SI or Plan  Insight: Partial  Judgement: poor  Cognition: Intact grossly. Length of psychotherapy session: 35 minutes     Total Patient Care Time Spent: 35 minutes : (Coordinated treatment team rounds conducted with patient present; discussions with nurses, , pharmacist,  held; counseling time with patient, individual psychotherapy with patient; and discussions with family members; chart reviewed in full including consultant notes, ancillary staff notes, vitals and labs reviewed in full).     Greater than 50% of total patient care time included counseling    Signed:  Devora Carroll NP  12/4/2020

## 2020-12-04 NOTE — PROGRESS NOTES
Problem: Mobility Impaired (Adult and Pediatric)  Goal: *Acute Goals and Plan of Care (Insert Text)  Description: FUNCTIONAL STATUS PRIOR TO ADMISSION: Patient was independent without use of DME. Patient states she was mobilizing, driving, and performing ADLs/IADLs independently until about 2 weeks ago. Since, then she has spent most of her time in the bed with little mobility due to fear of falling. HOME SUPPORT PRIOR TO ADMISSION: The patient lived alone in a senior living facility and has no local support. Physical Therapy Goals  Initiated 12/2/2020  1. Patient will move from supine to sit and sit to supine , scoot up and down, and roll side to side in bed with modified independence within 7 day(s). 2.  Patient will transfer from bed to chair and chair to bed with supervision using the least restrictive device within 7 day(s). 3.  Patient will perform sit to stand with supervision within 7 day(s). 4.  Patient will ambulate with supervision for 50 feet with the least restrictive device within 7 day(s). Outcome: Progressing Towards Goal   PHYSICAL THERAPY TREATMENT  Patient: Maria Del Carmen Hidalgo (60 y.o. female)  Date: 12/4/2020  Diagnosis: Overdose [T50.901A]  Benzodiazepine withdrawal ([facility] [address] [F13.239]   Benzodiazepine withdrawal ([facility] [address]       Precautions:    Chart, physical therapy assessment, plan of care and goals were reviewed. ASSESSMENT  Patient continues with skilled PT services and is progressing towards goals. Patient demonstrated improved functional mobility as evident by decreased assistance required for bed mobility and increased ambulation distance. Patient continues to need encouragement to mobilize and complains of neuropathy in feet. Patient needs additional rehab to improve deficit in functional mobility and safe mobilization.     Current Level of Function Impacting Discharge (mobility/balance): Min assistance for bed mobility and transfers, CGA for ambulation with RW, impaired standing balance    Other factors to consider for discharge: patient lives alone in independent living, patient has no local support         PLAN :  Patient continues to benefit from skilled intervention to address the above impairments. Continue treatment per established plan of care. to address goals. 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     OBJECTIVE DATA SUMMARY:   Critical Behavior:  Neurologic State: Alert  Orientation Level: Oriented X4  Cognition: Follows commands    Functional Mobility Training:  Bed Mobility:  Supine to Sit: Minimum assistance;Assist x1;Bed Modified; Additional time(HOB elevated 40 degrees)  Sit to Supine: (left in chair )  Scooting: Contact guard assistance     Transfers:  Sit to Stand: Contact guard assistance  Stand to Sit: Contact guard assistance  Bed to Chair: Minimum assistance;Assist x1(HHA x1)     Balance:  Sitting: Intact; Without support  Standing: Impaired; With support  Standing - Static: Fair;Constant support  Standing - Dynamic : Fair;Constant support    Ambulation/Gait Training:  Distance (ft): 50 Feet (ft)  Assistive Device: Gait belt;Walker, rolling  Ambulation - Level of Assistance: Contact guard assistance;Assist x1;Adaptive equipment  Gait Abnormalities: Decreased step clearance(stomping)  Base of Support: Widened  Speed/Antonietta: Fluctuations  Step Length: Left shortened;Right shortened       Therapeutic Exercises:   Knee extension: 2 x 10 bilateral  Seated marches: 1 x 5 bilateral  Ankle pumps: 1 x 10 bilateral    Pain Rating:  Patient does not report any pain at this time    Activity Tolerance:   Fair, impaired balance and decreased endurance limited activity    After treatment patient left in no apparent distress:   Sitting in chair, Call bell within reach, and Sitter present    COMMUNICATION/COLLABORATION:   The patients plan of care was discussed with: Registered nurse. Regarding student involvement in patient care:  A student participated in this treatment session. Per CMS Medicare statements and APTA guidelines I certify that the following was true:  1. I was present and directly observed the entire session. 2. I made all skilled judgments and clinical decisions regarding care. 3. I am the practitioner responsible for assessment, treatment, and documentation.     Petrona Claudio, DEIRDRE   Time Calculation: 26 mins

## 2020-12-04 NOTE — PROGRESS NOTES
[address] [facility] Adult  Hospitalist Group                                                                                          Hospitalist Progress Note  Leona Vasquez MD  Answering service: [phone] from in house phone        Date of Service:  2020  NAME:  Josefina Rockwell  :  1955  MRN:  [id number]      Admission Summary: Edith is a 60 y.o.   female whom presents with complaint with altered mental status. Her PMH is significant for anxiety and mood disorder, hyponatremia, HTN, and stage 3-4 metastatic breast cancer with a port. Reportedly she was seen in the ED 2 days prior for anxiety and was given a dose of Ativan which she improved with. At that time, [facility]  and noted multiple prescriptions over different time periods for Xanax. She was also seen in the ED on 11/10 for similar complaints and BSMART evaluated at that time and she was unwilling to stay in the hospital and therefore was discharged as she was not a candidate for a TDO. Back in late October, she was seen for concern for benzo withdrawal at that time. Per the ED, she was reportedly brought in via EMS soiled with urine, still in the same hospital gown she wore here with her identification band on her wrist. She is unable to recall why she came into the hospital.  Reportedly told someone she attempted to stab herself in the forehead with a knife but she was unable to confirm or deny that. Does express that she wants to go see Donny Hutchinson when I walk into the room. Only complaint is that she is anxious but overall poor historian and difficult to obtain additional information. There is concern that she may have taken too many of her medications as she was talking about empty pill bottles at home. \"    Interval history / Subjective:     She said she feels anxious and shaking, denied suicidal thought,   Per her sitter patient has on and off agitation      Assessment & Plan: Benzodiazepine Withdrawal  - Improving slowly  - Seizure precautions  - Pt states she ran out of her Xanax 1 week PTA  - Received ativan 0.5mg QID for 2 days, taper ativan 0.5 mg bid and ativan 2 mg iv prn  - TSH, Ammonia, Vitamin B12 NL  - Fall precatuions  - Supportive care  - Psych consult      Suicidal ideation   - attempted to stab self in head POA  - Suicide precautions  - 1:1 sitter at bedside  - Psych consult pending     Severe Anxiety & Mood Disorder  - Continue cymbalta (started this admission), gabapentin, thiamine, folic acid  - supportive care  - re consult to Psych consult      Mild hypokalemia  - Resolved     Hyponatremia  -Resolved     CORNELIA: normal kidney function on admission  -Resolved     H/o HTN  -BP not at goal, anxiety is also contributing, increase cozaar to 100 mg  - Monitor BP     Metastatic Breast CA: mets to sternum; per hem/onc-low disease burden  - on treatment holiday; next appt in Jan 2021  - Last Echo 10/27/2020 was NL  - DDNR on file  - Appreciate palliative care input  - Appreciate hem/onc input; pt not candidate for hospice  - Supportive care            Code status: Full Code  DVT prophylaxis: SCD    Care Plan discussed with: Patient/Family and Nurse  Anticipated Disposition: to transfer to Psych Unit  Anticipated Discharge: 24 hours to 48 hours     Hospital Problems  Date Reviewed: 12/1/2020          Codes Class Noted POA    * (Principal) Benzodiazepine withdrawal ([facility] [address] ICD-10-CM: G74.161  ICD-9-CM: 292.0, 304.10  1/28/2020 Yes              Vital Signs:    Last 24hrs VS reviewed since prior progress note.  Most recent are:  Visit Vitals  BP (!) 168/96 (BP 1 Location: Right leg, BP Patient Position: At rest;Supine)   Pulse 100   Temp 97.9 °F (36.6 °C)   Resp 18   Wt 99.2 kg (218 lb 9.6 oz)   SpO2 96%   BMI 36.38 kg/m²       No intake or output data in the 24 hours ending 12/04/20 0850     Physical Examination:     I had a face to face encounter with this patient and independently examined them on 12/4/2020 as outlined below:          Constitutional:  No acute distress, cooperative, pleasant    ENT:  Oral mucosa moist, oropharynx benign. Resp:  CTA bilaterally. No wheezing/rhonchi/rales. No accessory muscle use   CV:  Regular rhythm, normal rate, no murmurs, gallops, rubs    GI:  Soft, non distended, non tender. normoactive bowel sounds, no hepatosplenomegaly     Musculoskeletal:  No edema     Neurologic:  Moves all extremities. AAOx3, CN II-XII reviewed     Skin:  Good turgor, no rashes or ulcers       Data Review:    Review and/or order of clinical lab test  Review and/or order of tests in the radiology section of CPT  Review and/or order of tests in the medicine section of CPT      Labs:   No results for input(s): WBC, HGB, HCT, PLT, HGBEXT, HCTEXT, PLTEXT, HGBEXT, HCTEXT, PLTEXT in the last 72 hours. No results for input(s): NA, K, CL, CO2, BUN, CREA, GLU, CA, MG, PHOS, URICA in the last 72 hours. No results for input(s): ALT, AP, TBIL, TBILI, TP, ALB, GLOB, GGT, AML, LPSE in the last 72 hours. No lab exists for component: SGOT, GPT, AMYP, HLPSE  No results for input(s): INR, PTP, APTT, INREXT, INREXT in the last 72 hours. No results for input(s): FE, TIBC, PSAT, FERR in the last 72 hours. No results found for: FOL, RBCF   No results for input(s): PH, PCO2, PO2 in the last 72 hours. No results for input(s): CPK, CKNDX, TROIQ in the last 72 hours.     No lab exists for component: CPKMB  No results found for: CHOL, CHOLX, CHLST, CHOLV, HDL, HDLP, LDL, LDLC, DLDLP, TGLX, TRIGL, TRIGP, CHHD, CHHDX  Lab Results   Component Value Date/Time    Glucose (POC) 203 (H) 10/23/2020 05:50 AM    Glucose POC 83 06/14/2011 10:33 AM     Lab Results   Component Value Date/Time    Color YELLOW/STRAW 11/28/2020 11:38 AM    Appearance CLEAR 11/28/2020 11:38 AM    Specific gravity 1.028 11/28/2020 11:38 AM    Specific gravity 1.025 02/12/2018 01:41 PM    pH (UA) 5.5 11/28/2020 11:38 AM    Protein Negative 11/28/2020 11:38 AM    Glucose 250 (A) 11/28/2020 11:38 AM    Ketone Negative 11/28/2020 11:38 AM    Bilirubin Negative 11/28/2020 11:38 AM    Urobilinogen 0.2 11/28/2020 11:38 AM    Nitrites Negative 11/28/2020 11:38 AM    Leukocyte Esterase Negative 11/28/2020 11:38 AM    Epithelial cells MODERATE (A) 11/28/2020 11:38 AM    Bacteria 1+ (A) 11/28/2020 11:38 AM    WBC 20-50 11/28/2020 11:38 AM    RBC 0-5 11/28/2020 11:38 AM         Medications Reviewed:     Current Facility-Administered Medications   Medication Dose Route Frequency    ondansetron (ZOFRAN) injection 4 mg  4 mg IntraVENous Q6H PRN    docusate sodium (COLACE) capsule 100 mg  100 mg Oral BID    LORazepam (ATIVAN) tablet 0.5 mg  0.5 mg Oral Q8H PRN    gabapentin (NEURONTIN) capsule 300 mg  300 mg Oral TID    DULoxetine (CYMBALTA) capsule 60 mg  60 mg Oral DAILY    hydrOXYzine HCL (ATARAX) tablet 50 mg  50 mg Oral Q6H PRN    LORazepam (ATIVAN) injection 2 mg  2 mg IntraVENous PRN    thiamine HCL (B-1) tablet 100 mg  100 mg Oral DAILY    sodium chloride (NS) flush 5-40 mL  5-40 mL IntraVENous Q8H    sodium chloride (NS) flush 5-40 mL  5-40 mL IntraVENous PRN    fenofibrate nanocrystallized (TRICOR) tablet 145 mg  145 mg Oral DAILY    folic acid (FOLVITE) tablet 1 mg  1 mg Oral DAILY    losartan (COZAAR) tablet 50 mg  50 mg Oral DAILY    acetaminophen (TYLENOL) tablet 650 mg  650 mg Oral Q6H PRN    sodium chloride (NS) flush 5-40 mL  5-40 mL IntraVENous Q8H    sodium chloride (NS) flush 5-40 mL  5-40 mL IntraVENous PRN     ______________________________________________________________________  EXPECTED LENGTH OF STAY: 3d 9h  ACTUAL LENGTH OF STAY:          4                 Dennys Hall MD

## 2020-12-04 NOTE — PROGRESS NOTES
Bedside shift change report given to Zita Carvajal (oncoming nurse) by Faisal Ambrocio (offgoing nurse). Report included the following information SBAR, Kardex, Intake/Output, MAR and Recent Results.

## 2020-12-05 PROCEDURE — 77030038269 HC DRN EXT URIN PURWCK BARD -A

## 2020-12-05 PROCEDURE — 74011250637 HC RX REV CODE- 250/637: Performed by: NURSE PRACTITIONER

## 2020-12-05 PROCEDURE — 94760 N-INVAS EAR/PLS OXIMETRY 1: CPT

## 2020-12-05 PROCEDURE — 74011250637 HC RX REV CODE- 250/637: Performed by: HOSPITALIST

## 2020-12-05 PROCEDURE — 65270000029 HC RM PRIVATE

## 2020-12-05 PROCEDURE — 74011250637 HC RX REV CODE- 250/637: Performed by: INTERNAL MEDICINE

## 2020-12-05 RX ORDER — HYDROCODONE BITARTRATE AND ACETAMINOPHEN 5; 325 MG/1; MG/1
1 TABLET ORAL
Status: DISCONTINUED | OUTPATIENT
Start: 2020-12-05 | End: 2020-12-13

## 2020-12-05 RX ADMIN — GABAPENTIN 300 MG: 300 CAPSULE ORAL at 16:31

## 2020-12-05 RX ADMIN — DOCUSATE SODIUM 100 MG: 100 CAPSULE, LIQUID FILLED ORAL at 18:57

## 2020-12-05 RX ADMIN — GABAPENTIN 300 MG: 300 CAPSULE ORAL at 10:35

## 2020-12-05 RX ADMIN — LORAZEPAM 0.5 MG: 0.5 TABLET ORAL at 14:01

## 2020-12-05 RX ADMIN — Medication 10 ML: at 21:36

## 2020-12-05 RX ADMIN — Medication 10 ML: at 21:42

## 2020-12-05 RX ADMIN — LOSARTAN POTASSIUM 100 MG: 50 TABLET, FILM COATED ORAL at 10:35

## 2020-12-05 RX ADMIN — Medication 10 ML: at 06:00

## 2020-12-05 RX ADMIN — DOCUSATE SODIUM 100 MG: 100 CAPSULE, LIQUID FILLED ORAL at 10:35

## 2020-12-05 RX ADMIN — CLONAZEPAM 0.5 MG: 1 TABLET ORAL at 21:35

## 2020-12-05 RX ADMIN — GABAPENTIN 300 MG: 300 CAPSULE ORAL at 21:35

## 2020-12-05 RX ADMIN — FENOFIBRATE 145 MG: 145 TABLET ORAL at 10:35

## 2020-12-05 RX ADMIN — DULOXETINE HYDROCHLORIDE 60 MG: 60 CAPSULE, DELAYED RELEASE ORAL at 10:35

## 2020-12-05 RX ADMIN — FOLIC ACID 1 MG: 1 TABLET ORAL at 10:35

## 2020-12-05 RX ADMIN — CLONAZEPAM 0.5 MG: 1 TABLET ORAL at 10:42

## 2020-12-05 RX ADMIN — HYDROCODONE BITARTRATE AND ACETAMINOPHEN 1 TABLET: 5; 325 TABLET ORAL at 22:52

## 2020-12-05 RX ADMIN — LORAZEPAM 0.5 MG: 0.5 TABLET ORAL at 05:02

## 2020-12-05 RX ADMIN — HYDROCODONE BITARTRATE AND ACETAMINOPHEN 1 TABLET: 5; 325 TABLET ORAL at 16:31

## 2020-12-05 RX ADMIN — HYDROXYZINE HYDROCHLORIDE 50 MG: 25 TABLET, FILM COATED ORAL at 10:46

## 2020-12-05 RX ADMIN — Medication 100 MG: at 10:41

## 2020-12-05 RX ADMIN — ACETAMINOPHEN 650 MG: 325 TABLET ORAL at 02:21

## 2020-12-05 NOTE — PROGRESS NOTES
Patient requesting anxiety medication. Spoke with Dr. Neftaly Krishnamurthy and per aurelio ORTEGA to give Ativan PRN.

## 2020-12-05 NOTE — PROGRESS NOTES
[address] [facility] Adult  Hospitalist Group                                                                                          Hospitalist Progress Note  Gonsalo Gupta MD  Answering service: [phone] from in house phone        Date of Service:  2020  NAME:  Alicia Hewitt  :  1955  MRN:  [id number]      Admission Summary: Edith is a 60 y.o.   female whom presents with complaint with altered mental status. Her PMH is significant for anxiety and mood disorder, hyponatremia, HTN, and stage 3-4 metastatic breast cancer with a port. Reportedly she was seen in the ED 2 days prior for anxiety and was given a dose of Ativan which she improved with. At that time, ED provider [facility]  and noted multiple prescriptions over different time periods for Xanax. She was also seen in the ED on 11/10 for similar complaints and BSMART evaluated at that time and she was unwilling to stay in the hospital and therefore was discharged as she was not a candidate for a TDO. Back in late October, she was seen for concern for benzo withdrawal at that time. Per the ED, she was reportedly brought in via EMS soiled with urine, still in the same hospital gown she wore here with her identification band on her wrist. She is unable to recall why she came into the hospital.  Reportedly told someone she attempted to stab herself in the forehead with a knife but she was unable to confirm or deny that. Does express that she wants to go see Diana Waldrop when I walk into the room. Only complaint is that she is anxious but overall poor historian and difficult to obtain additional information. There is concern that she may have taken too many of her medications as she was talking about empty pill bottles at home. \"    Interval history / Subjective:     She said she feels the same, she said she had bad night, feels nervous and depressed, no suicidal ideation  She said she lives by herself and she said she can't do anything for her self and ask help     Assessment & Plan:     Benzodiazepine Withdrawal  - Improving slowly  - Seizure precautions  - Pt states she ran out of her Xanax 1 week PTA  - Received ativan 0.5mg QID for 2 days, taper ativan 0.5 mg bid and ativan 2 mg iv prn  - TSH, Ammonia, Vitamin B12 NL  - Fall precatuions  - Supportive care  - re evaluated by Psych consult, recommended to increase cymbalta to 90 on 12/7, started klonopin 0.5 mg bid 12/4 and to increase to tid if agitation increases , prn atarax q 6 hrs     Suicidal ideation   - attempted to stab self in head POA  -she said she doesn't have suicide ideation   - 1:1 sitter at bedside discontinued on 12/4  - seen by Psych consult     Severe Anxiety & Mood Disorder  - on Continue cymbalta (started this admission), gabapentin, thiamine, folic acid  - supportive care  - reevaluated by Psych       Mild hypokalemia  - Resolved     Hyponatremia  -Resolved     CORNELIA: normal kidney function on admission  -Resolved     H/o HTN  -BP not at goal, anxiety is also contributing, increased cozaar to 100 mg  - Monitor BP     Metastatic Breast CA: mets to sternum; per hem/onc-low disease burden  - on treatment holiday; next appt in Jan 2021  - Last Echo 10/27/2020 was NL  - DDNR on file  - Appreciate palliative care input  - Appreciate hem/onc input; pt not candidate for hospice  - Supportive care    Debility   -PT/OT          Code status: Full Code  DVT prophylaxis: SCD    Care Plan discussed with: Patient/Family and Nurse  Anticipated Disposition: SNF  Anticipated Discharge: 24 hours to 48 hours     Hospital Problems  Date Reviewed: 12/1/2020          Codes Class Noted POA    * (Principal) Benzodiazepine withdrawal ([facility] [address] ICD-10-CM: G73.202  ICD-9-CM: 292.0, 304.10  1/28/2020 Yes              Vital Signs:    Last 24hrs VS reviewed since prior progress note.  Most recent are:  Visit Vitals  BP (!) 161/91 (BP 1 Location: Right leg, BP Patient Position: At rest) Pulse (!) 110   Temp 98.3 °F (36.8 °C)   Resp 18   Wt 99.3 kg (218 lb 14.7 oz)   SpO2 96%   BMI 36.43 kg/m²       No intake or output data in the 24 hours ending 12/05/20 0911     Physical Examination:     I had a face to face encounter with this patient and independently examined them on 12/5/2020 as outlined below:          Constitutional:  No acute distress, cooperative, looks anxious   ENT:  Oral mucosa moist, oropharynx benign. Resp:  CTA bilaterally. No wheezing/rhonchi/rales. No accessory muscle use   CV:  Regular rhythm, normal rate, no murmurs, gallops, rubs    GI:  Soft, non distended, non tender. normoactive bowel sounds, no hepatosplenomegaly     Musculoskeletal:  No edema     Neurologic:  Moves all extremities. AAOx3, CN II-XII reviewed     Skin:  Good turgor, no rashes or ulcers       Data Review:    Review and/or order of clinical lab test  Review and/or order of tests in the radiology section of CPT  Review and/or order of tests in the medicine section of CPT      Labs:   No results for input(s): WBC, HGB, HCT, PLT, HGBEXT, HCTEXT, PLTEXT, HGBEXT, HCTEXT, PLTEXT in the last 72 hours. No results for input(s): NA, K, CL, CO2, BUN, CREA, GLU, CA, MG, PHOS, URICA in the last 72 hours. No results for input(s): ALT, AP, TBIL, TBILI, TP, ALB, GLOB, GGT, AML, LPSE in the last 72 hours. No lab exists for component: SGOT, GPT, AMYP, HLPSE  No results for input(s): INR, PTP, APTT, INREXT, INREXT in the last 72 hours. No results for input(s): FE, TIBC, PSAT, FERR in the last 72 hours. No results found for: FOL, RBCF   No results for input(s): PH, PCO2, PO2 in the last 72 hours. No results for input(s): CPK, CKNDX, TROIQ in the last 72 hours.     No lab exists for component: CPKMB  No results found for: CHOL, CHOLX, CHLST, CHOLV, HDL, HDLP, LDL, LDLC, DLDLP, TGLX, TRIGL, TRIGP, CHHD, CHHDX  Lab Results   Component Value Date/Time    Glucose (POC) 203 (H) 10/23/2020 05:50 AM    Glucose POC 83 06/14/2011 10:33 AM     Lab Results   Component Value Date/Time    Color YELLOW/STRAW 11/28/2020 11:38 AM    Appearance CLEAR 11/28/2020 11:38 AM    Specific gravity 1.028 11/28/2020 11:38 AM    Specific gravity 1.025 02/12/2018 01:41 PM    pH (UA) 5.5 11/28/2020 11:38 AM    Protein Negative 11/28/2020 11:38 AM    Glucose 250 (A) 11/28/2020 11:38 AM    Ketone Negative 11/28/2020 11:38 AM    Bilirubin Negative 11/28/2020 11:38 AM    Urobilinogen 0.2 11/28/2020 11:38 AM    Nitrites Negative 11/28/2020 11:38 AM    Leukocyte Esterase Negative 11/28/2020 11:38 AM    Epithelial cells MODERATE (A) 11/28/2020 11:38 AM    Bacteria 1+ (A) 11/28/2020 11:38 AM    WBC 20-50 11/28/2020 11:38 AM    RBC 0-5 11/28/2020 11:38 AM         Medications Reviewed:     Current Facility-Administered Medications   Medication Dose Route Frequency    losartan (COZAAR) tablet 100 mg  100 mg Oral DAILY    LORazepam (ATIVAN) tablet 0.5 mg  0.5 mg Oral DAILY PRN    clonazePAM (KlonoPIN) tablet 0.5 mg  0.5 mg Oral BID    ondansetron (ZOFRAN) injection 4 mg  4 mg IntraVENous Q6H PRN    docusate sodium (COLACE) capsule 100 mg  100 mg Oral BID    gabapentin (NEURONTIN) capsule 300 mg  300 mg Oral TID    DULoxetine (CYMBALTA) capsule 60 mg  60 mg Oral DAILY    hydrOXYzine HCL (ATARAX) tablet 50 mg  50 mg Oral Q6H PRN    LORazepam (ATIVAN) injection 2 mg  2 mg IntraVENous PRN    thiamine HCL (B-1) tablet 100 mg  100 mg Oral DAILY    sodium chloride (NS) flush 5-40 mL  5-40 mL IntraVENous Q8H    sodium chloride (NS) flush 5-40 mL  5-40 mL IntraVENous PRN    fenofibrate nanocrystallized (TRICOR) tablet 145 mg  145 mg Oral DAILY    folic acid (FOLVITE) tablet 1 mg  1 mg Oral DAILY    acetaminophen (TYLENOL) tablet 650 mg  650 mg Oral Q6H PRN    sodium chloride (NS) flush 5-40 mL  5-40 mL IntraVENous Q8H    sodium chloride (NS) flush 5-40 mL  5-40 mL IntraVENous PRN ______________________________________________________________________  EXPECTED LENGTH OF STAY: 3d 9h  ACTUAL LENGTH OF STAY:          5                 Dee Haney MD

## 2020-12-05 NOTE — PROGRESS NOTES
Bedside shift change report given to [facility] (oncoming nurse) by Rome Novoa (offgoing nurse). Report included the following information SBAR, Kardex, Intake/Output and MAR.

## 2020-12-05 NOTE — PROGRESS NOTES
Bedside and Verbal shift change report given to [address], RN (oncoming nurse) by Dali Troy RN (offgoing nurse). Report included the following information SBAR, Kardex and MAR.

## 2020-12-05 NOTE — PROGRESS NOTES
Problem: Patient Education: Go to Patient Education Activity  Goal: Patient/Family Education  Outcome: Progressing Towards Goal     Problem: Falls - Risk of  Goal: *Absence of Falls  Description: Document Angeline Osman Fall Risk and appropriate interventions in the flowsheet.   Outcome: Progressing Towards Goal  Note: Fall Risk Interventions:  Mobility Interventions: Bed/chair exit alarm    Mentation Interventions: Bed/chair exit alarm, More frequent rounding    Medication Interventions: Patient to call before getting OOB, Teach patient to arise slowly    Elimination Interventions: Call light in reach, Patient to call for help with toileting needs

## 2020-12-05 NOTE — PROGRESS NOTES
Patient fell to floor while sitting on the bedside commode after PCT left the room to get items for bath. Dr. Katlyn Alvares was paged and lift team was called. Nurses, PCT and PT were able to lift patient in chair. Lift team arrived and used [place] lift back to bed safely. Patient denied hitting head or injuring anything at the time. Discussed with Dr. Katlyn Alvares and per MD, no X-ray needed at this time. [address] with Fe Wallace, Nursing Supervisor, about fall and gave details.

## 2020-12-06 PROCEDURE — 74011250637 HC RX REV CODE- 250/637: Performed by: NURSE PRACTITIONER

## 2020-12-06 PROCEDURE — 74011250636 HC RX REV CODE- 250/636: Performed by: HOSPITALIST

## 2020-12-06 PROCEDURE — 65270000029 HC RM PRIVATE

## 2020-12-06 PROCEDURE — 77030019905 HC CATH URETH INTMIT MDII -A

## 2020-12-06 PROCEDURE — 77030038269 HC DRN EXT URIN PURWCK BARD -A

## 2020-12-06 PROCEDURE — 74011250637 HC RX REV CODE- 250/637: Performed by: HOSPITALIST

## 2020-12-06 PROCEDURE — 74011250637 HC RX REV CODE- 250/637: Performed by: INTERNAL MEDICINE

## 2020-12-06 PROCEDURE — 51798 US URINE CAPACITY MEASURE: CPT

## 2020-12-06 RX ADMIN — Medication 10 ML: at 06:24

## 2020-12-06 RX ADMIN — HYDROCODONE BITARTRATE AND ACETAMINOPHEN 1 TABLET: 5; 325 TABLET ORAL at 18:07

## 2020-12-06 RX ADMIN — HYDROCODONE BITARTRATE AND ACETAMINOPHEN 1 TABLET: 5; 325 TABLET ORAL at 12:23

## 2020-12-06 RX ADMIN — HYDROCODONE BITARTRATE AND ACETAMINOPHEN 1 TABLET: 5; 325 TABLET ORAL at 06:18

## 2020-12-06 RX ADMIN — ONDANSETRON 4 MG: 2 INJECTION INTRAMUSCULAR; INTRAVENOUS at 21:09

## 2020-12-06 RX ADMIN — HYDROXYZINE HYDROCHLORIDE 50 MG: 25 TABLET, FILM COATED ORAL at 15:40

## 2020-12-06 RX ADMIN — LORAZEPAM 0.5 MG: 0.5 TABLET ORAL at 03:43

## 2020-12-06 RX ADMIN — DULOXETINE HYDROCHLORIDE 60 MG: 60 CAPSULE, DELAYED RELEASE ORAL at 08:24

## 2020-12-06 RX ADMIN — LORAZEPAM 0.5 MG: 0.5 TABLET ORAL at 19:34

## 2020-12-06 RX ADMIN — DOCUSATE SODIUM 100 MG: 100 CAPSULE, LIQUID FILLED ORAL at 08:25

## 2020-12-06 RX ADMIN — Medication 10 ML: at 15:46

## 2020-12-06 RX ADMIN — CLONAZEPAM 0.5 MG: 1 TABLET ORAL at 21:01

## 2020-12-06 RX ADMIN — FENOFIBRATE 145 MG: 145 TABLET ORAL at 08:25

## 2020-12-06 RX ADMIN — DOCUSATE SODIUM 100 MG: 100 CAPSULE, LIQUID FILLED ORAL at 18:07

## 2020-12-06 RX ADMIN — CLONAZEPAM 0.5 MG: 1 TABLET ORAL at 08:28

## 2020-12-06 RX ADMIN — GABAPENTIN 300 MG: 300 CAPSULE ORAL at 08:24

## 2020-12-06 RX ADMIN — GABAPENTIN 300 MG: 300 CAPSULE ORAL at 21:01

## 2020-12-06 RX ADMIN — LOSARTAN POTASSIUM 100 MG: 50 TABLET, FILM COATED ORAL at 15:40

## 2020-12-06 RX ADMIN — Medication 100 MG: at 08:28

## 2020-12-06 RX ADMIN — FOLIC ACID 1 MG: 1 TABLET ORAL at 08:25

## 2020-12-06 RX ADMIN — GABAPENTIN 300 MG: 300 CAPSULE ORAL at 15:40

## 2020-12-06 RX ADMIN — Medication 10 ML: at 21:10

## 2020-12-06 NOTE — PROGRESS NOTES
Bedside and Verbal shift change report given to Edgard Xiao RN (oncoming nurse) by Zita Lizarraga RN (offgoing nurse). Report included the following information SBAR, Kardex and MAR.

## 2020-12-06 NOTE — PROGRESS NOTES
Bedside and Verbal shift change report given to ZHANG Garcia (oncoming nurse) by Aleida Garcia RN (offgoing nurse). Report included the following information SBAR, Kardex, Procedure Summary, Intake/Output, MAR and Recent Results.

## 2020-12-06 NOTE — PROGRESS NOTES
Problem: Falls - Risk of  Goal: *Absence of Falls  Description: Document Daniel Ramirez Fall Risk and appropriate interventions in the flowsheet.   Outcome: Progressing Towards Goal  Note: Fall Risk Interventions:  Mobility Interventions: Patient to call before getting OOB    Mentation Interventions: Door open when patient unattended, Adequate sleep, hydration, pain control    Medication Interventions: Patient to call before getting OOB    Elimination Interventions: Call light in reach, Patient to call for help with toileting needs

## 2020-12-06 NOTE — PROGRESS NOTES
[facility] [facility] Adult  Hospitalist Group                                                                                          Hospitalist Progress Note  Sunny Camargo MD  Answering service: [phone] from in house phone        Date of Service:  2020  NAME:  Rolo Murphy  :  1955  MRN:  [id number]      Admission Summary: Edith is a 60 y.o.   female whom presents with complaint with altered mental status. Her PMH is significant for anxiety and mood disorder, hyponatremia, HTN, and stage 3-4 metastatic breast cancer with a port. Reportedly she was seen in the ED 2 days prior for anxiety and was given a dose of Ativan which she improved with. At that time, [facility]  and noted multiple prescriptions over different time periods for Xanax. She was also seen in the ED on 11/10 for similar complaints and BSMART evaluated at that time and she was unwilling to stay in the hospital and therefore was discharged as she was not a candidate for a TDO. Back in late October, she was seen for concern for benzo withdrawal at that time. Per the ED, she was reportedly brought in via EMS soiled with urine, still in the same hospital gown she wore here with her identification band on her wrist. She is unable to recall why she came into the hospital.  Reportedly told someone she attempted to stab herself in the forehead with a knife but she was unable to confirm or deny that. Does express that she wants to go see Millie Abad when I walk into the room. Only complaint is that she is anxious but overall poor historian and difficult to obtain additional information. There is concern that she may have taken too many of her medications as she was talking about empty pill bottles at home. \"    Interval history / Subjective:     She said she feels better, still has anxiety and depression, no suicidal ideation  She said she lives by herself and she said she can't do anything for her self and ask help     Assessment & Plan:     Benzodiazepine Withdrawal  - Improving slowly  - Seizure precautions  - Pt states she ran out of her Xanax 1 week PTA  - Received ativan 0.5mg QID for 2 days, taper ativan 0.5 mg bid and ativan 2 mg iv prn, ativan stopped and on Klonopin 0.5 mg bid, and prn hydroxyzine and prn daily ativan  - TSH, Ammonia, Vitamin B12 NL  - Fall precatuions  - Supportive care  - re evaluated by Psych consult, recommended to increase cymbalta to 90 on 12/7, started klonopin 0.5 mg bid 12/4 and to increase to tid if agitation increases , prn atarax q 6 hrs     Suicidal ideation   - attempted to stab self in head POA  -she said she doesn't have suicide ideation   - 1:1 sitter at bedside discontinued on 12/4  - seen by Psych consult     Severe Anxiety & Mood Disorder  - Continue cymbalta 60 mg (started this admission), may increase to 90 mg on 12/7, on gabapentin, thiamine, folic acid  - supportive care  - reevaluated by Psych       Mild hypokalemia  - Resolved     Hyponatremia  -Resolved     CORNELIA: normal kidney function on admission  -Resolved     H/o HTN  -BP normal, continue cozaar to 100 mg  - Monitor BP     Metastatic Breast CA: mets to sternum; per hem/onc-low disease burden  - on treatment holiday; next appt in Jan 2021  - Last Echo 10/27/2020 was NL  - DDNR on file  - Appreciate palliative care input  - Appreciate hem/onc input; pt not candidate for hospice  - Supportive care    Debility   -PT/OT   -SNF on discharge         Code status: Full Code  DVT prophylaxis: SCD    Care Plan discussed with: Patient/Family and Nurse  Anticipated Disposition: SNF  Anticipated Discharge: 24 hours to 48 hours     Hospital Problems  Date Reviewed: 12/1/2020          Codes Class Noted POA    * (Principal) Benzodiazepine withdrawal ([facility] [address] ICD-10-CM: F22.564  ICD-9-CM: 292.0, 304.10  1/28/2020 Yes              Vital Signs:    Last 24hrs VS reviewed since prior progress note.  Most recent are:  Visit Vitals  /74 (BP 1 Location: Right leg, BP Patient Position: At rest)   Pulse (!) 109   Temp 97.4 °F (36.3 °C)   Resp 16   Wt 99.3 kg (218 lb 14.7 oz)   SpO2 97%   BMI 36.43 kg/m²         Intake/Output Summary (Last 24 hours) at 12/6/2020 0901  Last data filed at 12/5/2020 2133  Gross per 24 hour   Intake    Output 600 ml   Net -600 ml        Physical Examination:     I had a face to face encounter with this patient and independently examined them on 12/6/2020 as outlined below:          Constitutional:  No acute distress, cooperative, looks anxious   ENT:  Oral mucosa moist, oropharynx benign. Resp:  CTA bilaterally. No wheezing/rhonchi/rales. No accessory muscle use   CV:  Regular rhythm, normal rate, no murmurs, gallops, rubs    GI:  Soft, non distended, non tender. normoactive bowel sounds, no hepatosplenomegaly     Musculoskeletal:  No edema     Neurologic:  Moves all extremities. AAOx3, CN II-XII reviewed     Skin:  Good turgor, no rashes or ulcers       Data Review:    Review and/or order of clinical lab test  Review and/or order of tests in the radiology section of CPT  Review and/or order of tests in the medicine section of CPT      Labs:   No results for input(s): WBC, HGB, HCT, PLT, HGBEXT, HCTEXT, PLTEXT, HGBEXT, HCTEXT, PLTEXT in the last 72 hours. No results for input(s): NA, K, CL, CO2, BUN, CREA, GLU, CA, MG, PHOS, URICA in the last 72 hours. No results for input(s): ALT, AP, TBIL, TBILI, TP, ALB, GLOB, GGT, AML, LPSE in the last 72 hours. No lab exists for component: SGOT, GPT, AMYP, HLPSE  No results for input(s): INR, PTP, APTT, INREXT, INREXT in the last 72 hours. No results for input(s): FE, TIBC, PSAT, FERR in the last 72 hours. No results found for: FOL, RBCF   No results for input(s): PH, PCO2, PO2 in the last 72 hours. No results for input(s): CPK, CKNDX, TROIQ in the last 72 hours.     No lab exists for component: CPKMB  No results found for: CHOL, [address], [facility], [address], HDL, HDLP, LDL, LDLC, DLDLP, TGLX, TRIGL, TRIGP, CHHD, CHHDX  Lab Results   Component Value Date/Time    Glucose (POC) 203 (H) 10/23/2020 05:50 AM    Glucose POC 83 06/14/2011 10:33 AM     Lab Results   Component Value Date/Time    Color YELLOW/STRAW 11/28/2020 11:38 AM    Appearance CLEAR 11/28/2020 11:38 AM    Specific gravity 1.028 11/28/2020 11:38 AM    Specific gravity 1.025 02/12/2018 01:41 PM    pH (UA) 5.5 11/28/2020 11:38 AM    Protein Negative 11/28/2020 11:38 AM    Glucose 250 (A) 11/28/2020 11:38 AM    Ketone Negative 11/28/2020 11:38 AM    Bilirubin Negative 11/28/2020 11:38 AM    Urobilinogen 0.2 11/28/2020 11:38 AM    Nitrites Negative 11/28/2020 11:38 AM    Leukocyte Esterase Negative 11/28/2020 11:38 AM    Epithelial cells MODERATE (A) 11/28/2020 11:38 AM    Bacteria 1+ (A) 11/28/2020 11:38 AM    WBC 20-50 11/28/2020 11:38 AM    RBC 0-5 11/28/2020 11:38 AM         Medications Reviewed:     Current Facility-Administered Medications   Medication Dose Route Frequency    HYDROcodone-acetaminophen (NORCO) 5-325 mg per tablet 1 Tab  1 Tab Oral Q6H PRN    losartan (COZAAR) tablet 100 mg  100 mg Oral DAILY    LORazepam (ATIVAN) tablet 0.5 mg  0.5 mg Oral DAILY PRN    clonazePAM (KlonoPIN) tablet 0.5 mg  0.5 mg Oral BID    ondansetron (ZOFRAN) injection 4 mg  4 mg IntraVENous Q6H PRN    docusate sodium (COLACE) capsule 100 mg  100 mg Oral BID    gabapentin (NEURONTIN) capsule 300 mg  300 mg Oral TID    DULoxetine (CYMBALTA) capsule 60 mg  60 mg Oral DAILY    hydrOXYzine HCL (ATARAX) tablet 50 mg  50 mg Oral Q6H PRN    LORazepam (ATIVAN) injection 2 mg  2 mg IntraVENous PRN    thiamine HCL (B-1) tablet 100 mg  100 mg Oral DAILY    sodium chloride (NS) flush 5-40 mL  5-40 mL IntraVENous Q8H    sodium chloride (NS) flush 5-40 mL  5-40 mL IntraVENous PRN    fenofibrate nanocrystallized (TRICOR) tablet 145 mg  145 mg Oral DAILY    folic acid (FOLVITE) tablet 1 mg  1 mg Oral DAILY    acetaminophen (TYLENOL) tablet 650 mg  650 mg Oral Q6H PRN    sodium chloride (NS) flush 5-40 mL  5-40 mL IntraVENous Q8H    sodium chloride (NS) flush 5-40 mL  5-40 mL IntraVENous PRN     ______________________________________________________________________  EXPECTED LENGTH OF STAY: 3d 9h  ACTUAL LENGTH OF STAY:          6                 Ashley Valencia MD

## 2020-12-06 NOTE — PROGRESS NOTES
Contacted Dr. Diana Valencia regarding bladder scan of 622. Per MD, okay to straight cath at this time.

## 2020-12-07 PROCEDURE — 97535 SELF CARE MNGMENT TRAINING: CPT

## 2020-12-07 PROCEDURE — 77030038269 HC DRN EXT URIN PURWCK BARD -A

## 2020-12-07 PROCEDURE — 74011250637 HC RX REV CODE- 250/637: Performed by: HOSPITALIST

## 2020-12-07 PROCEDURE — 97110 THERAPEUTIC EXERCISES: CPT

## 2020-12-07 PROCEDURE — 74011250636 HC RX REV CODE- 250/636: Performed by: HOSPITALIST

## 2020-12-07 PROCEDURE — 74011250637 HC RX REV CODE- 250/637: Performed by: INTERNAL MEDICINE

## 2020-12-07 PROCEDURE — 65270000029 HC RM PRIVATE

## 2020-12-07 PROCEDURE — 97116 GAIT TRAINING THERAPY: CPT

## 2020-12-07 PROCEDURE — 51798 US URINE CAPACITY MEASURE: CPT

## 2020-12-07 PROCEDURE — 74011250637 HC RX REV CODE- 250/637: Performed by: NURSE PRACTITIONER

## 2020-12-07 PROCEDURE — 94760 N-INVAS EAR/PLS OXIMETRY 1: CPT

## 2020-12-07 PROCEDURE — 77030019905 HC CATH URETH INTMIT MDII -A

## 2020-12-07 RX ADMIN — DOCUSATE SODIUM 100 MG: 100 CAPSULE, LIQUID FILLED ORAL at 09:10

## 2020-12-07 RX ADMIN — Medication 100 MG: at 09:10

## 2020-12-07 RX ADMIN — CLONAZEPAM 0.5 MG: 1 TABLET ORAL at 09:13

## 2020-12-07 RX ADMIN — GABAPENTIN 300 MG: 300 CAPSULE ORAL at 21:57

## 2020-12-07 RX ADMIN — DULOXETINE HYDROCHLORIDE 60 MG: 60 CAPSULE, DELAYED RELEASE ORAL at 09:10

## 2020-12-07 RX ADMIN — ONDANSETRON 4 MG: 2 INJECTION INTRAMUSCULAR; INTRAVENOUS at 02:08

## 2020-12-07 RX ADMIN — Medication 10 ML: at 14:25

## 2020-12-07 RX ADMIN — FENOFIBRATE 145 MG: 145 TABLET ORAL at 09:10

## 2020-12-07 RX ADMIN — CLONAZEPAM 0.5 MG: 1 TABLET ORAL at 21:57

## 2020-12-07 RX ADMIN — FOLIC ACID 1 MG: 1 TABLET ORAL at 09:10

## 2020-12-07 RX ADMIN — GABAPENTIN 300 MG: 300 CAPSULE ORAL at 09:10

## 2020-12-07 RX ADMIN — DOCUSATE SODIUM 100 MG: 100 CAPSULE, LIQUID FILLED ORAL at 17:00

## 2020-12-07 RX ADMIN — HYDROCODONE BITARTRATE AND ACETAMINOPHEN 1 TABLET: 5; 325 TABLET ORAL at 13:02

## 2020-12-07 RX ADMIN — HYDROCODONE BITARTRATE AND ACETAMINOPHEN 1 TABLET: 5; 325 TABLET ORAL at 07:18

## 2020-12-07 RX ADMIN — Medication 10 ML: at 06:00

## 2020-12-07 RX ADMIN — HYDROCODONE BITARTRATE AND ACETAMINOPHEN 1 TABLET: 5; 325 TABLET ORAL at 02:00

## 2020-12-07 RX ADMIN — LORAZEPAM 0.5 MG: 0.5 TABLET ORAL at 21:57

## 2020-12-07 RX ADMIN — HYDROCODONE BITARTRATE AND ACETAMINOPHEN 1 TABLET: 5; 325 TABLET ORAL at 19:11

## 2020-12-07 RX ADMIN — GABAPENTIN 300 MG: 300 CAPSULE ORAL at 16:59

## 2020-12-07 NOTE — PROGRESS NOTES
[address] [facility] Adult  Hospitalist Group                                                                                          Hospitalist Progress Note  Ashley Vaelncia MD  Answering service: [phone] from in house phone        Date of Service:  2020  NAME:  Christoph Finney  :  1955  MRN:  [id number]      Admission Summary: Edith is a 60 y.o.   female whom presents with complaint with altered mental status. Her PMH is significant for anxiety and mood disorder, hyponatremia, HTN, and stage 3-4 metastatic breast cancer with a port. Reportedly she was seen in the ED 2 days prior for anxiety and was given a dose of Ativan which she improved with. At that time, [facility] provider [facility]  and noted multiple prescriptions over different time periods for Xanax. She was also seen in the ED on 11/10 for similar complaints and BSMART evaluated at that time and she was unwilling to stay in the hospital and therefore was discharged as she was not a candidate for a TDO. Back in late October, she was seen for concern for benzo withdrawal at that time. Per the ED, she was reportedly brought in via EMS soiled with urine, still in the same hospital gown she wore here with her identification band on her wrist. She is unable to recall why she came into the hospital.  Reportedly told someone she attempted to stab herself in the forehead with a knife but she was unable to confirm or deny that. Does express that she wants to go see Bobby Hart when I walk into the room. Only complaint is that she is anxious but overall poor historian and difficult to obtain additional information. There is concern that she may have taken too many of her medications as she was talking about empty pill bottles at home. \"    Interval history / Subjective:     She said she feels the same, anxious and depressed but no suicidal or homicidal thought.       Assessment & Plan:     Benzodiazepine Withdrawal  - Improving slowly  - Seizure precautions  - Pt states she ran out of her Xanax 1 week PTA  - Received ativan 0.5mg QID for 2 days, taper ativan 0.5 mg bid and ativan 2 mg iv prn  - TSH, Ammonia, Vitamin B12 NL  - Fall precatuions  - Supportive care  -continue klopin 0.5 mg bid, increase cymbalta to 90 mg, prn hydroxyzine   - re evaluated by Psych       Suicidal ideation   - attempted to stab self in head POA  -she said she doesn't have suicide or homicidal ideation   - 1:1 sitter at bedside discontinued on 12/4  - seen by Psych consult     Severe Anxiety & Mood Disorder  - increase cymbalta to 90 mg daily on 12/7 , gabapentin, thiamine, folic acid  - supportive care  - reevaluated by Psych       Mild hypokalemia  - Resolved     Hyponatremia  -Resolved     CORNELIA: normal kidney function on admission  -Resolved     H/o HTN  -BP not at goal, anxiety is also contributing, increased cozaar to 100 mg  - Monitor BP     Metastatic Breast CA: mets to sternum; per hem/onc-low disease burden  - on treatment holiday; next appt in Jan 2021  - Last Echo 10/27/2020 was NL  - DDNR on file  - Appreciate palliative care input  - Appreciate hem/onc input; pt not candidate for hospice  - Supportive care    Debility   -PT/OT          Code status: DNR  DVT prophylaxis: SCD    Care Plan discussed with: Patient/Family and Nurse  Anticipated Disposition: SNF, CM awaiting insurance authorization  Anticipated Discharge: 24 hours to 48 hours     Hospital Problems  Date Reviewed: 12/1/2020          Codes Class Noted POA    * (Principal) Benzodiazepine withdrawal ([facility] [address] ICD-10-CM: J48.813  ICD-9-CM: 292.0, 304.10  1/28/2020 Yes              Vital Signs:    Last 24hrs VS reviewed since prior progress note.  Most recent are:  Visit Vitals  /86 (BP 1 Location: Right leg, BP Patient Position: At rest)   Pulse (!) 106   Temp 97.9 °F (36.6 °C)   Resp 17   Wt 96.9 kg (213 lb 9.6 oz)   SpO2 95%   BMI 35.54 kg/m²         Intake/Output Summary (Last 24 hours) at 12/7/2020 1120  Last data filed at 12/7/2020 0600  Gross per 24 hour   Intake    Output 1750 ml   Net -1750 ml        Physical Examination:     I had a face to face encounter with this patient and independently examined them on 12/7/2020 as outlined below:          Constitutional:  No acute distress, cooperative, looks anxious   ENT:  Oral mucosa moist, oropharynx benign. Resp:  CTA bilaterally. No wheezing/rhonchi/rales. No accessory muscle use   CV:  Regular rhythm, normal rate, no murmurs, gallops, rubs    GI:  Soft, non distended, non tender. normoactive bowel sounds, no hepatosplenomegaly     Musculoskeletal:  No edema     Neurologic:  Moves all extremities. AAOx3, CN II-XII reviewed     Skin:  Good turgor, no rashes or ulcers       Data Review:    Review and/or order of clinical lab test  Review and/or order of tests in the radiology section of CPT  Review and/or order of tests in the medicine section of CPT      Labs:   No results for input(s): WBC, HGB, HCT, PLT, HGBEXT, HCTEXT, PLTEXT, HGBEXT, HCTEXT, PLTEXT in the last 72 hours. No results for input(s): NA, K, CL, CO2, BUN, CREA, GLU, CA, MG, PHOS, URICA in the last 72 hours. No results for input(s): ALT, AP, TBIL, TBILI, TP, ALB, GLOB, GGT, AML, LPSE in the last 72 hours. No lab exists for component: SGOT, GPT, AMYP, HLPSE  No results for input(s): INR, PTP, APTT, INREXT, INREXT in the last 72 hours. No results for input(s): FE, TIBC, PSAT, FERR in the last 72 hours. No results found for: FOL, RBCF   No results for input(s): PH, PCO2, PO2 in the last 72 hours. No results for input(s): CPK, CKNDX, TROIQ in the last 72 hours.     No lab exists for component: CPKMB  No results found for: CHOL, CHOLX, CHLST, CHOLV, HDL, HDLP, LDL, LDLC, DLDLP, TGLX, TRIGL, TRIGP, CHHD, CHHDX  Lab Results   Component Value Date/Time    Glucose (POC) 203 (H) 10/23/2020 05:50 AM    Glucose POC 83 06/14/2011 10:33 AM     Lab Results   Component Value Date/Time    Color YELLOW/STRAW 11/28/2020 11:38 AM    Appearance CLEAR 11/28/2020 11:38 AM    Specific gravity 1.028 11/28/2020 11:38 AM    Specific gravity 1.025 02/12/2018 01:41 PM    pH (UA) 5.5 11/28/2020 11:38 AM    Protein Negative 11/28/2020 11:38 AM    Glucose 250 (A) 11/28/2020 11:38 AM    Ketone Negative 11/28/2020 11:38 AM    Bilirubin Negative 11/28/2020 11:38 AM    Urobilinogen 0.2 11/28/2020 11:38 AM    Nitrites Negative 11/28/2020 11:38 AM    Leukocyte Esterase Negative 11/28/2020 11:38 AM    Epithelial cells MODERATE (A) 11/28/2020 11:38 AM    Bacteria 1+ (A) 11/28/2020 11:38 AM    WBC 20-50 11/28/2020 11:38 AM    RBC 0-5 11/28/2020 11:38 AM         Medications Reviewed:     Current Facility-Administered Medications   Medication Dose Route Frequency    [START ON 12/8/2020] DULoxetine (CYMBALTA) capsule 90 mg  90 mg Oral DAILY    HYDROcodone-acetaminophen (NORCO) 5-325 mg per tablet 1 Tab  1 Tab Oral Q6H PRN    losartan (COZAAR) tablet 100 mg  100 mg Oral DAILY    LORazepam (ATIVAN) tablet 0.5 mg  0.5 mg Oral DAILY PRN    clonazePAM (KlonoPIN) tablet 0.5 mg  0.5 mg Oral BID    ondansetron (ZOFRAN) injection 4 mg  4 mg IntraVENous Q6H PRN    docusate sodium (COLACE) capsule 100 mg  100 mg Oral BID    gabapentin (NEURONTIN) capsule 300 mg  300 mg Oral TID    hydrOXYzine HCL (ATARAX) tablet 50 mg  50 mg Oral Q6H PRN    LORazepam (ATIVAN) injection 2 mg  2 mg IntraVENous PRN    thiamine HCL (B-1) tablet 100 mg  100 mg Oral DAILY    sodium chloride (NS) flush 5-40 mL  5-40 mL IntraVENous Q8H    sodium chloride (NS) flush 5-40 mL  5-40 mL IntraVENous PRN    fenofibrate nanocrystallized (TRICOR) tablet 145 mg  145 mg Oral DAILY    folic acid (FOLVITE) tablet 1 mg  1 mg Oral DAILY    acetaminophen (TYLENOL) tablet 650 mg  650 mg Oral Q6H PRN    sodium chloride (NS) flush 5-40 mL  5-40 mL IntraVENous Q8H    sodium chloride (NS) flush 5-40 mL  5-40 mL IntraVENous PRN ______________________________________________________________________  EXPECTED LENGTH OF STAY: 3d 9h  ACTUAL LENGTH OF STAY:          7                 Raúl Simmons MD

## 2020-12-07 NOTE — PROGRESS NOTES
RN contacted MD since patient's /86 with losartan due. Dr. Gloria Del Real said okay to hold this dose of the medication.

## 2020-12-07 NOTE — PROGRESS NOTES
Problem: Self Care Deficits Care Plan (Adult)  Goal: *Acute Goals and Plan of Care (Insert Text)  Description:   FUNCTIONAL STATUS PRIOR TO ADMISSION: Pt lives alone in single story apartment in [address] [place] [address] and states she was Independent with ADLs/IADLs, without use of DME, showering in walk-in with seated surface and grab bars. HOME SUPPORT: The patient lived alone with no local support. Occupational Therapy Goals  Initiated 12/2/2020  1. Patient will perform RW grooming with supervision within 7 day(s). 2.  Patient will perform EOB/RW bathing with supervision within 7 day(s). 3.  Patient will perform EOB/RW lower body dressing with supervision/set-up within 7 day(s). 4.  Patient will perform RW toilet transfers with supervision within 7 day(s). 5.  Patient will perform all aspects of RW toileting with supervision within 7 day(s). Outcome: Progressing Towards Goal  OCCUPATIONAL THERAPY TREATMENT  Patient: Damaris Wise (60 y.o. female)  Date: 12/7/2020  Diagnosis: Overdose [T50.901A]  Benzodiazepine withdrawal ([facility] [address] [F13.239]   Benzodiazepine withdrawal ([facility] [address]       Precautions:    Chart, occupational therapy assessment, plan of care, and goals were reviewed. ASSESSMENT  Patient continues with skilled OT services and is progressing towards goals, demonstrating improvement with functional mobility and standing tolerance today. Pt received supine in bed, agreeable to participate. Min A for supine>sit with good-fair sitting balance, occasional posterior lean during dynamic task. Using RW she ambulated to bathroom and performed toilet transfer with CGA-min A, and completed toileting with CGA-mod A. She stood briefly at sink for grooming, did have minor posterior LOB with min A to correct. Returned to bed at end of session. Pt with overall improved participation and engagement in session. Continue to recommend rehab at discharge as pt lives alone and is well below independent-mod I baseline.     Current Level of Function Impacting Discharge (ADLs): CGA-min A transfers, up to mod A ADLs this session    Other factors to consider for discharge: fall risk, lives alone         PLAN :  Patient continues to benefit from skilled intervention to address the above impairments. Continue treatment per established plan of care. to address goals. Recommendation for discharge: (in order for the patient to meet his/her long term goals)  Therapy 3 hours per day 5-7 days per week    This discharge recommendation:  Has been made in collaboration with the attending provider and/or case management    IF patient discharges home will need the following DME: TBD       SUBJECTIVE:   Patient stated I can get up again.     OBJECTIVE DATA SUMMARY:   Cognitive/Behavioral Status:  Neurologic State: Alert  Orientation Level: Oriented X4  Cognition: Follows commands  Perception: Appears intact  Perseveration: Perseverates during ADLS  Safety/Judgement: Fall prevention;Decreased insight into deficits    Functional Mobility and Transfers for ADLs:  Bed Mobility:  Supine to Sit: Minimum assistance  Sit to Supine: Minimum assistance  Scooting: Contact guard assistance    Transfers:  Sit to Stand: Contact guard assistance; Adaptive equipment  Functional Transfers  Toilet Transfer : Contact guard assistance; Additional time; Adaptive equipment  Bed to Chair: Minimum assistance; Adaptive equipment    Balance:  Sitting: Intact; Without support  Standing: Impaired; With support  Standing - Static: Fair;Constant support  Standing - Dynamic : Fair;Constant support    ADL Intervention:       Grooming  Washing Face: Contact guard assistance;Minimum assistance(A for balance, pt with mild LOB posteriorly)  Cues: Verbal cues provided;Visual cues provided       Lower Body Dressing Assistance  Socks:  Moderate assistance    Toileting  Bladder Hygiene: Contact guard assistance  Clothing Management: Moderate assistance(pt doffed, A to don)    Cognitive Retraining  Safety/Judgement: Fall prevention;Decreased insight into deficits    Pain:  Pt did not c/o pain    Activity Tolerance:   Fair and requires rest breaks    After treatment patient left in no apparent distress:   Supine in bed, Call bell within reach, and Side rails x 3    COMMUNICATION/COLLABORATION:   The patients plan of care was discussed with: Physical therapist and Registered nurse.      Jeferson Montes De Oca, OT  Time Calculation: 23 mins

## 2020-12-07 NOTE — PROGRESS NOTES
FRIDA: SNF referrals are pending with [facility], [facility], [address] and [address]. Isaac Dura will need to be obtained prior to discharge. Noted negative COVID test 12/4. Chart reviewed. CM met with patient to discuss SNF choice. Patient prefers [facility]. CM spoke with Cruz Rubinstein at [address] They are not accepting patients until possibly Thursday. CM notified patient. She will review SNF list for additional choices. Patient requesting phone number for [employer] to call and have them pay her rent. CM provided phone number. Patient prefers [facility] or [facility]. CM sent referrals. CM called [facility] [phone] and left message for admissions. Noted [facility] declined patient. Referrals are still pending with [facility] and [facility]. [facility] has denied patient as they are a COVID+ facility and are not taking new admissions. 4:20 PM: CM met with patient to obtain additional SNF choices. Patient would like referrals sent to the [facility], [facility], and [address]. CM sent referrals.      GLORIA Escobar/CRM

## 2020-12-07 NOTE — PROGRESS NOTES
Palliative Medicine Social Work    This SW met with patient in her room. Patient lying in bed, appearing less anxious/tremulous than previous visit. She was trying to figure out how to pay rent. She requested this SW call her  with her.  at [employer] is Yamel Ann and was able to update patient on renovation plans in apartment building, which has been ongoing concern for patient as she struggles to remember details of where she will live during renovation (building is providing 30 days notices and a furnished apartment in the same building for 3 weeks). DIscussed OP follow up. Patient still unsure about if she will follow up with oncologist in January. She continues to state that she thought she was done with treatment. Provided education that oncologist is expecting to meet with her in January, patient will need to determine what her options are if she wants to pursue treatment if offered. This is too much for patient to process right now. She is still anxious, struggling to focus on long term goals. Patient requested social work support in community. CM updated. Will also put in OP palliative clinic referral with our team. (She was supposed to schedule an appt with [facility] palliative MD in Oct but had not been able to make appt.)    Will f/u Tues AM for supportive visit. Thank you for the opportunity to be involved in the care of Ms. Aristides Shaffer and her family.     Cher Mariscal LMSW, Supervisee in Social Work  Palliative Medicine   [phone]    Start time: 10:00  End time:  10:30

## 2020-12-07 NOTE — PROGRESS NOTES
Problem: Mobility Impaired (Adult and Pediatric)  Goal: *Acute Goals and Plan of Care (Insert Text)  Description: FUNCTIONAL STATUS PRIOR TO ADMISSION: Patient was independent without use of DME. Patient states she was mobilizing, driving, and performing ADLs/IADLs independently until about 2 weeks ago. Since, then she has spent most of her time in the bed with little mobility due to fear of falling. HOME SUPPORT PRIOR TO ADMISSION: The patient lived alone in a senior living facility and has no local support. Physical Therapy Goals  Initiated 12/2/2020  1. Patient will move from supine to sit and sit to supine , scoot up and down, and roll side to side in bed with modified independence within 7 day(s). 2.  Patient will transfer from bed to chair and chair to bed with supervision using the least restrictive device within 7 day(s). 3.  Patient will perform sit to stand with supervision within 7 day(s). 4.  Patient will ambulate with supervision for 50 feet with the least restrictive device within 7 day(s). Outcome: Progressing Towards Goal   PHYSICAL THERAPY TREATMENT  Patient: Latrice Betancourt (60 y.o. female)  Date: 12/7/2020  Diagnosis: Overdose [T50.901A]  Benzodiazepine withdrawal ([address] [F13.239]   Benzodiazepine withdrawal ([facility] [address]       Precautions:    Chart, physical therapy assessment, plan of care and goals were reviewed. ASSESSMENT  Patient continues with skilled PT services and is progressing towards goals. Patient demonstrated improved endurance as evident by ability to increase ambulation distance. Patient continues to need encouragement to mobilize; but patient presents more alert, actively communicating with PT. Patient requires increased cueing for functional mobility and continues to have decreased sensation with ambulation; raising concerns for fall.      Current Level of Function Impacting Discharge (mobility/balance): Min assistance for bed mobility with use of bed rails, CGA for transfers and ambulation with RW, Impaired standing balance    Other factors to consider for discharge: patient lives alone at independent living facility, looking into rehab options         PLAN :  Patient continues to benefit from skilled intervention to address the above impairments. Continue treatment per established plan of care. to address goals. Recommendation for discharge: (in order for the patient to meet his/her long term goals)  Therapy 3 hours per day 5-7 days per week    This discharge recommendation:  Has been made in collaboration with the attending provider and/or case management    IF patient discharges home will need the following DME: patient owns DME required for discharge       SUBJECTIVE:   Patient stated Nick Cool so how do you want me to get out of bed.     OBJECTIVE DATA SUMMARY:   Critical Behavior:  Neurologic State: Alert, Appropriate for age  Orientation Level: Oriented X4  Cognition: Appropriate decision making, Appropriate for age attention/concentration, Appropriate safety awareness, Follows commands  Safety/Judgement: Decreased awareness of need for assistance, Decreased awareness of need for safety, Decreased insight into deficits  Functional Mobility Training:  Bed Mobility:  Supine to Sit: Minimum assistance;Assist x1;Additional time  Sit to Supine: (left in chair)  Scooting: Contact guard assistance    Transfers:  Sit to Stand: Contact guard assistance; Adaptive equipment  Stand to Sit: Contact guard assistance; Adaptive equipment  Bed to Chair: Minimum assistance; Adaptive equipment        Balance:  Sitting: Intact; Without support  Standing: Impaired; With support  Standing - Static: Fair;Constant support  Standing - Dynamic : Fair;Constant support    Ambulation/Gait Training:  Distance (ft): 60 Feet (ft)(sitting break, +40 feet)  Assistive Device: Gait belt;Walker, rolling  Ambulation - Level of Assistance: Contact guard assistance; Adaptive equipment  Gait Abnormalities: Decreased step clearance  Base of Support: Widened  Speed/Antonietta: Fluctuations  Step Length: Left shortened;Right shortened    Therapeutic Exercises:   Seat exercises:  - knee extension: 1 x 10  -ankle pumps: 2 x 8, likes to roll ankles  - modified rows: 1 x 5, limited range  - UE over head adduction: 1 x 5, limited range  Standing   - Marches: 1 x 10    Pain Rating:  Patient does not report any pain at this time    Activity Tolerance:   Fair    After treatment patient left in no apparent distress:   Sitting in chair and Call bell within reach    COMMUNICATION/COLLABORATION:   The patients plan of care was discussed with: Registered nurse. Regarding student involvement in patient care:  A student participated in this treatment session. Per CMS Medicare statements and APTA guidelines I certify that the following was true:  1. I was present and directly observed the entire session. 2. I made all skilled judgments and clinical decisions regarding care. 3. I am the practitioner responsible for assessment, treatment, and documentation.     Carleene Frankel, DEIRDRE   Time Calculation: 27 mins

## 2020-12-07 NOTE — PROGRESS NOTES
Bedside and Verbal shift change report given to Angelito Alberto RN (oncoming nurse) by Ramiro Coffman RN (offgoing nurse). Report included the following information SBAR, Kardex, Procedure Summary, Intake/Output, MAR and Recent Results.

## 2020-12-08 PROCEDURE — 65270000029 HC RM PRIVATE

## 2020-12-08 PROCEDURE — 97116 GAIT TRAINING THERAPY: CPT

## 2020-12-08 PROCEDURE — 74011250637 HC RX REV CODE- 250/637: Performed by: NURSE PRACTITIONER

## 2020-12-08 PROCEDURE — 97110 THERAPEUTIC EXERCISES: CPT

## 2020-12-08 PROCEDURE — 74011250637 HC RX REV CODE- 250/637: Performed by: INTERNAL MEDICINE

## 2020-12-08 PROCEDURE — 74011250637 HC RX REV CODE- 250/637: Performed by: HOSPITALIST

## 2020-12-08 RX ORDER — LOSARTAN POTASSIUM 50 MG/1
50 TABLET ORAL DAILY
Status: DISCONTINUED | OUTPATIENT
Start: 2020-12-09 | End: 2020-12-17 | Stop reason: HOSPADM

## 2020-12-08 RX ADMIN — Medication 10 ML: at 13:10

## 2020-12-08 RX ADMIN — GABAPENTIN 300 MG: 300 CAPSULE ORAL at 21:08

## 2020-12-08 RX ADMIN — CLONAZEPAM 0.5 MG: 1 TABLET ORAL at 09:53

## 2020-12-08 RX ADMIN — HYDROCODONE BITARTRATE AND ACETAMINOPHEN 1 TABLET: 5; 325 TABLET ORAL at 19:17

## 2020-12-08 RX ADMIN — HYDROCODONE BITARTRATE AND ACETAMINOPHEN 1 TABLET: 5; 325 TABLET ORAL at 01:40

## 2020-12-08 RX ADMIN — LORAZEPAM 0.5 MG: 0.5 TABLET ORAL at 16:01

## 2020-12-08 RX ADMIN — DULOXETINE HYDROCHLORIDE 90 MG: 60 CAPSULE, DELAYED RELEASE ORAL at 09:50

## 2020-12-08 RX ADMIN — HYDROCODONE BITARTRATE AND ACETAMINOPHEN 1 TABLET: 5; 325 TABLET ORAL at 07:54

## 2020-12-08 RX ADMIN — GABAPENTIN 300 MG: 300 CAPSULE ORAL at 09:50

## 2020-12-08 RX ADMIN — DOCUSATE SODIUM 100 MG: 100 CAPSULE, LIQUID FILLED ORAL at 09:50

## 2020-12-08 RX ADMIN — Medication 100 MG: at 09:50

## 2020-12-08 RX ADMIN — DOCUSATE SODIUM 100 MG: 100 CAPSULE, LIQUID FILLED ORAL at 16:01

## 2020-12-08 RX ADMIN — FENOFIBRATE 145 MG: 145 TABLET ORAL at 09:50

## 2020-12-08 RX ADMIN — CLONAZEPAM 0.5 MG: 1 TABLET ORAL at 21:08

## 2020-12-08 RX ADMIN — HYDROCODONE BITARTRATE AND ACETAMINOPHEN 1 TABLET: 5; 325 TABLET ORAL at 13:10

## 2020-12-08 RX ADMIN — FOLIC ACID 1 MG: 1 TABLET ORAL at 09:50

## 2020-12-08 RX ADMIN — Medication 10 ML: at 21:09

## 2020-12-08 RX ADMIN — GABAPENTIN 300 MG: 300 CAPSULE ORAL at 16:01

## 2020-12-08 NOTE — PROGRESS NOTES
[facility] Adult  Hospitalist Group                                                                                          Hospitalist Progress Note  Kedar Duran MD  Answering service: [phone] from in house phone        Date of Service:  2020  NAME:  Rajesh Garduno  :  1955  MRN:  [id number]      Admission Summary: Edith is a 60 y.o.   female whom presents with complaint with altered mental status. Her PMH is significant for anxiety and mood disorder, hyponatremia, HTN, and stage 3-4 metastatic breast cancer with a port. Reportedly she was seen in the ED 2 days prior for anxiety and was given a dose of Ativan which she improved with. At that time, ED provider evalulated [facility]  and noted multiple prescriptions over different time periods for Xanax. She was also seen in the ED on 11/10 for similar complaints and BSMART evaluated at that time and she was unwilling to stay in the hospital and therefore was discharged as she was not a candidate for a TDO. Back in late October, she was seen for concern for benzo withdrawal at that time. Per the ED, she was reportedly brought in via EMS soiled with urine, still in the same hospital gown she wore here with her identification band on her wrist. She is unable to recall why she came into the hospital.  Reportedly told someone she attempted to stab herself in the forehead with a knife but she was unable to confirm or deny that. Does express that she wants to go see Shilpa Machado when I walk into the room. Only complaint is that she is anxious but overall poor historian and difficult to obtain additional information. There is concern that she may have taken too many of her medications as she was talking about empty pill bottles at home. \"    Interval history / Subjective:     She said she feels better, but still has some anxiety and depression, not suicidal     Assessment & Plan:     Benzodiazepine Withdrawal  - Improving slowly  - Seizure precautions  - Pt states she ran out of her Xanax 1 week PTA  - Received ativan 0.5mg QID for 2 days, taper ativan 0.5 mg bid and ativan 2 mg iv prn  - TSH, Ammonia, Vitamin B12 NL  - Fall precatuions  - Supportive care  -continue klopin 0.5 mg bid, increased cymbalta to 90 mg 12/8, prn hydroxyzine   - re evaluated by Psych       Suicidal ideation   - attempted to stab self in head POA  -she said she doesn't have suicide or homicidal ideation   - 1:1 sitter at bedside discontinued on 12/4  - seen by Psych consult     Severe Anxiety & Mood Disorder  - stable, increased cymbalta to 90 mg daily on 12/7 , continue gabapentin, thiamine, folic acid  - supportive care  - reevaluated by Psych       Mild hypokalemia  - Resolved     Hyponatremia  -Resolved     CORNELIA: normal kidney function on admission  -Resolved     HTN  -BP normal, anxiety is also contributing, cut back cozaar to 50  mg  - Monitor BP     Metastatic Breast CA: mets to sternum; per hem/onc-low disease burden  - on treatment holiday; next appt in Jan 2021  - Last Echo 10/27/2020 was NL  - DDNR on file  - Appreciate palliative care input  - Appreciate hem/onc input; pt not candidate for hospice  - Supportive care    Debility   -PT/OT          Code status: DNR  DVT prophylaxis: SCD    Care Plan discussed with: Patient/Family and Nurse  Anticipated Disposition: SNF, CM awaiting insurance authorization and state evaluation before discharge as she has hx of inpatient psych admission in the past  Anticipated Discharge: 24 hours to 48 hours     Hospital Problems  Date Reviewed: 12/1/2020          Codes Class Noted POA    * (Principal) Benzodiazepine withdrawal ([facility] [address] ICD-10-CM: O04.426  ICD-9-CM: 292.0, 304.10  1/28/2020 Yes              Vital Signs:    Last 24hrs VS reviewed since prior progress note.  Most recent are:  Visit Vitals  /74 (BP 1 Location: Left leg, BP Patient Position: At rest)   Pulse 87   Temp 97.6 °F (36.4 °C)   Resp 16   Wt 97.1 kg (214 lb)   SpO2 94%   BMI 35.61 kg/m²       No intake or output data in the 24 hours ending 12/08/20 3700     Physical Examination:     I had a face to face encounter with this patient and independently examined them on 12/8/2020 as outlined below:          Constitutional:  No acute distress, cooperative, looks anxious   ENT:  Oral mucosa moist, oropharynx benign. Resp:  CTA bilaterally. No wheezing/rhonchi/rales. No accessory muscle use   CV:  Regular rhythm, normal rate, no murmurs, gallops, rubs    GI:  Soft, non distended, non tender. normoactive bowel sounds, no hepatosplenomegaly     Musculoskeletal:  No edema     Neurologic:  Moves all extremities. AAOx3, CN II-XII reviewed     Skin:  Good turgor, no rashes or ulcers       Data Review:    Review and/or order of clinical lab test  Review and/or order of tests in the radiology section of CPT  Review and/or order of tests in the medicine section of CPT      Labs:   No results for input(s): WBC, HGB, HCT, PLT, HGBEXT, HCTEXT, PLTEXT, HGBEXT, HCTEXT, PLTEXT in the last 72 hours. No results for input(s): NA, K, CL, CO2, BUN, CREA, GLU, CA, MG, PHOS, URICA in the last 72 hours. No results for input(s): ALT, AP, TBIL, TBILI, TP, ALB, GLOB, GGT, AML, LPSE in the last 72 hours. No lab exists for component: SGOT, GPT, AMYP, HLPSE  No results for input(s): INR, PTP, APTT, INREXT, INREXT in the last 72 hours. No results for input(s): FE, TIBC, PSAT, FERR in the last 72 hours. No results found for: FOL, RBCF   No results for input(s): PH, PCO2, PO2 in the last 72 hours. No results for input(s): CPK, CKNDX, TROIQ in the last 72 hours.     No lab exists for component: CPKMB  No results found for: CHOL, CHOLX, CHLST, CHOLV, HDL, HDLP, LDL, LDLC, DLDLP, TGLX, TRIGL, TRIGP, CHHD, CHHDX  Lab Results   Component Value Date/Time    Glucose (POC) 203 (H) 10/23/2020 05:50 AM    Glucose POC 83 06/14/2011 10:33 AM     Lab Results   Component Value Date/Time Color YELLOW/STRAW 11/28/2020 11:38 AM    Appearance CLEAR 11/28/2020 11:38 AM    Specific gravity 1.028 11/28/2020 11:38 AM    Specific gravity 1.025 02/12/2018 01:41 PM    pH (UA) 5.5 11/28/2020 11:38 AM    Protein Negative 11/28/2020 11:38 AM    Glucose 250 (A) 11/28/2020 11:38 AM    Ketone Negative 11/28/2020 11:38 AM    Bilirubin Negative 11/28/2020 11:38 AM    Urobilinogen 0.2 11/28/2020 11:38 AM    Nitrites Negative 11/28/2020 11:38 AM    Leukocyte Esterase Negative 11/28/2020 11:38 AM    Epithelial cells MODERATE (A) 11/28/2020 11:38 AM    Bacteria 1+ (A) 11/28/2020 11:38 AM    WBC 20-50 11/28/2020 11:38 AM    RBC 0-5 11/28/2020 11:38 AM         Medications Reviewed:     Current Facility-Administered Medications   Medication Dose Route Frequency    DULoxetine (CYMBALTA) capsule 90 mg  90 mg Oral DAILY    HYDROcodone-acetaminophen (NORCO) 5-325 mg per tablet 1 Tab  1 Tab Oral Q6H PRN    losartan (COZAAR) tablet 100 mg  100 mg Oral DAILY    LORazepam (ATIVAN) tablet 0.5 mg  0.5 mg Oral DAILY PRN    clonazePAM (KlonoPIN) tablet 0.5 mg  0.5 mg Oral BID    ondansetron (ZOFRAN) injection 4 mg  4 mg IntraVENous Q6H PRN    docusate sodium (COLACE) capsule 100 mg  100 mg Oral BID    gabapentin (NEURONTIN) capsule 300 mg  300 mg Oral TID    hydrOXYzine HCL (ATARAX) tablet 50 mg  50 mg Oral Q6H PRN    LORazepam (ATIVAN) injection 2 mg  2 mg IntraVENous PRN    thiamine HCL (B-1) tablet 100 mg  100 mg Oral DAILY    sodium chloride (NS) flush 5-40 mL  5-40 mL IntraVENous Q8H    sodium chloride (NS) flush 5-40 mL  5-40 mL IntraVENous PRN    fenofibrate nanocrystallized (TRICOR) tablet 145 mg  145 mg Oral DAILY    folic acid (FOLVITE) tablet 1 mg  1 mg Oral DAILY    acetaminophen (TYLENOL) tablet 650 mg  650 mg Oral Q6H PRN    sodium chloride (NS) flush 5-40 mL  5-40 mL IntraVENous Q8H    sodium chloride (NS) flush 5-40 mL  5-40 mL IntraVENous PRN ______________________________________________________________________  EXPECTED LENGTH OF STAY: 3d 9h  ACTUAL LENGTH OF STAY:          8                 Efraín Maki MD

## 2020-12-08 NOTE — PROGRESS NOTES
Problem: Mobility Impaired (Adult and Pediatric)  Goal: *Acute Goals and Plan of Care (Insert Text)  Description: FUNCTIONAL STATUS PRIOR TO ADMISSION: Patient was independent without use of DME. Patient states she was mobilizing, driving, and performing ADLs/IADLs independently until about 2 weeks ago. Since, then she has spent most of her time in the bed with little mobility due to fear of falling. HOME SUPPORT PRIOR TO ADMISSION: The patient lived alone in a senior living facility and has no local support. Physical Therapy Goals  Initiated 12/2/2020  1. Patient will move from supine to sit and sit to supine , scoot up and down, and roll side to side in bed with modified independence within 7 day(s). 2.  Patient will transfer from bed to chair and chair to bed with supervision using the least restrictive device within 7 day(s). 3.  Patient will perform sit to stand with supervision within 7 day(s). 4.  Patient will ambulate with supervision for 50 feet with the least restrictive device within 7 day(s). Outcome: Progressing Towards Goal   PHYSICAL THERAPY TREATMENT  Patient: Yani Velez (60 y.o. female)  Date: 12/8/2020  Diagnosis: Overdose [T50.901A]  Benzodiazepine withdrawal ([facility] [address] [F13.239]   Benzodiazepine withdrawal ([facility] [address]       Precautions:  fall  Chart, physical therapy assessment, plan of care and goals were reviewed. ASSESSMENT  Patient continues with skilled PT services and is progressing towards goals. Patient demonstrated increased endurance as evident by increased ambulation total distance with rolling walker. Patient ran into tables, door, and bed during ambulation claiming she can't see very well without her glasses. Patient continues to present with decrease sensation in bilateral lower extremities (R>L) raising concerns for safe ambulation.  Patient also presents with dyskinesia during mobilization and tremors in UE at rest. PT continues to believe patient would benefit from additional rehab to address functional deficits and improved safety with mobilization before returning home alone. Current Level of Function Impacting Discharge (mobility/balance): Min assistance for bed mobility with use of bed rails, CGA for transfers and ambulation with RW, impaired standing balance    Other factors to consider for discharge: patient lives alone in an independent living, fall risk         PLAN :  Patient continues to benefit from skilled intervention to address the above impairments. Continue treatment per established plan of care. to address goals. Recommendation for discharge: (in order for the patient to meet his/her long term goals)  Therapy 3 hours per day 5-7 days per week    This discharge recommendation:  Has been made in collaboration with the attending provider and/or case management    IF patient discharges home will need the following DME: patient owns DME required for discharge       SUBJECTIVE:   Patient stated I feel like my Right foot is more numb.     OBJECTIVE DATA SUMMARY:   Critical Behavior:  Neurologic State: Alert  Orientation Level: Oriented to person, Oriented to place  Cognition: Follows commands  Safety/Judgement: Fall prevention, Decreased insight into deficits  Functional Mobility Training:  Bed Mobility:  Supine to Sit: Minimum assistance;Assist x1;Additional time(Increased VC)  Sit to Supine: (left in chair)  Scooting: Stand-by assistance     Transfers:  Sit to Stand: Contact guard assistance;Assist x1;Adaptive equipment(RW)  Stand to Sit: Contact guard assistance;Assist x1;Adaptive equipment(RW)  Bed to Chair: Contact guard assistance;Assist x1;Adaptive equipment(RW)    Balance:  Sitting: Intact; Without support  Standing: Impaired; With support  Standing - Static: Fair;Constant support  Standing - Dynamic : Fair;Constant support    Ambulation/Gait Training:  Distance (ft): 50 Feet (ft)(x 3, with seated rest between bouts)  Assistive Device: Gait belt;Walker, rolling  Ambulation - Level of Assistance: Contact guard assistance;Assist x1;Adaptive equipment(RW)  Gait Abnormalities: Decreased step clearance(stomping, over supination on R foot)  Base of Support: Widened  Speed/Antonietta: Fluctuations  Step Length: Right shortened;Left shortened       Therapeutic Exercises:   Sit to stand from bed: 1 x 5  Standing balance: narrow stance for 10sec, mod tandem for 15 sec  Seated knee extension with ankle pumps: 1 x 8 bilateral    Pain Rating:  Patient does not report any pain at this time    Activity Tolerance:   Fair    After treatment patient left in no apparent distress:   Sitting in chair and Call bell within reach    COMMUNICATION/COLLABORATION:   The patients plan of care was discussed with: Registered nurse. Regarding student involvement in patient care:  A student participated in this treatment session. Per CMS Medicare statements and APTA guidelines I certify that the following was true:  1. I was present and directly observed the entire session. 2. I made all skilled judgments and clinical decisions regarding care. 3. I am the practitioner responsible for assessment, treatment, and documentation.     Raghu Sanchez, SPT

## 2020-12-08 NOTE — PROGRESS NOTES
CM offered screening for Medicaid Long-Term Services & Supports. Patient has Medicaid secondary and will need screening for NH payment. Medicaid LTSS Screening submitted for processing. Level 2 faxed to [facility]- [phone] fax and phone- [phone].      Chika Hoyt, [address] [place]

## 2020-12-08 NOTE — ROUTINE PROCESS
Bedside shift change report given to Amie (oncoming nurse) by Clarita Ramos (offgoing nurse). Report included the following information SBAR.

## 2020-12-08 NOTE — PROGRESS NOTES
Palliative Medicine Social Work    This SW met with patient in room. She was lying in bed in the dark with covers pulled up to her eyes. She smiled as a greeting, and did not appear distressed. She reported her anxiety was about the same and she feels it most with racing thoughts. Provided CBT education/support for anxiety management. Patient reports worry about what will happen when she gets home from rehab and having support with ADLs/iADLs. Discussed plan to possibly get support in home. Will also provide OP palliative referral. Patient is scheduled to follow up with [facility] Oncologist in January. [facility] re: cancer are still unclear. Will follow up Wed for ongoing psychosocial support. Thank you for the opportunity to be involved in the care of Ms. Yared Beckwith and her family.     Magdi Bowles LMSW, Supervisee in Social Work  Palliative Medicine   [phone]    Start time: 10:15  End time: 10:45

## 2020-12-08 NOTE — PROGRESS NOTES
Bedside and Verbal shift change report given to Debbie Beach (oncoming nurse) by Nita Bender (offgoing nurse). Report included the following information SBAR, Kardex, Intake/Output and MAR.

## 2020-12-08 NOTE — PROGRESS NOTES
FRIDA: SNF referrals are pending with [facility]  and [address]. [facility] will need to be obtained prior to discharge. A Level II screening will need to be completed prior to discharge to SNF (The state will need to approve the level of care). CM to complete a UAI and submit to [facility]. Noted negative COVID test 12/4. Chart reviewed. Note patient had recent inpatient psych admission, therefore level II will be needed for SNF placement. Patient has medicaid secondary. Medicaid #[id number].    10:39 AM: [facility] and [facility] have denied referral. [facility] and [facility] have also denied so far. Referral sent to [facility] (patient's first choice) as this facility may be taking new admissions later in the week.       3:00 PM: [facility] has denied referral.    GLORIA Coello/CRM

## 2020-12-09 ENCOUNTER — APPOINTMENT (OUTPATIENT)
Dept: CT IMAGING | Age: 65
DRG: 897 | End: 2020-12-09
Attending: HOSPITALIST
Payer: MEDICARE

## 2020-12-09 PROCEDURE — 74011250637 HC RX REV CODE- 250/637: Performed by: NURSE PRACTITIONER

## 2020-12-09 PROCEDURE — 97110 THERAPEUTIC EXERCISES: CPT

## 2020-12-09 PROCEDURE — 97535 SELF CARE MNGMENT TRAINING: CPT

## 2020-12-09 PROCEDURE — 97116 GAIT TRAINING THERAPY: CPT

## 2020-12-09 PROCEDURE — 94760 N-INVAS EAR/PLS OXIMETRY 1: CPT

## 2020-12-09 PROCEDURE — 74011250637 HC RX REV CODE- 250/637: Performed by: HOSPITALIST

## 2020-12-09 PROCEDURE — 65270000029 HC RM PRIVATE

## 2020-12-09 PROCEDURE — 74011250637 HC RX REV CODE- 250/637: Performed by: INTERNAL MEDICINE

## 2020-12-09 RX ADMIN — LORAZEPAM 0.5 MG: 0.5 TABLET ORAL at 13:52

## 2020-12-09 RX ADMIN — HYDROCODONE BITARTRATE AND ACETAMINOPHEN 1 TABLET: 5; 325 TABLET ORAL at 18:45

## 2020-12-09 RX ADMIN — CLONAZEPAM 0.5 MG: 1 TABLET ORAL at 09:19

## 2020-12-09 RX ADMIN — LOSARTAN POTASSIUM 50 MG: 50 TABLET, FILM COATED ORAL at 09:19

## 2020-12-09 RX ADMIN — Medication 10 ML: at 17:43

## 2020-12-09 RX ADMIN — FOLIC ACID 1 MG: 1 TABLET ORAL at 09:19

## 2020-12-09 RX ADMIN — HYDROXYZINE HYDROCHLORIDE 50 MG: 25 TABLET, FILM COATED ORAL at 06:35

## 2020-12-09 RX ADMIN — CLONAZEPAM 0.5 MG: 1 TABLET ORAL at 20:51

## 2020-12-09 RX ADMIN — GABAPENTIN 300 MG: 300 CAPSULE ORAL at 09:18

## 2020-12-09 RX ADMIN — Medication 100 MG: at 09:18

## 2020-12-09 RX ADMIN — HYDROCODONE BITARTRATE AND ACETAMINOPHEN 1 TABLET: 5; 325 TABLET ORAL at 01:21

## 2020-12-09 RX ADMIN — DULOXETINE HYDROCHLORIDE 90 MG: 60 CAPSULE, DELAYED RELEASE ORAL at 09:18

## 2020-12-09 RX ADMIN — LORAZEPAM 0.5 MG: 0.5 TABLET ORAL at 21:30

## 2020-12-09 RX ADMIN — DOCUSATE SODIUM 100 MG: 100 CAPSULE, LIQUID FILLED ORAL at 17:41

## 2020-12-09 RX ADMIN — GABAPENTIN 300 MG: 300 CAPSULE ORAL at 20:51

## 2020-12-09 RX ADMIN — DOCUSATE SODIUM 100 MG: 100 CAPSULE, LIQUID FILLED ORAL at 09:18

## 2020-12-09 RX ADMIN — GABAPENTIN 300 MG: 300 CAPSULE ORAL at 17:40

## 2020-12-09 RX ADMIN — FENOFIBRATE 145 MG: 145 TABLET ORAL at 09:18

## 2020-12-09 RX ADMIN — HYDROCODONE BITARTRATE AND ACETAMINOPHEN 1 TABLET: 5; 325 TABLET ORAL at 07:16

## 2020-12-09 RX ADMIN — HYDROCODONE BITARTRATE AND ACETAMINOPHEN 1 TABLET: 5; 325 TABLET ORAL at 12:33

## 2020-12-09 NOTE — PROGRESS NOTES
Bedside and Verbal shift change report given to Davis Holland (oncoming nurse) by Cornel Milligan RN (offgoing nurse). Report included the following information SBAR, Kardex, ED Summary, Procedure Summary, Intake/Output, MAR and Recent Results.

## 2020-12-09 NOTE — PROGRESS NOTES
Problem: Self Care Deficits Care Plan (Adult)  Goal: *Acute Goals and Plan of Care (Insert Text)  Description:   FUNCTIONAL STATUS PRIOR TO ADMISSION: Pt lives alone in single story apartment in [address] [place] [address] and states she was Independent with ADLs/IADLs, without use of DME, showering in walk-in with seated surface and grab bars. HOME SUPPORT: The patient lived alone with no local support. Occupational Therapy Goals  Initiated 12/2/2020, Re-evaluated and continued 12/9/2020  1. Patient will perform RW grooming with supervision within 7 day(s). 2.  Patient will perform EOB/RW bathing with supervision within 7 day(s). 3.  Patient will perform EOB/RW lower body dressing with supervision/set-up within 7 day(s). 4.  Patient will perform RW toilet transfers with supervision within 7 day(s). 5.  Patient will perform all aspects of RW toileting with supervision within 7 day(s). Outcome: Progressing Towards Goal     OCCUPATIONAL THERAPY TREATMENT/WEEKLY RE-ASSESSMENT  Patient: Josefina Rockwell (60 y.o. female)  Date: 12/9/2020  Diagnosis: Overdose [T50.901A]  Benzodiazepine withdrawal ([facility] [F13.239]   Benzodiazepine withdrawal ([facility]       Precautions:    Chart, occupational therapy assessment, plan of care, and goals were reviewed. ASSESSMENT  Patient continues with skilled OT services and is progressing towards goals. Pt received supine in bed agreeable to therapy session. Pt performing bed mobility with overall min A. Pt requires min-mod vc's for safety during transfers and cueing for safe hand placement during sit>stand. Pt limited by initiation, memory, sequencing steps, and poor insight into deficits. Pt performed sit>stand with RW and min A and ambulated with min A to bathroom. Pt reporting she knows when she has to use bathroom but pt unaware she had been incontinent of bladder and required min A to change brief.  Pt transferred on/off toilet with min A and pt unable to void on toilet despite stating she needed to go. Pt ambulated to chair and performed bathing routine seated in chair with min A for standing balance only to bathe pedro area. Pt donned new gown with setup and new socks with setup utilizing cross leg technique. Pt is functioning below her baseline and lives by herself. Recommending SNF rehab at discharge as pt does not have family to assist and requires supervision for safety and min A for transfers and ADLs. Current Level of Function Impacting Discharge (ADLs): min A transfers, min A-setup dressing, min-mod vc's for cognition    Other factors to consider for discharge: Pt lives alone; no local support         PLAN :  Goals have been updated based on progression since last assessment. Patient continues to benefit from skilled intervention to address the above impairments. Continue to follow patient 5 times a week to address goals. Recommend with staff: OOB 3x/day; ambulate to bathroom with min A and RW    Recommend next OT session: [address] [place] [address], [place]    Recommendation for discharge: (in order for the patient to meet his/her long term goals)  Therapy up to 5 days/week in SNF setting    This discharge recommendation:  Has been made in collaboration with the attending provider and/or case management    IF patient discharges home will need the following DME: patient owns DME required for discharge       SUBJECTIVE:   Patient stated Steven Wayne went to the bathroom? I didn't know that.     OBJECTIVE DATA SUMMARY:   Cognitive/Behavioral Status:  Neurologic State: Alert  Orientation Level: Oriented X4  Cognition: Appropriate for age attention/concentration             Functional Mobility and Transfers for ADLs:  Bed Mobility:       Transfers:  Sit to Stand: Minimum assistance  Functional Transfers  Bathroom Mobility: Minimum assistance  Toilet Transfer : Minimum assistance  Bed to Chair: Minimum assistance    Balance:  Sitting: Intact; Without support  Sitting - Static: Fair (occasional)  Sitting - Dynamic: Fair (occasional)  Standing: Impaired; With support  Standing - Static: Fair;Constant support  Standing - Dynamic : Fair;Constant support    ADL Intervention:  Feeding  Feeding Assistance: Set-up  Container Management: Set-up  Cutting Food: Set-up  Utensil Management: Set-up  Food to Mouth: Set-up  Drink to Mouth: Set-up  Cues: Verbal cues provided    Grooming  Grooming Assistance: Minimum assistance  Position Performed: Seated in chair  Washing Face: Set-up  Washing Hands: Set-up    Upper Body Bathing  Bathing Assistance: Set-up  Position Performed: Seated in chair    Lower Body Bathing  Bathing Assistance: Minimum assistance  Perineal  : Contact guard assistance  Position Performed: Standing    Upper Body Dressing Assistance  Dressing Assistance: [place]: Set-up    Lower Body Dressing Assistance  Dressing Assistance: Set-up  Socks: Set-up  Leg Crossed Method Used: Yes  Position Performed: Seated in chair    Toileting  Toileting Assistance: Minimum assistance  Clothing Management: Minimum assistance         Therapeutic Exercises:       Pain:      Activity Tolerance:   Good and requires rest breaks    After treatment patient left in no apparent distress:   Sitting in chair, Heels elevated for pressure relief, Call bell within reach and Bed / chair alarm activated    COMMUNICATION/COLLABORATION:   The patients plan of care was discussed with: Physical therapist, Registered nurse and Case management.      Elias Riggins  Time Calculation: 37 mins

## 2020-12-09 NOTE — PROGRESS NOTES
Music Therapy Assessment  [address] [id number]     1955  60 y.o.  female    Patient Telephone Number: [phone] (home)   Anglican Affiliation: Joselito Gross   Language: English   Patient Active Problem List    Diagnosis Date Noted    Bacteremia 10/25/2020    [facility] 10/23/2020    Major depression 02/01/2020    Hypokalemia 01/28/2020    Benz[facility]ine withdrawal ([facility] 01/28/2020    Chronic prescription benzodiazepine use 01/28/2020    Altered mental status 01/27/2020    Drug-induced constipation 04/05/2018    Advance care planning 04/05/2018    CHF [facility]congestive heart failure) [facility] 04/02/2018    Depression 04/02/2018    Dementia ([facility] 04/02/2018    Metastatic breast cancer ([facility] 05/03/2017    Secondary cancer of bone ([facility] 05/03/2017    Anxiety 02/03/2016    HTN (hypertension)     GERD (gastroesophageal reflux disease)     Hypercholesterolemia         Date: 12/9/2020            Total Time (in minutes): 5          [facility] 5S1 ORTHO JOINT    Mental Status:   [x] Alert [  ] Wilbern Jenna [  ]  Confused  [  ] Minimally responsive  [  ] Sleeping    Communication Status: [  ] Impaired Speech [  ] Nonverbal -N/A    Physical Status:   [  ] Oxygen in use  [  ] Hard of Hearing [  ] Vision Impaired  [  ] Ambulatory  [  ] Ambulatory with assistance [  ] Non-ambulatory -N/A    Music Preferences, Background: Blues, pt said she likes artists like Hermes Juan, Andrew HEIMCATHYL, [place], Alysha, and [place]     Clinical Problem addressed: N/A: Please see Session Observations below.      Goal(s) met in session: N/A: Please see Session Observations below.   Physical/Pain management (Scale of 1-10):    Pre-session rating: Pt reported a headache  Post-session rating: Pt didn't report on pain    [  ] Increased relaxation   [  ] Affected breathing patterns  [  ] Decreased muscle tension   [  ] Decreased agitation  [  ] Affected heart rate    [  ] Increased alertness     Emotional/Psychological:  [  ] Increased self-expression   [  ] Decreased aggressive behavior   [  ] Decreased feelings of stress  [  ] Discussed healthy coping skills     [  ] Improved mood    [  ] Decreased withdrawn behavior     Social:  [  ] Decreased feelings of isolation/loneliness [x] Positive social interaction   [  ] Provided support and/or comfort for family/friends    Spiritual:  [  ] Spiritual support    [  ] Expressed peace  [  ] Expressed kimberley    [  ] Discussed beliefs    Techniques Utilized (Check all that apply): N/A: Please see Session Observations below. [  ] Procedural support MT [  ] Music for relaxation [  ] Patient preferred music  [  ] Delmis analysis  [  ] Song choice  [  ] Music for validation  [  ] Entrainment  [  ] Movement to music [  ] Guided visualization  [  ] Julius Estrella  [  ] Patient instrument playing [  ] [employer] writing  [  ] Rolando Meadows along   [  ] Michelle Prakash  [  ] Sensory stimulation  [  ] Active Listening  [  ] Music for spiritual support [  ] Making of CDs as gifts    Session Observations:  F/up visit; Patient (pt) was alert, lying in bed when this music therapy intern (MTI) entered the room. MTI asked if now was a better time for a visit and pt shared she had a headache. Pt asked if the MTI could follow up. MTI reported being able to follow up tomorrow. Pt agreed to this. Will follow up 12/10. Giovanni Gilbert, Music Therapy Intern  Spiritual Care Services Dept.    Referral-based service

## 2020-12-09 NOTE — PROGRESS NOTES
Problem: Falls - Risk of  Goal: *Absence of Falls  Description: Document Zneaida Perez Fall Risk and appropriate interventions in the flowsheet.   Outcome: Progressing Towards Goal  Note: Fall Risk Interventions:  Mobility Interventions: Bed/chair exit alarm, OT consult for ADLs, Patient to call before getting OOB, PT Consult for mobility concerns, PT Consult for assist device competence, Utilize walker, cane, or other assistive device    Mentation Interventions: Room close to nurse's station, More frequent rounding, Evaluate medications/consider consulting pharmacy    Medication Interventions: Bed/chair exit alarm, Evaluate medications/consider consulting pharmacy, Patient to call before getting OOB, Teach patient to arise slowly    Elimination Interventions: Bed/chair exit alarm, Call light in reach, Patient to call for help with toileting needs    History of Falls Interventions: Bed/chair exit alarm, Investigate reason for fall, Room close to nurse's station, Evaluate medications/consider consulting pharmacy

## 2020-12-09 NOTE — PROGRESS NOTES
Comprehensive Nutrition Assessment    Type and Reason for Visit: RD nutrition re-screen/LOS    Nutrition Recommendations/Plan:   Pt requests snacks added   Wt Management & Low Added Sugar counseling after discharge     Nutrition Assessment:     59 yo female admitted for Overdose and Benzodiazepine withdrawal who  has a past medical history of Acute hyponatremia (2019), Anxiety, Bimalleolar fracture of left ankle (2016), Cancer, Closed left ankle fracture (2016), Depression, GERD, HTN, Hypercholesterolemia, Metastatic breast cancer (2017), Neoplastic malignant related fatigue (2018), Pain due to neoplasm (2018), Secondary cancer of bone (2017), Sepsis (2/12/2018), and Urinary tract infection without hematuria (2018). Pt was brought to ED after suspected self-harm event. Pt is no longer 1:1. Pt is medically ready for discharge to SNF awaiting auth and placement. May consider evaluation for deficiencies such as Vit D. Noted  B12 checked and WDL. Some elevated BG results. Pt may benefit from pre-diabetes & wt management education after discharge. Pt is visited by RD for interview. Pt reports to to be eating well but is hungry. She would like a couple snacks during the day. Nutrition focused physical exam is completed to find no signs of malnutrition at this time. Pt is well built, nourished, obese, no GI concerns. Malnutrition Assessment:  Malnutrition Status:  No malnutrition      Estimated Daily Nutrient Needs:  Energy (kcal): 2050 kcal/d (MSJ xAF 1.3); Weight Used for Energy Requirements: Current 101.6 kg  Protein (g): 81-100g (0.8-1.0g/kg);  Weight Used for Protein Requirements: Current 101.6 kg  Fluid (ml/day): 2L/d; Method Used for Fluid Requirements: 1 ml/kcal    Nutrition Related Findings:       Last BM: 12/08/20  Abd: soft, intact, active  Edema: none noted    Wounds:    None       Current Nutrition Therapies:  DIET REGULAR  DIET SNACKS PM Snack, HS Snack; PM: pepsi & baked chips HS: 6packs saltines, cheese cubes, ginger ale     Meal Intake Documented:   Patient Vitals for the past 168 hrs:   % Diet Eaten   12/09/20 1743 80 %   12/09/20 1233 80 %   12/09/20 0806 100 %       Anthropometric Measures:  · Height:  5' 5\" (165.1 cm)  · Current Body Wt:  101.6 kg (223 lb 15.8 oz)   · Admission Body Wt:  217 lb 13 oz    · Usual Body Wt:        · Ideal Body Wt:  125 lbs:  179.2 %   · Adjusted Body Weight:   ; Weight Adjustment for: No adjustment   · Adjusted BMI:       · BMI Category:  Obese class 2 (BMI 35.0-39. 9)     Body mass index is 37.26 kg/m².     Wt Readings from Last 10 Encounters:   12/09/20 101.6 kg (223 lb 14.4 oz)   10/27/20 98.4 kg (217 lb)   10/24/20 101.4 kg (223 lb 8.7 oz)   08/02/20 96.2 kg (212 lb)   02/01/20 94.9 kg (209 lb 3.5 oz)   12/28/19 81.6 kg (180 lb)   12/25/19 96.9 kg (213 lb 10 oz)   06/20/19 94.3 kg (207 lb 14.3 oz)   04/04/18 88.2 kg (194 lb 6.4 oz)   03/09/18 88.5 kg (195 lb)   ]  Nutrition Diagnosis:   · No nutrition diagnosis at this time         Nutrition Interventions:   Food and/or Nutrient Delivery: Continue current diet, Snacks (specify)  Nutrition Education and Counseling: No recommendations at this time  Coordination of Nutrition Care: No recommendation at this time    Goals:  n/a       Nutrition Monitoring and Evaluation:   Behavioral-Environmental Outcomes:    Food/Nutrient Intake Outcomes: Food and nutrient intake  Physical Signs/Symptoms Outcomes: Weight, Biochemical data    Discharge Planning:    Recommend pursue outpatient nutrition counseling       No results found for: [place], [id number]    Lab Results   Component Value Date/Time    Vitamin B12 604 11/28/2020 11:38 AM     Lab Results   Component Value Date/Time    Hemoglobin A1c 6.2 (H) 11/29/2020 05:17 AM       Electronically signed by Yassine Rodriguez RD, MS on 12/9/2020 at 5:46 PM  Contact: [employer] Serve

## 2020-12-09 NOTE — PROGRESS NOTES
Palliative Medicine Social Work    This  met with patient in room. Patient sitting up in bed side chair, alert and oriented x3, anxiety is unchanged from yesterday. Patient stated that she is not sure she wants to go to rehab. Discussed yesterday's visit when patient was selecting facilities for rehab and at that time was prepared to go to build up strength. She does not remember this at first and then tells me she just wants to get home/is used to being independent. She is anxious about paying outsanding bills and is unclear how to follow up with OP psyche and oncology. She is also unclear about her change in meds and how her anxiety will be managed going forward. I reiterated her medication change and process for weaning off benzodiazapine. She has poor insight about this. Patient reports she has been talking to her uncle bill daily. He is a SW and  in [place]. She tells me his is supportive. CM updated. Will continue to follow for support                  Thank you for the opportunity to be involved in the care of Ms. Eden Rush and her family.     Heavenly Brown LMSW, Supervisee in Social Work  Palliative Medicine   [phone]    Start time: 11:30  End time: 12:15

## 2020-12-09 NOTE — PROGRESS NOTES
Bedside and Verbal shift change report given to Yina Duke (oncoming nurse) by Gabriela Elias (offgoing nurse). Report included the following information SBAR, Kardex and MAR.

## 2020-12-09 NOTE — PROGRESS NOTES
Music Therapy Assessment  [facility] [address] [place] [id number]     1955  60 y.o.  female    Patient Telephone Number: [phone] (home)   Yarsani Affiliation: [place]   Language: English   Patient Active Problem List    Diagnosis Date Noted    Bacteremia 10/25/2020    [facility]    Major depression 02/01/2020    Hypokalemia 01/28/2020    [facility] 01/28/2020    Chronic prescription benzodiazepine use 01/28/2020    Altered mental status 01/27/2020    Drug-induced constipation 04/05/2018    Advance care planning 04/05/2018    CH[facility]congestive heart failure) ([facility] 04/02/2018    Depression 04/02/2018    Dementia ([facility] 04/02/2018    Metastatic breast cancer ([facility] 05/03/2017    Secondary cancer of bone ([facility] 05/03/2017    Anxiety 02/03/2016    HTN (hypertension)     GERD (gastroesophageal reflux disease)     Hypercholesterolemia         Date: 12/9/2020            Total Time (in minutes): 5          [facility] 5S1 ORTHO JOINT    Mental Status:   [x] Alert [  ] Giron Knows [  ]  Confused  [  ] Minimally responsive  [  ] Sleeping    Communication Status: [  ] Impaired Speech [  ] Nonverbal -N/A    Physical Status:   [  ] Oxygen in use  [  ] Hard of Hearing [  ] Vision Impaired  [  ] Ambulatory  [  ] Ambulatory with assistance [  ] Non-ambulatory -N/A    Music Preferences, Background: Blues, pt said she likes artists like Daniel Radar, Andrew HEIMDAL, [place], Alysha, and [place]    Clinical Problem addressed: N/A: Please see Session Observations below. Goal(s) met in session: N/A: Please see Session Observations below.    Physical/Pain management (Scale of 1-10):    Pre-session rating ___________    Post-session rating __________   [  ] Increased relaxation   [  ] Affected breathing patterns  [  ] Decreased muscle tension   [  ] Decreased agitation  [  ] Affected heart rate    [  ] Increased alertness     Emotional/Psychological:  [  ] Increased self-expression   [  ] Decreased aggressive behavior   [  ] Decreased feelings of stress  [  ] Discussed healthy coping skills     [  ] Improved mood    [  ] Decreased withdrawn behavior     Social:  [  ] Decreased feelings of isolation/loneliness [  ] Positive social interaction   [  ] Provided support and/or comfort for family/friends    Spiritual:  [  ] Spiritual support    [  ] Expressed peace  [  ] Expressed kimberley    [  ] Discussed beliefs    Techniques Utilized (Check all that apply): N/A: Please see Session Observations below. [  ] Procedural support MT [  ] Music for relaxation [  ] Patient preferred music  [  ] Delmis analysis  [  ] Song choice  [  ] Music for validation  [  ] Entrainment  [  ] Movement to music [  ] Guided visualization  [  ] Noah James  [  ] Patient instrument playing [  ] Leticia Bess writing  [  ] Lesvia thurman   [  ] Kole Herrera  [  ] Sensory stimulation  [  ] Active Listening  [  ] Music for spiritual support [  ] Making of CDs as gifts    Session Observations:  F/uup visit; Patient (pt) was alert lying in bed. This music therapy intern (MTI) entered the room and saw the pt was on the phone. MTI offered a follow up visit later today and pt agreed to this. Irais Mercedes, Music Therapy Intern  Spiritual Care Services Dept.    Referral-based service

## 2020-12-09 NOTE — PROGRESS NOTES
FRIDA: SNF referrals are pending. [facility]onatan Dukes will need to be obtained prior to discharge. A Level II screening will need to be completed prior to discharge to SNF (The state will need to approve the level of care). CM has completed a [facility] and submitted to [facility]. Noted negative COVID test 12/4. Chart reviewed. CM met with patient at bedside. CM explained that additional SNF referrals will need to be sent as CM has not been able to secure any of the facilities that patient has requested so far. She was agreeable to CM looking into local area SNFs, CM sent referrals via [facility] and [facility]. [facility] -no    [place]-No    [facility]-no    [place]-no    [facility]-no    [facility]-no    [facility]-No    [facility]-No    [facility]-not accepting patients    [facility]-No    [facility]-No    [facility]-No    [facility]-No    [facility] [place]-referral pending    [facility]-referral pending    [facility]- No    [facility]- No    [facility]-referral pending    [facility]No    [facility] [place]-referral pending    [facility]-referral pending    [facility]-referral pending    [facility]-referral pending. CM will follow.     Carrillo Miranda, BSW/CRM

## 2020-12-09 NOTE — PROGRESS NOTES
Problem: Mobility Impaired (Adult and Pediatric)  Goal: *Acute Goals and Plan of Care (Insert Text)  Description: FUNCTIONAL STATUS PRIOR TO ADMISSION: Patient was independent without use of DME. Patient states she was mobilizing, driving, and performing ADLs/IADLs independently until about 2 weeks ago. Since, then she has spent most of her time in the bed with little mobility due to fear of falling. HOME SUPPORT PRIOR TO ADMISSION: The patient lived alone in a senior living facility and has no local support. Physical Therapy Goals  Initiated 12/9/2020  1. Patient will move from supine to sit and sit to supine , scoot up and down, and roll side to side in bed with modified independence within 7 day(s). 2.  Patient will transfer from bed to chair and chair to bed with supervision using the least restrictive device within 7 day(s). 3.  Patient will perform sit to stand with supervision within 7 day(s). 4.  Patient will ambulate with supervision for 50 feet with the least restrictive device within 7 day(s). Still appropriate. Initiated 12/2/2020  1. Patient will move from supine to sit and sit to supine , scoot up and down, and roll side to side in bed with modified independence within 7 day(s). 2.  Patient will transfer from bed to chair and chair to bed with supervision using the least restrictive device within 7 day(s). 3.  Patient will perform sit to stand with supervision within 7 day(s). 4.  Patient will ambulate with supervision for 50 feet with the least restrictive device within 7 day(s). 12/9/2020 1422 by Suma Bhardwaj  Outcome: Progressing Towards Goal   PHYSICAL THERAPY TREATMENT: WEEKLY REASSESSMENT  Patient: Lucrecia Taylor (60 y.o. female)  Date: 12/9/2020  Primary Diagnosis: Overdose [T50.901A]  B[facility] withdrawal ([facility] [F13.239]        Precautions: fall         ASSESSMENT  Patient continues with skilled PT services and is progressing towards goals.  Patient continues to make gains with functional mobility requiring less assistance with mobilization. PT attempted ambulation without RW and patient did well; with decreased stomping and more normal gait pattern. Patient was able to increase ambulation and safely navigate small spaces with CGA. As patient's mobility improves, PT main concern is patient's cognitive ability to live alone safely; these concerns were discussed with OT as well. PT continues to believe patient would benefit for additional rehab to improve functional mobility, endurance, safety, and independence. Patient's progression toward goals since last assessment: patient has not met any goals yet but is progressing towards mod I/supervision goals    Current Level of Function Impacting Discharge (mobility/balance): CGA for bed mobility, transfers, and ambulation; impaired standing balance     Functional Outcome Measure: The patient scored 16/28 on the Tinetti outcome measure which is indicative of High fall risk. Other factors to consider for discharge: patient lives alone at independent living facility, no support, below baseline function         PLAN :  Goals have been updated based on progression since last assessment. Patient continues to benefit from skilled intervention to address the above impairments. Recommendations and Planned Interventions: bed mobility training, transfer training, gait training, therapeutic exercises, edema management/control, patient and family training/education and therapeutic activities      Frequency/Duration: Patient will be followed by physical therapy:  5 times a week to address goals.     Recommendation for discharge: (in order for the patient to meet his/her long term goals)  Therapy up to 5 days/week in SNF setting    This discharge recommendation:  Has been made in collaboration with the attending provider and/or case management    IF patient discharges home will need the following DME: patient owns DME required for discharge         SUBJECTIVE:   Patient stated I am getting kind of hot.     OBJECTIVE DATA SUMMARY:   HISTORY:    Past Medical History:   Diagnosis Date    Acute hyponatremia 6/18/2019    Anxiety     Bimalleolar fracture of left ankle 2/3/2016    Comminuted and displaced     Cancer [facility])     breast    Closed left ankle fracture 2/4/2016    Depression     Gastroenteritis due to norovirus 2/21/2018    GERD (gastroesophageal reflux disease)     HTN (hypertension)     Hypercholesterolemia     Hypotension 9/12/2012    Metastatic breast cancer ([address] 5/3/2017    Neoplastic malignant related fatigue 4/4/2018    Pain due to neoplasm 4/4/2018    Secondary cancer of bone ([address] 5/3/2017    Sepsis ([facility] 2/12/2018    Urinary tract infection without hematuria 2/13/2018     Past Surgical History:   Procedure Laterality Date    BREAST SURGERY PROCEDURE UNLISTED  march 13    carina masectomy       Personal factors and/or comorbidities impacting plan of care: hx of breast cancer (chemo neuropathy), psych    Home Situation  Home Environment: Independent living  # Steps to Enter: 0  One/Two Story Residence: One story  Living Alone: Yes  Current DME Used/Available at Home: Grab bars, Ancelmo William, Transfer bench  Tub or Shower Type: Shower    EXAMINATION/PRESENTATION/DECISION MAKING:   Critical Behavior:  Neurologic State: Alert  Orientation Level: Oriented X4  Cognition: Appropriate for age attention/concentration  Safety/Judgement: Fall prevention, Decreased insight into deficits    Range Of Motion:  AROM: Generally decreased, functional     Strength:    Strength: Generally decreased, functional    Tone & Sensation:   Tone: Abnormal  Sensation: Impaired(decreased sensation in hands and feet)     Coordination:  Coordination: Generally decreased, functional    Functional Mobility:  Bed Mobility:  Supine to Sit: (received in chair )  Sit to Supine: Contact guard assistance;Assist x1  Scooting: Stand-by assistance    Transfers:  Sit to Stand: Contact guard assistance;Assist x1;Adaptive equipment(RW)  Stand to Sit: Contact guard assistance;Assist x1;Adaptive equipment(RW)  Bed to Chair: Stand-by assistance;Assist x1    Balance:   Sitting: Intact; Without support  Sitting - Static: Fair (occasional)  Sitting - Dynamic: Fair (occasional)  Standing: Impaired; With support  Standing - Static: Fair;Constant support  Standing - Dynamic : Fair;Constant support    Ambulation/Gait Training:  Distance (ft): 60 Feet (ft)(sitting rest, + 40 ft, sitting rest, +20 ft)  Assistive Device: Gait belt;Walker, rolling  Ambulation - Level of Assistance: Contact guard assistance;Assist x1;Adaptive equipment  Gait Abnormalities: Decreased step clearance  Base of Support: Widened  Speed/Antonietta: Fluctuations  Step Length: Right shortened;Left shortened     Therapeutic Exercises:   Seated exercises:  -knee extension 1 x 10 bilateral  - marches: 2 x 10 bilateral  - modified rows: 1 x 10  - over head UE stretch: 2 x 30 seconds  Ambulation:  - increased tolerance: with and without RW  -navigating small spaces without RW    Functional Measure:  Tinetti test:    Sitting Balance: 1  Arises: 1  Attempts to Rise: 2  Immediate Standing Balance: 1  Standing Balance: 1  Nudged: 0  Eyes Closed: 0  Turn 360 Degrees - Continuous/Discontinuous: 1  Turn 360 Degrees - Steady/Unsteady: 1  Sitting Down: 1  Balance Score: 9 Balance total score  Indication of Gait: 1  R Step Length/Height: 1  L Step Length/Height: 1  R Foot Clearance: 1  L Foot Clearance: 1  Step Symmetry: 0  Step Continuity: 1  Path: 1  Trunk: 0  Walking Time: 0  Gait Score: 7 Gait total score  Total Score: 16/28 Overall total score         Tinetti Tool Score Risk of Falls  <19 = High Fall Risk  19-24 = Moderate Fall Risk  25-28 = Low Fall Risk  Tinetti ME. Performance-Oriented Assessment of Mobility Problems in Elderly Patients. [address]; [id number].  (Scoring Description: PT Bulletin Feb. 10, 46)    Older adults: (Marcus et al, 2009; n = 1000 [facility] elderly evaluated with ABC, LINO, ADL, and IADL)  · Mean LINO score for males aged 69-68 years = 26.21(3.40)  · Mean LINO score for females age 69-68 years = 25.16(4.30)  · Mean LINO score for males over 80 years = 23.29(6.02)  · Mean ILNO score for females over 80 years = 17.20(8.32)       Pain Rating:  Patient reports pain in low back     Activity Tolerance:   Fair, cognitive impairments and decreased endurance limited activity    After treatment patient left in no apparent distress:   Supine in bed, Call bell within reach and Side rails x 3    COMMUNICATION/EDUCATION:   The patients plan of care was discussed with: Registered nurse. Fall prevention education was provided and the patient/caregiver indicated understanding., Patient/family have participated as able in goal setting and plan of care. and Patient/family agree to work toward stated goals and plan of care. Regarding student involvement in patient care:  A student participated in this treatment session. Per CMS Medicare statements and APTA guidelines I certify that the following was true:  1. I was present and directly observed the entire session. 2. I made all skilled judgments and clinical decisions regarding care. 3. I am the practitioner responsible for assessment, treatment, and documentation.     Thank you for this referral.  Liana Islas, SPT   Time Calculation: 28 mins

## 2020-12-09 NOTE — PROGRESS NOTES
Bedside and Verbal shift change report given to José Miguel Gallo (oncoming nurse) by Kimberlyn Del Castillo (offgoing nurse). Report included the following information SBAR, Kardex, Intake/Output and MAR.

## 2020-12-10 PROCEDURE — 97116 GAIT TRAINING THERAPY: CPT

## 2020-12-10 PROCEDURE — 74011250637 HC RX REV CODE- 250/637: Performed by: INTERNAL MEDICINE

## 2020-12-10 PROCEDURE — 74011250637 HC RX REV CODE- 250/637: Performed by: HOSPITALIST

## 2020-12-10 PROCEDURE — 65270000029 HC RM PRIVATE

## 2020-12-10 PROCEDURE — 97530 THERAPEUTIC ACTIVITIES: CPT

## 2020-12-10 PROCEDURE — 74011250637 HC RX REV CODE- 250/637: Performed by: NURSE PRACTITIONER

## 2020-12-10 PROCEDURE — 97110 THERAPEUTIC EXERCISES: CPT

## 2020-12-10 RX ORDER — POLYETHYLENE GLYCOL 3350 17 G/17G
17 POWDER, FOR SOLUTION ORAL 2 TIMES DAILY
Status: DISCONTINUED | OUTPATIENT
Start: 2020-12-10 | End: 2020-12-17 | Stop reason: HOSPADM

## 2020-12-10 RX ADMIN — LORAZEPAM 0.5 MG: 0.5 TABLET ORAL at 21:52

## 2020-12-10 RX ADMIN — GABAPENTIN 300 MG: 300 CAPSULE ORAL at 09:22

## 2020-12-10 RX ADMIN — DULOXETINE HYDROCHLORIDE 90 MG: 60 CAPSULE, DELAYED RELEASE ORAL at 09:22

## 2020-12-10 RX ADMIN — DOCUSATE SODIUM 100 MG: 100 CAPSULE, LIQUID FILLED ORAL at 17:22

## 2020-12-10 RX ADMIN — LOSARTAN POTASSIUM 50 MG: 50 TABLET, FILM COATED ORAL at 09:23

## 2020-12-10 RX ADMIN — Medication 100 MG: at 09:22

## 2020-12-10 RX ADMIN — FENOFIBRATE 145 MG: 145 TABLET ORAL at 09:22

## 2020-12-10 RX ADMIN — HYDROCODONE BITARTRATE AND ACETAMINOPHEN 1 TABLET: 5; 325 TABLET ORAL at 00:26

## 2020-12-10 RX ADMIN — POLYETHYLENE GLYCOL 3350 17 G: 17 POWDER, FOR SOLUTION ORAL at 21:52

## 2020-12-10 RX ADMIN — GABAPENTIN 300 MG: 300 CAPSULE ORAL at 15:08

## 2020-12-10 RX ADMIN — CLONAZEPAM 0.5 MG: 1 TABLET ORAL at 21:52

## 2020-12-10 RX ADMIN — DOCUSATE SODIUM 100 MG: 100 CAPSULE, LIQUID FILLED ORAL at 09:22

## 2020-12-10 RX ADMIN — HYDROCODONE BITARTRATE AND ACETAMINOPHEN 1 TABLET: 5; 325 TABLET ORAL at 20:06

## 2020-12-10 RX ADMIN — GABAPENTIN 300 MG: 300 CAPSULE ORAL at 21:52

## 2020-12-10 RX ADMIN — CLONAZEPAM 0.5 MG: 1 TABLET ORAL at 09:22

## 2020-12-10 RX ADMIN — HYDROCODONE BITARTRATE AND ACETAMINOPHEN 1 TABLET: 5; 325 TABLET ORAL at 07:52

## 2020-12-10 RX ADMIN — FOLIC ACID 1 MG: 1 TABLET ORAL at 09:22

## 2020-12-10 RX ADMIN — HYDROCODONE BITARTRATE AND ACETAMINOPHEN 1 TABLET: 5; 325 TABLET ORAL at 14:01

## 2020-12-10 NOTE — PROGRESS NOTES
Problem: Mobility Impaired (Adult and Pediatric)  Goal: *Acute Goals and Plan of Care (Insert Text)  Description: FUNCTIONAL STATUS PRIOR TO ADMISSION: Patient was independent without use of DME. Patient states she was mobilizing, driving, and performing ADLs/IADLs independently until about 2 weeks ago. Since, then she has spent most of her time in the bed with little mobility due to fear of falling. HOME SUPPORT PRIOR TO ADMISSION: The patient lived alone in a senior living facility and has no local support. Physical Therapy Goals  Initiated 12/9/2020  1. Patient will move from supine to sit and sit to supine , scoot up and down, and roll side to side in bed with modified independence within 7 day(s). 2.  Patient will transfer from bed to chair and chair to bed with supervision using the least restrictive device within 7 day(s). 3.  Patient will perform sit to stand with supervision within 7 day(s). 4.  Patient will ambulate with supervision for 50 feet with the least restrictive device within 7 day(s). Still appropriate. Initiated 12/2/2020  1. Patient will move from supine to sit and sit to supine , scoot up and down, and roll side to side in bed with modified independence within 7 day(s). 2.  Patient will transfer from bed to chair and chair to bed with supervision using the least restrictive device within 7 day(s). 3.  Patient will perform sit to stand with supervision within 7 day(s). 4.  Patient will ambulate with supervision for 50 feet with the least restrictive device within 7 day(s). Outcome: Progressing Towards Goal   PHYSICAL THERAPY TREATMENT  Patient: John Muñoz (60 y.o. female)  Date: 12/10/2020  Diagnosis: Overdose [T50.901A]  Benzodiazepine withdrawal ([facility] [F13.239]   Benzodiazepine withdrawal ([facility]       Precautions:  Fall  Chart, physical therapy assessment, plan of care and goals were reviewed.     ASSESSMENT  Patient continues with skilled PT services and is progressing towards goals. Patient demonstrated improved balance with dynamic balance exercises experiencing minor LOB x5 that she was able to self correct. Patient needed frequent reminders of what exercises she was doing, sometimes in the middle of a set, which further raise concerns for living alone from a cognitive stand point. Patient continues to present with dyskinesia and has poor initiation of tasks. PT believes patient will need additional rehab before being discharged home alone. Current Level of Function Impacting Discharge (mobility/balance): CGA for transfers and ambulation, impaired balance    Other factors to consider for discharge: patient lives alone in an independent living facility with no local support, patient is below baseline function at this time, fall risk          PLAN :  Patient continues to benefit from skilled intervention to address the above impairments. Continue treatment per established plan of care. to address goals. Recommendation for discharge: (in order for the patient to meet his/her long term goals)  Therapy 3 hours per day 5-7 days per week    This discharge recommendation:  Has been made in collaboration with the attending provider and/or case management    IF patient discharges home will need the following DME: patient owns DME required for discharge       SUBJECTIVE:   Patient stated I am worn out.     OBJECTIVE DATA SUMMARY:   Critical Behavior:  Neurologic State: Alert  Orientation Level: Oriented X4    Functional Mobility Training:  Bed Mobility:  Supine to Sit: (received in chair)  Scooting: Stand-by assistance     Transfers:  Sit to Stand: Contact guard assistance;Assist x1;Adaptive equipment(RW)  Stand to Sit: Contact guard assistance;Assist x1;Adaptive equipment(RW)        Balance:  Sitting: Intact; Without support  Sitting - Static: Fair (occasional)  Sitting - Dynamic: Fair (occasional)  Standing: Impaired; With support  Standing - Static: Fair;Constant support  Standing - Dynamic : Fair;Constant support  Ambulation/Gait Training:  Distance (ft): 40 Feet (ft)(x2 with a standing break)  Assistive Device: Gait belt;Walker, rolling  Ambulation - Level of Assistance: Contact guard assistance;Assist x1;Adaptive equipment  Gait Abnormalities: Decreased step clearance  Base of Support: Widened  Speed/Antonietta: Fluctuations    Therapeutic Exercises:   Seated exercises:  - floor bike: recreated pain in lower back  - UE stretches: over head   - modified rows: 1 x 8   Standing balance:  - Narrow stance: 30 sec  - EC: 30 sec  -toe taps on trash can: 3 x 10, x3 LOB   -SLS with hands on counter: 2 x 20 sec bilaterally, x1 LOB  -mini squats: 2 x 10, VC to sit back  -side stepping: 2 x 40 ft , x 1 LOB    Pain Rating:  Patient does not report any pain    Activity Tolerance:   Fair    After treatment patient left in no apparent distress:   Supine in bed, Call bell within reach, and Side rails x 3    COMMUNICATION/COLLABORATION:   The patients plan of care was discussed with: Registered nurse. Regarding student involvement in patient care:  A student participated in this treatment session. Per CMS Medicare statements and APTA guidelines I certify that the following was true:  1. I was present and directly observed the entire session. 2. I made all skilled judgments and clinical decisions regarding care. 3. I am the practitioner responsible for assessment, treatment, and documentation.     DEIRDRE Marin   Time Calculation: 27 mins

## 2020-12-10 NOTE — PROGRESS NOTES
Palliative Medicine Social Work    This SW met with patient in room. Patient lying in bed, covers pulled up over mouth, room dark. She was alert and oriented x3 and welcoming of a visit. She reviewed course of day, reported reduced anxiety and racing thoughts, was encouraged by working with PT/OT. \"I don't feel as sad as before. \" She reports plan is to d/c to rehab and her goal is to get home before her next rent payment is due. Will continue to follow along for psychosocial support. Thank you for the opportunity to be involved in the care of Ms. Robe Agudelo and her family.     Harpal Saldana LMSW, Supervisee in Social Work  Palliative Medicine   [phone]    Start time: 14:30  End time: 15:00

## 2020-12-10 NOTE — PROGRESS NOTES
Problem: Self Care Deficits Care Plan (Adult)  Goal: *Acute Goals and Plan of Care (Insert Text)  Description:   FUNCTIONAL STATUS PRIOR TO ADMISSION: Pt lives alone in single story apartment in [address] [place] [address] and states she was Independent with ADLs/IADLs, without use of DME, showering in walk-in with seated surface and grab bars. HOME SUPPORT: The patient lived alone with no local support. Occupational Therapy Goals  Initiated 12/2/2020, Re-evaluated and continued 12/9/2020  1. Patient will perform RW grooming with supervision within 7 day(s). 2.  Patient will perform EOB/RW bathing with supervision within 7 day(s). 3.  Patient will perform EOB/RW lower body dressing with supervision/set-up within 7 day(s). 4.  Patient will perform RW toilet transfers with supervision within 7 day(s). 5.  Patient will perform all aspects of RW toileting with supervision within 7 day(s). Outcome: Progressing Towards Goal     OCCUPATIONAL THERAPY TREATMENT  Patient: Rajesh Garduno (60 y.o. female)  Date: 12/10/2020  Diagnosis: Overdose [T50.901A]  Benzodiazepine withdrawal ([facility] [F13.239]   Benzodiazepine withdrawal ([facility]       Precautions:    Chart, occupational therapy assessment, plan of care, and goals were reviewed. ASSESSMENT  Patient continues with skilled OT services and is progressing towards goals. Pt received sitting up in chair following PT session and agreeable to therapy. OT attempted to administer [facility] a screening for cognitive impairment. Unable to formally administer as pt does not have her glasses and was unable to read or see items on page. OT educated pt on goals for today's session and requested pt ambulate to bathroom to perform grooming at sink. Pt required CGA sit>stand with RW and required 2 vc's to recall activity while ambulating to bathroom. Pt pauses frequently requiring cues for initiation and sequencing steps.  Pt groomed at sink with CGA for standing balance. Pt returned to sitting in chair and alarm activated. Pt is functioning below baseline and continues to be limited by STM, initiation, and sequencing steps. Pt would benefit from SNF rehab at discharge. If pt returns home recommend pt have caregiver 24/7 to provide supervision for safety. Current Level of Function Impacting Discharge (ADLs): min A transfers, setup dressing    Other factors to consider for discharge: Cognition         PLAN :  Patient continues to benefit from skilled intervention to address the above impairments. Continue treatment per established plan of care. to address goals. Recommend with staff: Transfers with CGA and RW; bed alarms    Recommend next OT session: standing tolerance, functional mobility, sequencing tasks    Recommendation for discharge: (in order for the patient to meet his/her long term goals)  Therapy up to 5 days/week in SNF setting    This discharge recommendation:  Has been made in collaboration with the attending provider and/or case management    IF patient discharges home will need the following DME: patient owns DME required for discharge       SUBJECTIVE:   Patient stated What did you ask me to do again? Mai Donovan    OBJECTIVE DATA SUMMARY:   Cognitive/Behavioral Status:  Neurologic State: Alert  Orientation Level: Oriented X4  Cognition: Appropriate decision making; Appropriate for age attention/concentration; Appropriate safety awareness; Follows commands             Functional Mobility and Transfers for ADLs:  Bed Mobility:       Transfers:     Functional Transfers  Bathroom Mobility: Minimum assistance       Balance:  Sitting: Intact; Without support  Sitting - Static: Fair (occasional)  Sitting - Dynamic: Fair (occasional)  Standing: Impaired; With support(RW)  Standing - Static: Fair;Constant support  Standing - Dynamic : Fair;Constant support    ADL Intervention:       Grooming  Grooming Assistance: Contact guard assistance  Position Performed: Standing  Washing Face: Contact guard assistance(standing at sink)  Washing Hands: Contact guard assistance  Brushing Teeth: Contact guard assistance(standing at sink)                                  Therapeutic Exercises:       Pain:  0/10    Activity Tolerance:   Good    After treatment patient left in no apparent distress:   Sitting in chair, Heels elevated for pressure relief, Call bell within reach and Bed / chair alarm activated    COMMUNICATION/COLLABORATION:   The patients plan of care was discussed with: Physical therapist and Case management.      Bianca Carr  Time Calculation: 25 mins

## 2020-12-10 NOTE — PROGRESS NOTES
Music Therapy Assessment  [address] [id number]     1955  60 y.o.  female    Patient Telephone Number: [phone] (home)   [place] Affiliation: Leena Chavez   Language: English   Patient Active Problem List    Diagnosis Date Noted    Bacteremia 10/25/2020    Seizure ([facility] 10/23/2020    Major depression 02/01/2020    Hypokalemia 01/28/2020    [facility] 01/28/2020    Chronic prescription benzodiazepine use 01/28/2020    Altered mental status 01/27/2020    Drug-induced constipation 04/05/2018    Advance care planning 04/05/2018    CHF (congestive heart failure) ([facility] 04/02/2018    Depression 04/02/2018    Dementia ([facility] 04/02/2018    Metastatic breast cancer ([facility] 05/03/2017    Secondary cancer of bone ([facility] 05/03/2017    Anxiety 02/03/2016    HTN (hypertension)     GERD (gastroesophageal reflux disease)     Hypercholesterolemia         Date: 12/10/2020            Total Time (in minutes): 6          [facility] 5S1 ORTHO JOINT    Mental Status:   [x] Alert [  ] Alphonsa [place] [  ]  Confused  [  ] Minimally responsive  [  ] Sleeping    Communication Status: [  ] Impaired Speech [  ] Nonverbal -N/A    Physical Status:   [  ] Oxygen in use  [  ] Hard of Hearing [  ] Vision Impaired  [  ] Ambulatory  [  ] Ambulatory with assistance [  ] Non-ambulatory -N/A    Music Preferences, [facility], pt said she likes artists like Leslie Clark, Andrew Tesfaye, Rahul keys, Alysha, and Honey       Clinical Problem addressed: N/A: Please see Session Observations below.      Goal(s) met in session:  Physical/Pain management (Scale of 1-10):    Pre-session rating: Pt didn't report on pain  Post-session rating: Pt didn't report on pain   [  ] Increased relaxation   [  ] Affected breathing patterns  [  ] Decreased muscle tension   [  ] Decreased agitation  [  ] Affected heart rate    [  ] Increased alertness     Emotional/Psychological:  [  ] Increased self-expression   [  ] Decreased aggressive behavior   [  ] Decreased feelings of stress  [  ] Discussed healthy coping skills     [  ] Improved mood    [  ] Decreased withdrawn behavior     Social:  [  ] Decreased feelings of isolation/loneliness [  ] Positive social interaction   [  ] Provided support and/or comfort for family/friends    Spiritual:  [  ] Spiritual support    [  ] Expressed peace  [  ] Expressed kimberley    [  ] Discussed beliefs    Techniques Utilized (Check all that apply): N/A: Please see Session Observations below. [  ] Procedural support MT [  ] Music for relaxation [  ] Patient preferred music  [  ] Delmis analysis  [  ] Song choice  [  ] Music for validation  [  ] Entrainment  [  ] Movement to music [  ] Guided visualization  [  ] Naeem Capellan  [  ] Patient instrument playing [  ] Olive Khushbu writing  [  ] Bacilio Alvarado along   [  ] Ana Fajardo  [  ] Sensory stimulation  [  ] Active Listening  [  ] Music for spiritual support [  ] Making of CDs as gifts    Session Observations:  F/up visit; Patient (pt) was alert, sitting up in bed when this music therapy intern (MTI) entered. MTI asked how she was feeling and she responded to this. Pt requested a follow up later today or tomorrow. MTI expressed understanding and said a follow up will be attempted. Dacia Lucero, Music Therapy Intern  Spiritual Care Services Dept.    Referral-based service

## 2020-12-10 NOTE — ROUTINE PROCESS
Bedside shift change report given to [facility] (oncoming nurse) by Page Fam (offgoing nurse). Report included the following information SBAR.

## 2020-12-10 NOTE — PROGRESS NOTES
Bedside shift change report given to [address] (oncoming nurse) by Ericka Marinelli (offgoing nurse). Report included the following information SBAR, Kardex, Intake/Output and MAR.

## 2020-12-11 LAB
ALBUMIN SERPL-MCNC: 3.2 G/DL (ref 3.5–5)
ANION GAP SERPL CALC-SCNC: 3 MMOL/L (ref 5–15)
BASOPHILS # BLD: 0.1 K/UL (ref 0–0.1)
BASOPHILS NFR BLD: 2 % (ref 0–1)
BUN SERPL-MCNC: 19 MG/DL (ref 6–20)
BUN/CREAT SERPL: 23 (ref 12–20)
CALCIUM SERPL-MCNC: 9.1 MG/DL (ref 8.5–10.1)
CHLORIDE SERPL-SCNC: 106 MMOL/L (ref 97–108)
CO2 SERPL-SCNC: 30 MMOL/L (ref 21–32)
CREAT SERPL-MCNC: 0.83 MG/DL (ref 0.55–1.02)
DIFFERENTIAL METHOD BLD: ABNORMAL
EOSINOPHIL # BLD: 0.1 K/UL (ref 0–0.4)
EOSINOPHIL NFR BLD: 3 % (ref 0–7)
ERYTHROCYTE [DISTWIDTH] IN BLOOD BY AUTOMATED COUNT: 11.6 % (ref 11.5–14.5)
GLUCOSE SERPL-MCNC: 124 MG/DL (ref 65–100)
HCT VFR BLD AUTO: 37 % (ref 35–47)
HGB BLD-MCNC: 12.2 G/DL (ref 11.5–16)
IMM GRANULOCYTES # BLD AUTO: 0 K/UL (ref 0–0.04)
IMM GRANULOCYTES NFR BLD AUTO: 1 % (ref 0–0.5)
LYMPHOCYTES # BLD: 1.1 K/UL (ref 0.8–3.5)
LYMPHOCYTES NFR BLD: 26 % (ref 12–49)
MCH RBC QN AUTO: 30.7 PG (ref 26–34)
MCHC RBC AUTO-ENTMCNC: 33 G/DL (ref 30–36.5)
MCV RBC AUTO: 93 FL (ref 80–99)
MONOCYTES # BLD: 0.3 K/UL (ref 0–1)
MONOCYTES NFR BLD: 7 % (ref 5–13)
NEUTS SEG # BLD: 2.8 K/UL (ref 1.8–8)
NEUTS SEG NFR BLD: 61 % (ref 32–75)
NRBC # BLD: 0 K/UL (ref 0–0.01)
NRBC BLD-RTO: 0 PER 100 WBC
PHOSPHATE SERPL-MCNC: 3.6 MG/DL (ref 2.6–4.7)
PLATELET # BLD AUTO: 208 K/UL (ref 150–400)
PMV BLD AUTO: 11.1 FL (ref 8.9–12.9)
POTASSIUM SERPL-SCNC: 4 MMOL/L (ref 3.5–5.1)
RBC # BLD AUTO: 3.98 M/UL (ref 3.8–5.2)
SODIUM SERPL-SCNC: 139 MMOL/L (ref 136–145)
WBC # BLD AUTO: 4.4 K/UL (ref 3.6–11)

## 2020-12-11 PROCEDURE — 85025 COMPLETE CBC W/AUTO DIFF WBC: CPT

## 2020-12-11 PROCEDURE — 97110 THERAPEUTIC EXERCISES: CPT

## 2020-12-11 PROCEDURE — 97116 GAIT TRAINING THERAPY: CPT

## 2020-12-11 PROCEDURE — 80069 RENAL FUNCTION PANEL: CPT

## 2020-12-11 PROCEDURE — 74011250637 HC RX REV CODE- 250/637: Performed by: NURSE PRACTITIONER

## 2020-12-11 PROCEDURE — 74011250637 HC RX REV CODE- 250/637: Performed by: HOSPITALIST

## 2020-12-11 PROCEDURE — 65270000029 HC RM PRIVATE

## 2020-12-11 PROCEDURE — 36415 COLL VENOUS BLD VENIPUNCTURE: CPT

## 2020-12-11 PROCEDURE — 74011250637 HC RX REV CODE- 250/637: Performed by: INTERNAL MEDICINE

## 2020-12-11 RX ADMIN — HYDROXYZINE HYDROCHLORIDE 50 MG: 25 TABLET, FILM COATED ORAL at 23:34

## 2020-12-11 RX ADMIN — CLONAZEPAM 0.5 MG: 1 TABLET ORAL at 09:06

## 2020-12-11 RX ADMIN — HYDROCODONE BITARTRATE AND ACETAMINOPHEN 1 TABLET: 5; 325 TABLET ORAL at 07:56

## 2020-12-11 RX ADMIN — POLYETHYLENE GLYCOL 3350 17 G: 17 POWDER, FOR SOLUTION ORAL at 18:28

## 2020-12-11 RX ADMIN — HYDROCODONE BITARTRATE AND ACETAMINOPHEN 1 TABLET: 5; 325 TABLET ORAL at 14:08

## 2020-12-11 RX ADMIN — GABAPENTIN 300 MG: 300 CAPSULE ORAL at 17:02

## 2020-12-11 RX ADMIN — HYDROCODONE BITARTRATE AND ACETAMINOPHEN 1 TABLET: 5; 325 TABLET ORAL at 01:48

## 2020-12-11 RX ADMIN — LOSARTAN POTASSIUM 50 MG: 50 TABLET, FILM COATED ORAL at 09:06

## 2020-12-11 RX ADMIN — DOCUSATE SODIUM 100 MG: 100 CAPSULE, LIQUID FILLED ORAL at 09:06

## 2020-12-11 RX ADMIN — Medication 10 ML: at 14:00

## 2020-12-11 RX ADMIN — HYDROCODONE BITARTRATE AND ACETAMINOPHEN 1 TABLET: 5; 325 TABLET ORAL at 21:00

## 2020-12-11 RX ADMIN — CLONAZEPAM 0.5 MG: 1 TABLET ORAL at 21:00

## 2020-12-11 RX ADMIN — DULOXETINE HYDROCHLORIDE 90 MG: 60 CAPSULE, DELAYED RELEASE ORAL at 09:06

## 2020-12-11 RX ADMIN — GABAPENTIN 300 MG: 300 CAPSULE ORAL at 21:00

## 2020-12-11 RX ADMIN — POLYETHYLENE GLYCOL 3350 17 G: 17 POWDER, FOR SOLUTION ORAL at 09:06

## 2020-12-11 RX ADMIN — DOCUSATE SODIUM 100 MG: 100 CAPSULE, LIQUID FILLED ORAL at 18:28

## 2020-12-11 RX ADMIN — Medication 10 ML: at 06:00

## 2020-12-11 RX ADMIN — FENOFIBRATE 145 MG: 145 TABLET ORAL at 09:06

## 2020-12-11 RX ADMIN — LORAZEPAM 0.5 MG: 0.5 TABLET ORAL at 15:47

## 2020-12-11 RX ADMIN — GABAPENTIN 300 MG: 300 CAPSULE ORAL at 09:05

## 2020-12-11 RX ADMIN — Medication 100 MG: at 09:05

## 2020-12-11 RX ADMIN — FOLIC ACID 1 MG: 1 TABLET ORAL at 09:06

## 2020-12-11 NOTE — PROGRESS NOTES
Music Therapy Assessment  [address] [id number]     1955  60 y.o.  female    Patient Telephone Number: [phone] (home)   [employer] Affiliation: Stacie Callahan   Language: English   Patient Active Problem List    Diagnosis Date Noted    Bacteremia 10/25/2020    Seizure ([facility] 10/23/2020    Major depression 02/01/2020    Hypokalemia 01/28/2020    Benzodiazepine withdrawal ([facility] 01/28/2020    Chronic prescription benzodiazepine use 01/28/2020    Altered mental status 01/27/2020    Drug-induced constipation 04/05/2018    Advance care planning 04/05/2018    CHF (congestive heart failure) ([facility] 04/02/2018    Depression 04/02/2018    Dementia ([facility] 04/02/2018    Metastatic breast cancer ([facility] 05/03/2017    Secondary cancer of bone ([facility] 05/03/2017    Anxiety 02/03/2016    HTN (hypertension)     GERD (gastroesophageal reflux disease)     Hypercholesterolemia         Date: 12/11/2020            Total Time (in minutes): 5          [address] [place] [address] 5S1 ORTHO JOINT    Mental Status:   [x] Alert [  ] Дмитрий Tendoy [  ]  Confused  [  ] Minimally responsive  [  ] Sleeping    Communication Status: [  ] Impaired Speech [  ] Nonverbal -N/A    Physical Status:   [  ] Oxygen in use  [  ] Hard of Hearing [  ] Vision Impaired  [  ] Ambulatory  [  ] Ambulatory with assistance [  ] Non-ambulatory -N/A    Music Preferences, Background: Blues, pt said she likes artists like Anneliese Murguia, Andrew BETTS, Alysha Marrufo and Honey    Clinical Problem to be addressed: Support relaxation     Goal(s) met in session:  Physical/Pain management (Scale of 1-10):    Pre-session rating: Pt didn't report on pain  Post-session rating: Pt didn't report on pain   [  ] Increased relaxation   [  ] Affected breathing patterns  [  ] Decreased muscle tension   [  ] Decreased agitation  [  ] Affected heart rate    [  ] Increased alertness     Emotional/Psychological:  [  ] Increased self-expression   [  ] Decreased aggressive behavior   [  ] Decreased feelings of stress  [  ] Discussed healthy coping skills     [  ] Improved mood    [  ] Decreased withdrawn behavior     Social:  [  ] Decreased feelings of isolation/loneliness [  ] Positive social interaction   [  ] Provided support and/or comfort for family/friends    Spiritual:  [  ] Spiritual support    [  ] Expressed peace  [  ] Expressed kimberley    [  ] Discussed beliefs    Techniques Utilized (Check all that apply): N/A: Please see Session Observations below. [  ] Procedural support MT [  ] Music for relaxation [  ] Patient preferred music  [  ] Delmis analysis  [  ] Song choice  [  ] Music for validation  [  ] Entrainment  [  ] Movement to music [  ] Guided visualization  [  ] Jose Reid  [  ] Patient instrument playing [  ] Steph Felix writing  [  ] Madelyn Remy along   [  ] Lucy Gens  [  ] Sensory stimulation  [  ] Active Listening  [  ] Music for spiritual support [  ] Making of CDs as gifts    Session Observations:  F/up visit; Patient (pt) was alert, lying in bed with her sheets covering her mouth. This music therapy intern (MTI) asked how the pt was feeling. She shared feeling tired today. MTI offered a music therapy session to help her relax and sleep. Pt declined, saying \"another day\". MTI expressed understanding. Missy Valdivia, Music Therapy Intern  Spiritual Care Services Dept.    Referral-based service

## 2020-12-11 NOTE — PROGRESS NOTES
Attempted follow-up visit with patient. Staff was in pt's room offering care. Consulted with pt's nurse. .    Music therapy has made several attempts to offer MT sessions this week.   Spiritual care services continues to be available for support upon referral.    Ney Carlisle, Palliative

## 2020-12-11 NOTE — PROGRESS NOTES
Bedside shift change report given to [address] [place] [address] (oncoming nurse) by Siomara Lopes (offgoing nurse). Report included the following information SBAR, Kardex, Intake/Output and MAR.

## 2020-12-11 NOTE — PROGRESS NOTES
[facility] [facility] Adult  Hospitalist Group                                                                                          Hospitalist Progress Note  Phoebe Thibodeaux MD  Answering service: [phone] from in house phone        Date of Service:  2020  NAME:  Diane Holcomb  :  1955  MRN:  [id number]      Admission Summary: Edith is a 60 y.o.   female whom presents with complaint with altered mental status. Her PMH is significant for anxiety and mood disorder, hyponatremia, HTN, and stage 3-4 metastatic breast cancer with a port. Reportedly she was seen in the ED 2 days prior for anxiety and was given a dose of Ativan which she improved with. At that time, [facility]  and noted multiple prescriptions over different time periods for Xanax. She was also seen in the ED on 11/10 for similar complaints and BSMART evaluated at that time and she was unwilling to stay in the hospital and therefore was discharged as she was not a candidate for a TDO. Back in late October, she was seen for concern for benzo withdrawal at that time. Per the ED, she was reportedly brought in via EMS soiled with urine, still in the same hospital gown she wore here with her identification band on her wrist. She is unable to recall why she came into the hospital.  Reportedly told someone she attempted to stab herself in the forehead with a knife but she was unable to confirm or deny that. Does express that she wants to go see Adama Pope when I walk into the room. Only complaint is that she is anxious but overall poor historian and difficult to obtain additional information. There is concern that she may have taken too many of her medications as she was talking about empty pill bottles at home. \"    Interval history / Subjective:   Patient seen and examined 12/10    States that she is constipated.      Assessment & Plan:     Benzodiazepine Withdrawal  - Improving slowly  - Seizure precautions  - Pt states she ran out of her Xanax 1 week PTA  -continue klopin 0.5 mg bid, increased cymbalta to 90 mg 12/8, prn hydroxyzine   - re evaluated by Psych    Constipation: add miralax       Suicidal ideation PTA  - attempted to stab self in head POA  -she said she doesn't have suicide or homicidal ideation   - 1:1 sitter at bedside discontinued on 12/4  - seen by Psych consult     Severe Anxiety & Mood Disorder  - stable, increased cymbalta to 90 mg daily on 12/7 , continue gabapentin, thiamine, folic acid  - supportive care  - reevaluated by Psych       Mild hypokalemia  - Resolved     Hyponatremia  -Resolved     CORNELIA: normal kidney function on admission  -Resolved     HTN  -BP normal, anxiety is also contributing, cut back cozaar to 50  mg  - Monitor BP     Metastatic Breast CA: mets to sternum; per hem/onc-low disease burden  - on treatment holiday; next appt in Jan 2021  - Last Echo 10/27/2020 was NL  - DDNR on file  - Appreciate palliative care input  - Appreciate hem/onc input; pt not candidate for hospice  - Supportive care    Debility   -PT/OT      Ordered labs    Code status: DNR  DVT prophylaxis: SCD    Care Plan discussed with: Patient/Family and Nurse  Anticipated Disposition: SNF, CM awaiting insurance authorization and state evaluation before discharge as she has hx of inpatient psych admission in the past  Anticipated Discharge: 24 hours to 48 hours     Hospital Problems  Date Reviewed: 12/1/2020          Codes Class Noted POA    * (Principal) Benzodiazepine withdrawal ([facility] ICD-10-CM: I32.702  ICD-9-CM: 292.0, 304.10  1/28/2020 Yes              Vital Signs:    Last 24hrs VS reviewed since prior progress note.  Most recent are:  Visit Vitals  BP (!) 159/75 (BP 1 Location: Right leg, BP Patient Position: Sitting)   Pulse (!) 109   Temp 98.2 °F (36.8 °C)   Resp 17   Ht 5' 5\" (1.651 m)   Wt 101.7 kg (224 lb 3.3 oz)   SpO2 97%   BMI 37.31 kg/m²         Intake/Output Summary (Last 24 hours) at 12/11/2020 1817  Last data filed at 12/11/2020 0856  Gross per 24 hour   Intake 250 ml   Output 1 ml   Net 249 ml        Physical Examination:     I had a face to face encounter with this patient and independently examined them on 12/11/2020 as outlined below:          Constitutional:  No acute distress   ENT:  Oral mucosa moist, oropharynx benign. Resp:  CTA bilaterally. No wheezing/rhonchi/rales. No accessory muscle use   CV:  Regular rhythm, normal rate, S1, S2 wnl    GI:  Soft, non distended, non tender, normoactive bowel sounds    Musculoskeletal:  No LE edema     Neurologic:  Moves all extremities. AAOx3     Skin:   no rashes or ulcers       Data Review:   meds      Labs:     Recent Labs     12/11/20  1107   WBC 4.4   HGB 12.2   HCT 37.0        Recent Labs     12/11/20  1107      K 4.0      CO2 30   BUN 19   CREA 0.83   *   CA 9.1   PHOS 3.6     Recent Labs     12/11/20  1107   ALB 3.2*     No results for input(s): INR, PTP, APTT, INREXT, INREXT in the last 72 hours. No results for input(s): FE, TIBC, PSAT, FERR in the last 72 hours. No results found for: FOL, RBCF   No results for input(s): PH, PCO2, PO2 in the last 72 hours. No results for input(s): CPK, CKNDX, TROIQ in the last 72 hours.     No lab exists for component: CPKMB  No results found for: CHOL, CHOLX, CHLST, CHOLV, HDL, HDLP, LDL, LDLC, DLDLP, TGLX, TRIGL, TRIGP, CHHD, CHHDX  Lab Results   Component Value Date/Time    Glucose (POC) 203 (H) 10/23/2020 05:50 AM    Glucose POC 83 06/14/2011 10:33 AM     Lab Results   Component Value Date/Time    Color YELLOW/STRAW 11/28/2020 11:38 AM    Appearance CLEAR 11/28/2020 11:38 AM    Specific gravity 1.028 11/28/2020 11:38 AM    Specific gravity 1.025 02/12/2018 01:41 PM    pH (UA) 5.5 11/28/2020 11:38 AM    Protein Negative 11/28/2020 11:38 AM    Glucose 250 (A) 11/28/2020 11:38 AM    Ketone Negative 11/28/2020 11:38 AM    Bilirubin Negative 11/28/2020 11:38 AM Urobilinogen 0.2 11/28/2020 11:38 AM    Nitrites Negative 11/28/2020 11:38 AM    Leukocyte Esterase Negative 11/28/2020 11:38 AM    Epithelial cells MODERATE (A) 11/28/2020 11:38 AM    Bacteria 1+ (A) 11/28/2020 11:38 AM    WBC 20-50 11/28/2020 11:38 AM    RBC 0-5 11/28/2020 11:38 AM         Medications Reviewed:     Current Facility-Administered Medications   Medication Dose Route Frequency    polyethylene glycol (MIRALAX) packet 17 g  17 g Oral BID    losartan (COZAAR) tablet 50 mg  50 mg Oral DAILY    DULoxetine (CYMBALTA) capsule 90 mg  90 mg Oral DAILY    HYDROcodone-acetaminophen (NORCO) 5-325 mg per tablet 1 Tab  1 Tab Oral Q6H PRN    LORazepam (ATIVAN) tablet 0.5 mg  0.5 mg Oral DAILY PRN    clonazePAM (KlonoPIN) tablet 0.5 mg  0.5 mg Oral BID    ondansetron (ZOFRAN) injection 4 mg  4 mg IntraVENous Q6H PRN    docusate sodium (COLACE) capsule 100 mg  100 mg Oral BID    gabapentin (NEURONTIN) capsule 300 mg  300 mg Oral TID    hydrOXYzine HCL (ATARAX) tablet 50 mg  50 mg Oral Q6H PRN    LORazepam (ATIVAN) injection 2 mg  2 mg IntraVENous PRN    thiamine HCL (B-1) tablet 100 mg  100 mg Oral DAILY    sodium chloride (NS) flush 5-40 mL  5-40 mL IntraVENous Q8H    sodium chloride (NS) flush 5-40 mL  5-40 mL IntraVENous PRN    fenofibrate nanocrystallized (TRICOR) tablet 145 mg  145 mg Oral DAILY    folic acid (FOLVITE) tablet 1 mg  1 mg Oral DAILY    acetaminophen (TYLENOL) tablet 650 mg  650 mg Oral Q6H PRN    sodium chloride (NS) flush 5-40 mL  5-40 mL IntraVENous Q8H    sodium chloride (NS) flush 5-40 mL  5-40 mL IntraVENous PRN     ______________________________________________________________________  EXPECTED LENGTH OF STAY: 3d 9h  ACTUAL LENGTH OF STAY:          11                 Alisha Fitch MD

## 2020-12-11 NOTE — PROGRESS NOTES
[address] [facility] Adult  Hospitalist Group                                                                                          Hospitalist Progress Note  Derick Castañeda MD  Answering service: [phone] from in house phone        Date of Service:  12/10/2020  NAME:  Rolo Murphy  :  1955  MRN:  [id number]      Admission Summary: Edith is a 60 y.o.   female whom presents with complaint with altered mental status. Her PMH is significant for anxiety and mood disorder, hyponatremia, HTN, and stage 3-4 metastatic breast cancer with a port. Reportedly she was seen in the ED 2 days prior for anxiety and was given a dose of Ativan which she improved with. At that time, [facility]  and noted multiple prescriptions over different time periods for Xanax. She was also seen in the ED on 11/10 for similar complaints and BSMART evaluated at that time and she was unwilling to stay in the hospital and therefore was discharged as she was not a candidate for a TDO. Back in late October, she was seen for concern for benzo withdrawal at that time. Per the ED, she was reportedly brought in via EMS soiled with urine, still in the same hospital gown she wore here with her identification band on her wrist. She is unable to recall why she came into the hospital.  Reportedly told someone she attempted to stab herself in the forehead with a knife but she was unable to confirm or deny that. Does express that she wants to go see Millie Abad when I walk into the room. Only complaint is that she is anxious but overall poor historian and difficult to obtain additional information. There is concern that she may have taken too many of her medications as she was talking about empty pill bottles at home. \"    Interval history / Subjective:   Patient seen and examined 12/10    States that she is constipated.      Assessment & Plan:     Benzodiazepine Withdrawal  - Improving slowly  - Seizure precautions  - Pt states she ran out of her Xanax 1 week PTA  -continue klopin 0.5 mg bid, increased cymbalta to 90 mg 12/8, prn hydroxyzine   - re evaluated by Psych    Constipation: add miralax       Suicidal ideation PTA  - attempted to stab self in head POA  -she said she doesn't have suicide or homicidal ideation   - 1:1 sitter at bedside discontinued on 12/4  - seen by Psych consult     Severe Anxiety & Mood Disorder  - stable, increased cymbalta to 90 mg daily on 12/7 , continue gabapentin, thiamine, folic acid  - supportive care  - reevaluated by Psych       Mild hypokalemia  - Resolved     Hyponatremia  -Resolved     CORNELIA: normal kidney function on admission  -Resolved     HTN  -BP normal, anxiety is also contributing, cut back cozaar to 50  mg  - Monitor BP     Metastatic Breast CA: mets to sternum; per hem/onc-low disease burden  - on treatment holiday; next appt in Jan 2021  - Last Echo 10/27/2020 was NL  - DDNR on file  - Appreciate palliative care input  - Appreciate hem/onc input; pt not candidate for hospice  - Supportive care    Debility   -PT/OT      Ordered labs    Code status: DNR  DVT prophylaxis: SCD    Care Plan discussed with: Patient/Family and Nurse  Anticipated Disposition: SNF, CM awaiting insurance authorization and state evaluation before discharge as she has hx of inpatient psych admission in the past  Anticipated Discharge: 24 hours to 48 hours     Hospital Problems  Date Reviewed: 12/1/2020          Codes Class Noted POA    * (Principal) Benzodiazepine withdrawal ([facility] ICD-10-CM: X40.994  ICD-9-CM: 292.0, 304.10  1/28/2020 Yes              Vital Signs:    Last 24hrs VS reviewed since prior progress note.  Most recent are:  Visit Vitals  /69 (BP 1 Location: Left leg, BP Patient Position: Post activity)   Pulse 99   Temp 98 °F (36.7 °C)   Resp 15   Ht 5' 5\" (1.651 m)   Wt 101.7 kg (224 lb 3.3 oz)   SpO2 96%   BMI 37.31 kg/m²         Intake/Output Summary (Last 24 hours) at 12/10/2020 2053  Last data filed at 12/9/2020 2232  Gross per 24 hour   Intake    Output 1 ml   Net -1 ml        Physical Examination:     I had a face to face encounter with this patient and independently examined them on 12/10/2020 as outlined below:          Constitutional:  No acute distress   ENT:  Oral mucosa moist, oropharynx benign. Resp:  CTA bilaterally. No wheezing/rhonchi/rales. No accessory muscle use   CV:  Regular rhythm, normal rate, S1, S2 wnl    GI:  Soft, non distended, non tender, normoactive bowel sounds    Musculoskeletal:  No LE edema     Neurologic:  Moves all extremities. AAOx3     Skin:   no rashes or ulcers       Data Review:   meds      Labs:   No results for input(s): WBC, HGB, HCT, PLT, HGBEXT, HCTEXT, PLTEXT, HGBEXT, HCTEXT, PLTEXT in the last 72 hours. No results for input(s): NA, K, CL, CO2, BUN, CREA, GLU, CA, MG, PHOS, URICA in the last 72 hours. No results for input(s): ALT, AP, TBIL, TBILI, TP, ALB, GLOB, GGT, AML, LPSE in the last 72 hours. No lab exists for component: SGOT, GPT, AMYP, HLPSE  No results for input(s): INR, PTP, APTT, INREXT, INREXT in the last 72 hours. No results for input(s): FE, TIBC, PSAT, FERR in the last 72 hours. No results found for: FOL, RBCF   No results for input(s): PH, PCO2, PO2 in the last 72 hours. No results for input(s): CPK, CKNDX, TROIQ in the last 72 hours.     No lab exists for component: CPKMB  No results found for: CHOL, CHOLX, CHLST, CHOLV, HDL, HDLP, LDL, LDLC, DLDLP, TGLX, TRIGL, TRIGP, CHHD, CHHDX  Lab Results   Component Value Date/Time    Glucose (POC) 203 (H) 10/23/2020 05:50 AM    Glucose POC 83 06/14/2011 10:33 AM     Lab Results   Component Value Date/Time    Color YELLOW/STRAW 11/28/2020 11:38 AM    Appearance CLEAR 11/28/2020 11:38 AM    Specific gravity 1.028 11/28/2020 11:38 AM    Specific gravity 1.025 02/12/2018 01:41 PM    pH (UA) 5.5 11/28/2020 11:38 AM    Protein Negative 11/28/2020 11:38 AM    Glucose 250 (A) 11/28/2020 11:38 AM    Ketone Negative 11/28/2020 11:38 AM    Bilirubin Negative 11/28/2020 11:38 AM    Urobilinogen 0.2 11/28/2020 11:38 AM    Nitrites Negative 11/28/2020 11:38 AM    Leukocyte Esterase Negative 11/28/2020 11:38 AM    Epithelial cells MODERATE (A) 11/28/2020 11:38 AM    Bacteria 1+ (A) 11/28/2020 11:38 AM    WBC 20-50 11/28/2020 11:38 AM    RBC 0-5 11/28/2020 11:38 AM         Medications Reviewed:     Current Facility-Administered Medications   Medication Dose Route Frequency    polyethylene glycol (MIRALAX) packet 17 g  17 g Oral BID    losartan (COZAAR) tablet 50 mg  50 mg Oral DAILY    DULoxetine (CYMBALTA) capsule 90 mg  90 mg Oral DAILY    HYDROcodone-acetaminophen (NORCO) 5-325 mg per tablet 1 Tab  1 Tab Oral Q6H PRN    LORazepam (ATIVAN) tablet 0.5 mg  0.5 mg Oral DAILY PRN    clonazePAM (KlonoPIN) tablet 0.5 mg  0.5 mg Oral BID    ondansetron (ZOFRAN) injection 4 mg  4 mg IntraVENous Q6H PRN    docusate sodium (COLACE) capsule 100 mg  100 mg Oral BID    gabapentin (NEURONTIN) capsule 300 mg  300 mg Oral TID    hydrOXYzine HCL (ATARAX) tablet 50 mg  50 mg Oral Q6H PRN    LORazepam (ATIVAN) injection 2 mg  2 mg IntraVENous PRN    thiamine HCL (B-1) tablet 100 mg  100 mg Oral DAILY    sodium chloride (NS) flush 5-40 mL  5-40 mL IntraVENous Q8H    sodium chloride (NS) flush 5-40 mL  5-40 mL IntraVENous PRN    fenofibrate nanocrystallized (TRICOR) tablet 145 mg  145 mg Oral DAILY    folic acid (FOLVITE) tablet 1 mg  1 mg Oral DAILY    acetaminophen (TYLENOL) tablet 650 mg  650 mg Oral Q6H PRN    sodium chloride (NS) flush 5-40 mL  5-40 mL IntraVENous Q8H    sodium chloride (NS) flush 5-40 mL  5-40 mL IntraVENous PRN     ______________________________________________________________________  EXPECTED LENGTH OF STAY: 3d 9h  ACTUAL LENGTH OF STAY:          10                 Kenny Saldana MD

## 2020-12-11 NOTE — PROGRESS NOTES
RUR 42%    [facility]- Level 2 pending for SNF placement. [facility] referral pending. BLS at discharge. THOMAS spoke with the  Nayan Gracia at [facility]- faxed referral to [phone] for review. Patient hoping to return back to her Independent living apartment from rehab. Spoke with Mateo Arzola with [facility] [phone]. CM provided her with additional information for the Level 2. SNF referrals have gone out to multiple facilities and denied. Will follow.      Chika Hoyt  [phone]

## 2020-12-11 NOTE — PROGRESS NOTES
Bedside and Verbal shift change report given to Veronika Fuchs RN (oncoming nurse) by Dionisio Brooks RN (offgoing nurse). Report included the following information SBAR.

## 2020-12-11 NOTE — PROGRESS NOTES
Problem: Mobility Impaired (Adult and Pediatric)  Goal: *Acute Goals and Plan of Care (Insert Text)  Description: FUNCTIONAL STATUS PRIOR TO ADMISSION: Patient was independent without use of DME. Patient states she was mobilizing, driving, and performing ADLs/IADLs independently until about 2 weeks ago. Since, then she has spent most of her time in the bed with little mobility due to fear of falling. HOME SUPPORT PRIOR TO ADMISSION: The patient lived alone in a senior living facility and has no local support. Physical Therapy Goals  Initiated 12/9/2020  1. Patient will move from supine to sit and sit to supine , scoot up and down, and roll side to side in bed with modified independence within 7 day(s). 2.  Patient will transfer from bed to chair and chair to bed with supervision using the least restrictive device within 7 day(s). 3.  Patient will perform sit to stand with supervision within 7 day(s). 4.  Patient will ambulate with supervision for 50 feet with the least restrictive device within 7 day(s). Still appropriate. Initiated 12/2/2020  1. Patient will move from supine to sit and sit to supine , scoot up and down, and roll side to side in bed with modified independence within 7 day(s). 2.  Patient will transfer from bed to chair and chair to bed with supervision using the least restrictive device within 7 day(s). 3.  Patient will perform sit to stand with supervision within 7 day(s). 4.  Patient will ambulate with supervision for 50 feet with the least restrictive device within 7 day(s). Outcome: Progressing Towards Goal   PHYSICAL THERAPY TREATMENT  Patient: Maria Del Carmen Hidalgo (60 y.o. female)  Date: 12/11/2020  Diagnosis: Overdose [T50.901A]  Benzodiazepine withdrawal ([facility] [address] [F13.239]   Benzodiazepine withdrawal ([facility] [address]       Precautions:  fall  Chart, physical therapy assessment, plan of care and goals were reviewed.     ASSESSMENT  Patient continues with skilled PT services and is progressing towards goals. Pt continues to increase activity tolerance for standing exercise and balance activities with occasional minor loss of balance - recovering independently with stepping strategy. Patient continues to require direct supervision and cues to complete task secondary to cognitive and memory deficits. Current Level of Function Impacting Discharge (mobility/balance): CGA for advance balance skills, supervision and cues for safey    Other factors to consider for discharge: prior lived independent without support; concern regarding cognitive deficits for safety         PLAN :  Patient continues to benefit from skilled intervention to address the above impairments. Continue treatment per established plan of care. to address goals. Recommendation for discharge: (in order for the patient to meet his/her long term goals)  Therapy up to 5 days/week in SNF setting    This discharge recommendation:  Has been made in collaboration with the attending provider and/or case management    IF patient discharges home will need the following DME: none       SUBJECTIVE:   Patient stated I don't understand why I just can't go home.     OBJECTIVE DATA SUMMARY:   Critical Behavior:  Neurologic State: Alert  Orientation Level: Oriented X4  Cognition: Impulsive  Safety/Judgement: Fall prevention, Decreased insight into deficits  Functional Mobility Training:        Transfers:  Sit to Stand: Stand-by assistance  Stand to Sit: Stand-by assistance                             Balance:  Sitting: Intact; Without support  Sitting - Static: Good (unsupported)  Sitting - Dynamic: Good (unsupported)  Standing: Impaired; Without support  Standing - Static: Good  Standing - Dynamic : Good(required contact guard for high level balance skills)  Ambulation/Gait Training:  Distance (ft): 100 Feet (ft)(pt prefers to amb in room vs. hallway; no assistive device)  Assistive Device: Gait belt  Ambulation - Level of Assistance: Contact guard assistance        Gait Abnormalities: Decreased step clearance        Base of Support: Widened           Therapeutic Exercises:   Pt performed seated warm up exercise - arms and legs. In standing - performed toe taps 25 reps x 2, mini squats 10 x 2 with standing rest.  Also performed standing exercise with support of counter hip abduction 10 reps x 2 sets. Standing balance activities with CGA - side stepping right and leg, braiding and forward/backward stepping. Patient able to  object from floor with CGA. Overall performed over 15 mins of standing activity without rest.    Pain Rating:  Back pain 7/10    Activity Tolerance:   Fair - progressing with OOB tolerance - encourage OOB for meals. After treatment patient left in no apparent distress:   Call bell within reach, Bed / chair alarm activated, and sitting edge of bed    COMMUNICATION/COLLABORATION:   The patients plan of care was discussed with: Registered nurse.      Erika Cameron, PT   Time Calculation: 25 mins

## 2020-12-12 PROCEDURE — 74011250637 HC RX REV CODE- 250/637: Performed by: NURSE PRACTITIONER

## 2020-12-12 PROCEDURE — 74011250637 HC RX REV CODE- 250/637: Performed by: HOSPITALIST

## 2020-12-12 PROCEDURE — 74011250637 HC RX REV CODE- 250/637: Performed by: INTERNAL MEDICINE

## 2020-12-12 PROCEDURE — 65270000029 HC RM PRIVATE

## 2020-12-12 RX ADMIN — POLYETHYLENE GLYCOL 3350 17 G: 17 POWDER, FOR SOLUTION ORAL at 08:26

## 2020-12-12 RX ADMIN — HYDROCODONE BITARTRATE AND ACETAMINOPHEN 1 TABLET: 5; 325 TABLET ORAL at 09:39

## 2020-12-12 RX ADMIN — LORAZEPAM 0.5 MG: 0.5 TABLET ORAL at 12:51

## 2020-12-12 RX ADMIN — CLONAZEPAM 0.5 MG: 1 TABLET ORAL at 08:26

## 2020-12-12 RX ADMIN — POLYETHYLENE GLYCOL 3350 17 G: 17 POWDER, FOR SOLUTION ORAL at 17:21

## 2020-12-12 RX ADMIN — Medication 10 ML: at 17:21

## 2020-12-12 RX ADMIN — LOSARTAN POTASSIUM 50 MG: 50 TABLET, FILM COATED ORAL at 08:30

## 2020-12-12 RX ADMIN — DULOXETINE HYDROCHLORIDE 90 MG: 60 CAPSULE, DELAYED RELEASE ORAL at 08:27

## 2020-12-12 RX ADMIN — GABAPENTIN 300 MG: 300 CAPSULE ORAL at 22:01

## 2020-12-12 RX ADMIN — HYDROCODONE BITARTRATE AND ACETAMINOPHEN 1 TABLET: 5; 325 TABLET ORAL at 15:49

## 2020-12-12 RX ADMIN — HYDROXYZINE HYDROCHLORIDE 50 MG: 25 TABLET, FILM COATED ORAL at 19:22

## 2020-12-12 RX ADMIN — GABAPENTIN 300 MG: 300 CAPSULE ORAL at 08:27

## 2020-12-12 RX ADMIN — GABAPENTIN 300 MG: 300 CAPSULE ORAL at 15:49

## 2020-12-12 RX ADMIN — DOCUSATE SODIUM 100 MG: 100 CAPSULE, LIQUID FILLED ORAL at 08:26

## 2020-12-12 RX ADMIN — HYDROCODONE BITARTRATE AND ACETAMINOPHEN 1 TABLET: 5; 325 TABLET ORAL at 04:28

## 2020-12-12 RX ADMIN — FOLIC ACID 1 MG: 1 TABLET ORAL at 08:26

## 2020-12-12 RX ADMIN — DOCUSATE SODIUM 100 MG: 100 CAPSULE, LIQUID FILLED ORAL at 17:21

## 2020-12-12 RX ADMIN — Medication 100 MG: at 08:26

## 2020-12-12 RX ADMIN — CLONAZEPAM 0.5 MG: 1 TABLET ORAL at 22:01

## 2020-12-12 RX ADMIN — FENOFIBRATE 145 MG: 145 TABLET ORAL at 08:27

## 2020-12-12 RX ADMIN — HYDROCODONE BITARTRATE AND ACETAMINOPHEN 1 TABLET: 5; 325 TABLET ORAL at 22:01

## 2020-12-12 NOTE — PROGRESS NOTES
[address] [facility] Adult  Hospitalist Group                                                                                          Hospitalist Progress Note  Lashay Alba MD  Answering service: [phone] from in house phone        Date of Service:  2020  NAME:  Damaris Wise  :  1955  MRN:  [id number]      Admission Summary: Edith is a 60 y.o.   female whom presents with complaint with altered mental status. Her PMH is significant for anxiety and mood disorder, hyponatremia, HTN, and stage 3-4 metastatic breast cancer with a port. Reportedly she was seen in the ED 2 days prior for anxiety and was given a dose of Ativan which she improved with. At that time, [facility]  and noted multiple prescriptions over different time periods for Xanax. She was also seen in the ED on 11/10 for similar complaints and BSMART evaluated at that time and she was unwilling to stay in the hospital and therefore was discharged as she was not a candidate for a TDO. Back in late October, she was seen for concern for benzo withdrawal at that time. Per the ED, she was reportedly brought in via EMS soiled with urine, still in the same hospital gown she wore here with her identification band on her wrist. She is unable to recall why she came into the hospital.  Reportedly told someone she attempted to stab herself in the forehead with a knife but she was unable to confirm or deny that. Does express that she wants to go see Criss Tolbert when I walk into the room. Only complaint is that she is anxious but overall poor historian and difficult to obtain additional information. There is concern that she may have taken too many of her medications as she was talking about empty pill bottles at home. \"    Interval history / Subjective:   Patient seen and examined     States that she is doing OK.   Waiting for placement     Assessment & Plan:     Benzodiazepine Withdrawal  - Improving slowly  - Seizure precautions  - Pt states she ran out of her Xanax 1 week PTA  -continue klopin 0.5 mg bid, increased cymbalta to 90 mg 12/8, prn hydroxyzine   - re evaluated by Psych    Constipation: continue miralax       Suicidal ideation PTA  - attempted to stab self in head POA  -she said she doesn't have suicide or homicidal ideation   - 1:1 sitter at bedside discontinued on 12/4  - seen by Psych consult     Severe Anxiety & Mood Disorder  - stable, increased cymbalta to 90 mg daily on 12/7 , continue gabapentin, thiamine, folic acid  - supportive care  - reevaluated by Psych       Mild hypokalemia  - Resolved     Hyponatremia  -Resolved     CORNELIA: normal kidney function on admission  -Resolved     HTN  -BP normal, anxiety is also contributing, cut back cozaar to 50  mg  - Monitor BP     Metastatic Breast CA: mets to sternum; per hem/onc-low disease burden  - on treatment holiday; next appt in Jan 2021  - Last Echo 10/27/2020 was NL  - DDNR on file  - Appreciate palliative care input  - Appreciate hem/onc input; pt not candidate for hospice  - Supportive care    Debility   -PT/OT      Labs wnl    Code status: DNR  DVT prophylaxis: SCD    Care Plan discussed with: Patient/Family and Nurse  Anticipated Disposition: SNF, CM awaiting insurance authorization and state evaluation before discharge as she has hx of inpatient psych admission in the past  Anticipated Discharge: tbd     Hospital Problems  Date Reviewed: 12/1/2020          Codes Class Noted POA    * (Principal) Benzodiazepine withdrawal ([facility] [address] ICD-10-CM: R03.370  ICD-9-CM: 292.0, 304.10  1/28/2020 Yes              Vital Signs:    Last 24hrs VS reviewed since prior progress note.  Most recent are:  Visit Vitals  /72   Pulse 98   Temp 98.1 °F (36.7 °C)   Resp 16   Ht 5' 5\" (1.651 m)   Wt 101.1 kg (222 lb 12.8 oz)   SpO2 97%   BMI 37.08 kg/m²       No intake or output data in the 24 hours ending 12/12/20 2891     Physical Examination:     I had a face to face encounter with this patient and independently examined them on 12/12/2020 as outlined below:          Constitutional:  No acute distress   ENT:  Oral mucosa moist, oropharynx benign. Resp:  CTA bilaterally. No wheezing/rhonchi/rales. No accessory muscle use   CV:  Regular rhythm, normal rate, S1, S2 wnl    GI:  Soft, non distended, non tender, normoactive bowel sounds    Musculoskeletal:  No LE edema     Neurologic:  Moves all extremities. AAOx3     Skin:   no rashes or ulcers       Data Review:   meds      Labs:     Recent Labs     12/11/20  1107   WBC 4.4   HGB 12.2   HCT 37.0        Recent Labs     12/11/20  1107      K 4.0      CO2 30   BUN 19   CREA 0.83   *   CA 9.1   PHOS 3.6     Recent Labs     12/11/20  1107   ALB 3.2*     No results for input(s): INR, PTP, APTT, INREXT, INREXT in the last 72 hours. No results for input(s): FE, TIBC, PSAT, FERR in the last 72 hours. No results found for: FOL, RBCF   No results for input(s): PH, PCO2, PO2 in the last 72 hours. No results for input(s): CPK, CKNDX, TROIQ in the last 72 hours.     No lab exists for component: CPKMB  No results found for: CHOL, CHOLX, CHLST, CHOLV, HDL, HDLP, LDL, LDLC, DLDLP, TGLX, TRIGL, TRIGP, CHHD, CHHDX  Lab Results   Component Value Date/Time    Glucose (POC) 203 (H) 10/23/2020 05:50 AM    Glucose POC 83 06/14/2011 10:33 AM     Lab Results   Component Value Date/Time    Color YELLOW/STRAW 11/28/2020 11:38 AM    Appearance CLEAR 11/28/2020 11:38 AM    Specific gravity 1.028 11/28/2020 11:38 AM    Specific gravity 1.025 02/12/2018 01:41 PM    pH (UA) 5.5 11/28/2020 11:38 AM    Protein Negative 11/28/2020 11:38 AM    Glucose 250 (A) 11/28/2020 11:38 AM    Ketone Negative 11/28/2020 11:38 AM    Bilirubin Negative 11/28/2020 11:38 AM    Urobilinogen 0.2 11/28/2020 11:38 AM    Nitrites Negative 11/28/2020 11:38 AM    Leukocyte Esterase Negative 11/28/2020 11:38 AM    Epithelial cells MODERATE (A) 11/28/2020 11:38 AM    Bacteria 1+ (A) 11/28/2020 11:38 AM    WBC 20-50 11/28/2020 11:38 AM    RBC 0-5 11/28/2020 11:38 AM         Medications Reviewed:     Current Facility-Administered Medications   Medication Dose Route Frequency    polyethylene glycol (MIRALAX) packet 17 g  17 g Oral BID    losartan (COZAAR) tablet 50 mg  50 mg Oral DAILY    DULoxetine (CYMBALTA) capsule 90 mg  90 mg Oral DAILY    HYDROcodone-acetaminophen (NORCO) 5-325 mg per tablet 1 Tab  1 Tab Oral Q6H PRN    LORazepam (ATIVAN) tablet 0.5 mg  0.5 mg Oral DAILY PRN    clonazePAM (KlonoPIN) tablet 0.5 mg  0.5 mg Oral BID    ondansetron (ZOFRAN) injection 4 mg  4 mg IntraVENous Q6H PRN    docusate sodium (COLACE) capsule 100 mg  100 mg Oral BID    gabapentin (NEURONTIN) capsule 300 mg  300 mg Oral TID    hydrOXYzine HCL (ATARAX) tablet 50 mg  50 mg Oral Q6H PRN    LORazepam (ATIVAN) injection 2 mg  2 mg IntraVENous PRN    thiamine HCL (B-1) tablet 100 mg  100 mg Oral DAILY    sodium chloride (NS) flush 5-40 mL  5-40 mL IntraVENous Q8H    sodium chloride (NS) flush 5-40 mL  5-40 mL IntraVENous PRN    fenofibrate nanocrystallized (TRICOR) tablet 145 mg  145 mg Oral DAILY    folic acid (FOLVITE) tablet 1 mg  1 mg Oral DAILY    acetaminophen (TYLENOL) tablet 650 mg  650 mg Oral Q6H PRN    sodium chloride (NS) flush 5-40 mL  5-40 mL IntraVENous Q8H    sodium chloride (NS) flush 5-40 mL  5-40 mL IntraVENous PRN     ______________________________________________________________________  EXPECTED LENGTH OF STAY: 3d 9h  ACTUAL LENGTH OF STAY:          12                 Liyah Lara MD

## 2020-12-12 NOTE — PROGRESS NOTES
[address] [facility] Adult  Hospitalist Group                                                                                          Hospitalist Progress Note  Phoebe Thibodeaux MD  Answering service: [phone] from in house phone        Date of Service:  2020  NAME:  Diane Holcomb  :  1955  MRN:  [id number]      Admission Summary: Edith is a 60 y.o.   female whom presents with complaint with altered mental status. Her PMH is significant for anxiety and mood disorder, hyponatremia, HTN, and stage 3-4 metastatic breast cancer with a port. Reportedly she was seen in the ED 2 days prior for anxiety and was given a dose of Ativan which she improved with. At that time, [facility]  and noted multiple prescriptions over different time periods for Xanax. She was also seen in the ED on 11/10 for similar complaints and BSMART evaluated at that time and she was unwilling to stay in the hospital and therefore was discharged as she was not a candidate for a TDO. Back in late October, she was seen for concern for benzo withdrawal at that time. Per the ED, she was reportedly brought in via EMS soiled with urine, still in the same hospital gown she wore here with her identification band on her wrist. She is unable to recall why she came into the hospital.  Reportedly told someone she attempted to stab herself in the forehead with a knife but she was unable to confirm or deny that. Does express that she wants to go see Adama Pope when I walk into the room. Only complaint is that she is anxious but overall poor historian and difficult to obtain additional information. There is concern that she may have taken too many of her medications as she was talking about empty pill bottles at home. \"    Interval history / Subjective:   Patient seen and examined     States that she had a small bowel movement     Assessment & Plan:     Benzodiazepine Withdrawal  - Improving slowly  - Seizure precautions  - Pt states she ran out of her Xanax 1 week PTA  -continue klopin 0.5 mg bid, increased cymbalta to 90 mg 12/8, prn hydroxyzine   - re evaluated by Psych    Constipation: continue miralax       Suicidal ideation PTA  - attempted to stab self in head POA  -she said she doesn't have suicide or homicidal ideation   - 1:1 sitter at bedside discontinued on 12/4  - seen by Psych consult     Severe Anxiety & Mood Disorder  - stable, increased cymbalta to 90 mg daily on 12/7 , continue gabapentin, thiamine, folic acid  - supportive care  - reevaluated by Psych       Mild hypokalemia  - Resolved     Hyponatremia  -Resolved     CORNELIA: normal kidney function on admission  -Resolved     HTN  -BP normal, anxiety is also contributing, cut back cozaar to 50  mg  - Monitor BP     Metastatic Breast CA: mets to sternum; per hem/onc-low disease burden  - on treatment holiday; next appt in Jan 2021  - Last Echo 10/27/2020 was NL  - DDNR on file  - Appreciate palliative care input  - Appreciate hem/onc input; pt not candidate for hospice  - Supportive care    Debility   -PT/OT      Ordered labs    Code status: DNR  DVT prophylaxis: SCD    Care Plan discussed with: Patient/Family and Nurse  Anticipated Disposition: SNF, CM awaiting insurance authorization and state evaluation before discharge as she has hx of inpatient psych admission in the past  Anticipated Discharge: 24 hours to 48 hours     Hospital Problems  Date Reviewed: 12/1/2020          Codes Class Noted POA    * (Principal) Benzodiazepine withdrawal ([facility] [address] ICD-10-CM: B09.036  ICD-9-CM: 292.0, 304.10  1/28/2020 Yes              Vital Signs:    Last 24hrs VS reviewed since prior progress note.  Most recent are:  Visit Vitals  /72   Pulse 98   Temp 98.1 °F (36.7 °C)   Resp 16   Ht 5' 5\" (1.651 m)   Wt 101.1 kg (222 lb 12.8 oz)   SpO2 97%   BMI 37.08 kg/m²       No intake or output data in the 24 hours ending 12/12/20 8371     Physical Examination:     I had a face to face encounter with this patient and independently examined them on 12/12/2020 as outlined below:          Constitutional:  No acute distress   ENT:  Oral mucosa moist, oropharynx benign. Resp:  CTA bilaterally. No wheezing/rhonchi/rales. No accessory muscle use   CV:  Regular rhythm, normal rate, S1, S2 wnl    GI:  Soft, non distended, non tender, normoactive bowel sounds    Musculoskeletal:  No LE edema     Neurologic:  Moves all extremities. AAOx3     Skin:   no rashes or ulcers       Data Review:   meds      Labs:     Recent Labs     12/11/20  1107   WBC 4.4   HGB 12.2   HCT 37.0        Recent Labs     12/11/20  1107      K 4.0      CO2 30   BUN 19   CREA 0.83   *   CA 9.1   PHOS 3.6     Recent Labs     12/11/20  1107   ALB 3.2*     No results for input(s): INR, PTP, APTT, INREXT, INREXT in the last 72 hours. No results for input(s): FE, TIBC, PSAT, FERR in the last 72 hours. No results found for: FOL, RBCF   No results for input(s): PH, PCO2, PO2 in the last 72 hours. No results for input(s): CPK, CKNDX, TROIQ in the last 72 hours.     No lab exists for component: CPKMB  No results found for: CHOL, CHOLX, CHLST, CHOLV, HDL, HDLP, LDL, LDLC, DLDLP, TGLX, TRIGL, TRIGP, CHHD, CHHDX  Lab Results   Component Value Date/Time    Glucose (POC) 203 (H) 10/23/2020 05:50 AM    Glucose POC 83 06/14/2011 10:33 AM     Lab Results   Component Value Date/Time    Color YELLOW/STRAW 11/28/2020 11:38 AM    Appearance CLEAR 11/28/2020 11:38 AM    Specific gravity 1.028 11/28/2020 11:38 AM    Specific gravity 1.025 02/12/2018 01:41 PM    pH (UA) 5.5 11/28/2020 11:38 AM    Protein Negative 11/28/2020 11:38 AM    Glucose 250 (A) 11/28/2020 11:38 AM    Ketone Negative 11/28/2020 11:38 AM    Bilirubin Negative 11/28/2020 11:38 AM    Urobilinogen 0.2 11/28/2020 11:38 AM    Nitrites Negative 11/28/2020 11:38 AM    Leukocyte Esterase Negative 11/28/2020 11:38 AM    Epithelial cells MODERATE (A) 11/28/2020 11:38 AM    Bacteria 1+ (A) 11/28/2020 11:38 AM    WBC 20-50 11/28/2020 11:38 AM    RBC 0-5 11/28/2020 11:38 AM         Medications Reviewed:     Current Facility-Administered Medications   Medication Dose Route Frequency    polyethylene glycol (MIRALAX) packet 17 g  17 g Oral BID    losartan (COZAAR) tablet 50 mg  50 mg Oral DAILY    DULoxetine (CYMBALTA) capsule 90 mg  90 mg Oral DAILY    HYDROcodone-acetaminophen (NORCO) 5-325 mg per tablet 1 Tab  1 Tab Oral Q6H PRN    LORazepam (ATIVAN) tablet 0.5 mg  0.5 mg Oral DAILY PRN    clonazePAM (KlonoPIN) tablet 0.5 mg  0.5 mg Oral BID    ondansetron (ZOFRAN) injection 4 mg  4 mg IntraVENous Q6H PRN    docusate sodium (COLACE) capsule 100 mg  100 mg Oral BID    gabapentin (NEURONTIN) capsule 300 mg  300 mg Oral TID    hydrOXYzine HCL (ATARAX) tablet 50 mg  50 mg Oral Q6H PRN    LORazepam (ATIVAN) injection 2 mg  2 mg IntraVENous PRN    thiamine HCL (B-1) tablet 100 mg  100 mg Oral DAILY    sodium chloride (NS) flush 5-40 mL  5-40 mL IntraVENous Q8H    sodium chloride (NS) flush 5-40 mL  5-40 mL IntraVENous PRN    fenofibrate nanocrystallized (TRICOR) tablet 145 mg  145 mg Oral DAILY    folic acid (FOLVITE) tablet 1 mg  1 mg Oral DAILY    acetaminophen (TYLENOL) tablet 650 mg  650 mg Oral Q6H PRN    sodium chloride (NS) flush 5-40 mL  5-40 mL IntraVENous Q8H    sodium chloride (NS) flush 5-40 mL  5-40 mL IntraVENous PRN     ______________________________________________________________________  EXPECTED LENGTH OF STAY: 3d 9h  ACTUAL LENGTH OF STAY:          12                 Edwin Gibbons MD

## 2020-12-12 NOTE — PROGRESS NOTES
Bedside and Verbal shift change report given to Starr Jackman RN (oncoming nurse) by Cyndi Canada RN (offgoing nurse). Report included the following information SBAR, Kardex, OR Summary, Procedure Summary, Intake/Output, MAR and Recent Results.

## 2020-12-13 PROCEDURE — 74011250637 HC RX REV CODE- 250/637: Performed by: NURSE PRACTITIONER

## 2020-12-13 PROCEDURE — 74011250637 HC RX REV CODE- 250/637: Performed by: HOSPITALIST

## 2020-12-13 PROCEDURE — 74011250637 HC RX REV CODE- 250/637: Performed by: INTERNAL MEDICINE

## 2020-12-13 PROCEDURE — 65270000029 HC RM PRIVATE

## 2020-12-13 RX ORDER — HYDROCODONE BITARTRATE AND ACETAMINOPHEN 5; 325 MG/1; MG/1
1.5 TABLET ORAL
Status: DISCONTINUED | OUTPATIENT
Start: 2020-12-13 | End: 2020-12-14

## 2020-12-13 RX ADMIN — FOLIC ACID 1 MG: 1 TABLET ORAL at 09:37

## 2020-12-13 RX ADMIN — POLYETHYLENE GLYCOL 3350 17 G: 17 POWDER, FOR SOLUTION ORAL at 17:53

## 2020-12-13 RX ADMIN — POLYETHYLENE GLYCOL 3350 17 G: 17 POWDER, FOR SOLUTION ORAL at 09:38

## 2020-12-13 RX ADMIN — DULOXETINE HYDROCHLORIDE 90 MG: 60 CAPSULE, DELAYED RELEASE ORAL at 09:37

## 2020-12-13 RX ADMIN — LOSARTAN POTASSIUM 50 MG: 50 TABLET, FILM COATED ORAL at 09:39

## 2020-12-13 RX ADMIN — Medication 10 ML: at 21:14

## 2020-12-13 RX ADMIN — CLONAZEPAM 0.5 MG: 1 TABLET ORAL at 09:38

## 2020-12-13 RX ADMIN — DOCUSATE SODIUM 100 MG: 100 CAPSULE, LIQUID FILLED ORAL at 09:38

## 2020-12-13 RX ADMIN — DOCUSATE SODIUM 100 MG: 100 CAPSULE, LIQUID FILLED ORAL at 17:53

## 2020-12-13 RX ADMIN — Medication 100 MG: at 09:37

## 2020-12-13 RX ADMIN — LORAZEPAM 0.5 MG: 0.5 TABLET ORAL at 21:06

## 2020-12-13 RX ADMIN — FENOFIBRATE 145 MG: 145 TABLET ORAL at 09:37

## 2020-12-13 RX ADMIN — HYDROCODONE BITARTRATE AND ACETAMINOPHEN 1 TABLET: 5; 325 TABLET ORAL at 12:41

## 2020-12-13 RX ADMIN — HYDROCODONE BITARTRATE AND ACETAMINOPHEN 1.5 TABLET: 5; 325 TABLET ORAL at 16:34

## 2020-12-13 RX ADMIN — CLONAZEPAM 0.5 MG: 1 TABLET ORAL at 21:06

## 2020-12-13 RX ADMIN — HYDROXYZINE HYDROCHLORIDE 50 MG: 25 TABLET, FILM COATED ORAL at 19:28

## 2020-12-13 RX ADMIN — HYDROCODONE BITARTRATE AND ACETAMINOPHEN 1.5 TABLET: 5; 325 TABLET ORAL at 21:07

## 2020-12-13 RX ADMIN — HYDROCODONE BITARTRATE AND ACETAMINOPHEN 1 TABLET: 5; 325 TABLET ORAL at 06:33

## 2020-12-13 RX ADMIN — GABAPENTIN 300 MG: 300 CAPSULE ORAL at 21:05

## 2020-12-13 RX ADMIN — GABAPENTIN 300 MG: 300 CAPSULE ORAL at 16:33

## 2020-12-13 RX ADMIN — GABAPENTIN 300 MG: 300 CAPSULE ORAL at 09:38

## 2020-12-13 NOTE — PROGRESS NOTES
[address] [facility] Adult  Hospitalist Group                                                                                          Hospitalist Progress Note  Alisha Fitch MD  Answering service: [phone] from in house phone        Date of Service:  2020  NAME:  Latrice Betancourt  :  1955  MRN:  [id number]      Admission Summary: Edith is a 60 y.o.   female whom presents with complaint with altered mental status. Her PMH is significant for anxiety and mood disorder, hyponatremia, HTN, and stage 3-4 metastatic breast cancer with a port. Reportedly she was seen in the ED 2 days prior for anxiety and was given a dose of Ativan which she improved with. At that time, [facility]  and noted multiple prescriptions over different time periods for Xanax. She was also seen in the ED on 11/10 for similar complaints and BSMART evaluated at that time and she was unwilling to stay in the hospital and therefore was discharged as she was not a candidate for a TDO. Back in late October, she was seen for concern for benzo withdrawal at that time. Per the ED, she was reportedly brought in via EMS soiled with urine, still in the same hospital gown she wore here with her identification band on her wrist. She is unable to recall why she came into the hospital.  Reportedly told someone she attempted to stab herself in the forehead with a knife but she was unable to confirm or deny that. Does express that she wants to go see Lupillo Merida when I walk into the room. Only complaint is that she is anxious but overall poor historian and difficult to obtain additional information. There is concern that she may have taken too many of her medications as she was talking about empty pill bottles at home. \"    Interval history / Subjective:   Patient seen and examined     States that she has some back pain today,constipation resolved.      Assessment & Plan:     Benzodiazepine Withdrawal  - Improving slowly  - Seizure precautions  - Pt states she ran out of her Xanax 1 week PTA  -continue klopin 0.5 mg bid, increased cymbalta to 90 mg 12/8, prn hydroxyzine   - re evaluated by Psych    Constipation: continue miralax       Suicidal ideation PTA  - attempted to stab self in head POA  -she said she doesn't have suicide or homicidal ideation   - 1:1 sitter at bedside discontinued on 12/4  - seen by Psych team     Severe Anxiety & Mood Disorder  - stable, increased cymbalta to 90 mg daily on 12/7 , continue gabapentin, thiamine, folic acid  - supportive care  - reevaluated by Psych       Mild hypokalemia  - Resolved     Hyponatremia  -Resolved     CORNELIA: normal kidney function on admission  -Resolved     HTN  -BP normal, anxiety is also contributing, cut back cozaar to 50  mg  - Monitor BP     Metastatic Breast CA: mets to sternum; per hem/onc-low disease burden  - on treatment holiday; next appt in Jan 2021  - Last Echo 10/27/2020 was NL  - DDNR on file  - Appreciate palliative care input  - Appreciate hem/onc input; pt not candidate for hospice  - Supportive care    Debility   -PT/OT      Ordered labs    Code status: DNR  DVT prophylaxis: SCD    Care Plan discussed with: Patient/Family and Nurse  Anticipated Disposition: SNF, CM awaiting insurance authorization and state evaluation before discharge as she has hx of inpatient psych admission in the past  Anticipated Discharge: 24 hours to 48 hours     Hospital Problems  Date Reviewed: 12/1/2020          Codes Class Noted POA    * (Principal) Benzodiazepine withdrawal ([facility] [address] ICD-10-CM: M28.051  ICD-9-CM: 292.0, 304.10  1/28/2020 Yes              Vital Signs:    Last 24hrs VS reviewed since prior progress note.  Most recent are:  Visit Vitals  BP (!) 101/53   Pulse (!) 102   Temp 98.1 °F (36.7 °C)   Resp 19   Ht 5' 5\" (1.651 m)   Wt 101.1 kg (222 lb 12.8 oz)   SpO2 98%   BMI 37.08 kg/m²       No intake or output data in the 24 hours ending 12/13/20 8786 Physical Examination:     I had a face to face encounter with this patient and independently examined them on 12/13/2020 as outlined below:          Constitutional:  No acute distress   ENT:  Oral mucosa moist, oropharynx benign. Resp:   No wheezing/rhonchi/rales. No accessory muscle use   CV:  Regular rhythm, normal rate, S1, S2 wnl    GI:  Soft, non distended, non tender    Musculoskeletal:  No LE edema     Neurologic:  Moves all extremities. awake and alert     Skin:   no rashes or ulcers       Data Review:   meds      Labs:     Recent Labs     12/11/20  1107   WBC 4.4   HGB 12.2   HCT 37.0        Recent Labs     12/11/20  1107      K 4.0      CO2 30   BUN 19   CREA 0.83   *   CA 9.1   PHOS 3.6     Recent Labs     12/11/20  1107   ALB 3.2*     No results for input(s): INR, PTP, APTT, INREXT, INREXT in the last 72 hours. No results for input(s): FE, TIBC, PSAT, FERR in the last 72 hours. No results found for: FOL, RBCF   No results for input(s): PH, PCO2, PO2 in the last 72 hours. No results for input(s): CPK, CKNDX, TROIQ in the last 72 hours.     No lab exists for component: CPKMB  No results found for: CHOL, CHOLX, CHLST, CHOLV, HDL, HDLP, LDL, LDLC, DLDLP, TGLX, TRIGL, TRIGP, CHHD, CHHDX  Lab Results   Component Value Date/Time    Glucose (POC) 203 (H) 10/23/2020 05:50 AM    Glucose POC 83 06/14/2011 10:33 AM     Lab Results   Component Value Date/Time    Color YELLOW/STRAW 11/28/2020 11:38 AM    Appearance CLEAR 11/28/2020 11:38 AM    Specific gravity 1.028 11/28/2020 11:38 AM    Specific gravity 1.025 02/12/2018 01:41 PM    pH (UA) 5.5 11/28/2020 11:38 AM    Protein Negative 11/28/2020 11:38 AM    Glucose 250 (A) 11/28/2020 11:38 AM    Ketone Negative 11/28/2020 11:38 AM    Bilirubin Negative 11/28/2020 11:38 AM    Urobilinogen 0.2 11/28/2020 11:38 AM    Nitrites Negative 11/28/2020 11:38 AM    Leukocyte Esterase Negative 11/28/2020 11:38 AM    Epithelial cells MODERATE (A) 11/28/2020 11:38 AM    Bacteria 1+ (A) 11/28/2020 11:38 AM    WBC 20-50 11/28/2020 11:38 AM    RBC 0-5 11/28/2020 11:38 AM         Medications Reviewed:     Current Facility-Administered Medications   Medication Dose Route Frequency    HYDROcodone-acetaminophen (NORCO) 5-325 mg per tablet 1.5 Tab  1.5 Tab Oral Q6H PRN    polyethylene glycol (MIRALAX) packet 17 g  17 g Oral BID    losartan (COZAAR) tablet 50 mg  50 mg Oral DAILY    DULoxetine (CYMBALTA) capsule 90 mg  90 mg Oral DAILY    LORazepam (ATIVAN) tablet 0.5 mg  0.5 mg Oral DAILY PRN    clonazePAM (KlonoPIN) tablet 0.5 mg  0.5 mg Oral BID    ondansetron (ZOFRAN) injection 4 mg  4 mg IntraVENous Q6H PRN    docusate sodium (COLACE) capsule 100 mg  100 mg Oral BID    gabapentin (NEURONTIN) capsule 300 mg  300 mg Oral TID    hydrOXYzine HCL (ATARAX) tablet 50 mg  50 mg Oral Q6H PRN    LORazepam (ATIVAN) injection 2 mg  2 mg IntraVENous PRN    thiamine HCL (B-1) tablet 100 mg  100 mg Oral DAILY    sodium chloride (NS) flush 5-40 mL  5-40 mL IntraVENous Q8H    sodium chloride (NS) flush 5-40 mL  5-40 mL IntraVENous PRN    fenofibrate nanocrystallized (TRICOR) tablet 145 mg  145 mg Oral DAILY    folic acid (FOLVITE) tablet 1 mg  1 mg Oral DAILY    acetaminophen (TYLENOL) tablet 650 mg  650 mg Oral Q6H PRN    sodium chloride (NS) flush 5-40 mL  5-40 mL IntraVENous Q8H    sodium chloride (NS) flush 5-40 mL  5-40 mL IntraVENous PRN     ______________________________________________________________________  EXPECTED LENGTH OF STAY: 3d 9h  ACTUAL LENGTH OF STAY:          13                 Erin Dancer, MD

## 2020-12-13 NOTE — PROGRESS NOTES
Bedside and Verbal shift change report given to Fili Dominguez  (oncoming nurse) by Anastasiia Henley RN (offgoing nurse). Report included the following information SBAR, Kardex, ED Summary, OR Summary, Procedure Summary, Intake/Output, MAR and Recent Results.

## 2020-12-13 NOTE — PROGRESS NOTES
Bedside shift change report given to Brynn Reyes RN (oncoming nurse) by Janie Louie RN (offgoing nurse). Report included the following information SBAR, Kardex, Intake/Output, MAR and Recent Results.

## 2020-12-14 PROCEDURE — 74011000250 HC RX REV CODE- 250: Performed by: HOSPITALIST

## 2020-12-14 PROCEDURE — 74011250637 HC RX REV CODE- 250/637: Performed by: HOSPITALIST

## 2020-12-14 PROCEDURE — 74011250637 HC RX REV CODE- 250/637: Performed by: NURSE PRACTITIONER

## 2020-12-14 PROCEDURE — 74011250637 HC RX REV CODE- 250/637: Performed by: INTERNAL MEDICINE

## 2020-12-14 PROCEDURE — 97535 SELF CARE MNGMENT TRAINING: CPT

## 2020-12-14 PROCEDURE — 97530 THERAPEUTIC ACTIVITIES: CPT

## 2020-12-14 PROCEDURE — 65270000029 HC RM PRIVATE

## 2020-12-14 RX ORDER — HYDROCODONE BITARTRATE AND ACETAMINOPHEN 5; 325 MG/1; MG/1
1 TABLET ORAL
Status: DISCONTINUED | OUTPATIENT
Start: 2020-12-14 | End: 2020-12-17 | Stop reason: HOSPADM

## 2020-12-14 RX ORDER — IBUPROFEN 200 MG
200 TABLET ORAL
Status: DISCONTINUED | OUTPATIENT
Start: 2020-12-14 | End: 2020-12-17 | Stop reason: HOSPADM

## 2020-12-14 RX ORDER — LIDOCAINE 4 G/100G
1 PATCH TOPICAL EVERY 24 HOURS
Status: DISCONTINUED | OUTPATIENT
Start: 2020-12-14 | End: 2020-12-17 | Stop reason: HOSPADM

## 2020-12-14 RX ADMIN — DOCUSATE SODIUM 100 MG: 100 CAPSULE, LIQUID FILLED ORAL at 17:26

## 2020-12-14 RX ADMIN — HYDROXYZINE HYDROCHLORIDE 50 MG: 25 TABLET, FILM COATED ORAL at 03:22

## 2020-12-14 RX ADMIN — FOLIC ACID 1 MG: 1 TABLET ORAL at 09:44

## 2020-12-14 RX ADMIN — POLYETHYLENE GLYCOL 3350 17 G: 17 POWDER, FOR SOLUTION ORAL at 09:44

## 2020-12-14 RX ADMIN — HYDROCODONE BITARTRATE AND ACETAMINOPHEN 1.5 TABLET: 5; 325 TABLET ORAL at 03:22

## 2020-12-14 RX ADMIN — HYDROXYZINE HYDROCHLORIDE 50 MG: 25 TABLET, FILM COATED ORAL at 21:02

## 2020-12-14 RX ADMIN — HYDROCODONE BITARTRATE AND ACETAMINOPHEN 1.5 TABLET: 5; 325 TABLET ORAL at 09:43

## 2020-12-14 RX ADMIN — HYDROCODONE BITARTRATE AND ACETAMINOPHEN 1 TABLET: 5; 325 TABLET ORAL at 23:51

## 2020-12-14 RX ADMIN — GABAPENTIN 300 MG: 300 CAPSULE ORAL at 17:26

## 2020-12-14 RX ADMIN — LOSARTAN POTASSIUM 50 MG: 50 TABLET, FILM COATED ORAL at 09:44

## 2020-12-14 RX ADMIN — DULOXETINE HYDROCHLORIDE 90 MG: 60 CAPSULE, DELAYED RELEASE ORAL at 09:44

## 2020-12-14 RX ADMIN — CLONAZEPAM 0.5 MG: 1 TABLET ORAL at 21:02

## 2020-12-14 RX ADMIN — HYDROCODONE BITARTRATE AND ACETAMINOPHEN 1 TABLET: 5; 325 TABLET ORAL at 17:26

## 2020-12-14 RX ADMIN — Medication 10 ML: at 21:04

## 2020-12-14 RX ADMIN — DOCUSATE SODIUM 100 MG: 100 CAPSULE, LIQUID FILLED ORAL at 09:44

## 2020-12-14 RX ADMIN — GABAPENTIN 300 MG: 300 CAPSULE ORAL at 21:02

## 2020-12-14 RX ADMIN — HYDROXYZINE HYDROCHLORIDE 50 MG: 25 TABLET, FILM COATED ORAL at 12:58

## 2020-12-14 RX ADMIN — CLONAZEPAM 0.5 MG: 1 TABLET ORAL at 09:43

## 2020-12-14 RX ADMIN — Medication 100 MG: at 09:43

## 2020-12-14 RX ADMIN — FENOFIBRATE 145 MG: 145 TABLET ORAL at 09:44

## 2020-12-14 RX ADMIN — GABAPENTIN 300 MG: 300 CAPSULE ORAL at 09:44

## 2020-12-14 RX ADMIN — POLYETHYLENE GLYCOL 3350 17 G: 17 POWDER, FOR SOLUTION ORAL at 17:26

## 2020-12-14 NOTE — PROGRESS NOTES
RUR 42 %    [facility]IDA- Level 2 pending for SNF placement. CM exploring SNF options. BLS at discharge. CM hoping to return to her independent living apartment after rehab. CM spoke with Gregory Heads-  at [place]. The facility feels that they cannot manage the patient's care at this time. Unable to find SNF placement at this time. PT/OT need to see daily. Will follow for transitions of care.      Felton Meza, [address]

## 2020-12-14 NOTE — PROGRESS NOTES
[address] [facility] Adult  Hospitalist Group                                                                                          Hospitalist Progress Note  Wes Vega MD  Answering service: [phone] from in house phone        Date of Service:  2020  NAME:  Ana Yu  :  1955  MRN:  [id number]      Admission Summary: Edith is a 60 y.o.   female whom presents with complaint with altered mental status. Her PMH is significant for anxiety and mood disorder, hyponatremia, HTN, and stage 3-4 metastatic breast cancer with a port. Reportedly she was seen in the ED 2 days prior for anxiety and was given a dose of Ativan which she improved with. At that time, ED provider [facility]  and noted multiple prescriptions over different time periods for Xanax. She was also seen in the ED on 11/10 for similar complaints and BSMART evaluated at that time and she was unwilling to stay in the hospital and therefore was discharged as she was not a candidate for a TDO. Back in late October, she was seen for concern for benzo withdrawal at that time. Per the ED, she was reportedly brought in via EMS soiled with urine, still in the same hospital gown she wore here with her identification band on her wrist. She is unable to recall why she came into the hospital.  Reportedly told someone she attempted to stab herself in the forehead with a knife but she was unable to confirm or deny that. Does express that she wants to go see Di Knott when I walk into the room. Only complaint is that she is anxious but overall poor historian and difficult to obtain additional information. There is concern that she may have taken too many of her medications as she was talking about empty pill bottles at home. \"    Interval history / Subjective:   Patient seen and examined     Waiting for placement  She has low back pain.      Assessment & Plan:     Benzodiazepine Withdrawal  - Seizure precautions  - Pt states she ran out of her Xanax 1 week PTA  -continue klopin 0.5 mg bid, increased cymbalta to 90 mg 12/8, prn hydroxyzine -per psychiatrist recommendations    #Low back pain:  Likely musculoskeletal.   -check lumbar X ray  -lidocaine patch, tylenol ,ibuprofen.  -prn oxycoodne for severe pain    Constipation: continue miralax       Suicidal ideation PTA  - attempted to stab self in head POA  -she said she doesn't have suicide or homicidal ideation now  - 1:1 sitter at bedside discontinued on 12/4  - seen by Psych team     Severe Anxiety & Mood Disorder  - son cymbalta 90 mg      Mild hypokalemia  - Resolved     Hyponatremia  -Resolved     CORNELIA: normal kidney function on admission  -Resolved     HTN  -continue losartan     Metastatic Breast CA: mets to sternum; per hem/onc-low disease burden  - on treatment holiday; next appt in Jan 2021  - Last Echo 10/27/2020 was NL  - DDNR on file  - Appreciate palliative care input  - Appreciate hem/onc input; pt not candidate for hospice  - Supportive care    Debility   -PT/OT          Code status: DNR  DVT prophylaxis: SCD    Care Plan discussed with: Patient/Family and Nurse  Anticipated Disposition: SNF, CM awaiting insurance authorization and state evaluation before discharge as she has hx of inpatient psych admission in the past  Anticipated Discharge: 24 hours to 48 hours     Hospital Problems  Date Reviewed: 12/1/2020          Codes Class Noted POA    * (Principal) Benzodiazepine withdrawal ([facility] [address] ICD-10-CM: V58.988  ICD-9-CM: 292.0, 304.10  1/28/2020 Yes              Vital Signs:    Last 24hrs VS reviewed since prior progress note.  Most recent are:  Visit Vitals  /75   Pulse 93   Temp 98.3 °F (36.8 °C)   Resp 16   Ht 5' 5\" (1.651 m)   Wt 101.5 kg (223 lb 12.8 oz)   SpO2 97%   BMI 37.24 kg/m²       No intake or output data in the 24 hours ending 12/14/20 0940     Physical Examination:     I had a face to face encounter with this patient and independently examined them on 12/14/2020 as outlined below:          Constitutional:  No acute distress   ENT:  Oral mucosa moist, oropharynx benign. Resp:   No wheezing/rhonchi/rales. No accessory muscle use   CV:  Regular rhythm, normal rate, S1, S2 wnl    GI:  Soft, non distended, non tender    Musculoskeletal:  No LE edema     Neurologic:  Moves all extremities. awake and alert     Skin:   no rashes or ulcers       Data Review:   meds      Labs:     Recent Labs     12/11/20  1107   WBC 4.4   HGB 12.2   HCT 37.0        Recent Labs     12/11/20  1107      K 4.0      CO2 30   BUN 19   CREA 0.83   *   CA 9.1   PHOS 3.6     Recent Labs     12/11/20  1107   ALB 3.2*     No results for input(s): INR, PTP, APTT, INREXT, INREXT in the last 72 hours. No results for input(s): FE, TIBC, PSAT, FERR in the last 72 hours. No results found for: FOL, RBCF   No results for input(s): PH, PCO2, PO2 in the last 72 hours. No results for input(s): CPK, CKNDX, TROIQ in the last 72 hours.     No lab exists for component: CPKMB  No results found for: CHOL, CHOLX, CHLST, CHOLV, HDL, HDLP, LDL, LDLC, DLDLP, TGLX, TRIGL, TRIGP, CHHD, CHHDX  Lab Results   Component Value Date/Time    Glucose (POC) 203 (H) 10/23/2020 05:50 AM    Glucose POC 83 06/14/2011 10:33 AM     Lab Results   Component Value Date/Time    Color YELLOW/STRAW 11/28/2020 11:38 AM    Appearance CLEAR 11/28/2020 11:38 AM    Specific gravity 1.028 11/28/2020 11:38 AM    Specific gravity 1.025 02/12/2018 01:41 PM    pH (UA) 5.5 11/28/2020 11:38 AM    Protein Negative 11/28/2020 11:38 AM    Glucose 250 (A) 11/28/2020 11:38 AM    Ketone Negative 11/28/2020 11:38 AM    Bilirubin Negative 11/28/2020 11:38 AM    Urobilinogen 0.2 11/28/2020 11:38 AM    Nitrites Negative 11/28/2020 11:38 AM    Leukocyte Esterase Negative 11/28/2020 11:38 AM    Epithelial cells MODERATE (A) 11/28/2020 11:38 AM    Bacteria 1+ (A) 11/28/2020 11:38 AM    WBC 20-50 11/28/2020 11:38 AM    RBC 0-5 11/28/2020 11:38 AM         Medications Reviewed:     Current Facility-Administered Medications   Medication Dose Route Frequency    HYDROcodone-acetaminophen (NORCO) 5-325 mg per tablet 1.5 Tab  1.5 Tab Oral Q6H PRN    polyethylene glycol (MIRALAX) packet 17 g  17 g Oral BID    losartan (COZAAR) tablet 50 mg  50 mg Oral DAILY    DULoxetine (CYMBALTA) capsule 90 mg  90 mg Oral DAILY    LORazepam (ATIVAN) tablet 0.5 mg  0.5 mg Oral DAILY PRN    clonazePAM (KlonoPIN) tablet 0.5 mg  0.5 mg Oral BID    ondansetron (ZOFRAN) injection 4 mg  4 mg IntraVENous Q6H PRN    docusate sodium (COLACE) capsule 100 mg  100 mg Oral BID    gabapentin (NEURONTIN) capsule 300 mg  300 mg Oral TID    hydrOXYzine HCL (ATARAX) tablet 50 mg  50 mg Oral Q6H PRN    LORazepam (ATIVAN) injection 2 mg  2 mg IntraVENous PRN    thiamine HCL (B-1) tablet 100 mg  100 mg Oral DAILY    sodium chloride (NS) flush 5-40 mL  5-40 mL IntraVENous Q8H    sodium chloride (NS) flush 5-40 mL  5-40 mL IntraVENous PRN    fenofibrate nanocrystallized (TRICOR) tablet 145 mg  145 mg Oral DAILY    folic acid (FOLVITE) tablet 1 mg  1 mg Oral DAILY    acetaminophen (TYLENOL) tablet 650 mg  650 mg Oral Q6H PRN    sodium chloride (NS) flush 5-40 mL  5-40 mL IntraVENous Q8H    sodium chloride (NS) flush 5-40 mL  5-40 mL IntraVENous PRN     ______________________________________________________________________  EXPECTED LENGTH OF STAY: 3d 9h  ACTUAL LENGTH OF STAY:          14                 Phoebe Thibodeaux MD

## 2020-12-14 NOTE — PROGRESS NOTES
Bedside and Verbal shift change report given to Amanda (oncoming nurse) by Emely Hollins (offgoing nurse). Report included the following information SBAR, Kardex, Intake/Output, MAR and Recent Results.

## 2020-12-14 NOTE — PROGRESS NOTES
Problem: Self Care Deficits Care Plan (Adult)  Goal: *Acute Goals and Plan of Care (Insert Text)  Description:   FUNCTIONAL STATUS PRIOR TO ADMISSION: Pt lives alone in single story apartment in [address] [place] [address] and states she was Independent with ADLs/IADLs, without use of DME, showering in walk-in with seated surface and grab bars. HOME SUPPORT: The patient lived alone with no local support. Occupational Therapy Goals  Initiated 12/2/2020, Re-evaluated and continued 12/9/2020  1. Patient will perform RW grooming with supervision within 7 day(s). 2.  Patient will perform EOB/RW bathing with supervision within 7 day(s). 3.  Patient will perform EOB/RW lower body dressing with supervision/set-up within 7 day(s). 4.  Patient will perform RW toilet transfers with supervision within 7 day(s). 5.  Patient will perform all aspects of RW toileting with supervision within 7 day(s). Outcome: Progressing Towards Goal    OCCUPATIONAL THERAPY TREATMENT  Patient: Yue Ware (60 y.o. female)  Date: 12/14/2020  Diagnosis: Overdose [T50.901A]  Benzodiazepine withdrawal ([facility] [address] [F13.239]   Benzodiazepine withdrawal ([facility] [address]       Precautions:  Falls  Chart, occupational therapy assessment, plan of care, and goals were reviewed. ASSESSMENT  Patient continues with skilled OT services and is progressing towards goals. Pt noted with progressive balance as functionally evidenced by completing standing grooming challenges with SBA, with Min verbal cues for widening base of support and positioning RW anteriorly rather than putting off to side during sustained reach outside base of support. Pt continues to benefit from skilled OT to address functional deficits during acute hospitalization, with reporting therapist believing pt would benefit from SNF level rehab upon discharge.      Current Level of Function Impacting Discharge (ADLs): Min A    Other factors to consider for discharge: fluctuating mentation         PLAN :  Patient continues to benefit from skilled intervention to address the above impairments. Continue treatment per established plan of care. to address goals. Recommend with staff: OOB meals, Active ADL engagement, Assist x1 to/from bathroom at RW    Recommend next OT session: POC progression    Recommendation for discharge: (in order for the patient to meet his/her long term goals)  Therapy up to 5 days/week in SNF setting    This discharge recommendation:  Has been made in collaboration with the attending provider and/or case management    IF patient discharges home will need the following DME: TBD       SUBJECTIVE:   Patient stated I have to sit up for lunch.     OBJECTIVE DATA SUMMARY:   Cognitive/Behavioral Status:  Neurologic State: Alert  Orientation Level: Oriented X4  Cognition: Follows commands  Perception: Appears intact  Perseveration: No perseveration noted  Safety/Judgement: Awareness of environment    Functional Mobility and Transfers for ADLs:  Bed Mobility:  Rolling: Supervision  Supine to Sit: Supervision    Transfers:  Sit to Stand: Stand-by assistance  Functional Transfers  Bathroom Mobility: Contact guard assistance       ADL Intervention:  Grooming  Grooming Assistance: Stand-by assistance  Position Performed: Standing  Washing Hands: Stand-by assistance  Cues: Verbal cues provided(for DME safety during positioning)    Lower Body Dressing Assistance  Socks: Set-up  Leg Crossed Method Used: Yes  Position Performed: Seated edge of bed    Cognitive Retraining  Safety/Judgement: Awareness of environment    Pt educated on safe transfer techniques, with specific emphasis on proper hand placement to push up from seated surface rather than attempt to pull self up, fully positioning self in-front of desired seated location, feeling chair on back of legs and reaching back with 1-2 UE to slowly lower self to seated position.     Pain:  No c/o pain    Activity Tolerance:   Good, Fair, and requires rest breaks    After treatment patient left in no apparent distress:   Sitting in chair, Call bell within reach, and Bed / chair alarm activated    COMMUNICATION/COLLABORATION:   The patients plan of care was discussed with: Physical therapist, Registered nurse, and Case management.      Emperatriz Holley, TANIA  Time Calculation: 25 mins

## 2020-12-15 ENCOUNTER — APPOINTMENT (OUTPATIENT)
Dept: GENERAL RADIOLOGY | Age: 65
DRG: 897 | End: 2020-12-15
Attending: HOSPITALIST
Payer: MEDICARE

## 2020-12-15 PROCEDURE — 74011250637 HC RX REV CODE- 250/637: Performed by: HOSPITALIST

## 2020-12-15 PROCEDURE — 65270000029 HC RM PRIVATE

## 2020-12-15 PROCEDURE — 72100 X-RAY EXAM L-S SPINE 2/3 VWS: CPT

## 2020-12-15 PROCEDURE — 74011250637 HC RX REV CODE- 250/637: Performed by: NURSE PRACTITIONER

## 2020-12-15 PROCEDURE — 74011250637 HC RX REV CODE- 250/637: Performed by: INTERNAL MEDICINE

## 2020-12-15 PROCEDURE — 94760 N-INVAS EAR/PLS OXIMETRY 1: CPT

## 2020-12-15 RX ADMIN — CLONAZEPAM 0.5 MG: 1 TABLET ORAL at 22:00

## 2020-12-15 RX ADMIN — HYDROXYZINE HYDROCHLORIDE 50 MG: 25 TABLET, FILM COATED ORAL at 11:28

## 2020-12-15 RX ADMIN — FENOFIBRATE 145 MG: 145 TABLET ORAL at 09:28

## 2020-12-15 RX ADMIN — HYDROCODONE BITARTRATE AND ACETAMINOPHEN 1 TABLET: 5; 325 TABLET ORAL at 06:15

## 2020-12-15 RX ADMIN — DOCUSATE SODIUM 100 MG: 100 CAPSULE, LIQUID FILLED ORAL at 16:50

## 2020-12-15 RX ADMIN — GABAPENTIN 300 MG: 300 CAPSULE ORAL at 22:00

## 2020-12-15 RX ADMIN — HYDROCODONE BITARTRATE AND ACETAMINOPHEN 1 TABLET: 5; 325 TABLET ORAL at 17:31

## 2020-12-15 RX ADMIN — FOLIC ACID 1 MG: 1 TABLET ORAL at 09:28

## 2020-12-15 RX ADMIN — HYDROXYZINE HYDROCHLORIDE 50 MG: 25 TABLET, FILM COATED ORAL at 22:01

## 2020-12-15 RX ADMIN — DOCUSATE SODIUM 100 MG: 100 CAPSULE, LIQUID FILLED ORAL at 09:28

## 2020-12-15 RX ADMIN — HYDROXYZINE HYDROCHLORIDE 50 MG: 25 TABLET, FILM COATED ORAL at 02:45

## 2020-12-15 RX ADMIN — DULOXETINE HYDROCHLORIDE 90 MG: 60 CAPSULE, DELAYED RELEASE ORAL at 09:28

## 2020-12-15 RX ADMIN — POLYETHYLENE GLYCOL 3350 17 G: 17 POWDER, FOR SOLUTION ORAL at 16:50

## 2020-12-15 RX ADMIN — LOSARTAN POTASSIUM 50 MG: 50 TABLET, FILM COATED ORAL at 09:28

## 2020-12-15 RX ADMIN — Medication 10 ML: at 22:01

## 2020-12-15 RX ADMIN — GABAPENTIN 300 MG: 300 CAPSULE ORAL at 09:28

## 2020-12-15 RX ADMIN — HYDROCODONE BITARTRATE AND ACETAMINOPHEN 1 TABLET: 5; 325 TABLET ORAL at 11:28

## 2020-12-15 RX ADMIN — HYDROXYZINE HYDROCHLORIDE 50 MG: 25 TABLET, FILM COATED ORAL at 17:31

## 2020-12-15 RX ADMIN — HYDROCODONE BITARTRATE AND ACETAMINOPHEN 1 TABLET: 5; 325 TABLET ORAL at 22:01

## 2020-12-15 RX ADMIN — POLYETHYLENE GLYCOL 3350 17 G: 17 POWDER, FOR SOLUTION ORAL at 09:28

## 2020-12-15 RX ADMIN — Medication 10 ML: at 16:52

## 2020-12-15 RX ADMIN — GABAPENTIN 300 MG: 300 CAPSULE ORAL at 16:50

## 2020-12-15 RX ADMIN — CLONAZEPAM 0.5 MG: 1 TABLET ORAL at 09:28

## 2020-12-15 RX ADMIN — Medication 100 MG: at 09:28

## 2020-12-15 NOTE — PROGRESS NOTES
Bedside and Verbal shift change report given to Umm (oncoming nurse) by Candie Cornejo (offgoing nurse). Report included the following information SBAR, Kardex, Intake/Output and MAR.

## 2020-12-15 NOTE — PROGRESS NOTES
Physical Therapy  12/15/2020    Chart reviewed. Pt preparing to go [facility] for imaging at this time.  Will defer and follow up tomorrow for PT.

## 2020-12-15 NOTE — PROGRESS NOTES
Bedside and Verbal shift change report given to Umm (oncoming nurse) by Xiomara Hoffman (offgoing nurse). Report included the following information SBAR.

## 2020-12-15 NOTE — PROGRESS NOTES
[facility]  Hospitalist Group                                                                                          Hospitalist Progress Note  Alisha Fitch MD  Answering service: [phone] from in house phone        Date of Service:  12/15/2020  NAME:  Latrice Betancourt  :  1955  MRN:  [id number]      Admission Summary: Edith is a 60 y.o.   female whom presents with complaint with altered mental status. Her PMH is significant for anxiety and mood disorder, hyponatremia, HTN, and stage 3-4 metastatic breast cancer with a port. Reportedly she was seen in the ED 2 days prior for anxiety and was given a dose of Ativan which she improved with. At that time, ED provider evalulated [facility]  and noted multiple prescriptions over different time periods for Xanax. She was also seen in the ED on 11/10 for similar complaints and BSMART evaluated at that time and she was unwilling to stay in the hospital and therefore was discharged as she was not a candidate for a TDO. Back in late October, she was seen for concern for benzo withdrawal at that time. Per the ED, she was reportedly brought in via EMS soiled with urine, still in the same hospital gown she wore here with her identification band on her wrist. She is unable to recall why she came into the hospital.  Reportedly told someone she attempted to stab herself in the forehead with a knife but she was unable to confirm or deny that. Does express that she wants to go see Lupillo Merida when I walk into the room. Only complaint is that she is anxious but overall poor historian and difficult to obtain additional information. There is concern that she may have taken too many of her medications as she was talking about empty pill bottles at home. \"    Interval history / Subjective:       Waiting for placement. No new complaints     Assessment & Plan:     Benzodiazepine Withdrawal  - Seizure precautions  - Pt states she ran out of her Xanax 1 week PTA  -continue klopin 0.5 mg bid, increased cymbalta to 90 mg 12/8, prn hydroxyzine -per psychiatrist recommendations    #Low back pain:  Likely musculoskeletal.   -pendinglumbar X ray  -lidocaine patch, tylenol ,ibuprofen.  -prn oxycoodne for severe pain  Encouraged mobility     Constipation: continue miralax       Suicidal ideation PTA  - attempted to stab self in head POA  -she said she doesn't have suicide or homicidal ideation now  - 1:1 sitter at bedside discontinued on 12/4  - seen by Psych team     Severe Anxiety & Mood Disorder  - son cymbalta 90 mg      Mild hypokalemia  - Resolved     Hyponatremia  -Resolved     CORNELIA: normal kidney function on admission  -Resolved     HTN  -continue losartan     Metastatic Breast CA: mets to sternum; per hem/onc-low disease burden  - on treatment holiday; next appt in Jan 2021  - Last Echo 10/27/2020 was NL  - DDNR on file  - Appreciate palliative care input  - Appreciate hem/onc input; pt not candidate for hospice  - Supportive care    Debility   -PT/OT          Code status: DNR  DVT prophylaxis: SCD    Care Plan discussed with: Patient/Family and Nurse  Anticipated Disposition: tbd  Anticipated Discharge: TBD     Hospital Problems  Date Reviewed: 12/1/2020          Codes Class Noted POA    * (Principal) Benzodiazepine withdrawal ([address] ICD-10-CM: G85.331  ICD-9-CM: 292.0, 304.10  1/28/2020 Yes              Vital Signs:    Last 24hrs VS reviewed since prior progress note.  Most recent are:  Visit Vitals  /75 (BP 1 Location: Left leg, BP Patient Position: At rest)   Pulse 96   Temp 97.6 °F (36.4 °C)   Resp 20   Ht 5' 5\" (1.651 m)   Wt 101.6 kg (224 lb)   SpO2 95%   BMI 37.28 kg/m²       No intake or output data in the 24 hours ending 12/15/20 9183     Physical Examination:     I had a face to face encounter with this patient and independently examined them on 12/15/2020 as outlined below:          Constitutional:  No acute distress   ENT:  Oral mucosa moist, oropharynx benign. Resp:   No wheezing No accessory muscle use   CV:  Regular rhythm, normal rate, S1, S2 wnl    GI:  Soft, non distended, non tender    Musculoskeletal:  No LE edema     Neurologic:  Moves all extremities. awake and alert     Skin:   no rashes or ulcers       Data Review:   meds      Labs:     No results for input(s): WBC, HGB, HCT, PLT, HGBEXT, HCTEXT, PLTEXT, HGBEXT, HCTEXT, PLTEXT in the last 72 hours. No results for input(s): NA, K, CL, CO2, BUN, CREA, GLU, CA, MG, PHOS, URICA in the last 72 hours. No results for input(s): ALT, AP, TBIL, TBILI, TP, ALB, GLOB, GGT, AML, LPSE in the last 72 hours. No lab exists for component: SGOT, GPT, AMYP, HLPSE  No results for input(s): INR, PTP, APTT, INREXT, INREXT in the last 72 hours. No results for input(s): FE, TIBC, PSAT, FERR in the last 72 hours. No results found for: FOL, RBCF   No results for input(s): PH, PCO2, PO2 in the last 72 hours. No results for input(s): CPK, CKNDX, TROIQ in the last 72 hours.     No lab exists for component: CPKMB  No results found for: CHOL, CHOLX, CHLST, CHOLV, HDL, HDLP, LDL, LDLC, DLDLP, TGLX, TRIGL, TRIGP, CHHD, CHHDX  Lab Results   Component Value Date/Time    Glucose (POC) 203 (H) 10/23/2020 05:50 AM    Glucose POC 83 06/14/2011 10:33 AM     Lab Results   Component Value Date/Time    Color YELLOW/STRAW 11/28/2020 11:38 AM    Appearance CLEAR 11/28/2020 11:38 AM    Specific gravity 1.028 11/28/2020 11:38 AM    Specific gravity 1.025 02/12/2018 01:41 PM    pH (UA) 5.5 11/28/2020 11:38 AM    Protein Negative 11/28/2020 11:38 AM    Glucose 250 (A) 11/28/2020 11:38 AM    Ketone Negative 11/28/2020 11:38 AM    Bilirubin Negative 11/28/2020 11:38 AM    Urobilinogen 0.2 11/28/2020 11:38 AM    Nitrites Negative 11/28/2020 11:38 AM    Leukocyte Esterase Negative 11/28/2020 11:38 AM    Epithelial cells MODERATE (A) 11/28/2020 11:38 AM    Bacteria 1+ (A) 11/28/2020 11:38 AM    WBC 20-50 11/28/2020 11:38 AM    RBC 0-5 11/28/2020 11:38 AM         Medications Reviewed:     Current Facility-Administered Medications   Medication Dose Route Frequency    lidocaine 4 % patch 1 Patch  1 Patch TransDERmal Q24H    ibuprofen (MOTRIN) tablet 200 mg  200 mg Oral Q6H PRN    HYDROcodone-acetaminophen (NORCO) 5-325 mg per tablet 1 Tab  1 Tab Oral Q6H PRN    polyethylene glycol (MIRALAX) packet 17 g  17 g Oral BID    losartan (COZAAR) tablet 50 mg  50 mg Oral DAILY    DULoxetine (CYMBALTA) capsule 90 mg  90 mg Oral DAILY    clonazePAM (KlonoPIN) tablet 0.5 mg  0.5 mg Oral BID    ondansetron (ZOFRAN) injection 4 mg  4 mg IntraVENous Q6H PRN    docusate sodium (COLACE) capsule 100 mg  100 mg Oral BID    gabapentin (NEURONTIN) capsule 300 mg  300 mg Oral TID    hydrOXYzine HCL (ATARAX) tablet 50 mg  50 mg Oral Q6H PRN    thiamine HCL (B-1) tablet 100 mg  100 mg Oral DAILY    sodium chloride (NS) flush 5-40 mL  5-40 mL IntraVENous Q8H    sodium chloride (NS) flush 5-40 mL  5-40 mL IntraVENous PRN    fenofibrate nanocrystallized (TRICOR) tablet 145 mg  145 mg Oral DAILY    folic acid (FOLVITE) tablet 1 mg  1 mg Oral DAILY    acetaminophen (TYLENOL) tablet 650 mg  650 mg Oral Q6H PRN    sodium chloride (NS) flush 5-40 mL  5-40 mL IntraVENous Q8H    sodium chloride (NS) flush 5-40 mL  5-40 mL IntraVENous PRN     ______________________________________________________________________  EXPECTED LENGTH OF STAY: 3d 9h  ACTUAL LENGTH OF STAY:          15                 Rupal Baird MD

## 2020-12-16 PROCEDURE — 74011250637 HC RX REV CODE- 250/637: Performed by: HOSPITALIST

## 2020-12-16 PROCEDURE — 74011250637 HC RX REV CODE- 250/637: Performed by: NURSE PRACTITIONER

## 2020-12-16 PROCEDURE — 74011250637 HC RX REV CODE- 250/637: Performed by: INTERNAL MEDICINE

## 2020-12-16 PROCEDURE — 97535 SELF CARE MNGMENT TRAINING: CPT

## 2020-12-16 PROCEDURE — 65270000029 HC RM PRIVATE

## 2020-12-16 PROCEDURE — 97116 GAIT TRAINING THERAPY: CPT

## 2020-12-16 RX ORDER — HEPARIN SODIUM 5000 [USP'U]/ML
5000 INJECTION, SOLUTION INTRAVENOUS; SUBCUTANEOUS EVERY 8 HOURS
Status: DISCONTINUED | OUTPATIENT
Start: 2020-12-17 | End: 2020-12-17 | Stop reason: HOSPADM

## 2020-12-16 RX ADMIN — CLONAZEPAM 0.5 MG: 1 TABLET ORAL at 21:00

## 2020-12-16 RX ADMIN — HYDROCODONE BITARTRATE AND ACETAMINOPHEN 1 TABLET: 5; 325 TABLET ORAL at 19:19

## 2020-12-16 RX ADMIN — Medication 10 ML: at 21:00

## 2020-12-16 RX ADMIN — Medication 100 MG: at 09:41

## 2020-12-16 RX ADMIN — DOCUSATE SODIUM 100 MG: 100 CAPSULE, LIQUID FILLED ORAL at 09:41

## 2020-12-16 RX ADMIN — LOSARTAN POTASSIUM 50 MG: 50 TABLET, FILM COATED ORAL at 09:42

## 2020-12-16 RX ADMIN — HYDROXYZINE HYDROCHLORIDE 50 MG: 25 TABLET, FILM COATED ORAL at 21:00

## 2020-12-16 RX ADMIN — POLYETHYLENE GLYCOL 3350 17 G: 17 POWDER, FOR SOLUTION ORAL at 09:42

## 2020-12-16 RX ADMIN — FENOFIBRATE 145 MG: 145 TABLET ORAL at 09:42

## 2020-12-16 RX ADMIN — DOCUSATE SODIUM 100 MG: 100 CAPSULE, LIQUID FILLED ORAL at 19:19

## 2020-12-16 RX ADMIN — GABAPENTIN 300 MG: 300 CAPSULE ORAL at 21:00

## 2020-12-16 RX ADMIN — HYDROCODONE BITARTRATE AND ACETAMINOPHEN 1 TABLET: 5; 325 TABLET ORAL at 13:28

## 2020-12-16 RX ADMIN — FOLIC ACID 1 MG: 1 TABLET ORAL at 09:42

## 2020-12-16 RX ADMIN — GABAPENTIN 300 MG: 300 CAPSULE ORAL at 15:14

## 2020-12-16 RX ADMIN — Medication 10 ML: at 14:38

## 2020-12-16 RX ADMIN — DULOXETINE HYDROCHLORIDE 90 MG: 60 CAPSULE, DELAYED RELEASE ORAL at 09:41

## 2020-12-16 RX ADMIN — HYDROXYZINE HYDROCHLORIDE 50 MG: 25 TABLET, FILM COATED ORAL at 15:14

## 2020-12-16 RX ADMIN — GABAPENTIN 300 MG: 300 CAPSULE ORAL at 09:42

## 2020-12-16 RX ADMIN — POLYETHYLENE GLYCOL 3350 17 G: 17 POWDER, FOR SOLUTION ORAL at 19:19

## 2020-12-16 RX ADMIN — HYDROCODONE BITARTRATE AND ACETAMINOPHEN 1 TABLET: 5; 325 TABLET ORAL at 07:19

## 2020-12-16 RX ADMIN — CLONAZEPAM 0.5 MG: 1 TABLET ORAL at 09:42

## 2020-12-16 NOTE — PROGRESS NOTES
Re-assess Nutrition Assessment    Type and Reason for Visit: RD nutrition re-screen/LOS    Nutrition Recommendations/Plan:   Continue snacks and encourage diet soda  Reinforced wt management & discouraged SSB's (drinking Coke & other soda)     Nutrition Assessment:     Admit OD and Benzodiazepine withdrawal; PMH metastatic breast CA, next appointment 1/2021. Hx Depression, GERD, HTN, HL, Neoplastic malignant related fatigue (2018), Pain due to neoplasm (2018), Secondary cancer of bone (2017). Pt was brought to ED after suspected self-harm event. Pt is no longer 1:1. Pt is medically ready for discharge to SNF awaiting auth and placement. May consider evaluation for deficiencies such as Vit D. Noted  B12 checked and WDL. Some elevated BG results. Pt may benefit from pre-diabetes & wt management education after discharge. Pt is visited by RD and claims # however, current wt 224# (12/15/2020) and last year wt 180# & 213# 12/25-28/2019. Last A1C 6.2 (11/29/2020); (12/11/2020). Drinking regular COKE today large bottle and lemon-lime soda and asking for gingerale. Discouraged SSB's. Nutrition focused physical exam completed last RD assessment, no signs of malnutrition. Appetite excellent, ate 100% lunch. Had BM today with Rx Colace and Miralax. No chew/swallow problems and no GI complaints. Skin intact. Malnutrition Assessment:  Malnutrition Status:  No malnutrition      Estimated Daily Nutrient Needs:  Energy (kcal): 2050 kcal/d (MSJ xAF 1.3); Weight Used for Energy Requirements: Current 101.6 kg  Protein (g): 81-100g (0.8-1.0g/kg);  Weight Used for Protein Requirements: Current 101.6 kg  Fluid (ml/day): 2L/d; Method Used for Fluid Requirements: 1 ml/kcal    Nutrition Related Findings:       Last BM: 12/16/20  Abd: soft, intact, active  Edema: none noted    Wounds:    None       Current Nutrition Therapies:  DIET REGULAR  DIET SNACKS PM Snack, HS Snack; PM: pepsi & baked chips HS: 6packs saltines, cheese cubes, ginger ale     Meal Intake Documented:   Patient Vitals for the past 168 hrs:   % Diet Eaten   12/11/20 0856 100 %   12/09/20 1743 80 %       Anthropometric Measures:  · Height:  5' 5\" (165.1 cm)  · Current Body Wt:  101.6 kg (223 lb 15.8 oz)   · Admission Body Wt:  217 lb 13 oz    · Usual Body Wt:  186#    · Ideal Body Wt:  125 lbs:  179.2 %   · Adjusted Body Weight: N/A    · BMI Category:  Obese class 2 (BMI 35.0-39. 9)     Body mass index is 37.28 kg/m². Wt Readings from Last 10 Encounters:   12/15/20 101.6 kg (224 lb)   10/27/20 98.4 kg (217 lb)   10/24/20 101.4 kg (223 lb 8.7 oz)   08/02/20 96.2 kg (212 lb)   02/01/20 94.9 kg (209 lb 3.5 oz)   12/28/19 81.6 kg (180 lb)   12/25/19 96.9 kg (213 lb 10 oz)   06/20/19 94.3 kg (207 lb 14.3 oz)   04/04/18 88.2 kg (194 lb 6.4 oz)   03/09/18 88.5 kg (195 lb)   ]  Nutrition Diagnosis:   · No nutrition diagnosis at this time         Nutrition Interventions:   Food and/or Nutrient Delivery: Continue current diet, Snacks (specify)  Nutrition Education and Counseling: No recommendations at this time  Coordination of Nutrition Care: No recommendation at this time    Goals:  Continue good appetite % all meals. No wt gain next 90 days.      Nutrition Monitoring and Evaluation:   Food/Nutrient Intake Outcomes: Food and nutrient intake  Physical Signs/Symptoms Outcomes: Weight, Biochemical data    Discharge Planning:    Recommend pursue outpatient nutrition counseling       No results found for: Analy Mcknight, [id number]    Lab Results   Component Value Date/Time    Vitamin B12 604 11/28/2020 11:38 AM     Lab Results   Component Value Date/Time    Hemoglobin A1c 6.2 (H) 11/29/2020 05:17 AM       Electronically signed by Mariella Green RD on 12/16/2020 at 1:05 PM  Contact: Tobi

## 2020-12-16 NOTE — PROGRESS NOTES
Problem: Self Care Deficits Care Plan (Adult)  Goal: *Acute Goals and Plan of Care (Insert Text)  Description:   FUNCTIONAL STATUS PRIOR TO ADMISSION: Pt lives alone in single story apartment in [address] [place] [address] and states she was Independent with ADLs/IADLs, without use of DME, showering in walk-in with seated surface and grab bars. HOME SUPPORT: The patient lived alone with no local support. Occupational Therapy Goals  Initiated 12/2/2020, Re-evaluated and continued 12/9/2020  1. Patient will perform RW grooming with supervision within 7 day(s). -Met and upgraded below  2. Patient will perform EOB/RW bathing with supervision within 7 day(s). -Met and upgraded below  3. Patient will perform EOB/RW lower body dressing with supervision/set-up within 7 day(s). -Met and upgraded below  4. Patient will perform RW toilet transfers with supervision within 7 day(s). -Met and upgraded below  5. Patient will perform all aspects of RW toileting with supervision within 7 day(s). -Met and upgraded below    Weekly Re-assessment 12/16/2020  1. Patient will perform RW grooming with Modified [place] within 7 day(s). 2.  Patient will perform EOB/RW bathing with Modified [place] within 7 day(s). 3.  Patient will perform EOB/RW lower body dressing with Modified [place] within 7 day(s). 4.  Patient will perform RW toilet transfers with Modified [place] within 7 day(s). 5.  Patient will perform all aspects of RW toileting with Modified Mercer within 7 day(s). Outcome: Progressing Towards Goal    OCCUPATIONAL THERAPY TREATMENT/WEEKLY RE-ASSESSMENT  Patient: Carolina Wall (60 y.o. female)  Date: 12/16/2020  Diagnosis: Overdose [T50.901A]  Benzodiazepine withdrawal ([address] [F13.239] Benzodiazepine withdrawal ([facility]       Precautions:    Chart, occupational therapy assessment, plan of care, and goals were reviewed. ASSESSMENT  Patient continues with skilled OT services and is progressing towards goals.   Pt noted with progressive functional balance, activity tolerance and full body ADL reaching, as well as, progressive ADL related safety this reporting period which has aided her ability to progress to Supervision level for RW ADL completion; all goals met and upgraded at current level. OT and pt spoke about in-home medication management, with pt reporting owning pill organizer and OT following up with nursing re: pt's medication questions (nursing following), with pt expressing concerns regarding anxiety within home environment and OT educating on use of weighed blanket in conjunction with medication, with good understanding verbalized. Pt continues to benefit from skilled OT to address functional deficits in an attempt at maximizing pt's highest level of safe functional independence, with reporting therapist believing pt safe for discharge home, from an ADL standpoint, with [address] [place] [address] services and ADL/IADL Supervision, with OT speaking with case management regarding higher level IADL concerns and CM following, stating she will follow up with pt regarding various community services to assist with IADL needs. Current Level of Function Impacting Discharge (ADLs): Supervision    Other factors to consider for discharge: Psych hx         PLAN :  Goals have been updated based on progression since last assessment. Patient continues to benefit from skilled intervention to address the above impairments. Continue to follow patient 3 times a week to address goals.     Recommend with staff: OOB meals, Active ADL engagement, Assist x1 to/from restroom    Recommend next OT session: POC progression    Recommendation for discharge: (in order for the patient to meet his/her long term goals)  Occupational therapy at least 2 days/week in the home AND ensure assist and/or supervision for safety with ADL/IADLs    This discharge recommendation:  Has been made in collaboration with the attending provider and/or case management    IF patient discharges home will need the following DME: patient owns DME required for discharge       SUBJECTIVE:   Patient stated i'm still dealing with anxiety.     OBJECTIVE DATA SUMMARY:   Cognitive/Behavioral Status:  Neurologic State: Alert  Orientation Level: Oriented X4  Cognition: Follows commands  Perception: Appears intact  Perseveration: No perseveration noted  Safety/Judgement: Awareness of environment    Functional Mobility and Transfers for ADLs:  Bed Mobility:  Supine to Sit: Modified independent; Additional time  Sit to Supine: (up in chair after)    Transfers:  Sit to Stand: Supervision; Additional time; Adaptive equipment  Functional Transfers  Bathroom Mobility: Supervision/set up(at RW)  Bed to Chair: Supervision; Adaptive equipment; Additional time    Balance:  Sitting: Intact; Without support  Standing: Intact; With support(with hands on the walker)    ADL Intervention:  Grooming  Grooming Assistance: Supervision  Position Performed: Standing(RW at sink)  Washing Hands: Supervision  Cues: Verbal cues provided(for anterior RW positioning)    Cognitive Retraining  Safety/Judgement: Awareness of environment    Pain:  No c/o pain    Activity Tolerance:   Good, Fair, and requires rest breaks    After treatment patient left in no apparent distress:   Sitting in chair, Call bell within reach, and Bed / chair alarm activated    COMMUNICATION/COLLABORATION:   The patients plan of care was discussed with: Physical therapist, Registered nurse, and Case management.      Belkys Barrera OT  Time Calculation: 13 mins

## 2020-12-16 NOTE — PROGRESS NOTES
RUR 42%    FRIDA: Level II pending for SNF placement vs. return to independent living with home health. Level II needed to ensure that patient will have her needs met if LTC SNF is needed in the future. CM will need to secure home health (referral sent to [facility]) and ensure patient has rolling walker prior to discharge(order sent to [employer]). Patient may need transport at discharge. Chart reviewed. Spoke with CM supervisor. CM called [facility] #[phone] option#3, then option #8 and left message to follow-up on the status of the level II screening. Plan B would be for patient to return to independent living apartment with home health SN for med management, PT/OT. CM to explore resources in the community to assist ([facility], [facility]). CM met with patient at bedside. Patient is open to referral pending sent to [facility], however does not want the assistance from the [facility]. CM sent order for rolling walker to [employer]. Patient has no preference for home health. Referral been sent to [facility]. Patient has a co-pay of $6.66 for the walker. Patient is aware of co-pay and has cash to pay this.     GLORIA Escobar/CRM

## 2020-12-16 NOTE — PROGRESS NOTES
Bedside and Verbal shift change report given to [facility] (oncoming nurse) by Wilberto Heredia (offgoing nurse). Report included the following information SBAR, Kardex, Intake/Output and MAR.

## 2020-12-16 NOTE — PROGRESS NOTES
Problem: Mobility Impaired (Adult and Pediatric)  Goal: *Acute Goals and Plan of Care (Insert Text)  Description: FUNCTIONAL STATUS PRIOR TO ADMISSION: Patient was independent without use of DME. Patient states she was mobilizing, driving, and performing ADLs/IADLs independently until about 2 weeks ago. Since, then she has spent most of her time in the bed with little mobility due to fear of falling. HOME SUPPORT PRIOR TO ADMISSION: The patient lived alone in a senior living facility and has no local support. Physical Therapy Goals  Reassessment completed 12/16/2020, goals updated:  1. Patient will ambulate with least restrictive assistive device 300' with modified independence in halls with distractions within 7 days. 2. Patient will asc/desc 4 stairs with 1 railing with modified independence within 7 days. Initiated 12/9/2020  1. Patient will move from supine to sit and sit to supine , scoot up and down, and roll side to side in bed with modified independence within 7 day(s). 2.  Patient will transfer from bed to chair and chair to bed with supervision using the least restrictive device within 7 day(s). 3.  Patient will perform sit to stand with supervision within 7 day(s). 4.  Patient will ambulate with supervision for 50 feet with the least restrictive device within 7 day(s). Still appropriate. Initiated 12/2/2020  1. Patient will move from supine to sit and sit to supine , scoot up and down, and roll side to side in bed with modified independence within 7 day(s). 2.  Patient will transfer from bed to chair and chair to bed with supervision using the least restrictive device within 7 day(s). 3.  Patient will perform sit to stand with supervision within 7 day(s). 4.  Patient will ambulate with supervision for 50 feet with the least restrictive device within 7 day(s). Outcome: Progressing Towards Goal   PHYSICAL THERAPY TREATMENT: WEEKLY REASSESSMENT  Patient:  Marion Louann (66 y.o. female)  Date: 12/16/2020  Primary Diagnosis: Overdose [T50.901A]  Benzodiazepine withdrawal ([facility] [F13.251]        Precautions:          ASSESSMENT  Patient continues with skilled PT services and is progressing towards goals. She is ambulating in the room with nursing to the bathroom and back with the walker and reports she is feeling more confident with her mobility. With some encouragement, she was able to ambulate in the avendaño with the RW for balance. Note that as she walks longer distances and around distractions, she is more anxious. However,  she was able to be reassured and complete increased mobility without loss of balance. Because she lives alone and still needs to be able to manage her own ADLs, iADLs and medications, she would benefit from a maximal amount of assist at home. She is reluctant to discharge to a SNF, but she also has no outside support. Discussed with case management and we will continue to progress her activity and independence as she is able. Patient's progression toward goals since last assessment: improved gait and balance    Current Level of Function Impacting Discharge (mobility/balance): supervision for safety with basic mob;ility    Other factors to consider for discharge: lives alone, no outside support         PLAN :  Goals have been updated based on progression since last assessment. Patient continues to benefit from skilled intervention to address the above impairments. Recommendations and Planned Interventions: gait training, therapeutic exercises, patient and family training/education, and therapeutic activities      Frequency/Duration: Patient will be followed by physical therapy:  5 times a week to address goals.     Recommendation for discharge: (in order for the patient to meet his/her long term goals)  To be determined: ideally, home with HHPT and OT and RN and aides to assist with iADLs, otherwise she would need SNF level rehab to progress to higher level of independence    This discharge recommendation:  Has been made in collaboration with the attending provider and/or case management    IF patient discharges home will need the following DME: rolling walker         SUBJECTIVE:   Patient stated I have a lot of anxiety still.     OBJECTIVE DATA SUMMARY:   HISTORY:    Past Medical History:   Diagnosis Date    Acute hyponatremia 6/18/2019    Anxiety     Bimalleolar fracture of left ankle 2/3/2016    Comminuted and displaced     Cancer [facility])     breast    Closed left ankle fracture 2/4/2016    Depression     Gastroenteritis due to norovirus 2/21/2018    GERD (gastroesophageal reflux disease)     HTN (hypertension)     Hypercholesterolemia     Hypotension 9/12/2012    Metastatic breast cancer ([address] 5/3/2017    Neoplastic malignant related fatigue 4/4/2018    Pain due to neoplasm 4/4/2018    Secondary cancer of bone ([facility] 5/3/2017    Sepsis ([facility] 2/12/2018    Urinary tract infection without hematuria 2/13/2018     Past Surgical History:   Procedure Laterality Date    BREAST SURGERY PROCEDURE UNLISTED  march 13    carina masectomy       Home Situation  Home Environment: Independent living  # Steps to Enter: 0  One/Two Story Residence: One story  Living Alone: Yes  Current DME Used/Available at Home: Grab bars, Angela , Transfer bench  Tub or Shower Type: Shower    EXAMINATION/PRESENTATION/DECISION MAKING:   Critical Behavior:  Neurologic State: Alert  Orientation Level: Oriented X4  Cognition: Appropriate decision making, Appropriate for age attention/concentration, Appropriate safety awareness, Follows commands  Safety/Judgement: Awareness of environment  Hearing:   Auditory  Auditory Impairment: None  Skin:    Edema:   Range Of Motion:  AROM: Within functional limits                       Strength:    Strength: Generally decreased, functional                    Tone & Sensation:   Tone: Normal              Sensation: (neuropathy with numbness RLE POA)               Coordination:  Coordination: Within functional limits  Vision:      Functional Mobility:  Bed Mobility:     Supine to Sit: Modified independent; Additional time  Sit to Supine: (up in chair after)     Transfers:  Sit to Stand: Supervision; Additional time; Adaptive equipment  Stand to Sit: Supervision; Adaptive equipment; Additional time        Bed to Chair: Supervision; Adaptive equipment; Additional time              Balance:   Sitting: Intact; Without support  Standing: Intact; With support(with hands on the walker)  Ambulation/Gait Training:  Distance (ft): 300 Feet (ft)  Assistive Device: Gait belt;Walker, rolling  Ambulation - Level of Assistance: Stand-by assistance; Adaptive equipment; Additional time        Gait Abnormalities: Decreased step clearance(needs walker for balance)        Base of Support: Widened     Speed/Antonietta: Pace decreased (<100 feet/min)  Step Length: Right shortened;Left shortened  Swing Pattern: Right asymmetrical(slight foot drop/steppage gait on R related to neuropathy)                  Stairs: Therapeutic Exercises:       Functional Measure:        Pain Rating:  No complaints of pain    Activity Tolerance:   Good    After treatment patient left in no apparent distress:   Sitting in chair, Call bell within reach, and Bed / chair alarm activated    COMMUNICATION/EDUCATION:   The patients plan of care was discussed with: Occupational therapist, Registered nurse, and Case management. Fall prevention education was provided and the patient/caregiver indicated understanding., Patient/family have participated as able in goal setting and plan of care. , and Patient/family agree to work toward stated goals and plan of care.     Thank you for this referral.  Maribell Melgar, PT   Time Calculation: 26 mins

## 2020-12-16 NOTE — PROGRESS NOTES
Music Therapy Assessment  [address] [id number]     1955  60 y.o.  female    Patient Telephone Number: [phone] (home)   Ad[employer]ist Affiliation: [place]   Language: English   Patient Active Problem List    Diagnosis Date Noted    Bacteremia 10/25/2020    Seizure ([facility] 10/23/2020    Major depression 02/01/2020    Hypokalemia 01/28/2020    Benzodiazepine withdrawal ([facility] 01/28/2020    Chronic prescription benzodiazepine use 01/28/2020    Altered mental status 01/27/2020    Drug-induced constipation 04/05/2018    Advance care planning 04/05/2018    CHF (congestive heart failure) ([facility] 04/02/2018    Depression 04/02/2018    Dementia ([facility] 04/02/2018    Metastatic breast cancer ([facility] 05/03/2017    Secondary cancer of bone ([facility] 05/03/2017    Anxiety 02/03/2016    HTN (hypertension)     GERD (gastroesophageal reflux disease)     Hypercholesterolemia         Date: 12/16/2020            Total Time (in minutes): 22          [facility] 5S1 ORTHO JOINT    Mental Status:   [x] Alert [  ] Altamese Like [  ]  Confused  [  ] Minimally responsive  [  ] Sleeping    Communication Status: [  ] Impaired Speech [  ] Nonverbal -N/A    Physical Status:   [  ] Oxygen in use  [  ] Hard of Hearing [  ] Vision Impaired  [  ] Ambulatory  [  ] Ambulatory with assistance [  ] Non-ambulatory -N/A    Music Preferences, Background: Blues, pt said she likes artists like [place], Andrew [address], Alysha, and [place].     Clinical Problem addressed: Support relaxation    Goal(s) met in session:  Physical/Pain management (Scale of 1-10):    Pre-session rating: Pt didn't report on pain  Post-session rating: Pt didn't report on pain  [x] Increased relaxation   [  ] Affected breathing patterns  [  ] Decreased muscle tension   [  ] Decreased agitation  [  ] Affected heart rate    [  ] Increased alertness     Emotional/Psychological:  [x] Increased self-expression   [  ] Decreased aggressive behavior   [  ] Decreased feelings of stress  [  ] Discussed healthy coping skills     [  ] Improved mood    [  ] Decreased withdrawn behavior     Social:  [  ] Decreased feelings of isolation/loneliness [x] Positive social interaction   [  ] Provided support and/or comfort for family/friends    Spiritual:  [  ] Spiritual support    [  ] Expressed peace  [  ] Expressed kimberley    [  ] Discussed beliefs    Techniques Utilized (Check all that apply):   [  ] Procedural support MT [x] Music for relaxation [x] Patient preferred music  [  ] Delmis analysis  [x ] Song choice  [x ] Music for validation  [  ] Entrainment  [  ] Movement to music [  ] Guided visualization  [  ] Alexandria Rolo  [  ] Patient instrument playing [  ] Roberto Border writing  [x] Sing along   [  ] Ether Bianka  [  ] Sensory stimulation  [x] Active Listening  [  ] Music for spiritual support [  ] Making of CDs as gifts    Session Observations:  F/up visit; Patient (pt) was alert, laying in bed when this music therapy intern (MTI) entered the room. MTI asked how the pt was feeling and she responded to this. MTI offered a music therapy session and pt agreed to this. MTI sat at bedside and sang and played [address] and [place] Against the [place] with guitar. Pt increased relaxation in response to the music as evidenced by (AEB) closing her eyes. She also appeared to be singing along. Pt requested a song the MTI sang in a previous session. MTI sang and played Lyngladys Sewickley' Don't Know Why with guitar. Pt further increased relaxation AEB closing her eyes during the song. Pt shared the music made her feel relaxed, and she shared the various ways she uses music when she's at home. 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 She also mentioned her potential discharge on Saturday, and shared she's looking forward to returning home.  MTI offered another song and the pt declined due to needing to prepare for her meal. ART expressed understanding and exited. Jerica Cornejo, Music Therapy Intern  Spiritual Care Services Dept.    Referral-based service

## 2020-12-17 VITALS
WEIGHT: 224 LBS | SYSTOLIC BLOOD PRESSURE: 133 MMHG | BODY MASS INDEX: 37.32 KG/M2 | DIASTOLIC BLOOD PRESSURE: 75 MMHG | HEART RATE: 105 BPM | TEMPERATURE: 97.8 F | HEIGHT: 65 IN | RESPIRATION RATE: 18 BRPM | OXYGEN SATURATION: 95 %

## 2020-12-17 PROBLEM — N17.9 AKI (ACUTE KIDNEY INJURY) (HCC): Status: ACTIVE | Noted: 2020-12-17

## 2020-12-17 PROCEDURE — 74011250637 HC RX REV CODE- 250/637: Performed by: NURSE PRACTITIONER

## 2020-12-17 PROCEDURE — 74011250636 HC RX REV CODE- 250/636: Performed by: INTERNAL MEDICINE

## 2020-12-17 PROCEDURE — 74011250636 HC RX REV CODE- 250/636: Performed by: HOSPITALIST

## 2020-12-17 PROCEDURE — 74011250637 HC RX REV CODE- 250/637: Performed by: INTERNAL MEDICINE

## 2020-12-17 PROCEDURE — 74011250637 HC RX REV CODE- 250/637: Performed by: HOSPITALIST

## 2020-12-17 RX ORDER — HYDROXYZINE 50 MG/1
50 TABLET, FILM COATED ORAL
Qty: 30 TAB | Refills: 0 | Status: SHIPPED | OUTPATIENT
Start: 2020-12-17 | End: 2020-12-27

## 2020-12-17 RX ORDER — LANOLIN ALCOHOL/MO/W.PET/CERES
100 CREAM (GRAM) TOPICAL DAILY
Qty: 30 TAB | Refills: 0 | Status: SHIPPED | OUTPATIENT
Start: 2020-12-18 | End: 2021-01-17

## 2020-12-17 RX ORDER — GABAPENTIN 300 MG/1
300 CAPSULE ORAL 3 TIMES DAILY
Qty: 90 CAP | Refills: 0 | Status: SHIPPED | OUTPATIENT
Start: 2020-12-17 | End: 2021-01-16

## 2020-12-17 RX ORDER — HYDROCODONE BITARTRATE AND ACETAMINOPHEN 5; 325 MG/1; MG/1
1 TABLET ORAL
Qty: 9 TAB | Refills: 0 | Status: SHIPPED | OUTPATIENT
Start: 2020-12-17 | End: 2020-12-20

## 2020-12-17 RX ORDER — DULOXETIN HYDROCHLORIDE 30 MG/1
90 CAPSULE, DELAYED RELEASE ORAL DAILY
Qty: 90 CAP | Refills: 0 | Status: SHIPPED | OUTPATIENT
Start: 2020-12-18 | End: 2021-01-17

## 2020-12-17 RX ORDER — POLYETHYLENE GLYCOL 3350 17 G/17G
17 POWDER, FOR SOLUTION ORAL 2 TIMES DAILY
Qty: 60 PACKET | Refills: 0 | Status: SHIPPED | OUTPATIENT
Start: 2020-12-17 | End: 2021-01-16

## 2020-12-17 RX ORDER — LIDOCAINE 4 G/100G
PATCH TOPICAL
Qty: 30 PATCH | Refills: 0 | Status: SHIPPED | OUTPATIENT
Start: 2020-12-18 | End: 2021-02-08

## 2020-12-17 RX ORDER — DOCUSATE SODIUM 100 MG/1
100 CAPSULE, LIQUID FILLED ORAL 2 TIMES DAILY
Qty: 60 CAP | Refills: 2 | Status: SHIPPED | OUTPATIENT
Start: 2020-12-17 | End: 2021-03-17

## 2020-12-17 RX ORDER — CLONAZEPAM 0.5 MG/1
0.5 TABLET ORAL 2 TIMES DAILY
Qty: 60 TAB | Refills: 0 | Status: SHIPPED | OUTPATIENT
Start: 2020-12-17 | End: 2021-01-16

## 2020-12-17 RX ORDER — HEPARIN 100 UNIT/ML
300 SYRINGE INTRAVENOUS AS NEEDED
Status: DISCONTINUED | OUTPATIENT
Start: 2020-12-17 | End: 2020-12-17 | Stop reason: HOSPADM

## 2020-12-17 RX ADMIN — GABAPENTIN 300 MG: 300 CAPSULE ORAL at 09:12

## 2020-12-17 RX ADMIN — Medication 100 MG: at 09:13

## 2020-12-17 RX ADMIN — HEPARIN SODIUM 5000 UNITS: 5000 INJECTION INTRAVENOUS; SUBCUTANEOUS at 09:12

## 2020-12-17 RX ADMIN — GABAPENTIN 300 MG: 300 CAPSULE ORAL at 17:47

## 2020-12-17 RX ADMIN — HEPARIN 300 UNITS: 100 SYRINGE at 18:08

## 2020-12-17 RX ADMIN — HYDROCODONE BITARTRATE AND ACETAMINOPHEN 1 TABLET: 5; 325 TABLET ORAL at 09:20

## 2020-12-17 RX ADMIN — LOSARTAN POTASSIUM 50 MG: 50 TABLET, FILM COATED ORAL at 09:12

## 2020-12-17 RX ADMIN — HYDROXYZINE HYDROCHLORIDE 50 MG: 25 TABLET, FILM COATED ORAL at 04:16

## 2020-12-17 RX ADMIN — FOLIC ACID 1 MG: 1 TABLET ORAL at 09:12

## 2020-12-17 RX ADMIN — CLONAZEPAM 0.5 MG: 1 TABLET ORAL at 09:13

## 2020-12-17 RX ADMIN — DULOXETINE HYDROCHLORIDE 90 MG: 60 CAPSULE, DELAYED RELEASE ORAL at 09:12

## 2020-12-17 RX ADMIN — HYDROXYZINE HYDROCHLORIDE 50 MG: 25 TABLET, FILM COATED ORAL at 11:27

## 2020-12-17 RX ADMIN — Medication 10 ML: at 04:16

## 2020-12-17 RX ADMIN — FENOFIBRATE 145 MG: 145 TABLET ORAL at 09:12

## 2020-12-17 RX ADMIN — ACETAMINOPHEN 650 MG: 325 TABLET ORAL at 04:22

## 2020-12-17 NOTE — PROGRESS NOTES
[facility] Adult  Hospitalist Group                                                                                          Hospitalist Progress Note  Desiree Lopez MD  Answering service: [phone] from in house phone        Date of Service:  2020  NAME:  Marion eLw  :  1955  MRN:  [id number]      Admission Summary: Edith is a 60 y.o.   female whom presents with complaint with altered mental status. Her PMH is significant for anxiety and mood disorder, hyponatremia, HTN, and stage 3-4 metastatic breast cancer with a port. Reportedly she was seen in the ED 2 days prior for anxiety and was given a dose of Ativan which she improved with. At that time, ED provider evalulated [facility]  and noted multiple prescriptions over different time periods for Xanax. She was also seen in the ED on 11/10 for similar complaints and BSMART evaluated at that time and she was unwilling to stay in the hospital and therefore was discharged as she was not a candidate for a TDO. Back in late October, she was seen for concern for benzo withdrawal at that time. Per the ED, she was reportedly brought in via EMS soiled with urine, still in the same hospital gown she wore here with her identification band on her wrist. She is unable to recall why she came into the hospital.  Reportedly told someone she attempted to stab herself in the forehead with a knife but she was unable to confirm or deny that. Does express that she wants to go see Carolin Ibrahim when I walk into the room. Only complaint is that she is anxious but overall poor historian and difficult to obtain additional information. There is concern that she may have taken too many of her medications as she was talking about empty pill bottles at home. \"    Interval history / Subjective:       States that she is walking in the room. No new complaints     Assessment & Plan:     Benzodiazepine Withdrawal  - Seizure precautions  - Pt states she ran out of her Xanax 1 week PTA  -continue klopin 0.5 mg bid, increased cymbalta to 90 mg 12/8, prn hydroxyzine -per psychiatrist recommendations  -OP psych follow up    #Low back pain due to degenrative disc disease  -Multilevel degenerative disc disease most advanced at L4-L5. Advanced lower  lumbar facet arthropathy  -lidocaine patch, tylenol ,ibuprofen.  -prn oxycoodne for severe pain  Encouraged mobility     Constipation: continue miralax       Suicidal ideation PTA  - attempted to stab self in head POA  -she said she doesn't have suicide or homicidal ideation now  - 1:1 sitter at bedside discontinued on 12/4  - seen by Psych team     Severe Anxiety & Mood Disorder  - son cymbalta 90 mg      Mild hypokalemia  - Resolved     Hyponatremia  -Resolved     CORNELIA: normal kidney function on admission  -Resolved     HTN  -continue losartan     Metastatic Breast CA: mets to sternum; per hem/onc-low disease burden  - on treatment holiday; next appt in Jan 2021  - Last Echo 10/27/2020 was NL  - DDNR on file  - Appreciate palliative care input  - Appreciate hem/onc input; pt not candidate for hospice  - Supportive care    Debility   -PT/OT          Code status: DNR  DVT prophylaxis: SCD    Care Plan discussed with: Patient/Family and Nurse  Anticipated Disposition: tbd  Anticipated Discharge: TBD     Hospital Problems  Date Reviewed: 12/1/2020          Codes Class Noted POA    * (Principal) Benzodiazepine withdrawal ([facility] ICD-10-CM: Q43.229  ICD-9-CM: 292.0, 304.10  1/28/2020 Yes              Vital Signs:    Last 24hrs VS reviewed since prior progress note.  Most recent are:  Visit Vitals  /67 (BP 1 Location: Left leg, BP Patient Position: At rest)   Pulse 80   Temp 98.2 °F (36.8 °C)   Resp 16   Ht 5' 5\" (1.651 m)   Wt 101.6 kg (224 lb)   SpO2 95%   BMI 37.28 kg/m²         Intake/Output Summary (Last 24 hours) at 12/16/2020 2341  Last data filed at 12/16/2020 0835  Gross per 24 hour   Intake 320 ml   Output    Net 320 ml Physical Examination:     I had a face to face encounter with this patient and independently examined them on 12/16/2020 as outlined below:          Constitutional:  No acute distress   ENT:  Oral mucosa moist, oropharynx benign. Resp:   No wheezing No accessory muscle use   CV:  Regular rhythm, normal rate, S1, S2 wnl    GI:  Soft, non distended, non tender    Musculoskeletal:  No LE edema     Neurologic:  Moves all extremities. awake and alert     Skin:   no rashes or ulcers       Data Review:   meds      Labs:     No results for input(s): WBC, HGB, HCT, PLT, HGBEXT, HCTEXT, PLTEXT, HGBEXT, HCTEXT, PLTEXT in the last 72 hours. No results for input(s): NA, K, CL, CO2, BUN, CREA, GLU, CA, MG, PHOS, URICA in the last 72 hours. No results for input(s): ALT, AP, TBIL, TBILI, TP, ALB, GLOB, GGT, AML, LPSE in the last 72 hours. No lab exists for component: SGOT, GPT, AMYP, HLPSE  No results for input(s): INR, PTP, APTT, INREXT, INREXT in the last 72 hours. No results for input(s): FE, TIBC, PSAT, FERR in the last 72 hours. No results found for: FOL, RBCF   No results for input(s): PH, PCO2, PO2 in the last 72 hours. No results for input(s): CPK, CKNDX, TROIQ in the last 72 hours.     No lab exists for component: CPKMB  No results found for: CHOL, CHOLX, CHLST, CHOLV, HDL, HDLP, LDL, LDLC, DLDLP, TGLX, TRIGL, TRIGP, CHHD, CHHDX  Lab Results   Component Value Date/Time    Glucose (POC) 203 (H) 10/23/2020 05:50 AM    Glucose POC 83 06/14/2011 10:33 AM     Lab Results   Component Value Date/Time    Color YELLOW/STRAW 11/28/2020 11:38 AM    Appearance CLEAR 11/28/2020 11:38 AM    Specific gravity 1.028 11/28/2020 11:38 AM    Specific gravity 1.025 02/12/2018 01:41 PM    pH (UA) 5.5 11/28/2020 11:38 AM    Protein Negative 11/28/2020 11:38 AM    Glucose 250 (A) 11/28/2020 11:38 AM    Ketone Negative 11/28/2020 11:38 AM    Bilirubin Negative 11/28/2020 11:38 AM    Urobilinogen 0.2 11/28/2020 11:38 AM Nitrites Negative 11/28/2020 11:38 AM    Leukocyte Esterase Negative 11/28/2020 11:38 AM    Epithelial cells MODERATE (A) 11/28/2020 11:38 AM    Bacteria 1+ (A) 11/28/2020 11:38 AM    WBC 20-50 11/28/2020 11:38 AM    RBC 0-5 11/28/2020 11:38 AM         Medications Reviewed:     Current Facility-Administered Medications   Medication Dose Route Frequency    lidocaine 4 % patch 1 Patch  1 Patch TransDERmal Q24H    ibuprofen (MOTRIN) tablet 200 mg  200 mg Oral Q6H PRN    HYDROcodone-acetaminophen (NORCO) 5-325 mg per tablet 1 Tab  1 Tab Oral Q6H PRN    polyethylene glycol (MIRALAX) packet 17 g  17 g Oral BID    losartan (COZAAR) tablet 50 mg  50 mg Oral DAILY    DULoxetine (CYMBALTA) capsule 90 mg  90 mg Oral DAILY    clonazePAM (KlonoPIN) tablet 0.5 mg  0.5 mg Oral BID    ondansetron (ZOFRAN) injection 4 mg  4 mg IntraVENous Q6H PRN    docusate sodium (COLACE) capsule 100 mg  100 mg Oral BID    gabapentin (NEURONTIN) capsule 300 mg  300 mg Oral TID    hydrOXYzine HCL (ATARAX) tablet 50 mg  50 mg Oral Q6H PRN    thiamine HCL (B-1) tablet 100 mg  100 mg Oral DAILY    sodium chloride (NS) flush 5-40 mL  5-40 mL IntraVENous Q8H    sodium chloride (NS) flush 5-40 mL  5-40 mL IntraVENous PRN    fenofibrate nanocrystallized (TRICOR) tablet 145 mg  145 mg Oral DAILY    folic acid (FOLVITE) tablet 1 mg  1 mg Oral DAILY    acetaminophen (TYLENOL) tablet 650 mg  650 mg Oral Q6H PRN    sodium chloride (NS) flush 5-40 mL  5-40 mL IntraVENous Q8H    sodium chloride (NS) flush 5-40 mL  5-40 mL IntraVENous PRN     ______________________________________________________________________  EXPECTED LENGTH OF STAY: 3d 9h  ACTUAL LENGTH OF STAY:          16                 Marzena Fischer MD

## 2020-12-17 NOTE — ROUTINE PROCESS
Bedside shift change report given to Daljit Ruiz (oncoming nurse) by Nat Bennett (offgoing nurse). Report included the following information SBAR.

## 2020-12-17 NOTE — DISCHARGE INSTRUCTIONS
Dear Yue Ware,    Thank you for choosing [address] [place] [address] for your healthcare needs. We strive to provide EXCELLENT care to you and your family. In an effort to explain clearly why you were here in the hospital, I've also written a very brief summary below. Other details including formal diagnosis, medication changes, and follow up appointment recommendations can also be found in this packet. You were admitted for altered mental status, and suicidal thoughts due to benzodiazepine withdrawal for which you were started on medications, and closely monitored. You also received care from specialist physicians in the following specialties:  [address]  IP CONSULT TO PALLIATIVE CARE - PROVIDER  IP CONSULT TO ONCOLOGY  IP CONSULT TO PSYCHIATRY  IP CONSULT TO PSYCHIATRY    Here are the updates to your medication list:  **Increase your Cymbalta, added hydroxyzine as needed for anxiety    Remember that it is important for you to take your medications exactly as they are prescribed. It is helpful to keep a list of your medication with the names, dosages, and times to be taken in your wallet. Additionally,   - Please make sure to follow up with your primary care physician within 1-2 weeks of discharge for hospital follow up  - Please make sure to continue to monitor for: thoughts of harming yourself or others, difficulty breathing, chest pain and return to the Emergency Department with any of these symptoms:   - Please get up slowly from a seated or laying position, avoid falls. - Avoid tobacco, alcohol and other illicit drug use. - Please note that you should use the following DME: [address],   -As we discussed is very important that you do not miss your medications. If you are not able to get any refills from your mail in pharmacy, call your primary immediately as you will need refills sent to you. You may not go without them.      Make sure to also see your primary care doctor for follow-up. Bring these papers with you and be sure to review your medication list with your doctor. I cannot stress the importance of follow up enough. I've included the information for your follow-up appointments below: Follow-up Information     Follow up With Specialties Details Why [address]  for home health PT/OT and skilled nursing [address], [address]    Levi Wagner MD [facility]   [address] [place] [address]  [phone]            At this time, the following test results are still pending: None  Again, please follow-up these results with your primary care provider. Should you have any fever over 101 degrees for 24 hours, chest pain, shortness of breath, fever, chills, nausea, vomiting, diarrhea, change in mentation, falling, weakness, bleeding, or worsening pain, please seek medical attention immediately. Finally, as your discharging physician, you may be receiving a survey regarding my care. I would greatly value and appreciate your input in the survey as we strive for excellence. If you have any questions, I can be reached at [phone].   Thank you so much again for allowing [facility] to [facility]    Respectfully yours,  Chito Martin MD

## 2020-12-17 NOTE — PROGRESS NOTES
Occupational Therapy  Attempted to see for treatment prior to discharge, patient received on hold on phone and informed she was doing good and feels she does not need OT this date, ambulated in avendaño earlier and trying to ensure she has access to her apartment. Per last tx recommend home health and supervision for ADL's/IADL's for safety.    [facility], OTR/L

## 2020-12-17 NOTE — PROGRESS NOTES
Problem: Falls - Risk of  Goal: *Absence of Falls  Description: Document Jeanine Franco Fall Risk and appropriate interventions in the flowsheet.   Outcome: Progressing Towards Goal  Note: Fall Risk Interventions:  Mobility Interventions: Patient to call before getting OOB, PT Consult for mobility concerns, PT Consult for assist device competence, OT consult for ADLs, Bed/chair exit alarm    Mentation Interventions: Bed/chair exit alarm    Medication Interventions: Bed/chair exit alarm, Evaluate medications/consider consulting pharmacy, Patient to call before getting OOB, Teach patient to arise slowly    Elimination Interventions: Call light in reach, Patient to call for help with toileting needs, Bed/chair exit alarm    History of Falls Interventions: Bed/chair exit alarm, Consult care management for discharge planning, Door open when patient unattended, Evaluate medications/consider consulting pharmacy, Investigate reason for fall, Room close to nurse's station

## 2020-12-17 NOTE — SUICIDE SAFETY PLAN
SAFETY PLAN    A suicide Safety Plan is a document that supports someone when they are having thoughts of suicide. Warning Signs that indicate a suicidal crisis may be developing: What (situations, thoughts, feelings, body sensations, behaviors, etc.) do you experience that lets you know you are beginning to think about suicide? 1. I don't want to eat  2. I don't want to drink  3. I don't want to maintain my life    Internal Coping Strategies:  What things can I do (relaxation techniques, hobbies, physical activities, etc.) to take my mind off my problems without contacting another person? 1. Deep Breathing  2. Turn on sounds machine - listen to rain or ocean waves  3. Turn off tv and lights    People and social settings that provide distraction: Who can I call or where can I go to distract me? 1. Name: Dad    2. Name: Friend at apartment    3. Place:  Store[employer]                People whom I can ask for help: Who can I call when I need help - for example, friends, family, clergy, someone else? 1. Name: Dad                  2. Name: Friends        Professionals or [facility] agencies I can contact during a crisis: Who can I call for help - for example, my doctor, my psychiatrist, my psychologist, a mental health provider, a suicide hotline? 1. Clinician Name: Psychiatrist   Phone:          3. Suicide Prevention Lifeline: 9-848-071-TALK (0278)    4. [address] [place] [address] Emergency Services -  for example, [facility] suicide hotline, [facility] Hotline:       Emergency Services Address:       Emergency Services Phone:     Making the environment safe: How can I make my environment (house/apartment/living space) safer? For example, can I remove guns, medications, and other items?   1. N/a pt states their environment is safe

## 2020-12-17 NOTE — DISCHARGE SUMMARY
Discharge Summary       PATIENT ID: Carolina Wall  MRN: [id number]   YOB: 1955    DATE OF ADMISSION: 11/28/2020 11:03 AM    DATE OF DISCHARGE: 12/17/2020   PRIMARY CARE PROVIDER: Levi Wagner MD     DISCHARGING PROVIDER: Chito Martin MD    To contact this individual call [phone] and ask the  to page. If unavailable ask to be transferred the Adult Hospitalist Department. CONSULTATIONS: IP CONSULT TO PSYCHIATRY  IP CONSULT TO PALLIATIVE CARE - PROVIDER  IP CONSULT TO ONCOLOGY  IP CONSULT TO PSYCHIATRY  IP CONSULT TO PSYCHIATRY    PROCEDURES/SURGERIES: * No surgery found *    ADMITTING DIAGNOSES & HOSPITAL COURSE:   Benzodiazepine withdrawal   Low back pain  - DJD  Suicide ideation - cleared for home by psych   Constipation   Severe anxiety and mood disorder  Metastatic breast cancer      Per H and P   Edith is a 60 y.o.   female whom presents with complaint with altered mental status. Her PMH is significant for anxiety and mood disorder, hyponatremia, HTN, and stage 3-4 metastatic breast cancer with a port. Reportedly she was seen in the ED 2 days prior for anxiety and was given a dose of Ativan which she improved with. At that time, ED provider evalulated [facility]  and noted multiple prescriptions over different time periods for Xanax. She was also seen in the ED on 11/10 for similar complaints and BSMART evaluated at that time and she was unwilling to stay in the hospital and therefore was discharged as she was not a candidate for a TDO. Back in late October, she was seen for concern for benzo withdrawal at that time. Per the ED, she was reportedly brought in via EMS soiled with urine, still in the same hospital gown she wore here with her identification band on her wrist. She is unable to recall why she came into the hospital.  Reportedly told someone she attempted to stab herself in the forehead with a knife but she was unable to confirm or deny that.  Does express that she wants to go see Vincent Crook when I walk into the room. Only complaint is that she is anxious but overall poor historian and difficult to obtain additional information. There is concern that she may have taken too many of her medications as she was talking about empty pill bottles at home. \"    Patient was admitted, and resumed on her benzodiazepines. She was seen by psych and cleared for home, sitter and precautions removed. Her medications were changed as well, cymbalta was increased, and added PRN hydroxyzine. There was some concern for patient being able to care for herself given she has been rehospitalized twice due to medication noncompliance. Patient on my evaluation remains alert and oriented x3, she remains capable of making her own medical decisions. Patient at this time refusing admission to a long-term care facility or SNF. She would like to go home, we discussed risk and benefit including risk of death, seizure, and other medication effects due to withdrawal.  Despite this she still wishes to go home. PT and OT have seen the patient and recommend having supervision, however unable to do this, as above. We will go ahead and set her up with home health, and other services that we are able to provide support from her at home. DISCHARGE DIAGNOSES / PLAN:      Benzodiazepine Withdrawal  - Seizure precautions  - Pt states she ran out of her Xanax 1 week PTA  -continue klopin 0.5 mg bid, increased cymbalta to 90 mg 12/8, prn hydroxyzine -per psychiatrist recommendations  -OP psych follow up     #Low back pain due to degenrative disc disease  -Multilevel degenerative disc disease most advanced at L4-L5.  Advanced lower  lumbar facet arthropathy  -lidocaine patch, tylenol ,ibuprofen.  -prn oxycoodne for severe pain  Encouraged mobility      Constipation: continue miralax       Suicidal ideation PTA  - attempted to stab self in head POA  -she said she doesn't have suicide or homicidal ideation now  - 1:1 sitter at bedside discontinued on 12/4  - seen by Psych team     Severe Anxiety & Mood Disorder  - son cymbalta 90 mg       HTN  -continue losartan     Metastatic Breast CA: mets to sternum; per hem/onc-low disease burden  - on treatment holiday; next appt in Jan 2021  - Last Echo 10/27/2020 was NL  - DDNR on file  - Appreciate palliative care input  - Appreciate hem/onc input; pt not candidate for hospice  - Supportive care     Debility   -PT/OT      ADDITIONAL CARE RECOMMENDATIONS:   - Please take all medications as prescribed. Note changes as below. **Increase your Cymbalta, added hydroxyzine as needed for anxiety  - Please make sure to follow up with your primary care physician within 1-2 weeks of discharge for hospital follow up  - Please make sure to continue to monitor for: thoughts of harming yourself or others, difficulty breathing, chest pain and return to the Emergency Department with any of these symptoms:   - Please get up slowly from a seated or laying position, avoid falls. - Avoid tobacco, alcohol and other illicit drug use. - Please note that you should use the following DME: Nathaniel Biswas,   -As we discussed is very important that you do not miss your medications. If you are not able to get any refills from your mail in pharmacy, call your primary immediately as you will need refills sent to you. You may not go without them.          PENDING TEST RESULTS:   At the time of discharge the following test results are still pending: None    FOLLOW UP APPOINTMENTS:    Follow-up Information     Follow up With Specialties Details Why [facility]  for home health PT/OT and skilled nursing [address] [facility], [address] [facility] [phone]             DIET: Regular Diet    ACTIVITY: Activity as tolerated    WOUND CARE: None    EQUIPMENT needed: None      DISCHARGE MEDICATIONS:  Current Discharge Medication List START taking these medications    Details   clonazePAM (KlonoPIN) 0.5 mg tablet Take 1 Tab by mouth two (2) times a day for 30 days. Max Daily Amount: 1 mg. Qty: 60 Tab, Refills: 0    Associated Diagnoses: Anxiety      docusate sodium (COLACE) 100 mg capsule Take 1 Cap by mouth two (2) times a day for 90 days. Qty: 60 Cap, Refills: 2      DULoxetine (CYMBALTA) 30 mg capsule Take 3 Caps by mouth daily for 30 days. Qty: 90 Cap, Refills: 0      gabapentin (NEURONTIN) 300 mg capsule Take 1 Cap by mouth three (3) times daily for 30 days. Max Daily Amount: 900 mg. Qty: 90 Cap, Refills: 0    Associated Diagnoses: Seizure ([address]      HYDROcodone-acetaminophen (NORCO) 5-325 mg per tablet Take 1 Tab by mouth every six (6) hours as needed for Pain for up to 3 days. Max Daily Amount: 4 Tabs. Qty: 9 Tab, Refills: 0    Associated Diagnoses: Metastatic breast cancer (HCC)      hydrOXYzine HCL (ATARAX) 50 mg tablet Take 1 Tab by mouth every six (6) hours as needed for Anxiety for up to 10 days. Qty: 30 Tab, Refills: 0      lidocaine 4 % patch To back  Qty: 30 Patch, Refills: 0      polyethylene glycol (MIRALAX) 17 gram packet Take 1 Packet by mouth two (2) times a day for 30 days. Qty: 60 Packet, Refills: 0      thiamine HCL (B-1) 100 mg tablet Take 1 Tab by mouth daily for 30 days. Qty: 30 Tab, Refills: 0         CONTINUE these medications which have NOT CHANGED    Details   losartan (COZAAR) 50 mg tablet Take 50 mg by mouth daily. folic acid (FOLVITE) 1 mg tablet Take 1 Tab by mouth daily. Qty: 60 Tab, Refills: 3      fenofibrate micronized (LOFIBRA) 134 mg capsule Take 134 mg by mouth every morning. aspirin delayed-release 81 mg tablet Take 1 Tab by mouth daily. Indications: prevention of transient ischemic attack  Qty: 30 Tab, Refills: 0               NOTIFY YOUR PHYSICIAN FOR ANY OF THE FOLLOWING:   Fever over 101 degrees for 24 hours.    Chest pain, shortness of breath, fever, chills, nausea, vomiting, diarrhea, change in mentation, falling, weakness, bleeding. Severe pain or pain not relieved by medications. Or, any other signs or symptoms that you may have questions about.     DISPOSITION:  X  Home With:   OT  PT X HH  RN       Long term SNF/Inpatient Rehab    Independent/assisted living    Hospice    Other:       PATIENT CONDITION AT DISCHARGE:     Functional status    Poor     Deconditioned    X Independent      Cognition   X  Lucid     Forgetful     Dementia      Catheters/lines (plus indication)    Jolly     PICC     PEG    X None      Code status     Full code    X DNR      PHYSICAL EXAMINATION AT DISCHARGE:  Visit Vitals  /75 (BP 1 Location: Left arm, BP Patient Position: At rest)   Pulse (!) 105   Temp 97.8 °F (36.6 °C)   Resp 18   Ht 5' 5\" (1.651 m)   Wt 101.6 kg (224 lb)   SpO2 95%   BMI 37.28 kg/m²     Gen: NAD, awake in bed  HEENT: NC/AT, sclera anicteric, PERRL, EOMI  CV: RRR no m/r/g, normal S1 and S2, no pedal edema   Resp: CTA b/l no increased work of breathing, no wheezing or rhonchi, speaking in full sentences   Abd: NT/ND, normal bowel sounds, no rebound or guarding  Ext: 2+ pulses, no edema  Skin: No rashes or lesions      CHRONIC MEDICAL DIAGNOSES:  Problem List as of 12/17/2020 Date Reviewed: 12/1/2020          Codes Class Noted - Resolved    Bacteremia ICD-10-CM: R78.81  ICD-9-CM: 790.7  10/25/2020 - Present        Seizure ([facility] ICD-10-CM: R56.9  ICD-9-CM: 780.39  10/23/2020 - Present        Major depression ICD-10-CM: F32.9  ICD-9-CM: 296.20  2/1/2020 - Present        Hypokalemia ICD-10-CM: E87.6  ICD-9-CM: 276.8  1/28/2020 - Present        * (Principal) Benzodiazepine withdrawal ([facility] ICD-10-CM: F13.239  ICD-9-CM: 292.0, 304.10  1/28/2020 - Present        Chronic prescription benzodiazepine use ICD-10-CM: Z79.899  ICD-9-CM: V58.69  1/28/2020 - Present        Altered mental status ICD-10-CM: R41.82  ICD-9-CM: 780.97  1/27/2020 - Present        Drug-induced constipation ICD-10-CM: K59.03  ICD-9-CM: 564.09, E980.5  4/5/2018 - Present        Advance care planning ICD-10-CM: Z71.89  ICD-9-CM: V65.49  4/5/2018 - Present        CHF (congestive heart failure) (HCC) ICD-10-CM: I50.9  ICD-9-CM: 428.0  4/2/2018 - Present        Depression ICD-10-CM: F32.9  ICD-9-CM: 156  4/2/2018 - Present        Dementia ([facility] ICD-10-CM: F03.90  ICD-9-CM: 294.20  4/2/2018 - Present        Metastatic breast cancer ([facility] ICD-10-CM: C50.919  ICD-9-CM: 174.9  5/3/2017 - Present        Secondary cancer of bone ([facility] ICD-10-CM: C79.51  ICD-9-CM: 198.5  5/3/2017 - Present        Anxiety ICD-10-CM: F41.9  ICD-9-CM: 300.00  2/3/2016 - Present        HTN (hypertension) ICD-10-CM: I10  ICD-9-CM: 401.9  Unknown - Present        GERD (gastroesophageal reflux disease) ICD-10-CM: K21.9  ICD-9-CM: 530.81  Unknown - Present        Hypercholesterolemia ICD-10-CM: E78.00  ICD-9-CM: 272.0  Unknown - Present        RESOLVED: Acute hyponatremia ICD-10-CM: E87.1  ICD-9-CM: 276.1  6/18/2019 - 1/28/2020        RESOLVED: Pain due to neoplasm ICD-10-CM: G89.3  ICD-9-CM: 338.3  4/4/2018 - 1/28/2020        RESOLVED: Neoplastic malignant related fatigue ICD-10-CM: R53.0  ICD-9-CM: 780.79  4/4/2018 - 1/28/2020        RESOLVED: Overdose ICD-10-CM: T50.901A  ICD-9-CM: 977.9, E980.5  4/2/2018 - 4/5/2018        RESOLVED: Gastroenteritis due to norovirus ICD-10-CM: A08.11  ICD-9-CM: 008.63  2/21/2018 - 1/28/2020        RESOLVED: Urinary tract infection without hematuria ICD-10-CM: N39.0  ICD-9-CM: 599.0  2/13/2018 - 1/28/2020        RESOLVED: Sepsis ([facility] ICD-10-CM: A41.9  ICD-9-CM: 038.9, 995.91  2/12/2018 - 1/28/2020        RESOLVED: Closed left ankle fracture ICD-10-CM: F91.896X  ICD-9-CM: 824.8  2/4/2016 - 1/28/2020        RESOLVED: Bimalleolar fracture of left ankle ICD-10-CM: U54.365Z  ICD-9-CM: 824.4  2/3/2016 - 1/28/2020    Overview Signed 2/3/2016 12:49 AM by JUANITA Cameron     Comminuted and displaced             RESOLVED: Hypotension ICD-10-CM: I95.9  ICD-9-CM: 458.9  9/12/2012 - 9/13/2012        RESOLVED: Dyspnea ICD-10-CM: R06.00  ICD-9-CM: 786.09  8/30/2012 - 8/31/2012              Greater than 30 minutes were spent with the patient on counseling and coordination of care    Signed:   Maryjo Paniagua MD  12/17/2020  2:10 PM

## 2020-12-17 NOTE — PALLIATIVE CARE DISCHARGE
Goals of Care/Treatment Preferences The Palliative Medicine team was consulted as part of your/your loved one's care in the hospital. Our team is a supportive service; we strive to relieve suffering and improve quality of life. We reviewed advance care planning information, which includes the following: 
Confirm Advance Directive: none on file, patient's Lizzette Goldstein is father We reviewed / discussed your code status as: DNR 
   Full Code means perform CPR in the event of cardiac arrest. 
    DNR means do NOT perform CPR in the event of cardiac arrest. 
    Partial Code means you have specific preferences, please discuss with your healthcare team. 
    Tala Quiles means this issue was not addressed / resolved during your stay Because of the importance of this information, we are providing you with a printed copy to share with other healthcare providers after this hospitalization is complete.

## 2020-12-17 NOTE — PROGRESS NOTES
Bedside and Verbal shift change report given to Andrew Carr (oncoming nurse) by Gabriela Elias (offgoing nurse). Report included the following information SBAR, Kardex, Intake/Output and MAR.

## 2020-12-17 NOTE — PROGRESS NOTES
Palliative Medicine Social Work      This SW met with patient for supportive visit. She is being discharged tonight. She was smiling, excited to go home, expressed feelings of happiness. Provided acknowledgement of patient's improvement over the weeks. Encouraged patient to follow up with PCP, oncologist and psychiatrist.                 Evelyn Salud you for the opportunity to be involved in the care of Ms. Jaime Montes De Oca and her family.     Florentin Mercedes LMSW, Supervisee in Social Work  Palliative Medicine   [phone]    Start time: 14:30  End time: 15:00

## 2020-12-17 NOTE — PROGRESS NOTES
RUR 42%     FRIDA: Plan is to return to independent living with home health.   [facility] home health has accepted patient for [facility] PT/OT and skilled nursing for med management. [employer] will deliver the rolling walker to the hospital today. A referral has been sent for [facility] to reach out to patient. [facility]) phone [phone] transport time is scheduled for 6:15 PM.  Patient's prescriptions have been picked up from the outpatient pharmacy. CM received call from Noah Engel with [facility]. The screening was sent off to the state on the 15th and they anticipate to get a determination today. [facility] has denied patient. CM sent referrals to [facility] and [facility] via [employer]. [facility] has accepted. Agency aware of discharge today. CM updated AVS.    2:00 PM: Level II was approved for if patient needs LTC SNF in the future. Paperwork placed on hard chart to be scanned into the hospital system. Medicare pt has received, reviewed, and signed 2nd IM letter informing them of their right to appeal the discharge. Signed copy has been placed on pt bedside chart.       Orin Koyanagi, BSW/DEA

## 2020-12-18 NOTE — PROGRESS NOTES
CM received call from  with [facility] #[phone]. The authorization for home health is still active with [facility], even though [facility] had discharged this patient in November. THOMAS spoke with Aamir Vidal with [facility] #[phone] who stated that [facility] has to call them to release the auth. THOMAS spoke with Elke with [facility] who plans to notify the person that does their authorizations to release the auth so that [facility] is able to see this patient.     GLORIA Hinson/CRM

## 2021-02-01 ENCOUNTER — APPOINTMENT (OUTPATIENT)
Dept: GENERAL RADIOLOGY | Age: 66
DRG: 918 | End: 2021-02-01
Attending: EMERGENCY MEDICINE
Payer: MEDICARE

## 2021-02-01 ENCOUNTER — APPOINTMENT (OUTPATIENT)
Dept: CT IMAGING | Age: 66
DRG: 918 | End: 2021-02-01
Attending: EMERGENCY MEDICINE
Payer: MEDICARE

## 2021-02-01 ENCOUNTER — HOSPITAL ENCOUNTER (INPATIENT)
Age: 66
LOS: 7 days | Discharge: HOME | DRG: 918 | End: 2021-02-08
Attending: EMERGENCY MEDICINE | Admitting: GENERAL ACUTE CARE HOSPITAL
Payer: MEDICARE

## 2021-02-01 DIAGNOSIS — T50.902A INTENTIONAL DRUG OVERDOSE, INITIAL ENCOUNTER (HCC): Primary | ICD-10-CM

## 2021-02-01 DIAGNOSIS — F41.9 ANXIETY: ICD-10-CM

## 2021-02-01 DIAGNOSIS — R56.9 SEIZURE (HCC): ICD-10-CM

## 2021-02-01 PROBLEM — T50.901A DRUG OVERDOSE: Status: ACTIVE | Noted: 2021-02-01

## 2021-02-01 LAB
ALBUMIN SERPL-MCNC: 4.6 G/DL (ref 3.5–5)
ALBUMIN/GLOB SERPL: 1.2 {RATIO} (ref 1.1–2.2)
ALP SERPL-CCNC: 103 U/L (ref 45–117)
ALT SERPL-CCNC: 26 U/L (ref 12–78)
AMPHET UR QL SCN: NEGATIVE
ANION GAP SERPL CALC-SCNC: 18 MMOL/L (ref 5–15)
APAP SERPL-MCNC: <2 UG/ML (ref 10–30)
APPEARANCE UR: CLEAR
AST SERPL-CCNC: 19 U/L (ref 15–37)
ATRIAL RATE: 116 BPM
BACTERIA URNS QL MICRO: NEGATIVE /HPF
BARBITURATES UR QL SCN: NEGATIVE
BASOPHILS # BLD: 0.1 K/UL (ref 0–0.1)
BASOPHILS NFR BLD: 1 % (ref 0–1)
BENZODIAZ UR QL: POSITIVE
BILIRUB SERPL-MCNC: 0.6 MG/DL (ref 0.2–1)
BILIRUB UR QL CFM: POSITIVE
BUN SERPL-MCNC: 18 MG/DL (ref 6–20)
BUN/CREAT SERPL: 11 (ref 12–20)
CALCIUM SERPL-MCNC: 10.1 MG/DL (ref 8.5–10.1)
CALCULATED P AXIS, ECG09: 42 DEGREES
CALCULATED R AXIS, ECG10: 37 DEGREES
CALCULATED T AXIS, ECG11: -117 DEGREES
CANNABINOIDS UR QL SCN: NEGATIVE
CHLORIDE SERPL-SCNC: 93 MMOL/L (ref 97–108)
CK SERPL-CCNC: 67 U/L (ref 26–192)
CO2 SERPL-SCNC: 19 MMOL/L (ref 21–32)
COCAINE UR QL SCN: NEGATIVE
COLOR UR: ABNORMAL
COMMENT, HOLDF: NORMAL
CREAT SERPL-MCNC: 1.62 MG/DL (ref 0.55–1.02)
DIAGNOSIS, 93000: NORMAL
DIFFERENTIAL METHOD BLD: ABNORMAL
DRUG SCRN COMMENT,DRGCM: ABNORMAL
EOSINOPHIL # BLD: 0 K/UL (ref 0–0.4)
EOSINOPHIL NFR BLD: 0 % (ref 0–7)
EPITH CASTS URNS QL MICRO: ABNORMAL /LPF
ERYTHROCYTE [DISTWIDTH] IN BLOOD BY AUTOMATED COUNT: 12.9 % (ref 11.5–14.5)
ETHANOL SERPL-MCNC: <10 MG/DL
GLOBULIN SER CALC-MCNC: 4 G/DL (ref 2–4)
GLUCOSE BLD STRIP.AUTO-MCNC: 254 MG/DL (ref 65–100)
GLUCOSE SERPL-MCNC: 239 MG/DL (ref 65–100)
GLUCOSE UR STRIP.AUTO-MCNC: NEGATIVE MG/DL
HCT VFR BLD AUTO: 48.2 % (ref 35–47)
HGB BLD-MCNC: 15.6 G/DL (ref 11.5–16)
HGB UR QL STRIP: NEGATIVE
HYALINE CASTS URNS QL MICRO: ABNORMAL /LPF (ref 0–5)
IMM GRANULOCYTES # BLD AUTO: 0.1 K/UL (ref 0–0.04)
IMM GRANULOCYTES NFR BLD AUTO: 1 % (ref 0–0.5)
KETONES UR QL STRIP.AUTO: 15 MG/DL
LEUKOCYTE ESTERASE UR QL STRIP.AUTO: ABNORMAL
LYMPHOCYTES # BLD: 2.6 K/UL (ref 0.8–3.5)
LYMPHOCYTES NFR BLD: 25 % (ref 12–49)
MAGNESIUM SERPL-MCNC: 1.9 MG/DL (ref 1.6–2.4)
MCH RBC QN AUTO: 29.7 PG (ref 26–34)
MCHC RBC AUTO-ENTMCNC: 32.4 G/DL (ref 30–36.5)
MCV RBC AUTO: 91.8 FL (ref 80–99)
METHADONE UR QL: NEGATIVE
MONOCYTES # BLD: 0.5 K/UL (ref 0–1)
MONOCYTES NFR BLD: 5 % (ref 5–13)
MUCOUS THREADS URNS QL MICRO: ABNORMAL /LPF
NEUTS SEG # BLD: 7.3 K/UL (ref 1.8–8)
NEUTS SEG NFR BLD: 68 % (ref 32–75)
NITRITE UR QL STRIP.AUTO: NEGATIVE
NRBC # BLD: 0 K/UL (ref 0–0.01)
NRBC BLD-RTO: 0 PER 100 WBC
OPIATES UR QL: NEGATIVE
P-R INTERVAL, ECG05: 224 MS
PCP UR QL: NEGATIVE
PH UR STRIP: 6 [PH] (ref 5–8)
PLATELET # BLD AUTO: 388 K/UL (ref 150–400)
PMV BLD AUTO: 11 FL (ref 8.9–12.9)
POTASSIUM SERPL-SCNC: 3 MMOL/L (ref 3.5–5.1)
POTASSIUM SERPL-SCNC: 3.2 MMOL/L (ref 3.5–5.1)
PROT SERPL-MCNC: 8.6 G/DL (ref 6.4–8.2)
PROT UR STRIP-MCNC: ABNORMAL MG/DL
Q-T INTERVAL, ECG07: 322 MS
QRS DURATION, ECG06: 106 MS
QTC CALCULATION (BEZET), ECG08: 447 MS
RBC # BLD AUTO: 5.25 M/UL (ref 3.8–5.2)
RBC #/AREA URNS HPF: ABNORMAL /HPF (ref 0–5)
SALICYLATES SERPL-MCNC: 3.1 MG/DL (ref 2.8–20)
SAMPLES BEING HELD,HOLD: NORMAL
SERVICE CMNT-IMP: ABNORMAL
SODIUM SERPL-SCNC: 130 MMOL/L (ref 136–145)
SP GR UR REFRACTOMETRY: >1.03 (ref 1–1.03)
TROPONIN I SERPL-MCNC: <0.05 NG/ML
UA: UC IF INDICATED,UAUC: ABNORMAL
UROBILINOGEN UR QL STRIP.AUTO: 1 EU/DL (ref 0.2–1)
VENTRICULAR RATE, ECG03: 116 BPM
WBC # BLD AUTO: 10.6 K/UL (ref 3.6–11)
WBC URNS QL MICRO: ABNORMAL /HPF (ref 0–4)

## 2021-02-01 PROCEDURE — 80179 DRUG ASSAY SALICYLATE: CPT

## 2021-02-01 PROCEDURE — 96376 TX/PRO/DX INJ SAME DRUG ADON: CPT

## 2021-02-01 PROCEDURE — 82962 GLUCOSE BLOOD TEST: CPT

## 2021-02-01 PROCEDURE — 96374 THER/PROPH/DIAG INJ IV PUSH: CPT

## 2021-02-01 PROCEDURE — 82077 ASSAY SPEC XCP UR&BREATH IA: CPT

## 2021-02-01 PROCEDURE — 80143 DRUG ASSAY ACETAMINOPHEN: CPT

## 2021-02-01 PROCEDURE — 84484 ASSAY OF TROPONIN QUANT: CPT

## 2021-02-01 PROCEDURE — 83735 ASSAY OF MAGNESIUM: CPT

## 2021-02-01 PROCEDURE — 87086 URINE CULTURE/COLONY COUNT: CPT

## 2021-02-01 PROCEDURE — 96361 HYDRATE IV INFUSION ADD-ON: CPT

## 2021-02-01 PROCEDURE — 36415 COLL VENOUS BLD VENIPUNCTURE: CPT

## 2021-02-01 PROCEDURE — 82550 ASSAY OF CK (CPK): CPT

## 2021-02-01 PROCEDURE — 80307 DRUG TEST PRSMV CHEM ANLYZR: CPT

## 2021-02-01 PROCEDURE — 74011250636 HC RX REV CODE- 250/636

## 2021-02-01 PROCEDURE — 85025 COMPLETE CBC W/AUTO DIFF WBC: CPT

## 2021-02-01 PROCEDURE — 65660000000 HC RM CCU STEPDOWN

## 2021-02-01 PROCEDURE — 74011250636 HC RX REV CODE- 250/636: Performed by: GENERAL ACUTE CARE HOSPITAL

## 2021-02-01 PROCEDURE — 99285 EMERGENCY DEPT VISIT HI MDM: CPT

## 2021-02-01 PROCEDURE — 80053 COMPREHEN METABOLIC PANEL: CPT

## 2021-02-01 PROCEDURE — 93005 ELECTROCARDIOGRAM TRACING: CPT

## 2021-02-01 PROCEDURE — 81001 URINALYSIS AUTO W/SCOPE: CPT

## 2021-02-01 PROCEDURE — 84132 ASSAY OF SERUM POTASSIUM: CPT

## 2021-02-01 PROCEDURE — 70450 CT HEAD/BRAIN W/O DYE: CPT

## 2021-02-01 PROCEDURE — 74011250636 HC RX REV CODE- 250/636: Performed by: EMERGENCY MEDICINE

## 2021-02-01 RX ORDER — DULOXETIN HYDROCHLORIDE 30 MG/1
90 CAPSULE, DELAYED RELEASE ORAL DAILY
COMMUNITY
End: 2021-02-08

## 2021-02-01 RX ORDER — HYDROXYZINE 50 MG/1
50 TABLET, FILM COATED ORAL
Status: ON HOLD | COMMUNITY
End: 2021-02-08 | Stop reason: SDUPTHER

## 2021-02-01 RX ORDER — LORAZEPAM 2 MG/ML
INJECTION INTRAMUSCULAR
Status: COMPLETED
Start: 2021-02-01 | End: 2021-02-01

## 2021-02-01 RX ORDER — LORAZEPAM 2 MG/ML
1 INJECTION INTRAMUSCULAR
Status: COMPLETED | OUTPATIENT
Start: 2021-02-01 | End: 2021-02-01

## 2021-02-01 RX ORDER — LOSARTAN POTASSIUM 25 MG/1
50 TABLET ORAL DAILY
Status: DISCONTINUED | OUTPATIENT
Start: 2021-02-02 | End: 2021-02-08 | Stop reason: HOSPADM

## 2021-02-01 RX ORDER — ACETAMINOPHEN 650 MG/1
650 SUPPOSITORY RECTAL
Status: DISCONTINUED | OUTPATIENT
Start: 2021-02-01 | End: 2021-02-08 | Stop reason: HOSPADM

## 2021-02-01 RX ORDER — SODIUM CHLORIDE 0.9 % (FLUSH) 0.9 %
5-40 SYRINGE (ML) INJECTION EVERY 8 HOURS
Status: DISCONTINUED | OUTPATIENT
Start: 2021-02-01 | End: 2021-02-08 | Stop reason: HOSPADM

## 2021-02-01 RX ORDER — ASPIRIN 325 MG/1
100 TABLET, FILM COATED ORAL DAILY
COMMUNITY

## 2021-02-01 RX ORDER — ASPIRIN 81 MG/1
81 TABLET ORAL DAILY
Status: DISCONTINUED | OUTPATIENT
Start: 2021-02-02 | End: 2021-02-08 | Stop reason: HOSPADM

## 2021-02-01 RX ORDER — GABAPENTIN 300 MG/1
300 CAPSULE ORAL 3 TIMES DAILY
COMMUNITY

## 2021-02-01 RX ORDER — LORAZEPAM 2 MG/ML
0.5 INJECTION INTRAMUSCULAR
Status: COMPLETED | OUTPATIENT
Start: 2021-02-01 | End: 2021-02-01

## 2021-02-01 RX ORDER — POLYETHYLENE GLYCOL 3350 17 G/17G
17 POWDER, FOR SOLUTION ORAL 2 TIMES DAILY
COMMUNITY

## 2021-02-01 RX ORDER — POTASSIUM CHLORIDE 7.45 MG/ML
10 INJECTION INTRAVENOUS
Status: COMPLETED | OUTPATIENT
Start: 2021-02-01 | End: 2021-02-01

## 2021-02-01 RX ORDER — CLONAZEPAM 0.5 MG/1
0.5 TABLET ORAL 2 TIMES DAILY
Status: ON HOLD | COMMUNITY
End: 2021-02-08 | Stop reason: SDUPTHER

## 2021-02-01 RX ORDER — POTASSIUM CHLORIDE 7.45 MG/ML
10 INJECTION INTRAVENOUS
Status: DISCONTINUED | OUTPATIENT
Start: 2021-02-01 | End: 2021-02-01

## 2021-02-01 RX ORDER — ENOXAPARIN SODIUM 100 MG/ML
40 INJECTION SUBCUTANEOUS EVERY 12 HOURS
Status: DISCONTINUED | OUTPATIENT
Start: 2021-02-01 | End: 2021-02-08 | Stop reason: HOSPADM

## 2021-02-01 RX ORDER — ACETAMINOPHEN 325 MG/1
650 TABLET ORAL
Status: DISCONTINUED | OUTPATIENT
Start: 2021-02-01 | End: 2021-02-08 | Stop reason: HOSPADM

## 2021-02-01 RX ORDER — POLYETHYLENE GLYCOL 3350 17 G/17G
17 POWDER, FOR SOLUTION ORAL DAILY PRN
Status: DISCONTINUED | OUTPATIENT
Start: 2021-02-01 | End: 2021-02-08 | Stop reason: HOSPADM

## 2021-02-01 RX ORDER — SODIUM CHLORIDE 0.9 % (FLUSH) 0.9 %
5-40 SYRINGE (ML) INJECTION AS NEEDED
Status: DISCONTINUED | OUTPATIENT
Start: 2021-02-01 | End: 2021-02-08 | Stop reason: HOSPADM

## 2021-02-01 RX ORDER — SODIUM CHLORIDE 9 MG/ML
50 INJECTION, SOLUTION INTRAVENOUS CONTINUOUS
Status: DISCONTINUED | OUTPATIENT
Start: 2021-02-01 | End: 2021-02-04

## 2021-02-01 RX ADMIN — SODIUM CHLORIDE 1000 ML: 9 INJECTION, SOLUTION INTRAVENOUS at 08:00

## 2021-02-01 RX ADMIN — SODIUM CHLORIDE 10 ML: 9 INJECTION, SOLUTION INTRAMUSCULAR; INTRAVENOUS; SUBCUTANEOUS at 13:53

## 2021-02-01 RX ADMIN — POTASSIUM CHLORIDE 10 MEQ: 10 INJECTION, SOLUTION INTRAVENOUS at 10:21

## 2021-02-01 RX ADMIN — SODIUM CHLORIDE 50 ML/HR: 9 INJECTION, SOLUTION INTRAVENOUS at 19:30

## 2021-02-01 RX ADMIN — SODIUM CHLORIDE 10 ML: 9 INJECTION, SOLUTION INTRAMUSCULAR; INTRAVENOUS; SUBCUTANEOUS at 22:00

## 2021-02-01 RX ADMIN — POTASSIUM CHLORIDE 10 MEQ: 10 INJECTION, SOLUTION INTRAVENOUS at 11:25

## 2021-02-01 RX ADMIN — SODIUM CHLORIDE 1000 ML: 9 INJECTION, SOLUTION INTRAVENOUS at 06:46

## 2021-02-01 RX ADMIN — LORAZEPAM 1 MG: 2 INJECTION INTRAMUSCULAR at 06:47

## 2021-02-01 RX ADMIN — POTASSIUM CHLORIDE 10 MEQ: 10 INJECTION, SOLUTION INTRAVENOUS at 13:51

## 2021-02-01 RX ADMIN — SODIUM CHLORIDE 50 ML/HR: 9 INJECTION, SOLUTION INTRAVENOUS at 10:22

## 2021-02-01 RX ADMIN — POTASSIUM CHLORIDE 10 MEQ: 10 INJECTION, SOLUTION INTRAVENOUS at 12:37

## 2021-02-01 RX ADMIN — ENOXAPARIN SODIUM 40 MG: 40 INJECTION SUBCUTANEOUS at 12:38

## 2021-02-01 RX ADMIN — LORAZEPAM 1 MG: 2 INJECTION INTRAMUSCULAR; INTRAVENOUS at 06:47

## 2021-02-01 RX ADMIN — LORAZEPAM 0.5 MG: 2 INJECTION INTRAMUSCULAR; INTRAVENOUS at 08:00

## 2021-02-01 NOTE — H&P
Hospitalist Admission Note    NAME: Trupti Javier   :  1955   MRN:  [id number]     Date/Time:  2021 9:53 AM    Patient PCP: Danyel Cummings MD  ______________________________________________________________________  Given the patient's current clinical presentation, I have a high level of concern for decompensation if discharged from the emergency department. Complex decision making was performed, which includes reviewing the patient's available past medical records, laboratory results, and x-ray films. My assessment of this patient's clinical condition and my plan of care is as follows. Assessment / Plan:  Intentional drug overdose  Suicidal ideation with attempt, as above  Seizure episode  Hx of metastatic breast cancer  Admit to Telemetry  One to One  Suicide precautions  Psychiatry Consult  Telemetry monitoring - concern for QTc prolongation, arrhythmia and further seizure episodes, amongst other potential side effects  Previous admission in Dec 2020 at [facility] indicates \"attempted to stab self in head POA\" Pt was also treated for benzodiazepine withdrawal - it is unclear whether pt is on those medications currently as not on PTA list. Psychiatry to comment on this. Was due for appointment with Oncology this month and is on treatment holiday    Hypokalemia  Replete    CORNELIA  IVF    Anxiety  Depression  HTN  GERD  HLD  PO meds as able  Pharmacy for med rec    Code Status: Full  Surrogate Decision Maker: Friend  DVT Prophylaxis: Lovenox  GI Prophylaxis: not indicated  Baseline: Independent      Subjective:   CHIEF COMPLAINT: drug overdose    HISTORY OF PRESENT ILLNESS:     Ivon Ledbetter is a 63 y.o.  female who presents with CC listed above. We were asked to admit for work up and evaluation of the above problems.      Past Medical History:   Diagnosis Date    Acute hyponatremia 2019    Anxiety     Bimalleolar fracture of left ankle 2/3/2016 Comminuted and displaced     Cancer [facility])     breast    Closed left ankle fracture 2/4/2016    Depression     Gastroenteritis due to norovirus 2/21/2018    GERD (gastroesophageal reflux disease)     HTN (hypertension)     Hypercholesterolemia     Hypotension 9/12/2012    Metastatic breast cancer ([facility] 5/3/2017    Neoplastic malignant related fatigue 4/4/2018    Pain due to neoplasm 4/4/2018    Secondary cancer of bone ([facility] 5/3/2017    Sepsis ([facility] 2/12/2018    Urinary tract infection without hematuria 2/13/2018        Past Surgical History:   Procedure Laterality Date    BREAST SURGERY PROCEDURE UNLISTED  march 13    carina masectomy       Social History     Tobacco Use    Smoking status: Former Smoker     Packs/day: 0.50     Years: 20.00     Pack years: 10.00    Smokeless tobacco: Never Used   Substance Use Topics    Alcohol use: No        Family History   Problem Relation Age of Onset    Hypertension Mother     Heart Disease Mother     Depression Mother     Hypertension Father      Allergies   Allergen Reactions    Prednisolone Palpitations    Sulfa (Sulfonamide Antibiotics) Unknown (comments)    Wellbutrin [Bupropion Hcl] Unknown (comments)        Prior to Admission medications    Medication Sig Start Date End Date Taking? Authorizing Provider   docusate sodium (COLACE) 100 mg capsule Take 1 Cap by mouth two (2) times a day for 90 days. 12/17/20 3/17/21  Astrid SAMSON MD   lidocaine 4 % patch To back 12/18/20   Adolfo Leon MD   losartan (COZAAR) 50 mg tablet Take 50 mg by mouth daily. Provider, Historical   folic acid (FOLVITE) 1 mg tablet Take 1 Tab by mouth daily. 12/25/19   Vinh Aguirre,    fenofibrate micronized (LOFIBRA) 134 mg capsule Take 134 mg by mouth every morning. Provider, Historical   aspirin delayed-release 81 mg tablet Take 1 Tab by mouth daily.  Indications: prevention of transient ischemic attack 4/11/18   Sanjay Hou MD REVIEW OF SYSTEMS:     I am not able to complete the review of systems because: The patient is intubated and sedated    The patient has altered mental status due to his acute medical problems    The patient has baseline aphasia from prior stroke(s)    The patient has baseline dementia and is not reliable historian    The patient is in acute medical distress and unable to provide information           Total of 12 systems reviewed as follows:       POSITIVE= underlined text  Negative = text not underlined  General:  fever, chills, sweats, generalized weakness, weight loss/gain,      loss of appetite   Eyes:    blurred vision, eye pain, loss of vision, double vision  ENT:    rhinorrhea, pharyngitis   Respiratory:   cough, sputum production, SOB, LOBO, wheezing, pleuritic pain   Cardiology:   chest pain, palpitations, orthopnea, PND, edema, syncope   Gastrointestinal:  abdominal pain , N/V, diarrhea, dysphagia, constipation, bleeding   Genitourinary:  frequency, urgency, dysuria, hematuria, incontinence   Muskuloskeletal :  arthralgia, myalgia, back pain  Hematology:  easy bruising, nose or gum bleeding, lymphadenopathy   Dermatological: rash, ulceration, pruritis, color change / jaundice  Endocrine:   hot flashes or polydipsia   Neurological:  headache, dizziness, confusion, focal weakness, paresthesia,     Speech difficulties, memory loss, gait difficulty  Psychological: Feelings of anxiety, depression, agitation    Objective:   VITALS:    Visit Vitals  /74   Pulse (!) 113   Temp 98.7 °F (37.1 °C)   Resp 20   Ht 5' 5\" (1.651 m)   Wt 110 kg (242 lb 8.1 oz)   SpO2 100%   BMI 40.36 kg/m²       PHYSICAL EXAM:    General:    Alert, anxious  HEENT: Dilated pupils   Neck:  Supple, symmetrical,  thyroid: non tender  Lungs:   Clear to auscultation bilaterally. No Wheezing or Rhonchi. No rales. Chest wall:  No tenderness  No Accessory muscle use.   Heart:   Regular  rhythm,  No  murmur   No edema  Abdomen:   Soft, non-tender. Not distended. Bowel sounds normal  Extremities: No cyanosis. No clubbing,      Skin turgor normal, Capillary refill normal, Radial dial pulse 2+  Skin:     Not pale. Not Jaundiced  No rashes   Psych:  Fair insight  Neurologic: EOMs intact. No facial asymmetry. No aphasia or slurred speech. Symmetrical strength, Sensation grossly intact. Alert and oriented X 4.     _______________________________________________________________________  Care Plan discussed with:    Comments   Patient x    Family      RN x    Care Manager                    Consultant:      _______________________________________________________________________  Expected  Disposition:   Home with Family    HH/PT/OT/RN    SNF/LTC    JATIN    ________________________________________________________________________  TOTAL TIME:  54 Minutes    Critical Care Provided     Minutes non procedure based      Comments    x Reviewed previous records   >50% of visit spent in counseling and coordination of care  Discussion with patient and/or family and questions answered       ________________________________________________________________________  Signed: Elaine Addison MD    Procedures: see electronic medical records for all procedures/Xrays and details which were not copied into this note but were reviewed prior to creation of Plan.     LAB DATA REVIEWED:    Recent Results (from the past 24 hour(s))   CBC WITH AUTOMATED DIFF    Collection Time: 02/01/21  6:39 AM   Result Value Ref Range    WBC 10.6 3.6 - 11.0 K/uL    RBC 5.25 (H) 3.80 - 5.20 M/uL    HGB 15.6 11.5 - 16.0 g/dL    HCT 48.2 (H) 35.0 - 47.0 %    MCV 91.8 80.0 - 99.0 FL    MCH 29.7 26.0 - 34.0 PG    MCHC 32.4 30.0 - 36.5 g/dL    RDW 12.9 11.5 - 14.5 %    PLATELET 696 542 - 363 K/uL    MPV 11.0 8.9 - 12.9 FL    NRBC 0.0 0  WBC    ABSOLUTE NRBC 0.00 0.00 - 0.01 K/uL    NEUTROPHILS 68 32 - 75 %    LYMPHOCYTES 25 12 - 49 %    MONOCYTES 5 5 - 13 %    EOSINOPHILS 0 0 - 7 %    BASOPHILS 1 0 - 1 %    IMMATURE GRANULOCYTES 1 (H) 0.0 - 0.5 %    ABS. NEUTROPHILS 7.3 1.8 - 8.0 K/UL    ABS. LYMPHOCYTES 2.6 0.8 - 3.5 K/UL    ABS. MONOCYTES 0.5 0.0 - 1.0 K/UL    ABS. EOSINOPHILS 0.0 0.0 - 0.4 K/UL    ABS. BASOPHILS 0.1 0.0 - 0.1 K/UL    ABS. IMM. GRANS. 0.1 (H) 0.00 - 0.04 K/UL    DF AUTOMATED     METABOLIC PANEL, COMPREHENSIVE    Collection Time: 02/01/21  6:39 AM   Result Value Ref Range    Sodium 130 (L) 136 - 145 mmol/L    Potassium 3.2 (L) 3.5 - 5.1 mmol/L    Chloride 93 (L) 97 - 108 mmol/L    CO2 19 (L) 21 - 32 mmol/L    Anion gap 18 (H) 5 - 15 mmol/L    Glucose 239 (H) 65 - 100 mg/dL    BUN 18 6 - 20 MG/DL    Creatinine 1.62 (H) 0.55 - 1.02 MG/DL    BUN/Creatinine ratio 11 (L) 12 - 20      GFR est AA 39 (L) >60 ml/min/1.73m2    GFR est non-AA 32 (L) >60 ml/min/1.73m2    Calcium 10.1 8.5 - 10.1 MG/DL    Bilirubin, total 0.6 0.2 - 1.0 MG/DL    ALT (SGPT) 26 12 - 78 U/L    AST (SGOT) 19 15 - 37 U/L    Alk. phosphatase 103 45 - 117 U/L    Protein, total 8.6 (H) 6.4 - 8.2 g/dL    Albumin 4.6 3.5 - 5.0 g/dL    Globulin 4.0 2.0 - 4.0 g/dL    A-G Ratio 1.2 1.1 - 2.2     ACETAMINOPHEN    Collection Time: 02/01/21  6:39 AM   Result Value Ref Range    Acetaminophen level <2 (L) 10 - 30 ug/mL   SALICYLATE    Collection Time: 02/01/21  6:39 AM   Result Value Ref Range    Salicylate level 3.1 2.8 - 20.0 MG/DL   ETHYL ALCOHOL    Collection Time: 02/01/21  6:39 AM   Result Value Ref Range    ALCOHOL(ETHYL),SERUM <10 <10 MG/DL   SAMPLES BEING HELD    Collection Time: 02/01/21  6:39 AM   Result Value Ref Range    SAMPLES BEING HELD  RED, BLUE     COMMENT        Add-on orders for these samples will be processed based on acceptable specimen integrity and analyte stability, which may vary by analyte.    CK    Collection Time: 02/01/21  6:39 AM   Result Value Ref Range    CK 67 26 - 192 U/L   TROPONIN I    Collection Time: 02/01/21  6:39 AM   Result Value Ref Range    Troponin-I, Qt. <0.05 <0.05 ng/mL   EKG, 12 LEAD, INITIAL    Collection Time: 02/01/21  6:43 AM   Result Value Ref Range    Ventricular Rate 116 BPM    Atrial Rate 116 BPM    P-R Interval 224 ms    QRS Duration 106 ms    Q-T Interval 322 ms    QTC Calculation (Bezet) 447 ms    Calculated P Axis 42 degrees    Calculated R Axis 37 degrees    Calculated T Axis -117 degrees    Diagnosis       Sinus tachycardia with 1st degree AV block  Possible Left atrial enlargement  Cannot rule out Anterior infarct , age undetermined  Marked ST abnormality, possible inferior subendocardial injury  When compared with ECG of 04-DEC-2020 05:27,  Significant changes have occurred     GLUCOSE, POC    Collection Time: 02/01/21  6:56 AM   Result Value Ref Range    Glucose (POC) 254 (H) 65 - 100 mg/dL    Performed by Yolanda Smith EDT    DRUG SCREEN, URINE    Collection Time: 02/01/21  7:01 AM   Result Value Ref Range    AMPHETAMINES Negative NEG      BARBITURATES Negative NEG      BENZODIAZEPINES Positive (A) NEG      COCAINE Negative NEG      METHADONE Negative NEG      OPIATES Negative NEG      PCP(PHENCYCLIDINE) Negative NEG      THC (TH-CANNABINOL) Negative NEG      Drug screen comment (NOTE)    URINALYSIS W/ REFLEX CULTURE    Collection Time: 02/01/21  7:01 AM    Specimen: Urine    Urine specimen   Result Value Ref Range    Color DARK YELLOW      Appearance CLEAR CLEAR      Specific gravity >1.030 (H) 1.003 - 1.030    pH (UA) 6.0 5.0 - 8.0      Protein TRACE (A) NEG mg/dL    Glucose Negative NEG mg/dL    Ketone 15 (A) NEG mg/dL    Blood Negative NEG      Urobilinogen 1.0 0.2 - 1.0 EU/dL    Nitrites Negative NEG      Leukocyte Esterase SMALL (A) NEG      WBC 10-20 0 - 4 /hpf    RBC 0-5 0 - 5 /hpf    Epithelial cells MODERATE (A) FEW /lpf    Bacteria Negative NEG /hpf    UA:UC IF INDICATED URINE CULTURE ORDERED (A) CNI      Mucus 2+ (A) NEG /lpf    Hyaline cast 2-5 0 - 5 /lpf   BILIRUBIN, CONFIRM    Collection Time: 02/01/21  7:01 AM   Result Value Ref Range    Bilirubin UA, confirm Positive (A) NEG

## 2021-02-01 NOTE — PROGRESS NOTES
Pharmacy - Enoxaparin (Lovenox®) Monitoring      Indication: DVT ppx     Current Dose: Enoxaparin 40 mg subcutaneously every 24 hours    Creatinine Clearance (mL/min): ~40-45    Current Weight: 110 Kg    Labs:  Recent Labs     02/01/21  0639   CREA 1.62*   HGB 15.6        Wt Readings from Last 1 Encounters:   02/01/21 110 kg (242 lb 8.1 oz)     Ht Readings from Last 1 Encounters:   02/01/21 165.1 cm (65\")       COVID-19 related labs (anticoagulation):   No results for input(s): DDIMSQ, FIBRN in the last 7224 hours. Impression/Plan:   Change to enoxaparin 40 mg q12h per [facility] P&T Committee Protocol with respect to BMI and renal function. Thanks,  Klaus Perikns      [email or web address]/[employer]/[facility]/Pharmacy/Clinical%20Companion/Lovenox%20Dose%20Adjustment%20protocol. pdf

## 2021-02-01 NOTE — PROGRESS NOTES
Pharmacy Clarification of the Prior to Admission Medication Regimen Retrospective to the Admission Medication Reconciliation    The patient was not interviewed regarding clarification of the prior to admission medication regimen. Pulled information from latest discharge  (12.17.20) paperwork from [facility]. Attempts to reach listed PCP of Dr. Brenda Wood [phone] were unsuccessful. Information Obtained From: Rx query, discharge information    Recommendations/Findings: The following amendments were made to the patient's active medication list on file at [address]:     1) Additions:   Clonazepam 0.5mg  Duloxetine 30mg  Gabapentin 300mg  Hydroxyzine 50mg  Miralax  Thiamine 100mg    2) Removals:       3) Changes:    4) Pertinent Pharmacy Findings:  Updated patients [facility] outpatient [facility] to[facility] in [address] [place] [address] medication list was corrected to the following:     Prior to Admission Medications   Prescriptions Last Dose Informant Taking? DULoxetine (CYMBALTA) 30 mg capsule  Other Yes   Sig: Take 90 mg by mouth daily. aspirin delayed-release 81 mg tablet  Other Yes   Sig: Take 1 Tab by mouth daily. Indications: prevention of transient ischemic attack   clonazePAM (KlonoPIN) 0.5 mg tablet  Other Yes   Sig: Take 0.5 mg by mouth two (2) times a day. docusate sodium (COLACE) 100 mg capsule  Other Yes   Sig: Take 1 Cap by mouth two (2) times a day for 90 days. fenofibrate micronized (LOFIBRA) 134 mg capsule  Other Yes   Sig: Take 134 mg by mouth every morning. folic acid (FOLVITE) 1 mg tablet  Other Yes   Sig: Take 1 Tab by mouth daily. gabapentin (NEURONTIN) 300 mg capsule  Other Yes   Sig: Take 300 mg by mouth three (3) times daily. hydrOXYzine HCL (ATARAX) 50 mg tablet  Other Yes   Sig: Take 50 mg by mouth every six (6) hours as needed. lidocaine 4 % patch  Other Yes   Sig: To back   losartan (COZAAR) 50 mg tablet  Other Yes   Sig: Take 50 mg by mouth daily.    polyethylene glycol (MIRALAX) 17 gram packet  Other Yes   Sig: Take 17 g by mouth two (2) times a day. thiamine mononitrate (B-1) 100 mg tablet  Other Yes   Sig: Take 100 mg by mouth daily.       Facility-Administered Medications: None        Thank you,  Radha Gramajo CPhT  Medication History Pharmacy Technician

## 2021-02-01 NOTE — ED PROVIDER NOTES
EMERGENCY DEPARTMENT HISTORY AND PHYSICAL EXAM      Date: 2/1/2021  Patient Name: Myriam Santiago    History of Presenting Illness     Chief Complaint   Patient presents with    Drug Overdose     ED visit d/t overdose, on effexor - unknown amount of medications - presents to ED with Sz like activity, clenching at gums - nonverbal       History Provided By: Patient and EMS    HPI: Myriam Santiago, 63 y.o. female presents to the ED with cc of overdose and seizure. Patient has history of metastatic breast cancer and had a withdrawal seizure in November. She is on Xanax and Effexor. According to EMS, the patient called them stating that she took an overdose of Effexor. They brought in her bottle which was filled on July 31, 2020. The original bottle had 300 tablets and it. There are currently 99 tablets left. The patient had a seizure on the way to the ER which lasted for 2 minutes. .  She did not sustain any trauma. It was a generalized seizure. She is now alert and oriented x3 and states that she took 2 handfuls of Effexor because she got bad news about her cancer. She says she has not had Xanax since January 15. She denies pain, shortness of breath, fever, vomiting or diarrhea. Patient has had suicidal ideation in the past.  She states that she had to stop chemo because it had affected her ejection fraction. There are no other complaints, changes, or physical findings at this time. PCP: Willie Hutchinson MD    No current facility-administered medications on file prior to encounter. Current Outpatient Medications on File Prior to Encounter   Medication Sig Dispense Refill    docusate sodium (COLACE) 100 mg capsule Take 1 Cap by mouth two (2) times a day for 90 days. 60 Cap 2    lidocaine 4 % patch To back 30 Patch 0    losartan (COZAAR) 50 mg tablet Take 50 mg by mouth daily.  folic acid (FOLVITE) 1 mg tablet Take 1 Tab by mouth daily.  60 Tab 3    fenofibrate micronized (LOFIBRA) 134 mg capsule Take 134 mg by mouth every morning.  aspirin delayed-release 81 mg tablet Take 1 Tab by mouth daily. Indications: prevention of transient ischemic attack 30 Tab 0       Past History     Past Medical History:  Past Medical History:   Diagnosis Date    Acute hyponatremia 6/18/2019    Anxiety     Bimalleolar fracture of left ankle 2/3/2016    Comminuted and displaced     Cancer [facility])     breast    Closed left ankle fracture 2/4/2016    Depression     Gastroenteritis due to norovirus 2/21/2018    GERD (gastroesophageal reflux disease)     HTN (hypertension)     Hypercholesterolemia     Hypotension 9/12/2012    Metastatic breast cancer ([facility] [address] 5/3/2017    Neoplastic malignant related fatigue 4/4/2018    Pain due to neoplasm 4/4/2018    Secondary cancer of bone ([facility] [address] 5/3/2017    Sepsis ([facility] [address] 2/12/2018    Urinary tract infection without hematuria 2/13/2018       Past Surgical History:  Past Surgical History:   Procedure Laterality Date    BREAST SURGERY PROCEDURE UNLISTED  march 13    carina masectomy       Family History:  Family History   Problem Relation Age of Onset    Hypertension Mother     Heart Disease Mother     Depression Mother     Hypertension Father        Social History:  Social History     Tobacco Use    Smoking status: Former Smoker     Packs/day: 0.50     Years: 20.00     Pack years: 10.00    Smokeless tobacco: Never Used   Substance Use Topics    Alcohol use: No    Drug use: No       Allergies: Allergies   Allergen Reactions    Prednisolone Palpitations    Sulfa (Sulfonamide Antibiotics) Unknown (comments)    Wellbutrin [Bupropion Hcl] Unknown (comments)         Review of Systems   Review of Systems   Constitutional: Negative for fever. HENT: Negative for congestion. Eyes: Negative. Respiratory: Negative for shortness of breath. Cardiovascular: Negative for chest pain. Gastrointestinal: Positive for nausea. Endocrine: Negative for heat intolerance. Genitourinary: Negative. Musculoskeletal: Negative for back pain. Skin: Negative for rash. Allergic/Immunologic: Positive for immunocompromised state. Neurological: Positive for seizures. Hematological: Does not bruise/bleed easily. Psychiatric/Behavioral: Positive for suicidal ideas. All other systems reviewed and are negative. Physical Exam   Physical Exam  Vitals signs and nursing note reviewed. Constitutional:       General: She is not in acute distress. Appearance: She is well-developed. HENT:      Head: Normocephalic. Eyes:      Extraocular Movements: Extraocular movements intact. Neck:      Musculoskeletal: Normal range of motion and neck supple. Cardiovascular:      Rate and Rhythm: Regular rhythm. Tachycardia present. Pulses: Normal pulses. Heart sounds: Normal heart sounds. Pulmonary:      Effort: Pulmonary effort is normal.      Breath sounds: Normal breath sounds. Abdominal:      General: Bowel sounds are normal.      Palpations: Abdomen is soft. Tenderness: There is no abdominal tenderness. Musculoskeletal: Normal range of motion. Skin:     General: Skin is warm and dry. Neurological:      General: No focal deficit present. Mental Status: She is alert and oriented to person, place, and time.    Psychiatric:         Mood and Affect: Mood normal.         Behavior: Behavior normal.         Diagnostic Study Results     Labs -     Recent Results (from the past 12 hour(s))   CBC WITH AUTOMATED DIFF    Collection Time: 02/01/21  6:39 AM   Result Value Ref Range    WBC 10.6 3.6 - 11.0 K/uL    RBC 5.25 (H) 3.80 - 5.20 M/uL    HGB 15.6 11.5 - 16.0 g/dL    HCT 48.2 (H) 35.0 - 47.0 %    MCV 91.8 80.0 - 99.0 FL    MCH 29.7 26.0 - 34.0 PG    MCHC 32.4 30.0 - 36.5 g/dL    RDW 12.9 11.5 - 14.5 %    PLATELET 824 016 - 327 K/uL    MPV 11.0 8.9 - 12.9 FL    NRBC 0.0 0  WBC    ABSOLUTE NRBC 0.00 0.00 - 0.01 K/uL    NEUTROPHILS 68 32 - 75 % LYMPHOCYTES 25 12 - 49 %    MONOCYTES 5 5 - 13 %    EOSINOPHILS 0 0 - 7 %    BASOPHILS 1 0 - 1 %    IMMATURE GRANULOCYTES 1 (H) 0.0 - 0.5 %    ABS. NEUTROPHILS 7.3 1.8 - 8.0 K/UL    ABS. LYMPHOCYTES 2.6 0.8 - 3.5 K/UL    ABS. MONOCYTES 0.5 0.0 - 1.0 K/UL    ABS. EOSINOPHILS 0.0 0.0 - 0.4 K/UL    ABS. BASOPHILS 0.1 0.0 - 0.1 K/UL    ABS. IMM. GRANS. 0.1 (H) 0.00 - 0.04 K/UL    DF AUTOMATED     SAMPLES BEING HELD    Collection Time: 02/01/21  6:39 AM   Result Value Ref Range    SAMPLES BEING HELD  RED, BLUE     COMMENT        Add-on orders for these samples will be processed based on acceptable specimen integrity and analyte stability, which may vary by analyte. EKG, 12 LEAD, INITIAL    Collection Time: 02/01/21  6:43 AM   Result Value Ref Range    Ventricular Rate 116 BPM    Atrial Rate 116 BPM    P-R Interval 224 ms    QRS Duration 106 ms    Q-T Interval 322 ms    QTC Calculation (Bezet) 447 ms    Calculated P Axis 42 degrees    Calculated R Axis 37 degrees    Calculated T Axis -117 degrees    Diagnosis       Sinus tachycardia with 1st degree AV block  Possible Left atrial enlargement  Cannot rule out Anterior infarct , age undetermined  Marked ST abnormality, possible inferior subendocardial injury  When compared with ECG of 04-DEC-2020 05:27,  Significant changes have occurred     GLUCOSE, POC    Collection Time: 02/01/21  6:56 AM   Result Value Ref Range    Glucose (POC) 254 (H) 65 - 100 mg/dL    Performed by Darlene PERKINS        Radiologic Studies -   XR CHEST PORT   Final Result   No acute process. CT HEAD WO CONT   Final Result   No acute intracranial process. CT Results  (Last 48 hours)               02/01/21 0718  CT HEAD WO CONT Final result    Impression:  No acute intracranial process. Narrative:  CLINICAL HISTORY: Seizure   INDICATION: Seizure   COMPARISON: 11/20/2020.    CT dose reduction was achieved through use of a standardized protocol tailored   for this examination and automatic exposure control for dose modulation. TECHNIQUE: Serial axial images with a collimation of 5 mm were obtained from the   skull base through the vertex     FINDINGS:    There is no evidence of an acute infarction, hemorrhage, or mass-effect. There   is no evidence of midline shift or hydrocephalus. Posterior fossa structures are   unremarkable. No extra-axial collections are seen. Mastoid air cells are well pneumatized and clear. There is no evidence of depressed skull fractures of soft tissue swelling. CXR Results  (Last 48 hours)    None          Medical Decision Making   I am the first provider for this patient. I reviewed the vital signs, available nursing notes, past medical history, past surgical history, family history and social history. Vital Signs-Reviewed the patient's vital signs. Patient Vitals for the past 12 hrs:   Temp Pulse Resp BP SpO2   02/01/21 0700  (!) 113 20 109/74 100 %   02/01/21 0645  (!) 119 28  98 %   02/01/21 0636 98.7 °F (37.1 °C) (!) 115 18 109/74 98 %       EKG interpretation: (Preliminary)  Rhythm: sinus tachycardia; and regular . Rate (approx.): 116; Axis: normal; [place] interval: normal; QRS interval: normal ; ST/T wave: T wave inverted; Other findings: New T wave inversions. Records Reviewed: Nursing Notes, Old Medical Records, Previous electrocardiograms, Ambulance Run Sheet, Previous Radiology Studies and Previous Laboratory Studies    Provider Notes (Medical Decision Making):   Overdose, seizure, electrolyte abnormality, depression, withdrawal seizure    ED Course:   Initial assessment performed. The patients presenting problems have been discussed, and they are in agreement with the care plan formulated and outlined with them. I have encouraged them to ask questions as they arise throughout their visit. Consult note:    I spoke to to poison control.   They state that the seizure could have been from withdrawal or could have been from her Effexor overdose. Symptoms which may occur from this overdose include tachycardia, agitation, seizure, clonus, nausea and vomiting, prolonged QRS, and prolonged QTC. Poison control recommends comprehensive metabolic panel including hepatic function test, Tylenol and aspirin levels, supportive care and benzodiazepine for clonus or seizures. They also recommend admission. consult note:    I spoke To Dr. Lucrecia Abernathy, hospitalist.  She will admit the patient               Critical Care Time:   CRITICAL CARE NOTE :    7:22 AM    IMPENDING DETERIORATION -Respiratory, Cardiovascular, CNS and Metabolic  ASSOCIATED RISK FACTORS - Dysrhythmia, Metabolic changes and CNS Decompensation  MANAGEMENT- Bedside Assessment and Supervision of Care  INTERPRETATION -  Xrays, CT Scan, ECG and Blood Pressure  INTERVENTIONS - hemodynamic mngmt and Metobolic interventions  CASE REVIEW - Hospitalist, Medical Sub-Specialist and Nursing  TREATMENT RESPONSE -Improved  PERFORMED BY - Self    NOTES   :  I have spent 50 minutes of critical care time involved in lab review, consultations with specialist, family decision- making, bedside attention and documentation. This time excludes time spent in any separate billed procedures. During this entire length of time I was immediately available to the patient . Nancy Leiva MD      Disposition:  admit    DISCHARGE PLAN:  1. Current Discharge Medication List        2. Follow-up Information    None       3. Return to ED if worse     Diagnosis     Clinical Impression:   1. Intentional drug overdose, initial encounter ([address]    2. Seizure ([facility] [address]        Attestations:    Nancy Leiva MD    Please note that this dictation was completed with [employer], the computer voice recognition software. Quite often unanticipated grammatical, syntax, homophones, and other interpretive errors are inadvertently transcribed by the computer software. Please disregard these errors. Please excuse any errors that have escaped final proofreading. Thank you.

## 2021-02-02 ENCOUNTER — APPOINTMENT (OUTPATIENT)
Dept: GENERAL RADIOLOGY | Age: 66
DRG: 918 | End: 2021-02-02
Attending: GENERAL ACUTE CARE HOSPITAL
Payer: MEDICARE

## 2021-02-02 LAB
ALBUMIN SERPL-MCNC: 3.5 G/DL (ref 3.5–5)
ALBUMIN/GLOB SERPL: 1.2 {RATIO} (ref 1.1–2.2)
ALP SERPL-CCNC: 77 U/L (ref 45–117)
ALT SERPL-CCNC: 20 U/L (ref 12–78)
ANION GAP SERPL CALC-SCNC: 7 MMOL/L (ref 5–15)
AST SERPL-CCNC: 15 U/L (ref 15–37)
BACTERIA SPEC CULT: NORMAL
BASOPHILS # BLD: 0.1 K/UL (ref 0–0.1)
BASOPHILS NFR BLD: 1 % (ref 0–1)
BILIRUB SERPL-MCNC: 0.5 MG/DL (ref 0.2–1)
BUN SERPL-MCNC: 17 MG/DL (ref 6–20)
BUN/CREAT SERPL: ABNORMAL (ref 12–20)
CALCIUM SERPL-MCNC: 8.6 MG/DL (ref 8.5–10.1)
CHLORIDE SERPL-SCNC: 106 MMOL/L (ref 97–108)
CO2 SERPL-SCNC: 25 MMOL/L (ref 21–32)
CREAT SERPL-MCNC: ABNORMAL MG/DL (ref 0.55–1.02)
DIFFERENTIAL METHOD BLD: NORMAL
EOSINOPHIL # BLD: 0.1 K/UL (ref 0–0.4)
EOSINOPHIL NFR BLD: 1 % (ref 0–7)
ERYTHROCYTE [DISTWIDTH] IN BLOOD BY AUTOMATED COUNT: 13.2 % (ref 11.5–14.5)
GLOBULIN SER CALC-MCNC: 2.9 G/DL (ref 2–4)
GLUCOSE SERPL-MCNC: 96 MG/DL (ref 65–100)
HCT VFR BLD AUTO: 35.5 % (ref 35–47)
HGB BLD-MCNC: 11.6 G/DL (ref 11.5–16)
IMM GRANULOCYTES # BLD AUTO: 0 K/UL (ref 0–0.04)
IMM GRANULOCYTES NFR BLD AUTO: 0 % (ref 0–0.5)
LYMPHOCYTES # BLD: 1.9 K/UL (ref 0.8–3.5)
LYMPHOCYTES NFR BLD: 25 % (ref 12–49)
MAGNESIUM SERPL-MCNC: 2.1 MG/DL (ref 1.6–2.4)
MCH RBC QN AUTO: 29.7 PG (ref 26–34)
MCHC RBC AUTO-ENTMCNC: 32.7 G/DL (ref 30–36.5)
MCV RBC AUTO: 91 FL (ref 80–99)
MONOCYTES # BLD: 0.6 K/UL (ref 0–1)
MONOCYTES NFR BLD: 7 % (ref 5–13)
NEUTS SEG # BLD: 4.9 K/UL (ref 1.8–8)
NEUTS SEG NFR BLD: 65 % (ref 32–75)
NRBC # BLD: 0 K/UL (ref 0–0.01)
NRBC BLD-RTO: 0 PER 100 WBC
PHOSPHATE SERPL-MCNC: 2.7 MG/DL (ref 2.6–4.7)
PLATELET # BLD AUTO: 220 K/UL (ref 150–400)
PMV BLD AUTO: 10.6 FL (ref 8.9–12.9)
POTASSIUM SERPL-SCNC: 3.1 MMOL/L (ref 3.5–5.1)
PROT SERPL-MCNC: 6.4 G/DL (ref 6.4–8.2)
RBC # BLD AUTO: 3.9 M/UL (ref 3.8–5.2)
SERVICE CMNT-IMP: NORMAL
SODIUM SERPL-SCNC: 138 MMOL/L (ref 136–145)
WBC # BLD AUTO: 7.6 K/UL (ref 3.6–11)

## 2021-02-02 PROCEDURE — 83735 ASSAY OF MAGNESIUM: CPT

## 2021-02-02 PROCEDURE — 74011250637 HC RX REV CODE- 250/637: Performed by: STUDENT IN AN ORGANIZED HEALTH CARE EDUCATION/TRAINING PROGRAM

## 2021-02-02 PROCEDURE — 36415 COLL VENOUS BLD VENIPUNCTURE: CPT

## 2021-02-02 PROCEDURE — 74011000250 HC RX REV CODE- 250: Performed by: STUDENT IN AN ORGANIZED HEALTH CARE EDUCATION/TRAINING PROGRAM

## 2021-02-02 PROCEDURE — 74011250636 HC RX REV CODE- 250/636: Performed by: STUDENT IN AN ORGANIZED HEALTH CARE EDUCATION/TRAINING PROGRAM

## 2021-02-02 PROCEDURE — 71045 X-RAY EXAM CHEST 1 VIEW: CPT

## 2021-02-02 PROCEDURE — 65660000000 HC RM CCU STEPDOWN

## 2021-02-02 PROCEDURE — 74011250637 HC RX REV CODE- 250/637: Performed by: GENERAL ACUTE CARE HOSPITAL

## 2021-02-02 PROCEDURE — 74011250636 HC RX REV CODE- 250/636: Performed by: GENERAL ACUTE CARE HOSPITAL

## 2021-02-02 PROCEDURE — 85025 COMPLETE CBC W/AUTO DIFF WBC: CPT

## 2021-02-02 PROCEDURE — 84100 ASSAY OF PHOSPHORUS: CPT

## 2021-02-02 PROCEDURE — 80053 COMPREHEN METABOLIC PANEL: CPT

## 2021-02-02 RX ORDER — HALOPERIDOL 5 MG/1
5 TABLET ORAL
Status: DISCONTINUED | OUTPATIENT
Start: 2021-02-02 | End: 2021-02-08 | Stop reason: HOSPADM

## 2021-02-02 RX ORDER — POTASSIUM CHLORIDE 20 MEQ/1
40 TABLET, EXTENDED RELEASE ORAL
Status: DISCONTINUED | OUTPATIENT
Start: 2021-02-02 | End: 2021-02-02

## 2021-02-02 RX ORDER — HALOPERIDOL 5 MG/ML
5 INJECTION INTRAMUSCULAR
Status: DISCONTINUED | OUTPATIENT
Start: 2021-02-02 | End: 2021-02-08 | Stop reason: HOSPADM

## 2021-02-02 RX ORDER — CLONAZEPAM 0.5 MG/1
1 TABLET ORAL 2 TIMES DAILY
Status: DISCONTINUED | OUTPATIENT
Start: 2021-02-03 | End: 2021-02-08 | Stop reason: HOSPADM

## 2021-02-02 RX ORDER — CLONAZEPAM 0.5 MG/1
0.5 TABLET ORAL ONCE
Status: ACTIVE | OUTPATIENT
Start: 2021-02-02 | End: 2021-02-03

## 2021-02-02 RX ORDER — CLONAZEPAM 0.5 MG/1
0.5 TABLET ORAL 2 TIMES DAILY
Status: DISCONTINUED | OUTPATIENT
Start: 2021-02-02 | End: 2021-02-02

## 2021-02-02 RX ORDER — LORAZEPAM 2 MG/ML
1 INJECTION INTRAMUSCULAR
Status: DISCONTINUED | OUTPATIENT
Start: 2021-02-02 | End: 2021-02-02

## 2021-02-02 RX ORDER — LORAZEPAM 2 MG/ML
2 INJECTION INTRAMUSCULAR
Status: COMPLETED | OUTPATIENT
Start: 2021-02-02 | End: 2021-02-03

## 2021-02-02 RX ORDER — POTASSIUM CHLORIDE 7.45 MG/ML
10 INJECTION INTRAVENOUS
Status: DISPENSED | OUTPATIENT
Start: 2021-02-02 | End: 2021-02-02

## 2021-02-02 RX ORDER — HYDRALAZINE HYDROCHLORIDE 20 MG/ML
15 INJECTION INTRAMUSCULAR; INTRAVENOUS
Status: CANCELLED | OUTPATIENT
Start: 2021-02-02

## 2021-02-02 RX ADMIN — POTASSIUM CHLORIDE 10 MEQ: 10 INJECTION, SOLUTION INTRAVENOUS at 17:40

## 2021-02-02 RX ADMIN — WATER 10 MG: 1 INJECTION INTRAMUSCULAR; INTRAVENOUS; SUBCUTANEOUS at 14:20

## 2021-02-02 RX ADMIN — LORAZEPAM 2 MG: 2 INJECTION INTRAMUSCULAR; INTRAVENOUS at 22:09

## 2021-02-02 RX ADMIN — SODIUM CHLORIDE 10 ML: 9 INJECTION, SOLUTION INTRAMUSCULAR; INTRAVENOUS; SUBCUTANEOUS at 17:41

## 2021-02-02 RX ADMIN — LORAZEPAM 1 MG: 2 INJECTION INTRAMUSCULAR; INTRAVENOUS at 10:20

## 2021-02-02 RX ADMIN — POTASSIUM CHLORIDE 10 MEQ: 10 INJECTION, SOLUTION INTRAVENOUS at 18:57

## 2021-02-02 RX ADMIN — LORAZEPAM 1 MG: 2 INJECTION INTRAMUSCULAR; INTRAVENOUS at 12:33

## 2021-02-02 RX ADMIN — LORAZEPAM 1 MG: 2 INJECTION INTRAMUSCULAR; INTRAVENOUS at 08:23

## 2021-02-02 RX ADMIN — SODIUM CHLORIDE 10 ML: 9 INJECTION, SOLUTION INTRAMUSCULAR; INTRAVENOUS; SUBCUTANEOUS at 21:07

## 2021-02-02 RX ADMIN — ASPIRIN 81 MG: 81 TABLET, COATED ORAL at 08:26

## 2021-02-02 RX ADMIN — ENOXAPARIN SODIUM 40 MG: 40 INJECTION SUBCUTANEOUS at 00:14

## 2021-02-02 RX ADMIN — CLONAZEPAM 0.5 MG: 0.5 TABLET ORAL at 20:05

## 2021-02-02 RX ADMIN — SODIUM CHLORIDE 10 ML: 9 INJECTION, SOLUTION INTRAMUSCULAR; INTRAVENOUS; SUBCUTANEOUS at 08:27

## 2021-02-02 RX ADMIN — LORAZEPAM 2 MG: 2 INJECTION INTRAMUSCULAR; INTRAVENOUS at 17:33

## 2021-02-02 RX ADMIN — POTASSIUM CHLORIDE 10 MEQ: 10 INJECTION, SOLUTION INTRAVENOUS at 20:05

## 2021-02-02 RX ADMIN — POTASSIUM CHLORIDE 10 MEQ: 10 INJECTION, SOLUTION INTRAVENOUS at 16:28

## 2021-02-02 RX ADMIN — SODIUM CHLORIDE 50 ML/HR: 9 INJECTION, SOLUTION INTRAVENOUS at 03:28

## 2021-02-02 RX ADMIN — LOSARTAN POTASSIUM 50 MG: 50 TABLET, FILM COATED ORAL at 08:26

## 2021-02-02 RX ADMIN — ENOXAPARIN SODIUM 40 MG: 40 INJECTION SUBCUTANEOUS at 23:06

## 2021-02-02 NOTE — ED NOTES
At 0800 pt became agitated and combative against staff. Pt is alert /oriented x 1. She begin to kick and swing and refused help from staff. At 9691 pt received 1 mg of ativan. At 3665, pt allowed staff to assist her back in bed, pt received morning medications.     Pt is in position of comfort with staff member at bedside

## 2021-02-02 NOTE — PROGRESS NOTES
CM reviewed chart prior to attempting to complete initial assessment. Pt noted to be agitated, alert and oriented x1, unable to verify demographics or complete initial assessment with pt at this time.     Rani Leggett [address], [address]

## 2021-02-02 NOTE — ED NOTES
At 1000 pt became agitated, begin to kick and swing at staff members. Pt got out of bed and refused to get back in bed. At 1020 1 mg of ativan was given. Pt is not resting comfortably with staff at bedside.

## 2021-02-02 NOTE — CONSULTS
PSYCHIATRIC CONSULTATION NOTE VIA TELEHEALTH:    REASON FOR CONSULT:    A psychiatric consultation was requested by Carrie Melara MD to evaluate or provide advice/opinion related to evaluating intentional drug overdose    HISTORY OF PRESENTING COMPLAINT:     Ms. Cece Neil is a 63 y.o. WHITE OR  female who is currently admitted to the medical floor at [facility] on 2/1/2021 for the treatment of <principal problem not specified>. Patient reportedly admitted after an alleged intentional overdose. It is unknown which medication could be Effexor and/or Xanax. Patient reportedly told nursing she has been sad and depressed. Patient last admitted to psychiatric facility 12/20/2020. When attempting to assess patient nursing reports that patient had previously been agitated and aggressive and received Ativan. Patient is assessed via telehealth at bedside and patient has one on one sitter. Patient is confused, disorganized, and unable to answer assessments appropriately. Of note patient has been confused, agitated, and aggressive. REVIEW OF SYMPTOMS:  Patient denies appetite change, weight change, vision change, sleep change, fever, night sweats, headache, cough, shortness of breath, chest pain, palpitations, nausea,vomiting, diarrhea, dizziness, weakness, tremors. PAST MEDICAL HISTORY:  Please see History & Physical for details.    Past Medical History:   Diagnosis Date    Acute hyponatremia 6/18/2019    Anxiety     Bimalleolar fracture of left ankle 2/3/2016    Comminuted and displaced     Cancer [facility])     breast    Closed left ankle fracture 2/4/2016    Depression     Gastroenteritis due to norovirus 2/21/2018    GERD (gastroesophageal reflux disease)     HTN (hypertension)     Hypercholesterolemia     Hypotension 9/12/2012    Metastatic breast cancer ([facility] [address] 5/3/2017    Neoplastic malignant related fatigue 4/4/2018    Pain due to neoplasm 4/4/2018    Secondary cancer of bone ([facility] 5/3/2017    Sepsis ([facility] 2/12/2018    Urinary tract infection without hematuria 2/13/2018         ALLERGIES:  Allergies   Allergen Reactions    Prednisolone Palpitations    Sulfa (Sulfonamide Antibiotics) Unknown (comments)    Wellbutrin [Bupropion Hcl] Unknown (comments)       MEDICATIONS PRIOR TO ADMISSION:  (Not in a hospital admission)      CURRENT MEDICATIONS:    Current Facility-Administered Medications:     potassium chloride (K-DUR, KLOR-CON) SR tablet 40 mEq, 40 mEq, Oral, NOW, Anders Sigala MD    LORazepam (ATIVAN) injection 1 mg, 1 mg, IntraVENous, Q2H PRN, Jacob Sigala MD    0.9% sodium chloride infusion, 50 mL/hr, IntraVENous, CONTINUOUS, Abiodun Guerrero MD, Last Rate: 50 mL/hr at 02/02/21 0328, 50 mL/hr at 02/02/21 0328    aspirin delayed-release tablet 81 mg, 81 mg, Oral, DAILY, Abiodun Guerrero MD, 81 mg at 02/02/21 0826    losartan (COZAAR) tablet 50 mg, 50 mg, Oral, DAILY, Abiodnu Guerrero MD, 50 mg at 02/02/21 0826    sodium chloride (NS) flush 5-40 mL, 5-40 mL, IntraVENous, Q8H, Daniela Rogers MD, 10 mL at 02/02/21 0827    sodium chloride (NS) flush 5-40 mL, 5-40 mL, IntraVENous, PRN, Abiodun Guerrero MD    acetaminophen (TYLENOL) tablet 650 mg, 650 mg, Oral, Q6H PRN **OR** acetaminophen (TYLENOL) suppository 650 mg, 650 mg, Rectal, Q6H PRN, Abiodun Guerrero MD    polyethylene glycol (MIRALAX) packet 17 g, 17 g, Oral, DAILY PRN, Abiodun Guerrero MD    enoxaparin (LOVENOX) injection 40 mg, 40 mg, SubCUTAneous, Q12H, Abiodun Guerrero MD, 40 mg at 02/02/21 0014    Current Outpatient Medications:     DULoxetine (CYMBALTA) 30 mg capsule, Take 90 mg by mouth daily. , Disp: , Rfl:     clonazePAM (KlonoPIN) 0.5 mg tablet, Take 0.5 mg by mouth two (2) times a day., Disp: , Rfl:     gabapentin (NEURONTIN) 300 mg capsule, Take 300 mg by mouth three (3) times daily. , Disp: , Rfl:     hydrOXYzine HCL (ATARAX) 50 mg tablet, Take 50 mg by mouth every six (6) hours as needed. , Disp: , Rfl:     polyethylene glycol (MIRALAX) 17 gram packet, Take 17 g by mouth two (2) times a day., Disp: , Rfl:     thiamine mononitrate (B-1) 100 mg tablet, Take 100 mg by mouth daily. , Disp: , Rfl:     docusate sodium (COLACE) 100 mg capsule, Take 1 Cap by mouth two (2) times a day for 90 days. , Disp: 60 Cap, Rfl: 2    lidocaine 4 % patch, To back, Disp: 30 Patch, Rfl: 0    losartan (COZAAR) 50 mg tablet, Take 50 mg by mouth daily. , Disp: , Rfl:     folic acid (FOLVITE) 1 mg tablet, Take 1 Tab by mouth daily. , Disp: 60 Tab, Rfl: 3    fenofibrate micronized (LOFIBRA) 134 mg capsule, Take 134 mg by mouth every morning., Disp: , Rfl:     aspirin delayed-release 81 mg tablet, Take 1 Tab by mouth daily. Indications: prevention of transient ischemic attack, Disp: 30 Tab, Rfl: 0     LAB RESULTS:  Lab Results   Component Value Date/Time    WBC 7.6 02/02/2021 03:35 AM    HGB (POC) 15.5 02/02/2018 12:29 PM    HGB 11.6 02/02/2021 03:35 AM    HCT (POC) 45.6 02/02/2018 12:29 PM    HCT 35.5 02/02/2021 03:35 AM    PLATELET 979 04/58/5068 03:35 AM    MCV 91.0 02/02/2021 03:35 AM      Lab Results   Component Value Date/Time    Sodium 138 02/02/2021 03:35 AM    Potassium 3.1 (L) 02/02/2021 03:35 AM    Chloride 106 02/02/2021 03:35 AM    CO2 25 02/02/2021 03:35 AM    Anion gap 7 02/02/2021 03:35 AM    Glucose 96 02/02/2021 03:35 AM    BUN 17 02/02/2021 03:35 AM    Creatinine INVESTIGATED PER DELTA CHECK PROTOCOL 02/02/2021 03:35 AM    BUN/Creatinine ratio INVESTIGATED PER DELTA CHECK PROTOCOL 02/02/2021 03:35 AM    GFR est AA Cannot be calculated 02/02/2021 03:35 AM    GFR est non-AA Cannot be calculated 02/02/2021 03:35 AM    Calcium 8.6 02/02/2021 03:35 AM    Bilirubin, total 0.5 02/02/2021 03:35 AM    Alk.  phosphatase 77 02/02/2021 03:35 AM    Protein, total 6.4 02/02/2021 03:35 AM    Albumin 3.5 02/02/2021 03:35 AM    Globulin 2.9 02/02/2021 03:35 AM    A-G Ratio 1.2 02/02/2021 03:35 AM    ALT (SGPT) 20 2021 03:35 AM        PAST PSYCHIATRIC HISTORY:  Patient has history of seeing a psychiatrist at [facility]. Reported history of Depression, Anxiety, possible PTSD. Per consult notes Benzodiazepine Use, severe. 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 Not able to assess current. MENTAL STATUS EXAM:  General appearance:  Disheveled, laying on side in hospital gown on ER stretcher. 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 At this time the patient will not benefit from inpatient psychiatric treatment, patient needs medical clearance and re-evaluation once the she has stabilized. 2. [address] [facility]  Patient only currently prescribed Effexor 200 mg BID and Alprazolam 1 mg QID PRN Anxiety by Akilah Benitez with [facility]. 3. Called and tried to reach Akilah Benitez left voicemail for returned call. 4. Clonazepam 1mg BID  5. Haldol 5 mg PO/IM Q6H PRN Agitation/Psychois/Delirium    Please consult Psychiatry again for any concerns regarding the patient's mental health changes and/or management.      Thank you for the opportunity to participate in the care of your patient.

## 2021-02-02 NOTE — ED NOTES
Bedside shift change report given to Denice Osuna RN (oncoming nurse) by Celestino Mcrae RN (offgoing nurse). Report included the following information SBAR, Kardex, ED Summary, [facility] and Recent Results. 7:31 AM: Bedside shift change report given to Celestino Mcrae RN (oncoming nurse) by Denice Osuna RN (offgoing nurse). Report included the following information SBAR, Kardex, ED Summary, [facility] and Recent Results.

## 2021-02-02 NOTE — PROGRESS NOTES
Hospitalist Progress Note    NAME: Jessica Perez   :  1955   MRN:  [id number]       Assessment / Plan:    Intentional venlafaxine overdose  Suicidal ideation with attempt, as above  Seizure episode  Hx of metastatic breast cancer   One to One  Suicide precautions  Psychiatry Consulted  Telemetry monitoring - monitor for QTc prolongation, arrhythmia and further seizure episodes, amongst other potential side effects  Previous admission in Dec 2020 at [facility] indicates \"attempted to stab self in head POA\" Pt was also treated for benzodiazepine withdrawal - it is unclear whether pt is on those medications currently as not on PTA list. Psychiatry to comment on this. Per pharmacy med rec, pt on clonazepam 0.5mg  Was due for appointment with Oncology this month and is on treatment holiday  Continue PRN ativan  Avoiding meds that could cause QT prolongation  3-point restraint     Hypokalemia  Replete and monioteor    CORNELIA  IVF     Anxiety  Depression  HTN  GERD  HLD  PO meds as able  Pharmacy for med rec     Code Status: Full  Surrogate Decision Maker: Friend  DVT Prophylaxis: Lovenox  GI Prophylaxis: not indicated  Baseline: Independent     Subjective:     Chief Complaint / Reason for Physician Visit  Discussed with RN events overnight. Intermittent agitation, code atlas   Oriented x1        Review of Systems:  Symptom Y/N Comments  Symptom Y/N Comments   Fever/Chills    Chest Pain     Poor Appetite    Edema     Cough    Abdominal Pain     Sputum    Joint Pain     SOB/LOBO    Pruritis/Rash     Nausea/vomit    Tolerating PT/OT     Diarrhea    Tolerating Diet     Constipation    Other       Could NOT obtain due to: AMS     Objective:     VITALS:   Last 24hrs VS reviewed since prior progress note.  Most recent are:  Patient Vitals for the past 24 hrs:   Temp Pulse Resp BP SpO2   21 1530 97.9 °F (36.6 °C) 95 16 118/68 95 %   21 1501 97.9 °F (36.6 °C) 95 16 117/70 95 %   21 0800     98 % 02/02/21 0719 98.6 °F (37 °C) 76 23 102/69 97 %   02/02/21 0715  78 22 102/69 98 %   02/02/21 0630  78 20 103/75 97 %   02/02/21 0615 98.3 °F (36.8 °C) 77 21 101/63 97 %   02/02/21 0545  79 19 112/61 97 %   02/02/21 0515  79 23 114/69 97 %   02/02/21 0445  78 19 (!) 116/53 98 %   02/02/21 0345  79 20 107/65 97 %   02/02/21 0315 98 °F (36.7 °C) 78 19 117/72 100 %   02/02/21 0245  79 21 103/74 99 %   02/02/21 0230  80 19 116/69 99 %   02/02/21 0130  80 22 116/73 98 %   02/02/21 0015  82 23 112/89 100 %   02/01/21 2330  85 21 121/71 98 %   02/01/21 2300 99.1 °F (37.3 °C) 83 21 114/66 99 %   02/01/21 2230  82 23 124/69 99 %   02/01/21 2130  83 21 119/72 99 %   02/01/21 2030  84 23 (!) 112/58 95 %   02/01/21 1945  84 23 116/67 100 %   02/01/21 1930 98.9 °F (37.2 °C) 89 18 126/63 100 %   02/01/21 1828     99 %     No intake or output data in the 24 hours ending 02/02/21 1818     I had a face to face encounter and independently examined this patient on 2/2/2021, as outlined below:  PHYSICAL EXAM:  General: WD, WN. Alert, cooperative, no acute distress    EENT:  EOMI. Anicteric sclerae. MMM  Resp:  CTA bilaterally, no wheezing or rales. No accessory muscle use  CV:  Regular  rhythm,  No edema  GI:  Soft, Non distended, Non tender. +Bowel sounds  Neurologic:  Alert and oriented X 1, normal speech,   Psych:   Agitated  Skin:  No rashes. No jaundice    Reviewed most current lab test results and cultures  YES  Reviewed most current radiology test results   YES  Review and summation of old records today    NO  Reviewed patient's current orders and MAR    YES  PMH/SH reviewed - no change compared to H&P  ________________________________________________________________________  Care Plan discussed with:    Comments   Patient x    Family  x    RN     Care Manager     Consultant                        Multidiciplinary team rounds were held today with , nursing, pharmacist and clinical coordinator. Patient's plan of care was discussed; medications were reviewed and discharge planning was addressed. ______________________________________________________________________    Total CRITICAL CARE TIME Spent:   Minutes non procedure based      Comments   >50% of visit spent in counseling and coordination of care x    ________________________________________________________________________  Micheline Dominguez MD     Procedures: see electronic medical records for all procedures/Xrays and details which were not copied into this note but were reviewed prior to creation of Plan. LABS:  I reviewed today's most current labs and imaging studies.   Pertinent labs include:  Recent Labs     02/02/21 0335 02/01/21  0639   WBC 7.6 10.6   HGB 11.6 15.6   HCT 35.5 48.2*    388     Recent Labs     02/02/21  0335 02/01/21  1020 02/01/21  0639     --  130*   K 3.1* 3.0* 3.2*     --  93*   CO2 25  --  19*   GLU 96  --  239*   BUN 17  --  18   CREA INVESTIGATED PER DELTA CHECK PROTOCOL  --  1.62*   CA 8.6  --  10.1   MG 2.1 1.9  --    PHOS 2.7  --   --    ALB 3.5  --  4.6   TBILI 0.5  --  0.6   ALT 20  --  26       Signed: Micheline Dominguez MD

## 2021-02-02 NOTE — PROGRESS NOTES
1230 Patient arrived on neuro unit and was extremely agitated and aggressive. Called code [place] and [place] police have been called to help control the patient. The patient is extremely agitated and will not corporate w/ security or nursing staff. Patient is refusing all care.

## 2021-02-03 LAB
ANION GAP SERPL CALC-SCNC: 8 MMOL/L (ref 5–15)
ATRIAL RATE: 98 BPM
BUN SERPL-MCNC: 12 MG/DL (ref 6–20)
BUN/CREAT SERPL: 16 (ref 12–20)
CALCIUM SERPL-MCNC: 9 MG/DL (ref 8.5–10.1)
CALCULATED R AXIS, ECG10: 31 DEGREES
CALCULATED T AXIS, ECG11: 22 DEGREES
CHLORIDE SERPL-SCNC: 103 MMOL/L (ref 97–108)
CO2 SERPL-SCNC: 25 MMOL/L (ref 21–32)
CREAT SERPL-MCNC: 0.75 MG/DL (ref 0.55–1.02)
DIAGNOSIS, 93000: NORMAL
GLUCOSE SERPL-MCNC: 106 MG/DL (ref 65–100)
P-R INTERVAL, ECG05: 170 MS
POTASSIUM SERPL-SCNC: 3.6 MMOL/L (ref 3.5–5.1)
Q-T INTERVAL, ECG07: 336 MS
QRS DURATION, ECG06: 72 MS
QTC CALCULATION (BEZET), ECG08: 428 MS
SODIUM SERPL-SCNC: 136 MMOL/L (ref 136–145)
VENTRICULAR RATE, ECG03: 98 BPM

## 2021-02-03 PROCEDURE — 80048 BASIC METABOLIC PNL TOTAL CA: CPT

## 2021-02-03 PROCEDURE — 36415 COLL VENOUS BLD VENIPUNCTURE: CPT

## 2021-02-03 PROCEDURE — 74011250636 HC RX REV CODE- 250/636: Performed by: NURSE PRACTITIONER

## 2021-02-03 PROCEDURE — 93005 ELECTROCARDIOGRAM TRACING: CPT

## 2021-02-03 PROCEDURE — 74011250636 HC RX REV CODE- 250/636: Performed by: STUDENT IN AN ORGANIZED HEALTH CARE EDUCATION/TRAINING PROGRAM

## 2021-02-03 PROCEDURE — 65270000015 HC RM PRIVATE ONCOLOGY

## 2021-02-03 PROCEDURE — 74011250637 HC RX REV CODE- 250/637: Performed by: NURSE PRACTITIONER

## 2021-02-03 PROCEDURE — 74011000258 HC RX REV CODE- 258: Performed by: STUDENT IN AN ORGANIZED HEALTH CARE EDUCATION/TRAINING PROGRAM

## 2021-02-03 PROCEDURE — 74011250637 HC RX REV CODE- 250/637: Performed by: GENERAL ACUTE CARE HOSPITAL

## 2021-02-03 PROCEDURE — 74011250636 HC RX REV CODE- 250/636: Performed by: GENERAL ACUTE CARE HOSPITAL

## 2021-02-03 PROCEDURE — 74011250637 HC RX REV CODE- 250/637: Performed by: STUDENT IN AN ORGANIZED HEALTH CARE EDUCATION/TRAINING PROGRAM

## 2021-02-03 RX ORDER — FENOFIBRATE 145 MG/1
145 TABLET, COATED ORAL DAILY
Status: DISCONTINUED | OUTPATIENT
Start: 2021-02-04 | End: 2021-02-08 | Stop reason: HOSPADM

## 2021-02-03 RX ORDER — GABAPENTIN 300 MG/1
300 CAPSULE ORAL 3 TIMES DAILY
Status: DISCONTINUED | OUTPATIENT
Start: 2021-02-03 | End: 2021-02-05

## 2021-02-03 RX ORDER — LORAZEPAM 2 MG/ML
1 INJECTION INTRAMUSCULAR
Status: DISCONTINUED | OUTPATIENT
Start: 2021-02-03 | End: 2021-02-07

## 2021-02-03 RX ADMIN — ENOXAPARIN SODIUM 40 MG: 40 INJECTION SUBCUTANEOUS at 16:23

## 2021-02-03 RX ADMIN — THIAMINE HYDROCHLORIDE 100 MG: 100 INJECTION, SOLUTION INTRAMUSCULAR; INTRAVENOUS at 13:31

## 2021-02-03 RX ADMIN — HALOPERIDOL LACTATE 5 MG: 5 INJECTION, SOLUTION INTRAMUSCULAR at 05:17

## 2021-02-03 RX ADMIN — LORAZEPAM 2 MG: 2 INJECTION INTRAMUSCULAR; INTRAVENOUS at 16:23

## 2021-02-03 RX ADMIN — LORAZEPAM 2 MG: 2 INJECTION INTRAMUSCULAR; INTRAVENOUS at 03:12

## 2021-02-03 RX ADMIN — ASPIRIN 81 MG: 81 TABLET, COATED ORAL at 08:56

## 2021-02-03 RX ADMIN — LORAZEPAM 2 MG: 2 INJECTION INTRAMUSCULAR; INTRAVENOUS at 00:52

## 2021-02-03 RX ADMIN — GABAPENTIN 300 MG: 300 CAPSULE ORAL at 21:14

## 2021-02-03 RX ADMIN — CLONAZEPAM 1 MG: 0.5 TABLET ORAL at 08:56

## 2021-02-03 RX ADMIN — LOSARTAN POTASSIUM 50 MG: 50 TABLET, FILM COATED ORAL at 08:56

## 2021-02-03 RX ADMIN — SODIUM CHLORIDE 50 ML/HR: 9 INJECTION, SOLUTION INTRAVENOUS at 00:54

## 2021-02-03 RX ADMIN — CLONAZEPAM 1 MG: 0.5 TABLET ORAL at 18:52

## 2021-02-03 RX ADMIN — SODIUM CHLORIDE 10 ML: 9 INJECTION, SOLUTION INTRAMUSCULAR; INTRAVENOUS; SUBCUTANEOUS at 16:28

## 2021-02-03 RX ADMIN — LORAZEPAM 2 MG: 2 INJECTION INTRAMUSCULAR; INTRAVENOUS at 21:14

## 2021-02-03 RX ADMIN — SODIUM CHLORIDE 10 ML: 9 INJECTION, SOLUTION INTRAMUSCULAR; INTRAVENOUS; SUBCUTANEOUS at 21:19

## 2021-02-03 RX ADMIN — SODIUM CHLORIDE 50 ML/HR: 9 INJECTION, SOLUTION INTRAVENOUS at 13:30

## 2021-02-03 NOTE — PROGRESS NOTES
Reason for Admission:   Drug overdose               RUR Score:     53%    PCP: First and Last name: Lukas Ortiz MD     Name of Practice: [facility], [phone]   Are you a current patient: Yes/No: Yes   Approximate date of last visit: unknown   Can you do a virtual visit with your PCP: No             Resources/supports as identified by patient/family:   Pt AOx1; pt unable to identify networks of support at this time. Pt's father stated she has \"plenty of friends and neighbors. \"                Top Challenges facing patient (as identified by patient/family and CM): Finances/Medication cost?     Medicaid screening initiated during previous admission at [facility] in December 2020. CM unable to gather additional information from pt/ pt's father regarding finances. Transportation? Pt drives; per Trinity Shields it is not safe for pt to be driving due to declining memory loss. Support system or lack thereof? Needs for support w/ medication administration and caring for herself identified by friend, Trinity Colonrick. Living arrangements? Pt lives independently at [address]           Self-care/ADLs/Cognition? Needs for support w/ medication administration and caring for herself identified by friend, Trinity Colonrick. Areas for concern relating to her cognition and ability to provide adequate care to complete ADLs/ IADLs independently. Trinity Shields stated she has reached out to [facility] on multiple occassions regarding concerns for her health & well-being.             Current Advanced Directive/Advance Care Plan:    Advance Care Planning   General Advance Care Planning (ACP) Conversation  Date of Conversation: 2/3/2021  Conducted with: Healthcare Decision Maker: Next of Kin by law (only applies in absence of a Healthcare Power of  or Legal Guardian)    Healthcare Decision Maker:     Primary Decision Maker: Evie Reynoso - Father - [phone]    Content/Action Overview:   DECLINED ACP conversation - will revisit periodically   Reviewed DNR/DNI and patient elects Full Code (Attempt Resuscitation)    Length of Voluntary ACP Conversation in minutes:  <16 minutes (Non-Billable)                          Plan for utilizing home health:    Prior hx of HH w/ [facility] (OT/PT/RN)                 Transition of Care Plan:  Return to [facility] ([address] [place] [address]) vs ???  CM to secure transport for d/c- Lyft through [employer]  [facility]nd [facility] letter  **Noted prior in-pt psych admission; will require Level II screening pending disposition for d/c    SW Intern: CM reviewed pt's chart. CM spoke w/ pt's father, Shakira Veliz ([phone]) to introduce role & complete initial assessment. Pt's father stated he was not aware of pt's living situation or additional networks of support available. Mr. Abbey Genao reported he lives nearby in the [facility] area of [place] and visits his dtr monthly. CM also spoke to pt's emergency contact/ friend Joleen Cervantes ([phone]) to further assess pt's resources and networks of support available to her. Rodger Garcia expressed various concerns regarding the pt's cognitive & physical ability to appropriately care for herself. Rodger Garcia also shared concerns for the pt's safety due to her memory loss and inability to administer her medications correctly. Rodger Garcia stated she will be unable to provide transportation or additional support at d/c. Pt will require Lyft transport at d/c. Unit CM to continue following & provide updates as they become available. Care Management Interventions  PCP Verified by CM: Yes  Palliative Care Criteria Met (RRAT>21 & CHF Dx)?: No  Mode of Transport at Discharge:  Other (see comment)(will need Lyft transport)  Transition of Care Consult (CM Consult): Discharge Planning  Discharge Durable Medical Equipment: No(has a cane & RW)  Physical Therapy Consult: No  Occupational Therapy Consult: No  Speech Therapy Consult: No  Current Support Network: Lives Alone(independent living- [address])  Confirm Follow Up Transport: Self  Discharge Location  Discharge Placement: Home(w/ f/u appts)    Lorice Rinne, MSW Intern  Care Management

## 2021-02-03 NOTE — PROGRESS NOTES
Received notification from bedside RN about patient with regards to: persistent pulling off restraints, causing pulling off IV site and irritation of arms and hands  VS: /69, HR 98, RR 18, O2 sat 94% on RA    Intervention given: Ativan IM prn ordered

## 2021-02-03 NOTE — ROUTINE PROCESS
TRANSFER - OUT REPORT:    Verbal report given to Megha Montoya (name) on Trupti Javier  being transferred to Oncology (unit) for routine progression of care       Report consisted of patients Situation, Background, Assessment and   Recommendations(SBAR). Information from the following report(s) SBAR was reviewed with the receiving nurse. Lines:   Venous Access Device Power Port 11/28/20 Upper chest (subclavicular area, right (Active)        Opportunity for questions and clarification was provided.       Patient transported with:   Registered Nurse

## 2021-02-03 NOTE — ROUTINE PROCESS
Patient resting with sitter in room , suicide precautions and seizure precautions. No verbal response of trying to hurt herself. No seizure activity. She is inappropriate in some verbal contact. Affect is Happy and she laughs at times briefly. She speaks randomly with content that is not related to surroundings. she sits up in bed even though restraints and seems restless at times. She is on scheduled meds which should be relaxing her. I have not had to give prns yet. Patient is disoriented. When I asked her what her name was when giving meds, she purposefully gave me a first and last man's name. She did use the bedpan and ate breakfast. Both arms restrained and left leg has been restrained but is not right now. There is no aggression, patient is pleasant. She is to be transferred to another unit.  Talked with physician on rounds

## 2021-02-03 NOTE — PROGRESS NOTES
Bedside shift change report given to ZHANG Esteban (oncoming nurse) by Pop Kumar. Isabel Diallo RN (offgoing nurse). Report included the following information SBAR, Kardex, Procedure Summary, Intake/Output, MAR and Recent Results.

## 2021-02-03 NOTE — PROGRESS NOTES
SW Intern: CM reviewed pt's chart prior to moving forward. CM met w/ pt at bedside. Pt was accompanied by a 1:1 sitter & restraints on her wrists; pt appeared happy and was agreeable to completing assessment. Pt confirmed her  & address but appeared disoriented as conversation deepened. Pt was AOx1 (oriented to self only). CM to defer to her emergency contacts, Tommy Monique ([phone]) and her father, Mary Aranda ([phone]), to complete assessment. CM to follow & provide updates as they become available.      Sam Zacarias MSW Intern  Care Management    -

## 2021-02-03 NOTE — PROGRESS NOTES
Hospitalist Progress Note    NAME: Era Bower   :  1955   MRN:  [id number]       Assessment / Plan:    AMS - improving  Intentional venlafaxine overdose  Suicidal ideation with attempt, as above  Seizure episode  Major depression  Hx of metastatic breast cancer   One on One sitter  Suicide and seizure precautions  Psychiatry Consulted, needs to be medically stable before being considered for inpt psych treatment  Telemetry monitoring - monitor for QTc prolongation, arrhythmia and further seizure episodes, amongst other potential side effects  Previous admission in Dec 2020 at [facility] indicates \"attempted to stab self in head POA\" Pt was also treated for benzodiazepine withdrawal - it is unclear whether pt is on those medications currently as not on PTA list. Per Psychiatry note,  Alprazolam 1 mg QID PRN Anxiety   Started on clonazepam 1mg BID  Haldol prn, watch for QT rprolongation  Was due for appointment with Oncology this month and is on treatment holiday  2-point restraint     Hypokalemia  Replete and monitor  BMP penidng    CORNELIA  IVF  BMP pending. Will d/c fluid if CORNELIA resolves     Anxiety  HTN  GERD  HLD  Continue ASA, fenofibrate, gabapentin, folic acid, and losartan  Pharmacy for med rec     Code Status: Full  Surrogate Decision Maker: Friend  DVT Prophylaxis: Lovenox  GI Prophylaxis: not indicated  Baseline: Independent     Subjective:     Chief Complaint / Reason for Physician Visit  Discussed with RN events overnight. Patient more calm today  Alert, less agitated, confused. Oriented to self and year. Was not able to tell me where she is at. Review of Systems:  Symptom Y/N Comments  Symptom Y/N Comments   Fever/Chills    Chest Pain     Poor Appetite    Edema     Cough    Abdominal Pain     Sputum    Joint Pain     SOB/LOBO    Pruritis/Rash     Nausea/vomit    Tolerating PT/OT     Diarrhea    Tolerating Diet     Constipation    Other       Could NOT obtain due to:  AMS Objective:     VITALS:   Last 24hrs VS reviewed since prior progress note. Most recent are:  Patient Vitals for the past 24 hrs:   Temp Pulse Resp BP SpO2   02/03/21 1102 97.8 °F (36.6 °C) 99 18 117/69 95 %   02/03/21 0740 97.8 °F (36.6 °C) 88 18 135/66 94 %   02/03/21 0210 98.4 °F (36.9 °C) 98 18 118/69 94 %   02/02/21 2300 98.6 °F (37 °C) 90 18 109/67 96 %   02/02/21 1926 97.7 °F (36.5 °C) 87 18 106/82 97 %   02/02/21 1850  90 18 112/73 93 %   02/02/21 1818 97.7 °F (36.5 °C) 95 16 109/69 95 %   02/02/21 1700  94 16 (!) 111/58 97 %   02/02/21 1530 97.9 °F (36.6 °C) 95 16 118/68 95 %   02/02/21 1501 97.9 °F (36.6 °C) 95 16 117/70 95 %       Intake/Output Summary (Last 24 hours) at 2/3/2021 1212  Last data filed at 2/3/2021 3972  Gross per 24 hour   Intake 2934.16 ml   Output 425 ml   Net 2509.16 ml        I had a face to face encounter and independently examined this patient on 2/3/2021, as outlined below:  PHYSICAL EXAM:  General: WD, WN. Alert, cooperative, no acute distress    EENT:  EOMI. Anicteric sclerae. MMM  Resp:  CTA bilaterally, no wheezing or rales. No accessory muscle use  CV:  Regular  rhythm,  No edema  GI:  Soft, Non distended, Non tender. +Bowel sounds  Neurologic:  Alert and oriented X 2, normal speech,   Psych:   Calm, not agitated  Skin:  No rashes. No jaundice    Reviewed most current lab test results and cultures  YES  Reviewed most current radiology test results   YES  Review and summation of old records today    NO  Reviewed patient's current orders and MAR    YES  PMH/SH reviewed - no change compared to H&P  ________________________________________________________________________  Care Plan discussed with:    Comments   Patient x    Family      RN x    Care Manager     Consultant                        Multidiciplinary team rounds were held today with , nursing, pharmacist and clinical coordinator.   Patient's plan of care was discussed; medications were reviewed and discharge planning was addressed. ______________________________________________________________________    Total CRITICAL CARE TIME Spent:   Minutes non procedure based      Comments   >50% of visit spent in counseling and coordination of care x    ________________________________________________________________________  Esthela Aguilar MD     Procedures: see electronic medical records for all procedures/Xrays and details which were not copied into this note but were reviewed prior to creation of Plan. LABS:  I reviewed today's most current labs and imaging studies.   Pertinent labs include:  Recent Labs     02/02/21 0335 02/01/21  0639   WBC 7.6 10.6   HGB 11.6 15.6   HCT 35.5 48.2*    388     Recent Labs     02/02/21  0335 02/01/21  1020 02/01/21  0639     --  130*   K 3.1* 3.0* 3.2*     --  93*   CO2 25  --  19*   GLU 96  --  239*   BUN 17  --  18   CREA INVESTIGATED PER DELTA CHECK PROTOCOL  --  1.62*   CA 8.6  --  10.1   MG 2.1 1.9  --    PHOS 2.7  --   --    ALB 3.5  --  4.6   TBILI 0.5  --  0.6   ALT 20  --  26       Signed: Esthela Aguilar MD

## 2021-02-03 NOTE — PROGRESS NOTES
End of Shift Note    Bedside shift change report given to Ashley Loomis RN (oncoming nurse) by Vivien Zimmerman (offgoing nurse). Report included the following information SBAR, Kardex, Intake/Output and MAR    Shift worked:  DAY     Shift summary and any significant changes:    24 hours restraints started at 1500. Patient in 3 point restraints. Seizure precautions. Sitter at bedside for suicide precautions. Concerns for physician to address:       Zone phone for oncoming shift:          Activity:  Activity Level: Ambulate X (#)  Number times ambulated in hallways past shift: 0  Number of times OOB to chair past shift: 0    Cardiac:   Cardiac Monitoring: No      Cardiac Rhythm: Normal sinus rhythm    Access:   Current line(s): PIV     Genitourinary:   Urinary status: voiding and external catheter    Respiratory:   O2 Device: Room air  Chronic home O2 use?: NO  Incentive spirometer at bedside: NO     GI:     Current diet:  DIET FULL LIQUID  Passing flatus: YES  Tolerating current diet: YES       Pain Management:   Patient states pain is manageable on current regimen: YES    Skin:  Adama Score: 21  Interventions: PT/OT consult    Patient Safety:  Fall Score:  Total Score: 3  Interventions: bed/chair alarm, assistive device (walker, cane, etc) and sitter at bedside   High Fall Risk: Yes    Length of Stay:  Expected LOS: 2d 7h  Actual LOS: 1      Vivien Zimmerman

## 2021-02-04 LAB
ALBUMIN SERPL-MCNC: 3.3 G/DL (ref 3.5–5)
ALBUMIN/GLOB SERPL: 1 {RATIO} (ref 1.1–2.2)
ALP SERPL-CCNC: 92 U/L (ref 45–117)
ALT SERPL-CCNC: 31 U/L (ref 12–78)
ANION GAP SERPL CALC-SCNC: 8 MMOL/L (ref 5–15)
AST SERPL-CCNC: 28 U/L (ref 15–37)
ATRIAL RATE: 108 BPM
BASOPHILS # BLD: 0.1 K/UL (ref 0–0.1)
BASOPHILS NFR BLD: 1 % (ref 0–1)
BILIRUB SERPL-MCNC: 0.5 MG/DL (ref 0.2–1)
BUN SERPL-MCNC: 8 MG/DL (ref 6–20)
BUN/CREAT SERPL: 12 (ref 12–20)
CALCIUM SERPL-MCNC: 8.6 MG/DL (ref 8.5–10.1)
CALCULATED P AXIS, ECG09: 31 DEGREES
CALCULATED R AXIS, ECG10: 13 DEGREES
CALCULATED T AXIS, ECG11: -69 DEGREES
CHLORIDE SERPL-SCNC: 105 MMOL/L (ref 97–108)
CO2 SERPL-SCNC: 25 MMOL/L (ref 21–32)
CREAT SERPL-MCNC: 0.68 MG/DL (ref 0.55–1.02)
DIAGNOSIS, 93000: NORMAL
DIFFERENTIAL METHOD BLD: NORMAL
EOSINOPHIL # BLD: 0.1 K/UL (ref 0–0.4)
EOSINOPHIL NFR BLD: 2 % (ref 0–7)
ERYTHROCYTE [DISTWIDTH] IN BLOOD BY AUTOMATED COUNT: 13 % (ref 11.5–14.5)
GLOBULIN SER CALC-MCNC: 3.3 G/DL (ref 2–4)
GLUCOSE SERPL-MCNC: 171 MG/DL (ref 65–100)
HCT VFR BLD AUTO: 38.4 % (ref 35–47)
HGB BLD-MCNC: 12.8 G/DL (ref 11.5–16)
IMM GRANULOCYTES # BLD AUTO: 0 K/UL (ref 0–0.04)
IMM GRANULOCYTES NFR BLD AUTO: 0 % (ref 0–0.5)
LYMPHOCYTES # BLD: 1.1 K/UL (ref 0.8–3.5)
LYMPHOCYTES NFR BLD: 19 % (ref 12–49)
MCH RBC QN AUTO: 30.3 PG (ref 26–34)
MCHC RBC AUTO-ENTMCNC: 33.3 G/DL (ref 30–36.5)
MCV RBC AUTO: 90.8 FL (ref 80–99)
MONOCYTES # BLD: 0.3 K/UL (ref 0–1)
MONOCYTES NFR BLD: 5 % (ref 5–13)
NEUTS SEG # BLD: 4.5 K/UL (ref 1.8–8)
NEUTS SEG NFR BLD: 73 % (ref 32–75)
NRBC # BLD: 0 K/UL (ref 0–0.01)
NRBC BLD-RTO: 0 PER 100 WBC
P-R INTERVAL, ECG05: 160 MS
PLATELET # BLD AUTO: 223 K/UL (ref 150–400)
PMV BLD AUTO: 10.8 FL (ref 8.9–12.9)
POTASSIUM SERPL-SCNC: 3.4 MMOL/L (ref 3.5–5.1)
PROT SERPL-MCNC: 6.6 G/DL (ref 6.4–8.2)
Q-T INTERVAL, ECG07: 308 MS
QRS DURATION, ECG06: 74 MS
QTC CALCULATION (BEZET), ECG08: 412 MS
RBC # BLD AUTO: 4.23 M/UL (ref 3.8–5.2)
SODIUM SERPL-SCNC: 138 MMOL/L (ref 136–145)
VENTRICULAR RATE, ECG03: 108 BPM
WBC # BLD AUTO: 6.2 K/UL (ref 3.6–11)

## 2021-02-04 PROCEDURE — 74011250637 HC RX REV CODE- 250/637: Performed by: NURSE PRACTITIONER

## 2021-02-04 PROCEDURE — 74011250637 HC RX REV CODE- 250/637: Performed by: GENERAL ACUTE CARE HOSPITAL

## 2021-02-04 PROCEDURE — 65270000015 HC RM PRIVATE ONCOLOGY

## 2021-02-04 PROCEDURE — 80053 COMPREHEN METABOLIC PANEL: CPT

## 2021-02-04 PROCEDURE — 74011250636 HC RX REV CODE- 250/636: Performed by: GENERAL ACUTE CARE HOSPITAL

## 2021-02-04 PROCEDURE — 85025 COMPLETE CBC W/AUTO DIFF WBC: CPT

## 2021-02-04 PROCEDURE — 36415 COLL VENOUS BLD VENIPUNCTURE: CPT

## 2021-02-04 PROCEDURE — 74011250637 HC RX REV CODE- 250/637: Performed by: STUDENT IN AN ORGANIZED HEALTH CARE EDUCATION/TRAINING PROGRAM

## 2021-02-04 PROCEDURE — 93005 ELECTROCARDIOGRAM TRACING: CPT

## 2021-02-04 RX ORDER — ASPIRIN 325 MG/1
100 TABLET, FILM COATED ORAL DAILY
Status: DISCONTINUED | OUTPATIENT
Start: 2021-02-04 | End: 2021-02-08 | Stop reason: HOSPADM

## 2021-02-04 RX ADMIN — SODIUM CHLORIDE 10 ML: 9 INJECTION, SOLUTION INTRAMUSCULAR; INTRAVENOUS; SUBCUTANEOUS at 06:40

## 2021-02-04 RX ADMIN — SODIUM CHLORIDE 10 ML: 9 INJECTION, SOLUTION INTRAMUSCULAR; INTRAVENOUS; SUBCUTANEOUS at 15:45

## 2021-02-04 RX ADMIN — GABAPENTIN 300 MG: 300 CAPSULE ORAL at 15:33

## 2021-02-04 RX ADMIN — ENOXAPARIN SODIUM 40 MG: 40 INJECTION SUBCUTANEOUS at 11:39

## 2021-02-04 RX ADMIN — CLONAZEPAM 1 MG: 0.5 TABLET ORAL at 17:51

## 2021-02-04 RX ADMIN — FENOFIBRATE 145 MG: 145 TABLET ORAL at 10:04

## 2021-02-04 RX ADMIN — ENOXAPARIN SODIUM 40 MG: 40 INJECTION SUBCUTANEOUS at 00:15

## 2021-02-04 RX ADMIN — SODIUM CHLORIDE 10 ML: 9 INJECTION, SOLUTION INTRAMUSCULAR; INTRAVENOUS; SUBCUTANEOUS at 21:36

## 2021-02-04 RX ADMIN — THIAMINE HCL TAB 100 MG 100 MG: 100 TAB at 10:04

## 2021-02-04 RX ADMIN — GABAPENTIN 300 MG: 300 CAPSULE ORAL at 21:35

## 2021-02-04 RX ADMIN — CLONAZEPAM 1 MG: 0.5 TABLET ORAL at 10:04

## 2021-02-04 RX ADMIN — GABAPENTIN 300 MG: 300 CAPSULE ORAL at 10:04

## 2021-02-04 RX ADMIN — LOSARTAN POTASSIUM 50 MG: 50 TABLET, FILM COATED ORAL at 10:04

## 2021-02-04 RX ADMIN — ASPIRIN 81 MG: 81 TABLET, COATED ORAL at 10:04

## 2021-02-04 NOTE — PROGRESS NOTES
End of Shift Note    Bedside shift change report given to Codie (oncoming nurse) by Alexx Torres RN (offgoing nurse). Report included the following information SBAR, Kardex and MAR    Shift worked:  7a-7p     Shift summary and any significant changes:     Pt stable through shift, pt was calm and a lot less confused compared to yesterday, she could tell me the season, the year, her name and she could tell she was not at home, she could not remember what happened or why she was here, EKG done, labs grabbed, meds given accoridng to mar, pt had sitter in room with her, restraints were removed at around 0730 am or a little before. Concerns for physician to address:       Zone phone for oncoming shift:          Activity:  Activity Level: Bed Rest  Number times ambulated in hallways past shift: 0  Number of times OOB to chair past shift: 0    Cardiac:   Cardiac Monitoring: Yes      Cardiac Rhythm: Normal sinus rhythm    Access:   Current line(s): port    Genitourinary:   Urinary status: voiding    Respiratory:   O2 Device: Room air  Chronic home O2 use?: NO  Incentive spirometer at bedside: NO     GI:     Current diet:  DIET ONE TIME MESSAGE  DIET REGULAR  Passing flatus: YES  Tolerating current diet: YES  % Diet Eaten: 70 %    Pain Management:   Patient states pain is manageable on current regimen: YES    Skin:  Adama Score: 16  Interventions: turn team, increase time out of bed and limit briefs    Patient Safety:  Fall Score:  Total Score: 3  Interventions: bed/chair alarm, gripper socks and pt to call before getting OOB  High Fall Risk: Yes    Length of Stay:  Expected LOS: 2d 7h  Actual LOS: Alireza Carballo RN

## 2021-02-04 NOTE — PROGRESS NOTES
End of Shift Note    Bedside shift change report given to Codie (oncoming nurse) by Aaron Sotelo RN (offgoing nurse). Report included the following information SBAR, Kardex and MAR    Shift worked:  7a-7p     Shift summary and any significant changes:     Pt stable through shift, meds given according to mar, labs drawn, port accessed, pt refused some meds but when she calmed down she took some, pt became agitated and combative tried to bite, me gave 1 prn dose of ativan calmed, her, pt had sitter in room with her was in soft restraints, pt is confused,      Concerns for physician to address:       Zone phone for oncoming shift:          Activity:  Activity Level: Bed Rest  Number times ambulated in hallways past shift: 0  Number of times OOB to chair past shift: 0    Cardiac:   Cardiac Monitoring: Yes      Cardiac Rhythm: Normal sinus rhythm    Access:   Current line(s): port     Genitourinary:   Urinary status: voiding    Respiratory:   O2 Device: Room air  Chronic home O2 use?: NO  Incentive spirometer at bedside: NO     GI:     Current diet:  DIET ONE TIME MESSAGE  DIET REGULAR  Passing flatus: YES  Tolerating current diet: YES  % Diet Eaten: 100 %    Pain Management:   Patient states pain is manageable on current regimen: YES    Skin:  Adama Score: 15  Interventions: PT/OT consult    Patient Safety:  Fall Score:  Total Score: 3  Interventions: bed/chair alarm, gripper socks, pt to call before getting OOB and sitter at bedside   High Fall Risk: Yes    Length of Stay:  Expected LOS: 2d 7h  Actual LOS: 2      Aaron Sotelo, RN

## 2021-02-04 NOTE — PROGRESS NOTES
End of Shift Note    Bedside shift change report given to Sandra Crockett (oncoming nurse) by Josey Mace (offgoing nurse). Report included the following information SBAR, Kardex and MAR    Shift worked:  7p-7a     Shift summary and any significant changes:     Patient does have a sitter at bedside. Patient is pleasantly confused. Patient was restless, Ativan was given once, see MAR. Scheduled medications have been given, see MAR. Chest port has been flushed, is patent and infusing. Patient teaching and routine rounding has been done. Concerns for physician to address:       Zone phone for oncoming shift:          Activity:  Activity Level: Bed Rest  Number times ambulated in hallways past shift: 0  Number of times OOB to chair past shift: 0    Cardiac:   Cardiac Monitoring: Yes      Cardiac Rhythm: Normal sinus rhythm    Access:   Current line(s): port     Genitourinary:   Urinary status: incontinent    Respiratory:   O2 Device: Room air  Chronic home O2 use?: NO  Incentive spirometer at bedside: NO     GI:     Current diet:  DIET ONE TIME MESSAGE  DIET REGULAR  Passing flatus: YES  Tolerating current diet: YES  % Diet Eaten: 100 %    Pain Management:   Patient states pain is manageable on current regimen: YES    Skin:  Adama Score: 16  Interventions: turn team, float heels, increase time out of bed and PT/OT consult    Patient Safety:  Fall Score:  Total Score: 3  Interventions: bed/chair alarm, assistive device (walker, cane, etc) and pt to call before getting OOB  High Fall Risk: Yes    Length of Stay:  Expected LOS: 2d 7h  Actual LOS: [address]

## 2021-02-04 NOTE — PROGRESS NOTES
Hospitalist Progress Note    NAME: Pradeep Galaviz   :  1955   MRN:  [id number]       Assessment / Plan:    AMS/toxic metabolic encephalopathy - improving  Intentional venlafaxine overdose  Suicidal ideation with attempt, as above  Seizure episode  Major depression  Hx of metastatic breast cancer   Continue One on One sitter  Suicide and seizure precautions  Psychiatry Consulted, needs to be medically stable before being considered for inpt psych treatment  Telemetry monitoring - monitor for QTc prolongation, arrhythmia and further seizure episodes  Previous admission in Dec 2020 at [facility] indicates \"attempted to stab self in head POA\" Pt was also treated for benzodiazepine withdrawal - it is unclear whether pt is on those medications currently as not on PTA list. Per Psychiatry note,  Alprazolam 1 mg QID PRN Anxiety   Started on clonazepam 1mg BID  Haldol prn per psych, watch for QT rprolongation  Alert and oriented x 3 today, calm. Discontinue restraints         Hypokalemia  Replete and monitor    CORNELIA - resolved  Discontinue IV fluid     Anxiety  HTN  GERD  HLD  Continue ASA, fenofibrate, gabapentin, folic acid, and losartan  Pharmacy for med rec     Code Status: Full  Surrogate Decision Maker: Friend  DVT Prophylaxis: Lovenox  GI Prophylaxis: not indicated  Baseline: Independent     Subjective:     Chief Complaint / Reason for Physician Visit    Patient alert and oriented x today  Denies suicidal ideation  Sitter by bedside  Restraints discontinued      Review of Systems:  Symptom Y/N Comments  Symptom Y/N Comments   Fever/Chills    Chest Pain     Poor Appetite    Edema     Cough    Abdominal Pain     Sputum    Joint Pain     SOB/LOBO    Pruritis/Rash     Nausea/vomit    Tolerating PT/OT     Diarrhea    Tolerating Diet     Constipation    Other       Could NOT obtain due to: AMS     Objective:     VITALS:   Last 24hrs VS reviewed since prior progress note.  Most recent are:  Patient Vitals for the past 24 hrs:   Temp Pulse Resp BP SpO2   02/04/21 0359 98.1 °F (36.7 °C) 92 20 (!) 155/98 95 %   02/03/21 2313 98.9 °F (37.2 °C) (!) 102 24 (!) 128/90 94 %   02/03/21 1936 98 °F (36.7 °C) 96 20 (!) 157/95 97 %   02/03/21 1600  (!) 105      02/03/21 1240 98.8 °F (37.1 °C) 92 18 130/73 97 %   02/03/21 1102 97.8 °F (36.6 °C) 99 18 117/69 95 %   02/03/21 0740 97.8 °F (36.6 °C) 88 18 135/66 94 %       Intake/Output Summary (Last 24 hours) at 2/4/2021 9204  Last data filed at 2/3/2021 3410  Gross per 24 hour   Intake 360 ml   Output 200 ml   Net 160 ml        I had a face to face encounter and independently examined this patient on 2/4/2021, as outlined below:  PHYSICAL EXAM:  General: WD, WN. Alert, cooperative, no acute distress    EENT:  EOMI. Anicteric sclerae. MMM  Resp:  CTA bilaterally, no wheezing or rales. No accessory muscle use  CV:  Regular  rhythm,  No edema  GI:  Soft, Non distended, Non tender. +Bowel sounds  Neurologic:  Alert and oriented X 3, normal speech,   Psych:   Calm, not agitated  Skin:  No rashes. No jaundice    Reviewed most current lab test results and cultures  YES  Reviewed most current radiology test results   YES  Review and summation of old records today    NO  Reviewed patient's current orders and MAR    YES  PMH/SH reviewed - no change compared to H&P  ________________________________________________________________________  Care Plan discussed with:    Comments   Patient x    Family      RN x    Care Manager     Consultant                        Multidiciplinary team rounds were held today with , nursing, pharmacist and clinical coordinator. Patient's plan of care was discussed; medications were reviewed and discharge planning was addressed.      ______________________________________________________________________    Total CRITICAL CARE TIME Spent:   Minutes non procedure based      Comments   >50% of visit spent in counseling and coordination of care x ________________________________________________________________________  Vani Ríos MD     Procedures: see electronic medical records for all procedures/Xrays and details which were not copied into this note but were reviewed prior to creation of Plan. LABS:  I reviewed today's most current labs and imaging studies.   Pertinent labs include:  Recent Labs     02/02/21  0335   WBC 7.6   HGB 11.6   HCT 35.5        Recent Labs     02/03/21  1339 02/02/21  0335 02/01/21  1020    138  --    K 3.6 3.1* 3.0*    106  --    CO2 25 25  --    * 96  --    BUN 12 17  --    CREA 0.75 INVESTIGATED PER DELTA CHECK PROTOCOL  --    CA 9.0 8.6  --    MG  --  2.1 1.9   PHOS  --  2.7  --    ALB  --  3.5  --    TBILI  --  0.5  --    ALT  --  20  --        Signed: Vani Ríos MD

## 2021-02-05 LAB
ALBUMIN SERPL-MCNC: 2.2 G/DL (ref 3.5–5)
ANION GAP SERPL CALC-SCNC: 10 MMOL/L (ref 5–15)
ANION GAP SERPL CALC-SCNC: 5 MMOL/L (ref 5–15)
BUN SERPL-MCNC: 11 MG/DL (ref 6–20)
BUN SERPL-MCNC: 14 MG/DL (ref 6–20)
BUN/CREAT SERPL: 17 (ref 12–20)
BUN/CREAT SERPL: 23 (ref 12–20)
CALCIUM SERPL-MCNC: 6.4 MG/DL (ref 8.5–10.1)
CALCIUM SERPL-MCNC: 8.6 MG/DL (ref 8.5–10.1)
CHLORIDE SERPL-SCNC: 106 MMOL/L (ref 97–108)
CHLORIDE SERPL-SCNC: 117 MMOL/L (ref 97–108)
CO2 SERPL-SCNC: 20 MMOL/L (ref 21–32)
CO2 SERPL-SCNC: 28 MMOL/L (ref 21–32)
CREAT SERPL-MCNC: 0.48 MG/DL (ref 0.55–1.02)
CREAT SERPL-MCNC: 0.84 MG/DL (ref 0.55–1.02)
GLUCOSE SERPL-MCNC: 133 MG/DL (ref 65–100)
GLUCOSE SERPL-MCNC: 93 MG/DL (ref 65–100)
MAGNESIUM SERPL-MCNC: 1.3 MG/DL (ref 1.6–2.4)
POTASSIUM SERPL-SCNC: 2.5 MMOL/L (ref 3.5–5.1)
POTASSIUM SERPL-SCNC: 4.1 MMOL/L (ref 3.5–5.1)
SODIUM SERPL-SCNC: 139 MMOL/L (ref 136–145)
SODIUM SERPL-SCNC: 147 MMOL/L (ref 136–145)

## 2021-02-05 PROCEDURE — 74011250636 HC RX REV CODE- 250/636: Performed by: NURSE PRACTITIONER

## 2021-02-05 PROCEDURE — 74011250637 HC RX REV CODE- 250/637: Performed by: GENERAL ACUTE CARE HOSPITAL

## 2021-02-05 PROCEDURE — 80048 BASIC METABOLIC PNL TOTAL CA: CPT

## 2021-02-05 PROCEDURE — 97116 GAIT TRAINING THERAPY: CPT

## 2021-02-05 PROCEDURE — 74011250637 HC RX REV CODE- 250/637: Performed by: NURSE PRACTITIONER

## 2021-02-05 PROCEDURE — 82040 ASSAY OF SERUM ALBUMIN: CPT

## 2021-02-05 PROCEDURE — 97161 PT EVAL LOW COMPLEX 20 MIN: CPT

## 2021-02-05 PROCEDURE — 74011250637 HC RX REV CODE- 250/637: Performed by: STUDENT IN AN ORGANIZED HEALTH CARE EDUCATION/TRAINING PROGRAM

## 2021-02-05 PROCEDURE — 65270000015 HC RM PRIVATE ONCOLOGY

## 2021-02-05 PROCEDURE — 74011250636 HC RX REV CODE- 250/636: Performed by: GENERAL ACUTE CARE HOSPITAL

## 2021-02-05 PROCEDURE — 83735 ASSAY OF MAGNESIUM: CPT

## 2021-02-05 PROCEDURE — 36415 COLL VENOUS BLD VENIPUNCTURE: CPT

## 2021-02-05 RX ORDER — MAGNESIUM SULFATE HEPTAHYDRATE 40 MG/ML
2 INJECTION, SOLUTION INTRAVENOUS
Status: COMPLETED | OUTPATIENT
Start: 2021-02-05 | End: 2021-02-05

## 2021-02-05 RX ORDER — MAGNESIUM SULFATE HEPTAHYDRATE 40 MG/ML
2 INJECTION, SOLUTION INTRAVENOUS ONCE
Status: DISCONTINUED | OUTPATIENT
Start: 2021-02-05 | End: 2021-02-05 | Stop reason: DRUGHIGH

## 2021-02-05 RX ORDER — MAGNESIUM SULFATE 4 G/50ML
4 INJECTION INTRAVENOUS ONCE
Status: DISCONTINUED | OUTPATIENT
Start: 2021-02-05 | End: 2021-02-05 | Stop reason: CLARIF

## 2021-02-05 RX ADMIN — LOSARTAN POTASSIUM 50 MG: 50 TABLET, FILM COATED ORAL at 08:13

## 2021-02-05 RX ADMIN — MAGNESIUM SULFATE HEPTAHYDRATE 2 G: 40 INJECTION, SOLUTION INTRAVENOUS at 11:52

## 2021-02-05 RX ADMIN — ENOXAPARIN SODIUM 40 MG: 40 INJECTION SUBCUTANEOUS at 11:52

## 2021-02-05 RX ADMIN — ENOXAPARIN SODIUM 40 MG: 40 INJECTION SUBCUTANEOUS at 23:40

## 2021-02-05 RX ADMIN — ENOXAPARIN SODIUM 40 MG: 40 INJECTION SUBCUTANEOUS at 01:42

## 2021-02-05 RX ADMIN — SODIUM CHLORIDE 10 ML: 9 INJECTION, SOLUTION INTRAMUSCULAR; INTRAVENOUS; SUBCUTANEOUS at 14:19

## 2021-02-05 RX ADMIN — FENOFIBRATE 145 MG: 145 TABLET ORAL at 08:13

## 2021-02-05 RX ADMIN — CLONAZEPAM 1 MG: 0.5 TABLET ORAL at 17:34

## 2021-02-05 RX ADMIN — GABAPENTIN 400 MG: 100 CAPSULE ORAL at 21:07

## 2021-02-05 RX ADMIN — SODIUM CHLORIDE 10 ML: 9 INJECTION, SOLUTION INTRAMUSCULAR; INTRAVENOUS; SUBCUTANEOUS at 07:16

## 2021-02-05 RX ADMIN — LORAZEPAM 1 MG: 2 INJECTION INTRAMUSCULAR; INTRAVENOUS at 17:34

## 2021-02-05 RX ADMIN — MAGNESIUM SULFATE HEPTAHYDRATE 2 G: 40 INJECTION, SOLUTION INTRAVENOUS at 10:32

## 2021-02-05 RX ADMIN — HALOPERIDOL 5 MG: 5 TABLET ORAL at 19:58

## 2021-02-05 RX ADMIN — CLONAZEPAM 1 MG: 0.5 TABLET ORAL at 08:13

## 2021-02-05 RX ADMIN — LORAZEPAM 1 MG: 2 INJECTION INTRAMUSCULAR; INTRAVENOUS at 12:50

## 2021-02-05 RX ADMIN — GABAPENTIN 300 MG: 300 CAPSULE ORAL at 08:13

## 2021-02-05 RX ADMIN — LORAZEPAM 1 MG: 2 INJECTION INTRAMUSCULAR; INTRAVENOUS at 01:42

## 2021-02-05 RX ADMIN — GABAPENTIN 400 MG: 100 CAPSULE ORAL at 17:34

## 2021-02-05 RX ADMIN — ASPIRIN 81 MG: 81 TABLET, COATED ORAL at 08:13

## 2021-02-05 RX ADMIN — THIAMINE HCL TAB 100 MG 100 MG: 100 TAB at 08:13

## 2021-02-05 RX ADMIN — POTASSIUM BICARBONATE 40 MEQ: 782 TABLET, EFFERVESCENT ORAL at 08:51

## 2021-02-05 RX ADMIN — SODIUM CHLORIDE 10 ML: 9 INJECTION, SOLUTION INTRAMUSCULAR; INTRAVENOUS; SUBCUTANEOUS at 21:08

## 2021-02-05 RX ADMIN — POTASSIUM BICARBONATE 40 MEQ: 782 TABLET, EFFERVESCENT ORAL at 07:13

## 2021-02-05 NOTE — PROGRESS NOTES
End of Shift Note    Bedside shift change report given to Shabbir Garcia (oncoming nurse) by Ina Almaraz (offgoing nurse). Report included the following information SBAR, Kardex and MAR    Shift worked:  7p-7a     Shift summary and any significant changes:     Patient does have a sitter at bedside. Patient has not complained of any pain during my shift. Patient did ask for medication to make her relax because she was restless, Ativan was given, see PRN MAR. Scheduled medications were given, see MAR. Chest port has been flushed and is patent. Patient teaching and routine rounding has been done. Morning labs were done, critical laboratory values were; Potassium 2.5 and Calcium 6.4. The NP was notified and 40mEq Effervesent tablets were ordered and administered, see MAR. Concerns for physician to address:       Zone phone for oncoming shift:         Activity:  Activity Level: Bed Rest  Number times ambulated in hallways past shift: 0  Number of times OOB to chair past shift: 0    Cardiac:   Cardiac Monitoring: Yes      Cardiac Rhythm: Sinus tachycardia    Access:   Current line(s): port     Genitourinary:   Urinary status: incontinent    Respiratory:   O2 Device: Room air  Chronic home O2 use?: NO  Incentive spirometer at bedside: NO     GI:     Current diet:  DIET ONE TIME MESSAGE  DIET REGULAR  Passing flatus: YES  Tolerating current diet: YES  % Diet Eaten: 100 %    Pain Management:   Patient states pain is manageable on current regimen: YES    Skin:  Adama Score: 16  Interventions: turn team, increase time out of bed and nutritional support     Patient Safety:  Fall Score:  Total Score: 3  Interventions: bed/chair alarm and sitter at bedside   High Fall Risk: Yes    Length of Stay:  Expected LOS: 2d 7h  Actual LOS: [address]

## 2021-02-05 NOTE — PROGRESS NOTES
Physician Progress Note      PATIENTFerd Kate  [facility] #:                  [id number]  :                       1955  ADMIT DATE:       2021 6:34 AM  DISCH DATE:  RESPONDING  PROVIDER #:        Shea Landers MD          QUERY TEXT:    Pt admitted with Intentional drug overdose presumed to venlafaxine, unknown amount. Pt noted to have nursing documentation of \"became agitated and combative against staff. Pt is alert /oriented x 1\" \"became agitated, begin to kick and swing at staff members\" Code atlas called. If possible, please document in the progress notes and discharge summary if you are evaluating and / or treating any of the following: The medical record reflects the following:  Risk Factors: 64 yo F admitted with  Intentional drug overdose presumed to venlafaxine  Clinical Indicators: ED notes \" Pt arrived as nonverbal, unable to maintain airway, clenching and biting down / sz like activity at this time - non responsive to pain \"  2/2 Nursing notes \"pt  became agitated and combative against staff. Pt is alert /oriented x 1\" \"became agitated, begin to kick and swing at staff members\"  Treatment: IV Ativan PRN, Code atlas  Options provided:  -- Toxic encephalopathy 2/2 drug overdose, presumed venlafaxine  -- Encephalopathy due to medications or drugs, Please specify  -- Other - I will add my own diagnosis  -- Disagree - Not applicable / Not valid  -- Disagree - Clinically unable to determine / Unknown  -- Refer to Clinical Documentation Reviewer    PROVIDER RESPONSE TEXT:    This patient has toxic encephalopathy 2/2 drug overdose, presumed venlafaxine.     Query created by: Elenita Dudley on 2/3/2021 8:06 AM      Electronically signed by:  Shea Landers MD 2021 7:31 PM

## 2021-02-05 NOTE — PROGRESS NOTES
Received notification from bedside RN about patient with regards to: K+ 2.5, Ca 6.4 (corrected calcium 7.84)  VS: /88, , RR 18, O2 sat 94%    Intervention given:   Effer K 40 meq x 2 doses, Magnesium Sulfate 4 g x 1 dose  added Albumin and Magnesium to am labs  repeat CMP, Magnesium @ 1600

## 2021-02-05 NOTE — CONSULTS
Psychiatric Consult Requested    4:09 pm - Contacted unit for psychiatric consult. Advised RN off the floor and will call back. Given provider number to contact back once ready with computer.

## 2021-02-05 NOTE — PROGRESS NOTES
[facility] Plan:  TBD- home vs placement  CM to secure transport for d/c- [employer] through [employer]  2nd IMM letter  **Noted prior in-pt psych admission; will require Level II screening pending disposition for d/c    CM informed by MD that patient reported she was unsure of her housing situation, stating, \"I don't think I have a home. \" CM to follow-up w/ pt as she appears more A & O. CM will continue to follow & awaiting psych eval to assist in plan for disposition (psych re-consulted on 2/5).     PB Vann Intern  Care Management

## 2021-02-05 NOTE — PROGRESS NOTES
End of Shift Note    Bedside shift change report given to jovanni (oncoming nurse) by Mp Sanchez RN (offgoing nurse). Report included the following information SBAR, Kardex, ED Summary, MAR and Recent Results    Shift worked:  [id number]     Shift summary and any significant changes:     Lab work drawn. Given prn ativan X2 for anxiety. Patient stable during shift. Educated patient regarding all given meds. Hourly rounding completed. X1 to [facility]. Sitter at bedside. Consult to psych called. Concerns for physician to address:  none     Zone phone for oncoming shift:   [phone]       Activity:  Activity Level: Up with Assistance  Number times ambulated in hallways past shift: 0  Number of times OOB to chair past shift: 1    Cardiac:   Cardiac Monitoring: Yes      Cardiac Rhythm: Normal sinus rhythm    Access:   Current line(s): port     Genitourinary:   Urinary status: voiding    Respiratory:   O2 Device: Room air  Chronic home O2 use?: NO  Incentive spirometer at bedside: NO     GI:     Current diet:  DIET ONE TIME MESSAGE  DIET REGULAR  Passing flatus: YES  Tolerating current diet: YES  % Diet Eaten: 100 %    Pain Management:   Patient states pain is manageable on current regimen: YES    Skin:  Adama Score: 19  Interventions: float heels, increase time out of bed, PT/OT consult and limit briefs    Patient Safety:  Fall Score:  Total Score: 3  Interventions: bed/chair alarm and sitter at bedside   High Fall Risk: Yes    Length of Stay:  Expected LOS: 3d 12h  Actual LOS: [address], RN

## 2021-02-05 NOTE — INTERDISCIPLINARY ROUNDS
Oncology Interdisciplinary rounds were held today to discuss patient plan of care and outcomes. The following members were present: Nursing, Physician, Case Management, Pharmacy, and PT/OT    Actual Length of Stay: 4    DRG GLOS: 3.5    Expected Length of Stay: 3d 12h                       Plan            Discharge    Sitter at bedside.   Re consult psych Date: TBA

## 2021-02-05 NOTE — PROGRESS NOTES
Hospitalist Progress Note    NAME: Lashon Navarro   :  1955   MRN:  [id number]       Assessment / Plan:    AMS/toxic metabolic encephalopathy - improving  Intentional venlafaxine overdose  Suicidal ideation with attempt, as above  Seizure episode  Major depression  Hx of metastatic breast cancer   Continue One on One sitter  Suicide and seizure precautions  Psychiatry Consulted, needs to be medically stable before being considered for inpt psych treatment  Telemetry monitoring - monitor for QTc prolongation, arrhythmia and further seizure episodes  Previous admission in Dec 2020 at [facility] indicates \"attempted to stab self in head POA\" Pt was also treated for benzodiazepine withdrawal - it is unclear whether pt is on those medications currently as not on PTA list. Per Psychiatry note,  Alprazolam 1 mg QID PRN Anxiety   Started on clonazepam 1mg BID  Haldol prn per psych, watch for QT rprolongation  Alert and oriented x 3 today, calm. Discontinue restraints  Re-consulting Psych in-patient psych eval      CORNELIA - resolved  Discontinue IV fluid     Anxiety  HTN  GERD  HLD  Continue ASA, fenofibrate, gabapentin, folic acid, and losartan  Pharmacy for med rec     Code Status: Full  Surrogate Decision Maker: Friend  DVT Prophylaxis: Lovenox  GI Prophylaxis: not indicated  Baseline: Independent     Subjective:     Chief Complaint / Reason for Physician Visit  Patient now alert and oriented x 3 but seems to be having some memory problem.  \"I don't think I have a home\"  \"the lease in not renewed\"  \"I think that is why I tried to kill myself\"  Restraints removed      Review of Systems:  Symptom Y/N Comments  Symptom Y/N Comments   Fever/Chills    Chest Pain     Poor Appetite    Edema     Cough    Abdominal Pain     Sputum    Joint Pain     SOB/LOBO    Pruritis/Rash     Nausea/vomit    Tolerating PT/OT     Diarrhea    Tolerating Diet     Constipation    Other       Could NOT obtain due to: AMS     Objective: VITALS:   Last 24hrs VS reviewed since prior progress note. Most recent are:  Patient Vitals for the past 24 hrs:   Temp Pulse Resp BP SpO2   02/05/21 0746 97.4 °F (36.3 °C) 96 18 126/64 96 %   02/05/21 0300 98.5 °F (36.9 °C) (!) 107 18 125/88 94 %   02/04/21 2300 98.3 °F (36.8 °C) (!) 101 18 (!) 131/90 95 %   02/04/21 2003 98 °F (36.7 °C) (!) 105 20 129/87 97 %   02/04/21 1550 98.7 °F (37.1 °C) (!) 107 18 (!) 128/95 96 %   02/04/21 1145 98.6 °F (37 °C) (!) 111 18 123/83 95 %       Intake/Output Summary (Last 24 hours) at 2/5/2021 0848  Last data filed at 2/4/2021 1820  Gross per 24 hour   Intake 960 ml   Output    Net 960 ml        I had a face to face encounter and independently examined this patient on 2/5/2021, as outlined below:  PHYSICAL EXAM:  General: WD, WN. Alert, cooperative, no acute distress    EENT:  EOMI. Anicteric sclerae. MMM  Resp:  CTA bilaterally, no wheezing or rales. No accessory muscle use  CV:  Regular  rhythm,  No edema  GI:  Soft, Non distended, Non tender. +Bowel sounds  Neurologic:  Alert and oriented X 3, normal speech,   Psych:   Calm, not agitated  Skin:  No rashes. No jaundice    Reviewed most current lab test results and cultures  YES  Reviewed most current radiology test results   YES  Review and summation of old records today    NO  Reviewed patient's current orders and MAR    YES  PMH/SH reviewed - no change compared to H&P  ________________________________________________________________________  Care Plan discussed with:    Comments   Patient x    Family      RN x    Care Manager     Consultant                        Multidiciplinary team rounds were held today with , nursing, pharmacist and clinical coordinator. Patient's plan of care was discussed; medications were reviewed and discharge planning was addressed.      ______________________________________________________________________    Total CRITICAL CARE TIME Spent:   Minutes non procedure based Comments   >50% of visit spent in counseling and coordination of care x    ________________________________________________________________________  Dara Hendrix MD     Procedures: see electronic medical records for all procedures/Xrays and details which were not copied into this note but were reviewed prior to creation of Plan. LABS:  I reviewed today's most current labs and imaging studies.   Pertinent labs include:  Recent Labs     02/04/21  1146   WBC 6.2   HGB 12.8   HCT 38.4        Recent Labs     02/05/21  0453 02/04/21  1146 02/03/21  1339   * 138 136   K 2.5* 3.4* 3.6   * 105 103   CO2 20* 25 25   GLU 93 171* 106*   BUN 11 8 12   CREA 0.48* 0.68 0.75   CA 6.4* 8.6 9.0   MG 1.3*  --   --    ALB 2.2* 3.3*  --    TBILI  --  0.5  --    ALT  --  31  --        Signed: Dara Hendrix MD

## 2021-02-05 NOTE — PROGRESS NOTES
Problem: Mobility Impaired (Adult and Pediatric)  Goal: *Acute Goals and Plan of Care (Insert Text)  Description: FUNCTIONAL STATUS PRIOR TO ADMISSION: Pt states she lives alone in an apartment with no steps in an [place] community. She states she has been doing her own ADLs. She states she is supposed to use a walker for ambulation but hasn't been    HOME SUPPORT PRIOR TO ADMISSION: The patient lived alone with no local support. Physical Therapy Goals  Initiated 2/5/2021  1. Patient will move from supine to sit and sit to supine , scoot up and down, and roll side to side in bed with independence within 7 day(s). 2.  Patient will transfer from bed to chair and chair to bed with modified independence using the least restrictive device within 7 day(s). 3.  Patient will perform sit to stand with modified independence within 7 day(s). 4.  Patient will ambulate with supervision/set-up for 150 feet with the least restrictive device within 7 day(s). Outcome: Not Met   PHYSICAL THERAPY EVALUATION  Patient: Douglas Hernandez (63 y.o. female)  Date: 2/5/2021  Primary Diagnosis: Drug overdose [T50.901A]        Precautions: fall, has sitter at time of eval      ASSESSMENT  Based on the objective data described below, the patient presents with limitations in safety, functional mobility and gait with decreased attention, high distractibility, and some impulsiveness with admission s/p alleged intentional overdose. She is currently oriented x4. Pt is supervision for bed mobility. She is min A of 1 for transfers. She amb with HHA min A of 2 to and from bathroom. Cues for safety. Exhibits short shuffling steps with wide base. Pt returned to bed, sitter present. Note: swelling L hand, non-pitting, pt reports dulled sensation associated. Current Level of Function Impacting Discharge (mobility/balance): min A of 1-2 persons for OOB    Functional Outcome Measure:   The patient scored 50/100 on the barthel outcome measure which is indicative of 50% impairment. Other factors to consider for discharge: Pt with need for 24 hr supervision/assist from PT standpoint     Patient will benefit from skilled therapy intervention to address the above noted impairments. PLAN :  Recommendations and Planned Interventions: bed mobility training, transfer training, gait training, therapeutic exercises, patient and family training/education, and therapeutic activities      Frequency/Duration: Patient will be followed by physical therapy:  3 times a week to address goals. Recommendation for discharge: (in order for the patient to meet his/her long term goals)  To be determined: awaiting psych evaluation for assist in decision on dispo, will need 24 hr S/assist for mobility    This discharge recommendation:  A follow-up discussion with the attending provider and/or case management is planned    IF patient discharges home will need the following DME: patient owns DME required for discharge         SUBJECTIVE:   Patient stated I'm supposed to use my walker but I don't.     OBJECTIVE DATA SUMMARY:   HISTORY:    Past Medical History:   Diagnosis Date    Acute hyponatremia 6/18/2019    Anxiety     Bimalleolar fracture of left ankle 2/3/2016    Comminuted and displaced     Cancer [facility])     breast    Closed left ankle fracture 2/4/2016    Depression     Gastroenteritis due to norovirus 2/21/2018    GERD (gastroesophageal reflux disease)     HTN (hypertension)     Hypercholesterolemia     Hypotension 9/12/2012    Metastatic breast cancer ([address] 5/3/2017    Neoplastic malignant related fatigue 4/4/2018    Pain due to neoplasm 4/4/2018    Secondary cancer of bone ([address] 5/3/2017    Sepsis ([address] 2/12/2018    Urinary tract infection without hematuria 2/13/2018     Past Surgical History:   Procedure Laterality Date    BREAST SURGERY PROCEDURE UNLISTED  march 13    carina masectomy           Home Situation  Home Environment: Apartment  # Steps to Enter: 0  One/Two Story Residence: One story  Living Alone: Yes  Current DME Used/Available at Home: Charna Jeannine, rolling, Shower chair, Grab bars(RW x2)  Tub or Shower Type: Shower    EXAMINATION/PRESENTATION/DECISION MAKING:   Critical Behavior:  Neurologic State: Alert  Orientation Level: Oriented X4  Cognition: Follows commands, Decreased attention/concentration, Poor safety awareness    Range Of Motion:  AROM: Within functional limits                       Strength:    Strength: Generally decreased, functional                    Tone & Sensation:   Tone: Normal              Sensation: Impaired(L hand w/ swelling & dulled sensation; chronic LE neuropathy)               Coordination:  Coordination: Within functional limits  Vision:      Functional Mobility:  Bed Mobility:  Rolling: Supervision  Supine to Sit: Supervision  Sit to Supine: Supervision  Scooting: Supervision  Transfers:  Sit to Stand: Minimum assistance  Stand to Sit: Minimum assistance                       Balance:   Sitting: Intact  Standing: Impaired  Standing - Static: Fair  Standing - Dynamic : Fair  Ambulation/Gait Training:  Distance (ft): 25 Feet (ft)(12 x2)  Assistive Device: Gait belt; Other (comment)(HHA of 2)  Ambulation - Level of Assistance: Minimal assistance;Assist x2; Other (comment)(HHA)        Gait Abnormalities: Decreased step clearance;Shuffling gait        Base of Support: Widened     Speed/Antonietta: Slow;Shuffled;Pace decreased (<100 feet/min)  Step Length: Right shortened;Left shortened        Functional Measure:  Barthel Index:    Bathin  Bladder: 10  Bowels: 10  Groomin  Dressin  Feeding: 10  Mobility: 0  Stairs: 0  Toilet Use: 5  Transfer (Bed to Chair and Back): 10  Total: 50/100       The Barthel ADL Index: Guidelines  1. The index should be used as a record of what a patient does, not as a record of what a patient could do.   2. The main aim is to establish degree of independence from any help, physical or verbal, however minor and for whatever reason. 3. The need for supervision renders the patient not independent. 4. A patient's performance should be established using the best available evidence. Asking the patient, friends/relatives and nurses are the usual sources, but direct observation and common sense are also important. However direct testing is not needed. 5. Usually the patient's performance over the preceding 24-48 hours is important, but occasionally longer periods will be relevant. 6. Middle categories imply that the patient supplies over 50 per cent of the effort. 7. Use of aids to be independent is allowed. Steve Cardoso., Barthel, DMatthewW. ([id number]). Functional evaluation: the Barthel Index. [facility] (14)2. Elvina Gaucher sammi RAYSA Gonzales, Felicitas Orellana., Les Richter., [place], [address] (). Measuring the change indisability after inpatient rehabilitation; comparison of the responsiveness of the Barthel Index and Functional [place] Measure. Journal of Neurology, Neurosurgery, and Psychiatry, 66(4), 384-845. ZIYAD Encarnacion.ADRIANNE, LAVONNE Andrade, & Michael Post MKAROLYN. (2004.) Assessment of post-stroke quality of life in cost-effectiveness studies: The usefulness of the Barthel Index and the EuroQoL-5D.  Quality of Life Research, 15, 272-93           Physical Therapy Evaluation Charge Determination   History Examination Presentation Decision-Making   HIGH Complexity :3+ comorbidities / personal factors will impact the outcome/ POC  HIGH Complexity : 4+ Standardized tests and measures addressing body structure, function, activity limitation and / or participation in recreation  MEDIUM Complexity : Evolving with changing characteristics  LOW Complexity : FOTO score of       Based on the above components, the patient evaluation is determined to be of the following complexity level: LOW     Pain Rating:  No c/o    Activity Tolerance:   Fair    After treatment patient left in no apparent distress:   Supine in bed, Call bell within reach, Side rails x 3, and sitter present    COMMUNICATION/EDUCATION:   The patients plan of care was discussed with: Registered nurse. Patient is unable to participate in goal setting and plan of care.     Thank you for this referral.  Philip Medina, PT   Time Calculation: 16 mins

## 2021-02-06 LAB
ANION GAP SERPL CALC-SCNC: 4 MMOL/L (ref 5–15)
BUN SERPL-MCNC: 12 MG/DL (ref 6–20)
BUN/CREAT SERPL: 15 (ref 12–20)
CALCIUM SERPL-MCNC: 8.6 MG/DL (ref 8.5–10.1)
CHLORIDE SERPL-SCNC: 108 MMOL/L (ref 97–108)
CO2 SERPL-SCNC: 28 MMOL/L (ref 21–32)
COMMENT, HOLDF: NORMAL
CREAT SERPL-MCNC: 0.8 MG/DL (ref 0.55–1.02)
GLUCOSE SERPL-MCNC: 103 MG/DL (ref 65–100)
POTASSIUM SERPL-SCNC: 3.9 MMOL/L (ref 3.5–5.1)
SAMPLES BEING HELD,HOLD: NORMAL
SODIUM SERPL-SCNC: 140 MMOL/L (ref 136–145)

## 2021-02-06 PROCEDURE — 74011250637 HC RX REV CODE- 250/637: Performed by: NURSE PRACTITIONER

## 2021-02-06 PROCEDURE — 65270000015 HC RM PRIVATE ONCOLOGY

## 2021-02-06 PROCEDURE — 74011250637 HC RX REV CODE- 250/637: Performed by: GENERAL ACUTE CARE HOSPITAL

## 2021-02-06 PROCEDURE — 80048 BASIC METABOLIC PNL TOTAL CA: CPT

## 2021-02-06 PROCEDURE — 74011250636 HC RX REV CODE- 250/636: Performed by: NURSE PRACTITIONER

## 2021-02-06 PROCEDURE — 74011250637 HC RX REV CODE- 250/637: Performed by: STUDENT IN AN ORGANIZED HEALTH CARE EDUCATION/TRAINING PROGRAM

## 2021-02-06 PROCEDURE — 74011250636 HC RX REV CODE- 250/636: Performed by: GENERAL ACUTE CARE HOSPITAL

## 2021-02-06 PROCEDURE — 36415 COLL VENOUS BLD VENIPUNCTURE: CPT

## 2021-02-06 PROCEDURE — 74011250636 HC RX REV CODE- 250/636: Performed by: STUDENT IN AN ORGANIZED HEALTH CARE EDUCATION/TRAINING PROGRAM

## 2021-02-06 RX ORDER — DICLOFENAC SODIUM 10 MG/G
2 GEL TOPICAL 4 TIMES DAILY
Status: DISCONTINUED | OUTPATIENT
Start: 2021-02-06 | End: 2021-02-08 | Stop reason: HOSPADM

## 2021-02-06 RX ORDER — KETOROLAC TROMETHAMINE 30 MG/ML
30 INJECTION, SOLUTION INTRAMUSCULAR; INTRAVENOUS
Status: DISCONTINUED | OUTPATIENT
Start: 2021-02-06 | End: 2021-02-08 | Stop reason: HOSPADM

## 2021-02-06 RX ADMIN — LOSARTAN POTASSIUM 50 MG: 50 TABLET, FILM COATED ORAL at 08:59

## 2021-02-06 RX ADMIN — SODIUM CHLORIDE 10 ML: 9 INJECTION, SOLUTION INTRAMUSCULAR; INTRAVENOUS; SUBCUTANEOUS at 06:42

## 2021-02-06 RX ADMIN — ENOXAPARIN SODIUM 40 MG: 40 INJECTION SUBCUTANEOUS at 23:40

## 2021-02-06 RX ADMIN — ACETAMINOPHEN 650 MG: 325 TABLET ORAL at 06:50

## 2021-02-06 RX ADMIN — GABAPENTIN 400 MG: 100 CAPSULE ORAL at 23:40

## 2021-02-06 RX ADMIN — SODIUM CHLORIDE 10 ML: 9 INJECTION, SOLUTION INTRAMUSCULAR; INTRAVENOUS; SUBCUTANEOUS at 13:54

## 2021-02-06 RX ADMIN — CLONAZEPAM 1 MG: 0.5 TABLET ORAL at 08:59

## 2021-02-06 RX ADMIN — DICLOFENAC SODIUM 2 G: 10 GEL TOPICAL at 17:23

## 2021-02-06 RX ADMIN — LORAZEPAM 1 MG: 2 INJECTION INTRAMUSCULAR; INTRAVENOUS at 19:54

## 2021-02-06 RX ADMIN — LORAZEPAM 1 MG: 2 INJECTION INTRAMUSCULAR; INTRAVENOUS at 00:50

## 2021-02-06 RX ADMIN — DICLOFENAC SODIUM 2 G: 10 GEL TOPICAL at 13:50

## 2021-02-06 RX ADMIN — GABAPENTIN 400 MG: 100 CAPSULE ORAL at 08:59

## 2021-02-06 RX ADMIN — ASPIRIN 81 MG: 81 TABLET, COATED ORAL at 08:59

## 2021-02-06 RX ADMIN — ACETAMINOPHEN 650 MG: 325 TABLET ORAL at 13:51

## 2021-02-06 RX ADMIN — ENOXAPARIN SODIUM 40 MG: 40 INJECTION SUBCUTANEOUS at 13:51

## 2021-02-06 RX ADMIN — KETOROLAC TROMETHAMINE 30 MG: 30 INJECTION, SOLUTION INTRAMUSCULAR at 17:23

## 2021-02-06 RX ADMIN — FENOFIBRATE 145 MG: 145 TABLET ORAL at 08:59

## 2021-02-06 RX ADMIN — LORAZEPAM 1 MG: 2 INJECTION INTRAMUSCULAR; INTRAVENOUS at 09:38

## 2021-02-06 RX ADMIN — LORAZEPAM 1 MG: 2 INJECTION INTRAMUSCULAR; INTRAVENOUS at 23:40

## 2021-02-06 RX ADMIN — SODIUM CHLORIDE 10 ML: 9 INJECTION, SOLUTION INTRAMUSCULAR; INTRAVENOUS; SUBCUTANEOUS at 23:41

## 2021-02-06 RX ADMIN — CLONAZEPAM 1 MG: 0.5 TABLET ORAL at 17:23

## 2021-02-06 RX ADMIN — GABAPENTIN 400 MG: 100 CAPSULE ORAL at 16:26

## 2021-02-06 RX ADMIN — THIAMINE HCL TAB 100 MG 100 MG: 100 TAB at 08:59

## 2021-02-06 NOTE — PROGRESS NOTES
Hospitalist Progress Note    NAME: Дмитрий Red   :  1955   MRN:  [id number]       Assessment / Plan:    AMS/toxic metabolic encephalopathy - improving  Intentional venlafaxine overdose  Suicidal ideation with attempt, as above  Seizure episode  Major depression  Hx of metastatic breast cancer   Continue One on One sitter  Suicide and seizure precautions  Psychiatry Consulted, needs to be medically stable before being considered for inpt psych treatment  Telemetry monitoring - monitor for QTc prolongation, arrhythmia and further seizure episodes  Previous admission in Dec 2020 at [facility] indicates \"attempted to stab self in head POA\" Pt was also treated for benzodiazepine withdrawal - it is unclear whether pt is on those medications currently as not on PTA list. Per Psychiatry note,  Alprazolam 1 mg QID PRN Anxiety   Continue clonazepam 1mg BID   Haldol prn per psych, watch for QT rprolongation  Alert and oriented x 3 today, calm. Restraints discontinued  Re-consulting Psych in-patient psych eval  PT evaluation and treat    Right shoulder pain  - PT  - Topical Voltaren gel    CORNELIA - resolved  S/p IV fluid     Anxiety  HTN  GERD  HLD  Continue ASA, fenofibrate, gabapentin, folic acid, and losartan  Pharmacy for med rec     Code Status: Full  Surrogate Decision Maker: Friend  DVT Prophylaxis: Lovenox  GI Prophylaxis: not indicated  Baseline:  Independent     Subjective:     Chief Complaint / Reason for Physician Visit    No acute events overnight  C/o right shoulder pain  Remains alert, oriented, and calm  Awaiting placement    Review of Systems:  Symptom Y/N Comments  Symptom Y/N Comments   Fever/Chills    Chest Pain     Poor Appetite    Edema     Cough    Abdominal Pain     Sputum    Joint Pain     SOB/LOBO    Pruritis/Rash     Nausea/vomit    Tolerating PT/OT     Diarrhea    Tolerating Diet     Constipation    Other       Could NOT obtain due to: AMS     Objective:     VITALS:   Last 24hrs VS reviewed since prior progress note. Most recent are:  Patient Vitals for the past 24 hrs:   Temp Pulse Resp BP SpO2   02/06/21 0533 97.4 °F (36.3 °C) 99 18 132/82 98 %   02/05/21 2001 98.6 °F (37 °C) 94 18 134/84 99 %   02/05/21 1516 97.5 °F (36.4 °C) 99 18 (!) 140/80 99 %   02/05/21 1130 98.3 °F (36.8 °C) 94 18 109/71 94 %   02/05/21 0746 97.4 °F (36.3 °C) 96 18 126/64 96 %       Intake/Output Summary (Last 24 hours) at 2/6/2021 9931  Last data filed at 2/5/2021 1215  Gross per 24 hour   Intake 240 ml   Output    Net 240 ml        I had a face to face encounter and independently examined this patient on 2/6/2021, as outlined below:  PHYSICAL EXAM:  General: WD, WN. Alert, cooperative, no acute distress    EENT:  EOMI. Anicteric sclerae. MMM  Resp:  CTA bilaterally, no wheezing or rales. No accessory muscle use  CV:  Regular  rhythm,  No edema  GI:  Soft, Non distended, Non tender. +Bowel sounds  Neurologic:  Alert and oriented X 3, normal speech,   Psych:   Calm, not agitated  Skin:  No rashes. No jaundice    Reviewed most current lab test results and cultures  YES  Reviewed most current radiology test results   YES  Review and summation of old records today    NO  Reviewed patient's current orders and MAR    YES  PMH/ reviewed - no change compared to H&P  ________________________________________________________________________  Care Plan discussed with:    Comments   Patient x    Family      RN x    Care Manager     Consultant                        Multidiciplinary team rounds were held today with , nursing, pharmacist and clinical coordinator. Patient's plan of care was discussed; medications were reviewed and discharge planning was addressed.      ______________________________________________________________________    Total CRITICAL CARE TIME Spent:   Minutes non procedure based      Comments   >50% of visit spent in counseling and coordination of care x ________________________________________________________________________  Ozzy Polanco MD     Procedures: see electronic medical records for all procedures/Xrays and details which were not copied into this note but were reviewed prior to creation of Plan. LABS:  I reviewed today's most current labs and imaging studies.   Pertinent labs include:  Recent Labs     02/04/21  1146   WBC 6.2   HGB 12.8   HCT 38.4        Recent Labs     02/06/21  0400 02/05/21  1023 02/05/21  0453 02/04/21  1146    139 147* 138   K 3.9 4.1 2.5* 3.4*    106 117* 105   CO2 28 28 20* 25   * 133* 93 171*   BUN 12 14 11 8   CREA 0.80 0.84 0.48* 0.68   CA 8.6 8.6 6.4* 8.6   MG  --   --  1.3*  --    ALB  --   --  2.2* 3.3*   TBILI  --   --   --  0.5   ALT  --   --   --  31       Signed: Ozzy Polanco MD

## 2021-02-06 NOTE — PROGRESS NOTES
End of Shift Note    Bedside shift change report given to Farzad Lomas (oncoming nurse) by Jessica Garza (offgoing nurse). Report included the following information SBAR, Kardex and MAR    Shift worked:  7p-7a     Shift summary and any significant changes:     Patient does have a sitter at the bedside. Patient was more alert last night and did engage in conversation. Scheduled medications were given, see MAR. Patient did told me,\" she felt very anxious and restless,\"  Haldol and Ativan were given, see PRN MAR. Patient did complain of a headache, Tylenol was given, see PRN MAR. Chest port was flushed and is patent. Morning labs were drawn. Patient teaching and routine rounding has been done. Concerns for physician to address:       Zone phone for oncoming shift:          Activity:  Activity Level: Up with Assistance  Number times ambulated in hallways past shift: 0  Number of times OOB to chair past shift: 0    Cardiac:   Cardiac Monitoring: Yes      Cardiac Rhythm: Normal sinus rhythm    Access:   Current line(s): port     Genitourinary:   Urinary status: voiding    Respiratory:   O2 Device: Room air  Chronic home O2 use?: NO  Incentive spirometer at bedside: NO     GI:     Current diet:  DIET ONE TIME MESSAGE  DIET REGULAR  Passing flatus: YES  Tolerating current diet: YES  % Diet Eaten: 100 %    Pain Management:   Patient states pain is manageable on current regimen: YES    Skin:  Adama Score: 18  Interventions: turn team and nutritional support     Patient Safety:  Fall Score:  Total Score: 4  Interventions: bed/chair alarm, gripper socks and sitter at bedside   High Fall Risk: Yes    Length of Stay:  Expected LOS: 3d 12h  Actual LOS: [address]

## 2021-02-06 NOTE — PROGRESS NOTES
End of Shift Note    Bedside shift change report given to hitesh (oncoming nurse) by Cookie Campoverde RN (offgoing nurse). Report included the following information SBAR, Kardex and MAR    Shift worked:  7a7p     Shift summary and any significant changes:     Pt stable through shift meds given according to, pt requested pain meds for righ shoulder, ativan given for anxiety, prn pain meds given for pain, hourly rounding and education done, sitter in room with pt     Concerns for physician to address:       Zone phone for oncoming shift:          Activity:  Activity Level: Up with Assistance  Number times ambulated in hallways past shift: 0  Number of times OOB to chair past shift: 0    Cardiac:   Cardiac Monitoring: Yes      Cardiac Rhythm: Normal sinus rhythm    Access:   Current line(s): PIV     Genitourinary:   Urinary status: voiding    Respiratory:   O2 Device: Room air  Chronic home O2 use?: NO  Incentive spirometer at bedside: NO     GI:     Current diet:  DIET ONE TIME MESSAGE  DIET REGULAR  Passing flatus: YES  Tolerating current diet: YES  % Diet Eaten: 100 %    Pain Management:   Patient states pain is manageable on current regimen: YES    Skin:  Adama Score: 18  Interventions: increase time out of bed    Patient Safety:  Fall Score:  Total Score: 4  Interventions: bed/chair alarm, gripper socks and pt to call before getting OOB  High Fall Risk: Yes    Length of Stay:  Expected LOS: 3d 12h  Actual LOS: Solis Rosales RN

## 2021-02-07 PROCEDURE — 74011000250 HC RX REV CODE- 250: Performed by: GENERAL ACUTE CARE HOSPITAL

## 2021-02-07 PROCEDURE — 74011250637 HC RX REV CODE- 250/637: Performed by: GENERAL ACUTE CARE HOSPITAL

## 2021-02-07 PROCEDURE — 74011250636 HC RX REV CODE- 250/636: Performed by: GENERAL ACUTE CARE HOSPITAL

## 2021-02-07 PROCEDURE — 74011250637 HC RX REV CODE- 250/637: Performed by: STUDENT IN AN ORGANIZED HEALTH CARE EDUCATION/TRAINING PROGRAM

## 2021-02-07 PROCEDURE — 74011250636 HC RX REV CODE- 250/636: Performed by: STUDENT IN AN ORGANIZED HEALTH CARE EDUCATION/TRAINING PROGRAM

## 2021-02-07 PROCEDURE — 65270000015 HC RM PRIVATE ONCOLOGY

## 2021-02-07 PROCEDURE — 74011250636 HC RX REV CODE- 250/636: Performed by: NURSE PRACTITIONER

## 2021-02-07 PROCEDURE — 74011250637 HC RX REV CODE- 250/637: Performed by: NURSE PRACTITIONER

## 2021-02-07 RX ORDER — LORAZEPAM 2 MG/ML
1 INJECTION INTRAMUSCULAR
Status: DISCONTINUED | OUTPATIENT
Start: 2021-02-07 | End: 2021-02-07

## 2021-02-07 RX ORDER — LORAZEPAM 2 MG/ML
1 INJECTION INTRAMUSCULAR
Status: DISCONTINUED | OUTPATIENT
Start: 2021-02-07 | End: 2021-02-08

## 2021-02-07 RX ADMIN — ENOXAPARIN SODIUM 40 MG: 40 INJECTION SUBCUTANEOUS at 14:27

## 2021-02-07 RX ADMIN — GABAPENTIN 400 MG: 100 CAPSULE ORAL at 09:45

## 2021-02-07 RX ADMIN — LOSARTAN POTASSIUM 50 MG: 50 TABLET, FILM COATED ORAL at 09:45

## 2021-02-07 RX ADMIN — DICLOFENAC SODIUM 2 G: 10 GEL TOPICAL at 21:46

## 2021-02-07 RX ADMIN — GABAPENTIN 400 MG: 100 CAPSULE ORAL at 21:45

## 2021-02-07 RX ADMIN — FENOFIBRATE 145 MG: 145 TABLET ORAL at 09:45

## 2021-02-07 RX ADMIN — CLONAZEPAM 1 MG: 0.5 TABLET ORAL at 18:52

## 2021-02-07 RX ADMIN — GABAPENTIN 400 MG: 100 CAPSULE ORAL at 18:52

## 2021-02-07 RX ADMIN — SODIUM CHLORIDE 10 ML: 9 INJECTION, SOLUTION INTRAMUSCULAR; INTRAVENOUS; SUBCUTANEOUS at 14:14

## 2021-02-07 RX ADMIN — THIAMINE HCL TAB 100 MG 100 MG: 100 TAB at 09:45

## 2021-02-07 RX ADMIN — LORAZEPAM 1 MG: 2 INJECTION INTRAMUSCULAR; INTRAVENOUS at 20:20

## 2021-02-07 RX ADMIN — LORAZEPAM 1 MG: 2 INJECTION INTRAMUSCULAR; INTRAVENOUS at 14:27

## 2021-02-07 RX ADMIN — SODIUM CHLORIDE 10 ML: 9 INJECTION, SOLUTION INTRAMUSCULAR; INTRAVENOUS; SUBCUTANEOUS at 06:57

## 2021-02-07 RX ADMIN — DICLOFENAC SODIUM 2 G: 10 GEL TOPICAL at 09:45

## 2021-02-07 RX ADMIN — SODIUM CHLORIDE 10 ML: 9 INJECTION, SOLUTION INTRAMUSCULAR; INTRAVENOUS; SUBCUTANEOUS at 21:48

## 2021-02-07 RX ADMIN — ASPIRIN 81 MG: 81 TABLET, COATED ORAL at 09:45

## 2021-02-07 RX ADMIN — CLONAZEPAM 1 MG: 0.5 TABLET ORAL at 09:45

## 2021-02-07 RX ADMIN — LORAZEPAM 1 MG: 2 INJECTION INTRAMUSCULAR; INTRAVENOUS at 03:31

## 2021-02-07 NOTE — PROGRESS NOTES
End of Shift Note    Bedside shift change report given to Joyce (oncoming nurse) by Lulu Reyes (offgoing nurse). Report included the following information SBAR, Kardex, ED Summary, Intake/Output, MAR, Accordion and Recent Results    Shift worked:  night     Shift summary and any significant changes:    PT remained stable throughout shift. Scheduled meds ad prn ativan given for anxiety q2 hours. BP did go up but believed to be anxiety related. Concerns for physician to address:    Zone phone for oncoming shift:       Activity:  Activity Level: Up with Assistance  Number times ambulated in hallways past shift: 0  Number of times OOB to chair past shift: 0    Cardiac:   Cardiac Monitoring: Yes      Cardiac Rhythm: Sinus tachycardia    Access:   Current line(s): port     Genitourinary:   Urinary status: voiding    Respiratory:   O2 Device: Room air  Chronic home O2 use?: NO  Incentive spirometer at bedside: NO     GI:     Current diet:  DIET ONE TIME MESSAGE  DIET REGULAR  Passing flatus: YES  Tolerating current diet: YES  % Diet Eaten: 100 %    Pain Management:   Patient states pain is manageable on current regimen: YES    Skin:  Adama Score: 18  Interventions: increase time out of bed    Patient Safety:  Fall Score:  Total Score: 4  Interventions: bed/chair alarm, gripper socks and sitter at bedside   High Fall Risk: Yes    Length of Stay:  Expected LOS: 3d 12h  Actual LOS: [address]

## 2021-02-07 NOTE — PROGRESS NOTES
Problem: Falls - Risk of  Goal: *Absence of Falls  Description: Document [place] Fall Risk and appropriate interventions in the flowsheet.   Outcome: Progressing Towards Goal  Note: Fall Risk Interventions:  Mobility Interventions: Patient to call before getting OOB, Communicate number of staff needed for ambulation/transfer, Bed/chair exit alarm    Mentation Interventions: Bed/chair exit alarm, Adequate sleep, hydration, pain control, More frequent rounding    Medication Interventions: Bed/chair exit alarm    Elimination Interventions: Bed/chair exit alarm, Call light in reach

## 2021-02-07 NOTE — PROGRESS NOTES
Hospitalist Progress Note    NAME: Hany Mccracken   :  1955   MRN:  [id number]       Assessment / Plan:    AMS/toxic metabolic encephalopathy - improving  Intentional venlafaxine overdose  Suicidal ideation with attempt, as above  Seizure episode  Major depression  Hx of metastatic breast cancer     Admitted s/p intentional venlafaxone OD. Was very agitated on admission and required physical and chemical restraits     Previous admission in Dec 2020 at [facility] indicates \"attempted to stab self in head POA\" Pt was also treated for benzodiazepine withdrawal      A constant observer in room  Suicide and seizure precautions  Telemetry monitoring, no QT prolongation or arrhythmia so far  Continue clonazepam 1mg BID   Haldol prn per psych, watch for QT rprolongation    Psychiatry Consulted on admission, initially patient was not medically stable. Reconsulted as patient is now clear for discharge from medical standpoing    Diarrhea  - one episode of watery diarrhea last night  - will send for fecal leukocyte if another episode    Right shoulder pain  -PT  -Topical Voltaren gel    CORNELIA - resolved  S/p IV fluid     Anxiety  HTN  GERD  HLD  Continue ASA, fenofibrate, gabapentin, folic acid, and losartan      Code Status: Full  Surrogate Decision Maker: Friend  DVT Prophylaxis: Lovenox  GI Prophylaxis: not indicated  Baseline: Independent     Subjective:     Chief Complaint / Reason for Physician Visit  Watery diarrhea last night  No abdominal pain, nausea, or vomiting  Remains calm, alert, and oriented      Review of Systems:  Symptom Y/N Comments  Symptom Y/N Comments   Fever/Chills    Chest Pain     Poor Appetite    Edema     Cough    Abdominal Pain     Sputum    Joint Pain     SOB/LOBO    Pruritis/Rash     Nausea/vomit    Tolerating PT/OT     Diarrhea    Tolerating Diet     Constipation    Other       Could NOT obtain due to: AMS     Objective:     VITALS:   Last 24hrs VS reviewed since prior progress note. Most recent are:  Patient Vitals for the past 24 hrs:   Temp Pulse Resp BP SpO2   02/07/21 1023 97.8 °F (36.6 °C) (!) 108 18 130/76 95 %   02/07/21 0721 98.2 °F (36.8 °C) (!) 111 18 138/72 96 %   02/07/21 0638 99.6 °F (37.6 °C) 98 (!) 112 109/69    02/07/21 0308 98.5 °F (36.9 °C) 100 (!) 119 (!) 182/94    02/06/21 2314 98.6 °F (37 °C) 96 18 (!) 161/83 95 %   02/06/21 2114 97.4 °F (36.3 °C) 86 18 (!) 150/78 100 %   02/06/21 1628 97.7 °F (36.5 °C) 93 18 115/69 99 %     No intake or output data in the 24 hours ending 02/07/21 1254     I had a face to face encounter and independently examined this patient on 2/7/2021, as outlined below:  PHYSICAL EXAM:  General: WD, WN. Alert, cooperative, no acute distress    EENT:  EOMI. Anicteric sclerae. MMM  Resp:  CTA bilaterally, no wheezing or rales. No accessory muscle use  CV:  Regular  rhythm,  No edema  GI:  Soft, Non distended, Non tender. +Bowel sounds  Neurologic:  Alert and oriented X 3, normal speech,   Psych:   Calm, not agitated  Skin:  No rashes. No jaundice    Reviewed most current lab test results and cultures  YES  Reviewed most current radiology test results   YES  Review and summation of old records today    NO  Reviewed patient's current orders and MAR    YES  PMH/ reviewed - no change compared to H&P  ________________________________________________________________________  Care Plan discussed with:    Comments   Patient x    Family      RN x    Care Manager     Consultant                        Multidiciplinary team rounds were held today with , nursing, pharmacist and clinical coordinator. Patient's plan of care was discussed; medications were reviewed and discharge planning was addressed.      ______________________________________________________________________    Total CRITICAL CARE TIME Spent:   Minutes non procedure based      Comments   >50% of visit spent in counseling and coordination of care x ________________________________________________________________________  James Rodrgiuez MD     Procedures: see electronic medical records for all procedures/Xrays and details which were not copied into this note but were reviewed prior to creation of Plan. LABS:  I reviewed today's most current labs and imaging studies. Pertinent labs include:  No results for input(s): WBC, HGB, HCT, PLT, HGBEXT, HCTEXT, PLTEXT, HGBEXT, HCTEXT, PLTEXT in the last 72 hours.   Recent Labs     02/06/21  0400 02/05/21  1023 02/05/21  0453    139 147*   K 3.9 4.1 2.5*    106 117*   CO2 28 28 20*   * 133* 93   BUN 12 14 11   CREA 0.80 0.84 0.48*   CA 8.6 8.6 6.4*   MG  --   --  1.3*   ALB  --   --  2.2*       Signed: James Rodriguez MD

## 2021-02-07 NOTE — PROGRESS NOTES
Problem: Falls - Risk of  Goal: *Absence of Falls  Description: Document Elsie Mihaela Fall Risk and appropriate interventions in the flowsheet. Outcome: Progressing Towards Goal  Note: Fall Risk Interventions:  Mobility Interventions: Bed/chair exit alarm, Communicate number of staff needed for ambulation/transfer    Mentation Interventions: Bed/chair exit alarm, Family/sitter at bedside, More frequent rounding, Reorient patient, Room close to nurse's station, Toileting rounds, Update white board    Medication Interventions: Bed/chair exit alarm, Patient to call before getting OOB, Teach patient to arise slowly    Elimination Interventions: Bed/chair exit alarm, Call light in reach, Patient to call for help with toileting needs, Stay With Me (per policy)    History of Falls Interventions: Bed/chair exit alarm, Room close to nurse's station         Problem: Patient Education: Go to Patient Education Activity  Goal: Patient/Family Education  Outcome: Progressing Towards Goal     Problem: Pressure Injury - Risk of  Goal: *Prevention of pressure injury  Description: Document Adama Scale and appropriate interventions in the flowsheet.   Outcome: Progressing Towards Goal  Note: Pressure Injury Interventions:  Sensory Interventions: Float heels, Keep linens dry and wrinkle-free, Assess changes in LOC, Minimize linen layers    Moisture Interventions: Absorbent underpads    Activity Interventions: Increase time out of bed    Mobility Interventions: HOB 30 degrees or less, Float heels    Nutrition Interventions: Document food/fluid/supplement intake    Friction and Shear Interventions: Apply protective barrier, creams and emollients, Minimize layers                Problem: Non-Violent Restraints  Goal: *Removal from restraints as soon as assessed to be safe  Outcome: Resolved/Met  Goal: *No harm/injury to patient while restraints in use  Outcome: Resolved/Met  Goal: *Patient's dignity will be maintained  Outcome: Resolved/Met  Goal: *Patient Specific Goal (EDIT GOAL, INSERT TEXT)  Outcome: Resolved/Met  Goal: Non-violent Restaints:Standard Interventions  Outcome: Resolved/Met  Goal: Non-violent Restraints:Patient Interventions  Outcome: Resolved/Met  Goal: Patient/Family Education  Outcome: Resolved/Met     Problem: Patient Education: Go to Patient Education Activity  Goal: Patient/Family Education  Outcome: Progressing Towards Goal

## 2021-02-08 VITALS
RESPIRATION RATE: 18 BRPM | TEMPERATURE: 97.9 F | WEIGHT: 242.51 LBS | HEIGHT: 65 IN | SYSTOLIC BLOOD PRESSURE: 136 MMHG | DIASTOLIC BLOOD PRESSURE: 99 MMHG | BODY MASS INDEX: 40.4 KG/M2 | OXYGEN SATURATION: 98 % | HEART RATE: 92 BPM

## 2021-02-08 LAB
ANION GAP SERPL CALC-SCNC: 5 MMOL/L (ref 5–15)
BUN SERPL-MCNC: 15 MG/DL (ref 6–20)
BUN/CREAT SERPL: 19 (ref 12–20)
CALCIUM SERPL-MCNC: 8.7 MG/DL (ref 8.5–10.1)
CHLORIDE SERPL-SCNC: 106 MMOL/L (ref 97–108)
CO2 SERPL-SCNC: 26 MMOL/L (ref 21–32)
CREAT SERPL-MCNC: 0.8 MG/DL (ref 0.55–1.02)
GLUCOSE SERPL-MCNC: 171 MG/DL (ref 65–100)
POTASSIUM SERPL-SCNC: 3.9 MMOL/L (ref 3.5–5.1)
SODIUM SERPL-SCNC: 137 MMOL/L (ref 136–145)

## 2021-02-08 PROCEDURE — 36415 COLL VENOUS BLD VENIPUNCTURE: CPT

## 2021-02-08 PROCEDURE — 74011250636 HC RX REV CODE- 250/636

## 2021-02-08 PROCEDURE — 90471 IMMUNIZATION ADMIN: CPT

## 2021-02-08 PROCEDURE — 74011250636 HC RX REV CODE- 250/636: Performed by: GENERAL ACUTE CARE HOSPITAL

## 2021-02-08 PROCEDURE — 74011250637 HC RX REV CODE- 250/637: Performed by: NURSE PRACTITIONER

## 2021-02-08 PROCEDURE — 74011250637 HC RX REV CODE- 250/637: Performed by: STUDENT IN AN ORGANIZED HEALTH CARE EDUCATION/TRAINING PROGRAM

## 2021-02-08 PROCEDURE — 80048 BASIC METABOLIC PNL TOTAL CA: CPT

## 2021-02-08 PROCEDURE — 74011250636 HC RX REV CODE- 250/636: Performed by: NURSE PRACTITIONER

## 2021-02-08 PROCEDURE — 74011250636 HC RX REV CODE- 250/636: Performed by: STUDENT IN AN ORGANIZED HEALTH CARE EDUCATION/TRAINING PROGRAM

## 2021-02-08 PROCEDURE — 74011250637 HC RX REV CODE- 250/637: Performed by: GENERAL ACUTE CARE HOSPITAL

## 2021-02-08 PROCEDURE — 90686 IIV4 VACC NO PRSV 0.5 ML IM: CPT | Performed by: STUDENT IN AN ORGANIZED HEALTH CARE EDUCATION/TRAINING PROGRAM

## 2021-02-08 RX ORDER — DICLOFENAC SODIUM 10 MG/G
2 GEL TOPICAL 4 TIMES DAILY
Qty: 1 EACH | Refills: 0 | Status: SHIPPED | OUTPATIENT
Start: 2021-02-08

## 2021-02-08 RX ORDER — VENLAFAXINE 100 MG/1
200 TABLET ORAL 2 TIMES DAILY WITH MEALS
Qty: 120 TAB | Refills: 0 | Status: SHIPPED | OUTPATIENT
Start: 2021-02-08

## 2021-02-08 RX ORDER — HYDROXYZINE 50 MG/1
50 TABLET, FILM COATED ORAL
Qty: 30 TAB | Refills: 0 | Status: SHIPPED | OUTPATIENT
Start: 2021-02-08

## 2021-02-08 RX ORDER — HEPARIN 100 UNIT/ML
SYRINGE INTRAVENOUS
Status: COMPLETED
Start: 2021-02-08 | End: 2021-02-08

## 2021-02-08 RX ORDER — CLONAZEPAM 1 MG/1
1 TABLET ORAL 2 TIMES DAILY
Qty: 60 TAB | Refills: 0 | Status: SHIPPED | OUTPATIENT
Start: 2021-02-08

## 2021-02-08 RX ORDER — HYDROXYZINE 25 MG/1
50 TABLET, FILM COATED ORAL
Status: DISCONTINUED | OUTPATIENT
Start: 2021-02-08 | End: 2021-02-08 | Stop reason: HOSPADM

## 2021-02-08 RX ADMIN — KETOROLAC TROMETHAMINE 30 MG: 30 INJECTION, SOLUTION INTRAMUSCULAR at 08:02

## 2021-02-08 RX ADMIN — SODIUM CHLORIDE 10 ML: 9 INJECTION, SOLUTION INTRAMUSCULAR; INTRAVENOUS; SUBCUTANEOUS at 13:41

## 2021-02-08 RX ADMIN — LORAZEPAM 1 MG: 2 INJECTION INTRAMUSCULAR; INTRAVENOUS at 10:06

## 2021-02-08 RX ADMIN — INFLUENZA VIRUS VACCINE 0.5 ML: 15; 15; 15; 15 SUSPENSION INTRAMUSCULAR at 15:15

## 2021-02-08 RX ADMIN — LORAZEPAM 1 MG: 2 INJECTION INTRAMUSCULAR; INTRAVENOUS at 03:24

## 2021-02-08 RX ADMIN — KETOROLAC TROMETHAMINE 30 MG: 30 INJECTION, SOLUTION INTRAMUSCULAR at 14:49

## 2021-02-08 RX ADMIN — GABAPENTIN 400 MG: 100 CAPSULE ORAL at 08:02

## 2021-02-08 RX ADMIN — THIAMINE HCL TAB 100 MG 100 MG: 100 TAB at 08:02

## 2021-02-08 RX ADMIN — CLONAZEPAM 1 MG: 0.5 TABLET ORAL at 08:01

## 2021-02-08 RX ADMIN — FENOFIBRATE 145 MG: 145 TABLET ORAL at 08:02

## 2021-02-08 RX ADMIN — ENOXAPARIN SODIUM 40 MG: 40 INJECTION SUBCUTANEOUS at 13:40

## 2021-02-08 RX ADMIN — Medication 300 UNITS: at 14:49

## 2021-02-08 RX ADMIN — DICLOFENAC SODIUM 2 G: 10 GEL TOPICAL at 08:10

## 2021-02-08 RX ADMIN — DICLOFENAC SODIUM 2 G: 10 GEL TOPICAL at 13:40

## 2021-02-08 RX ADMIN — ASPIRIN 81 MG: 81 TABLET, COATED ORAL at 08:02

## 2021-02-08 RX ADMIN — LOSARTAN POTASSIUM 50 MG: 50 TABLET, FILM COATED ORAL at 08:02

## 2021-02-08 NOTE — PROGRESS NOTES
End of Shift Note    Bedside shift change report given to Maryann Estes (oncoming nurse) by Yoon Marquez (offgoing nurse). Report included the following information SBAR, Kardex, Intake/Output, MAR and Recent Results    Shift worked:  7p-7a     Shift summary and any significant changes:     pt requested PRN ativan x2. Pt slept most of the night. Pt overheard talking on the phone saying she overdosed on pills because she was \"bored\". Needs psych re-eval     Concerns for physician to address:  d/c? Zone phone for oncoming shift:   [phone]       Activity:  Activity Level: Up with Assistance  Number times ambulated in hallways past shift: 0  Number of times OOB to chair past shift: 2    Cardiac:   Cardiac Monitoring: Yes      Cardiac Rhythm: Sinus tachycardia    Access:   Current line(s): PIV     Genitourinary:   Urinary status: voiding    Respiratory:   O2 Device: Room air  Chronic home O2 use?: NO  Incentive spirometer at bedside: NO     GI:  Last Bowel Movement Date: 02/07/21  Current diet:  DIET ONE TIME MESSAGE  DIET REGULAR  Passing flatus: YES  Tolerating current diet: YES  % Diet Eaten: 100 %    Pain Management:   Patient states pain is manageable on current regimen: YES    Skin:  Adama Score: 19  Interventions: increase time out of bed    Patient Safety:  Fall Score:  Total Score: 4  Interventions: gripper socks and pt to call before getting OOB  High Fall Risk: Yes    Length of Stay:  Expected LOS: 3d 12h  Actual LOS: [facility]

## 2021-02-08 NOTE — PROGRESS NOTES
Transition of Care Plan:    Disposition: return home independently   Follow up appointments: Pt to make own follow up with PCP, psychiatry   Transportation at Discharge: Joseluis Arboleda   DME needed: N/A  IM Medicare letter: reviewed on 2/8/21    CM aware of discharge order. Met with pt to finalize and review discharge plan. Pt plans to return to her home address located at [address] [facility]. Pt has 2 RWs at home that she can use upon discharge. Pt does not have own transportation home today and requests assistance. Pt reports she does not have her house key and is unsure if her apartment is locked or not. Pt asked CM to contact her rental , Minda Lira, at [facility] [address]. CM spoke with Holly Restrepo ([phone]) to explain situation. Holly Restrepo stated that as long as pt returns within business hours (by 5PM) that rental office can assist pt in getting in to her apartment. CM has been granted permission to set up Round Trip transportation with ETA 3:30PM. Ride ID: #[id number]    CM inquired about discharging medications. Pt prefers for prescriptions to be called in to the [facility] in [place], [place] on [address]. CM alerted hospitalist as prescriptions had already been called in to [address]. Hospitalist to change location of discharge medications. Pt states that she will return home at d/c and drive herself to the pharmacy later this evening to pick them up. Pt reports she does not have groceries, but plans to stop by and purchase a fast food meal later today. Pt reports \"I cannot think big right now\" referring to grocery shopping. CM made attempt x 2 to schedule outpatient psychiatry follow up with no response. Pt voiced that she has a PCP appointment scheduled at the end of the month. Pt to contact her psychiatrist (Dr. Scotty Santillan) independently to set up follow up appointment. Pt reports seeing Dr. Scotty Santillan virtually every 3 months.     Pt denies suicidal or homicidal ideations at time of d/c. Pt voiced feeling safe returning home. Medicare pt has received, reviewed, and signed 2nd IM letter informing them of their right to appeal the discharge. Signed copy has been placed on pt bedside chart. Pt provided with copy of letter to keep. Pt is ready for d/c from a CM standpoint. Assigned RN informed. Care Management Interventions  PCP Verified by CM: Yes  Palliative Care Criteria Met (RRAT>21 & CHF Dx)?: No  Mode of Transport at Discharge:  Other (see comment)(Round Trip/Lyft )  Hospital Transport Time of Discharge: [address] (CM Consult): Discharge Planning  Discharge Durable Medical Equipment: No  Physical Therapy Consult: Yes  Occupational Therapy Consult: No  Speech Therapy Consult: No  Current Support Network: Lives Alone  Confirm Follow Up Transport: Self  [facility] Resource Information Provided?: No  Discharge Location  Discharge Placement: Home with outpatient services    Donald Musa, [address], [address] [facility]  [phone]

## 2021-02-08 NOTE — PROGRESS NOTES
Problem: Falls - Risk of  Goal: *Absence of Falls  Description: Document Lyndon Dunhamer Fall Risk and appropriate interventions in the flowsheet. Outcome: Progressing Towards Goal  Note: Fall Risk Interventions:  Mobility Interventions: Bed/chair exit alarm, Communicate number of staff needed for ambulation/transfer, Patient to call before getting OOB    Mentation Interventions: Bed/chair exit alarm, Door open when patient unattended, More frequent rounding    Medication Interventions: Bed/chair exit alarm, Patient to call before getting OOB    Elimination Interventions: Call light in reach, Bed/chair exit alarm, Toileting schedule/hourly rounds    History of Falls Interventions: Bed/chair exit alarm, Door open when patient unattended, Room close to nurse's station         Problem: Patient Education: Go to Patient Education Activity  Goal: Patient/Family Education  Outcome: Progressing Towards Goal     Problem: Pressure Injury - Risk of  Goal: *Prevention of pressure injury  Description: Document Adama Scale and appropriate interventions in the flowsheet. Outcome: Progressing Towards Goal  Note: Pressure Injury Interventions:  Sensory Interventions: Assess changes in LOC, Assess need for specialty bed, Check visual cues for pain, Keep linens dry and wrinkle-free, Maintain/enhance activity level, Minimize linen layers, Turn and reposition approx.  every two hours (pillows and wedges if needed)    Moisture Interventions: Absorbent underpads, Minimize layers    Activity Interventions: Assess need for specialty bed, Increase time out of bed    Mobility Interventions: Assess need for specialty bed, HOB 30 degrees or less, Pressure redistribution bed/mattress (bed type)    Nutrition Interventions: Document food/fluid/supplement intake    Friction and Shear Interventions: Apply protective barrier, creams and emollients, Foam dressings/transparent film/skin sealants, HOB 30 degrees or less, Minimize layers

## 2021-02-08 NOTE — PROGRESS NOTES
Comprehensive Nutrition Assessment    Type and Reason for Visit: Initial, RD nutrition re-screen/LOS    Nutrition Recommendations/Plan:   Continue regular diet    Nutrition Assessment:      Chart reviewed for LOS. Pt noted for AMS, toxic metabolic encephalopathy d/t intentional drug overdose, hx of metastatic breast ca, HTN, GERD, HLD. RD visited bedside, pt appeared to be resting but awake. She reports a good appetite and has been eating well. No questions or concerns for RD at this time. MST negative for malnutrition risk factors. Wt hx trending up. Patient Vitals for the past 72 hrs:   % Diet Eaten   02/08/21 0851 100 %   02/06/21 1800 100 %   02/05/21 1215 100 %     Wt Readings from Last 5 Encounters:   02/01/21 110 kg (242 lb 8.1 oz)   12/15/20 101.6 kg (224 lb)   10/27/20 98.4 kg (217 lb)   10/24/20 101.4 kg (223 lb 8.7 oz)   08/02/20 96.2 kg (212 lb)   ]    Estimated Daily Nutrient Needs:  Energy (kcal): 1853 kcal (BMR x 1. 3AF - 300); Weight Used for Energy Requirements: Current  Protein (g): 88g (0.8g/kg); Weight Used for Protein Requirements: Current  Fluid (ml/day): 1900mL; Method Used for Fluid Requirements: 1 ml/kcal      Nutrition Related Findings:  Labs: Mg 1.3. Meds: tricor, thiamine. Trace edema. BM 2/7.       Wounds:    None       Current Nutrition Therapies:  DIET ONE TIME MESSAGE  DIET REGULAR    Anthropometric Measures:  · Height:  5' 5\" (165.1 cm)  · Current Body Wt:  110 kg (242 lb 8.1 oz)   · Ideal Body Wt:  125 lbs:  194 %    · BMI Category:  Obese class 3 (BMI 40.0 or greater)       Nutrition Diagnosis:   No nutrition diagnosis at this time     Nutrition Interventions:   Food and/or Nutrient Delivery: Continue current diet  Nutrition Education and Counseling: No recommendations at this time  Coordination of Nutrition Care: Continue to monitor while inpatient    Goals:  PO intake >80% meals next 5-7 days       Nutrition Monitoring and Evaluation:   Behavioral-Environmental Outcomes: None identified  Food/Nutrient Intake Outcomes: Food and nutrient intake  Physical Signs/Symptoms Outcomes: Biochemical data, Weight, Fluid status or edema    Discharge Planning:    No discharge needs at this time     Electronically signed by Yony Leo RD on 2/8/2021 at 11:49 AM    Contact: [phone]  Pager [phone]

## 2021-02-08 NOTE — PROGRESS NOTES
Problem: Falls - Risk of  Goal: *Absence of Falls  Description: Document Fani Rivers Fall Risk and appropriate interventions in the flowsheet. 2/8/2021 1510 by Fidelia Nichols RN  Outcome: Resolved/Met  Note: Fall Risk Interventions:  Mobility Interventions: Bed/chair exit alarm, Communicate number of staff needed for ambulation/transfer, Patient to call before getting OOB    Mentation Interventions: Bed/chair exit alarm, Door open when patient unattended, More frequent rounding    Medication Interventions: Bed/chair exit alarm, Patient to call before getting OOB    Elimination Interventions: Call light in reach, Bed/chair exit alarm, Toileting schedule/hourly rounds    History of Falls Interventions: Bed/chair exit alarm, Door open when patient unattended, Room close to nurse's station      2/8/2021 0916 by Fidelia Nichols RN  Outcome: Progressing Towards Goal  Note: Fall Risk Interventions:  Mobility Interventions: Bed/chair exit alarm, Communicate number of staff needed for ambulation/transfer, Patient to call before getting OOB    Mentation Interventions: Bed/chair exit alarm, Door open when patient unattended, More frequent rounding    Medication Interventions: Bed/chair exit alarm, Patient to call before getting OOB    Elimination Interventions: Call light in reach, Bed/chair exit alarm, Toileting schedule/hourly rounds    History of Falls Interventions: Bed/chair exit alarm, Door open when patient unattended, Room close to nurse's station         Problem: Patient Education: Go to Patient Education Activity  Goal: Patient/Family Education  2/8/2021 1510 by Fidelia Nichols RN  Outcome: Resolved/Met  2/8/2021 0916 by Fidelia Nichols RN  Outcome: Progressing Towards Goal     Problem: Pressure Injury - Risk of  Goal: *Prevention of pressure injury  Description: Document Adama Scale and appropriate interventions in the flowsheet.   2/8/2021 1510 by Fidelia Nichols RN  Outcome: Resolved/Met  Note: Pressure Injury Interventions:  Sensory Interventions: Assess changes in LOC, Assess need for specialty bed, Check visual cues for pain, Keep linens dry and wrinkle-free, Maintain/enhance activity level, Minimize linen layers, Turn and reposition approx. every two hours (pillows and wedges if needed)    Moisture Interventions: Absorbent underpads, Minimize layers    Activity Interventions: Assess need for specialty bed, Increase time out of bed    Mobility Interventions: Assess need for specialty bed, HOB 30 degrees or less, Pressure redistribution bed/mattress (bed type)    Nutrition Interventions: Document food/fluid/supplement intake    Friction and Shear Interventions: Apply protective barrier, creams and emollients, Foam dressings/transparent film/skin sealants, HOB 30 degrees or less, Minimize layers             2/8/2021 0916 by Edson Vizcarra RN  Outcome: Progressing Towards Goal  Note: Pressure Injury Interventions:  Sensory Interventions: Assess changes in LOC, Assess need for specialty bed, Check visual cues for pain, Keep linens dry and wrinkle-free, Maintain/enhance activity level, Minimize linen layers, Turn and reposition approx.  every two hours (pillows and wedges if needed)    Moisture Interventions: Absorbent underpads, Minimize layers    Activity Interventions: Assess need for specialty bed, Increase time out of bed    Mobility Interventions: Assess need for specialty bed, HOB 30 degrees or less, Pressure redistribution bed/mattress (bed type)    Nutrition Interventions: Document food/fluid/supplement intake    Friction and Shear Interventions: Apply protective barrier, creams and emollients, Foam dressings/transparent film/skin sealants, HOB 30 degrees or less, Minimize layers                Problem: Patient Education: Go to Patient Education Activity  Goal: Patient/Family Education  Outcome: Resolved/Met     Problem: Patient Education: Go to Patient Education Activity  Goal: Patient/Family Education  Outcome: Resolved/Met

## 2021-02-08 NOTE — CONSULTS
PSYCHIATRIC CONSULTATION NOTE VIA TELEHEALTH:    REASON FOR CONSULT:    A psychiatric consultation was requested by Revonda Dancer, MD to evaluate or provide advice/opinion related to evaluating Suicidal attempt. HISTORY OF PRESENTING COMPLAINT:     Ms. Myriam Santiago is a 63 y.o. WHITE OR  female who is currently admitted to the medical floor at [facility] on 2/1/2021 for the treatment of <principal problem not specified>. Patient reports that she took to many Effexor in hopes to be hospitalized to get attention. Patient reports that she did not want to die she just was lonely and wanted some company. Patient reports that she knew taken a few more Effexor would make her sick and she would have to come to the hospital to stay for a little. Patient reports that she feels the Effexor works for her and does not want to change it. Patient denies SI/HI/AVH/Paranoia. 2/2/2021 - Patient reportedly admitted after an alleged intentional overdose. It is unknown which medication could be Effexor and/or Xanax. Patient reportedly told nursing she has been sad and depressed. Patient last admitted to psychiatric facility 12/20/2020. When attempting to assess patient nursing reports that patient had previously been agitated and aggressive and received Ativan. Patient is assessed via telehealth at bedside and patient has one on one sitter. Patient is confused, disorganized, and unable to answer assessments appropriately. Of note patient has been confused, agitated, and aggressive. REVIEW OF SYMPTOMS:  Patient denies appetite change, weight change, vision change, sleep change, fever, night sweats, headache, cough, shortness of breath, chest pain, palpitations, nausea,vomiting, diarrhea, dizziness, weakness, tremors. PAST MEDICAL HISTORY:  Please see History & Physical for details.    Past Medical History:   Diagnosis Date    Acute hyponatremia 6/18/2019    Anxiety     Bimalleolar fracture of left ankle 2/3/2016    Comminuted and displaced     Cancer [facility])     breast    Closed left ankle fracture 2/4/2016    Depression     Gastroenteritis due to norovirus 2/21/2018    GERD (gastroesophageal reflux disease)     HTN (hypertension)     Hypercholesterolemia     Hypotension 9/12/2012    Metastatic breast cancer ([facility] 5/3/2017    Neoplastic malignant related fatigue 4/4/2018    Pain due to neoplasm 4/4/2018    Secondary cancer of bone ([facility] 5/3/2017    Sepsis ([facility] 2/12/2018    Urinary tract infection without hematuria 2/13/2018         ALLERGIES:  Allergies   Allergen Reactions    Prednisolone Palpitations    Sulfa (Sulfonamide Antibiotics) Unknown (comments)    Wellbutrin [Bupropion Hcl] Unknown (comments)       MEDICATIONS PRIOR TO ADMISSION:  Medications Prior to Admission   Medication Sig    DULoxetine (CYMBALTA) 30 mg capsule Take 90 mg by mouth daily.  clonazePAM (KlonoPIN) 0.5 mg tablet Take 0.5 mg by mouth two (2) times a day.  gabapentin (NEURONTIN) 300 mg capsule Take 300 mg by mouth three (3) times daily.  hydrOXYzine HCL (ATARAX) 50 mg tablet Take 50 mg by mouth every six (6) hours as needed.  polyethylene glycol (MIRALAX) 17 gram packet Take 17 g by mouth two (2) times a day.  thiamine mononitrate (B-1) 100 mg tablet Take 100 mg by mouth daily.  docusate sodium (COLACE) 100 mg capsule Take 1 Cap by mouth two (2) times a day for 90 days.  lidocaine 4 % patch To back    losartan (COZAAR) 50 mg tablet Take 50 mg by mouth daily.  folic acid (FOLVITE) 1 mg tablet Take 1 Tab by mouth daily.  fenofibrate micronized (LOFIBRA) 134 mg capsule Take 134 mg by mouth every morning.  aspirin delayed-release 81 mg tablet Take 1 Tab by mouth daily.  Indications: prevention of transient ischemic attack       CURRENT MEDICATIONS:    Current Facility-Administered Medications:     influenza vaccine 2020-21 (6 mos+)(PF) (FLUARIX/FLULAVAL/FLUZONE QUAD) injection 0.5 mL, 0.5 mL, IntraMUSCular, PRIOR TO DISCHARGE, Wali Sigala MD    LORazepam (ATIVAN) injection 1 mg, 1 mg, IntraVENous, Q6H PRN, Karolyn Cardozo NP, 1 mg at 02/08/21 1006    diclofenac (VOLTAREN) 1 % topical gel 2 g, 2 g, Topical, QID, Anders Sigala MD, 2 g at 02/08/21 0810    ketorolac (TORADOL) injection 30 mg, 30 mg, IntraVENous, Q6H PRN, Odilon Mason MD, 30 mg at 02/08/21 0802    gabapentin (NEURONTIN) capsule 400 mg, 400 mg, Oral, TID, Odilon Mason MD, 400 mg at 02/08/21 0802    thiamine mononitrate (B-1) tablet 100 mg, 100 mg, Oral, DAILY, Anders Sigala MD, 100 mg at 02/08/21 0802    fenofibrate nanocrystallized (TRICOR) tablet 145 mg, 145 mg, Oral, DAILY, Anders Sigala MD, 145 mg at 02/08/21 0802    clonazePAM (KlonoPIN) tablet 1 mg, 1 mg, Oral, BID, Saddie Eye D, NP, 1 mg at 02/08/21 0801    haloperidoL (HALDOL) tablet 5 mg, 5 mg, Oral, Q6H PRN, Saddie Eye D, NP, 5 mg at 02/05/21 1958    haloperidol lactate (HALDOL) injection 5 mg, 5 mg, IntraMUSCular, Q6H PRN, Saddie Eye D, NP, 5 mg at 02/03/21 2615    aspirin delayed-release tablet 81 mg, 81 mg, Oral, DAILY, Bebo Brooks MD, 81 mg at 02/08/21 0802    losartan (COZAAR) tablet 50 mg, 50 mg, Oral, DAILY, Bebo Brooks MD, 50 mg at 02/08/21 0802    sodium chloride (NS) flush 5-40 mL, 5-40 mL, IntraVENous, Q8H, Bebo Brooks MD, Stopped at 02/08/21 0600    sodium chloride (NS) flush 5-40 mL, 5-40 mL, IntraVENous, PRN, Bebo Brooks MD    acetaminophen (TYLENOL) tablet 650 mg, 650 mg, Oral, Q6H PRN, 650 mg at 02/06/21 1351 **OR** acetaminophen (TYLENOL) suppository 650 mg, 650 mg, Rectal, Q6H PRN, Bebo Brooks MD    polyethylene glycol (MIRALAX) packet 17 g, 17 g, Oral, DAILY PRN, Bebo Brooks MD    enoxaparin (LOVENOX) injection 40 mg, 40 mg, SubCUTAneous, Q12H, Bebo Brooks MD, 40 mg at 02/07/21 1427     LAB RESULTS:  Lab Results   Component Value Date/Time    WBC 6.2 02/04/2021 11:46 AM    HGB (POC) 15.5 02/02/2018 12:29 PM    HGB 12.8 02/04/2021 11:46 AM    HCT (POC) 45.6 02/02/2018 12:29 PM    HCT 38.4 02/04/2021 11:46 AM    PLATELET 511 44/21/0876 11:46 AM    MCV 90.8 02/04/2021 11:46 AM      Lab Results   Component Value Date/Time    Sodium 137 02/08/2021 03:34 AM    Potassium 3.9 02/08/2021 03:34 AM    Chloride 106 02/08/2021 03:34 AM    CO2 26 02/08/2021 03:34 AM    Anion gap 5 02/08/2021 03:34 AM    Glucose 171 (H) 02/08/2021 03:34 AM    BUN 15 02/08/2021 03:34 AM    Creatinine 0.80 02/08/2021 03:34 AM    BUN/Creatinine ratio 19 02/08/2021 03:34 AM    GFR est AA >60 02/08/2021 03:34 AM    GFR est non-AA >60 02/08/2021 03:34 AM    Calcium 8.7 02/08/2021 03:34 AM    Bilirubin, total 0.5 02/04/2021 11:46 AM    Alk. phosphatase 92 02/04/2021 11:46 AM    Protein, total 6.6 02/04/2021 11:46 AM    Albumin 2.2 (L) 02/05/2021 04:53 AM    Globulin 3.3 02/04/2021 11:46 AM    A-G Ratio 1.0 (L) 02/04/2021 11:46 AM    ALT (SGPT) 31 02/04/2021 11:46 AM        PAST PSYCHIATRIC HISTORY:  Patient has history of seeing a psychiatrist at [facility]. Reported history of Depression, Anxiety, possible PTSD. Per consult notes Benzodiazepine Use, severe. 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 Not able to assess current.     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 At this time the patient will not benefit from inpatient psychiatric treatment. 2. Continue Clonazepam 1 mg BID  3. Discontinue Alprazolam and Lorazepam  4. Hydroxyzine 50 mg Q6H PRN Anxiety  5. Case Management to make appointment with Dr. Abiodun Romero for outpatient psychiatry   6. Case Management to provide resources for [address] [place] [address] to help with appointments and outings. 7. Resume Effexor 200 mg BID    Please consult Psychiatry again for any concerns regarding the patient's mental health changes and/or management. Thank you for the opportunity to participate in the care of your patient.

## 2021-02-08 NOTE — DISCHARGE SUMMARY
Hospitalist Discharge Summary     Patient ID:  Kit Boss  [id number]  27 y.o.  1955 2/1/2021    PCP on record: Vinod Teresa MD    Admit date: 2/1/2021  Discharge date and time: 2/8/2021    DISCHARGE DIAGNOSIS:     · AMS/toxic metabolic encephalopathy - resolved  · Intentional venlafaxine overdose  · Suicidal ideation with attempt, as above  · Seizure episode  · Major depression, recurrent mild-moderate  · Hx of metastatic breast cancer   · Generalized anxiety disorder  · HTN  · GERD  · HLD    CONSULTATIONS:  IP CONSULT TO PSYCHIATRY  IP CONSULT TO PSYCHIATRY    Excerpted HPI from H&P    Kit Boss, 63 y.o. female presents to the ED with cc of overdose and seizure. Patient has history of metastatic breast cancer and had a withdrawal seizure in November. She is on Xanax and Effexor. According to EMS, the patient called them stating that she took an overdose of Effexor. They brought in her bottle which was filled on July 31, 2020. The original bottle had 300 tablets. There are currently 99 tablets left. The patient had a seizure on the way to the ER which lasted for 2 minutes. .  She did not sustain any trauma. It was a generalized seizure. She is now alert and oriented x3 and states that she took 2 handfuls of Effexor because she got bad news about her cancer. She says she has not had Xanax since January 15. She denies pain, shortness of breath, fever, vomiting or diarrhea. Patient has had suicidal ideation in the past.  She states that she had to stop chemo because it had affected her ejection fraction. ______________________________________________________________________  DISCHARGE SUMMARY/HOSPITAL COURSE:  for full details see H&P, daily progress notes, labs, consult notes.      AMS/toxic metabolic encephalopathy - resolved  Intentional venlafaxine overdose  Suicidal ideation with attempt, as above  Seizure episode  Major depression, recurrent mild-moderate  Hx of metastatic breast cancer      Admitted s/p intentional venlafaxone OD. Was very agitated on admission and required physical and chemical restraints.     Previous admission in Dec 2020 at [facility] indicates \"attempted to stab self in head POA\" Pt was also treated for benzodiazepine withdrawal       Telemetry monitoring, no QT prolongation or arrhythmia. A constant observer in room  Effexor held while inpatient. PRN ativan was used for anxiety. She was placed on clonazepam 1mg BID. No more seizure episode after hospitalization. Patient's mentation back to baseline. She stated that she took the pills to get attention. Counseling provided. Psychiatry consutled and patient deemed to be not a candidate for inpatient psych. [facility] recommended continuing Clonazepam 1 mg BID, Hydroxyzine 50 mg Q6H PRN for anxiety, and resuming Resume Effexor 200 mg BID upon discharge. Right shoulder pain  -PT/OT. Topical Voltaren gel. CORNELIA - resolved  Resolved s/p IV fluid     Generalized anxiety disorder  HTN  GERD  HLD  Continue ASA, fenofibrate, gabapentin, folic acid, and losartan    _______________________________________________________________________  Patient seen and examined by me on discharge day. Pertinent Findings:  Gen:    Not in distress  Chest: Clear lungs  CVS:   Regular rhythm. No edema  Abd:  Soft, not distended, not tender  Neuro:  Alert, oriented x3  _______________________________________________________________________  DISCHARGE MEDICATIONS:   Current Discharge Medication List      START taking these medications    Details   diclofenac (VOLTAREN) 1 % gel Apply 2 g to affected area four (4) times daily. Qty: 1 Each, Refills: 0      venlafaxine (EFFEXOR) 100 mg tablet Take 2 Tabs by mouth two (2) times daily (with meals). Qty: 120 Tab, Refills: 0         CONTINUE these medications which have CHANGED    Details   clonazePAM (KlonoPIN) 1 mg tablet Take 1 Tab by mouth two (2) times a day.  Max Daily Amount: 2 mg. Qty: 60 Tab, Refills: 0    Associated Diagnoses: Anxiety         CONTINUE these medications which have NOT CHANGED    Details   gabapentin (NEURONTIN) 300 mg capsule Take 300 mg by mouth three (3) times daily. hydrOXYzine HCL (ATARAX) 50 mg tablet Take 50 mg by mouth every six (6) hours as needed. polyethylene glycol (MIRALAX) 17 gram packet Take 17 g by mouth two (2) times a day. thiamine mononitrate (B-1) 100 mg tablet Take 100 mg by mouth daily. docusate sodium (COLACE) 100 mg capsule Take 1 Cap by mouth two (2) times a day for 90 days. Qty: 60 Cap, Refills: 2      lidocaine 4 % patch To back  Qty: 30 Patch, Refills: 0      losartan (COZAAR) 50 mg tablet Take 50 mg by mouth daily. folic acid (FOLVITE) 1 mg tablet Take 1 Tab by mouth daily. Qty: 60 Tab, Refills: 3      fenofibrate micronized (LOFIBRA) 134 mg capsule Take 134 mg by mouth every morning. aspirin delayed-release 81 mg tablet Take 1 Tab by mouth daily. Indications: prevention of transient ischemic attack  Qty: 30 Tab, Refills: 0         STOP taking these medications       DULoxetine (CYMBALTA) 30 mg capsule Comments:   Reason for Stopping:                 Patient Follow Up Instructions:    Activity: Activity as tolerated  Diet: Regular Diet  Wound Care: None needed      Follow-up Information     Follow up With Specialties Details Why Kranthi Oneill MD Family Medicine In 1 week  [address] [facility]  Lan Segura., MD Psychiatry In 1 week  [address] [facility] [address]  [phone]          ________________________________________________________________    Risk of deterioration: Low    Condition at Discharge:  Stable  __________________________________________________________________    Disposition  Home with family, no needs    ____________________________________________________________________    Code Status: Full Code  ___________________________________________________________________      Total time in minutes spent coordinating this discharge (includes going over instructions, follow-up, prescriptions, and preparing report for sign off to her PCP) :  35 minutes    Signed:  Juan Singh MD

## 2021-02-13 ENCOUNTER — HOSPITAL ENCOUNTER (EMERGENCY)
Age: 66
Discharge: HOME | End: 2021-02-13
Attending: EMERGENCY MEDICINE
Payer: MEDICARE

## 2021-02-13 VITALS
HEART RATE: 92 BPM | TEMPERATURE: 98.1 F | RESPIRATION RATE: 18 BRPM | DIASTOLIC BLOOD PRESSURE: 73 MMHG | SYSTOLIC BLOOD PRESSURE: 152 MMHG | OXYGEN SATURATION: 97 %

## 2021-02-13 DIAGNOSIS — F41.8 ANXIETY ASSOCIATED WITH DEPRESSION: Primary | ICD-10-CM

## 2021-02-13 PROCEDURE — 99282 EMERGENCY DEPT VISIT SF MDM: CPT

## 2021-02-13 NOTE — ED TRIAGE NOTES
Pt requests additional alprazolam to be prescribed to her because she states, \"I lost mine at home, I'm not sure where it is I checked everywhere. \" Was seen at [address] at [address] [facility] and given valium there but it wasn't enough to help her get to sleep. States, \"I feel like I'm in withdrawal. The last time I ran out I had a seizure. \" Complains of feeling like her BP is high.

## 2021-02-13 NOTE — ED PROVIDER NOTES
HPI     62-year-old female with a history of anxiety, seizures, hypertension, high cholesterol, breast cancer, prior overdose, presents the emergency department requesting a refill of her Xanax. Patient states she had a refill prescription filled 2 days ago and cannot locate it. She has not called her doctor to see if he would refill it. She went to her  [facility]  this evening around 7:00 and they gave her a Valium. She drove home was not able to sleep so she just decided to drive all the way down here. She denies any other medical complaints. She states the pharmacy said her insurance would not pay for a refill because she has had too many.     Past Medical History:   Diagnosis Date    Acute hyponatremia 6/18/2019    Anxiety     Bimalleolar fracture of left ankle 2/3/2016    Comminuted and displaced     Cancer [facility])     breast    Closed left ankle fracture 2/4/2016    Depression     Gastroenteritis due to norovirus 2/21/2018    GERD (gastroesophageal reflux disease)     HTN (hypertension)     Hypercholesterolemia     Hypotension 9/12/2012    Metastatic breast cancer ([address] 5/3/2017    Neoplastic malignant related fatigue 4/4/2018    Pain due to neoplasm 4/4/2018    Secondary cancer of bone ([address] 5/3/2017    Sepsis ([address] 2/12/2018    Urinary tract infection without hematuria 2/13/2018       Past Surgical History:   Procedure Laterality Date    BREAST SURGERY PROCEDURE UNLISTED  march 13    carina masectomy         Family History:   Problem Relation Age of Onset    Hypertension Mother     Heart Disease Mother     Depression Mother     Hypertension Father        Social History     Socioeconomic History    Marital status: SINGLE     Spouse name: Not on file    Number of children: Not on file    Years of education: Not on file    Highest education level: Not on file   Occupational History    Not on file   Social Needs    Financial resource strain: Not on file    Food insecurity Worry: Not on file     Inability: Not on file    Transportation needs     Medical: Not on file     Non-medical: Not on file   Tobacco Use    Smoking status: Former Smoker     Packs/day: 0.50     Years: 20.00     Pack years: 10.00    Smokeless tobacco: Never Used   Substance and Sexual Activity    Alcohol use: No    Drug use: No    Sexual activity: Never   Lifestyle    Physical activity     Days per week: Not on file     Minutes per session: Not on file    Stress: Not on file   Relationships    Social connections     Talks on phone: Not on file     Gets together: Not on file     Attends Faith service: Not on file     Active member of club or organization: Not on file     Attends meetings of clubs or organizations: Not on file     Relationship status: Not on file    Intimate partner violence     Fear of current or ex partner: Not on file     Emotionally abused: Not on file     Physically abused: Not on file     Forced sexual activity: Not on file   Other Topics Concern     Service Not Asked    Blood Transfusions Not Asked    Caffeine Concern Not Asked    Occupational Exposure Not Asked   Melissa Nereyda Hazards Not Asked    Sleep Concern Not Asked    Stress Concern Not Asked    Weight Concern Not Asked    Special Diet Not Asked    Back Care Not Asked    Exercise Not Asked    Bike Helmet Not Asked   [address] Not Asked    Self-Exams Not Asked   Social History Narrative    63year old  female admitted for depression after suicide attempt on Xanax and opiods. Py is follwed by DR. Daphane Goodell on the outside. She has breast and bone cancer. She is living by herself with no family supports. ALLERGIES: Prednisolone, Sulfa (sulfonamide antibiotics), and Wellbutrin [bupropion hcl]    Review of Systems   Constitutional: Negative for fever. HENT: Negative for congestion. Eyes: Negative for visual disturbance. Respiratory: Negative for cough and shortness of breath. Cardiovascular: Negative for chest pain. Gastrointestinal: Negative for anal bleeding. Genitourinary: Negative for dysuria. Musculoskeletal: Negative for gait problem. Skin: Negative for rash. Neurological: Negative for headaches. Psychiatric/Behavioral: Negative for dysphoric mood. The patient is nervous/anxious. Vitals:    02/13/21 0132   BP: (!) 152/73   Pulse: 92   Resp: 18   Temp: 98.1 °F (36.7 °C)   SpO2: 97%            Physical Exam  Constitutional:       General: She is not in acute distress. Appearance: She is well-developed. HENT:      Head: Normocephalic and atraumatic. Mouth/Throat:      Mouth: Mucous membranes are moist.      Pharynx: No oropharyngeal exudate. Eyes:      General: No scleral icterus. Right eye: No discharge. Left eye: No discharge. Pupils: Pupils are equal, round, and reactive to light. Neck:      Musculoskeletal: Normal range of motion and neck supple. Vascular: No JVD. Cardiovascular:      Rate and Rhythm: Normal rate and regular rhythm. Heart sounds: Normal heart sounds. No murmur. Pulmonary:      Effort: Pulmonary effort is normal. No respiratory distress. Breath sounds: Normal breath sounds. No stridor. No wheezing or rales. Chest:      Chest wall: No tenderness. Abdominal:      General: Bowel sounds are normal. There is no distension. Palpations: Abdomen is soft. There is no mass. Tenderness: There is no abdominal tenderness. There is no guarding or rebound. Musculoskeletal: Normal range of motion. Skin:     General: Skin is warm and dry. Capillary Refill: Capillary refill takes less than 2 seconds. Findings: No rash. Neurological:      Mental Status: She is oriented to person, place, and time. Psychiatric:         Behavior: Behavior normal.         Thought Content:  Thought content normal.         Judgment: Judgment normal.          MDM       Procedures      According to [place] prescription monitoring website patient had 53 tablets filled on February 10. She had 60 tablets filled on January 2 and again on January 11. I have explained to the patient that we are unable to prescribe benzodiazepines to patient's and that she needs to call her primary care doctor for any refills.

## 2021-02-13 NOTE — DISCHARGE INSTRUCTIONS
You need to follow up with your doctor who prescribes your medications for refill. You can call them tomorrow to see if they will call it in.

## 2021-05-05 NOTE — ROUTINE PROCESS
Bedside shift change report given to Fe Ortiz (oncoming nurse) by Srinath Baltazar (offgoing nurse). Report included the following information SBAR. 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 Render Risk Assessment In Note?: no Detail Level: Detailed

## 2021-08-03 PROBLEM — F32.A DEPRESSION: Status: RESOLVED | Noted: 2018-04-02 | Resolved: 2021-08-03

## 2021-12-04 NOTE — ADT AUTH CERT NOTES
Systemic or Infectious Condition [facility] - Care Day 7 (2/18/2018) by Santana Scott RN        Review Entered Review Status       2/18/2018 Completed       Details              Care Day: 7 Care Date: 2/18/2018 Level of Care: Telemetry       Guideline Day 2        Clinical Status       (X) * No ICU or intermediate care needs              Interventions       (X) Inpatient interventions continue                                   * Milestone              Additional Notes       bp has been elevated, briefly responding to PRN Clonidine. She took HCTZ in the past                Headache last night; denies headache today                Had indigestion last night responding to prn TUMS and Protonix                Last bm was formed and occurred yesterday .                Denies urinary difficulty       T 97.8 P 85 RR 18 /98 spO2 95%       Diarrhea resolved. P: D/c Enteric isolation. D/c Questran                HTN. Restart HCTZ               GERD . Continue PPI and PRN TUMS.               Augmentin continues       Metatstatic Breast ca to ribs and sternum. Generally comfortable now on Duragesic patch and PRN Percocet.       Lovenox sc daily       Regular diet, cardiac monitoring, up ad melita           Systemic or Infectious Condition [facility] - Care Day 6 (2/17/2018) by Santana Scott RN        Review Entered Review Status       2/18/2018 Completed       Details              Care Day: 6 Care Date: 2/17/2018 Level of Care: Telemetry       Guideline Day 2        Clinical Status       (X) * No ICU or intermediate care needs              Interventions       (X) Inpatient interventions continue                                   * Milestone              Additional Notes       Now on Questran. Diarrhea improved. Denies nausea.       T 98.2 P 80 RR 18 /67 spO2 99%       Infectious Gastroenteritis, improving.  Continue current rx.        Hopefully she can be d/c'd soon back to [facility]       Lovenox sc daily       Regular diet, cardiac monitoring, up ad melita PAST SURGICAL HISTORY:  No significant past surgical history

## 2022-05-15 NOTE — CONSULTS
[address]    Name:  Shantell Ahuja  MR#:  [id number]  :  1955  ACCOUNT #:  [de-identified]  DATE OF SERVICE:  2020    BEHAVIORAL HEALTH CONSULTATION    REASON FOR CONSULTATION:  Possible verdose, severe anxiety. HISTORY OF PRESENTING ILLNESS:  The patient is a 46-year-old female who is currently being seen in the medical unit for psychiatric consultation for complaints mentioned above. Her admission to the medical unit is well documented on her chart. She presented to the emergency room with altered mentation, potential overdose on her Xanax. Her past medical history is significant for anxiety, hyponatremia, hypertension, stage III to IV metastatic breast cancer with a port. She presented to the emergency room about four days ago for anxiety and was given a dose of Ativan. She was seen in the emergency room prior to that on 11/10 for similar complaints, she was evaluated by [facility] but was unwilling to be admitted in the hospital.  She states that she has been seeing Dr. Shivam Marsh at [facility] for more than 10 years and has been on Xanax for more than 10 years as well. She was admitted under the care of Dr. Gabi Mojica at [facility] in 2020 after a benzodiazepine withdrawal.  Her Xanax was decreased to 1 mg twice a day and she was started on Effexor but then for some reason, immediately after her discharge, she was put back on Xanax 1 mg four times a day and her Effexor was discontinued. Her  shows gabapentin, Xanax, lorazepam.  She tells me that she did not overdose on Xanax. She stated she got confused with different pill bottles. She also tells me that she got confused on her dosing but clearly tells me that it was not an attempt to overdose. She could not tell why she is not taking Effexor anymore. She denies being depressed, but shares that anxiety has been a significant problem for her also with some neuropathy.    She states that she ran out of her Xanax, that is why she went to the emergency room and was given  Ativan. She shares that she has been taking the Xanax more than she was supposed to but again, she states that she got confused with the pill bottle. She is also complaining of some confusion, which I informed her would be coming from the long-term use of the Xanax and the misuse. She is receptive in starting Cymbalta to help with anxiety and the neuropathy. Urine drug screen is positive for benzodiazepine. She denies suicidal ideation, homicidal ideation, auditory or visual hallucinations. PAST MEDICAL HISTORY:  See H and P. Past Medical History:   Diagnosis Date    Acute hyponatremia 6/18/2019    Anxiety     Bimalleolar fracture of left ankle 2/3/2016    Comminuted and displaced     Cancer [facility]     breast    Closed left ankle fracture 2/4/2016    Depression     Gastroenteritis due to norovirus 2/21/2018    GERD (gastroesophageal reflux disease)     HTN (hypertension)     Hypercholesterolemia     Hypotension 9/12/2012    Metastatic breast cancer ([facility] [address] 5/3/2017    Neoplastic malignant related fatigue 4/4/2018    Pain due to neoplasm 4/4/2018    Secondary cancer of bone ([address] 5/3/2017    Sepsis ([facility] [address] 2/12/2018    Urinary tract infection without hematuria 2/13/2018       Labs: (reviewed/updated 12/1/2020)  Patient Vitals for the past 8 hrs:   BP Temp Pulse Resp SpO2 Weight   12/01/20 0614 (!) 128/97 98.7 °F (37.1 °C) (!) 102 16 98 %    12/01/20 0215 123/74 97.6 °F (36.4 °C) 99 21 97 % 103.3 kg (227 lb 11.2 oz)     Labs Reviewed   CBC WITH AUTOMATED DIFF - Abnormal; Notable for the following components:       Result Value    WBC 11.4 (*)     NEUTROPHILS 81 (*)     ABS.  NEUTROPHILS 9.2 (*)     All other components within normal limits   METABOLIC PANEL, COMPREHENSIVE - Abnormal; Notable for the following components:    Sodium 132 (*)     Potassium 3.3 (*)     Glucose 157 (*)     BUN 30 (*)     Creatinine 1.26 (*) BUN/Creatinine ratio 24 (*)     GFR est AA 52 (*)     GFR est non-AA 43 (*)     Protein, total 8.3 (*)     All other components within normal limits   URINALYSIS W/ RFLX MICROSCOPIC - Abnormal; Notable for the following components:    Glucose 250 (*)     Epithelial cells MODERATE (*)     Bacteria 1+ (*)     Mucus 1+ (*)     CA Oxalate crystals 1+ (*)     Budding yeast PRESENT (*)     All other components within normal limits   DRUG SCREEN, URINE - Abnormal; Notable for the following components:    BENZODIAZEPINES Positive (*)     All other components within normal limits   SALICYLATE - Abnormal; Notable for the following components:    Salicylate level 2.2 (*)     All other components within normal limits   ACETAMINOPHEN - Abnormal; Notable for the following components:    Acetaminophen level <2 (*)     All other components within normal limits   METABOLIC PANEL, COMPREHENSIVE - Abnormal; Notable for the following components:    Potassium 3.1 (*)     Glucose 138 (*)     BUN 23 (*)     BUN/Creatinine ratio 24 (*)     GFR est non-AA 60 (*)     All other components within normal limits   HEMOGLOBIN A1C WITH EAG - Abnormal; Notable for the following components:    Hemoglobin A1c 6.2 (*)     All other components within normal limits   METABOLIC PANEL, BASIC - Abnormal; Notable for the following components:    Glucose 192 (*)     BUN 24 (*)     Creatinine 1.05 (*)     BUN/Creatinine ratio 23 (*)     GFR est non-AA 53 (*)     All other components within normal limits   METABOLIC PANEL, BASIC - Abnormal; Notable for the following components:    Chloride 109 (*)     Glucose 106 (*)     BUN/Creatinine ratio 24 (*)     All other components within normal limits   CULTURE, URINE   SAMPLES BEING HELD   ETHYL ALCOHOL   AMMONIA   VITAMIN B12   T4, FREE   TSH 3RD GENERATION   CBC WITH AUTOMATED DIFF     Lab Results   Component Value Date/Time    Sodium 140 12/01/2020 04:19 AM    Potassium 3.8 12/01/2020 04:19 AM    Chloride 109 (H) 12/01/2020 04:19 AM    CO2 26 12/01/2020 04:19 AM    Anion gap 5 12/01/2020 04:19 AM    Glucose 106 (H) 12/01/2020 04:19 AM    BUN 19 12/01/2020 04:19 AM    Creatinine 0.79 12/01/2020 04:19 AM    BUN/Creatinine ratio 24 (H) 12/01/2020 04:19 AM    GFR est AA >60 12/01/2020 04:19 AM    GFR est non-AA >60 12/01/2020 04:19 AM    Calcium 8.5 12/01/2020 04:19 AM    Bilirubin, total 0.5 11/29/2020 05:17 AM    Alk. phosphatase 88 11/29/2020 05:17 AM    Protein, total 7.2 11/29/2020 05:17 AM    Albumin 3.9 11/29/2020 05:17 AM    Globulin 3.3 11/29/2020 05:17 AM    A-G Ratio 1.2 11/29/2020 05:17 AM    ALT (SGPT) 26 11/29/2020 05:17 AM     Admission on 11/28/2020   Component Date Value Ref Range Status    Ventricular Rate 11/28/2020 118  BPM Final    Atrial Rate 11/28/2020 118  BPM Final    P-R Interval 11/28/2020 144  ms Final    QRS Duration 11/28/2020 70  ms Final    Q-T Interval 11/28/2020 320  ms Final    QTC Calculation (Bezet) 11/28/2020 448  ms Final    Calculated R Axis 11/28/2020 38  degrees Final    Calculated T Axis 11/28/2020 -129  degrees Final    Diagnosis 11/28/2020    Final                    Value:** Poor data quality, interpretation may be adversely affected  Sinus tachycardia with premature supraventricular complexes  ST & T wave abnormality, consider inferior ischemia  ST & T wave abnormality, consider anterolateral ischemia  When compared with ECG of 26-NOV-2020 20:55,  Inverted T waves have replaced nonspecific T wave abnormality in Inferior   leads  Confirmed by Karen Driscoll MD [id number]) on 11/30/2020 1:18:27 PM      SAMPLES BEING HELD 11/28/2020 1BLU,1UC   Final    COMMENT 11/28/2020 Add-on orders for these samples will be processed based on acceptable specimen integrity and analyte stability, which may vary by analyte.     Final    WBC 11/28/2020 11.4* 3.6 - 11.0 K/uL Final    RBC 11/28/2020 5.05  3.80 - 5.20 M/uL Final    HGB 11/28/2020 15.4  11.5 - 16.0 g/dL Final    HCT 11/28/2020 45.7  35.0 - 47.0 % Final    MCV 11/28/2020 90.5  80.0 - 99.0 FL Final    MCH 11/28/2020 30.5  26.0 - 34.0 PG Final    MCHC 11/28/2020 33.7  30.0 - 36.5 g/dL Final    RDW 11/28/2020 11.5  11.5 - 14.5 % Final    PLATELET 16/39/7376 351  150 - 400 K/uL Final    MPV 11/28/2020 11.3  8.9 - 12.9 FL Final    NRBC 11/28/2020 0.0  0  WBC Final    ABSOLUTE NRBC 11/28/2020 0.00  0.00 - 0.01 K/uL Final    NEUTROPHILS 11/28/2020 81* 32 - 75 % Final    LYMPHOCYTES 11/28/2020 12  12 - 49 % Final    MONOCYTES 11/28/2020 6  5 - 13 % Final    EOSINOPHILS 11/28/2020 0  0 - 7 % Final    BASOPHILS 11/28/2020 1  0 - 1 % Final    IMMATURE GRANULOCYTES 11/28/2020 0  0.0 - 0.5 % Final    ABS. NEUTROPHILS 11/28/2020 9.2* 1.8 - 8.0 K/UL Final    ABS. LYMPHOCYTES 11/28/2020 1.4  0.8 - 3.5 K/UL Final    ABS. MONOCYTES 11/28/2020 0.7  0.0 - 1.0 K/UL Final    ABS. EOSINOPHILS 11/28/2020 0.0  0.0 - 0.4 K/UL Final    ABS. BASOPHILS 11/28/2020 0.1  0.0 - 0.1 K/UL Final    ABS. IMM. GRANS. 11/28/2020 0.0  0.00 - 0.04 K/UL Final    DF 11/28/2020 AUTOMATED    Final    Sodium 11/28/2020 132* 136 - 145 mmol/L Final    Potassium 11/28/2020 3.3* 3.5 - 5.1 mmol/L Final    Chloride 11/28/2020 97  97 - 108 mmol/L Final    CO2 11/28/2020 24  21 - 32 mmol/L Final    Anion gap 11/28/2020 11  5 - 15 mmol/L Final    Glucose 11/28/2020 157* 65 - 100 mg/dL Final    BUN 11/28/2020 30* 6 - 20 MG/DL Final    Creatinine 11/28/2020 1.26* 0.55 - 1.02 MG/DL Final    BUN/Creatinine ratio 11/28/2020 24* 12 - 20   Final    GFR est AA 11/28/2020 52* >60 ml/min/1.73m2 Final    GFR est non-AA 11/28/2020 43* >60 ml/min/1.73m2 Final    Calcium 11/28/2020 9.6  8.5 - 10.1 MG/DL Final    Bilirubin, total 11/28/2020 0.4  0.2 - 1.0 MG/DL Final    ALT (SGPT) 11/28/2020 31  12 - 78 U/L Final    AST (SGOT) 11/28/2020 21  15 - 37 U/L Final    Alk.  phosphatase 11/28/2020 102  45 - 117 U/L Final    Protein, total 11/28/2020 8.3* 6.4 - 8.2 g/dL Final    Albumin 11/28/2020 4.5  3.5 - 5.0 g/dL Final    Globulin 11/28/2020 3.8  2.0 - 4.0 g/dL Final    A-G Ratio 11/28/2020 1.2  1.1 - 2.2   Final    Color 11/28/2020 YELLOW/STRAW    Final    Appearance 11/28/2020 CLEAR  CLEAR   Final    Specific gravity 11/28/2020 1.028  1.003 - 1.030   Final    pH (UA) 11/28/2020 5.5  5.0 - 8.0   Final    Protein 11/28/2020 Negative  NEG mg/dL Final    Glucose 11/28/2020 250* NEG mg/dL Final    Ketone 11/28/2020 Negative  NEG mg/dL Final    Bilirubin 11/28/2020 Negative  NEG   Final    Blood 11/28/2020 Negative  NEG   Final    Urobilinogen 11/28/2020 0.2  0.2 - 1.0 EU/dL Final    Nitrites 11/28/2020 Negative  NEG   Final    Leukocyte Esterase 11/28/2020 Negative  NEG   Final    WBC 11/28/2020 20-50  0 - 4 /hpf Final    RBC 11/28/2020 0-5  0 - 5 /hpf Final    Epithelial cells 11/28/2020 MODERATE* FEW /lpf Final    Bacteria 11/28/2020 1+* NEG /hpf Final    Mucus 11/28/2020 1+* NEG /lpf Final    CA Oxalate crystals 11/28/2020 1+* NEG Final    Hyaline cast 11/28/2020 2-5  0 - 5 /lpf Final    Budding yeast 11/28/2020 PRESENT* NEG   Final    AMPHETAMINES 11/28/2020 Negative  NEG   Final    BARBITURATES 11/28/2020 Negative  NEG   Final    BENZODIAZEPINES 11/28/2020 Positive* NEG   Final    COCAINE 11/28/2020 Negative  NEG   Final    METHADONE 11/28/2020 Negative  NEG   Final    OPIATES 11/28/2020 Negative  NEG   Final    PCP(PHENCYCLIDINE) 11/28/2020 Negative  NEG   Final    THC (TH-CANNABINOL) 11/28/2020 Negative  NEG   Final    Drug screen comment 11/28/2020 (NOTE)   Final    Salicylate level 99/72/3372 2.2* 2.8 - 20.0 MG/DL Final    Acetaminophen level 11/28/2020 <2* 10 - 30 ug/mL Final    ALCOHOL(ETHYL),SERUM 11/28/2020 <10  <10 MG/DL Final    Ammonia 11/28/2020 23  <32 UMOL/L Final    Vitamin B12 11/28/2020 604  193 - 986 pg/mL Final    T4, Free 11/28/2020 1.1  0.8 - 1.5 NG/DL Final    TSH 11/28/2020 3.57  0.36 - 3.74 uIU/mL Final    Sodium 11/29/2020 136  136 - 145 mmol/L Final    Potassium 11/29/2020 3.1* 3.5 - 5.1 mmol/L Final    Chloride 11/29/2020 102  97 - 108 mmol/L Final    CO2 11/29/2020 24  21 - 32 mmol/L Final    Anion gap 11/29/2020 10  5 - 15 mmol/L Final    Glucose 11/29/2020 138* 65 - 100 mg/dL Final    BUN 11/29/2020 23* 6 - 20 MG/DL Final    Creatinine 11/29/2020 0.95  0.55 - 1.02 MG/DL Final    BUN/Creatinine ratio 11/29/2020 24* 12 - 20   Final    GFR est AA 11/29/2020 >60  >60 ml/min/1.73m2 Final    GFR est non-AA 11/29/2020 60* >60 ml/min/1.73m2 Final    Calcium 11/29/2020 8.8  8.5 - 10.1 MG/DL Final    Bilirubin, total 11/29/2020 0.5  0.2 - 1.0 MG/DL Final    ALT (SGPT) 11/29/2020 26  12 - 78 U/L Final    AST (SGOT) 11/29/2020 20  15 - 37 U/L Final    Alk. phosphatase 11/29/2020 88  45 - 117 U/L Final    Protein, total 11/29/2020 7.2  6.4 - 8.2 g/dL Final    Albumin 11/29/2020 3.9  3.5 - 5.0 g/dL Final    Globulin 11/29/2020 3.3  2.0 - 4.0 g/dL Final    A-G Ratio 11/29/2020 1.2  1.1 - 2.2   Final    WBC 11/29/2020 7.4  3.6 - 11.0 K/uL Final    RBC 11/29/2020 4.46  3.80 - 5.20 M/uL Final    HGB 11/29/2020 13.5  11.5 - 16.0 g/dL Final    HCT 11/29/2020 40.4  35.0 - 47.0 % Final    MCV 11/29/2020 90.6  80.0 - 99.0 FL Final    MCH 11/29/2020 30.3  26.0 - 34.0 PG Final    MCHC 11/29/2020 33.4  30.0 - 36.5 g/dL Final    RDW 11/29/2020 11.7  11.5 - 14.5 % Final    PLATELET 82/98/3294 835  150 - 400 K/uL Final    MPV 11/29/2020 10.9  8.9 - 12.9 FL Final    NRBC 11/29/2020 0.0  0  WBC Final    ABSOLUTE NRBC 11/29/2020 0.00  0.00 - 0.01 K/uL Final    NEUTROPHILS 11/29/2020 62  32 - 75 % Final    LYMPHOCYTES 11/29/2020 26  12 - 49 % Final    MONOCYTES 11/29/2020 9  5 - 13 % Final    EOSINOPHILS 11/29/2020 2  0 - 7 % Final    BASOPHILS 11/29/2020 1  0 - 1 % Final    IMMATURE GRANULOCYTES 11/29/2020 0  0.0 - 0.5 % Final    ABS. NEUTROPHILS 11/29/2020 4.7  1.8 - 8.0 K/UL Final    ABS.  LYMPHOCYTES 11/29/2020 1.9  0.8 - 3.5 K/UL Final    ABS. MONOCYTES 11/29/2020 0.6  0.0 - 1.0 K/UL Final    ABS. EOSINOPHILS 11/29/2020 0.1  0.0 - 0.4 K/UL Final    ABS. BASOPHILS 11/29/2020 0.1  0.0 - 0.1 K/UL Final    ABS. IMM.  GRANS. 11/29/2020 0.0  0.00 - 0.04 K/UL Final    DF 11/29/2020 AUTOMATED    Final    Special Requests: 11/28/2020 NO SPECIAL REQUESTS    Final    Culture result: 11/28/2020 No growth (<1,000 CFU/ML)    Final    Hemoglobin A1c 11/29/2020 6.2* 4.0 - 5.6 % Final    Est. average glucose 11/29/2020 131  mg/dL Final    Sodium 11/30/2020 136  136 - 145 mmol/L Final    Potassium 11/30/2020 3.6  3.5 - 5.1 mmol/L Final    Chloride 11/30/2020 103  97 - 108 mmol/L Final    CO2 11/30/2020 27  21 - 32 mmol/L Final    Anion gap 11/30/2020 6  5 - 15 mmol/L Final    Glucose 11/30/2020 192* 65 - 100 mg/dL Final    BUN 11/30/2020 24* 6 - 20 MG/DL Final    Creatinine 11/30/2020 1.05* 0.55 - 1.02 MG/DL Final    BUN/Creatinine ratio 11/30/2020 23* 12 - 20   Final    GFR est AA 11/30/2020 >60  >60 ml/min/1.73m2 Final    GFR est non-AA 11/30/2020 53* >60 ml/min/1.73m2 Final    Calcium 11/30/2020 8.5  8.5 - 10.1 MG/DL Final    Sodium 12/01/2020 140  136 - 145 mmol/L Final    Potassium 12/01/2020 3.8  3.5 - 5.1 mmol/L Final    Chloride 12/01/2020 109* 97 - 108 mmol/L Final    CO2 12/01/2020 26  21 - 32 mmol/L Final    Anion gap 12/01/2020 5  5 - 15 mmol/L Final    Glucose 12/01/2020 106* 65 - 100 mg/dL Final    BUN 12/01/2020 19  6 - 20 MG/DL Final    Creatinine 12/01/2020 0.79  0.55 - 1.02 MG/DL Final    BUN/Creatinine ratio 12/01/2020 24* 12 - 20   Final    GFR est AA 12/01/2020 >60  >60 ml/min/1.73m2 Final    GFR est non-AA 12/01/2020 >60  >60 ml/min/1.73m2 Final    Calcium 12/01/2020 8.5  8.5 - 10.1 MG/DL Final     Vitals:    11/30/20 1747 11/30/20 2210 12/01/20 0215 12/01/20 0614   BP: 139/61 127/66 123/74 (!) 128/97   Pulse: 97 97 99 (!) 102   Resp: 23 19 21 16   Temp: 97.8 °F (36.6 °C) 98.2 °F (36.8 °C) 97.6 °F (36.4 °C) 98.7 °F (37.1 °C)   SpO2: 96% 99% 97% 98%   Weight:   103.3 kg (227 lb 11.2 oz)      Recent Results (from the past 24 hour(s))   METABOLIC PANEL, BASIC    Collection Time: 12/01/20  4:19 AM   Result Value Ref Range    Sodium 140 136 - 145 mmol/L    Potassium 3.8 3.5 - 5.1 mmol/L    Chloride 109 (H) 97 - 108 mmol/L    CO2 26 21 - 32 mmol/L    Anion gap 5 5 - 15 mmol/L    Glucose 106 (H) 65 - 100 mg/dL    BUN 19 6 - 20 MG/DL    Creatinine 0.79 0.55 - 1.02 MG/DL    BUN/Creatinine ratio 24 (H) 12 - 20      GFR est AA >60 >60 ml/min/1.73m2    GFR est non-AA >60 >60 ml/min/1.73m2    Calcium 8.5 8.5 - 10.1 MG/DL       RADIOLOGY REPORTS:  Results from Hospital Encounter encounter on 11/28/20   XR CHEST PORT    Narrative INDICATION:  SOB     Exam: Portable chest 1239. Comparison: 10/25/2020. Findings: Cardiomediastinal silhouette is within normal limits. Pulmonary  vasculature is not engorged. There are no focal parenchymal opacities,  effusions, or pneumothorax. Right sided port unchanged      Impression Impression:  1. No acute disease      Ct Head Wo Cont    Result Date: 11/28/2020  EXAM: CT HEAD WO CONT INDICATION: AMS COMPARISON: CT 10/23/2020. CONTRAST: None. TECHNIQUE: Unenhanced CT of the head was performed using 5 mm images. Brain and bone windows were generated. Coronal and sagittal reformats. CT dose reduction was achieved through use of a standardized protocol tailored for this examination and automatic exposure control for dose modulation. FINDINGS: The ventricles and sulci are normal in size, shape and configuration. . There is no significant white matter disease. There is no intracranial hemorrhage, extra-axial collection, or mass effect. The basilar cisterns are open. No CT evidence of acute infarct. The bone windows demonstrate no abnormalities. The visualized portions of the paranasal sinuses and mastoid air cells are clear. IMPRESSION: No acute findings.     Ct Head Wo Cont    Result Date: 10/23/2020  Indication:  AMS Comparison: CT January 2020 Findings: 5 mm axial images were obtained from the skull base through the vertex. CT dose reduction was achieved through the use of a standardized protocol tailored for this examination and automatic exposure control for dose modulation. The ventricles and cortical sulci are prominent, compatible with age related volume loss. There is no evidence of intracranial hemorrhage, mass, mass effect, or acute infarct. There is periventricular white matter disease. No extra-axial fluid collections are seen. The visualized paranasal sinuses and mastoid air cells are clear. The orbital structures are unremarkable. No osseous abnormalities are seen. Impression: 1. No evidence of acute infarct or intracranial hemorrhage. 2. Mild periventricular white matter disease is likely secondary to chronic small vessel ischemic changes. Xr Chest Port    Result Date: 11/28/2020  INDICATION:  SOB Exam: Portable chest 1239. Comparison: 10/25/2020. Findings: Cardiomediastinal silhouette is within normal limits. Pulmonary vasculature is not engorged. There are no focal parenchymal opacities, effusions, or pneumothorax. Right sided port unchanged     Impression: 1. No acute disease      Xr Chest Port    Result Date: 10/25/2020  EXAM:  XR CHEST PORT INDICATION:  Chest Pain COMPARISON:  10/23/2020 FINDINGS: A portable AP radiograph of the chest was obtained at 1049 hours. Port-A-Cath tip overlies SVC right atrial junction. .  Lungs are clear of an acute process allowing for portable technique. Scarring left upper lobe medially is unchanged. Delwyn Scarce Heart size is stable. Bony structures are unchanged. IMPRESSION: Lungs are clear of an acute process.      Xr Chest Port    Result Date: 10/23/2020  INDICATION: short of breath EXAM:  AP CHEST RADIOGRAPH COMPARISON: August 2, 2020 FINDINGS: AP portable view of the chest demonstrates right internal jugular Port-A-Cath, unchanged. Heart size is normal. There is no edema, effusion, consolidation, or pneumothorax. The osseous structures are unremarkable. IMPRESSION: No acute process. PAST PSYCHIATRIC HISTORY:  See above. PSYCHOSOCIAL HISTORY:  She used to work as a nurse. She lives by herself. MENTAL STATUS EXAMINATION:  She is alert and oriented in all spheres. She is dressed in hospital apparel. She reports her mood is okay. Affect is anxious. Speech normal rate and rhythm. Thought process is logical and goal directed. She denies suicidal ideation, homicidal ideation, auditory or visual hallucinations. Memory seems intact. Intelligence seems average. Insight is poor. Judgment is poor. ASSESSMENT AND PLANNING:  The patient meets the criteria for unspecified anxiety disorder and benzodiazepine use disorder, severe. I will start her on Cymbalta 30 mg daily, this can be increased to 60 mg after 3 doses; Atarax will be started as well 50 mg every six hours as needed for anxiety. Continue Ativan 0.5 mg four times a day. I have encouraged individual counseling after discharge. It is very important for her outpatient psychiatrist to be aware that the patient is misusing her Xanax. She has been to the emergency department a few times for anxiety and running out of the Xanax. I highly recommend for her outpatient psychiatrist to not have patient go back to Xanax at all given her dependence. Ativan taper will have to be continued outpatient, and this has to be done again by her outpatient psychiatrist.  Follow up with Dr. Vito Sadler as outpatient. Dc sitter. Discharge to home once medically stable. Thank you for this kind consult. Please call with questions.       SAMIRA LINDSAY NP      [id number]  D:  11/30/2020 17:49  T:  11/30/2020 23:38  11/30/2020 22:35  JOB #:  [id number] no bleeding at site

## 2023-11-26 NOTE — CONSULTS
1. Start cymbalta 30mg daily, increase to 60mg after 3 doses. 2. Atarax 50mg q6hrs prn for anxiety. 3. Continue ativan 0.5mg qid. 4. Highly encourage individual counseling. 5. It is very important for her outpatient psychiatrist to be aware that patient is misusing her xanax, she has been to ED few times for anxiety. Highly recommend for outpatient psychiatrist NOT for patient to go back to xanax AT ALL given psychological dependence. Ativan taper will have to be continued outpatient and this has to be done again by her outpatient psychiatrist.  6. Follow up with Dr. Carol Romero at [facility] immediately after discharge, highly recommend within 7 days of discharge. 7. Dc sitter. 8. Dc to home. Full consult dictated. Type 2 diabetes mellitus

## 2024-09-16 NOTE — PROGRESS NOTES
Hospitalist Progress Note    NAME: Laura Stanley   :  1955   MRN:  [id number]       Assessment / Plan:    1) Benzodiazpeine overdose:   Patient received flumazenil in the ED. Slowly improving, she is alert.     2) Tylenol overdose  - tylenol level at 132 on admission. She received IV acetylcysteine and repeat tylenol is at 8. Continue iv fluids. LFT wnl.      3) hypotension  4) HTN:   Holding home dose of HCTZ, losartan. Continue IV fluids. close monitoring. 5) Depression  Not controlled. Consult palliative service. Continue home velafaxine. 6)  Metastatic Breast CA to bone    7). Hypokalemia  replete    Body mass index is 33.09 kg/(m^2). Code status: Full  Prophylaxis: Hep SQ  Recommended Disposition: Home w/Family     Subjective:     Chief Complaint / Reason for Physician Visit  \"PAtient states her pain is mental, she is very sad and depressed because of her incurable cancer. She went to the psychiatry clinic last week but it has not helped\". Discussed with RN events overnight. Review of Systems:  Symptom Y/N Comments  Symptom Y/N Comments   Fever/Chills n   Chest Pain n    Poor Appetite n   Edema n    Cough n   Abdominal Pain n    Sputum n   Joint Pain n    SOB/LOBO n   Pruritis/Rash n    Nausea/vomit n   Tolerating PT/OT     Diarrhea n   Tolerating Diet y    Constipation    Other       Could NOT obtain due to:      Objective:     VITALS:   Last 24hrs VS reviewed since prior progress note.  Most recent are:  Patient Vitals for the past 24 hrs:   Temp Pulse Resp BP SpO2   18 1800 - 92 18 101/65 97 %   18 1730 - 86 17 (!) 86/51 97 %   18 1702 - 87 13 92/52 98 %   18 1630 98 °F (36.7 °C) 83 19 (!) 87/60 96 %   18 1607 - - - - 96 %   18 1530 - 83 23 95/56 96 %   18 1524 - 82 14 (!) 83/51 98 %   18 1522 - 83 12 (!) 70/50 99 %   18 1230 - 80 14 91/68 -   18 1101 - 87 12 97/79 92 %   18 1040 - 93 23 105/78 96 %   18 0934 - - - - 95 %   04/03/18 0900 - 91 18 127/79 96 %   04/03/18 0843 - 92 18 132/82 96 %   04/03/18 0811 - 90 16 121/83 95 %   04/03/18 0500 - 92 (!) 7 113/65 -   04/03/18 0430 - 92 11 117/80 -   04/03/18 0400 - 87 15 107/69 -   04/03/18 0332 - (!) 101 16 (!) 121/98 -   04/03/18 0301 - 86 - - -   04/03/18 0300 - 86 - 121/80 -   04/03/18 0246 - 86 - - -   04/03/18 0245 - 88 - 113/72 -   04/03/18 0230 - 89 - 135/90 -   04/03/18 0229 - 89 - - -   04/03/18 0215 - - - (!) 129/91 -   04/03/18 0200 - 85 10 128/76 -   04/03/18 0145 - - - 125/73 -   04/03/18 0130 - - - 117/79 -   04/03/18 0115 - 74 - 112/79 -   04/03/18 0100 - - - 116/75 96 %   04/03/18 0046 - - - - 91 %   04/03/18 0045 - - - 115/74 -   04/03/18 0030 - - - 121/77 99 %   04/02/18 2330 - 69 12 102/53 98 %   04/02/18 2315 - 69 13 105/80 95 %   04/02/18 2230 - 67 17 109/69 100 %   04/02/18 2215 - 68 12 97/56 100 %   04/02/18 2200 - 64 9 96/66 100 %   04/02/18 2101 - 74 19 98/65 94 %   04/02/18 2015 - 68 15 92/68 100 %   04/02/18 1856 - 60 14 99/78 99 %   04/02/18 1845 - 62 12 (!) 77/46 97 %       Intake/Output Summary (Last 24 hours) at 04/03/18 1836  Last data filed at 04/03/18 1319   Gross per 24 hour   Intake                0 ml   Output             1000 ml   Net            -1000 ml        PHYSICAL EXAM:  General: Alert, cooperative, not in acute pain or distress    EENT:  EOMI. Anicteric sclerae. Resp:  CTA bilaterally, no wheezing or rales. Bilateral mastectomy  CV:  Regular rate,  No LE edema  GI:  Soft, Non distended, Non tender.  +Bowel sounds  Neurologic:  Alert and oriented X 3, normal speech,   Psych:   Sad and tearful. Skin:  No rashes.   No jaundice    Reviewed most current lab test results and cultures  YES  Reviewed most current radiology test results   YES  Review and summation of old records today    NO  Reviewed patient's current orders and MAR    YES  PMH/SH reviewed - no change compared to H&P  ________________________________________________________________________  ________________________________________________________________________  Trevor Fagan MD     Procedures: see electronic medical records for all procedures/Xrays and details which were not copied into this note but were reviewed prior to creation of Plan. LABS:  I reviewed today's most current labs and imaging studies.   Pertinent labs include:  Recent Labs      04/03/18   0405  04/02/18   1722   WBC  7.0  5.4   HGB  13.3  13.6   HCT  40.7  40.8   PLT  265  288     Recent Labs      04/03/18   1600  04/03/18   0405  04/02/18   1722   NA  139  137  134*   K  2.7*  3.1*  3.9   CL  101  100  97   CO2  28  25  30   GLU  87  142*  115*   BUN  16  16  17   CREA  1.34*  1.13*  1.32*   CA  7.7*  8.1*  9.5   MG   --    --   1.8   ALB  3.0*  3.1*  3.9   TBILI  0.2  0.3  0.4   SGOT  14*  12*  14*   ALT  22  19  24       Signed: Trevor Fagan MD no concerns

## 2025-05-13 NOTE — ED NOTES
Pt resting in bed at this time. Pt denies pain. Pt talkative and asking when her friend is coming to visit. Pt has no needs at this time. Sitter 1:1. Will continue to monitor. Pyelonephritis